# Patient Record
Sex: FEMALE | Race: WHITE
[De-identification: names, ages, dates, MRNs, and addresses within clinical notes are randomized per-mention and may not be internally consistent; named-entity substitution may affect disease eponyms.]

---

## 2017-03-28 NOTE — OPERATIVE REPORT
PROCEDURE PHYSICIAN:   IVÁN OVALLE

 

DATE OF PROCEDURE:  

03/28/2017

 

PREOPERATIVE DIAGNOSIS:  

History of colon polyps. 

 

POSTOPERATIVE DIAGNOSIS:

Normal colon. 

 

PROCEDURE:

Colonoscopy. 

 

SURGEON:

Dr. Ovalle. 

 

ANESTHESIA:

Per CRNA. 

 

ESTIMATED BLOOD LOSS:

None. 

 

COMPLICATIONS:

None. 

 

INDICATIONS:

The patient is a 41-year-old female with recent history of

multiple colon polyps. She is recommended to have repeat

colonoscopy at this time. She understands the risks and benefits

and wished to proceed with procedure. Consent was signed on

chart. 

 

PROCEDURE:

The patient was taken to the endoscopy suite, placed in the left

lateral recumbent position. Timeout was performed. Digital rectal

exam was performed. There were no palpable polyps, masses or

ulcerations. The scope was inserted in the rectum and advanced

all way cecum with minimal difficulty. Prep was adequate. There

were no polyps, masses, ulcerations visualized of the cecum,

ascending, transverse, descending and sigmoid colon. Once in the

rectum, the scope was also retroflexed noting no other pathology.

The scope was returned to its normal position slowly withdrawn

until completely removed. The patient tolerated the procedure

well without any complications. She was taken to the recovery

room in stable condition. 

 

RECOMMENDATIONS:

The patient would repeat colonoscopy in 5 years. If she has any

problems prior to that, she should be reevaluated at that time. 

 

 

Job ID: [id number]

Dictated Date: 03/28/2017 10:22:47 

Transcription Date: 03/28/2017 13:26:11 / maicol

## 2017-03-28 NOTE — PROGRESS NOTE-POST OPERATIVE
Post-Operative Progess Note


Surgeon (s)/Assistant (s)


Surgeon


IVÁN HOLLAND DO


Assistant:  none





Pre-Operative Diagnosis


history of polyps





Post-Operative Diagnosis





normal colon





Post-Op Procedure Note


Date of Procedure:  Mar 28, 2017


Name of Procedure Performed:  


colonoscopy


Description of the Procedure:  


see note


Findings of the Procedure


see note


Anesthesia Type


per crna


Estimated blood loss (mL):  none


Specimen(s) collected/removed


none











IVÁN HOLLAND DO Mar 28, 2017 11:02 am

## 2017-03-28 NOTE — DISCHARGE INST-SIMPLE/STANDARD
Discharge Inst-Standard


Patient Instructions/Follow Up


Plan of Care/Instructions/FU:  


Follow up with Dr. Ovalle as needed.


Will need colonoscopy in 5 years or sooner if changes to current


conditions.


Activity as Tolerated:  Yes


Discharge Diet:  No Restrictions











FREDERICK SIMMS Mar 28, 2017 10:35

## 2017-03-28 NOTE — PROGRESS NOTE-PRE OPERATIVE
Pre-Operative Progress Note


H&P Reviewed


The H&P was reviewed, patient examined and no changes noted.


Date H&P Reviewed:  Mar 28, 2017


Time H&P Reviewed:  09:54


Pre-Operative Diagnosis:  history of polyps











IVÁN HOLLAND DO Mar 28, 2017 11:01 am

## 2017-04-11 NOTE — XMS REPORT
[facility]

 Created on: 2015



Kate,  April

External Reference #: [id number]

: 1975

Sex: Female



Demographics







 Address  [address] 

[place]  [address]

 

 Home Phone  [phone]

 

 Preferred Language  Unknown

 

 Marital Status  Unknown

 

 Denominational Affiliation  Unknown

 

 Race  White

 

 Ethnic Group  Not  or 





Author







 Author  VENECIA SHARPE

 

 [facility]  [employer]

 

 Address  Unknown

 

 Phone  Unavailable







Care Team Providers







 Care Team Member Name  Role  Phone

 

 VENECIA SHARPE    Unavailable



                                                                



Allergies, Adverse Reactions, Alerts

          





 Substance  Reaction  Event Type

 

 Penicillin V Potassium  Info Not Available  Drug Allergy



                                                                               
         



Problems

          





 Problem Type  Condition  ICD-9 Code  Onset Dates  Condition Status

 

 Problem  Plantar wart  078.12     Active

 

 Assessment  Palpitations  785.1     Active

 

 Problem  Other and unspecified ovarian cyst  620.2     Active

 

 Problem  Postnasal drip  784.91     Active

 

 Problem  Rash and other nonspecific skin eruption  782.1     Active

 

 Problem  Acute sinusitis, unspecified  461.9     Active

 

 Problem  Unspecified episodic mood disorder  296.90     Active

 

 Problem  Impaired fasting glucose  790.21     Active

 

 Problem  Cervicalgia  723.1     Active

 

 Problem  Enlargement of lymph nodes  785.6     Active

 

 Problem  Personal history of tobacco use, presenting hazards to health  V15.82
     Active

 

 Problem  Need for prophylactic vaccination and inoculation, Influenza  V04.81 
    Active

 

 Problem  Chest pain, unspecified  786.50     Active

 

 Problem  Cough  786.2     Active

 

 Problem  Acute mucoid otitis media  381.02     Active

 

 Problem  Pain in joint, lower leg  719.46     Active

 

 Problem  Acute upper respiratory infections of unspecified site  465.9     
Active

 

 Problem  Leukocytosis, unspecified  288.60     Active

 

 Problem  Screening for malignant neoplasm of the cervix  V76.2     Active

 

 Problem  Ingrowing nail  703.0     Active

 

 Problem  Dermatophytosis of nail  110.1     Active

 

 Problem  Allergic rhinitis due to pollen  477.0     Active

 

 Problem  Allergic rhinitis, cause unspecified  477.9     Active

 

 Problem  Unspecified contraceptive management  V25.9     Active

 

 Problem  Unspecified breast screening  V76.10     Active



                                                                               
                                                                               
                                                                               
                                                                               
            



Medications

          





 Medication  Code System  Code  Instructions  Start Date  End Date  Status  
Dosage

 

 Flonase  NDC  [address]-15  50 mcg/actuation    2014        take 1 
sprays by Nasal route 2 times per dayin each nostril

 

 Triamcinolone Acetonide  NDC  [address]-15  0.1 %    2014        
apply a thin layer to the affected area(s)  by Topical route 3 times per day 
dispense 60 gram tube

 

 Allegra Allergy  NDC  35268-62617  180 mg    2014        take 1 
tablet (180 mg) by oral route once daily

 

 Antacid  NDC  [address]-92  500 MG Orally Once a day           1 tablet

 

 Zoloft  NDC  [address]-68  25 MG Orally Once a day  May 28, 2015        1 
tablet



                                                                               
                                                 



Procedures

          





 Procedure  Coding System  Code  Date

 

 ASSAY OF MAGNESIUM  CPT-4  84203  2015

 

 COMPLETE CBC W/AUTO DIFF WBC  CPT-4  35597  2015

 

 COMPREHEN METABOLIC PANEL  CPT-4  67508  2015

 

 ELECTROCARDIOGRAM, TRACING  CPT-4  41812  2015

 

 ASSAY THYROID STIM HORMONE  CPT-4  32536  2015

 

 VENIPUNCT, ROUTINE*  CPT-4  82469  2015

 

 Office Visit, Est Pt., Level 4  CPT-4  09304  2015



                                                                               
                                                                               



Vital Signs

          





 Date/Time:  2015

 

 Cardiac Monitoring Heart Rate  80 bpm

 

 Temperature  97.4 F

 

 Height  69 in

 

 Blood Pressure Diastolic  74 mmHg

 

 Blood Pressure Systolic  116 mmHg



                                                                    



Results

          





 Name  Result  Date  Reference Range  Unit  Abnormality Flag

 

 MAGNESIUM, SERUM               

 

 ROUTINE VENIPUNCTURE               

 

 EKG, TRACING (IN-HOUSE)               



                                                                               
         



Summary Purpose

          [employer] Submission

## 2017-04-11 NOTE — XMS REPORT
[facility]

 Created on: 2016



Kate,  April

External Reference #: [id number]

: 1975

Sex: Female



Demographics







 Address  [address] [place]  [address]

 

 Home Phone  [phone]

 

 Preferred Language  Unknown

 

 Marital Status  Unknown

 

 Bahai Affiliation  Unknown

 

 Race  White

 

 Ethnic Group  Not  or 





Author







 Author  VENECIA SHARPE

 

 Organization  [employer]

 

 Address  Unknown

 

 Phone  Unavailable







Care Team Providers







 Care Team Member Name  Role  Phone

 

 VENECIA SHARPE  CP  Unavailable



                                                                



Allergies

          No Known Allergies                                                   
                                     



Problems

          





 Problem Type  Condition  Code  Onset Dates  Condition Status

 

 Problem  Upper abdominal pain  R10.10     Active

 

 Problem  Bowel habit changes  R19.4     Active

 

 Problem  Dyspepsia  R10.13     Active

 

 Problem  Type 2 diabetes mellitus without complication, without long-term 
current use of insulin  E11.9     Active

 

 Assessment  Type 2 diabetes mellitus without complication, without long-term 
current use of insulin  E11.9     Active

 

 Problem  History of leukocytosis  Z86.2     Active

 

 Problem  Helicobacter pylori (H. pylori) infection  A04.8     Active



                                                                               
                                                                     



Medications

          





 Medication  Code System  Code  Instructions  Start Date  End Date  Status  
Dosage

 

 MetFORMIN HCl ER  NDC  [address]-73  500 MG Orally twice a day  Aug 03, 2016 
       1 tab with evening meal x 7 days, 1 tab twice day, 2 tabs in am and 1 
tab in evening, 2 tabs twice day with meals



                                                                              



Results

          No Known Results                                                     
               



Summary Purpose

          [employer] Submission

## 2017-04-11 NOTE — XMS REPORT
[facility]

 Created on: 2015



Kate,  April

External Reference #: [id number]

: 1975

Sex: Female



Demographics







 Address  [address] 

[place]  [address]

 

 Home Phone  [phone]

 

 Preferred Language  Unknown

 

 Marital Status  Unknown

 

 Mosque Affiliation  Unknown

 

 Race  White

 

 Ethnic Group  Not  or 





Author







 Author  VENECIA SHARPE

 

 Organization  [employer]

 

 Address  Unknown

 

 Phone  Unavailable







Care Team Providers







 Care Team Member Name  Role  Phone

 

 VENECIA SHARPE  CP  Unavailable



                                                                



Allergies

          No Known Allergies                                                   
                                     



Problems

          





 Problem Type  Condition  ICD-9 Code  Onset Dates  Condition Status

 

 Problem  Plantar wart  078.12     Active

 

 Assessment  Palpitations  785.1     Active

 

 Problem  Other and unspecified ovarian cyst  620.2     Active

 

 Problem  Postnasal drip  784.91     Active

 

 Problem  Rash and other nonspecific skin eruption  782.1     Active

 

 Problem  Acute sinusitis, unspecified  461.9     Active

 

 Problem  Unspecified episodic mood disorder  296.90     Active

 

 Problem  Impaired fasting glucose  790.21     Active

 

 Problem  Cervicalgia  723.1     Active

 

 Problem  Enlargement of lymph nodes  785.6     Active

 

 Problem  Personal history of tobacco use, presenting hazards to health  V15.82
     Active

 

 Problem  Need for prophylactic vaccination and inoculation, Influenza  V04.81 
    Active

 

 Problem  Chest pain, unspecified  786.50     Active

 

 Problem  Cough  786.2     Active

 

 Problem  Acute mucoid otitis media  381.02     Active

 

 Problem  Pain in joint, lower leg  719.46     Active

 

 Problem  Acute upper respiratory infections of unspecified site  465.9     
Active

 

 Problem  Leukocytosis, unspecified  288.60     Active

 

 Problem  Screening for malignant neoplasm of the cervix  V76.2     Active

 

 Problem  Ingrowing nail  703.0     Active

 

 Problem  Dermatophytosis of nail  110.1     Active

 

 Problem  Allergic rhinitis due to pollen  477.0     Active

 

 Problem  Allergic rhinitis, cause unspecified  477.9     Active

 

 Problem  Unspecified contraceptive management  V25.9     Active

 

 Problem  Unspecified breast screening  V76.10     Active



                                                                               
                                                                               
                                                                               
                                                                               
            



Medications

          No Known Medications                                                 
                                       



Procedures

          





 Procedure  Coding System  Code  Date

 

 VENIPUNCT, ROUTINE*  CPT-4  05857  2015

 

 LIPID PANEL  CPT-4  57298  2015



                                                                              



Results

          





 Name  Result  Date  Reference Range  Unit  Abnormality Flag

 

 ROUTINE VENIPUNCTURE               



                                                                    



Summary Purpose

          [employer]ClinicalWorks Submission

## 2017-04-11 NOTE — XMS REPORT
[facility]

 Created on: 2017



Kate,  April

External Reference #: [id number]

: 1975

Sex: Female



Demographics







 Address  [address] [place]  [address]

 

 Preferred Language  Unknown

 

 Marital Status  Unknown

 

 Zoroastrian Affiliation  Unknown

 

 Race  Unknown

 

 Ethnic Group  Unknown





Author







 Author  VENECIA SHARPE

 

 [facility]

 

 Address  [address] [place]  [address]



 

 Phone  [phone]







Care Team Providers







 Care Team Member Name  Role  Phone

 

 VENECIA SHARPE  Unavailable  [phone]







PROBLEMS







 Type  Condition  ICD9-CM Code  AMK90-JC Code  Onset Dates  Condition Status  
SNOMED Code

 

 Problem  Type 2 diabetes mellitus without complication, without long-term 
current use of insulin     E11.9     Active  145020163

 

 Problem  Mixed hyperlipidemia     E78.2     Active  472586435

 

 Problem  Dyspepsia     R10.13     Active  973023198

 

 Problem  History of leukocytosis     Z86.2     Active  650694066

 

 Problem  Helicobacter pylori (H. pylori) infection     A04.8     Active  
5304854

 

 Problem  Upper abdominal pain     R10.10     Active  64029352

 

 Problem  Bowel habit changes     R19.4     Active  46107329







ALLERGIES

Unknown Allergies



SOCIAL HISTORY

No smoking Hx information available



PLAN OF CARE





VITAL SIGNS





MEDICATIONS







 Medication  Instructions  Dosage  Frequency  Start Date  End Date  Duration  
Status

 

 MetFORMIN HCl  MG  Orally twice a day  2 tablets  12h  03 Aug, 2016     
30 days  Active







RESULTS

No Results



PROCEDURES

No Known procedures



IMMUNIZATIONS

No Known Immunizations

## 2017-04-11 NOTE — XMS REPORT
[facility]

 Created on: 2015whit,  April

External Reference #: [id number]

: 1975

Sex: Female



Demographics







 Address  [address]

[place]  [address]

 

 Home Phone  [phone]

 

 Preferred Language  Unknown

 

 Marital Status  Unknown

 

 Lutheran Affiliation  Unknown

 

 Race  White

 

 Ethnic Group  Not  or 





Author







 Author  VENECIA SHARPE

 

 [facility]  [employer]

 

 Address  Unknown

 

 Phone  Unavailable







Care Team Providers







 Care Team Member Name  Role  Phone

 

 VENECIA SHARPE  CP  Unavailable



                                                                



Allergies, Adverse Reactions, Alerts

          





 Substance  Reaction  Event Type

 

 Penicillin V Potassium  Info Not Available  Drug Allergy



                                                                               
         



Problems

          





 Problem Type  Condition  Code  Onset Dates  Condition Status

 

 Assessment  Hyperlipidemia  272.4     Active

 

 Assessment  Encounter for PPD test  V74.1     Active

 

 Assessment  Physical exam, pre-employment  V70.5     Active

 

 Problem  Other and unspecified ovarian cyst  620.2     Active

 

 Problem  Postnasal drip  784.91     Active

 

 Problem  Rash and other nonspecific skin eruption  782.1     Active

 

 Problem  Chest pain, unspecified  786.50     Active

 

 Problem  Unspecified episodic mood disorder  296.90     Active

 

 Problem  Acute mucoid otitis media  381.02     Active

 

 Problem  Acute sinusitis, unspecified  461.9     Active

 

 Problem  Need for prophylactic vaccination and inoculation, Influenza  V04.81 
    Active

 

 Problem  Impaired fasting glucose  790.21     Active

 

 Problem  Ingrowing nail  703.0     Active

 

 Problem  Enlargement of lymph nodes  785.6     Active

 

 Problem  Hyperlipidemia  272.4     Active

 

 Problem  Personal history of tobacco use, presenting hazards to health  V15.82
     Active

 

 Problem  Acute upper respiratory infections of unspecified site  465.9     
Active

 

 Problem  Cough  786.2     Active

 

 Problem  Cervicalgia  723.1     Active

 

 Problem  Pain in joint, lower leg  719.46     Active

 

 Problem  Screening for malignant neoplasm of the cervix  V76.2     Active

 

 Problem  Unspecified contraceptive management  V25.9     Active

 

 Problem  Dermatophytosis of nail  110.1     Active

 

 Problem  Leukocytosis, unspecified  288.60     Active

 

 Problem  Allergic rhinitis, cause unspecified  477.9     Active

 

 Problem  Plantar wart  078.12     Active

 

 Problem  Unspecified breast screening  V76.10     Active

 

 Problem  Allergic rhinitis due to pollen  477.0     Active



                                                                               
                                                                               
                                                                               
                                                                               
                                          



Medications

          





 Medication  Code System  Code  Instructions  Start Date  End Date  Status  
Dosage

 

 Atorvastatin Calcium  NDC  [address]-69  20 MG Orally Once a day  2015        1 tablet

 

 Zoloft  NDC  [address]-56  25 MG Orally Once a day  May 28, 2015        1 
tablet

 

 Depo-Provera  NDC  [address]-55  150 MG/ML Intramuscular            1 ml



                                                                               
                             



Procedures

          





 Procedure  Coding System  Code  Date

 

 TB INTRADERMAL TEST  CPT-4  23532  2015

 

 Office Visit, Est Pt., Level 3  CPT-4  29908  2015



                                                                               
                             



Vital Signs

          





 Date/Time:  2015

 

 Temperature  98.3 F

 

 Weight  208.5 lbs

 

 Height  69 in

 

 BMI  30.79 Index

 

 Blood Pressure Diastolic  70 mmHg

 

 Blood Pressure Systolic  118 mmHg

 

 Cardiac Monitoring Heart Rate  60 bpm



                                                                    



Results

          





 Name  Result  Date  Reference Range  Unit  Abnormality Flag

 

 TB INTRADERMAL               



                                                                    



Summary Purpose

          [employer]ClinicalWorks Submission

## 2017-04-11 NOTE — XMS REPORT
[facility]

 Created on: 2016



Kate,  April

External Reference #: [id number]

: 1975

Sex: Female



Demographics







 Address  [address] [place]  [address]

 

 Home Phone  [phone]

 

 Preferred Language  Unknown

 

 Marital Status  Unknown

 

 Taoist Affiliation  Unknown

 

 Race  White

 

 Ethnic Group  Not  or 





Author







 Author  VENECIA SHARPE

 

 Organization  [employer]

 

 Address  Unknown

 

 Phone  Unavailable







Care Team Providers







 Care Team Member Name  Role  Phone

 

 VENECIA SHARPE  CP  Unavailable



                                                                



Allergies

          No Known Allergies                                                   
                                     



Problems

          





 Problem Type  Condition  Code  Onset Dates  Condition Status

 

 Problem  Upper abdominal pain  R10.10     Active

 

 Problem  Bowel habit changes  R19.4     Active

 

 Problem  Dyspepsia  R10.13     Active

 

 Problem  Type 2 diabetes mellitus without complication, without long-term 
current use of insulin  E11.9     Active

 

 Assessment  Type 2 diabetes mellitus without complication, without long-term 
current use of insulin  E11.9     Active

 

 Problem  History of leukocytosis  Z86.2     Active

 

 Problem  Helicobacter pylori (H. pylori) infection  A04.8     Active



                                                                               
                                                                     



Medications

          No Known Medications                                                 
                                       



Procedures

          





 Procedure  Coding System  Code  Date

 

 MICROALBUMIN, SEMIQUANT  CPT-4  82096  Aug 08, 2016

 

 VENIPUNCT, ROUTINE*  CPT-4  14443  Aug 08, 2016

 

 GLYCATED HEMOGLOBIN TEST  CPT-4  72696  Aug 08, 2016

 

 MICROALBUMIN, QUANTITATIVE  CPT-4  22477  Aug 08, 2016

 

 ASSAY OF URINE CREATININE  CPT-4  60339  Aug 08, 2016



                                                                               
                                       



Results

          No Known Results                                                     
               



Summary Purpose

          [employer]ClinicalWorks Submission

## 2017-04-11 NOTE — XMS REPORT
[facility]

 Created on: 12/15/2015



Kate,  April

External Reference #: [id number]

: 1975

Sex: Female



Demographics







 Address  [address]

[place]  [address]

 

 Home Phone  [phone]

 

 Preferred Language  Unknown

 

 Marital Status  Unknown

 

 Bahai Affiliation  Unknown

 

 Race  White

 

 Ethnic Group  Not  or 





Author







 VENECIA Combs

 

 [facility]  eClinicalWorks

 

 Address  Unknown

 

 Phone  Unavailable







Care Team Providers







 Care Team Member Name  Role  Phone

 

 VENECIA SHARPE    Unavailable



                                                                



Allergies, Adverse Reactions, Alerts

          





 Substance  Reaction  Event Type

 

 Penicillin V Potassium  Info Not Available  Drug Allergy



                                                                               
         



Problems

          





 Problem Type  Condition  Code  Onset Dates  Condition Status

 

 Assessment  Localized swelling of lower leg  R22.40     Active

 

 Assessment  Left leg pain  M79.605     Active

 

 Problem  Postnasal drip  784.91     Active

 

 Problem  Chest pain, unspecified  786.50     Active

 

 Problem  Unspecified episodic mood disorder  296.90     Active

 

 Problem  Personal history of tobacco use, presenting hazards to health  V15.82
     Active

 

 Problem  Acute sinusitis, unspecified  461.9     Active

 

 Problem  Enlargement of lymph nodes  785.6     Active

 

 Problem  Acute mucoid otitis media  381.02     Active

 

 Problem  Acute upper respiratory infections of unspecified site  465.9     
Active

 

 Problem  Cough  786.2     Active

 

 Problem  Left leg pain  M79.605     Active

 

 Problem  Hyperlipidemia  272.4     Active

 

 Problem  Leukocytosis, unspecified  288.60     Active

 

 Problem  Dermatophytosis of nail  110.1     Active

 

 Problem  Localized swelling of lower leg  R22.40     Active

 

 Problem  Ingrowing nail  703.0     Active

 

 Problem  Cervicalgia  723.1     Active

 

 Problem  Pain in joint, lower leg  719.46     Active

 

 Problem  Need for prophylactic vaccination and inoculation, Influenza  V04.81 
    Active

 

 Problem  Impaired fasting glucose  790.21     Active

 

 Problem  Unspecified breast screening  V76.10     Active

 

 Problem  Allergic rhinitis due to pollen  477.0     Active

 

 Problem  Screening for malignant neoplasm of the cervix  V76.2     Active

 

 Problem  Unspecified contraceptive management  V25.9     Active

 

 Problem  Other and unspecified ovarian cyst  620.2     Active

 

 Problem  Rash and other nonspecific skin eruption  782.1     Active

 

 Problem  Allergic rhinitis, cause unspecified  477.9     Active

 

 Problem  Plantar wart  078.12     Active



                                                                               
                                                                               
                                                                               
                                                                               
                                                    



Medications

          





 Medication  Code System  Code  Instructions  Start Date  End Date  Status  
Dosage

 

 Depo-Provera  NDC  [address]-68  150 MG/ML Intramuscular            1 ml

 

 Flonase  NDC  [address]-62  50 MCG/ACT Nasally Once a day           1 spray 
in each nostril

 

 Zoloft  NDC  [address]-63  25 MG Orally Once a day  May 28, 2015        1 
tablet

 

 Fish Oil  NDC  [address]-62  300 MG Orally Twice a day           1 capsule



                                                                               
                                       



Procedures

          





 Procedure  Coding System  Code  Date

 

 Office Visit, Est Pt., Level 3  CPT-4  93447  Dec 10, 2015



                                                                               
                   



Vital Signs

          





 Date/Time:  Dec 10, 2015

 

 Temperature  99.5 F

 

 Weight  215.9 lbs

 

 Height  69 in

 

 BMI  31.88 Index

 

 Blood Pressure Diastolic  80 mmHg

 

 Blood Pressure Systolic  122 mmHg

 

 Cardiac Monitoring Heart Rate  84 bpm



                                                                              



Results

          No Known Results                                                     
               



Summary Purpose

          eClinicalWorks Submission

## 2017-04-11 NOTE — XMS REPORT
[facility]

 Created on: 2015



Kate,  April

External Reference #: [id number]

: 1975

Sex: Female



Demographics







 Address  [address] 

[place]  [address]

 

 Home Phone  [phone]

 

 Preferred Language  Unknown

 

 Marital Status  Unknown

 

 Mosque Affiliation  Unknown

 

 Race  White

 

 Ethnic Group  Not  or 





Author







 Author  VENECIA SHARPE

 

 Organization  eClinicalWorks

 

 Address  Unknown

 

 Phone  Unavailable







Care Team Providers







 Care Team Member Name  Role  Phone

 

 VENECIA SHARPE  CP  Unavailable



                                                                



Allergies

          No Known Allergies                                                   
                                     



Problems

          





 Problem Type  Condition  Code  Onset Dates  Condition Status

 

 Assessment  Encounter for immunization  Z23     Active

 

 Problem  Other and unspecified ovarian cyst  620.2     Active

 

 Problem  Postnasal drip  784.91     Active

 

 Problem  Rash and other nonspecific skin eruption  782.1     Active

 

 Problem  Chest pain, unspecified  786.50     Active

 

 Problem  Unspecified episodic mood disorder  296.90     Active

 

 Problem  Acute mucoid otitis media  381.02     Active

 

 Problem  Acute sinusitis, unspecified  461.9     Active

 

 Problem  Need for prophylactic vaccination and inoculation, Influenza  V04.81 
    Active

 

 Problem  Impaired fasting glucose  790.21     Active

 

 Problem  Ingrowing nail  703.0     Active

 

 Problem  Enlargement of lymph nodes  785.6     Active

 

 Problem  Hyperlipidemia  272.4     Active

 

 Problem  Personal history of tobacco use, presenting hazards to health  V15.82
     Active

 

 Problem  Acute upper respiratory infections of unspecified site  465.9     
Active

 

 Problem  Cough  786.2     Active

 

 Problem  Cervicalgia  723.1     Active

 

 Problem  Pain in joint, lower leg  719.46     Active

 

 Problem  Screening for malignant neoplasm of the cervix  V76.2     Active

 

 Problem  Unspecified contraceptive management  V25.9     Active

 

 Problem  Dermatophytosis of nail  110.1     Active

 

 Problem  Leukocytosis, unspecified  288.60     Active

 

 Problem  Allergic rhinitis, cause unspecified  477.9     Active

 

 Problem  Plantar wart  078.12     Active

 

 Problem  Unspecified breast screening  V76.10     Active

 

 Problem  Allergic rhinitis due to pollen  477.0     Active



                                                                               
                                                                               
                                                                               
                                                                               
                      



Medications

          No Known Medications                                                 
                                       



Procedures

          





 Procedure  Coding System  Code  Date

 

 SINGLE IMMUNIZATION ADMIN  CPT-4  91584  2015

 

 FLUARIX QUAD (3 & UP)-GSK-  CPT-4  01843  2015



                                                                               
         



Results

          No Known Results                                                     
                                   



Immunizations

          





 Vaccine  Administration Date

 

 FLUARIX QUAD (3 & UP)-GSK-2015



                                                                    



Summary Purpose

          eClinicalWorks Submission

## 2017-04-11 NOTE — XMS REPORT
[facility]

 Created on: 2015



Kate,  April

External Reference #: [id number]

: 1975

Sex: Female



Demographics







 Address  [address] 

[place]  [address]

 

 Home Phone  [phone]

 

 Preferred Language  Unknown

 

 Marital Status  Unknown

 

 Amish Affiliation  Unknown

 

 Race  White

 

 Ethnic Group  Not  or 





Author







 Author  VENECIA SHARPE

 

 Organization  eClinicalWorks

 

 Address  Unknown

 

 Phone  Unavailable







Care Team Providers







 Care Team Member Name  Role  Phone

 

 VENECIA SHARPE  CP  Unavailable



                                                                



Allergies

          No Known Allergies                                                   
                                     



Problems

          





 Problem Type  Condition  ICD-9 Code  Onset Dates  Condition Status

 

 Problem  Plantar wart  078.12     Active

 

 Assessment  Elevated blood sugar  790.29     Active

 

 Problem  Other and unspecified ovarian cyst  620.2     Active

 

 Problem  Postnasal drip  784.91     Active

 

 Problem  Rash and other nonspecific skin eruption  782.1     Active

 

 Problem  Acute sinusitis, unspecified  461.9     Active

 

 Problem  Unspecified episodic mood disorder  296.90     Active

 

 Problem  Impaired fasting glucose  790.21     Active

 

 Problem  Cervicalgia  723.1     Active

 

 Problem  Enlargement of lymph nodes  785.6     Active

 

 Problem  Personal history of tobacco use, presenting hazards to health  V15.82
     Active

 

 Problem  Need for prophylactic vaccination and inoculation, Influenza  V04.81 
    Active

 

 Problem  Chest pain, unspecified  786.50     Active

 

 Problem  Cough  786.2     Active

 

 Problem  Acute mucoid otitis media  381.02     Active

 

 Problem  Pain in joint, lower leg  719.46     Active

 

 Problem  Acute upper respiratory infections of unspecified site  465.9     
Active

 

 Problem  Leukocytosis, unspecified  288.60     Active

 

 Problem  Screening for malignant neoplasm of the cervix  V76.2     Active

 

 Problem  Ingrowing nail  703.0     Active

 

 Problem  Dermatophytosis of nail  110.1     Active

 

 Problem  Allergic rhinitis due to pollen  477.0     Active

 

 Problem  Allergic rhinitis, cause unspecified  477.9     Active

 

 Problem  Unspecified contraceptive management  V25.9     Active

 

 Problem  Unspecified breast screening  V76.10     Active



                                                                               
                                                                               
                                                                               
                                                                               
            



Medications

          No Known Medications                                                 
                                       



Procedures

          





 Procedure  Coding System  Code  Date

 

 GLYCATED HEMOGLOBIN TEST  CPT-4  58032  2015



                                                                              



Results

          No Known Results                                                     
               



Summary Purpose

          eClinicalWorks Submission

## 2017-04-11 NOTE — XMS REPORT
[facility]

 Created on: 2015



Kate,  April

External Reference #: [id number]

: 1975

Sex: Female



Demographics







 Address  [address] 

[place]  [address]

 

 Home Phone  [phone]

 

 Preferred Language  Unknown

 

 Marital Status  Unknown

 

 Restorationist Affiliation  Unknown

 

 Race  White

 

 Ethnic Group  Not  or 





Author







 Author  MARY GARCIA

 

 Organization  eClinicalWorks

 

 Address  Unknown

 

 Phone  Unavailable







Care Team Providers







 Care Team Member Name  Role  Phone

 

 MARY GARCIA  CP  Unavailable



                                                                



Allergies

          No Known Allergies                                                   
                                     



Problems

          





 Problem Type  Condition  ICD-9 Code  Onset Dates  Condition Status

 

 Problem  Rash and other nonspecific skin eruption  782.1     Active

 

 Problem  Acute sinusitis, unspecified  461.9     Active

 

 Problem  Unspecified episodic mood disorder  296.90     Active

 

 Problem  Impaired fasting glucose  790.21     Active

 

 Problem  Enlargement of lymph nodes  785.6     Active

 

 Problem  Cervicalgia  723.1     Active

 

 Problem  Personal history of tobacco use, presenting hazards to health  V15.82
     Active

 

 Problem  Chest pain, unspecified  786.50     Active

 

 Problem  Need for prophylactic vaccination and inoculation, Influenza  V04.81 
    Active

 

 Problem  Cough  786.2     Active

 

 Problem  Acute mucoid otitis media  381.02     Active

 

 Problem  Pain in joint, lower leg  719.46     Active

 

 Problem  Acute upper respiratory infections of unspecified site  465.9     
Active

 

 Problem  Leukocytosis, unspecified  288.60     Active

 

 Problem  Screening for malignant neoplasm of the cervix  V76.2     Active

 

 Problem  Ingrowing nail  703.0     Active

 

 Problem  Dermatophytosis of nail  110.1     Active

 

 Problem  Allergic rhinitis due to pollen  477.0     Active

 

 Problem  Allergic rhinitis, cause unspecified  477.9     Active

 

 Problem  Unspecified contraceptive management  V25.9     Active

 

 Problem  Plantar wart  078.12     Active

 

 Problem  Postnasal drip  784.91     Active

 

 Problem  Unspecified breast screening  V76.10     Active

 

 Problem  Other and unspecified ovarian cyst  620.2     Active



                                                                               
                                                                               
                                                                               
                                                                               
  



Medications

          





 Medication  Code System  Code  Instructions  Start Date  End Date  Status  
Dosage

 

 Silvadene  NDC  [address]-31  1 % Externally Once a day  2015        
1 application to affected area



                                                                              



Results

          No Known Results                                                     
               



Summary Purpose

          eClinicalWorks Submission

## 2017-04-11 NOTE — XMS REPORT
[facility]

 Created on: 2015



Kate,  April

External Reference #: [id number]

: 1975

Sex: Female



Demographics







 Address  [address] 

[place]  [address]

 

 Home Phone  [phone]

 

 Preferred Language  Unknown

 

 Marital Status  Unknown

 

 Orthodoxy Affiliation  Unknown

 

 Race  White

 

 Ethnic Group  Not  or 





Author







 Author  VENECIA SHARPE

 

 Organization  eClinicalWorks

 

 Address  Unknown

 

 Phone  Unavailable







Care Team Providers







 Care Team Member Name  Role  Phone

 

 VENECIA SHARPE  CP  Unavailable



                                                                



Allergies

          No Known Allergies                                                   
                                     



Problems

          





 Problem Type  Condition  Code  Onset Dates  Condition Status

 

 Problem  Postnasal drip  784.91     Active

 

 Problem  Chest pain, unspecified  786.50     Active

 

 Problem  Unspecified episodic mood disorder  296.90     Active

 

 Problem  Personal history of tobacco use, presenting hazards to health  V15.82
     Active

 

 Problem  Acute sinusitis, unspecified  461.9     Active

 

 Problem  Enlargement of lymph nodes  785.6     Active

 

 Problem  Acute mucoid otitis media  381.02     Active

 

 Problem  Acute upper respiratory infections of unspecified site  465.9     
Active

 

 Problem  Cough  786.2     Active

 

 Problem  Left leg pain  M79.605     Active

 

 Problem  Hyperlipidemia  272.4     Active

 

 Problem  Leukocytosis, unspecified  288.60     Active

 

 Problem  Dermatophytosis of nail  110.1     Active

 

 Problem  Localized swelling of lower leg  R22.40     Active

 

 Problem  Ingrowing nail  703.0     Active

 

 Problem  Cervicalgia  723.1     Active

 

 Problem  Pain in joint, lower leg  719.46     Active

 

 Problem  Need for prophylactic vaccination and inoculation, Influenza  V04.81 
    Active

 

 Problem  Impaired fasting glucose  790.21     Active

 

 Problem  Unspecified breast screening  V76.10     Active

 

 Problem  Allergic rhinitis due to pollen  477.0     Active

 

 Problem  Screening for malignant neoplasm of the cervix  V76.2     Active

 

 Problem  Unspecified contraceptive management  V25.9     Active

 

 Problem  Other and unspecified ovarian cyst  620.2     Active

 

 Problem  Rash and other nonspecific skin eruption  782.1     Active

 

 Problem  Allergic rhinitis, cause unspecified  477.9     Active

 

 Problem  Plantar wart  078.12     Active



                                                                               
                                                                               
                                                                               
                                                                               
                                



Medications

          No Known Medications                                                 
                             



Results

          No Known Results                                                     
               



Summary Purpose

          eClinicalWorks Submission

## 2017-04-11 NOTE — XMS REPORT
[facility]

 Created on: 12/10/2015



Bunney,  April

External Reference #: [id number]

: 1975

Sex: Female



Demographics







 Address  [address] 

[place]  [address]

 

 Home Phone  [phone]

 

 Preferred Language  Unknown

 

 Marital Status  Unknown

 

 Orthodoxy Affiliation  Unknown

 

 Race  White

 

 Ethnic Group  Not  or 





Author







 Author  VENECIA SHARPE

 

 [facility]  eClinicalWorks

 

 Address  Unknown

 

 Phone  Unavailable







Care Team Providers







 Care Team Member Name  Role  Phone

 

 VENECIA SHARPE  CP  Unavailable



                                                                



Allergies, Adverse Reactions, Alerts

          





 Substance  Reaction  Event Type

 

 Penicillin V Potassium  Info Not Available  Drug Allergy



                                                                               
         



Problems

          





 Problem Type  Condition  Code  Onset Dates  Condition Status

 

 Assessment  Left leg pain  M79.605     Active

 

 Problem  Postnasal drip  784.91     Active

 

 Problem  Rash and other nonspecific skin eruption  782.1     Active

 

 Problem  Chest pain, unspecified  786.50     Active

 

 Problem  Unspecified episodic mood disorder  296.90     Active

 

 Problem  Personal history of tobacco use, presenting hazards to health  V15.82
     Active

 

 Problem  Acute sinusitis, unspecified  461.9     Active

 

 Problem  Cough  786.2     Active

 

 Problem  Acute mucoid otitis media  381.02     Active

 

 Problem  Hyperlipidemia  272.4     Active

 

 Problem  Need for prophylactic vaccination and inoculation, Influenza  V04.81 
    Active

 

 Problem  Dermatophytosis of nail  110.1     Active

 

 Problem  Ingrowing nail  703.0     Active

 

 Problem  Left leg pain  M79.605     Active

 

 Problem  Enlargement of lymph nodes  785.6     Active

 

 Problem  Pain in joint, lower leg  719.46     Active

 

 Problem  Acute upper respiratory infections of unspecified site  465.9     
Active

 

 Problem  Impaired fasting glucose  790.21     Active

 

 Problem  Cervicalgia  723.1     Active

 

 Problem  Unspecified contraceptive management  V25.9     Active

 

 Problem  Unspecified breast screening  V76.10     Active

 

 Problem  Leukocytosis, unspecified  288.60     Active

 

 Problem  Screening for malignant neoplasm of the cervix  V76.2     Active

 

 Problem  Plantar wart  078.12     Active

 

 Problem  Other and unspecified ovarian cyst  620.2     Active

 

 Problem  Allergic rhinitis due to pollen  477.0     Active

 

 Problem  Allergic rhinitis, cause unspecified  477.9     Active



                                                                               
                                                                               
                                                                               
                                                                               
                                



Medications

          





 Medication  Code System  Code  Instructions  Start Date  End Date  Status  
Dosage

 

 Zoloft  NDC  [address]-12  25 MG Orally Once a day  May 28, 2015        1 
tablet

 

 Fish Oil  NDC  [address]-98  300 MG Orally Twice a day           1 capsule

 

 Depo-Provera  NDC  [address]-69  150 MG/ML Intramuscular            1 ml



                                                                               
                             



Procedures

          





 Procedure  Coding System  Code  Date

 

 Office Visit, Est Pt., Level 3  CPT-4  11990  Dec 01, 2015



                                                                               
                   



Vital Signs

          





 Date/Time:  Dec 01, 2015

 

 Temperature  98.3 F

 

 Weight  216.9 lbs

 

 Height  69 in

 

 BMI  32.03 Index

 

 Blood Pressure Diastolic  76 mmHg

 

 Blood Pressure Systolic  118 mmHg

 

 Cardiac Monitoring Heart Rate  80 bpm



                                                                    



Results

          





 Name  Result  Date  Reference Range  Unit  Abnormality Flag

 

 Ultrasound : Venous Doppler (DVT EVAL)               



                                                                    



Summary Purpose

          eClinicalWorks Submission

## 2017-04-11 NOTE — XMS REPORT
[facility]

 Created on: 2016



Kate,  April

External Reference #: [id number]

: 1975

Sex: Female



Demographics







 Address  [address] [place]  [address]

 

 Home Phone  [phone]

 

 Preferred Language  Unknown

 

 Marital Status  Unknown

 

 Alevism Affiliation  Unknown

 

 Race  White

 

 Ethnic Group  Not  or 





Author







 Author  VENECIA SHARPE

 

 Organization  [employer]

 

 Address  Unknown

 

 Phone  Unavailable







Care Team Providers







 Care Team Member Name  Role  Phone

 

 VENECIA SHARPE  CP  Unavailable



                                                                



Allergies

          No Known Allergies                                                   
                                     



Problems

          





 Problem Type  Condition  Code  Onset Dates  Condition Status

 

 Assessment  Mixed hyperlipidemia  E78.2     Active

 

 Problem  Dyspepsia  R10.13     Active

 

 Problem  Upper abdominal pain  R10.10     Active

 

 Problem  Mixed hyperlipidemia  E78.2     Active

 

 Problem  Helicobacter pylori (H. pylori) infection  A04.8     Active

 

 Problem  Type 2 diabetes mellitus without complication, without long-term 
current use of insulin  E11.9     Active

 

 Problem  Bowel habit changes  R19.4     Active

 

 Problem  History of leukocytosis  Z86.2     Active



                                                                               
                                                                               



Medications

          No Known Medications                                                 
                                       



Procedures

          





 Procedure  Coding System  Code  Date

 

 VENIPUNCT, ROUTINE*  CPT-4  39506  2016

 

 LIPID PANEL  CPT-4  67520  2016



                                                                              



Results

          





 Name  Result  Date  Reference Range  Unit  Abnormality Flag

 

 ROUTINE VENIPUNCTURE               

 

 LIPID PANEL               

 

 ----VLDL Cholesterol Jani  44  51107852  5-40   mg/dL  H

 

 ----LDL Cholesterol Calc  136  22357794  0-99   mg/dL  H

 

 ----Triglycerides  220  23339165  0-149   mg/dL  H

 

 ----HDL Cholesterol  33  61617291  >39   mg/dL  L

 

 ----Cholesterol, Total  213  19650301  100-199   mg/dL  H



                                                                              



Summary Purpose

          [employer] Submission

## 2017-04-11 NOTE — XMS REPORT
[facility]

 Created on: 2016whit,  April

External Reference #: [id number]

: 1975

Sex: Female



Demographics







 Address  [address] [place]  [address]

 

 Home Phone  [phone]

 

 Preferred Language  Unknown

 

 Marital Status  Unknown

 

 Anabaptism Affiliation  Unknown

 

 Race  White

 

 Ethnic Group  Not  or 





Author







 VENECIA Combs

 

 [facility]  [employer]

 

 Address  Unknown

 

 Phone  Unavailable







Care Team Providers







 Care Team Member Name  Role  Phone

 

 VENECIA SHARPE    Unavailable



                                                                



Allergies, Adverse Reactions, Alerts

          





 Substance  Reaction  Event Type

 

 Penicillin V Potassium  Info Not Available  Drug Allergy



                                                                               
         



Problems

          





 Problem Type  Condition  Code  Onset Dates  Condition Status

 

 Assessment  Mixed hyperlipidemia  E78.2     Active

 

 Assessment  Type 2 diabetes mellitus without complication, without long-term 
current use of insulin  E11.9     Active

 

 Assessment  Helicobacter pylori (H. pylori) infection  A04.8     Active

 

 Problem  Dyspepsia  R10.13     Active

 

 Problem  Upper abdominal pain  R10.10     Active

 

 Problem  Mixed hyperlipidemia  E78.2     Active

 

 Problem  Helicobacter pylori (H. pylori) infection  A04.8     Active

 

 Problem  Type 2 diabetes mellitus without complication, without long-term 
current use of insulin  E11.9     Active

 

 Problem  Bowel habit changes  R19.4     Active

 

 Problem  History of leukocytosis  Z86.2     Active



                                                                               
                                                                               
                    



Medications

          





 Medication  Code System  Code  Instructions  Start Date  End Date  Status  
Dosage

 

 Zoloft  NDC  [address]-28  25 MG Orally Once a day  May 28, 2015        1 
tablet

 

 Depo-Provera  NDC  [address]-41  150 MG/ML Intramuscular            1 ml

 

 Biaxin  NDC  [address]-10  500 MG Orally every 12 hrs  Aug 02, 2016  Aug 16, 
2016     1 tablet

 

 Silvadene  NDC  [address]-90  1 % Externally Once a day  2015        
1 application to affected area

 

 Los Contour Next Monitor  NDC  [address]-79  w/Device    Aug 10, 2016      
  as directed

 

 Los Contour Next Test  NDC  0  Test Strips In Vitro Once a day  Aug 10, 2016
        as directed

 

 Flonase  NDC  [address]-90  50 MCG/ACT Nasally Once a day           1 spray 
in each nostril

 

 Metronidazole  NDC  [address]-79  500 MG Orally Twice a day  Aug 02, 2016  
Aug 16, 2016     1 tablet

 

 Nexium  NDC  [address]-38  40 mg Orally Once a day  Aug 02, 2016        1 
capsule

 

 Singulair  NDC  [address]-80  10 MG Orally Once a day           1 tablet in 
the evening

 

 MetFORMIN HCl ER  NDC  [address]-82  500 MG Orally twice a day  Aug 03, 2016 
       1 tab with evening meal x 7 days, 1 tab twice day, 2 tabs in am and 1 
tab in evening, 2 tabs twice day with meals

 

 Fish Oil  NDC  [address]-39  300 MG Orally Twice a day           1 capsule



                                                                               
                                                                               
                                        



Procedures

          





 Procedure  Coding System  Code  Date

 

 Office Visit, Est Pt., Level 4  CPT-4  95755  Aug 10, 2016



                                                                               
                   



Vital Signs

          





 Date/Time:  Aug 10, 2016

 

 Cardiac Monitoring Heart Rate  77 bpm

 

 Weight  216.2 lbs

 

 Height  69 in

 

 BMI  31.92 Index

 

 Blood Pressure Diastolic  83 mmHg

 

 Blood Pressure Systolic  128 mmHg



                                                                              



Results

          No Known Results                                                     
               



Summary Purpose

          [employer] Submission

## 2017-04-11 NOTE — XMS REPORT
[facility]

 Created on: 2015



Kate,  April

External Reference #: [id number]

: 1975

Sex: Female



Demographics







 Address  [address] 

[place]  [address]

 

 Home Phone  [phone]

 

 Preferred Language  Unknown

 

 Marital Status  Unknown

 

 Jain Affiliation  Unknown

 

 Race  White

 

 Ethnic Group  Not  or 





Author







 Author  VENECIA SHARPE

 

 Organization  [employer]

 

 Address  Unknown

 

 Phone  Unavailable







Care Team Providers







 Care Team Member Name  Role  Phone

 

 VENECIA SHARPE  CP  Unavailable



                                                                



Allergies

          No Known Allergies                                                   
                                     



Problems

          





 Problem Type  Condition  Code  Onset Dates  Condition Status

 

 Assessment  Left leg pain  M79.605     Active

 

 Problem  Postnasal drip  784.91     Active

 

 Problem  Chest pain, unspecified  786.50     Active

 

 Problem  Unspecified episodic mood disorder  296.90     Active

 

 Problem  Personal history of tobacco use, presenting hazards to health  V15.82
     Active

 

 Problem  Acute sinusitis, unspecified  461.9     Active

 

 Problem  Enlargement of lymph nodes  785.6     Active

 

 Problem  Acute mucoid otitis media  381.02     Active

 

 Problem  Acute upper respiratory infections of unspecified site  465.9     
Active

 

 Problem  Cough  786.2     Active

 

 Problem  Left leg pain  M79.605     Active

 

 Problem  Hyperlipidemia  272.4     Active

 

 Problem  Leukocytosis, unspecified  288.60     Active

 

 Problem  Dermatophytosis of nail  110.1     Active

 

 Problem  Localized swelling of lower leg  R22.40     Active

 

 Problem  Ingrowing nail  703.0     Active

 

 Problem  Cervicalgia  723.1     Active

 

 Problem  Pain in joint, lower leg  719.46     Active

 

 Problem  Need for prophylactic vaccination and inoculation, Influenza  V04.81 
    Active

 

 Problem  Impaired fasting glucose  790.21     Active

 

 Problem  Unspecified breast screening  V76.10     Active

 

 Problem  Allergic rhinitis due to pollen  477.0     Active

 

 Problem  Screening for malignant neoplasm of the cervix  V76.2     Active

 

 Problem  Unspecified contraceptive management  V25.9     Active

 

 Problem  Other and unspecified ovarian cyst  620.2     Active

 

 Problem  Rash and other nonspecific skin eruption  782.1     Active

 

 Problem  Allergic rhinitis, cause unspecified  477.9     Active

 

 Problem  Plantar wart  078.12     Active



                                                                               
                                                                               
                                                                               
                                                                               
                                          



Medications

          No Known Medications                                                 
                             



Results

          No Known Results                                                     
               



Summary Purpose

          [employer] Submission

## 2017-04-11 NOTE — XMS REPORT
Continuity of Care Document

 Created on: 2017



MAKAYLA PRAKASH APRIL

External Reference #: [id number]

: 1975

Sex: Female



Demographics







 [place] [address]

 

 Home Phone  [phone]

 

 Preferred Language  Unknown

 

 Marital Status  Unknown

 

 Jehovah's witness Affiliation  Unknown

 

 Race  Unknown

 

 Ethnic Group  Unknown





Author







 Author  [facility]

 

 Address  Unknown

 

 Phone  Unavailable



                                                      



Allergies

                      





 Active                    Description                    Code                  
  Type                    Severity                    Reaction                  
  Onset                    Reported/Identified                    Relationship 
to Patient                    Clinical Status                

 

 Yes                    Penicillins                                         
Drug Allergy                                                                   
                10/02/2012                                                     
     

 

 Yes                    Penicillins                                         
Drug Allergy                    N/A                    N/A                     
                    10/02/2012                                                 
         

 

 Yes                    Penicillins                    [id number]              
      Drug Allergy                    Unknown                    N/A           
                              2017                                       
                   



                                                                               
                             



Medications

                                                                               
         



Problems

                      





 Date Dx Coded                    Attending                    Type            
        Code                    Diagnosis                    Diagnosed By      
          

 

 10/06/2008                                                              V65.11
                    NEW MOMMY VISIT                                     

 

 10/06/2008                                                              V65.11
                    NEW MOMMY VISIT                                     

 

 10/06/2008                                                              V65.11
                    NEW MOMMY VISIT                                     

 

 10/06/2008                                                              V65.11
                    NEW MOMMY VISIT                                     

 

 10/06/2008                                                              V65.11
                    NEW MOMMY VISIT                                     

 

 10/06/2008                                                              V65.11
                    NEW MOMMY VISIT                                     

 

 10/06/2008                    MARY GARCIA DO K                                
         V65.11                    NEW MOMMY VISIT                             
        

 

 10/06/2008                    MARY GARCIA DO K                                
         V65.11                    NEW MOMMY VISIT                             
        

 

 10/06/2008                    MARY GARCIA DO K                                
         V65.11                    NEW MOMMY VISIT                             
        

 

 10/06/2008                    MAYR GARCIA DO K                                
         V65.11                    NEW MOMMY VISIT                             
        

 

 10/06/2008                    FRANCESCA BUSBY R                       
                  V65.11                    NEW MOMMY VISIT                    
                 

 

 10/06/2008                    MADL APRN, VENECIA L                             
            V65.11                    NEW MOMMY VISIT                          
           

 

 10/06/2008                    MADL APRN, VENECIA L                             
            V65.11                    NEW MOMMY VISIT                          
           

 

 10/06/2008                    MADL APRN, VENECIA L                             
            V65.11                    NEW MOMMY VISIT                          
           

 

 10/06/2008                    MADL APRN, VENECIA L                             
            V65.11                    NEW MOMMY VISIT                          
           

 

 10/06/2008                    MADL APRN, VENECIA L                             
            V65.11                    NEW MOMMY VISIT                          
           

 

 10/06/2008                    MADL APRN, VENECIA L                             
            V65.11                    NEW MOMMY VISIT                          
           

 

 10/06/2008                    YONNY TRENT R                           
              V65.11                    NEW MOMMY VISIT                        
             

 

 10/06/2008                    MADL APRN, VENECIA L                             
            V65.11                    NEW MOMMY VISIT                          
           

 

 10/06/2008                    GARCIA DO MARY K                                
         V65.11                    NEW MOMMY VISIT                             
        

 

 10/06/2008                    MADL APRN, VENECIA L                             
            V65.11                    NEW MOMMY VISIT                          
           

 

 10/06/2008                    GARCIA DO MARY K                                
         V65.11                    NEW MOMMY VISIT                             
        

 

 10/06/2008                    MADL APRN, VENECIA L                             
            V65.11                    NEW MOMMY VISIT                          
           

 

 2011                                                              034.0 
                   STREPTOCOCCAL SORE THROAT                                   
  

 

 2011                                                              034.0 
                   STREPTOCOCCAL SORE THROAT                                   
  

 

 2011                                                              034.0 
                   STREPTOCOCCAL SORE THROAT                                   
  

 

 2011                                                              034.0 
                   STREPTOCOCCAL SORE THROAT                                   
  

 

 2011                                                              034.0 
                   STREPTOCOCCAL SORE THROAT                                   
  

 

 2011                                                              034.0 
                   STREPTOCOCCAL SORE THROAT                                   
  

 

 2011                    MARY GARCIA DO K                                
         034.0                    STREPTOCOCCAL SORE THROAT                    
                 

 

 2011                    MARY GARCIA DO K                                
         034.0                    STREPTOCOCCAL SORE THROAT                    
                 

 

 2011                    MARY GARCIA DO K                                
         034.0                    STREPTOCOCCAL SORE THROAT                    
                 

 

 2011                    MARY GARCIA DO K                                
         034.0                    STREPTOCOCCAL SORE THROAT                    
                 

 

 2011                    JULIUS APRNALEXANDERIA R                       
                  034.0                    STREPTOCOCCAL SORE THROAT           
                          

 

 2011                    MADL APRN, VENECIA L                             
            034.0                    STREPTOCOCCAL SORE THROAT                 
                    

 

 2011                    MADL APRN, VENECIA L                             
            034.0                    STREPTOCOCCAL SORE THROAT                 
                    

 

 2011                    MADL APRN, VENECIA L                             
            034.0                    STREPTOCOCCAL SORE THROAT                 
                    

 

 2011                    MADL APRN, VENECIA L                             
            034.0                    STREPTOCOCCAL SORE THROAT                 
                    

 

 2011                    MADL APRN, VENECIA L                             
            034.0                    STREPTOCOCCAL SORE THROAT                 
                    

 

 2011                    MADL APRN, VENECIA L                             
            034.0                    STREPTOCOCCAL SORE THROAT                 
                    

 

 2011                    DAHLIA PARKINSON YONNY R                           
              034.0                    STREPTOCOCCAL SORE THROAT               
                      

 

 2011                    MADL APRN, VEENCIA L                             
            034.0                    STREPTOCOCCAL SORE THROAT                 
                    

 

 2011                    GARCIA DONGAA K                                
         034.0                    STREPTOCOCCAL SORE THROAT                    
                 

 

 2011                    MADL APRN, VENECIA L                             
            034.0                    STREPTOCOCCAL SORE THROAT                 
                    

 

 2011                    MARY GARCIA DO K                                
         034.0                    STREPTOCOCCAL SORE THROAT                    
                 

 

 2011                    MADL APRN, VENECIA L                             
            034.0                    STREPTOCOCCAL SORE THROAT                 
                    

 

 2011                                                              V25.02
                    GENERAL COUNSELING ON INITIATION OF OTHER CONTRACEPTIVE 
MEASURES                                     

 

 2011                                                              V72.31
                    ROUTINE GYNECOLOGICAL EXAMINATION                          
           

 

 2011                                                              V25.02
                    GENERAL COUNSELING ON INITIATION OF OTHER CONTRACEPTIVE 
MEASURES                                     

 

 2011                                                              V72.31
                    ROUTINE GYNECOLOGICAL EXAMINATION                          
           

 

 2011                                                              V25.02
                    GENERAL COUNSELING ON INITIATION OF OTHER CONTRACEPTIVE 
MEASURES                                     

 

 2011                                                              V72.31
                    ROUTINE GYNECOLOGICAL EXAMINATION                          
           

 

 2011                                                              V25.02
                    GENERAL COUNSELING ON INITIATION OF OTHER CONTRACEPTIVE 
MEASURES                                     

 

 2011                                                              V72.31
                    ROUTINE GYNECOLOGICAL EXAMINATION                          
           

 

 2011                                                              V25.02
                    GENERAL COUNSELING ON INITIATION OF OTHER CONTRACEPTIVE 
MEASURES                                     

 

 2011                                                              V72.31
                    ROUTINE GYNECOLOGICAL EXAMINATION                          
           

 

 2011                                                              V25.02
                    GENERAL COUNSELING ON INITIATION OF OTHER CONTRACEPTIVE 
MEASURES                                     

 

 2011                                                              V72.31
                    ROUTINE GYNECOLOGICAL EXAMINATION                          
           

 

 2011                    MARY GARCIA DO K                                
         V25.02                    GENERAL COUNSELING ON INITIATION OF OTHER 
CONTRACEPTIVE MEASURES                                     

 

 2011                    NGA GARCIA DOA K                                
         V72.31                    ROUTINE GYNECOLOGICAL EXAMINATION           
                          

 

 2011                    NGA GARCIA DOA K                                
         V25.02                    GENERAL COUNSELING ON INITIATION OF OTHER 
CONTRACEPTIVE MEASURES                                     

 

 2011                    NGA GARCIA DOA K                                
         V72.31                    ROUTINE GYNECOLOGICAL EXAMINATION           
                          

 

 2011                    NGA GARCIA DOA K                                
         V25.02                    GENERAL COUNSELING ON INITIATION OF OTHER 
CONTRACEPTIVE MEASURES                                     

 

 2011                    NGA GARCIA DOA K                                
         V72.31                    ROUTINE GYNECOLOGICAL EXAMINATION           
                          

 

 2011                    NGA GARCIA DOA K                                
         V25.02                    GENERAL COUNSELING ON INITIATION OF OTHER 
CONTRACEPTIVE MEASURES                                     

 

 2011                    GARCIA DO MARY K                                
         V72.31                    ROUTINE GYNECOLOGICAL EXAMINATION           
                          

 

 2011                    MADRID APRN FRANCESCA R                       
                  V25.02                    GENERAL COUNSELING ON INITIATION OF 
OTHER CONTRACEPTIVE MEASURES                                     

 

 2011                    MADRID APRN, FRANCESCA R                       
                  V72.31                    ROUTINE GYNECOLOGICAL EXAMINATION  
                                   

 

 2011                    MADL APRN, VENECIA L                             
            V25.02                    GENERAL COUNSELING ON INITIATION OF OTHER 
CONTRACEPTIVE MEASURES                                     

 

 2011                    MADL APRN, VENECIA L                             
            V72.31                    ROUTINE GYNECOLOGICAL EXAMINATION        
                             

 

 2011                    MADL APRN, VENECIA L                             
            V25.02                    GENERAL COUNSELING ON INITIATION OF OTHER 
CONTRACEPTIVE MEASURES                                     

 

 2011                    MADL APRN, VENECIA L                             
            V72.31                    ROUTINE GYNECOLOGICAL EXAMINATION        
                             

 

 2011                    MADL APRN, VENECIA L                             
            V25.02                    GENERAL COUNSELING ON INITIATION OF OTHER 
CONTRACEPTIVE MEASURES                                     

 

 2011                    MADL APRN, VENECIA L                             
            V72.31                    ROUTINE GYNECOLOGICAL EXAMINATION        
                             

 

 2011                    MADL APRN, VENECIA L                             
            V25.02                    GENERAL COUNSELING ON INITIATION OF OTHER 
CONTRACEPTIVE MEASURES                                     

 

 2011                    MADL APRN, VENECIA L                             
            V72.31                    ROUTINE GYNECOLOGICAL EXAMINATION        
                             

 

 2011                    MADL APRN, VENECIA L                             
            V25.02                    GENERAL COUNSELING ON INITIATION OF OTHER 
CONTRACEPTIVE MEASURES                                     

 

 2011                    MADL APRN, VENECIA L                             
            V72.31                    ROUTINE GYNECOLOGICAL EXAMINATION        
                             

 

 2011                    MADL APRN, VENECIA L                             
            V25.02                    GENERAL COUNSELING ON INITIATION OF OTHER 
CONTRACEPTIVE MEASURES                                     

 

 2011                    MADL APRN, VENECIA L                             
            V72.31                    ROUTINE GYNECOLOGICAL EXAMINATION        
                             

 

 2011                    DAHLIA PARKINSON, YONNY R                           
              V25.02                    GENERAL COUNSELING ON INITIATION OF 
OTHER CONTRACEPTIVE MEASURES                                     

 

 2011                    DAHLIA CELESTINN, YONNY R                           
              V72.31                    ROUTINE GYNECOLOGICAL EXAMINATION      
                               

 

 2011                    MADL APRN, VENECIA L                             
            V25.02                    GENERAL COUNSELING ON INITIATION OF OTHER 
CONTRACEPTIVE MEASURES                                     

 

 2011                    MADL APRN, VENECIA L                             
            V72.31                    ROUTINE GYNECOLOGICAL EXAMINATION        
                             

 

 2011                    GARCIA DONGAA K                                
         V25.02                    GENERAL COUNSELING ON INITIATION OF OTHER 
CONTRACEPTIVE MEASURES                                     

 

 2011                    JOSE MEJIA MARY K                                
         V72.31                    ROUTINE GYNECOLOGICAL EXAMINATION           
                          

 

 2011                    MADL APRN, VENECIA L                             
            V25.02                    GENERAL COUNSELING ON INITIATION OF OTHER 
CONTRACEPTIVE MEASURES                                     

 

 2011                    MADL APRN, VENECIA L                             
            V72.31                    ROUTINE GYNECOLOGICAL EXAMINATION        
                             

 

 2011                    GARCIA DO MARY K                                
         V25.02                    GENERAL COUNSELING ON INITIATION OF OTHER 
CONTRACEPTIVE MEASURES                                     

 

 2011                    GARCIA DO MARY K                                
         V72.31                    ROUTINE GYNECOLOGICAL EXAMINATION           
                          

 

 2011                    MADL APRN, VENECIA L                             
            V25.02                    GENERAL COUNSELING ON INITIATION OF OTHER 
CONTRACEPTIVE MEASURES                                     

 

 2011                    MADL APRN, VENECIA L                             
            V72.31                    ROUTINE GYNECOLOGICAL EXAMINATION        
                             

 

 2011                                                              V25.49
                    SURVEILLANCE OF OTHER CONTRACEPTIVE METHOD                 
                    

 

 2011                                                              V25.49
                    SURVEILLANCE OF OTHER CONTRACEPTIVE METHOD                 
                    

 

 2011                                                              V25.49
                    SURVEILLANCE OF OTHER CONTRACEPTIVE METHOD                 
                    

 

 2011                                                              V25.49
                    SURVEILLANCE OF OTHER CONTRACEPTIVE METHOD                 
                    

 

 2011                                                              V25.49
                    SURVEILLANCE OF OTHER CONTRACEPTIVE METHOD                 
                    

 

 2011                                                              V25.49
                    SURVEILLANCE OF OTHER CONTRACEPTIVE METHOD                 
                    

 

 2011                    GARCIA DO MARY K                                
         V25.49                    SURVEILLANCE OF OTHER CONTRACEPTIVE METHOD  
                                   

 

 2011                    GARCIA DO MARY K                                
         V25.49                    SURVEILLANCE OF OTHER CONTRACEPTIVE METHOD  
                                   

 

 2011                    GARCIA DO, MARY K                                
         V25.49                    SURVEILLANCE OF OTHER CONTRACEPTIVE METHOD  
                                   

 

 2011                    GARCIA DO, MARY K                                
         V25.49                    SURVEILLANCE OF OTHER CONTRACEPTIVE METHOD  
                                   

 

 2011                    JULIUS CELESTINNALEXANDERIA R                       
                  V25.49                    SURVEILLANCE OF OTHER CONTRACEPTIVE 
METHOD                                     

 

 2011                    MADL APRN, VENECIA L                             
            V25.49                    SURVEILLANCE OF OTHER CONTRACEPTIVE 
METHOD                                     

 

 2011                    MADL APRN, VENECIA L                             
            V25.49                    SURVEILLANCE OF OTHER CONTRACEPTIVE 
METHOD                                     

 

 2011                    MADL APRN, VENECIA L                             
            V25.49                    SURVEILLANCE OF OTHER CONTRACEPTIVE 
METHOD                                     

 

 2011                    MADL APRN, VENECIA L                             
            V25.49                    SURVEILLANCE OF OTHER CONTRACEPTIVE 
METHOD                                     

 

 2011                    MADL APRN, VENECIA L                             
            V25.49                    SURVEILLANCE OF OTHER CONTRACEPTIVE 
METHOD                                     

 

 2011                    MADL APRN, VENECIA L                             
            V25.49                    SURVEILLANCE OF OTHER CONTRACEPTIVE 
METHOD                                     

 

 2011                    YONNY TRENT R                           
              V25.49                    SURVEILLANCE OF OTHER CONTRACEPTIVE 
METHOD                                     

 

 2011                    MADL APRN, VENECIA L                             
            V25.49                    SURVEILLANCE OF OTHER CONTRACEPTIVE 
METHOD                                     

 

 2011                    GARCIA DO, MARY K                                
         V25.49                    SURVEILLANCE OF OTHER CONTRACEPTIVE METHOD  
                                   

 

 2011                    MADL APRN, VENECIA L                             
            V25.49                    SURVEILLANCE OF OTHER CONTRACEPTIVE 
METHOD                                     

 

 2011                    GARCIA DO MARY K                                
         V25.49                    SURVEILLANCE OF OTHER CONTRACEPTIVE METHOD  
                                   

 

 2011                    MADL APRN, VENECIA L                             
            V25.49                    SURVEILLANCE OF OTHER CONTRACEPTIVE 
METHOD                                     

 

 2012                                                              V25.9 
                   CONTRACEPTION MANAGEMENT                                     

 

 2012                                                              V76.2 
                   CERVICAL CANCER SCREENING (PAP SMEAR)                       
              

 

 2012                                                              V25.9 
                   CONTRACEPTION MANAGEMENT                                     

 

 2012                                                              V76.2 
                   CERVICAL CANCER SCREENING (PAP SMEAR)                       
              

 

 2012                                                              V25.9 
                   CONTRACEPTION MANAGEMENT                                     

 

 2012                                                              V76.2 
                   CERVICAL CANCER SCREENING (PAP SMEAR)                       
              

 

 2012                                                              V25.9 
                   CONTRACEPTION MANAGEMENT                                     

 

 2012                                                              V76.2 
                   CERVICAL CANCER SCREENING (PAP SMEAR)                       
              

 

 2012                                                              V25.9 
                   CONTRACEPTION MANAGEMENT                                     

 

 2012                                                              V76.2 
                   CERVICAL CANCER SCREENING (PAP SMEAR)                       
              

 

 2012                                                              V25.9 
                   CONTRACEPTION MANAGEMENT                                     

 

 2012                                                              V76.2 
                   CERVICAL CANCER SCREENING (PAP SMEAR)                       
              

 

 2012                    GARCIA NGA MEJIAA K                                
         V25.9                    CONTRACEPTION MANAGEMENT                     
                

 

 2012                    GARCIA DO MARY K                                
         V76.2                    CERVICAL CANCER SCREENING (PAP SMEAR)        
                             

 

 2012                    GARCIA NGA MEJIAA K                                
         V25.9                    CONTRACEPTION MANAGEMENT                     
                

 

 2012                    GARCIA NGA MEJIAA K                                
         V76.2                    CERVICAL CANCER SCREENING (PAP SMEAR)        
                             

 

 2012                    GARCIA NGA MEJIAA K                                
         V25.9                    CONTRACEPTION MANAGEMENT                     
                

 

 2012                    GARCIA NGA MEJIAA K                                
         V76.2                    CERVICAL CANCER SCREENING (PAP SMEAR)        
                             

 

 2012                    NGA GARCIA DOA K                                
         V25.9                    CONTRACEPTION MANAGEMENT                     
                

 

 2012                    GARCIA NGA MEJIAA K                                
         V76.2                    CERVICAL CANCER SCREENING (PAP SMEAR)        
                             

 

 2012                    MADRID APRHUMBERTO, FRANCESCA R                       
                  V25.9                    CONTRACEPTION MANAGEMENT            
                         

 

 2012                    MADRID APRHUMBERTO, FRANCESCA R                       
                  V76.2                    CERVICAL CANCER SCREENING (PAP SMEAR
)                                     

 

 2012                    MADL APRN, VENECIA L                             
            V25.9                    CONTRACEPTION MANAGEMENT                  
                   

 

 2012                    MADL APRN, VENECIA L                             
            V76.2                    CERVICAL CANCER SCREENING (PAP SMEAR)     
                                

 

 2012                    MADL APRN, VENECIA L                             
            V25.9                    CONTRACEPTION MANAGEMENT                  
                   

 

 2012                    MADL APRN, VENECIA L                             
            V76.2                    CERVICAL CANCER SCREENING (PAP SMEAR)     
                                

 

 2012                    MADL APRN, VENECIA L                             
            V25.9                    CONTRACEPTION MANAGEMENT                  
                   

 

 2012                    MADL APRN, VENECIA L                             
            V76.2                    CERVICAL CANCER SCREENING (PAP SMEAR)     
                                

 

 2012                    MADL APRN, VENECIA L                             
            V25.9                    CONTRACEPTION MANAGEMENT                  
                   

 

 2012                    MADL APRN, VENECIA L                             
            V76.2                    CERVICAL CANCER SCREENING (PAP SMEAR)     
                                

 

 2012                    MADL APRN, VENECIA L                             
            V25.9                    CONTRACEPTION MANAGEMENT                  
                   

 

 2012                    MADL APRN, VENECIA L                             
            V76.2                    CERVICAL CANCER SCREENING (PAP SMEAR)     
                                

 

 2012                    MADL APRN, VENECIA L                             
            V25.9                    CONTRACEPTION MANAGEMENT                  
                   

 

 2012                    MADL APRN, VENECIA L                             
            V76.2                    CERVICAL CANCER SCREENING (PAP SMEAR)     
                                

 

 2012                    DAHLIA APRN, YONNY R                           
              V25.9                    CONTRACEPTION MANAGEMENT                
                     

 

 2012                    DAHLIA APRN, YONNY R                           
              V76.2                    CERVICAL CANCER SCREENING (PAP SMEAR)   
                                  

 

 2012                    MADL APRN, VENECIA L                             
            V25.9                    CONTRACEPTION MANAGEMENT                  
                   

 

 2012                    MADL APRN, VENECIA L                             
            V76.2                    CERVICAL CANCER SCREENING (PAP SMEAR)     
                                

 

 2012                    GARCIA DOMARY K                                
         V25.9                    CONTRACEPTION MANAGEMENT                     
                

 

 2012                    GARCIA DONGAA K                                
         V76.2                    CERVICAL CANCER SCREENING (PAP SMEAR)        
                             

 

 2012                    MADL APRN, VENECIA L                             
            V25.9                    CONTRACEPTION MANAGEMENT                  
                   

 

 2012                    MADL APRN, VENECIA L                             
            V76.2                    CERVICAL CANCER SCREENING (PAP SMEAR)     
                                

 

 2012                    GARCIA DONGAA K                                
         V25.9                    CONTRACEPTION MANAGEMENT                     
                

 

 2012                    GARCIA DONGAA K                                
         V76.2                    CERVICAL CANCER SCREENING (PAP SMEAR)        
                             

 

 2012                    MADL APRN, VENECIA L                             
            V25.9                    CONTRACEPTION MANAGEMENT                  
                   

 

 2012                    ADRIENNEL APRN, VENECIA L                             
            V76.2                    CERVICAL CANCER SCREENING (PAP SMEAR)     
                                

 

 04/10/2012                                                              078.12
                    PLANTAR WART                                     

 

 04/10/2012                                                              078.12
                    PLANTAR WART                                     

 

 04/10/2012                                                              078.12
                    PLANTAR WART                                     

 

 04/10/2012                                                              078.12
                    PLANTAR WART                                     

 

 04/10/2012                                                              078.12
                    PLANTAR WART                                     

 

 04/10/2012                                                              078.12
                    PLANTAR WART                                     

 

 04/10/2012                    GARCIA DONGAA K                                
         078.12                    PLANTAR WART                                
     

 

 04/10/2012                    GARCIA DONGAA K                                
         078.12                    PLANTAR WART                                
     

 

 04/10/2012                    GARCIA DO MARY K                                
         078.12                    PLANTAR WART                                
     

 

 04/10/2012                    GARCIA DO MARY K                                
         078.12                    PLANTAR WART                                
     

 

 04/10/2012                    JULIUS APRN, FRANCESCA R                       
                  078.12                    PLANTAR WART                       
              

 

 04/10/2012                    MADL APRN, VENECIA L                             
            078.12                    PLANTAR WART                             
        

 

 04/10/2012                    MADL APRN, VENECIA L                             
            078.12                    PLANTAR WART                             
        

 

 04/10/2012                    MADL APRN, VENECIA L                             
            078.12                    PLANTAR WART                             
        

 

 04/10/2012                    MADL APRN, VENECIA L                             
            078.12                    PLANTAR WART                             
        

 

 04/10/2012                    MADL APRN, VENECIA L                             
            078.12                    PLANTAR WART                             
        

 

 04/10/2012                    MADL APRN, VENECIA L                             
            078.12                    PLANTAR WART                             
        

 

 04/10/2012                    DAHLIA APRN, YONNY R                           
              078.12                    PLANTAR WART                           
          

 

 04/10/2012                    MADL APRN, VENECIA L                             
            078.12                    PLANTAR WART                             
        

 

 04/10/2012                    GARCIA DO, MARY K                                
         078.12                    PLANTAR WART                                
     

 

 04/10/2012                    MADL APRN, VENECIA L                             
            078.12                    PLANTAR WART                             
        

 

 04/10/2012                    GARCIA DO, MARY K                                
         078.12                    PLANTAR WART                                
     

 

 04/10/2012                    MADL APRN, VENECIA L                             
            078.12                    PLANTAR WART                             
        

 

 2012                                                              786.50
                    CHEST PAIN                                     

 

 2012                                                              786.50
                    CHEST PAIN                                     

 

 2012                                                              786.50
                    CHEST PAIN                                     

 

 2012                                                              786.50
                    CHEST PAIN                                     

 

 2012                                                              786.50
                    CHEST PAIN                                     

 

 2012                                                              786.50
                    CHEST PAIN                                     

 

 2012                    GARCIA DO, MARY K                                
         786.50                    CHEST PAIN                                  
   

 

 2012                    GARCIA DO, MARY K                                
         786.50                    CHEST PAIN                                  
   

 

 2012                    GARCIA DO, MARY K                                
         786.50                    CHEST PAIN                                  
   

 

 2012                    GARCIA DO, MARY K                                
         786.50                    CHEST PAIN                                  
   

 

 2012                    MADRID APRN, FRANCESCA R                       
                  786.50                    CHEST PAIN                         
            

 

 2012                    MADL APRN, VENECIA L                             
            786.50                    CHEST PAIN                               
      

 

 2012                    MADL APRN, VENECIA L                             
            786.50                    CHEST PAIN                               
      

 

 2012                    MADL APRN, VENECIA L                             
            786.50                    CHEST PAIN                               
      

 

 2012                    MADL APRN, VENECIA L                             
            786.50                    CHEST PAIN                               
      

 

 2012                    MADL APRN, VENECIA L                             
            786.50                    CHEST PAIN                               
      

 

 2012                    MADL APRN, VENECIA L                             
            786.50                    CHEST PAIN                               
      

 

 2012                    DAHLIA APRNRUSSELLYONNY R                           
              786.50                    CHEST PAIN                             
        

 

 2012                    MADL APRN, VENECIA L                             
            786.50                    CHEST PAIN                               
      

 

 2012                    GARCIA DO, MARY K                                
         786.50                    CHEST PAIN                                  
   

 

 2012                    MADL APRN, VENECIA L                             
            786.50                    CHEST PAIN                               
      

 

 2012                    GARCIA DO, MARY K                                
         786.50                    CHEST PAIN                                  
   

 

 2012                    MADL APRN, VENECIA L                             
            786.50                    CHEST PAIN                               
      

 

 10/02/2012                                                              465.9 
                   UPPER RESPIRATORY INFECTION                                 
    

 

 10/02/2012                                                              786.2 
                   COUGH                                     

 

 10/02/2012                                                              465.9 
                   UPPER RESPIRATORY INFECTION                                 
    

 

 10/02/2012                                                              786.2 
                   COUGH                                     

 

 10/02/2012                                                              465.9 
                   UPPER RESPIRATORY INFECTION                                 
    

 

 10/02/2012                                                              786.2 
                   COUGH                                     

 

 10/02/2012                                                              465.9 
                   UPPER RESPIRATORY INFECTION                                 
    

 

 10/02/2012                                                              786.2 
                   COUGH                                     

 

 10/02/2012                                                              465.9 
                   UPPER RESPIRATORY INFECTION                                 
    

 

 10/02/2012                                                              786.2 
                   COUGH                                     

 

 10/02/2012                                                              465.9 
                   UPPER RESPIRATORY INFECTION                                 
    

 

 10/02/2012                                                              786.2 
                   COUGH                                     

 

 10/02/2012                    GARCIA DO, MARY K                                
         465.9                    UPPER RESPIRATORY INFECTION                  
                   

 

 10/02/2012                    GARCIA DO, MARY K                                
         786.2                    COUGH                                     

 

 10/02/2012                    GARCIA DO, MARY K                                
         465.9                    UPPER RESPIRATORY INFECTION                  
                   

 

 10/02/2012                    GARCIA DO, MARY K                                
         786.2                    COUGH                                     

 

 10/02/2012                    GARCIA DO, MARY K                                
         465.9                    UPPER RESPIRATORY INFECTION                  
                   

 

 10/02/2012                    GARCIA DO, MARY K                                
         786.2                    COUGH                                     

 

 10/02/2012                    GARCIA DO, MARY K                                
         465.9                    UPPER RESPIRATORY INFECTION                  
                   

 

 10/02/2012                    GARCIA DO, MARY K                                
         786.2                    COUGH                                     

 

 10/02/2012                    MADRID APRN, FRANCESCA R                       
                  465.9                    UPPER RESPIRATORY INFECTION         
                            

 

 10/02/2012                    MADRID APRN, FRANCESCA R                       
                  786.2                    COUGH                               
      

 

 10/02/2012                    MADL APRN, VENECIA L                             
            465.9                    UPPER RESPIRATORY INFECTION               
                      

 

 10/02/2012                    MADL APRN, VENECIA L                             
            786.2                    COUGH                                     

 

 10/02/2012                    MADL APRN, VENECIA L                             
            465.9                    UPPER RESPIRATORY INFECTION               
                      

 

 10/02/2012                    MADL APRN, VENECIA L                             
            786.2                    COUGH                                     

 

 10/02/2012                    MADL APRN, VENECIA L                             
            465.9                    UPPER RESPIRATORY INFECTION               
                      

 

 10/02/2012                    MADL APRN, VENECIA L                             
            786.2                    COUGH                                     

 

 10/02/2012                    MADL APRN, VENECIA L                             
            465.9                    UPPER RESPIRATORY INFECTION               
                      

 

 10/02/2012                    MADL APRN, VENECIA L                             
            786.2                    COUGH                                     

 

 10/02/2012                    MADL APRN, VENECIA L                             
            465.9                    UPPER RESPIRATORY INFECTION               
                      

 

 10/02/2012                    MADL APRN, VENECIA L                             
            786.2                    COUGH                                     

 

 10/02/2012                    MADL APRN, VENECIA L                             
            465.9                    UPPER RESPIRATORY INFECTION               
                      

 

 10/02/2012                    MADL APRN, VENECIA L                             
            786.2                    COUGH                                     

 

 10/02/2012                    DAHLIA APRN, YONNY R                           
              465.9                    UPPER RESPIRATORY INFECTION             
                        

 

 10/02/2012                    DAHLIA APRN, YONNY R                           
              786.2                    COUGH                                   
  

 

 10/02/2012                    MADL APRN, VENECIA L                             
            465.9                    UPPER RESPIRATORY INFECTION               
                      

 

 10/02/2012                    MADL APRN, VENECIA L                             
            786.2                    COUGH                                     

 

 10/02/2012                    GARCIA DO, MARY K                                
         465.9                    UPPER RESPIRATORY INFECTION                  
                   

 

 10/02/2012                    GARCIA DO, MARY K                                
         786.2                    COUGH                                     

 

 10/02/2012                    MADL APRN, VENECIA L                             
            465.9                    UPPER RESPIRATORY INFECTION               
                      

 

 10/02/2012                    MADL APRN, VENECIA L                             
            786.2                    COUGH                                     

 

 10/02/2012                    GARCIA DO, MARY K                                
         465.9                    UPPER RESPIRATORY INFECTION                  
                   

 

 10/02/2012                    GARCIA DO, MARY K                                
         786.2                    COUGH                                     

 

 10/02/2012                    MADL APRN, VENECIA L                             
            465.9                    UPPER RESPIRATORY INFECTION               
                      

 

 10/02/2012                    MADL APRN, VENECIA L                             
            786.2                    COUGH                                     

 

 2013                                                              461.9 
                   SINUSITIS ACUTE                                     

 

 2013                                                              784.91
                    POSTNASAL DRIP                                     

 

 2013                                                              461.9 
                   SINUSITIS ACUTE                                     

 

 2013                                                              784.91
                    POSTNASAL DRIP                                     

 

 2013                                                              461.9 
                   SINUSITIS ACUTE                                     

 

 2013                                                              784.91
                    POSTNASAL DRIP                                     

 

 2013                                                              461.9 
                   SINUSITIS ACUTE                                     

 

 2013                                                              784.91
                    POSTNASAL DRIP                                     

 

 2013                                                              461.9 
                   SINUSITIS ACUTE                                     

 

 2013                                                              784.91
                    POSTNASAL DRIP                                     

 

 2013                    GARCIA DO, MARY K                                
         461.9                    SINUSITIS ACUTE                              
       

 

 2013                    GARCIA DO, MARY K                                
         784.91                    POSTNASAL DRIP                              
       

 

 2013                    GARCIA DO, MARY K                                
         461.9                    SINUSITIS ACUTE                              
       

 

 2013                    GARCIA DO, MARY K                                
         784.91                    POSTNASAL DRIP                              
       

 

 2013                    GARCIA DO, MARY K                                
         461.9                    SINUSITIS ACUTE                              
       

 

 2013                    GARCIA DO, MARY K                                
         784.91                    POSTNASAL DRIP                              
       

 

 2013                    GARCIA DO, MARY K                                
         461.9                    SINUSITIS ACUTE                              
       

 

 2013                    GARCIA DO, MARY K                                
         784.91                    POSTNASAL DRIP                              
       

 

 2013                    MADRID APRN, FRANCESCA R                       
                  461.9                    SINUSITIS ACUTE                     
                

 

 2013                    MADRID APRN, FRANCESCA R                       
                  784.91                    POSTNASAL DRIP                     
                

 

 2013                    MADL APRN, VENECIA L                             
            461.9                    SINUSITIS ACUTE                           
          

 

 2013                    MADL APRN, VENECIA L                             
            784.91                    POSTNASAL DRIP                           
          

 

 2013                    MADL APRN, VENECIA L                             
            461.9                    SINUSITIS ACUTE                           
          

 

 2013                    MADL APRN, VENECIA L                             
            784.91                    POSTNASAL DRIP                           
          

 

 2013                    MADL APRN, VENECIA L                             
            461.9                    SINUSITIS ACUTE                           
          

 

 2013                    MADL APRN, VENECIA L                             
            784.91                    POSTNASAL DRIP                           
          

 

 2013                    MADL APRN, VENECIA L                             
            461.9                    SINUSITIS ACUTE                           
          

 

 2013                    MADL APRN, VENECIA L                             
            784.91                    POSTNASAL DRIP                           
          

 

 2013                    MADL APRN, VENECIA L                             
            461.9                    SINUSITIS ACUTE                           
          

 

 2013                    MADL APRN, VENECIA L                             
            784.91                    POSTNASAL DRIP                           
          

 

 2013                    MADL APRN, VENECIA L                             
            461.9                    SINUSITIS ACUTE                           
          

 

 2013                    MADL APRN, VENECIA L                             
            784.91                    POSTNASAL DRIP                           
          

 

 2013                    DAHLIA APRN, YONNY R                           
              461.9                    SINUSITIS ACUTE                         
            

 

 2013                    DAHLIA APRN, YONNY R                           
              784.91                    POSTNASAL DRIP                         
            

 

 2013                    MADL APRN, VENECIA L                             
            461.9                    SINUSITIS ACUTE                           
          

 

 2013                    MADL APRN, VENECIA L                             
            784.91                    POSTNASAL DRIP                           
          

 

 2013                    GARCIA DO, MARY K                                
         461.9                    SINUSITIS ACUTE                              
       

 

 2013                    GARCIA DO, MARY K                                
         784.91                    POSTNASAL DRIP                              
       

 

 2013                    MADL APRN, VENECIA L                             
            461.9                    SINUSITIS ACUTE                           
          

 

 2013                    MADL APRN, VENECIA L                             
            784.91                    POSTNASAL DRIP                           
          

 

 2013                    GARCIA DO, MARY K                                
         461.9                    SINUSITIS ACUTE                              
       

 

 2013                    GARCIA DO, MARY K                                
         784.91                    POSTNASAL DRIP                              
       

 

 2013                    MADL APRN, VENECIA L                             
            461.9                    SINUSITIS ACUTE                           
          

 

 2013                    MADL APRN, VENECIA L                             
            784.91                    POSTNASAL DRIP                           
          

 

 2013                                                              V76.10
                    BREAST CANCER SCREENING                                     

 

 2013                                                              V76.10
                    BREAST CANCER SCREENING                                     

 

 2013                                                              V76.10
                    BREAST CANCER SCREENING                                     

 

 2013                    GARCIA DO, MARY K                                
         V76.10                    BREAST CANCER SCREENING                     
                

 

 2013                    GARCIA DO, MARY K                                
         V76.10                    BREAST CANCER SCREENING                     
                

 

 2013                    GARCIA DO, MARY K                                
         V76.10                    BREAST CANCER SCREENING                     
                

 

 2013                    GARCIA DO, MARY K                                
         V76.10                    BREAST CANCER SCREENING                     
                

 

 2013                    JULIUS PARKINSON, FRANCESCA R                       
                  V76.10                    BREAST CANCER SCREENING            
                         

 

 2013                    MADL APRN, VENECIA L                             
            V76.10                    BREAST CANCER SCREENING                  
                   

 

 2013                    MADL APRN, VENECIA L                             
            V76.10                    BREAST CANCER SCREENING                  
                   

 

 2013                    MADL APRN, VENECIA L                             
            V76.10                    BREAST CANCER SCREENING                  
                   

 

 2013                    MADL APRN, VENECIA L                             
            V76.10                    BREAST CANCER SCREENING                  
                   

 

 2013                    MADL APRN, VENECIA L                             
            V76.10                    BREAST CANCER SCREENING                  
                   

 

 2013                    MADL APRN, VENECIA L                             
            V76.10                    BREAST CANCER SCREENING                  
                   

 

 2013                    DAHLIA PARKINSON YONNY R                           
              V76.10                    BREAST CANCER SCREENING                
                     

 

 2013                    MADL APRN, VENECIA L                             
            V76.10                    BREAST CANCER SCREENING                  
                   

 

 2013                    GARCIA DO, MARY K                                
         V76.10                    BREAST CANCER SCREENING                     
                

 

 2013                    MADL APRN, VENECIA L                             
            V76.10                    BREAST CANCER SCREENING                  
                   

 

 2013                    GARCIA DO, MARY K                                
         V76.10                    BREAST CANCER SCREENING                     
                

 

 2013                    MADL APRN, VENECIA L                             
            V76.10                    BREAST CANCER SCREENING                  
                   

 

 2013                                                              V15.82
                    Nicotine abuse                                     

 

 2013                                                              V15.82
                    Nicotine abuse                                     

 

 2013                    GARCIA DO, MARY K                                
         V15.82                    Nicotine abuse                              
       

 

 2013                    GARCIA DO, MARY K                                
         V15.82                    Nicotine abuse                              
       

 

 2013                    GARCIA DO, MARY K                                
         V15.82                    Nicotine abuse                              
       

 

 2013                    GARCIA DO, MARY K                                
         V15.82                    Nicotine abuse                              
       

 

 2013                    MADRID APRN, FRANCESCA R                       
                  V15.82                    Nicotine abuse                     
                

 

 2013                    MADL APRN, VENECIA L                             
            V15.82                    Nicotine abuse                           
          

 

 2013                    MADL APRN, VENECIA L                             
            V15.82                    Nicotine abuse                           
          

 

 2013                    MADL APRN, VENECIA L                             
            V15.82                    Nicotine abuse                           
          

 

 2013                    MADL APRN, VENECIA L                             
            V15.82                    Nicotine abuse                           
          

 

 2013                    MADL APRN, VENECIA L                             
            V15.82                    Nicotine abuse                           
          

 

 2013                    MADL APRN, VENECIA L                             
            V15.82                    Nicotine abuse                           
          

 

 2013                    DAHLIA APRN YONNY R                           
              V15.82                    Nicotine abuse                         
            

 

 2013                    MADL APRN, VENECIA L                             
            V15.82                    Nicotine abuse                           
          

 

 2013                    GARCIA DO, MARY K                                
         V15.82                    Nicotine abuse                              
       

 

 2013                    MADL APRN, VENECIA L                             
            V15.82                    Nicotine abuse                           
          

 

 2013                    GARCIA DO, MARY K                                
         V15.82                    Nicotine abuse                              
       

 

 2013                    MADL APRN, VENECIA L                             
            V15.82                    Nicotine abuse                           
          

 

 2013                    GARCIA DO, MARY K                                
         785.6                    ENLARGEMENT OF LYMPH NODES                   
                  

 

 2013                    GARCIA DO MARY K                                
         785.6                    ENLARGEMENT OF LYMPH NODES                   
                  

 

 2013                    GARCIA DO, MRAY K                                
         785.6                    ENLARGEMENT OF LYMPH NODES                   
                  

 

 2013                    GARCIA DO, MARY K                                
         785.6                    ENLARGEMENT OF LYMPH NODES                   
                  

 

 2013                    MADRID APRN, FRANCESCA R                       
                  785.6                    ENLARGEMENT OF LYMPH NODES          
                           

 

 2013                    MADL APRN, VENECIA L                             
            785.6                    ENLARGEMENT OF LYMPH NODES                
                     

 

 2013                    MADL APRN, VENECIA L                             
            785.6                    ENLARGEMENT OF LYMPH NODES                
                     

 

 2013                    MADL APRN, VENECIA L                             
            785.6                    ENLARGEMENT OF LYMPH NODES                
                     

 

 2013                    MADL APRN, VENECIA L                             
            785.6                    ENLARGEMENT OF LYMPH NODES                
                     

 

 2013                    MADL APRN, VENECIA L                             
            785.6                    ENLARGEMENT OF LYMPH NODES                
                     

 

 2013                    MADL APRN, VENECIA L                             
            785.6                    ENLARGEMENT OF LYMPH NODES                
                     

 

 2013                    DAHLIA APRN, YONNY R                           
              785.6                    ENLARGEMENT OF LYMPH NODES              
                       

 

 2013                    MADL APRN, VENECIA L                             
            785.6                    ENLARGEMENT OF LYMPH NODES                
                     

 

 2013                    GARCIA DO, MARY K                                
         785.6                    ENLARGEMENT OF LYMPH NODES                   
                  

 

 2013                    MADL APRN, VENECIA L                             
            785.6                    ENLARGEMENT OF LYMPH NODES                
                     

 

 2013                    GARCIA DO, MARY K                                
         785.6                    ENLARGEMENT OF LYMPH NODES                   
                  

 

 2013                    MADL APRN, VENECIA L                             
            785.6                    ENLARGEMENT OF LYMPH NODES                
                     

 

 2014                    GARCIA DO, MARY K                                
         296.90                    MOOD DISORDER                               
      

 

 2014                    GARCIA DO, MARY K                                
         782.1                    RASH                                     

 

 2014                    JULIUS APRHUMBERTO, FRANCESCA R                       
                  296.90                    MOOD DISORDER                      
               

 

 2014                    MADRID APRN, FRANCESCA R                       
                  782.1                    RASH                                
     

 

 2014                    MADL APRN, VENECIA L                             
            296.90                    MOOD DISORDER                            
         

 

 2014                    MADL APRN, VENECIA L                             
            782.1                    RASH                                     

 

 2014                    MADL APRN, VENECIA L                             
            296.90                    MOOD DISORDER                            
         

 

 2014                    MADL APRN, VENECIA L                             
            782.1                    RASH                                     

 

 2014                    MADL APRN, VENECIA L                             
            296.90                    MOOD DISORDER                            
         

 

 2014                    MADL APRN, VENECIA L                             
            782.1                    RASH                                     

 

 2014                    MADL APRN, VENECIA L                             
            296.90                    MOOD DISORDER                            
         

 

 2014                    MADL APRN, VENECIA L                             
            782.1                    RASH                                     

 

 2014                    MADL APRN, VENECIA L                             
            296.90                    MOOD DISORDER                            
         

 

 2014                    MADL APRN, VENECIA L                             
            782.1                    RASH                                     

 

 2014                    MADL APRN, VENECIA L                             
            296.90                    MOOD DISORDER                            
         

 

 2014                    MADL APRN, VENECIA L                             
            782.1                    RASH                                     

 

 2014                    DAHLIA APRN, YONNY R                           
              296.90                    MOOD DISORDER                          
           

 

 2014                    DAHLIA APRN, YONNY R                           
              782.1                    RASH                                     

 

 2014                    MADL APRN, VENECIA L                             
            296.90                    MOOD DISORDER                            
         

 

 2014                    MADL APRN, VENECIA L                             
            782.1                    RASH                                     

 

 2014                    GARCIA DO, MARY K                                
         296.90                    MOOD DISORDER                               
      

 

 2014                    GARCIA DO, MARY K                                
         782.1                    RASH                                     

 

 2014                    MADL APRN, VENECIA L                             
            296.90                    MOOD DISORDER                            
         

 

 2014                    MADL APRN, VENECIA L                             
            782.1                    RASH                                     

 

 2014                    GARCIA DO, MARY K                                
         296.90                    MOOD DISORDER                               
      

 

 2014                    GARCIA DO, MARY K                                
         782.1                    RASH                                     

 

 2014                    MADL APRN, VENECIA L                             
            296.90                    MOOD DISORDER                            
         

 

 2014                    MADL APRN, VENECIA L                             
            782.1                    RASH                                     

 

 2014                    FRANCESCA BUSBY R                       
                  477.9                    ALLERGIC RHINITIS CAUSE UNSPECIFIED 
                                    

 

 2014                    MADL APRN, VENECIA L                             
            477.9                    ALLERGIC RHINITIS CAUSE UNSPECIFIED       
                              

 

 2014                    MADL APRN, VENECIA L                             
            477.9                    ALLERGIC RHINITIS CAUSE UNSPECIFIED       
                              

 

 2014                    MADL APRN, VENECIA L                             
            477.9                    ALLERGIC RHINITIS CAUSE UNSPECIFIED       
                              

 

 2014                    MADL APRN, VENECIA L                             
            477.9                    ALLERGIC RHINITIS CAUSE UNSPECIFIED       
                              

 

 2014                    MADL APRN, VENECIA L                             
            477.9                    ALLERGIC RHINITIS CAUSE UNSPECIFIED       
                              

 

 2014                    MADL APRN, VENECIA L                             
            477.9                    ALLERGIC RHINITIS CAUSE UNSPECIFIED       
                              

 

 2014                    RUSSELL TRENTINA R                           
              477.9                    ALLERGIC RHINITIS CAUSE UNSPECIFIED     
                                

 

 2014                    ADRIENNEL APRN, VENECIA L                             
            477.9                    ALLERGIC RHINITIS CAUSE UNSPECIFIED       
                              

 

 2014                    GARCIA DO, MARY K                                
         477.9                    ALLERGIC RHINITIS CAUSE UNSPECIFIED          
                           

 

 2014                    MADL APRN, VENECIA L                             
            477.9                    ALLERGIC RHINITIS CAUSE UNSPECIFIED       
                              

 

 2014                    GARCIA DO, MARY K                                
         477.9                    ALLERGIC RHINITIS CAUSE UNSPECIFIED          
                           

 

 2014                    MADL APRN, VENECIA L                             
            477.9                    ALLERGIC RHINITIS CAUSE UNSPECIFIED       
                              

 

 2014                    MADL APRN, VENECIA L                             
            719.46                    PAIN IN JOINT INVOLVING LOWER LEG        
                             

 

 2014                    MADL APRN, VENECIA L                             
            723.1                    CERVICALGIA                               
      

 

 2014                    MADL APRN, VENECIA L                             
            790.21                    IMPAIRED FASTING GLUCOSE                 
                    

 

 2014                    MADL APRN, VENECIA L                             
            719.46                    PAIN IN JOINT INVOLVING LOWER LEG        
                             

 

 2014                    MADL APRN, VENECIA L                             
            723.1                    CERVICALGIA                               
      

 

 2014                    MADL APRN, VENECIA L                             
            790.21                    IMPAIRED FASTING GLUCOSE                 
                    

 

 2014                    MADL APRN, VENECIA L                             
            719.46                    PAIN IN JOINT INVOLVING LOWER LEG        
                             

 

 2014                    MADL APRN, VENECIA L                             
            723.1                    CERVICALGIA                               
      

 

 2014                    MADL APRN, VENECIA L                             
            790.21                    IMPAIRED FASTING GLUCOSE                 
                    

 

 2014                    MADL APRN, VENECIA L                             
            719.46                    PAIN IN JOINT INVOLVING LOWER LEG        
                             

 

 2014                    MADL APRN, VENECIA L                             
            723.1                    CERVICALGIA                               
      

 

 2014                    MADL APRN, VENECIA L                             
            790.21                    IMPAIRED FASTING GLUCOSE                 
                    

 

 2014                    MADL APRN, VENECIA L                             
            719.46                    PAIN IN JOINT INVOLVING LOWER LEG        
                             

 

 2014                    MADL APRN, VENECIA L                             
            723.1                    CERVICALGIA                               
      

 

 2014                    MADL APRN, VENECIA L                             
            790.21                    IMPAIRED FASTING GLUCOSE                 
                    

 

 2014                    MADL APRN, VENECIA L                             
            719.46                    PAIN IN JOINT INVOLVING LOWER LEG        
                             

 

 2014                    MADL APRN, VENECIA L                             
            723.1                    CERVICALGIA                               
      

 

 2014                    ADRIENNEL APRN, VENECIA L                             
            790.21                    IMPAIRED FASTING GLUCOSE                 
                    

 

 2014                    RUSSELL TRENTINA R                           
              719.46                    PAIN IN JOINT INVOLVING LOWER LEG      
                               

 

 2014                    DAHLIA APRN, YONNY R                           
              723.1                    CERVICALGIA                             
        

 

 2014                    DAHLIA CELESTINN YONNY R                           
              790.21                    IMPAIRED FASTING GLUCOSE               
                      

 

 2014                    ADRIENNEL APRN, VENECIA L                             
            719.46                    PAIN IN JOINT INVOLVING LOWER LEG        
                             

 

 2014                    MADL APRN, VENECIA L                             
            723.1                    CERVICALGIA                               
      

 

 2014                    ADRIENNEL APRN, VENECIA L                             
            790.21                    IMPAIRED FASTING GLUCOSE                 
                    

 

 2014                    GARCIA DO MARY K                                
         719.46                    PAIN IN JOINT INVOLVING LOWER LEG           
                          

 

 2014                    GARCIA DO, MARY K                                
         723.1                    CERVICALGIA                                  
   

 

 2014                    GARCIA DO MARY K                                
         790.21                    IMPAIRED FASTING GLUCOSE                    
                 

 

 2014                    ANNEMARIE CELESTINN, VENECIA L                             
            719.46                    PAIN IN JOINT INVOLVING LOWER LEG        
                             

 

 2014                    MADL APRN, VENECIA L                             
            723.1                    CERVICALGIA                               
      

 

 2014                    ADRIENNEL APRN, VENECIA L                             
            790.21                    IMPAIRED FASTING GLUCOSE                 
                    

 

 2014                    GARCIA DO, MARY K                                
         719.46                    PAIN IN JOINT INVOLVING LOWER LEG           
                          

 

 2014                    GARCIA DO, MARY K                                
         723.1                    CERVICALGIA                                  
   

 

 2014                    GARCIA DO, MARY K                                
         790.21                    IMPAIRED FASTING GLUCOSE                    
                 

 

 2014                    ANNEMARIE APRN, VENECIA L                             
            719.46                    PAIN IN JOINT INVOLVING LOWER LEG        
                             

 

 2014                    MADL APRN, VENECIA L                             
            723.1                    CERVICALGIA                               
      

 

 2014                    MADL APRN, VENECIA L                             
            790.21                    IMPAIRED FASTING GLUCOSE                 
                    

 

 2014                    MADL APRN, VENECIA L                             
            288.60                    LEUKOCYTOSIS UNSPECIFIED                 
                    

 

 2014                    MADL APRN, VENECIA L                             
            288.60                    LEUKOCYTOSIS UNSPECIFIED                 
                    

 

 2014                    MADL APRN, VENECIA L                             
            288.60                    LEUKOCYTOSIS UNSPECIFIED                 
                    

 

 2014                    ADRIENNEL APRN, VENECIA L                             
            288.60                    LEUKOCYTOSIS UNSPECIFIED                 
                    

 

 2014                    DAHLIA APRN, YONNY R                           
              288.60                    LEUKOCYTOSIS UNSPECIFIED               
                      

 

 2014                    MADL APRN, VENECIA L                             
            288.60                    LEUKOCYTOSIS UNSPECIFIED                 
                    

 

 2014                    GARCIA DO, MARY K                                
         288.60                    LEUKOCYTOSIS UNSPECIFIED                    
                 

 

 2014                    MADL APRN, VENECIA L                             
            288.60                    LEUKOCYTOSIS UNSPECIFIED                 
                    

 

 2014                    GARCIA DO, MARY K                                
         288.60                    LEUKOCYTOSIS UNSPECIFIED                    
                 

 

 2014                    MADL APRN, VENECIA L                             
            288.60                    LEUKOCYTOSIS UNSPECIFIED                 
                    

 

 2014                    MADL APRN, VENECIA L                             
            381.02                    ACUTE MUCOID OTITIS MEDIA                
                     

 

 2014                    MADL APRN, VENECIA L                             
            461.9                    SINUSITIS ACUTE                           
          

 

 2014                    MADL APRN, VENECIA L                             
            465.9                    UPPER RESPIRATORY INFECTION               
                      

 

 2014                    DAHLIA APRN, YONNY R                           
              381.02                    ACUTE MUCOID OTITIS MEDIA              
                       

 

 2014                    DAHLIA APRN, YONNY R                           
              461.9                    SINUSITIS ACUTE                         
            

 

 2014                    DAHLIA APRN, YONNY R                           
              465.9                    UPPER RESPIRATORY INFECTION             
                        

 

 2014                    MADL APRN, VENECIA L                             
            381.02                    ACUTE MUCOID OTITIS MEDIA                
                     

 

 2014                    MADL APRN, VENECIA L                             
            461.9                    SINUSITIS ACUTE                           
          

 

 2014                    MADL APRN, VENECIA L                             
            465.9                    UPPER RESPIRATORY INFECTION               
                      

 

 2014                    GARCIA DO, MARY K                                
         381.02                    ACUTE MUCOID OTITIS MEDIA                   
                  

 

 2014                    GARCIA DO, MARY K                                
         461.9                    SINUSITIS ACUTE                              
       

 

 2014                    GARCIA DO, MARY K                                
         465.9                    UPPER RESPIRATORY INFECTION                  
                   

 

 2014                    MADL APRN, VENECIA L                             
            381.02                    ACUTE MUCOID OTITIS MEDIA                
                     

 

 2014                    MADL APRN, VENECIA L                             
            461.9                    SINUSITIS ACUTE                           
          

 

 2014                    MADL APRN, VENECIA L                             
            465.9                    UPPER RESPIRATORY INFECTION               
                      

 

 2014                    GARCIA DO, MARY K                                
         381.02                    ACUTE MUCOID OTITIS MEDIA                   
                  

 

 2014                    GARCIA DO, MARY K                                
         461.9                    SINUSITIS ACUTE                              
       

 

 2014                    GARCIA DO, MARY K                                
         465.9                    UPPER RESPIRATORY INFECTION                  
                   

 

 2014                    MADL APRN, VENECIA L                             
            381.02                    ACUTE MUCOID OTITIS MEDIA                
                     

 

 2014                    MADL APRN, VENECIA L                             
            461.9                    SINUSITIS ACUTE                           
          

 

 2014                    MADL APRN, VENECIA L                             
            465.9                    UPPER RESPIRATORY INFECTION               
                      

 

 2014                    RUSSELL TRENTINA R                           
              477.0                    ALLERGIC RHINITIS DUE TO POLLEN         
                            

 

 2014                    MADL APRN, VENECIA L                             
            477.0                    ALLERGIC RHINITIS DUE TO POLLEN           
                          

 

 2014                    GARCIA DO, MARY K                                
         477.0                    ALLERGIC RHINITIS DUE TO POLLEN              
                       

 

 2014                    MADL APRN, VENECIA L                             
            477.0                    ALLERGIC RHINITIS DUE TO POLLEN           
                          

 

 2014                    GARCIA DO, MARY K                                
         477.0                    ALLERGIC RHINITIS DUE TO POLLEN              
                       

 

 2014                    MADL APRN, VENECIA L                             
            477.0                    ALLERGIC RHINITIS DUE TO POLLEN           
                          

 

 2014                    KARRI GONZALEZ, ANDER D                    Ot    
                620.2                    OVARIAN CYST NEC/NOS                  
                   

 

 2014                    KARRI GONZALEZ, ANDER D                    Ot    
                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              
                       

 

 2014                    KARRI GONZALEZ, ANDER D                    Ot    
                625.3                    DYSMENORRHEA                          
           

 

 10/07/2014                    MADL APRN, VENECIA L                             
            620.2                    OTHER AND UNSPECIFIED OVARIAN CYST        
                             

 

 10/07/2014                    MARY GARCIA DO K                                
         620.2                    OTHER AND UNSPECIFIED OVARIAN CYST           
                          

 

 10/07/2014                    MADL APRN, VENECIA L                             
            620.2                    OTHER AND UNSPECIFIED OVARIAN CYST        
                             

 

 10/07/2014                    MARY GARCIA DO K                                
         620.2                    OTHER AND UNSPECIFIED OVARIAN CYST           
                          

 

 10/07/2014                    MADL APRN, VENECIA L                             
            620.2                    OTHER AND UNSPECIFIED OVARIAN CYST        
                             

 

 2015                    MARY GARCIA DO K                                
         V04.81                    FLU SHOT                                     

 

 2015                    MADL APRN, VENECIA L                             
            V04.81                    FLU SHOT                                 
    

 

 2015                    GARCIA DO MARY K                                
         V04.81                    FLU SHOT                                     

 

 2015                    MADL APRN, VENECIA L                             
            V04.81                    FLU SHOT                                 
    

 

 2015                    MADL, VENECIA L ARNP                    Ot       
             620.2                                                          

 

 2015                    MADL, VENECIA L ARNP                    Ot       
             620.2                                                          

 

 2015                    MADL, VENECIA L ARNP                    Ot       
             620.2                                                          

 

 2015                    HARVINDER GONZALEZ, MEMO SANDERS                    Ot           
         278.00                                                          

 

 2015                    HARVINDER GONZALEZ, MEMO SANDERS                    Ot           
         288.60                                                          

 

 2015                    HARVINDER GONZALEZ, MEMO SNADERS                    Ot           
         305.1                                                          

 

 2015                    HARVINDER GONZALEZ, MEMO SANDERS                    Ot           
         V58.69                                                          

 

 2015                    HARVINDER GONZALEZ, MEMO SANDERS                    Ot           
         V85.34                                                          

 

 2015                    HARVINDER GONZALEZ, MEMO SANDERS                    Ot           
         278.00                                                          

 

 2015                    HARVINDER GONZALEZ, MEMO SANDERS                    Ot           
         288.60                                                          

 

 2015                    HARVINDER GONZALEZ, MEMO SANDERS                    Ot           
         305.1                                                          

 

 2015                    HARVINDER GONZALEZ, MEMO TOMMY                    Ot           
         V58.69                                                          

 

 2015                    HARVINDER GONZALEZ, MEMO SANDERS                    Ot           
         V85.34                                                          

 

 2015                    TANNER GONZALEZ, STACIA PARSONS                    Ot       
             473.9                                                          

 

 2015                    HARVINDER GONZALEZ, MEMO SANDERS                    Ot           
         278.00                    OBESITY, NOS                                
     

 

 2015                    HARVINDER GONZALEZ, MEMO SANDERS                    Ot           
         288.60                    LEUKOCYTOSIS, UNSPECIFIED                   
                  

 

 2015                    HARVINDER GONZALEZ, MEMO SANDERS                    Ot           
         305.1                    TOBACCO USE DISORDER                         
            

 

 2015                    HARVINDER GONZALEZ, MEMO SANDERS                    Ot           
         V58.69                    OT MED,LT,CURRENT USE                      
               

 

 2015                    HARVINDER GONZALEZ, MEMO SANDERS                    Ot           
         V85.34                    BODY MASS INDEX 34.0-34.9, ADULT            
                         

 

 2015                    HARVINDER GONZALEZ, MEMO SANDERS                    Ot           
         278.00                                                          

 

 2015                    HARVINDER GONZALEZ, MEMO SANDERS                    Ot           
         288.60                                                          

 

 2015                    HARVINDER GONZALEZ, MEMO SANDERS                    Ot           
         305.1                                                          

 

 2015                    HARVINDER GONZALEZ, MEMO SANDERS                    Ot           
         V58.69                                                          

 

 2015                    HARVINDER GONZALEZ, MEMO SANDERS                    Ot           
         V85.34                                                          

 

 2015                    MADVENECIA FERREIRA L ARNP                    Ot       
             620.2                                                          

 

 2015                    VENECIA SHARPE L ARNP                    Ot       
             620.2                                                          

 

 2015                    TANNER GONZALEZ, STACIA PARSONS                    Ot       
             473.9                                                          

 

 2015                    HARVINDER GONZALEZ, MEMO SANDERS                    Ot           
         278.00                                                          

 

 2015                    HARVINDER GONZALEZ, MEMO SANDERS                    Ot           
         288.60                                                          

 

 2015                    HARVINDER GONZALEZ, MEMO SANDERS                    Ot           
         305.1                                                          

 

 2015                    HARVINDER GONZALEZ, MEMO SANDERS                    Ot           
         V58.69                                                          

 

 2015                    HARVINDER GONZALEZ, MEMO SANDERS                    Ot           
         V85.34                                                          

 

 2015                    MADVENECIA FERREIRA L ARNP                    Ot       
             620.2                                                          

 

 2015                    VENECIA SHARPE L ARNP                    Ot       
             620.2                                                          

 

 2015                    TANNER GONZALEZ, STACIA P                    Ot       
             473.9                                                          

 

 2015                    HARVINDER GONZALEZ, MEMO K                    Ot           
         278.00                                                          

 

 2015                    HARVINDER GONZALEZ, MEMO TOMMY                    Ot           
         288.60                                                          

 

 2015                    HARVINDER GONZALEZ, MEMO TOMMY                    Ot           
         305.1                                                          

 

 2015                    HARVINDER GONZALEZ, MEMO SANDERS                    Ot           
         V58.69                                                          

 

 2015                    HARVINDER GONZALEZ, MEMO SANDERS                    Ot           
         V85.34                                                          

 

 2015                    SHARON GONZALEZ, NADINE KEYES                    Ot  
                  276.8                    HYPOPOTASSEMIA                      
               

 

 2015                    SHARON GONZALEZ, NADINE T                    Ot  
                  785.1                    PALPITATIONS                        
             

 

 2015                    SHARON GONZALEZ, NADINE T                    Ot  
                  786.50                    CHEST PAIN NOS                     
                

 

 2015                    VENECIA SHARPE ARNP                    Ot       
             620.2                                                          

 

 2015                    VENECIA SHARPE ARNP                    Ot       
             620.2                                                          

 

 2015                    TANNER GONZALEZ, STACIA P                    Ot       
             473.9                                                          

 

 2015                    HARVINDER GONZALEZ, MEMO TOMMY                    Ot           
         278.00                                                          

 

 2015                    HARVINDER GONZALEZ, MEMO TOMMY                    Ot           
         288.60                                                          

 

 2015                    HARVINDER GONZALEZ, MEMO TOMMY                    Ot           
         305.1                                                          

 

 2015                    HARVINDER GONZALEZ, MEMO SANDERS                    Ot           
         V58.69                                                          

 

 2015                    HARVINDER GONZALEZ, MEMO SANDERS                    Ot           
         V85.34                                                          

 

 2015                    VENECIA SHARPE ARNP                    Ot       
             785.1                                                          

 

 2015                    FRANCINE GONZALEZ FAC, ALI FACP CCDS                    
Ot                    288.8                                                    
      

 

 2015                    FRANCINE GONZALEZ FACC, ALI FACP CCDS                    
Ot                    305.1                                                    
      

 

 2015                    FRANCINE GONZALEZ FACC, ALI FACP CCDS                    
Ot                    785.1                                                    
      

 

 2015                    FRANCINE GONZALEZ FACC, ALI FACP CCDS                    
Ot                    786.59                                                   
       

 

 2015                    FRANCINE GONZALEZ FACC, ALI FACP CCDS                    
Ot                    790.6                                                    
      

 

 2015                    FRANCINE GONZALEZ FACC, ALI FACP CCDS                    
Ot                    288.8                                                    
      

 

 2015                    FRANCINE GONZALEZ FACC, ALI FACP CCDS                    
Ot                    305.1                                                    
      

 

 2015                    FRANCINE GONZALEZ FACC, ALI FACP CCDS                    
Ot                    785.1                                                    
      

 

 2015                    FRANCINE GONZALEZ FACC, ALI FACP CCDS                    
Ot                    786.59                                                   
       

 

 2015                    FRANCINE GONZALEZ FACC, ALI FACP CCDS                    
Ot                    790.6                                                    
      

 

 2015                    FRANCINE GONZALEZ FACC, ALI FACP CCDS                    
Ot                    288.8                                                    
      

 

 2015                    FRANCINE GONZALEZ FACC, ALI FACP CCDS                    
Ot                    305.1                                                    
      

 

 2015                    FRANCINE GONZALEZ FACC, ALI FACP CCDS                    
Ot                    785.1                                                    
      

 

 2015                    FRANCINE GONZALEZ FACC, ALI FACP CCDS                    
Ot                    786.59                                                   
       

 

 2015                    FRANCINE GONZALEZ FACC, ALI FACP CCDS                    
Ot                    790.6                                                    
      

 

 2015                    FRANCINE GONZALEZ FACC, ALI FACP CCDS                    
Ot                    288.8                                                    
      

 

 2015                    FRANCINE GONZALEZ FACC, ALI FACP CCDS                    
Ot                    305.1                                                    
      

 

 2015                    FRANCINE GONZALEZ FACC, ALI FACP CCDS                    
Ot                    785.1                                                    
      

 

 2015                    FRANCINE GONZALEZ FACC, ALI FACP CCDS                    
Ot                    786.59                                                   
       

 

 2015                    FRANCINE MD FACC, ALI FACP CCDS                    
Ot                    790.6                                                    
      

 

 09/15/2015                    FRANCINE GONZALEZ FACC, ALI FACP CCDS                    
Ot                    305.1                                                    
      

 

 09/15/2015                    FRANCINE GONZALEZ FAC, ALI FACP CCDS                    
Ot                    786.59                                                   
       

 

 09/15/2015                    FRANCINE GONZALEZ FACC, ALI FACP CCDS                    
Ot                    794.31                                                   
       

 

 09/15/2015                    FRANCINE GONZALEZ FACC, ALI FACP CCDS                    
Ot                    V58.69                                                   
       

 

 2015                    VENECIA SHARPE ARNP                    Ot       
             785.1                                                          

 

 10/05/2015                    FRANCINE GONZALEZ FACC, ALI FACP CCDS                    
Ot                    288.8                                                    
      

 

 10/05/2015                    FRANCINE GONZALEZ FACC, ALI FACP CCDS                    
Ot                    305.1                                                    
      

 

 10/05/2015                    FRANCINE GONZALEZ FACC, ALI FACP CCDS                    
Ot                    785.1                                                    
      

 

 10/05/2015                    FRANCINE GONZALEZ FACC, ALI FACP CCDS                    
Ot                    786.59                                                   
       

 

 10/05/2015                    FRANCINE GONZALEZ FACC, ALI FACP CCDS                    
Ot                    790.6                                                    
      

 

 10/19/2015                    FRANCINE GONZALEZ FACC, ALI FACP CCDS                    
Ot                    288.8                                                    
      

 

 10/19/2015                    FRANCINE GONZALEZ FACC, ALI FACP CCDS                    
Ot                    305.1                                                    
      

 

 10/19/2015                    FRANCINE GONZALEZ FACC, ALI FACP CCDS                    
Ot                    785.1                                                    
      

 

 10/19/2015                    FRANCINE GONZALEZ FACC, ALI FACP CCDS                    
Ot                    786.59                                                   
       

 

 10/19/2015                    FRANCINE GONZALEZ FACC, ALI FACP CCDS                    
Ot                    790.6                                                    
      

 

 10/19/2015                    FRANCINE GONZALEZ FACC, ALI FACP CCDS                    
Ot                    E78.5                                                    
      

 

 10/19/2015                    FRANCINE GONZALEZ FACC, ALI FACP CCDS                    
Ot                    288.8                                                    
      

 

 10/19/2015                    FRANCINE MD FACC, ALI FACP CCDS                    
Ot                    305.1                                                    
      

 

 10/19/2015                    FRANCINE MD FACC, ALI FACP CCDS                    
Ot                    785.1                                                    
      

 

 10/19/2015                    FRANCINE GONZALEZ FACC, ALI FACP CCDS                    
Ot                    786.59                                                   
       

 

 10/19/2015                    FRANCINE MD FACC, ALI FACP CCDS                    
Ot                    790.6                                                    
      

 

 10/21/2015                    FRANCINE GONZALEZ FACC, ALI FACP CCDS                    
Ot                    288.8                                                    
      

 

 10/21/2015                    FRANCINE GONZALEZ FACC, ALI FACP CCDS                    
Ot                    305.1                                                    
      

 

 10/21/2015                    FRANCINE GONZALEZ FACC, ALI FACP CCDS                    
Ot                    785.1                                                    
      

 

 10/21/2015                    FRANCINE GONZALEZ FACC, ALI FACP CCDS                    
Ot                    786.59                                                   
       

 

 10/21/2015                    FRANCINE GONZALEZ FACC, ALI FACP CCDS                    
Ot                    790.6                                                    
      

 

 10/22/2015                    FRANCINE GONZALEZ FACC, ALI FACP CCDS                    
Ot                    288.8                                                    
      

 

 10/22/2015                    FRANCINE GONZALEZ FACC, ALI FACP CCDS                    
Ot                    305.1                                                    
      

 

 10/22/2015                    FRANCINE GONZALEZ FACC, ALI FACP CCDS                    
Ot                    785.1                                                    
      

 

 10/22/2015                    FRANCINE GONZALEZ FACC, ALI FACP CCDS                    
Ot                    786.59                                                   
       

 

 10/22/2015                    FRANCINE GONZALEZ FACC, ALI FACP CCDS                    
Ot                    790.6                                                    
      

 

 2015                    FRANCINE GONZALEZ FACC, ALI FACP CCDS                    
Ot                    E78.5                                                    
      

 

 2015                    FRANCINE GONZALEZ FACC, ALI FACP CCDS                    
Ot                    E78.5                                                    
      

 

 2015                    VENECIA SHARPE ARNP                    Ot       
             620.2                                                          

 

 2015                    VENECIA SHARPE ARNP                    Ot       
             620.2                                                          

 

 2015                    TANNER GONZALEZ, STACIA PARSONS                    Ot       
             473.9                                                          

 

 2015                    HARVINDER GONZALEZ, MEMO SANDERS                    Ot           
         278.00                                                          

 

 2015                    HARVINDER GONZALEZ, MEMO SANDERS                    Ot           
         288.60                                                          

 

 2015                    HARVINDER GONZALEZ, MEMO SANDERS                    Ot           
         305.1                                                          

 

 2015                    HARVINDER GONZALEZ, MEMO SANDERS                    Ot           
         V58.69                                                          

 

 2015                    HARVINDER GONZALEZ, MEMO SANDERS                    Ot           
         V85.34                                                          

 

 2015                    MADL, VENECIA L ARNP                    Ot       
             785.1                                                          

 

 2015                    FRANCINE GONZALEZ FAC, ALI FACP CCDS                    
Ot                    288.8                                                    
      

 

 2015                    FRANCINE GONZALEZ FAC, ALI FACP CCDS                    
Ot                    305.1                                                    
      

 

 2015                    FRANCINE GONZALEZ [facility], ALI FACP CCDS                    
Ot                    785.1                                                    
      

 

 2015                    FRANCINE GONZALEZ [facility], ALI FACP CCDS                    
Ot                    786.59                                                   
       

 

 2015                    FRANCINE GONZALEZ [facility], ALI FACP CCDS                    
Ot                    790.6                                                    
      

 

 2015                    AZUCENA LEDBETTER DO                    Ot        
            G47.10                    HYPERSOMNIA, UNSPECIFIED                 
                    

 

 2015                    AZUCENA LEDBETTER DO                    Ot        
            I49.9                    CARDIAC ARRHYTHMIA, UNSPECIFIED           
                          

 

 2015                    AZUCENA LEDBETTER DO                    Ot        
            R51                    HEADACHE                                     

 

 2016                    MADL, VENECIA L ARNP                    Ot       
             620.2                                                          

 

 2016                    MADL, VENECIA L ARNP                    Ot       
             620.2                                                          

 

 2016                    TANNER GONZALEZ, STACIA PARSONS                    Ot       
             473.9                                                          

 

 2016                    HARVINDER GONZALEZ, MEMO TOMMY                    Ot           
         278.00                                                          

 

 2016                    HARVINDER GONZALEZ, MEMO SANDERS                    Ot           
         288.60                                                          

 

 2016                    HARVINDER GONZALEZ, MEMO SANDERS                    Ot           
         305.1                                                          

 

 2016                    HARVINDER GONZALEZ, MEMO SANDERS                    Ot           
         V58.69                                                          

 

 2016                    HARVINDER GONZALEZ, MEMO SANDERS                    Ot           
         V85.34                                                          

 

 2016                    MADL, VENECIA L ARNP                    Ot       
             785.1                                                          

 

 2016                    FRANCINE GONZALEZ [facility], ALI FACP CCDS                    
Ot                    288.8                                                    
      

 

 2016                    FRANCINE GONZALEZ [facility], ALI FACP CCDS                    
Ot                    305.1                                                    
      

 

 2016                    FRANCINE GONZALEZ [facility], ALI FACP CCDS                    
Ot                    785.1                                                    
      

 

 2016                    FRANCINE GONZALEZ [facility], ALI FACP CCDS                    
Ot                    786.59                                                   
       

 

 2016                    FRANCINE GONZALEZ [facility], ALI FACP CCDS                    
Ot                    790.6                                                    
      

 

 2016                    MADL, VENECIA L ARNP                    Ot       
             R22.40                                                          

 

 2016                    MADL, VENECIA L ARNP                    Ot       
             620.2                                                          

 

 2016                    MADL, VENECIA L ARNP                    Ot       
             620.2                                                          

 

 2016                    TANNER GONZALEZ, STACIA P                    Ot       
             473.9                                                          

 

 2016                    MEMO BLANTON MD                    Ot           
         278.00                                                          

 

 2016                    MEMO BLANTON MD                    Ot           
         288.60                                                          

 

 2016                    MEMO BLANTON MD                    Ot           
         305.1                                                          

 

 2016                    MEMO BLANTON MD                    Ot           
         V58.69                                                          

 

 2016                    MEMO BLANTON MD                    Ot           
         V85.34                                                          

 

 2016                    MADL, VENECIA L ARNP                    Ot       
             785.1                                                          

 

 2016                    FRANCINE GONZALEZ [facility], ALI FACP CCDS                    
Ot                    288.8                                                    
      

 

 2016                    FRANCINE GONZALEZ [facility], ALI FACP CCDS                    
Ot                    305.1                                                    
      

 

 2016                    FRANCINE GONZALEZ [facility], ALI FACP CCDS                    
Ot                    785.1                                                    
      

 

 2016                    FRANCINE GONZALEZ [facility], ALI FACP CCDS                    
Ot                    786.59                                                   
       

 

 2016                    FRANCINE GONZALEZ [facility], ALI FACP CCDS                    
Ot                    790.6                                                    
      

 

 2016                    MADL, VENECIA L ARNP                    Ot       
             R22.40                                                          

 

 2016                    MADL, VENECIA L ARNP                    Ot       
             620.2                    OVARIAN CYST NEC/NOS                     
                

 

 2016                    MADL, VENECIA L ARNP                    Ot       
             620.2                    OVARIAN CYST NEC/NOS                     
                

 

 2016                    TANNER GONZALEZ, STACIA P                    Ot       
             473.9                    CHRONIC SINUSITIS NOS                    
                 

 

 2016                    MEMO BLANTON MD                    Ot           
         278.00                    OBESITY, NOS                                
     

 

 2016                    MEMO BLANTON MD                    Ot           
         288.60                    LEUKOCYTOSIS, UNSPECIFIED                   
                  

 

 2016                    MEMO BLANTON MD                    Ot           
         305.1                    TOBACCO USE DISORDER                         
            

 

 2016                    MEMO BLANTON MD                    Ot           
         V58.69                    OT MED,LT,CURRENT USE                      
               

 

 2016                    MEMO BLANTON MD                    Ot           
         V85.34                    BODY MASS INDEX 34.0-34.9, ADULT            
                         

 

 2016                    MADL, VENECIA L ARNP                    Ot       
             785.1                    PALPITATIONS                             
        

 

 2016                    FRANCINE GONZALEZ [facility], ALI FACP CCDS                    
Ot                    288.8                    WBC DISEASE NEC                 
                    

 

 2016                    FRANCINE NEGRO, ALI FACP CCDS                    
Ot                    305.1                    TOBACCO USE DISORDER            
                         

 

 2016                    FRANCINE GONZALEZ FAC, ALI FACP CCDS                    
Ot                    785.1                    PALPITATIONS                    
                 

 

 2016                    FRANCINE GONZALEZ [facility], ALI FACP CCDS                    
Ot                    786.59                    CHEST PAIN NEC                 
                    

 

 2016                    FRANCINE GONZALEZ [facility], ALI FACP CCDS                    
Ot                    790.6                    ABN BLOOD CHEMISTRY NEC         
                            

 

 2016                    MADL, VENECIA L ARNP                    Ot       
             R22.40                    LOCALIZED SWELLING, MASS AND LUMP, UNSPE
                                     

 

 2016                    MADL, VENECIA L ARNP                    Ot       
             M79.605                    PAIN IN LEFT LEG                       
              

 

 2016                    MADL, VENECIA L ARNP                    Ot       
             620.2                    OVARIAN CYST NEC/NOS                     
                

 

 2016                    MADL, VENECIA L ARNP                    Ot       
             620.2                    OVARIAN CYST NEC/NOS                     
                

 

 2016                    STACIA HART MD                    Ot       
             473.9                    CHRONIC SINUSITIS NOS                    
                 

 

 2016                    MEMO BLANTON MD                    Ot           
         278.00                    OBESITY, NOS                                
     

 

 2016                    MEMO BLANTON MD                    Ot           
         288.60                    LEUKOCYTOSIS, UNSPECIFIED                   
                  

 

 2016                    MEMO BLANTON MD                    Ot           
         305.1                    TOBACCO USE DISORDER                         
            

 

 2016                    MEMO BLANTON MD                    Ot           
         V58.69                    OTH MED,LT,CURRENT USE                      
               

 

 2016                    MEMO BLANTON MD                    Ot           
         V85.34                    BODY MASS INDEX 34.0-34.9, ADULT            
                         

 

 2016                    MADL, VENECIA L ARNP                    Ot       
             785.1                    PALPITATIONS                             
        

 

 2016                    FRANCINE GONZALEZ [facility]                    
Ot                    288.8                    WBC DISEASE NEC                 
                    

 

 2016                    FRANCINE GONZALEZ [facility]                    
Ot                    305.1                    TOBACCO USE DISORDER            
                         

 

 2016                    FRANCINE GONZALEZ [facility]                    
Ot                    785.1                    PALPITATIONS                    
                 

 

 2016                    FRANCINE GONZALEZ [facility]                    
Ot                    786.59                    CHEST PAIN NEC                 
                    

 

 2016                    FRANCINE GONZALEZ [facility], ALI FACP CCDS                    
Ot                    790.6                    ABN BLOOD CHEMISTRY NEC         
                            

 

 2016                    MADL, VENECIA L ARNP                    Ot       
             R22.40                    LOCALIZED SWELLING, MASS AND LUMP, UNSPE
                                     

 

 2016                    MADL, VENECIA L ARNP                    Ot       
             M79.605                    PAIN IN LEFT LEG                       
              

 

 2016                    MADL, VENECIA L ARNP                    Ot       
             620.2                    OVARIAN CYST NEC/NOS                     
                

 

 2016                    STACIA HART MD                    Ot       
             473.9                    CHRONIC SINUSITIS NOS                    
                 

 

 2016                    MADL, VENECIA L ARNP                    Ot       
             785.1                    PALPITATIONS                             
        

 

 2016                    VENECIA SHARPE ARNP                    Ot       
             M79.605                    PAIN IN LEFT LEG                       
              

 

 2016                    IVÁN HOLLAND DO                    Ot        
            B96.81                    HELICOBACTER PYLORI AS THE CAUSE OF DISE 
                                    

 

 2016                    IVÁN HOLLAND DO                    Ot        
            R19.4                    CHANGE IN BOWEL HABIT                     
                

 

 2016                    IVÁN HOLLAND DO                    Ot        
            Z01.818                    ENCOUNTER FOR OTHER PREPROCEDURAL EXAMIN
                                     

 

 2016                    IVÁN HOLLAND DO                    Ot        
            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        
                             

 

 2016                    IVÁN HOLLAND DO                    Ot        
            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       
                              

 

 2016                    IVÁN HOLLAND DO                    Ot        
            K21.9                    GASTRO-ESOPHAGEAL REFLUX DISEASE WITHOUT  
                                   

 

 2016                    IVÁN HOLLAND DO                    Ot        
            K44.9                    DIAPHRAGMATIC HERNIA WITHOUT OBSTRUCTION  
                                   

 

 2016                    IVÁN HOLLAND DO                    Ot        
            K62.1                    RECTAL POLYP                              
       

 

 2016                    IVÁN HOLLAND DO                    Ot        
            K63.5                    POLYP OF COLON                            
         

 

 2016                    IVÁN HOLLAND DO                    Ot        
            R19.4                    CHANGE IN BOWEL HABIT                     
                

 

 2016                    IVÁN HOLLAND DO                    Ot        
            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        
                             

 

 2016                    IVÁN HOLLAND DO                    Ot        
            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       
                              

 

 2016                    IVÁN HOLLAND DO                    Ot        
            K21.9                    GASTRO-ESOPHAGEAL REFLUX DISEASE WITHOUT  
                                   

 

 2016                    IVÁN HOLLAND DO                    Ot        
            K44.9                    DIAPHRAGMATIC HERNIA WITHOUT OBSTRUCTION  
                                   

 

 2016                    IVÁN HOLLAND DO                    Ot        
            K62.1                    RECTAL POLYP                              
       

 

 2016                    IVÁN HOLLAND DO                    Ot        
            K63.5                    POLYP OF COLON                            
         

 

 2016                    IVÁN HOLLAND DO                    Ot        
            R19.4                    CHANGE IN BOWEL HABIT                     
                

 

 2017                    IVÁN HOLLAND DO                    Ot        
            Z01.818                    ENCOUNTER FOR OTHER PREPROCEDURAL EXAMIN
                                     

 

 2017                    IVÁN HOLLAND DO                    Ot        
            Z86.010                    PERSONAL HISTORY OF COLONIC POLYPS      
                               

 

 2017                    IVÁN HOLLAND DO                    Ot        
            Z01.818                    ENCOUNTER FOR OTHER PREPROCEDURAL EXAMIN
                                     

 

 2017                    IVÁN HOLLAND DO                    Ot        
            Z86.010                    PERSONAL HISTORY OF COLONIC POLYPS      
                               

 

 2017                    IVÁN HOLLAND DO                    Ot        
            Z01.818                    ENCOUNTER FOR OTHER PREPROCEDURAL EXAMIN
                                     

 

 2017                    IVÁN HOLLAND DO                    Ot        
            Z86.010                    PERSONAL HISTORY OF COLONIC POLYPS      
                               

 

 2017                    IVÁN HOLLAND DO                    Ot        
            Z09                    ENCNTR FOR F/U EXAM AFT TRTMT FOR COND O    
                                 

 

 2017                    IVÁN HOLLAND DO                    Ot        
            Z86.010                    PERSONAL HISTORY OF COLONIC POLYPS      
                               

 

 2017                    VENECIA SHARPE ARNP                    Ot       
             620.2                    OVARIAN CYST NEC/NOS                     
                

 

 2017                    MADL, VENECIA L ARNP                    Ot       
             620.2                    OVARIAN CYST NEC/NOS                     
                

 

 2017                    TANNER GONZALEZ, STACIA PARSONS                    Ot       
             473.9                    CHRONIC SINUSITIS NOS                    
                 

 

 2017                    HARVINDER GONZALEZ, MEMO SANDERS                    Ot           
         278.00                    OBESITY, NOS                                
     

 

 2017                    HARVINDER GONZALEZ, MEMO SANDERS                    Ot           
         288.60                    LEUKOCYTOSIS, UNSPECIFIED                   
                  

 

 2017                    MEMO BLANTON MD                    Ot           
         305.1                    TOBACCO USE DISORDER                         
            

 

 2017                    MEMO BLANTON MD                    Ot           
         V58.69                    OT MED,LT,CURRENT USE                      
               

 

 2017                    HARVINDER GONZALEZ, MEMO SANDERS                    Ot           
         V85.34                    BODY MASS INDEX 34.0-34.9, ADULT            
                         

 

 2017                    ANNEMARIEVENECIA ARNP                    Ot       
             785.1                    PALPITATIONS                             
        

 

 2017                    FRANCINE GONZALEZ FAC, ALI FACP CCDS                    
Ot                    288.8                    WBC DISEASE NEC                 
                    

 

 2017                    FRANCINE GONZALEZ FACC, ALI FACP CCDS                    
Ot                    305.1                    TOBACCO USE DISORDER            
                         

 

 2017                    FRANCINE GONZALEZ FACC, ALI FACP CCDS                    
Ot                    785.1                    PALPITATIONS                    
                 

 

 2017                    FRANCINE GONZALEZ FACC, ALI FACP CCDS                    
Ot                    786.59                    CHEST PAIN NEC                 
                    

 

 2017                    FRANCINE GONZALEZ FACC, ALI FACP CCDS                    
Ot                    790.6                    ABN BLOOD CHEMISTRY NEC         
                            

 

 2017                    ANNEMARIEANNTETEVENECIA HANNA ARNP                    Ot       
             R22.40                    LOCALIZED SWELLING, MASS AND LUMP, UNSPE
                                     

 

 2017                    ADRIENNEHANNA VENECIA FERREIRA ARNP                    Ot       
             M79.605                    PAIN IN LEFT LEG                       
              

 

 2017                    IVÁN HOLLAND DO                    Ot        
            Z01.818                    ENCOUNTER FOR OTHER PREPROCEDURAL EXAMIN
                                     

 

 2017                    IVÁN HOLLAND DO                    Ot        
            Z86.010                    PERSONAL HISTORY OF COLONIC POLYPS      
                               



                                                                               
                                                                               
                                                                               
                                                                               
                                                                               
                                                                               
                                                                               
                                                                               
                                                                               
                                                                               
                                                                               
                                                                               
                                                                               
                                                                               
                                                                               
                                                                               
                                                                               
                                                                               
                                                                               
                                                                               
                                                                               
                                                                               
                                                                               
                                                                               
                                                                               
                                                                               
                                                                               
                                                                               
                                                                               
                                                                               
                                                                               
                                                                               
                                                                               
                                                                               
                                                                               
                                                                               
                                                                               
                                                                               
                                                                               
                                                                               
                                                                               
                                                                               
                                                                               
                                                                               
                                                                               
                                                                               
                                                                               
                                                                               
                                                                               
                                                                               
                                                                               
                                                                               
                                                                               
                                                                               
                                                                               
                                                                               
                                                                               
                                                                               
                                                                               
                                                                               
                                                                               
                                                                               
                                                                               
                                                                               
                                                                               
                                                                               
                                                                               
                                                                               
                                                                               
                                                                               
                                                                               
                                                                               
                                                                               
                                                                               
                                                                               
                                                                               
                                                                               
                                                                               
                                                                               
                                                                               
                                                                               
                                                                               
                                                                               
                                                                               
                                                                               
                                                                               
                                                                               
                                                                               
                                                                               
                                                                               
                                                                    



Procedures

                      





 Code                    Description                    Performed By           
         Performed On                

 

                     09350                                                     
     THERAPUTIC INJ SQ/IM                                                      
     2012                

 

                                                                          
     DEPO-[facility]OVER[facility]  MG                                                   
        2012                

 

                     17362                                                     
     URINE PREGNANCY TEST (IN-HOUSE)                                           
                2012                

 

                     08030                                                     
     URINE PREGNANCY TEST (IN-HOUSE)                                           
                2013                

 

                                                                          
     DEPO-[facility]  MG                                                   
        2013                

 

                     29100                                                     
     THERAPUTIC INJ SQ/IM                                                      
     2013                

 

                     55252                                                     
     THERAPUTIC INJ SQ/IM                                                      
     2013                

 

                                                                          
     DEPO [facility]                                                                           

 

                     05646                                                     
     URINE PREGNANCY TEST (IN-HOUSE)                                           
                2013                

 

                     25349                                                     
     PULMONARY FUNCTION TEST (IN-HOUSE)                                        
                   2013                

 

                     67864                                                     
     RESPIRATORY FLOW VOLUME LOOP                                              
             2013                

 

                     69455                                                     
     THERAPUTIC INJ SQ/IM                                                      
     2013                

 

                     49852                                                     
     URINE PREGNANCY TEST (IN-HOUSE)                                           
                2013                

 

                                                                          
     DEPO [facility]                                                           2013                

 

                     30532                                                     
     ROUTINE VENIPUNCTURE                                                      
     10/04/2013                

 

                     72853                                                     
     XRAY CHEST 2 VIEW                                                         
  10/04/2013                

 

                     78775                                                     
     CBC                                                           10/04/2013  
              

 

                     05640                                                     
     MONO TEST (IN-HOUSE)                                                      
     10/07/2013                

 

                     27913                                                     
     URINE PREGNANCY TEST (IN-HOUSE)                                           
                2013                

 

                                                                          
     DEPO [facility]                                                                           

 

                                                                          
     DEPO [facility]OVER[facility]                                                                           

 

                     14565                                                     
     PREGNANCY TEST, URINE (IN-HOUSE)                                          
                 2014                

 

                     46085                                                     
     THERAPUTIC INJ SQ/IM                                                      
     2014                

 

                     15178                                                     
     TB TEST INTRADERMAL                                                       
    2014                

 

                     43924                                                     
     XRAY CERVICAL SPINE, 2 OR 3 VIEWS                                         
                  2014                

 

                     10562                                                     
     XRAY KNEE RIGHT 1 OR 2 VIEWS                                              
             2014                

 

                     75182                                                     
     ROUTINE VENIPUNCTURE                                                      
     2014                

 

                     53936                                                     
     CBC                                                           2014  
              

 

                     77574                                                     
     LIPID PANEL                                                                           

 

                     28387                                                     
     CMP                                                           2014  
              

 

                     [id number]                                                   
       GFR CALC (RESULT ONLY)                                                  
         2014                

 

                     45329                                                     
     TSH                                                           2014  
              

 

                     22862                                                     
     GLUCOSE JANET 2 HOUR                                                        
   2014                

 

                     52032                                                     
     A1C (IN-HOUSE)                                                                           

 

                     29974                                                     
     ROUTINE VENIPUNCTURE                                                      
     2014                

 

                     82174                                                     
     CBC                                                           2014  
              

 

                     [id number]                                                   
       GFR CALC (RESULT ONLY)                                                  
         2014                

 

                     83208                                                     
     BMP                                                           2014  
              

 

                                                                          
     ROCEPHIN INJ 1 G                                                           
2014                

 

                     28762                                                     
     THERAPUTIC INJ SQ/IM                                                      
     2014                

 

                     56176                                                     
     US PELVIC COMPL (REFLEX CPT- 61983)                                       
                    10/07/2014                

 

                     48989                                                     
     ROUTINE VENIPUNCTURE                                                      
     01/15/2015                

 

                     43290                                                     
     US PELVIC COMPL (REFLEX CPT- 21486)                                       
                    01/15/2015                

 

                     78261                                                     
     A1C (IN-HOUSE)                                                           01
/15/2015                

 

                     [id number]                                                     
     CBC                                                           01/15/2015  
              

 

                     [id number]                                                   
       GFR CALC (RESULT ONLY)                                                  
         01/15/2015                

 

                     [id number]                                                     
     CMP                                                           01/15/2015  
              

 

                     [id number]                                                     
     ROUTINE VENIPUNCTURE                                                      
     2015                

 

                     [id number]                                                     
     PERIPHERAL BLOOD SMEAR W/ PATH REPORT                                     
                      2015                



                                                                               
                                                                               
                                                                               
                                                                               
                                                                               
                                                                               
                                                                               
               



Results

                      





 Test                    Result                    Range                









 Urine beta human chorionic gonadotropin (hCG) measurement - 16 07:40    
            









 Urine beta human chorionic gonadotropin (hCG) measurement                    
NEGATIVE                     NEGATIVE                









 Urine beta human chorionic gonadotropin (hCG) measurement - 17 09:15    
            









 Urine beta human chorionic gonadotropin (hCG) measurement                    
NEGATIVE                     NEGATIVE                









 Capillary blood glucose measurement by glucometer (mass/volume) - 17 09:
16                









 Capillary blood glucose measurement by glucometer (mass/volume)               
     159 mg/dL                                    



                                                                               
                   



Encounters

                      





 ACCT No.                    Visit Date/Time                    Discharge      
              Status                    Pt. Type                    Provider   
                 Facility                    Loc./Unit                    
Complaint                

 

 [id number]                    02/10/2015 10:24:00                    02/10/2015 23:
59:59                    CLS                    Outpatient                    
VENECIA HOBSON                                                            
                   

 

 [id number]                    2015 08:48:00                    2015 23:
59:59                    CLS                    Outpatient                    
MARY GARCIA DO                                                               
                

 

 [id number]                    01/15/2015 14:25:00                    01/15/2015 23:
59:59                    CLS                    Outpatient                    
VENECIA HOBSON                                                            
                   

 

 [id number]                    2015 16:35:00                    2015 23:
59:59                    CLS                    Outpatient                    
MARY GARCIA DO                                                               
                

 

 [id number]                    10/07/2014 08:36:00                    10/07/2014 23:
59:59                    CLS                    Outpatient                    
VENECIA HOBSON                                                            
                   

 

 [id number]                    2014 08:30:00                    2014 23:
59:59                    CLS                    Outpatient                    
YONNY TRENT                                                          
                     

 

 [id number]                    2014 15:42:00                    2014 23:
59:59                    CLS                    Outpatient                    
MADL APRNNEGROA L                                                            
                   

 

 [id number]                    2014 09:02:00                    2014 23:
59:59                    CLS                    Outpatient                    
MADL APRN, VENECIA L                                                            
                   

 

 [id number]                    2014 15:00:00                    2014 23:
59:59                    CLS                    Outpatient                    
MADL APRN, VENECIA L                                                            
                   

 

 [id number]                    2014 11:03:00                    2014 23:
59:59                    CLS                    Outpatient                    
MADL APRN, VENECIA L                                                            
                   

 

 [id number]                    2014 07:59:00                    2014 23:
59:59                    CLS                    Outpatient                    
MADL APRN, VENECIA L                                                            
                   

 

 [id number]                    2014 09:59:00                    2014 23:
59:59                    CLS                    Outpatient                    
MADL APRN, VENECIA L                                                            
                   

 

 [id number]                    2014 15:28:00                    2014 23:
59:59                    CLS                    Outpatient                    
FRANCESCA BUSBY STEFANY                                                      
                         

 

 [id number]                    2014 08:33:00                    2014 23:
59:59                    CLS                    Outpatient                    
GARCIA MARY                                                               
                

 

 [id number]                    2013 09:07:00                    2013 23:
59:59                    CLS                    Outpatient                    
JOSE MEJIAMARY                                                               
                

 

 [id number]                    10/04/2013 08:57:00                    10/04/2013 23:
59:59                    CLS                    Outpatient                    
JOSE MEJIAMARY                                                               
                

 

 [id number]                    2013 10:37:00                    2013 23:
59:59                    CLS                    Outpatient                    
MARY GARCIA DO                                                               
                

 

 [id number]                    2013 09:42:00                    2013 23:
59:59                    CLS                    Outpatient                     
                                                                               

 

 [id number]                    2013 10:41:00                    2013 23:
59:59                    CLS                    Outpatient                     
                                                                               

 

 [id number]                    2012 08:24:00                    2012 23:
59:59                    CLS                    Outpatient                     
                                                                               

 

 [id number]                    2013 11:39:00                                 
                             Document Registration                             
                                                                       

 

 [id number]                    2013 15:03:00                                 
                             Document Registration                             
                                                                       

 

 [id number]                    2013 15:04:00                                 
                             Document Registration

## 2017-04-11 NOTE — XMS REPORT
[facility]

 Created on: 10/14/2015



Bunney,  April

External Reference #: [id number]

: 1975

Sex: Female



Demographics







 [place]  [address] 

[facility] [place]  [address]

 

 Home Phone  [phone]

 

 Preferred Language  Unknown

 

 Marital Status  Unknown

 

 Druze Affiliation  Unknown

 

 Race  White

 

 Ethnic Group  Not  or 





Author







 Author  CEASAR QUINTANILLA

 

 [facility]  [employer]

 

 Address  Unknown

 

 Phone  Unavailable







Care Team Providers







 Care Team Member Name  Role  Phone

 

 CEASAR QUINTANILLA  CP  Unavailable



                                                                



Allergies, Adverse Reactions, Alerts

          





 Substance  Reaction  Event Type

 

 Penicillin V Potassium  Info Not Available  Drug Allergy



                                                                               
         



Problems

          





 Problem Type  Condition  Code  Onset Dates  Condition Status

 

 Assessment  Bronchitis  J40     Active

 

 Problem  Other and unspecified ovarian cyst  620.2     Active

 

 Problem  Postnasal drip  784.91     Active

 

 Problem  Rash and other nonspecific skin eruption  782.1     Active

 

 Problem  Chest pain, unspecified  786.50     Active

 

 Problem  Unspecified episodic mood disorder  296.90     Active

 

 Problem  Acute mucoid otitis media  381.02     Active

 

 Problem  Acute sinusitis, unspecified  461.9     Active

 

 Problem  Need for prophylactic vaccination and inoculation, Influenza  V04.81 
    Active

 

 Problem  Impaired fasting glucose  790.21     Active

 

 Problem  Ingrowing nail  703.0     Active

 

 Problem  Enlargement of lymph nodes  785.6     Active

 

 Problem  Hyperlipidemia  272.4     Active

 

 Problem  Personal history of tobacco use, presenting hazards to health  V15.82
     Active

 

 Problem  Acute upper respiratory infections of unspecified site  465.9     
Active

 

 Problem  Cough  786.2     Active

 

 Problem  Cervicalgia  723.1     Active

 

 Problem  Pain in joint, lower leg  719.46     Active

 

 Problem  Screening for malignant neoplasm of the cervix  V76.2     Active

 

 Problem  Unspecified contraceptive management  V25.9     Active

 

 Problem  Dermatophytosis of nail  110.1     Active

 

 Problem  Leukocytosis, unspecified  288.60     Active

 

 Problem  Allergic rhinitis, cause unspecified  477.9     Active

 

 Problem  Plantar wart  078.12     Active

 

 Problem  Unspecified breast screening  V76.10     Active

 

 Problem  Allergic rhinitis due to pollen  477.0     Active



                                                                               
                                                                               
                                                                               
                                                                               
                      



Medications

          





 Medication  Code System  Code  Instructions  Start Date  End Date  Status  
Dosage

 

 Zithromax Z-Miguelangel  NDC  [address]-71  250 MG Orally Once a day  Oct 12, 2015  
Oct 17, 2015     2 tablets  on the first day, then 1 tablet daily for 4 days

 

 Flonase  NDC  [address]-46  50 MCG/ACT Nasally Once a day           1 spray 
in each nostril

 

 Depo-Provera  NDC  [address]-32  150 MG/ML Intramuscular            1 ml

 

 Zoloft  NDC  [address]-64  25 MG Orally Once a day  May 28, 2015        1 
tablet

 

 Atorvastatin Calcium  NDC  [address]-87  20 MG Orally Once a day  2015        1 tablet



                                                                               
                                                 



Procedures

          





 Procedure  Coding System  Code  Date

 

 Office Visit, Est Pt., Level 3  CPT-4  56003  Oct 12, 2015



                                                                               
                   



Vital Signs

          





 Date/Time:  Oct 12, 2015

 

 Temperature  99.1 F

 

 Weight  208.2 lbs

 

 Height  69 in

 

 BMI  30.74 Index

 

 Blood Pressure Diastolic  74 mmHg

 

 Blood Pressure Systolic  118 mmHg

 

 Cardiac Monitoring Heart Rate  72 bpm



                                                                              



Results

          No Known Results                                                     
               



Summary Purpose

          eClinicalWorks Submission

## 2017-04-11 NOTE — XMS REPORT
[facility]

 Created on: 2015



Kate,  April

External Reference #: [id number]

: 1975

Sex: Female



Demographics







 Address  [address] 

[place]  [address]

 

 Home Phone  [phone]

 

 Preferred Language  Unknown

 

 Marital Status  Unknown

 

 Religion Affiliation  Unknown

 

 Race  White

 

 Ethnic Group  Not  or 





Author







 Author  VENECIA SHARPE

 

 Organization  [employer]

 

 Address  Unknown

 

 Phone  Unavailable







Care Team Providers







 Care Team Member Name  Role  Phone

 

 VENECIA SHARPE  CP  Unavailable



                                                                



Allergies

          No Known Allergies                                                   
                                     



Problems

          





 Problem Type  Condition  ICD-9 Code  Onset Dates  Condition Status

 

 Problem  Rash and other nonspecific skin eruption  782.1     Active

 

 Problem  Acute sinusitis, unspecified  461.9     Active

 

 Problem  Unspecified episodic mood disorder  296.90     Active

 

 Problem  Impaired fasting glucose  790.21     Active

 

 Problem  Enlargement of lymph nodes  785.6     Active

 

 Problem  Cervicalgia  723.1     Active

 

 Problem  Personal history of tobacco use, presenting hazards to health  V15.82
     Active

 

 Problem  Chest pain, unspecified  786.50     Active

 

 Problem  Need for prophylactic vaccination and inoculation, Influenza  V04.81 
    Active

 

 Problem  Cough  786.2     Active

 

 Problem  Acute mucoid otitis media  381.02     Active

 

 Problem  Pain in joint, lower leg  719.46     Active

 

 Problem  Acute upper respiratory infections of unspecified site  465.9     
Active

 

 Problem  Leukocytosis, unspecified  288.60     Active

 

 Problem  Screening for malignant neoplasm of the cervix  V76.2     Active

 

 Problem  Ingrowing nail  703.0     Active

 

 Problem  Dermatophytosis of nail  110.1     Active

 

 Problem  Allergic rhinitis due to pollen  477.0     Active

 

 Problem  Allergic rhinitis, cause unspecified  477.9     Active

 

 Problem  Unspecified contraceptive management  V25.9     Active

 

 Problem  Plantar wart  078.12     Active

 

 Problem  Postnasal drip  784.91     Active

 

 Problem  Unspecified breast screening  V76.10     Active

 

 Problem  Other and unspecified ovarian cyst  620.2     Active



                                                                               
                                                                               
                                                                               
                                                                               
  



Medications

          





 Medication  Code System  Code  Instructions  Start Date  End Date  Status  
Dosage

 

 Lovastatin  NDC  [address]-88  10 MG Orally Once a day  2015        
1 tablet with a meal

 

 Fish Oil  NDC  [address]-45  1000 MG Orally Once a day  Sept 09, 2015  Oct 09
, 2015     1 capsule



                                                                               
         



Results

          No Known Results                                                     
               



Summary Purpose

          eClinicalWorks Submission

## 2017-04-11 NOTE — XMS REPORT
[facility]

 Created on: 2016



Kate,  April

External Reference #: [id number]

: 1975

Sex: Female



Demographics







 Address  [address] [place]  [address]

 

 Home Phone  [phone]

 

 Preferred Language  Unknown

 

 Marital Status  Unknown

 

 Lutheran Affiliation  Unknown

 

 Race  White

 

 Ethnic Group  Not  or 





Author







 Author  VENECIA SHARPE

 

 Organization  [employer]

 

 Address  Unknown

 

 Phone  Unavailable







Care Team Providers







 Care Team Member Name  Role  Phone

 

 VENECIA SHARPE  CP  Unavailable



                                                                



Allergies

          No Known Allergies                                                   
                                     



Problems

          





 Problem Type  Condition  Code  Onset Dates  Condition Status

 

 Assessment  Mixed hyperlipidemia  E78.2     Active

 

 Problem  Dyspepsia  R10.13     Active

 

 Problem  Upper abdominal pain  R10.10     Active

 

 Problem  Mixed hyperlipidemia  E78.2     Active

 

 Problem  Helicobacter pylori (H. pylori) infection  A04.8     Active

 

 Problem  Type 2 diabetes mellitus without complication, without long-term 
current use of insulin  E11.9     Active

 

 Problem  Bowel habit changes  R19.4     Active

 

 Problem  History of leukocytosis  Z86.2     Active



                                                                               
                                                                               



Medications

          





 Medication  Code System  Code  Instructions  Start Date  End Date  Status  
Dosage

 

 [facility]  [address]-09  10 mg Orally Once a day  2016        1 
tablet



                                                                              



Results

          No Known Results                                                     
               



Summary Purpose

          eClinicalWorks Submission

## 2017-04-11 NOTE — XMS REPORT
[facility]

 Created on: 2016



Kate  April

External Reference #: [id number]

: 1975

Sex: Female



Demographics







 Address  [address] [place]  [address]

 

 Home Phone  [phone]

 

 Preferred Language  Unknown

 

 Marital Status  Unknown

 

 Confucianist Affiliation  Unknown

 

 Race  White

 

 Ethnic Group  Not  or 





Author







 Author  VENECIA SHARPE

 

 Organization  [employer]

 

 Address  Unknown

 

 Phone  Unavailable







Care Team Providers







 Care Team Member Name  Role  Phone

 

 VENECIA SHARPE  CP  Unavailable



                                                                



Allergies

          No Known Allergies                                                   
                                     



Problems

          





 Problem Type  Condition  Code  Onset Dates  Condition Status

 

 Problem  Dyspepsia  R10.13     Active

 

 Problem  Upper abdominal pain  R10.10     Active

 

 Problem  Mixed hyperlipidemia  E78.2     Active

 

 Problem  Helicobacter pylori (H. pylori) infection  A04.8     Active

 

 Problem  Type 2 diabetes mellitus without complication, without long-term 
current use of insulin  E11.9     Active

 

 Problem  Bowel habit changes  R19.4     Active

 

 Problem  History of leukocytosis  Z86.2     Active



                                                                               
                                                                     



Medications

          No Known Medications                                                 
                             



Results

          No Known Results                                                     
               



Summary Purpose

          eClinicalWorks Submission

## 2017-04-11 NOTE — XMS REPORT
[facility]

 Created on: 2016

External Reference #: [id number]

: 1975

Sex: Female



Demographics







 Address  [address] [place]  [address]

 

 Home Phone  [phone]

 

 Preferred Language  Unknown

 

 Marital Status  Unknown

 

 Pentecostalism Affiliation  Unknown

 

 Race  White

 

 Ethnic Group  Not  or 





Author







 VENECIA Combs

 

 [facility]  e[employer]

 

 Address  Unknown

 

 Phone  Unavailable







Care Team Providers







 Care Team Member Name  Role  Phone

 

 VENECIA SHARPE    Unavailable



                                                                



Allergies, Adverse Reactions, Alerts

          





 Substance  Reaction  Event Type

 

 Penicillin V Potassium  Info Not Available  Drug Allergy



                                                                               
         



Problems

          





 Problem Type  Condition  Code  Onset Dates  Condition Status

 

 Assessment  History of leukocytosis  Z86.2     Active

 

 Assessment  Upper abdominal pain  R10.10     Active

 

 Assessment  Bowel habit changes  R19.4     Active

 

 Assessment  Helicobacter pylori (H. pylori) infection  A04.8     Active

 

 Problem  Upper abdominal pain  R10.10     Active

 

 Problem  Bowel habit changes  R19.4     Active

 

 Problem  Dyspepsia  R10.13     Active

 

 Problem  Type 2 diabetes mellitus without complication, without long-term 
current use of insulin  E11.9     Active

 

 Assessment  Dyspepsia  R10.13     Active

 

 Problem  History of leukocytosis  Z86.2     Active

 

 Problem  Helicobacter pylori (H. pylori) infection  A04.8     Active



                                                                               
                                                                               
                              



Medications

          





 Medication  Code System  Code  Instructions  Start Date  End Date  Status  
Dosage

 

 Singulair  NDC  [address]-36  10 MG Orally Once a day           1 tablet in 
the evening

 

 Metronidazole  NDC  [address]-92  500 MG Orally Twice a day  Aug 02, 2016  
Aug 16, 2016     1 tablet

 

 Nexium  NDC  [address]-66  40 mg Orally Once a day  Aug 02, 2016        1 
capsule

 

 Zoloft  NDC  [address]-66  25 MG Orally Once a day  May 28, 2015        1 
tablet

 

 Fish Oil  NDC  [address]-29  300 MG Orally Twice a day           1 capsule

 

 Biaxin  NDC  [address]-03  500 MG Orally every 12 hrs  Aug 02, 2016  Aug 16, 
2016     1 tablet

 

 Silvadene  NDC  [address]-95  1 % Externally Once a day  2015        
1 application to affected area

 

 Depo-Provera  NDC  [address]-62  150 MG/ML Intramuscular            1 ml

 

 Flonase  NDC  [address]-03  50 MCG/ACT Nasally Once a day           1 spray 
in each nostril



                                                                               
                                                                               
          



Procedures

          





 Procedure  Coding System  Code  Date

 

 COMPLETE CBC W/AUTO DIFF WBC  CPT-4  86667  Aug 02, 2016

 

 COMPREHEN METABOLIC PANEL  CPT-4  94193  Aug 02, 2016

 

 IMMUNOASSAY,INFECTIOUS AGENT  CPT-4  44133  Aug 02, 2016

 

 VENIPUNCT, ROUTINE*  CPT-4  37803  Aug 02, 2016

 

 Office Visit, Est Pt., Level 4  CPT-4  33239  Aug 02, 2016



                                                                               
                                                           



Vital Signs

          





 Date/Time:  Aug 02, 2016

 

 Cardiac Monitoring Heart Rate  78 bpm

 

 Weight  217.0 lbs

 

 Height  69 in

 

 BMI  32.04 Index

 

 Blood Pressure Diastolic  78 mmHg

 

 Blood Pressure Systolic  126 mmHg



                                                                              



Results

          No Known Results                                                     
               



Summary Purpose

          eClinicalWorks Submission

## 2017-04-11 NOTE — XMS REPORT
[facility]

 Created on: 2015



Kate,  April

External Reference #: [id number]

: 1975

Sex: Female



Demographics







 Address  [address] 

[place]  [address]

 

 Home Phone  [phone]

 

 Preferred Language  Unknown

 

 Marital Status  Unknown

 

 Synagogue Affiliation  Unknown

 

 Race  White

 

 Ethnic Group  Not  or 





Author







 Author  VENECIA SHARPE

 

 Organization  [employer]

 

 Address  Unknown

 

 Phone  Unavailable







Care Team Providers







 Care Team Member Name  Role  Phone

 

 VENECIA SHARPE  CP  Unavailable



                                                                



Allergies

          No Known Allergies                                                   
                                     



Problems

          





 Problem Type  Condition  ICD-9 Code  Onset Dates  Condition Status

 

 Problem  Plantar wart  078.12     Active

 

 Assessment  Elevated blood sugar  790.29     Active

 

 Problem  Other and unspecified ovarian cyst  620.2     Active

 

 Problem  Postnasal drip  784.91     Active

 

 Problem  Rash and other nonspecific skin eruption  782.1     Active

 

 Problem  Acute sinusitis, unspecified  461.9     Active

 

 Problem  Unspecified episodic mood disorder  296.90     Active

 

 Problem  Impaired fasting glucose  790.21     Active

 

 Problem  Cervicalgia  723.1     Active

 

 Problem  Enlargement of lymph nodes  785.6     Active

 

 Problem  Personal history of tobacco use, presenting hazards to health  V15.82
     Active

 

 Problem  Need for prophylactic vaccination and inoculation, Influenza  V04.81 
    Active

 

 Problem  Chest pain, unspecified  786.50     Active

 

 Problem  Cough  786.2     Active

 

 Problem  Acute mucoid otitis media  381.02     Active

 

 Problem  Pain in joint, lower leg  719.46     Active

 

 Problem  Acute upper respiratory infections of unspecified site  465.9     
Active

 

 Problem  Leukocytosis, unspecified  288.60     Active

 

 Problem  Screening for malignant neoplasm of the cervix  V76.2     Active

 

 Problem  Ingrowing nail  703.0     Active

 

 Problem  Dermatophytosis of nail  110.1     Active

 

 Problem  Allergic rhinitis due to pollen  477.0     Active

 

 Problem  Allergic rhinitis, cause unspecified  477.9     Active

 

 Problem  Unspecified contraceptive management  V25.9     Active

 

 Problem  Unspecified breast screening  V76.10     Active



                                                                               
                                                                               
                                                                               
                                                                               
            



Medications

          No Known Medications                                                 
                             



Results

          No Known Results                                                     
               



Summary Purpose

          [employer] Submission

## 2017-04-11 NOTE — XMS REPORT
[facility]

 Created on: 2015



Kate,  April

External Reference #: [id number]

: 1975

Sex: Female



Demographics







 Address  [address] 

[place]  [address]

 

 Home Phone  [phone]

 

 Preferred Language  Unknown

 

 Marital Status  Unknown

 

 Mandaeism Affiliation  Unknown

 

 Race  White

 

 Ethnic Group  Not  or 





Author







 Author  SONNY CARVALHO

 

 Organization  [employer]

 

 Address  Unknown

 

 Phone  Unavailable







Care Team Providers







 Care Team Member Name  Role  Phone

 

 SONNY CARVALHO  CP  Unavailable



                                                                



Allergies

          No Known Allergies                                                   
                                     



Problems

          





 Problem Type  Condition  ICD-9 Code  Onset Dates  Condition Status

 

 Problem  Rash and other nonspecific skin eruption  782.1     Active

 

 Problem  Acute sinusitis, unspecified  461.9     Active

 

 Problem  Unspecified episodic mood disorder  296.90     Active

 

 Problem  Impaired fasting glucose  790.21     Active

 

 Problem  Enlargement of lymph nodes  785.6     Active

 

 Problem  Cervicalgia  723.1     Active

 

 Problem  Personal history of tobacco use, presenting hazards to health  V15.82
     Active

 

 Problem  Chest pain, unspecified  786.50     Active

 

 Problem  Need for prophylactic vaccination and inoculation, Influenza  V04.81 
    Active

 

 Problem  Cough  786.2     Active

 

 Problem  Acute mucoid otitis media  381.02     Active

 

 Problem  Pain in joint, lower leg  719.46     Active

 

 Problem  Acute upper respiratory infections of unspecified site  465.9     
Active

 

 Problem  Leukocytosis, unspecified  288.60     Active

 

 Problem  Screening for malignant neoplasm of the cervix  V76.2     Active

 

 Problem  Ingrowing nail  703.0     Active

 

 Problem  Dermatophytosis of nail  110.1     Active

 

 Problem  Allergic rhinitis due to pollen  477.0     Active

 

 Problem  Allergic rhinitis, cause unspecified  477.9     Active

 

 Problem  Unspecified contraceptive management  V25.9     Active

 

 Problem  Plantar wart  078.12     Active

 

 Problem  Postnasal drip  784.91     Active

 

 Problem  Unspecified breast screening  V76.10     Active

 

 Problem  Other and unspecified ovarian cyst  620.2     Active



                                                                               
                                                                               
                                                                               
                                                                               
  



Medications

          





 Medication  Code System  Code  Instructions  Start Date  End Date  Status  
Dosage

 

 Silvadene  NDC  [address]-68  1 % Externally Once a day  2015        
1 application to affected area



                                                                              



Results

          No Known Results                                                     
               



Summary Purpose

          [employer] Submission

## 2018-02-09 NOTE — DIAGNOSTIC IMAGING REPORT
INDICATION: Screening mammogram  



COMPARISON: None.



FINDINGS:

Baseline digital screen mammography was obtained with cad.



The current study was also evaluated with a Computer Aided

Detection (CAD) system.



Scattered fibroglandular densities are present. There is no mass

or suspicious calcification.



 IMPRESSION: Negative baseline mammogram. No malignancy. 



BI-RADS category 1.



ACR BI-RADS Category 1: Negative.

Result letter will be mailed to the patient.

Note:  At least 10% of breast cancer is not imaged by

mammography.



Dictated by: 



  Dictated on workstation # [id number]

## 2020-02-07 NOTE — DIAGNOSTIC IMAGING REPORT
EXAMINATION: 

Magnetic resonance imaging of the left knee without intravenous

contrast.



DATE: 

February 6, 2020.



COMPARISON: 

None.



INDICATION: 

44-year-old female, left knee pain and swelling after fall on

Christmas 2019.



TECHNIQUE: 

Multiplanar, multisequence noncontrast enhanced MR imaging was

accomplished.



FINDINGS:



MENISCI:

The medial meniscus is intact.



The lateral meniscus is intact.



LIGAMENTS AND TENDONS:

The anterior and posterior cruciate ligaments are intact.



The medial collateral ligament is intact.



The iliotibial band, mid third lateral capsular ligament, fibular

collateral ligament, biceps femoris tendon, and conjoined tendon

are intact.



The quadriceps tendon and patella ligament are intact.



JOINT:

There is an 8 mm wide full-thickness cartilage flap involving the

medial aspect of the lateral patellar facet. There is surface

irregularity of the cartilage of the medial patellar facet and

median patellar ridge. There is a full-thickness cartilage

fissure involving the mid weightbearing portion of the medial

femoral condyle. The lateral compartment cartilage is grossly

intact. There is a trace knee joint effusion without

intra-articular body or prominent synovitis.



BONE:

There is degenerative related marrow edema in the patella. There

is no acute fracture, bone contusion, or evidence of

osteonecrosis.



BURSAE AND SOFT TISSUES:

There is no Baker's cyst. There is nonspecific prepatellar

subcutaneous edema.



IMPRESSION: 

1. Intact menisci and cruciate ligaments. Additional ligaments

and tendons are intact.

2. Moderate patellofemoral compartment osteoarthritis. Trace knee

joint effusion.

3. No acute fracture, bone contusion, or evidence of

osteonecrosis.



 



Dictated by: 



  Dictated on workstation # [id number]

## 2020-06-09 NOTE — STRESS TEST
DATE OF SERVICE:  06/09/2020



RESTING AND POST REGADENOSON TECHNETIUM-99M TETROFOSMIN SPECT CT IMAGING



ORDERING PHYSICIAN:

Dr. Chiu.



PRIMARY PHYSICIAN:

Dr. Ambrose.



CLINICAL DIAGNOSES:

Palpitations, shortness of breath.



Baseline images were carried out after injection of 10.92 mCi of technetium-99m

Tetrofosmin.  This was followed by 0.4 mg regadenoson and 30.6 mCi of

technetium-99m Tetrofosmin for stress imaging.  The electrocardiogram showed

sinus rhythm at baseline.  Frequent isolated premature ventricular contractions

were seen.  There was less than 1 mm ST segment depression in the anterolateral

leads.  The patient noted mild shortness of breath with regadenoson infusion,

which resolved in a few minutes.  Review of images at rest and following stress

indicates a small to moderate anteroapical perfusion defect that appears

transient.  Gated images show normal global left ventricular systolic function,

normal regional wall motion.  Left ventricular ejection fraction is calculated

to be 65%.  Left ventricular end diastolic volume is 93 mL.  TID is absent

(0.99).



CONCLUSIONS:

1.  This study is suggestive of a small to moderate amount of anteroapical

ischemia.

2.  Normal regional wall motion.

3.  Normal global left ventricular systolic function with a calculated ejection

fraction of 65%.





Job ID: [id number]

DocumentID: [id number]

Dictated Date:  06/09/2020 15:53:37

Transcription Date: 06/09/2020 19:47:26

Dictated By: LAN CHIU MD, MA, FACP, FACC,

## 2020-06-23 NOTE — XMS REPORT
[facility]

                             Created on: 2018



Kate April

External Reference #: [id number]

: 1975

Sex: Female



Demographics





                          Address                   [address] [place]  [address]

 

                          Preferred Language        Unknown

 

                          Marital Status            Unknown

 

                          Orthodox Affiliation     Unknown

 

                          Race                      Unknown

 

                          Ethnic Group              Unknown





Author





                          Author                    Darby Humphrey

 

                          Organization              [facility]

 

                          Address                   [address] [place]  [address]



 

                          Phone                     [phone]







Care Team Providers





                    Care Team Member Name Role                Phone

 

                    GRISELDA Humphrey Unavailable         [phone]







PROBLEMS





          Type      Condition ICD9-CM Code DQP20-AW Code Onset Dates Condition S

tatus SNOMED 

Code

 

          Problem   History of leukocytosis           Z86.2               Active

    [id number]

 

          Problem   Duarte's neuroma of right foot           G57.61             

 Active    [id number]

 

                          Problem                   Type 2 diabetes mellitus wit

hout complication, without long-term current

use of insulin              E11.9                     Active       [id number]

 

          Problem   Tubular adenoma of colon           D12.6               Activ

e    [id number]

 

          Problem   Mixed hyperlipidemia           E78.2               Active   

 [id number]







ALLERGIES

No Information



ENCOUNTERS





                Encounter       Location        Date            Diagnosis

 

                          [place] [id number] [address]

                                        Pharyngitis due to Streptoco

ccus species J02.0

 

                          [place] [address] [place] [id number]

[address] [place] [address]

                                         

 

                          [facility] [place] [facility]  [id number]

[address] [place] [address] 

[address]                10 Jul, 2018              Fever, unspecified fever cau

se R50.9 and Lymphadenopathy

R59.1

 

                          [facility] [place] [facility]  [address] [facility] [id number]

[address] [place] [address] 

[address]                              Pharyngitis due to other org

anism J02.8

 

                          [facility]     [id number]

[address] [place] [address]

                                         

 

                          [facility]     [id number]

[address] [place] [address]

                                        Type 2 diabetes mellitus wit

hout complication, without long-term 

current use of insulin E11.9 and Other eczema L30.8

 

                          [facility]  [address] [place] [id number]

[address] [place] [address] 

[address]                28 2018              Acute pain of left shoulder 

M25.512

 

                          [facility]     [address] [place] [id number] [place] [address]

                          23 2018               

 

                          [facility]     [address] [address]

                          15 2018              Type 2 diabetes mellitus wit

hout complication, without long-term 

current use of insulin E11.9

 

                          [place] [address] [address]

                                        Type 2 diabetes mellitus wit

hout complication, without long-term 

current use of insulin E11.9 ; Tubular adenoma of colon D12.6 ; Mixed 
hyperlipidemia E78.2 ; History of leukocytosis Z86.2 and Screening breast 
examination Z12.31

 

                          [place] [address] [address]

                                        Metatarsalgia of right foot 

M77.41 and Type 2 diabetes mellitus 

without complication, without long-term current use of insulin E11.9

 

                          [place] [address] [address]

                                        Capsulitis M77.9 and Metatar

salgia of right foot M77.41

 

                          [place] [address] [address]

                          08 Sep, 2017              Capsulitis M77.9

 

                          [place] [address] [address]

                          06 Sep, 2017               

 

                          [place] [address] [address]

                          03 Aug, 2017              Type 2 diabetes mellitus wit

hout complication, without long-term 

current use of insulin E11.9 ; Tubular adenoma of colon D12.6 and Mixed 
hyperlipidemia E78.2

 

                          [place] [address] [address]

                          14 2017              Metatarsalgia of right foot 

M77.41 and Capsulitis M77.9

 

                          [facility]     [address]

[address] [place] [address]

                                         

 

                          [facility]  [address] [place] [id number]

[address] [place] [address] 

[address]                              Duarte's neuroma of right fo

ot G57.61

 

                          [facility]     [address] [place] [id number]

[address] [place] [address]

                                        Mixed hyperlipidemia E78.2 a

nd Type 2 diabetes mellitus without 

complication, without long-term current use of insulin E11.9

 

                          [place] [address] [address]

                          10 Brandon, 2017              Type 2 diabetes mellitus wit

hout complication, without long-term 

current use of insulin E11.9 ; Tubular adenoma of colon D12.6 and Mixed 
hyperlipidemia E78.2

 

                          [place] [address]

[address] [place] [address]

                          22 Dec, 2016              Mixed hyperlipidemia E78.2

 

                          [place] [address] [place] [id number]

[address] [place] [address]

                                         

 

                          [place] [address] [place] [id number] [place] [address]

                                        Mixed hyperlipidemia E78.2

 

                          [place] [address] [place] [id number]

[address] [place] [address]

                                        Mixed hyperlipidemia E78.2

 

                          [place] [address] [place] [id number]

[address] [place] [address]

                          08 Sep, 2016               

 

                          [facility]     [address] [place] [id number]

[address] [place] [address]

                          10 Aug, 2016              Type 2 diabetes mellitus wit

hout complication, without long-term 

current use of insulin E11.9 ; Helicobacter pylori (H. pylori) infection A04.8 
and Mixed hyperlipidemia E78.2

 

                          [place] [address] [address]

                          08 Aug, 2016              Type 2 diabetes mellitus wit

hout complication, without long-term 

current use of insulin E11.9

 

                          [place] [address] [address]

                          03 Aug, 2016              Type 2 diabetes mellitus wit

hout complication, without long-term 

current use of insulin E11.9

 

                          [facility]     [address] [address]

                          02 Aug, 2016              Dyspepsia R10.13 ; Upper abd

ominal pain R10.10 ; Bowel habit 

changes R19.4 ; History of leukocytosis Z86.2 and Helicobacter pylori (H. 
pylori) infection A04.8

 

                          [facility] [place] [facility]  [address] 

[address]                27 May, 2016              Environmental allergies Z91.

09

 

                          [place] [address] [address]

                                        Left leg pain M79.605 ; Loca

lized swelling of lower leg R22.40 and 

Upper respiratory infection J06.9

 

                          [place] [address] [address]

                          28 Dec, 2015               

 

                          [place] [address] [address]

                          21 Dec, 2015              Left leg pain M79.605

 

                          [place] [address] [address]

                          10 Dec, 2015              Left leg pain M79.605 and Lo

calized swelling of lower leg R22.40

 

                          [place] [address] [address]

                          01 Dec, 2015              Left leg pain M79.605

 

                          [place] [address] [address]

                                        Encounter for immunization Z

23

 

                          [place] [address] [address]

                          12 Oct, 2015              Bronchitis J40

 

                          [facility]     [address] [facility] [id number]

[address] [place] [address]

                          30 Sep, 2015              Physical exam, pre-employmen

t V70.5 ; Encounter for PPD test V74.1 

and Hyperlipidemia 272.4

 

                          [facility]     [address] [facility] [id number]

[address] [place] [address]

                          21 Sep, 2015               

 

                          [facility]     [address] [place] [id number] [place] [address]

                          21 Sep, 2015               

 

                          [facility]     [address] [place] [id number]

[address] [place] [address]

                          09 Sep, 2015               

 

                          [facility]     [address] [address]

                          04 Sep, 2015              Elevated blood sugar 790.29

 

                          [facility]     [address] [place] [id number] [place] [address]

                          03 Sep, 2015              Elevated blood sugar 790.29

 

                          [facility]     [address] [place] [id number] [place] [address]

                          03 Sep, 2015              Palpitations 785.1

 

                          [facility]     [address] [address]

                          01 Sep, 2015              Palpitations 785.1

 

                          [facility]     [address] [place] [id number] [place] [address]

                                         

 

                          [facility]     [address] [place] [id number] [place] [address]

                          28 May, 2015              Hand pain, right 729.5 and D

epression with anxiety 300.4

 

                          [facility]     [address] [place] [id number]

[address] [place] [address]

                                         

 

                          [facility]     [address] [place] [id number] [place] [address]

                                         

 

                          [facility]     [address] [place] [id number]

[address] [place] [address]

                          05 Mar, 2015               

 

                          [facility]     [address] [id number]

[address] [place] [address], [place] [address]

                          05 Mar, 2015               

 

                          [facility]     [address] [id number]

[address] [place] [address], [place] [address]

                                         

 

                          [facility]     [address] [id number]

[address] [place] [address], [place] [address]

                                         

 

                          [facility]     [address] [id number]

[address] [place] [address], [place] [address]

                          10 Feb, 2015               

 

                          [facility]     [address] [id number]

[address] [place] [address], [place] [address]

                          10 Feb, 2015               

 

                          [facility]     [address] [id number]

[address] [place] [address], [place] [address]

                                         

 

                          [facility]     [address] [id number]

[address] [place] [address], [place] [address]

                                         

 

                          [facility]     [address] [id number]

[address] [place] [address], [place] [address]

                                         

 

                          [facility]     [address] [id number]

[address] [place] [address], [place] [address]

                                         

 

                          [facility]     [address] [id number]

[address] [place] [address], [place] [address]

                                         

 

                          [facility]     [address] [id number]

[address] [place] [address], [place] [address]

                                         

 

                          [facility]     [address] [id number]

[address] [place] [address], [place] [address]

                                         

 

                          [facility]     [address] [id number]

[address] [place] [address], [place] [address]

                                         

 

                          [facility]     [address] [id number]

[address] [place] [address], [place] [address]

                                         

 

                          [facility]     [address] [id number]

[address] [place] [address], [place] [address]

                                         

 

                          [facility]     [address] [id number]

[address] [place] [address], [place] [address]

                                         

 

                          [facility]     [address] [id number]

[address] [place] [address], [place] [address]

                          16 2015               

 

                          [facility]     [address] [id number]

[address] [place] [address], [place] [address]

                          15 2015               

 

                          [facility]     [address] [id number]

[address] [place] [address], [place] [address]

                          15 2015               

 

                          [facility]     [address] [id number]

[address] [place] [address], [place] [address]

                                         

 

                          [facility]     [address] [id number]

[address] [place] [address], [place] [address]

                                         

 

                          [facility]     [address] [id number]

[address] [place] [address], [place] [address]

                          15 Oct, 2014               

 

                          [facility]     [address] [id number]

[address] [place] [address], [place] [address]

                          15 Oct, 2014               

 

                          [facility]     [address] [id number]

[address] [place] [address], [place] [address]

                          14 Oct, 2014               

 

                          [facility]     [address] [id number]

[address] [place] [address], [place] [address]

                          14 Oct, 2014               

 

                          [facility]     [address] [id number]

[address] [place] [address], [place] [address]

                          07 Oct, 2014               

 

                          [facility]     [address] [id number]

[address] [place] [address]

                          07 Oct, 2014               

 

                          [facility]     [address] [id number]

[address] [place] [address]

                          12 Sep, 2014               

 

                          [facility]     [address] [id number]

[address] [place] [address], [place] [address]

                          12 Sep, 2014               

 

                          [facility]     [address] [id number]

[address] [place] [address], [place] [address]

                          04 Sep, 2014               

 

                          [facility]     [address] [id number]

[address] [place] [address], [place] [address]

                          03 Sep,                

 

                          [facility]     [address] [id number]

[facility] [place] [address]

                          03 Sep, 2014               

 

                          [facility]     [address] [id number]

[address] [place] [address], [place] [address]

                          02 Sep, 2014               

 

                          [facility]     [address] [id number]

[address] [place] [address], [place] [address]

                          02 Sep, 2014               

 

                          [facility]     [address] [id number]

[address] [place] [address], [place] [address]

                          30 Aug, 2014               

 

                          [facility]     [address] [id number]

[address] [place] [address], [place] [address]

                          30 Aug, 2014               

 

                          [facility]     [address] [id number]

[address] [place] [address], [place] [address]

                          19 Aug, 2014               

 

                          [facility]     [address] [id number]

[address] [place] [address], [place] [address]

                          19 Aug, 2014               

 

                          [facility]     [address] [id number]

[address] [place] [address], [place] [address]

                          14 Aug, 2014               

 

                          [facility]     [address] [id number]

[address] [place] [address], [place] [address]

                          14 Aug, 2014               

 

                          [facility]     [address] [id number]

[address] [place] [address], [place] [address]

                          13 Aug, 2014               

 

                          [facility]     [address] [id number]

[address] [place] [address], [place] [address]

                          13 Aug, 2014               

 

                          [facility]     [address] [id number]

[address] [place] [address], [place] [address]

                                         

 

                          [facility]     [address] [id number]

[address] [place] [address], [place] [address]

                                         

 

                          [facility]     [address] [id number]

[address] [place] [address], [place] [address]

                                         

 

                          [facility]     [address] [id number]

[address] [place] [address], [place] [address]

                                         

 

                          [facility]     [address] [id number]

[address] [place] [address], [place] [address]

                                         

 

                          [facility]     [address] [id number]

[facility] [place] [address]

                          ,                

 

                          [facility]     [address] [id number]

[address] [place] [address], [place] [address]

                                         

 

                          [facility]     [address] [id number]

[address] [place] [address], [place] [address]

                          ,                

 

                          [facility]     [address] [id number]

[address] [place] [address], [place] [address]

                                         

 

                          [facility]     [address] [id number]

[address] [place] [address], [place] [address]

                                         

 

                          [facility]     [address] [id number]

[address] [place] [address], [place] [address]

                                         

 

                          [facility]     [address] [id number]

[address] [place] [address], [place] [address]

                          ,                

 

                          [facility]     [address] [id number]

[address] [place] [address], [place] [address]

                          ,                

 

                          [facility]     [address] [id number]

[address] [place] [address], [place] [address]

                          ,                

 

                          [facility]     [address] [id number]

[address] [place] [address], [place] [address]

                                         

 

                          [facility]     [address] [id number]

[address] [place] [address], [place] [address]

                          ,                

 

                          [facility]     [address] [id number]

[address] [place] [address], [place] [address]

                          ,                

 

                          [facility]     [address] [id number]

[address] [place] [address], [place] [address]

                          ,                

 

                          [facility]     [address] [id number]

[address] [place] [address], [place] [address]

                                         

 

                          [facility]     [address] [id number]

[address] [place] [address], [place] [address]

                          ,                

 

                          [facility]     [address] [id number]

[address] [place] [address], [place] [address]

                                         

 

                          [facility]     [address] [id number]

[address] [place] [address], [place] [address]

                                         

 

                          [facility]     [address] [id number]

[address] [place] [address], [place] [address]

                                         

 

                          [facility]     [address] [id number]

[address] [place] [address], [place] [address]

                                         

 

                          [facility]     [address] [id number]

[address] [place] [address], [place] [address]

                                         

 

                          [facility]     [address] [id number]

[address] [place] [address], [place] [address]

                                         

 

                          [facility]     [address] [id number]

[address] [place] [address], [place] [address]

                                         

 

                          [facility]     [address] [id number]

[address] [place] [address], [place] [address]

                          27 Mar, 2014               

 

                          [facility]     [address] [id number]

[address] [place] [address], [place] [address]

                          27 Mar, 2014               

 

                          [facility]     [address] [id number]

[address] [place] [address], [place] [address]

                                         

 

                          [facility]     [address] [id number]

[address] [place] [address], [place] [address]

                                         

 

                          [facility]     [address] [id number]

[address] [place] [address], [place] [address]

                                         

 

                          [facility]     [address] [id number]

[address] [place] [address], [place] [address]

                                         

 

                          [facility]     [address] [id number]

[address] [place] [address], [place] [address]

                          08 Oct, 2013               

 

                          [facility]     [address] [id number]

[address] [place] [address], [place] [address]

                          04 Oct, 2013               

 

                          [facility]     [address] [id number]

[address] [place] [address], [place] [address]

                          03 Oct, 2013               

 

                          [facility]     [address] [id number]

[address] [place] [address], [place] [address]

                          02 Oct, 2013               

 

                          [facility]     [address] [id number]

[address] [place] [address], [place] [address]

                          01 Oct, 2013               

 

                          [facility]     [address] [id number]

[address] [place] [address], [place] [address]

                          20 Sep, 2013               

 

                          [facility]     [address] [id number]

[address] [place] [address], [place] [address]

                          08 Aug, 2013               

 

                          [facility]     [address] [id number]

[address] [place] [address], [place] [address]

                          24 May, 2013               

 

                          [facility]     [address] [id number]

[address] [place] [address], [place] [address]

                          13 May, 2013               

 

                          [facility]     [address] [id number]

[address] [place] [address], [place] [address]

                                         

 

                          [facility]     [address] [id number]

[address] [place] [address], [place] [address]

                                         

 

                          [facility]     [address] [id number]

[address] [place] [address], [place] [address]

                                         

 

                          [facility]     [address] [id number]

[address] [place] [address], [place] [address]

                                         

 

                          [facility]     [address] [id number]

[address] [place] [address], [place] [address]

                          02 Oct, 2012               

 

                          [facility]     [address] [id number]

[address] [place] [address], [place] [address]

                          02 Oct, 2012               

 

                          [facility]     [address] [id number]

[address] [place] [address], [place] [address]

                          21 Sep, 2012               

 

                          [facility]     [address] [id number]

[address] [place] [address], [place] [address]

                          06 Aug, 2012               

 

                          [facility]     [address] [id number]

[address] [place] [address], [place] [address]

                          02 Aug, 2012               

 

                          [facility]     [address] [id number]

[address] [place] [address], [place] [address]

                                         

 

                          [facility]     [address] [id number]

[address] [place] [address]

                          27 2012               

 

                          [facility]     [address] [id number]

[address] [place] [address]

                          15 2012               

 

                          [facility]     [address] [id number]

[address] [place] [address]

                          20 2012               

 

                          [facility]     [address] [id number]

[address] [place] [address]

                          10 Apr, 2012               

 

                          [facility]     [address] [facility] [id number]

[address] [place] [address]

                          22 Mar, 2012               

 

                          [facility]     [address] [facility] [id number]

[address] [place] [address]

                          20 Mar, 2012               

 

                          [facility]     [address] [facility] [id number]

[address] [place] [address]

                          14 Mar, 2012               

 

                          [facility]     [address] [facility] [id number]

[address] [place] [address]

                                         







IMMUNIZATIONS

No Known Immunizations



SOCIAL HISTORY

Never Assessed



REASON FOR VISIT

Requests return call



PLAN OF CARE





VITAL SIGNS





MEDICATIONS

No Known Medications



RESULTS

No Results



PROCEDURES

No Known procedures



INSTRUCTIONS





MEDICATIONS ADMINISTERED

No Known Medications



MEDICAL (GENERAL) HISTORY





                    Type                Description         Date

 

                          Medical History           allergic rhinitis- rec's dewige donahue allergy injections from Dr Rojas office                            

 

                    Medical History     mood disorder        

 

                    Medical History     Leukocytosis- has been to hematology  

 

                    Medical History     Hyperlipidemia       

 

                          Medical History           Left leg pain- multiple imag

ing performed and ortho referral 

placed                                   

 

                          Medical History           TUBULAR ADENOMA AND GASTRITI

S ON COLONOSOCPY AUG 2016 -MULTIPLE 

POLYPS                                   

 

                    Medical History     Helicobacter pylori (H. pylori) infectio

n Hx  

 

                    Surgical History    cholecystectomy     2006

 

                    Surgical History     section    , 

 

                    Surgical History    tonsillectomy       2015

 

                    Surgical History    colonoscopy         2016

 

                    Surgical History    colonscopy          2017

 

                    Surgical History    Right Knee scope    2017

## 2020-06-23 NOTE — XMS REPORT
[facility]

                             Created on: 2019



Jasonwhit April

External Reference #: [id number]

: 1975

Sex: Female



Demographics





                          Address                   [address] [place]  [address]

 

                          Preferred Language        Unknown

 

                          Marital Status            Unknown

 

                          Pentecostalism Affiliation     Unknown

 

                          Race                      Unknown

 

                          Ethnic Group              Unknown





Author





                          Author                    ANNEMARIE April VENECIA

 

                          [facility]

 

                          Address                   [address] [place]  [address]



 

                          Phone                     [phone]







Care Team Providers





                    Care Team Member Name Role                Phone

 

                    ANNETTE SHARPEWNYA       Unavailable         [phone]







PROBLEMS





          Type      Condition ICD9-CM Code WLE70-BZ Code Onset Dates Condition S

tatus SNOMED 

Code

 

          Problem   Tubular adenoma of colon           D12.6               Activ

e    [id number]

 

          Problem   Duarte's neuroma of right foot           G57.61             

 Active    90954810

 

          Problem   History of leukocytosis           Z86.2               Active

    [id number]

 

          Problem   Non morbid obesity           E66.9               Active    4

[id number]

 

          Problem   Mixed hyperlipidemia           E78.2               Active   

 [id number]

 

          Problem   Seasonal allergic rhinitis due to pollen           J30.1    

           Active    34039060

 

                          Problem                   Type 2 diabetes mellitus wit

hout complication, without long-term current

use of insulin              E11.9                     Active       [id number]

 

          Problem   Anxiety             F41.9               Active    95349915

 

           Problem    Seasonal allergic rhinitis due to other allergic trigger  

          J30.89                

Active                                  [id number]

 

          Problem   Dysthymic disorder           F34.1               Active    7

[id number]

 

          Problem   Arthritis           M19.90              Active    3999006







ALLERGIES

No Information



ENCOUNTERS





                Encounter       Location        Date            Diagnosis

 

                          [place] [address] [facility] [id number]

[address] [place] [address]

                                        Type 2 diabetes mellitus wit

hout complication, without long-term 

current use of insulin E11.9

 

                          [facility]     [address] [facility] [id number]

[address] [place] [address]

                                        Weight loss R63.4

 

                          [place] [address] [facility] [id number]

[address] [place] [address]

                          31 May, 2019              Type 2 diabetes mellitus wit

hout complication, without long-term 

current use of insulin E11.9

 

                          [place] [address] [facility] [id number]

[address] [place] [address]

                          31 May, 2019              Type 2 diabetes mellitus wit

hout complication, without long-term 

current use of insulin E11.9 and Non morbid obesity E66.9

 

                          [facility] IN [place] [address] 

[address]                14 May, 2019              Seasonal allergic rhinitis d

ue to pollen J30.1 and Sore 

throat J02.9

 

                          [facility] [place] [address] 

[address]                              Arthritis M19.90

 

                          [place] [address] [address]

                                        Seasonal allergic rhinitis d

ue to other allergic trigger J30.89 and

Dysthymic disorder F34.1

 

                          [place] [address] [address]

                          05 Dec, 2018              Bilateral otitis media with 

effusion H65.93 and Anxiety F41.9

 

                          [place] [address] [address]

                                        Type 2 diabetes mellitus wit

hout complication, without long-term 

current use of insulin E11.9

 

                          [place] [address] [address]

                                        Pharyngitis due to Streptoco

ccus species J02.0

 

                          [place] [address] [address]

                                         

 

                          [facility] [place] [address] 

[address]                10 Jul, 2018              Fever, unspecified fever cau

se R50.9 and Lymphadenopathy

R59.1

 

                          [facility] [place] [address] 

[address]                              Pharyngitis due to other org

anism J02.8

 

                          [place] [address] [address]

                                         

 

                          [place] [address] [address]

                                        Type 2 diabetes mellitus wit

hout complication, without long-term 

current use of insulin E11.9 and Other eczema L30.8

 

                          [facility] [place] [facility]  [address] 

[address]                              Acute pain of left shoulder 

M25.512

 

                          [place] [address] [address]

                                         

 

                          [place] [address] [address]

                          15 Feb, 2018              Type 2 diabetes mellitus wit

hout complication, without long-term 

current use of insulin E11.9

 

                          [place] [address] [address]

                                        Type 2 diabetes mellitus wit

hout complication, without long-term 

current use of insulin E11.9 ; Tubular adenoma of colon D12.6 ; Mixed 
hyperlipidemia E78.2 ; History of leukocytosis Z86.2 and Screening breast 
examination Z12.31

 

                          [place] [address] [address]

                                        Metatarsalgia of right foot 

M77.41 and Type 2 diabetes mellitus 

without complication, without long-term current use of insulin E11.9

 

                          [place] [address] [address]

                                        Capsulitis M77.9 and Metatar

salgia of right foot M77.41

 

                          [place] [address] [address]

                          08 Sep, 2017              Capsulitis M77.9

 

                          [place] [address] [address]

                          06 Sep, 2017               

 

                          [place] [address] [address]

                          03 Aug, 2017              Type 2 diabetes mellitus wit

hout complication, without long-term 

current use of insulin E11.9 ; Tubular adenoma of colon D12.6 and Mixed 
hyperlipidemia E78.2

 

                          [facility]     [address] [facility] [id number]

[address] [place] [address]

                          14 2017              Metatarsalgia of right foot 

M77.41 and Capsulitis M77.9

 

                          [facility]     [address] [facility] [id number]

[address] [place] [address]

                                         

 

                          [facility]  [address] [facility] [id number]

[address] [place] [address] 

[address]                              Duarte's neuroma of right fo

ot G57.61

 

                          [facility]     [address] [facility] [id number]

[address] [place] [address]

                                        Mixed hyperlipidemia E78.2 a

nd Type 2 diabetes mellitus without 

complication, without long-term current use of insulin E11.9

 

                          [place] [address] [place] [id number]

[address] [place] [address]

                          10 Brandon, 2017              Type 2 diabetes mellitus wit

hout complication, without long-term 

current use of insulin E11.9 ; Tubular adenoma of colon D12.6 and Mixed 
hyperlipidemia E78.2

 

                          [place] [address] [facility] [id number]

[address] [place] [address]

                          22 Dec, 2016              Mixed hyperlipidemia E78.2

 

                          [place] [address] [facility] [id number]

[address] [place] [address]

                                         

 

                          [place] [address] [facility] [id number]

[address] [place] [address]

                                        Mixed hyperlipidemia E78.2

 

                          [place] [address] [facility] [id number]

[address] [place] [address]

                                        Mixed hyperlipidemia E78.2

 

                          [place] [address] [facility] [id number]

[address] [place] [address]

                          08 Sep, 2016               

 

                          [facility]     [address] [facility] [id number]

[address] [place] [address]

                          10 Aug, 2016              Type 2 diabetes mellitus wit

hout complication, without long-term 

current use of insulin E11.9 ; Helicobacter pylori (H. pylori) infection A04.8 
and Mixed hyperlipidemia E78.2

 

                          [facility]     [address] [place] [id number]

[address] [place] [address]

                          08 Aug, 2016              Type 2 diabetes mellitus wit

hout complication, without long-term 

current use of insulin E11.9

 

                          [place] [address] [place] [id number]

[address] [place] [address]

                          03 Aug, 2016              Type 2 diabetes mellitus wit

hout complication, without long-term 

current use of insulin E11.9

 

                          [place] [address] [address]

                          02 Aug, 2016              Dyspepsia R10.13 ; Upper abd

ominal pain R10.10 ; Bowel habit 

changes R19.4 ; History of leukocytosis Z86.2 and Helicobacter pylori (H. 
pylori) infection A04.8

 

                          [facility] [place] [facility]  [address] 

[address]                27 May, 2016              Environmental allergies Z91.

09

 

                          [place] [address] [address]

                                        Left leg pain M79.605 ; Loca

lized swelling of lower leg R22.40 and 

Upper respiratory infection J06.9

 

                          [place] [address] [address]

                          28 Dec, 2015               

 

                          [place] [address] [address]

                          21 Dec, 2015              Left leg pain M79.605

 

                          [place] [address] [address]

                          10 Dec, 2015              Left leg pain M79.605 and Lo

calized swelling of lower leg R22.40

 

                          [place] [address] [address]

                          01 Dec, 2015              Left leg pain M79.605

 

                          [place] [address] [address]

                                        Encounter for immunization Z

23

 

                          [place] [address]

[address] [place] [address]

                          12 Oct, 2015              Bronchitis J40

 

                          [facility]     [address] [address]

                          30 Sep, 2015              Physical exam, pre-employmen

t V70.5 ; Encounter for PPD test V74.1 

and Hyperlipidemia 272.4

 

                          [facility]     [address] [address]

                          21 Sep, 2015               

 

                          [facility]     [address] [place] [id number] [place] [address]

                          21 Sep, 2015               

 

                          [facility]     [address] [address]

                          09 Sep, 2015               

 

                          [facility]     [address] [address]

                          04 Sep, 2015              Elevated blood sugar 790.29

 

                          [facility]     [address] [address]

                          03 Sep, 2015              Elevated blood sugar 790.29

 

                          [facility]     [address] [address]

                          03 Sep, 2015              Palpitations 785.1

 

                          [facility]     [address] [address]

                          01 Sep, 2015              Palpitations 785.1

 

                          [facility]     [address] [address]

                                         

 

                          [facility]     [address] [address]

                          28 May, 2015              Hand pain, right 729.5 and D

epression with anxiety 300.4

 

                          [facility]     [address] [place] [id number] [place] [address]

                                         

 

                          [facility]     [address] [place] [id number] [place] [address]

                                         

 

                          [facility]     [address] [address]

                          05 Mar, 2015               

 

                          [facility]     [address] [id number]

[address] [place] [address], [place] [address]

                          05 Mar, 2015               

 

                          [facility]     [address] [id number]

[address] [place] [address], [place] [address]

                                         

 

                          [facility]     [address] [id number]

[address] [place] [address], [place] [address]

                                         

 

                          [facility]     [address] [id number]

[address] [place] [address], [place] [address]

                          10 Feb, 2015               

 

                          [facility]     [address] [id number]

[address] [place] [address], [place] [address]

                          10 Feb, 2015               

 

                          [facility]     [address] [id number]

[address] [place] [address], [place] [address]

                                         

 

                          [facility]     [address] [id number]

[address] [place] [address], [place] [address]

                                         

 

                          [facility]     [address] [id number]

[address] [place] [address], [place] [address]

                                         

 

                          [facility]     [address] [id number]

[address] [place] [address], [place] [address]

                                         

 

                          [facility]     [address] [id number]

[address] [place] [address], [place] [address]

                                         

 

                          [facility]     [address] [id number]

[address] [place] [address], [place] [address]

                                         

 

                          [facility]     [address] [id number]

[address] [place] [address]

                                         

 

                          [facility]     [address] [id number]

[address] [place] [address], [place] [address]

                                         

 

                          [facility]     [address] [id number]

[address] [place] [address], [place] [address]

                                         

 

                          [facility]     [address] [id number]

[address] [place] [address]

                                         

 

                          [facility]     [address] [id number]

[address] [place] [address], [place] [address]

                          16 2015               

 

                          [facility]     [address] [id number]

[address] [place] [address], [place] [address]

                                         

 

                          [facility]     [address] [id number]

[address] [place] [address], [place] [address]

                          15 2015               

 

                          [facility]     [address] [id number]

[address] [place] [address], [place] [address]

                          15 [place], 2015               

 

                          [facility]     [address] [id number]

[address] [place] [address], [place] [address]

                                         

 

                          [facility]     [address] [id number]

[address] [place] [address], [place] [address]

                                         

 

                          [facility]     [address] [id number]

[address] [place] [address], [place] [address]

                          15 Oct, 2014               

 

                          [facility]     [address] [id number]

[address] [place] [address], [place] [address]

                          15 Oct, 2014               

 

                          [facility]     [address] [id number]

[address] [place] [address], [place] [address]

                          14 Oct, 2014               

 

                          [facility]     [address] [id number]

[address] [place] [address], [place] [address]

                          14 Oct, 2014               

 

                          [facility]     [address] [id number]

[address] [place] [address], [place] [address]

                          07 Oct, 2014               

 

                          [facility]     [address] [id number]

[address] [place] [address], [place] [address]

                          07 Oct, 2014               

 

                          [facility]     [address] [id number]

[address] [place] [address], [place] [address]

                          12 Sep, 2014               

 

                          [facility]     [address] [id number]

[address] [place] [address], [place] [address]

                          12 Sep, 2014               

 

                          [facility]     [address] [id number]

[address] [place] [address], [place] [address]

                          04 Sep, 2014               

 

                          [facility]     [address] [id number]

[address] [place] [address], [place] [address]

                          03 Sep, 2014               

 

                          [facility]     [address] [id number]

[address] [address]

                          03 Sep, 2014               

 

                          [facility]     [address] [id number]

[address] [place] [address], [place] [address]

                          02 Sep, 2014               

 

                          [facility]     [address] [id number]

[address] [place] [address], [place] [address]

                          02 Sep, 2014               

 

                          [facility]     [address] [id number]

[address] [place] [address], [place] [address]

                          30 Aug, 2014               

 

                          [facility]     [address] [id number]

[address] [place] [address], [place] [address]

                          30 Aug, 2014               

 

                          [facility]     [address] [id number]

[address] [place] [address], [place] [address]

                          19 Aug, 2014               

 

                          [facility]     [address] [id number]

[address] [place] [address], [place] [address]

                          19 Aug, 2014               

 

                          [facility]     [address] [id number]

[address] [place] [address], [place] [address]

                          14 Aug, 2014               

 

                          [facility]     [address] [id number]

[address] [place] [address], [place] [address]

                          14 Aug, 2014               

 

                          [facility]     [address] [id number]

[address] [place] [address], [place] [address]

                          13 Aug, 2014               

 

                          [facility]     [address] [id number]

[address] [place] [address], [place] [address]

                          13 Aug, 2014               

 

                          [facility]     [address] [id number]

[address] [place] [address], [place] [address]

                                         

 

                          [facility]     [address] [id number]

[address] [place] [address], [place] [address]

                                         

 

                          [facility]     [address] [id number]

[address] [place] [address], [place] [address]

                                         

 

                          [facility]     [address] [id number]

[address] [place] [address], [place] [address]

                                         

 

                          [facility]     [address] [id number]

[address] [address]

                          ,                

 

                          [facility]     [address] [id number]

[address] [address]

                          ,                

 

                          [facility]     [address] [id number]

[address] [address]

                          ,                

 

                          [facility]     [address] [id number]

[address] [place] [address], [place] [address]

                          ,                

 

                          [facility]     [address] [id number]

[address] [place] [address], [place] [address]

                          ,                

 

                          [facility]     [address] [id number]

[address] [place] [address], [place] [address]

                          ,                

 

                          [facility]     [address] [id number]

[address] [place] [address], [place] [address]

                          ,                

 

                          [facility]     [address] [id number]

[address] [place] [address], [place] [address]

                          ,                

 

                          [facility]     [address] [id number]

[address] [place] [address], [place] [address]

                          ,                

 

                          [facility]     [address] [id number]

[address] [place] [address], [place] [address]

                          ,                

 

                          [facility]     [address] [id number]

[address] [place] [address], [place] [address]

                          ,                

 

                          [facility]     [address] [id number]

[address] [place] [address], [place] [address]

                          ,                

 

                          [facility]     [address] [id number]

[address] [place] [address], [place] [address]

                          ,                

 

                          [facility]     [address] [id number]

[address] [place] [address], [place] [address]

                          ,                

 

                          [facility]     [address] [id number]

[address] [place] [address], [place] [address]

                          ,                

 

                          [facility]     [address] [id number]

[address] [place] [address], [place] [address]

                                         

 

                          [facility]     [address] [id number]

[address] [place] [address], [place] [address]

                                         

 

                          [facility]     [address] [id number]

[address] [place] [address], [place] [address]

                                         

 

                          [facility]     [address] [id number]

[address] [place] [address], [place] [address]

                                         

 

                          [facility]     [address] [id number]

[address] [place] [address], [place] [address]

                                         

 

                          [facility]     [address] [id number]

[address] [place] [address], [place] [address]

                                         

 

                          [facility]     [address] [id number]

[address] [place] [address], [place] [address]

                                         

 

                          [facility]     [address] [id number]

[address] [place] [address], [place] [address]

                                         

 

                          [facility]     [address] [id number]

[address] [place] [address], [place] [address]

                          27 Mar, 2014               

 

                          [facility]     [address] [id number]

[address] [place] [address], [place] [address]

                          27 Mar, 2014               

 

                          [facility]     [address] [id number]

[address] [place] [address], [place] [address]

                                         

 

                          [facility]     [address] [id number]

[address] [place] [address], [place] [address]

                                         

 

                          [facility]     [address] [id number]

[address] [place] [address]

                                         

 

                          [facility]     [address] [id number]

[address] [place] [address], [place] [address]

                                         

 

                          [facility]     [address] [id number]

[address] [place] [address], [place] [address]

                          08 Oct, 2013               

 

                          [facility]     [address] [id number]

[address] [place] [address], [place] [address]

                          04 Oct, 2013               

 

                          [facility]     [address] [id number]

[address] [place] [address]

                          03 Oct, 2013               

 

                          [facility]     [address] [id number]

[address] [place] [address], [place] [address]

                          02 Oct, 2013               

 

                          [facility]     [address] [id number]

[address] [place] [address], [place] [address]

                          01 Oct, 2013               

 

                          [facility]     [address] [id number]

[address] [place] [address], [place] [address]

                          20 Sep, 2013               

 

                          [facility]     [address] [id number]

[address] [place] [address], [place] [address]

                          08 Aug, 2013               

 

                          [facility]     [address] [id number]

[address] [place] [address], [place] [address]

                          24 May, 2013               

 

                          [facility]     [address] [id number]

[address] [place] [address], [place] [address]

                          13 May, 2013               

 

                          [facility]     [address] [id number]

[address] [place] [address], [place] [address]

                                         

 

                          [facility]     [address] [id number]

[address] [place] [address], [place] [address]

                                         

 

                          [facility]     [address] [id number]

[address] [place] [address], [place] [address]

                                         

 

                          [facility]     [address] [id number]

[address] [place] [address], [place] [address]

                                         

 

                          [facility]     [address] [id number]

[address] [place] [address], [place] [address]

                          02 Oct, 2012               

 

                          [facility]     [address] [id number]

[address] [place] [address], [place] [address]

                          02 Oct, 2012               

 

                          [facility]     [address] [id number]

[address] [place] [address], [place] [address]

                          21 Sep, 2012               

 

                          [facility]     [address] [id number]

[address] [place] [address], [place] [address]

                          06 Aug, 2012               

 

                          [facility]     [address] [id number]

[address] [place] [address], [place] [address]

                          02 Aug, 2012               

 

                          [facility]     [address] [id number]

[address] [place] [address]

                                         

 

                          [facility]     [address] [id number]

[address] [place] [address]

                                         

 

                          [facility]     [address] [id number]

[address] [place] [address]

                          15 Eusebio, 2012               

 

                          [facility]     [address] [id number]

[address] [place] [address]

                                         

 

                          [facility]     [address] [id number]

[address] [place] [address]

                          10 Apr, 2012               

 

                          [facility]     [address] [id number]

[address] [place] [address]

                          22 Mar, 2012               

 

                          [facility]     [address] [id number]

[address] [place] [address]

                          20 Mar, 2012               

 

                          [facility]     [address] [id number]

[address] [place] [address]

                          14 Mar, 2012               

 

                          [facility]     [address] [id number]

[address] [place] [address]

                                         







IMMUNIZATIONS

No Known Immunizations



SOCIAL HISTORY

Never Assessed



REASON FOR VISIT





PLAN OF CARE





VITAL SIGNS





                    Height              69 in               2014

 

                    Weight              209 lbs             2014

 

                    Temperature         98.1 degrees Fahrenheit 2014

 

                    Heart Rate          84 bpm              2014

 

                    Respiratory Rate    20                  2014

 

                    Blood pressure systolic 118 mmHg            2014

 

                    Blood pressure diastolic 76 mmHg             2014







MEDICATIONS

Unknown Medications



RESULTS

No Results



PROCEDURES





                Procedure       Date Ordered    Result          Body Site

 

                COMPLETE CBC W/AUTO DIFF WBC Aug 13, 2014                     

 

                BASIC METABOLIC PANEL Aug 13, 2014                     

 

                VENIPUNCT, ROUTINE* Aug 13, 2014                     







INSTRUCTIONS





MEDICATIONS ADMINISTERED

No Known Medications



MEDICAL (GENERAL) HISTORY





                    Type                Description         Date

 

                          Medical History           allergic rhinitis- rec's edwige donahue allergy injections from Dr Rojas office                            

 

                    Medical History     mood disorder        

 

                    Medical History     Leukocytosis- has been to hematology  

 

                    Medical History     Hyperlipidemia       

 

                          Medical History           Left leg pain- multiple imag

ing performed and ortho referral 

placed                                   

 

                          Medical History           TUBULAR ADENOMA AND GASTRITI

S ON COLONOSOCPY AUG 2016 -MULTIPLE 

POLYPS                                   

 

                    Medical History     Helicobacter pylori (H. pylori) infectio

n Hx  

 

                    Surgical History    cholecystectomy     2006

 

                    Surgical History     section    , 

 

                    Surgical History    tonsillectomy       2015

 

                    Surgical History    colonoscopy         2016

 

                    Surgical History    colonscopy          2017

 

                    Surgical History    Right Knee scope    2017

## 2020-06-23 NOTE — XMS REPORT
[facility]

                             Created on: 2020



Darby Love

External Reference #: [id number]

: 1975

Sex: Female



Demographics





                          Address                   [address]

[place]  [address]

 

                          Preferred Language        Unknown

 

                          Marital Status            Unknown

 

                          Pentecostalism Affiliation     Unknown

 

                          Race                      Unknown

 

                          Ethnic Group              Unknown





Author





                          Author                    Darby MADRID FRANCESCA

 

                          Organization              [facility]

 

                          Address                   [address] [place]  [address]



 

                          Phone                     [phone]







Care Team Providers





                    Care Team Member Name Role                Phone

 

                    JULIUS  FRANCESCA Unavailable         [phone]







PROBLEMS





          Type      Condition ICD9-CM Code FPL00-JK Code Onset Dates Condition S

tatus SNOMED 

Code

 

          Problem   Duarte's neuroma of right foot           G57.61             

 Active    13618497

 

          Problem   History of leukocytosis           Z86.2               Active

    [id number]

 

          Problem   Anxiety             F41.9               Active    38471628

 

                          Problem                   Type 2 diabetes mellitus wit

hout complication, without long-term current

use of insulin              E11.9                     Active       [id number]

 

          Problem   Seasonal allergic rhinitis due to pollen           J30.1    

           Active    77465833

 

          Problem   Tubular adenoma of colon           D12.6               Activ

e    354683482

 

          Problem   GERD with esophagitis           K21.0               Active  

  [id number]

 

          Problem   Mixed hyperlipidemia           E78.2               Active   

 597090919

 

           Problem    Seasonal allergic rhinitis due to other allergic trigger  

          J30.89                

Active                                  [id number]

 

          Problem   Dysthymic disorder           F34.1               Active    7

2725324

 

          Problem   Arthritis           M19.90              Active    0166792

 

          Problem   Non morbid obesity           E66.9               Active    4

[id number]







ALLERGIES

No Information



ENCOUNTERS





                Encounter       Location        Date            Diagnosis

 

                    [facility] [address] [place] [address] 10 

2020                                

 

                    [facility] [id number] [place] [address] 15 

2020                               Leg pain, left M79.605

 

                          [facility]  [address] [id number]

[address], [place] 

[address]                13 2020              Leg pain, left M79.605

 

                    [facility] [address] [place] [address]                                Type 2 diabetes mellitus without complic

ation, without long-term 

current use of insulin E11.9 and GERD with esophagitis K21.0

 

                    [place] [address] [address] 01 

Oct, 2019                               Encounter for immunization Z23

 

                    [place] [address] [address]                                Type 2 diabetes mellitus without complic

ation, without long-term 

current use of insulin E11.9

 

                    [place] [address] [address]                                 

 

                    [place] [address] [address]                                Type 2 diabetes mellitus without complic

ation, without long-term 

current use of insulin E11.9

 

                    [place] [address] [address]                                Weight loss R63.4

 

                    [place] [address] [address] 31 

May, 2019                               Type 2 diabetes mellitus without complic

ation, without long-term 

current use of insulin E11.9

 

                    [place] [address] [address] 31 

May, 2019                               Type 2 diabetes mellitus without complic

ation, without long-term 

current use of insulin E11.9 and Non morbid obesity E66.9

 

                          [facility] [place] [address]

[address] [place] [address] 

[address]                14 May, 2019              Seasonal allergic rhinitis d

ue to pollen J30.1 and Sore 

throat J02.9

 

                          [facility] WALK IN [place] [address] [address] [id number]

[address] [place] [address] 

[address]                              Arthritis M19.90

 

                    [address] [address]                                Seasonal allergic rhinitis due to other 

allergic trigger J30.89 and 

Dysthymic disorder F34.1

 

                    [place] [address] [address] 05 

Dec, 2018                               Bilateral otitis media with effusion H65

.93 and Anxiety F41.9

 

                    [place] [address] [address]                                Type 2 diabetes mellitus without complic

ation, without long-term 

current use of insulin E11.9

 

                    [place] [address] [address]                                Pharyngitis due to Streptococcus species

 J02.0

 

                    [place] [address] [address]                                 

 

                          [facility] WALK IN [place] [address] 

[address]                10 Jul, 2018              Fever, unspecified fever cau

se R50.9 and Lymphadenopathy

R59.1

 

                          [facility] WALK IN [place] [address] 

[address]                              Pharyngitis due to other org

anism J02.8

 

                    [place] [address] [address]                                 

 

                    [place] [address] [address]                                Type 2 diabetes mellitus without complic

ation, without long-term 

current use of insulin E11.9 and Other eczema L30.8

 

                          [facility] WALK IN [place] [address] 

[address]                              Acute pain of left shoulder 

M25.512

 

                    [place] [address] [address]                                 

 

                    [address] [address] 15 

Feb, 2018                               Type 2 diabetes mellitus without complic

ation, without long-term 

current use of insulin E11.9

 

                    [place] [address] [address] 06 

2018                               Type 2 diabetes mellitus without complic

ation, without long-term 

current use of insulin E11.9 ; Tubular adenoma of colon D12.6 ; Mixed 
hyperlipidemia E78.2 ; History of leukocytosis Z86.2 and Screening breast 
examination Z12.31

 

                    [place] [address] [address]                                Metatarsalgia of right foot M77.41 and T

ype 2 diabetes mellitus 

without complication, without long-term current use of insulin E11.9

 

                    [place] [address] [address]                                Capsulitis M77.9 and Metatarsalgia of ri

ght foot M77.41

 

                    [place] [address] [address] 08 

Sep, 2017                               Capsulitis M77.9

 

                    [place] [address] [address] 06 

Sep, 2017                                

 

                    [place] [address] [address] 03 

Aug, 2017                               Type 2 diabetes mellitus without complic

ation, without long-term 

current use of insulin E11.9 ; Tubular adenoma of colon D12.6 and Mixed 
hyperlipidemia E78.2

 

                    [place] [address] [address]                                Metatarsalgia of right foot M77.41 and C

apsulitis M77.9

 

                    [place] [address] [address]                                 

 

                          [facility] WALK IN [facility]  [address] [id number]

[address] 

[address]                              Duarte's neuroma of right fo

ot G57.61

 

                    [place] [address] [address]                                Mixed hyperlipidemia E78.2 and Type 2 di

abetes mellitus without 

complication, without long-term current use of insulin E11.9

 

                    [place] [address] [address] 10 

Brandon, 2017                               Type 2 diabetes mellitus without complic

ation, without long-term 

current use of insulin E11.9 ; Tubular adenoma of colon D12.6 and Mixed 
hyperlipidemia E78.2

 

                    [place] [address] [address] 22 

Dec, 2016                               Mixed hyperlipidemia E78.2

 

                    [place] [address] [address]                                 

 

                    [place] [address] [address]                                Mixed hyperlipidemia E78.2

 

                    [place] [address] [address]                                Mixed hyperlipidemia E78.2

 

                    [place] [address] [address] 08 

Sep, 2016                                

 

                    [place] [address] [address] 10 

Aug, 2016                               Type 2 diabetes mellitus without complic

ation, without long-term 

current use of insulin E11.9 ; Helicobacter pylori (H. pylori) infection A04.8 
and Mixed hyperlipidemia E78.2

 

                    [place] [address] [address] 08 

Aug, 2016                               Type 2 diabetes mellitus without complic

ation, without long-term 

current use of insulin E11.9

 

                    [place] [address] [address] 03 

Aug, 2016                               Type 2 diabetes mellitus without complic

ation, without long-term 

current use of insulin E11.9

 

                    [place] [address] [address] 02 

Aug, 2016                               Dyspepsia R10.13 ; Upper abdominal pain 

R10.10 ; Bowel habit changes 

R19.4 ; History of leukocytosis Z86.2 and Helicobacter pylori (H. pylori) 
infection A04.8

 

                          [facility] [place] [facility]  [address] [id number]

[address] 

[address]                27 May, 2016              Environmental allergies Z91.

09

 

                    [place] [address] [address]                                Left leg pain M79.605 ; Localized swelli

ng of lower leg R22.40 and 

Upper respiratory infection J06.9

 

                    [place] [address] [address] 28 

Dec, 2015                                

 

                    [place] [address] [address] 21 

Dec, 2015                               Left leg pain M79.605

 

                    [place] [address] [address] 10 

Dec, 2015                               Left leg pain M79.605 and Localized swel

ling of lower leg R22.40

 

                    [place] [address] [address] 01 

Dec, 2015                               Left leg pain M79.605

 

                    [place] [address] [address]                                Encounter for immunization Z23

 

                    [address] [address] 12 

Oct, 2015                               Bronchitis J40

 

                    [place] [address] [address] 30 

Sep, 2015                               Physical exam, pre-employment V70.5 ; En

counter for PPD test V74.1 and

Hyperlipidemia 272.4

 

                    [place] [address] [address] 21 

Sep, 2015                                

 

                    [place] [address] [address] 21 

Sep, 2015                                

 

                    [place] [address] [address] 09 

Sep, 2015                                

 

                    [place] [address] [address] 04 

Sep, 2015                               Elevated blood sugar 790.29

 

                    [place] [address] [address] 03 

Sep, 2015                               Elevated blood sugar 790.29

 

                    [place] [address] [address] 03 

Sep, 2015                               Palpitations 785.1

 

                    [facility] [id number] [place] [address] 01 

Sep, 2015                               Palpitations 785.1

 

                    [facility] [id number] [place] [address]                                 

 

                    [facility] [id number] [place] [address] 28 

May, 2015                               Hand pain, right 729.5 and Depression wi

th anxiety 300.4

 

                    [facility] [id number] [place] [address]                                 

 

                    [facility] [id number] [place] [address]                                 

 

                    [facility] [id number] [place] [address] 05 

Mar, 2015                                

 

                    [facility] [id number] [place] [address] 05 

Mar, 2015                                

 

                    [facility] [id number] [place] [address]                                 

 

                    [facility] [id number] [place] [address]                                 

 

                    [facility] [id number] [place] [address] 10 

Feb, 2015                                

 

                    [facility] [id number] [place] [address] 10 

Feb, 2015                                

 

                    [facility] [id number] [place] [address]                                 

 

                    [facility] [id number] [place] [address]                                 

 

                    [facility] [id number] [place] [address]                                 

 

                    [facility] [id number] [place] [address]                                 

 

                    [facility] [address] [address] [place] [address]                                 

 

                    [facility] [place] [facility] [address] [id number] [place] [address]                                 

 

                    [facility] [place] [facility] [id number] [place] [address]                                 

 

                    [facility] [place] [facility] [id number] [place] [address]                                 

 

                    [facility] [place] [facility] [id number] [place] [address]                                 

 

                    [facility] [place] [facility] [id number] [place] [address]                                 

 

                    [facility] [place] [facility] [id number] [place] [address]                                 

 

                    [facility] [place] [facility] [id number] [place] [address]                                 

 

                    [facility] [place] [facility] [id number] [place] [address] 15 

Br[place], 2015                                

 

                    [facility] [place] [facility] [id number] [place] [address] 15 

Br[place], 2015                                

 

                    [facility] [place] [facility] [id number] [place] [address]                                 

 

                    [facility] [place] [facility] [id number] [place] [address]                                 

 

                    [facility] [place] [facility] [address] [place] [address] 15 

Oct, 2014                                

 

                    [facility] [place] [facility] [id number] [place] [address] 15 

Oct, 2014                                

 

                    [facility] [place] [facility] [id number] [place] [address] 14 

Oct, 2014                                

 

                    [facility] [place] [facility] [id number] [place] [address] 14 

Oct, 2014                                

 

                    [facility] [place] [facility] [id number] [place] [address] 07 

Oct, 2014                                

 

                    [facility] [place] [facility] [address] [id number] [place] [address] 07 

Oct, 2014                                

 

                    [facility] [place] [facility] [address] [id number] [place] [address] 12 

Sep, 2014                                

 

                    [facility] [place] [facility] [address] [id number] [place] [address] 12 

Sep,                                 

 

                    [facility] [place] [facility] [id number] [place] [address] 04 

Sep, 2014                                

 

                    [facility] [place] [facility] [address] [id number] [place] [address] 03 

Sep,                                 

 

                    [facility] [place] [facility] [id number] [place] [address] 03 

Sep, 2014                                

 

                    [facility] [place] [facility] [id number] [place] [address] 02 

Sep, 2014                                

 

                    [facility] [place] [facility] [id number] [place] [address] 02 

Sep, 2014                                

 

                    [facility] [place] [facility] [address] [place] [address] 30 

Aug, 2014                                

 

                    [facility] [place] [facility] [id number] [place] [address] 30 

Aug, 2014                                

 

                    [facility] [place] [facility] [address] [place] [address] 19 

Aug, 2014                                

 

                    [facility] [place] [facility] [address] [place] [address] 19 

Aug, 2014                                

 

                    [facility] [place] [facility] [id number] [place] [address] 14 

Aug, 2014                                

 

                    [facility] [place] [facility] [address] [id number] [place] [address] 14 

Aug, 2014                                

 

                    [facility] [place] [facility] [id number] [place] [address] 13 

Aug, 2014                                

 

                    [facility] [place] [facility] [id number] [place] [address] 13 

Aug, 2014                                

 

                    [facility] [place] [facility] [id number] [place] [address] ,                                 

 

                    [facility] [place] [facility] [address] [id number] [facility] [place] [address] ,                                 

 

                    [facility] [place] [facility] [address] [id number] [place] [address] ,                                 

 

                    [facility] [place] [facility] [address] [facility] [id number] [facility] [place] [address] ,                                 

 

                    [facility] [place] [facility] [address] [id number] [place] [address] ,                                 

 

                    [facility] [place] [facility] [address] [id number] [place] [address] ,                                 

 

                    [facility] [place] [facility] [address] [id number] [place] [address] ,                                 

 

                    [facility] [place] [facility] [address] [id number] [facility] [place] [address] ,                                 

 

                    [facility] [place] [facility] [address] [id number] [place] [address] ,                                 

 

                    [facility] [place] [facility] [address] [id number] [facility] [place] [address] ,                                 

 

                    [facility] [place] [facility] [address] [id number] [place] [address] ,                                 

 

                    [facility] [place] [facility] [address] [id number] [place] [address] ,                                 

 

                    [facility] [place] [facility] [address] [id number] [place] [address] ,                                 

 

                    [facility] [place] [facility] [address] [id number] [facility] [place] [address] ,                                 

 

                    [facility] [place] [facility] [address] [id number] [place] [address] ,                                 

 

                    [facility] [place] [facility] [address] [id number] [place] [address] ,                                 

 

                    [facility] [place] [facility] [address] [id number] [facility] [place] [address] ,                                 

 

                    [facility] [place] [facility] [address] [facility] [id number] [place] [address]                                 

 

                    [facility] [place] [facility] [address] [facility] [id number] [place] [address]                                 

 

                    [facility] [place] [facility] [id number] [place] [address]                                 

 

                    [facility] [id number] [place] [address]                                 

 

                    [facility] [id number] [place] [address]                                 

 

                    [facility] [id number] [place] [address]                                 

 

                    [facility] [id number] [place] [address]                                 

 

                    [facility] [id number] [place] [address]                                 

 

                    [facility] [id number] [place] [address]                                 

 

                    [facility] [id number] [place] [address]                                 

 

                    [facility] [id number] [place] [address] 27 

Mar, 2014                                

 

                    [facility] [id number] [place] [address] 27 

Mar, 2014                                

 

                    [facility] [id number] [place] [address]                                 

 

                    [facility] [id number] [place] [address]                                 

 

                    [facility] [id number] [place] [address]                                 

 

                    [facility] [id number] [place] [address]                                 

 

                    [facility] [id number] [place] [address] 08 

Oct, 2013                                

 

                    [facility] [id number] [place] [address] 04 

Oct, 2013                                

 

                    [facility] [id number] [place] [address] 03 

Oct, 2013                                

 

                    [facility] [id number] [place] [address] 02 

Oct, 2013                                

 

                    [facility] [id number] [place] [address] 01 

Oct, 2013                                

 

                    [facility] [id number] [place] [address] 20 

Sep, 2013                                

 

                    [facility] [id number] [place] [address] 08 

Aug, 2013                                

 

                    [facility] [id number] [place] [address] 24 

May, 2013                                

 

                    [facility] [id number] [place] [address] 13 

May, 2013                                

 

                    [facility] [id number] [place] [address]                                 

 

                    [facility] [id number] [place] [address]                                 

 

                    [facility] [id number] [place] [address]                                 

 

                    [facility] [id number] [place] [address]                                 

 

                    [facility] [id number] [place] [address] 02 

Oct, 2012                                

 

                    [facility] [id number] [place] [address] 02 

Oct, 2012                                

 

                    [facility] [id number] [place] [address] 21 

Sep, 2012                                

 

                    [facility] [id number] [place] [address] 06 

Aug, 2012                                

 

                    [facility] [id number] [place] [address] 02 

Aug, 2012                                

 

                    [facility] [id number] [place] [address]                                 

 

                    [facility] [id number] [place] [address]                                 

 

                    [facility] [id number] [place] [address] 15 

Eusebio, 2012                                

 

                    [facility] [address] [address]                                 

 

                    [facility] [address] [address] 10 

Apr, 2012                                

 

                    [facility] [address] [address] 22 

Mar, 2012                                

 

                    [facility] [id number] [place] [address] 20 

Mar, 2012                                

 

                    [facility] [id number] [place] [address] 14 

Mar, 2012                                

 

                    [facility] [id number] [place] [address]                                 







IMMUNIZATIONS

No Known Immunizations



SOCIAL HISTORY

Never Assessed



REASON FOR VISIT





PLAN OF CARE





VITAL SIGNS





                    Height              69 in               2014

 

                    Weight              211.3 lbs           2014

 

                    Temperature         97.8 degrees Fahrenheit 2014

 

                    Heart Rate          86 bpm              2014

 

                    Respiratory Rate    16                  2014

 

                    Blood pressure systolic 110 mmHg            2014

 

                    Blood pressure diastolic 78 mmHg             2014







MEDICATIONS

Unknown Medications



RESULTS

No Results



PROCEDURES

No Known procedures



INSTRUCTIONS





MEDICATIONS ADMINISTERED

No Known Medications



MEDICAL (GENERAL) HISTORY





                    Type                Description         Date

 

                          Medical History           allergic rhinitis- rec's edwige donahue allergy injections from Dr Rojas office                            

 

                    Medical History     mood disorder        

 

                    Medical History     Leukocytosis- has been to hematology  

 

                    Medical History     Hyperlipidemia       

 

                          Medical History           Left leg pain- multiple imag

ing performed and ortho referral 

placed                                   

 

                          Medical History           TUBULAR ADENOMA AND GASTRITI

S ON COLONOSOCPY AUG 2016 -MULTIPLE 

POLYPS                                   

 

                    Medical History     Helicobacter pylori (H. pylori) infectio

n Hx  

 

                    Surgical History    cholecystectomy     2006

 

                    Surgical History     section    , 

 

                    Surgical History    tonsillectomy       2015

 

                    Surgical History    colonoscopy         2016

 

                    Surgical History    colonscopy          2017

 

                    Surgical History    Right Knee scope    2017

## 2020-06-23 NOTE — XMS REPORT
[facility]

                             Created on: 07/10/2018



Kate April

External Reference #: [id number]

: 1975

Sex: Female



Demographics





                          Address                   [address] [place]  [address]

 

                          Preferred Language        Unknown

 

                          Marital Status            Unknown

 

                          Voodoo Affiliation     Unknown

 

                          Race                      Unknown

 

                          Ethnic Group              Unknown





Author





                          Author                    Darby SHARPE

 

                          [facility]

 

                          Address                   [address] [place]  [address]



 

                          Phone                     [phone]







Care Team Providers





                    Care Team Member Name Role                Phone

 

                    ANNEMARIE  VENECIA       Unavailable         [phone]







PROBLEMS





          Type      Condition ICD9-CM Code AOU62-EA Code Onset Dates Condition S

tatus SNOMED 

Code

 

          Problem   History of leukocytosis           Z86.2               Active

    [id number]

 

          Problem   Duarte's neuroma of right foot           G57.61             

 Active    [id number]

 

                          Problem                   Type 2 diabetes mellitus wit

hout complication, without long-term current

use of insulin              E11.9                     Active       [id number]

 

          Problem   Tubular adenoma of colon           D12.6               Activ

e    [id number]

 

          Problem   Mixed hyperlipidemia           E78.2               Active   

 [id number]







ALLERGIES

No Information



ENCOUNTERS





                Encounter       Location        Date            Diagnosis

 

                          [facility]     [address] [id number]

[address] [place] [address]

                                         

 

                          [facility]  [address] [id number]

[address] [place] [address] 

[address]                              Pharyngitis due to other org

anism J02.8

 

                          [facility]     [address] [id number]

[address] [place] [address]

                                         

 

                          [facility]     [address] [id number]

[address] [place] [address]

                                        Type 2 diabetes mellitus wit

hout complication, without long-term 

current use of insulin E11.9 and Other eczema L30.8

 

                          [facility]  [address] [id number]

[address] [place] [address] 

[address]                              Acute pain of left shoulder 

M25.512

 

                          [facility]     [address] [id number]

[address] [place] [address]

                                         

 

                          [facility]     [address] [id number]

[address] [place] [address]

                          15 2018              Type 2 diabetes mellitus wit

hout complication, without long-term 

current use of insulin E11.9

 

                          [place] [address] [address]

                          06 2018              Type 2 diabetes mellitus wit

hout complication, without long-term 

current use of insulin E11.9 ; Tubular adenoma of colon D12.6 ; Mixed 
hyperlipidemia E78.2 ; History of leukocytosis Z86.2 and Screening breast 
examination Z12.31

 

                          [place] [address] [address]

                                        Metatarsalgia of right foot 

M77.41 and Type 2 diabetes mellitus 

without complication, without long-term current use of insulin E11.9

 

                          [place] [address] [address]

                                        Capsulitis M77.9 and Metatar

salgia of right foot M77.41

 

                          [place] [address] [address]

                          08 Sep, 2017              Capsulitis M77.9

 

                          [place] [address] [address]

                          06 Sep, 2017               

 

                          [place] [address] [address]

                          03 Aug, 2017              Type 2 diabetes mellitus wit

hout complication, without long-term 

current use of insulin E11.9 ; Tubular adenoma of colon D12.6 and Mixed 
hyperlipidemia E78.2

 

                          [place] [address] [place] [id number] [place] [address]

                                        Metatarsalgia of right foot 

M77.41 and Capsulitis M77.9

 

                          [facility]     [address] [id number]

[address] [place] [address]

                                         

 

                          [facility]  [address] [id number]

[address] [place] [address] 

[address]                              Duarte's neuroma of right fo

ot G57.61

 

                          [facility]     [address] [id number]

[address] [place] [address]

                                        Mixed hyperlipidemia E78.2 a

nd Type 2 diabetes mellitus without 

complication, without long-term current use of insulin E11.9

 

                          [facility]     [address] [id number]

[address] [place] [address]

                          10 Brandon, 2017              Type 2 diabetes mellitus wit

hout complication, without long-term 

current use of insulin E11.9 ; Tubular adenoma of colon D12.6 and Mixed 
hyperlipidemia E78.2

 

                          [facility]     [address] [id number]

[address] [place] [address]

                          22 Dec, 2016              Mixed hyperlipidemia E78.2

 

                          [place] [address] [id number]

[address] [place] [address]

                                         

 

                          [place] [address] [id number]

[address] [place] [address]

                                        Mixed hyperlipidemia E78.2

 

                          [facility]     [address] [id number]

[address] [place] [address]

                                        Mixed hyperlipidemia E78.2

 

                          [facility]     [address] [id number]

[address] [place] [address]

                          08 Sep, 2016               

 

                          [facility]     [address] [id number]

[address] [place] [address]

                          10 Aug, 2016              Type 2 diabetes mellitus wit

hout complication, without long-term 

current use of insulin E11.9 ; Helicobacter pylori (H. pylori) infection A04.8 
and Mixed hyperlipidemia E78.2

 

                          [facility]     [address] [id number]

[address] [place] [address]

                          08 Aug, 2016              Type 2 diabetes mellitus wit

hout complication, without long-term 

current use of insulin E11.9

 

                          [facility]     [address] [id number]

[address] [place] [address]

                          03 Aug, 2016              Type 2 diabetes mellitus wit

hout complication, without long-term 

current use of insulin E11.9

 

                          [place] [address] [id number]

[address] [place] [address]

                          02 Aug, 2016              Dyspepsia R10.13 ; Upper abd

ominal pain R10.10 ; Bowel habit 

changes R19.4 ; History of leukocytosis Z86.2 and Helicobacter pylori (H. 
pylori) infection A04.8

 

                          [facility]  [address] [id number]

[address] [place] [address] 

[address]                27 May, 2016              Environmental allergies Z91.

09

 

                          [place] [address] [address]

                                        Left leg pain M79.605 ; Loca

lized swelling of lower leg R22.40 and 

Upper respiratory infection J06.9

 

                          [place] [address] [address]

                          28 Dec, 2015               

 

                          [place] [address] [address]

                          21 Dec, 2015              Left leg pain M79.605

 

                          [place] [address] [address]

                          10 Dec, 2015              Left leg pain M79.605 and Lo

calized swelling of lower leg R22.40

 

                          [place] [address] [address]

                          01 Dec, 2015              Left leg pain M79.605

 

                          [place] [address] [address]

                                        Encounter for immunization Z

23

 

                          [place] [address] [address]

                          12 Oct, 2015              Bronchitis J40

 

                          [place] [address] [address]

                          30 Sep, 2015              Physical exam, pre-employmen

t V70.5 ; Encounter for PPD test V74.1 

and Hyperlipidemia 272.4

 

                          [place] [address] [id number]

[address] [place] [address]

                          21 Sep, 2015               

 

                          [place] [id number]

[address] [place] [address]

                          21 Sep, 2015               

 

                          [facility]     [address] [id number]

[address] [place] [address]

                          09 Sep, 2015               

 

                          [facility]     [address] [id number]

[address] [place] [address]

                          04 Sep, 2015              Elevated blood sugar 790.29

 

                          [facility]     [address] [id number]

[address] [place] [address]

                          03 Sep, 2015              Elevated blood sugar 790.29

 

                          [facility]     [address] [id number]

[address] [place] [address]

                          03 Sep, 2015              Palpitations 785.1

 

                          [facility]     [address] [id number]

[address] [place] [address]

                          01 Sep, 2015              Palpitations 785.1

 

                          [facility]     [address] [id number]

[address] [place] [address]

                                         

 

                          [facility]     [address] [id number]

[address] [place] [address]

                          28 May, 2015              Hand pain, right 729.5 and D

epression with anxiety 300.4

 

                          [facility]     [address] [id number]

[address] [place] [address]

                                         

 

                          [facility]     [address] [id number]

[address] [place] [address]

                                         

 

                          [facility]     [address] [id number]

[address] [place] [address]

                          05 Mar, 2015               

 

                          [facility]     [address] [id number]

[address] [place] [address]

                          05 Mar, 2015               

 

                          [facility]     [address] [id number]

[address] [place] [address]

                                         

 

                          [facility]     [address] [id number]

[address] [place] [address]

                                         

 

                          [facility]     [address] [id number]

[address] [place] [address] [place] [address]

                          10 2015               

 

                          [facility]     [address] [id number]

[address] [place] [address], [place] [address]

                          10 Feb, 2015               

 

                          [facility]     [address] [id number]

[address] [place] [address], [place] [address]

                                         

 

                          [facility]     [address] [id number]

[address] [place] [address], [place] [address]

                                         

 

                          [facility]     [address] [id number]

[address] [place] [address], [place] [address]

                                         

 

                          [facility]     [address] [id number]

[address] [place] [address], [place] [address]

                                         

 

                          [facility]     [address] [id number]

[address] [place] [address], [place] [address]

                                         

 

                          [facility]     [address] [id number]

[address] [place] [address], [place] [address]

                                         

 

                          [facility]     [address] [id number]

[address] [place] [address], [place] [address]

                                         

 

                          [facility]     [address] [id number]

[address] [place] [address], [place] [address]

                                         

 

                          [facility]     [address] [id number]

[address] [place] [address], [place] [address]

                                         

 

                          [facility]     [address] [id number]

[address] [place] [address], [place] [address]

                                         

 

                          [facility]     [address] [id number]

[address] [place] [address], [place] [address]

                                         

 

                          [facility]     [address] [id number]

[address] [place] [address], [place] [address]

                                         

 

                          [facility]     [address] [id number]

[address] [place] [address], [place] [address]

                          15 [place], 2015               

 

                          [facility]     [address] [id number]

[address] [place] [address], [place] [address]

                          15 [place], 2015               

 

                          [facility]     [address] [id number]

[address] [place] [address], [place] [address]

                                         

 

                          [facility]     [address] [id number]

[address] [place] [address], [place] [address]

                                         

 

                          [facility]     [address] [id number]

[address] [place] [address], [place] [address]

                          15 Oct, 2014               

 

                          [facility]     [address] [id number]

[address] [place] [address], [place] [address]

                          15 Oct, 2014               

 

                          [facility]     [address] [id number]

[address] [place] [address], [place] [address]

                          14 Oct, 2014               

 

                          [facility]     [address] [id number]

[address] [place] [address], [place] [address]

                          14 Oct, 2014               

 

                          [facility]     [address] [id number]

[address] [place] [address], [place] [address]

                          07 Oct, 2014               

 

                          [facility]     [address] [id number]

[address] [place] [address], [place] [address]

                          07 Oct, 2014               

 

                          [facility]     [address] [id number]

[address] [place] [address], [place] [address]

                          12 Sep, 2014               

 

                          [facility]     [address] [id number]

[address] [place] [address], [place] [address]

                          12 Sep,                

 

                          [facility]     [address] [id number]

[address] [place] [address], [place] [address]

                          04 Sep,                

 

                          [facility]     [address] [id number]

[address] [place] [address], [place] [address]

                          03 Sep,                

 

                          [facility]     [address] [id number]

[address] [place] [address], [place] [address]

                          03 Sep,                

 

                          [facility]     [address] [id number]

[address] [place] [address], [place] [address]

                          02 Sep,                

 

                          [facility]     [address] [id number]

[address] [place] [address], [place] [address]

                          02 Sep,                

 

                          [facility]     [address] [id number]

[address] [place] [address], [place] [address]

                          30 Aug, 2014               

 

                          [facility]     [address] [id number]

[address] [place] [address]

                          30 Aug, 2014               

 

                          [facility]     [address] [id number]

[address] [place] [address], [place] [address]

                          19 Aug, 2014               

 

                          [facility]     [address] [id number]

[address] [place] [address], [place] [address]

                          19 Aug, 2014               

 

                          [facility]     [address] [id number]

[address] [place] [address], [place] [address]

                          14 Aug, 2014               

 

                          [facility]     [address] [id number]

[address] [place] [address], [place] [address]

                          14 Aug, 2014               

 

                          [facility]     [address] [id number]

[address] [place] [address], [place] [address]

                          13 Aug, 2014               

 

                          [facility]     [address] [id number]

[address] [place] [address], [place] [address]

                          13 Aug, 2014               

 

                          [facility]     [address] [id number]

[address] [place] [address], [place] [address]

                                         

 

                          [facility]     [address] [id number]

[address] [place] [address], [place] [address]

                                         

 

                          [facility]     [address] [id number]

[address] [place] [address], [place] [address]

                                         

 

                          [facility]     [address] [id number]

[address] [place] [address], [place] [address]

                                         

 

                          [facility]     [address] [id number]

[address] [place] [address], [place] [address]

                                         

 

                          [facility]     [address] [id number]

[address] [place] [address], [place] [address]

                                         

 

                          [facility]     [address] [id number]

[address] [place] [address], [place] [address]

                                         

 

                          [facility]     [address] [id number]

[address] [place] [address], [place] [address]

                                         

 

                          [facility]     [address] [id number]

[facility] [place] [address]

                          ,                

 

                          [facility]     [address] [id number]

[facility] [place] [address]

                          ,                

 

                          [facility]     [address] [id number]

[address] [place] [address], [place] [address]

                          ,                

 

                          [facility]     [address] [id number]

[address] [place] [address], [place] [address]

                          ,                

 

                          [facility]     [address] [id number]

[address] [place] [address], [place] [address]

                          ,                

 

                          [facility]     [address] [id number]

[address] [place] [address], [place] [address]

                          ,                

 

                          [facility]     [address] [id number]

[address] [place] [address], [place] [address]

                          ,                

 

                          [facility]     [address] [id number]

[address] [place] [address], [place] [address]

                          ,                

 

                          [facility]     [address] [id number]

[address] [place] [address], [place] [address]

                                         

 

                          [facility]     [address] [id number]

[address] [place] [address], [place] [address]

                                         

 

                          [facility]     [address] [id number]

[address] [place] [address], [place] [address]

                                         

 

                          [facility]     [address] [id number]

[address] [place] [address], [place] [address]

                                         

 

                          [facility]     [address] [id number]

[address] [place] [address], [place] [address]

                                         

 

                          [facility]     [address] [id number]

[address] [place] [address], [place] [address]

                                         

 

                          [facility]     [address] [id number]

[address] [place] [address], [place] [address]

                                         

 

                          [facility]     [address] [id number]

[address] [place] [address], [place] [address]

                                         

 

                          [facility]     [address] [id number]

[address] [place] [address], [place] [address]

                                         

 

                          [facility]     [address] [id number]

[address] [place] [address], [place] [address]

                                         

 

                          [facility]     [address] [id number]

[address] [place] [address], [place] [address]

                                         

 

                          [facility]     [address] [id number]

[address] [place] [address], [place] [address]

                          27 Mar, 2014               

 

                          [facility]     [address] [id number]

[address] [place] [address], [place] [address]

                          27 Mar, 2014               

 

                          [facility]     [address] [id number]

[address] [place] [address], [place] [address]

                                         

 

                          [facility]     [address] [id number]

[address] [place] [address], [place] [address]

                                         

 

                          [facility]     [address] [id number]

[address] [place] [address], [place] [address]

                                         

 

                          [facility]     [address] [id number]

[address] [place] [address], [place] [address]

                                         

 

                          [facility]     [address] [id number]

[address] [place] [address], [place] [address]

                          08 Oct, 2013               

 

                          [facility]     [address] [id number]

[address] [place] [address], [place] [address]

                          04 Oct, 2013               

 

                          [facility]     [address] [id number]

[address] [place] [address]

                          03 Oct, 2013               

 

                          [facility]     [address] [id number]

[address] [place] [address], [place] [address]

                          02 Oct, 2013               

 

                          [facility]     [address] [id number]

[address] [place] [address], [place] [address]

                          01 Oct, 2013               

 

                          [facility]     [address] [id number]

[address] [place] [address], [place] [address]

                          20 Sep, 2013               

 

                          [facility]     [address] [id number]

[address] [place] [address], [place] [address]

                          08 Aug, 2013               

 

                          [facility]     [address] [id number]

[address] [place] [address], [place] [address]

                          24 May, 2013               

 

                          [facility]     [address] [id number]

[address] [place] [address], [place] [address]

                          13 May, 2013               

 

                          [facility]     [address] [id number]

[address] [place] [address], [place] [address]

                                         

 

                          [facility]     [address] [id number]

[address] [place] [address], [place] [address]

                                         

 

                          [facility]     [address] [id number]

[address] [place] [address], [place] [address]

                                         

 

                          [facility]     [address] [id number]

[address] [place] [address], [place] [address]

                                         

 

                          [facility]     [address] [id number]

[address] [place] [address], [place] [address]

                          02 Oct, 2012               

 

                          [facility]     [address] [id number]

[address] [place] [address], [place] [address]

                          02 Oct, 2012               

 

                          [facility]     [address] [id number]

[address] [place] [address], [place] [address]

                          21 Sep, 2012               

 

                          [facility]     [address] [id number]

[address] [place] [address], [place] [address]

                          06 Aug, 2012               

 

                          [facility]     [address] [id number]

[address] [place] [address], [place] [address]

                          02 Aug, 2012               

 

                          [facility]     [address] [id number]

[address] [place] [address], [place] [address]

                                         

 

                          [facility]     [address] [id number]

[address] [place] [address], [place] [address]

                                         

 

                          [facility]     [address] [id number]

[address] [place] [address], [place] [address]

                          15 Eusebio, 2012               

 

                          [facility]     [address] [id number]

[address] [place] [address], [place] [address]

                                         

 

                          [facility]     [address] [facility] [address] [id number]

[address] [place] [address]

                          10 Apr, 2012               

 

                          [facility]     [address] [id number]

[address] [place] [address]

                          22 Mar, 2012               

 

                          [facility]     [address] [facility] [address] [id number]

[address] [place] [address]

                          20 Mar, 2012               

 

                          [facility]     [address] [facility] [address] [id number]

[address] [place] [address]

                          14 Mar, 2012               

 

                          [facility]     [address] [facility] [address] [id number]

[address] [place] [address]

                                         







IMMUNIZATIONS

No Known Immunizations



SOCIAL HISTORY

Never Assessed



REASON FOR VISIT

requesting a returned call 



PLAN OF CARE





VITAL SIGNS





MEDICATIONS





        Medication Instructions Dosage  Frequency Start Date End Date Duration S

tat

 

                Chantix 1 MG                    1mg 1/2 tab daily x 3 days then 

1/2 tab bid x 4 days then 1 bid  

                                                    Active







RESULTS

No Results



PROCEDURES

No Known procedures



INSTRUCTIONS





MEDICATIONS ADMINISTERED

No Known Medications



MEDICAL (GENERAL) HISTORY





                    Type                Description         Date

 

                          Medical History           allergic rhinitis- rec's edwige donahue allergy injections from Dr Rojas office                            

 

                    Medical History     mood disorder        

 

                    Medical History     Leukocytosis- has been to hematology  

 

                    Medical History     Hyperlipidemia       

 

                          Medical History           Left leg pain- multiple imag

ing performed and ortho referral 

placed                                   

 

                          Medical History           TUBULAR ADENOMA AND GASTRITI

S ON COLONOSOCPY AUG 2016 -MULTIPLE 

POLYPS                                   

 

                    Medical History     Helicobacter pylori (H. pylori) infectio

n Hx  

 

                    Surgical History    cholecystectomy     2006

 

                    Surgical History     section    , 

 

                    Surgical History    tonsillectomy       2015

 

                    Surgical History    colonoscopy         2016

 

                    Surgical History    colonscopy          2017

 

                    Surgical History    Right Knee scope    2017

## 2020-06-23 NOTE — XMS REPORT
[facility]

                             Created on: 2019



Jasonwhit, April

External Reference #: [id number]

: 1975

Sex: Female



Demographics





                          Address                   [address] [place]  [address]

 

                          Preferred Language        Unknown

 

                          Marital Status            Unknown

 

                          Alevism Affiliation     Unknown

 

                          Race                      Unknown

 

                          Ethnic Group              Unknown





Author





                          Author                    ANNEMARIE April VENECIA

 

                          [facility]

 

                          Address                   [address] [place]  [address]



 

                          Phone                     [phone]







Care Team Providers





                    Care Team Member Name Role                Phone

 

                    ANNETTE SHARPEWNYA       Unavailable         [phone]







PROBLEMS





          Type      Condition ICD9-CM Code FJP57-VC Code Onset Dates Condition S

tatus SNOMED 

Code

 

          Problem   Tubular adenoma of colon           D12.6               Activ

e    [id number]

 

          Problem   Duarte's neuroma of right foot           G57.61             

 Active    16443498

 

          Problem   History of leukocytosis           Z86.2               Active

    [id number]

 

          Problem   Non morbid obesity           E66.9               Active    4

[id number]

 

          Problem   Mixed hyperlipidemia           E78.2               Active   

 [id number]

 

          Problem   Seasonal allergic rhinitis due to pollen           J30.1    

           Active    09253737

 

                          Problem                   Type 2 diabetes mellitus wit

hout complication, without long-term current

use of insulin              E11.9                     Active       [id number]

 

          Problem   Anxiety             F41.9               Active    39279467

 

           Problem    Seasonal allergic rhinitis due to other allergic trigger  

          J30.89                

Active                                  [id number]

 

          Problem   Dysthymic disorder           F34.1               Active    7

9447217

 

          Problem   Arthritis           M19.90              Active    6820603







ALLERGIES

No Information



ENCOUNTERS





                Encounter       Location        Date            Diagnosis

 

                          [place] [address] [address] [facility] [id number]

[address] [place] [address]

                                        Type 2 diabetes mellitus wit

hout complication, without long-term 

current use of insulin E11.9

 

                          [facility]     [address] [facility] [id number]

[address] [place] [address]

                                         

 

                          [facility]     [address] [facility] [id number]

[address] [place] [address]

                                        Type 2 diabetes mellitus wit

hout complication, without long-term 

current use of insulin E11.9

 

                          [facility]     [address] [facility] [id number]

[address] [place] [address]

                                        Weight loss R63.4

 

                          [place] [address] [address] [facility] [id number]

[address] [place] [address]

                          31 May, 2019              Type 2 diabetes mellitus wit

hout complication, without long-term 

current use of insulin E11.9

 

                          [place] [address] [place] [id number]

[address] [place] [address]

                          31 May, 2019              Type 2 diabetes mellitus wit

hout complication, without long-term 

current use of insulin E11.9 and Non morbid obesity E66.9

 

                          [facility] IN [place] [address] 

[address]                14 May, 2019              Seasonal allergic rhinitis d

ue to pollen J30.1 and Sore 

throat J02.9

 

                          [place] [address] 

[address]                              Arthritis M19.90

 

                          [place] [address] [address]

                                        Seasonal allergic rhinitis d

ue to other allergic trigger J30.89 and

Dysthymic disorder F34.1

 

                          [place] [address] [address]

                          05 Dec, 2018              Bilateral otitis media with 

effusion H65.93 and Anxiety F41.9

 

                          [place] [address] [address]

                                        Type 2 diabetes mellitus wit

hout complication, without long-term 

current use of insulin E11.9

 

                          [place] [address] [place] [id number]

[address] [place] [address]

                                        Pharyngitis due to Streptoco

ccus species J02.0

 

                          [place] [address] [address]

                                         

 

                          [facility] [place] [address] 

[address]                10 Jul, 2018              Fever, unspecified fever cau

se R50.9 and Lymphadenopathy

R59.1

 

                          [facility] [place] [address] [address] [place] 

[address]                              Pharyngitis due to other org

anism J02.8

 

                          [facility]     [address] [place] [id number]

[address] [place] [address]

                                         

 

                          [facility]     [address] [facility] [id number]

[address] [place] [address]

                                        Type 2 diabetes mellitus wit

hout complication, without long-term 

current use of insulin E11.9 and Other eczema L30.8

 

                          [facility] IN [facility]  [address] [facility] [id number]

[address] [place] [address] 

[address]                              Acute pain of left shoulder 

M25.512

 

                          [place] [address] [place] [id number] [place] [address]

                                         

 

                          [place] [address] [address]

                          15 2018              Type 2 diabetes mellitus wit

hout complication, without long-term 

current use of insulin E11.9

 

                          [place] [address] [place] [id number] [place] [address]

                                        Type 2 diabetes mellitus wit

hout complication, without long-term 

current use of insulin E11.9 ; Tubular adenoma of colon D12.6 ; Mixed 
hyperlipidemia E78.2 ; History of leukocytosis Z86.2 and Screening breast 
examination Z12.31

 

                          [place] [address] [address]

                                        Metatarsalgia of right foot 

M77.41 and Type 2 diabetes mellitus 

without complication, without long-term current use of insulin E11.9

 

                          [place] [address] [place] [id number]

[address] [place] [address]

                                        Capsulitis M77.9 and Metatar

salgia of right foot M77.41

 

                          [place] [address] [place] [id number] [place] [address]

                          08 Sep, 2017              Capsulitis M77.9

 

                          [place] [address] [address] [place] [address]

                          06 Sep, 2017               

 

                          [facility]     [address] [facility] [id number]

[address] [place] [address]

                          03 Aug, 2017              Type 2 diabetes mellitus wit

hout complication, without long-term 

current use of insulin E11.9 ; Tubular adenoma of colon D12.6 and Mixed 
hyperlipidemia E78.2

 

                          [facility]     [address] [place] [id number]

[address] [place] [address]

                                        Metatarsalgia of right foot 

M77.41 and Capsulitis M77.9

 

                          [facility]     [address] [place] [id number]

[address] [place] [address]

                                         

 

                          [facility] [place] [facility]  [address] [place] [id number] [place] [address] 

[address]                              Duarte's neuroma of right fo

ot G57.61

 

                          [place] [address] [place] [id number]

[address] [place] [address]

                                        Mixed hyperlipidemia E78.2 a

nd Type 2 diabetes mellitus without 

complication, without long-term current use of insulin E11.9

 

                          [place] [address] [address]

                          10 Brandon, 2017              Type 2 diabetes mellitus wit

hout complication, without long-term 

current use of insulin E11.9 ; Tubular adenoma of colon D12.6 and Mixed 
hyperlipidemia E78.2

 

                          [place] [address] [place] [id number]

[address] [place] [address]

                          22 Dec, 2016              Mixed hyperlipidemia E78.2

 

                          [place] [address] [place] [id number]

[address] [place] [address]

                                         

 

                          [place] [address] [place] [id number]

[address] [place] [address]

                                        Mixed hyperlipidemia E78.2

 

                          [place] [address] [place] [id number]

[address] [place] [address]

                                        Mixed hyperlipidemia E78.2

 

                          [place] [address] [place] [id number]

[address] [place] [address]

                          08 Sep, 2016               

 

                          [facility]     [address] [facility] [id number]

[address] [place] [address]

                          10 Aug, 2016              Type 2 diabetes mellitus wit

hout complication, without long-term 

current use of insulin E11.9 ; Helicobacter pylori (H. pylori) infection A04.8 
and Mixed hyperlipidemia E78.2

 

                          [facility]     [address] [place] [id number] [place] [address]

                          08 Aug, 2016              Type 2 diabetes mellitus wit

hout complication, without long-term 

current use of insulin E11.9

 

                          [place] [address] [place] [id number] [place] [address]

                          03 Aug, 2016              Type 2 diabetes mellitus wit

hout complication, without long-term 

current use of insulin E11.9

 

                          [place] [address] [place] [id number] [place] [address]

                          02 Aug, 2016              Dyspepsia R10.13 ; Upper abd

ominal pain R10.10 ; Bowel habit 

changes R19.4 ; History of leukocytosis Z86.2 and Helicobacter pylori (H. 
pylori) infection A04.8

 

                          [facility] [place] [facility]  [address] 

[address]                27 May, 2016              Environmental allergies Z91.

09

 

                          [facility]     [address] [place] [id number]

[address] [place] [address]

                                        Left leg pain M79.605 ; Loca

lized swelling of lower leg R22.40 and 

Upper respiratory infection J06.9

 

                          [facility]     [address] [facility] [id number]

[address] [place] [address]

                          28 Dec, 2015               

 

                          [place] [address] [address]

                          21 Dec, 2015              Left leg pain M79.605

 

                          [facility]     [address] [place] [id number]

[address] [place] [address]

                          10 Dec, 2015              Left leg pain M79.605 and Lo

calized swelling of lower leg R22.40

 

                          [facility]     [address] [facility] [id number]

[address] [place] [address]

                          01 Dec, 2015              Left leg pain M79.605

 

                          [facility]     [address] [place] [id number]

[address] [place] [address]

                                        Encounter for immunization Z

23

 

                          [facility]     [address] [place] [id number] [place] [address]

                          12 Oct, 2015              Bronchitis J40

 

                          [facility]     [address] [place] [id number]

[address] [place] [address]

                          30 Sep, 2015              Physical exam, pre-employmen

t V70.5 ; Encounter for PPD test V74.1 

and Hyperlipidemia 272.4

 

                          [facility]     [address] [place] [id number]

[address] [place] [address]

                          21 Sep, 2015               

 

                          [facility]     [address] [place] [id number] [place] [address]

                          21 Sep, 2015               

 

                          [facility]     [address] [place] [id number]

[address] [place] [address]

                          09 Sep, 2015               

 

                          [facility]     [address] [place] [id number]

[address] [place] [address]

                          04 Sep, 2015              Elevated blood sugar 790.29

 

                          [facility]     [address] [place] [id number]

[address] [place] [address]

                          03 Sep, 2015              Elevated blood sugar 790.29

 

                          [facility]     [address] [place] [id number]

[address] [place] [address]

                          03 Sep, 2015              Palpitations 785.1

 

                          [facility]     [address] [place] [id number]

[address] [place] [address]

                          01 Sep, 2015              Palpitations 785.1

 

                          [facility]     [address] [place] [id number]

[address] [place] [address]

                                         

 

                          [facility]     [address] [place] [id number]

[address] [place] [phone]

                          28 May, 2015              Hand pain, right 729.5 and D

epression with anxiety 300.4

 

                          [facility]     [address] [id number]

[address] [place] [address], [place] [address]

                          14 2015               

 

                          [facility]     [address] [id number]

[address] [place] [address], [place] [address]

                                         

 

                          [facility]     [address] [id number]

[address] [place] [address], [place] [address]

                          05 Mar, 2015               

 

                          [facility]     [address] [id number]

[address] [place] [address], [place] [address]

                          05 Mar, 2015               

 

                          [facility]     [address] [id number]

[address] [place] [address], [place] [address]

                                         

 

                          [facility]     [address] [id number]

[address] [place] [address], [place] [address]

                                         

 

                          [facility]     [address] [id number]

[address] [place] [address], [place] [address]

                          10 Feb, 2015               

 

                          [facility]     [address] [id number]

[address] [place] [address]

                          10 Feb, 2015               

 

                          [facility]     [address] [id number]

[address] [place] [address], [place] [address]

                                         

 

                          [facility]     [address] [id number]

[address] [place] [address]

                                         

 

                          [facility]     [address] [id number]

[address] [place] [address]

                                         

 

                          [facility]     [address] [id number]

[address] [place] [address]

                                         

 

                          [facility]     [address] [id number]

[address] [place] [address], [place] [address]

                                         

 

                          [facility]     [address] [id number]

[address] [place] [address]

                                         

 

                          [facility]     [address] [id number]

[address] [place] [address], [place] [address]

                                         

 

                          [facility]     [address] [id number]

[address] [place] [address], [place] [address]

                          17 2015               

 

                          [facility]     [address] [id number]

[address] [place] [address], [place] [address]

                                         

 

                          [facility]     [address] [id number]

[address] [place] [address], [place] [address]

                                         

 

                          [facility]     [address] [id number]

[address] [place] [address], [place] [address]

                                         

 

                          [facility]     [address] [id number]

[address] [place] [address], [place] [address]

                                         

 

                          [facility]     [address] [id number]

[address] [place] [address], [place] [address]

                          15 [place], 2015               

 

                          [facility]     [address] [id number]

[address] [place] [address], [place] [address]

                          15 [place], 2015               

 

                          [facility]     [address] [id number]

[address] [place] [address], [place] [address]

                                         

 

                          [facility]     [address] [id number]

[address] [place] [address], [place] [address]

                                         

 

                          [facility]     [address] [id number]

[address] [place] [address], [place] [address]

                          15 Oct, 2014               

 

                          [facility]     [address] [id number]

[address] [place] [address], [place] [address]

                          15 Oct, 2014               

 

                          [facility]     [address] [id number]

[address] [place] [address], [place] [address]

                          14 Oct, 2014               

 

                          [facility]     [address] [id number]

[address] [place] [address], [place] [address]

                          14 Oct, 2014               

 

                          [facility]     [address] [id number]

[address] [place] [address], [place] [address]

                          07 Oct, 2014               

 

                          [facility]     [address] [id number]

[address] [place] [address], [place] [address]

                          07 Oct, 2014               

 

                          [facility]     [address] [id number]

[address] [place] [address], [place] [address]

                          12 Sep, 2014               

 

                          [facility]     [address] [id number]

[address] [address]

                          12 Sep,                

 

                          [facility]     [address] [id number]

[facility] [address]

                          04 Sep,                

 

                          [facility]     [address] [id number]

[facility] [place] [address]

                          03 Sep,                

 

                          [facility]     [address] [id number]

[address] [place] [address], [place] [address]

                          03 Sep,                

 

                          [facility]     [address] [id number]

[address] [place] [address], [place] [address]

                          02 Sep,                

 

                          [facility]     [address] [id number]

[address] [place] [address], [place] [address]

                          02 Sep, 2014               

 

                          [facility]     [address] [id number]

[address] [place] [address], [place] [address]

                          30 Aug, 2014               

 

                          [facility]     [address] [id number]

[address] [place] [address], [place] [address]

                          30 Aug, 2014               

 

                          [facility]     [address] [id number]

[address] [place] [address], [place] [address]

                          19 Aug, 2014               

 

                          [facility]     [address] [id number]

[address] [place] [address], [place] [address]

                          19 Aug, 2014               

 

                          [facility] [place] [facility]     [address] [id number]

[address] [place] [address], [place] [address]

                          14 Aug, 2014               

 

                          [facility]     [address] [id number]

[address] [place] [address], [place] [address]

                          14 Aug, 2014               

 

                          [facility]     [address] [id number]

[address] [place] [address], [place] [address]

                          13 Aug, 2014               

 

                          [facility]     [address] [id number]

[address] [place] [address], [place] [address]

                          13 Aug, 2014               

 

                          [facility]     [address] [id number]

[address] [place] [address], [place] [address]

                                         

 

                          [facility]     [address] [id number]

[address] [place] [address], [place] [address]

                                         

 

                          [facility]     [address] [id number]

[facility] [address]

                                         

 

                          [facility]     [address] [id number]

[address] [place] [address], [place] [address]

                                         

 

                          [facility]     [address] [id number]

[address] [address]

                                         

 

                          [facility]     [address] [id number]

[address] [place] [address], [place] [address]

                                         

 

                          [facility]     [address] [id number]

[address] [place] [address], [place] [address]

                                         

 

                          [facility]     [address] [id number]

[address] [place] [address], [place] [address]

                                         

 

                          [facility]     [address] [id number]

[address] [place] [address], [place] [address]

                                         

 

                          [facility]     [address] [id number]

[address] [place] [address], [place] [address]

                                         

 

                          [facility]     [address] [id number]

[address] [place] [address], [place] [address]

                                         

 

                          [facility]     [address] [id number]

[address] [place] [address], [place] [address]

                                         

 

                          [facility]     [address] [id number]

[address] [place] [address], [place] [address]

                                         

 

                          [facility]     [address] [id number]

[address] [place] [address], [place] [address]

                          ,                

 

                          [facility]     [address] [id number]

[address] [place] [address], [place] [address]

                                         

 

                          [facility]     [address] [id number]

[address] [place] [address], [place] [address]

                                         

 

                          [facility]     [address] [id number]

[address] [place] [address], [place] [address]

                          ,                

 

                          [facility]     [address] [id number]

[address] [place] [address], [place] [address]

                                         

 

                          [facility]     [address] [id number]

[address] [place] [address], [place] [address]

                                         

 

                          [facility]     [address] [id number]

[address] [place] [address], [place] [address]

                                         

 

                          [facility]     [address] [id number]

[address] [place] [address], [place] [address]

                                         

 

                          [facility]     [address] [id number]

[address] [place] [address], [place] [address]

                                         

 

                          [facility]     [address] [id number]

[address] [place] [address], [place] [address]

                                         

 

                          [facility]     [address] [id number]

[address] [place] [address], [place] [address]

                                         

 

                          [facility]     [address] [id number]

[address] [place] [address], [place] [address]

                                         

 

                          [facility]     [address] [id number]

[address] [place] [address], [place] [address]

                                         

 

                          [facility]     [address] [id number]

[address] [place] [address], [place] [address]

                                         

 

                          [facility]     [address] [id number]

[address] [place] [address], [place] [address]

                          27 Mar, 2014               

 

                          [facility]     [address] [id number]

[address] [place] [address], [place] [address]

                          27 Mar, 2014               

 

                          [facility]     [address] [id number]

[address] [place] [address], [place] [address]

                                         

 

                          [facility]     [address] [id number]

[address] [place] [address], [place] [address]

                                         

 

                          [facility]     [address] [id number]

[address] [place] [address], [place] [address]

                                         

 

                          [facility]     [address] [id number]

[address] [place] [address], [place] [address]

                                         

 

                          [facility]     [address] [id number]

[address] [place] [address], [place] [address]

                          08 Oct, 2013               

 

                          [facility]     [address] [id number]

[address] [place] [address], [place] [address]

                          04 Oct, 2013               

 

                          [facility]     [address] [id number]

[address] [place] [address], [place] [address]

                          03 Oct, 2013               

 

                          [facility]     [address] [id number]

[address] [place] [address], [place] [address]

                          02 Oct, 2013               

 

                          [facility]     [address] [id number]

[address] [place] [address], [place] [address]

                          01 Oct, 2013               

 

                          [facility]     [address] [id number]

[address] [place] [address], [place] [address]

                          20 Sep, 2013               

 

                          [facility]     [address] [id number]

[address] [place] [address], [place] [address]

                          08 Aug, 2013               

 

                          [facility]     [address] [id number]

[address] [place] [address], [place] [address]

                          24 May, 2013               

 

                          [facility]     [address] [id number]

[address] [place] [address], [place] [address]

                          13 May, 2013               

 

                          [facility]     [address] [id number]

[address] [place] [address], [place] [address]

                                         

 

                          [facility]     [address] [id number]

[address] [place] [address], [place] [address]

                                         

 

                          [facility]     [address] [id number]

[address] [place] [address], [place] [address]

                                         

 

                          [facility]     [address] [id number]

[address] [place] [address], [place] [address]

                                         

 

                          [facility]     [address] [id number]

[address] [place] [address], [place] [address]

                          02 Oct, 2012               

 

                          [facility]     [address] [id number]

[address] [place] [address], [place] [address]

                          02 Oct, 2012               

 

                          [facility]     [address] [id number]

[address] [place] [address]

                          21 Sep, 2012               

 

                          [facility]     [address] [id number]

[address] [place] [address]

                          06 Aug, 2012               

 

                          [facility]     [address] [id number]

[address] [place] [address]

                          02 Aug, 2012               

 

                          [facility]     [address] [id number]

[address] [place] [address]

                                         

 

                          [facility]     [address] [id number]

[address] [place] [address]

                                         

 

                          [facility]     [address] [id number]

[address] [place] [address]

                          15 Eusebio, 2012               

 

                          [facility]     [address] [id number]

[address] [place] [address]

                                         

 

                          [facility]     [address] [id number]

[address] [place] [address]

                          10 Apr, 2012               

 

                          [facility]     [address] [id number]

[address] [place] [address]

                          22 Mar, 2012               

 

                          [facility]     [address] [id number]

[address] [place] [address]

                          20 Mar, 2012               

 

                          [facility]     [address] [id number]

[address] [place] [address]

                          14 Mar, 2012               

 

                          [facility]     [address] [facility] [id number]

[address] [place] [address]

                                         







IMMUNIZATIONS

No Known Immunizations



SOCIAL HISTORY

Never Assessed



REASON FOR VISIT





PLAN OF CARE





VITAL SIGNS





MEDICATIONS

Unknown Medications



RESULTS

No Results



PROCEDURES

No Known procedures



INSTRUCTIONS





MEDICATIONS ADMINISTERED

No Known Medications



MEDICAL (GENERAL) HISTORY





                    Type                Description         Date

 

                          Medical History           allergic rhinitis- rec's edwige donahue allergy injections from Dr Rojas office                            

 

                    Medical History     mood disorder        

 

                    Medical History     Leukocytosis- has been to hematology  

 

                    Medical History     Hyperlipidemia       

 

                          Medical History           Left leg pain- multiple imag

ing performed and ortho referral 

placed                                   

 

                          Medical History           TUBULAR ADENOMA AND GASTRITI

S ON COLONOSOCPY AUG 2016 -MULTIPLE 

POLYPS                                   

 

                    Medical History     Helicobacter pylori (H. pylori) infectio

n Hx  

 

                    Surgical History    cholecystectomy     2006

 

                    Surgical History     section    , 

 

                    Surgical History    tonsillectomy       2015

 

                    Surgical History    colonoscopy         2016

 

                    Surgical History    colonscopy          2017

 

                    Surgical History    Right Knee scope    2017

## 2020-06-23 NOTE — XMS REPORT
[facility]

                             Created on: 2018



Jasonwhit April

External Reference #: [id number]

: 1975

Sex: Female



Demographics





                          Address                   [address] [place]  [address]

 

                          Preferred Language        Unknown

 

                          Marital Status            Unknown

 

                          Temple Affiliation     Unknown

 

                          Race                      Unknown

 

                          Ethnic Group              Unknown





Author





                          Author                    ZIA April ASHOK

 

                          Organization              [facility]

 

                          Address                   [address] [place]  [address]



 

                          Phone                     [phone]







Care Team Providers





                    Care Team Member Name Role                Phone

 

                    ASHOK LIZARRAGA       Unavailable         [phone]







PROBLEMS





          Type      Condition ICD9-CM Code AQA00-JK Code Onset Dates Condition S

tatus SNOMED 

Code

 

          Problem   History of leukocytosis           Z86.2               Active

    [id number]

 

          Problem   Duarte's neuroma of right foot           G57.61             

 Active    [id number]

 

                          Problem                   Type 2 diabetes mellitus wit

hout complication, without long-term current

use of insulin              E11.9                     Active       [id number]

 

          Problem   Tubular adenoma of colon           D12.6               Activ

e    [id number]

 

          Problem   Mixed hyperlipidemia           E78.2               Active   

 [id number]







ALLERGIES





             Substance    Reaction     Event Type   Date         Status

 

             Penicillin V Potassium Unknown      Drug Allergy  Activ

e







ENCOUNTERS





                Encounter       Location        Date            Diagnosis

 

                          [facility]     [address] [facility] [id number]

[address] [place] [address]

                                        Pharyngitis due to Streptoco

ccus species J02.0

 

                          [facility]     [address] [facility] [id number]

[address] [place] [address]

                                         

 

                          [facility]  [address] [facility] [id number]

[address] [place] [address] 

[address]                10 Jul, 2018              Fever, unspecified fever cau

se R50.9 and Lymphadenopathy

R59.1

 

                          [facility]  [address] [facility] [id number]

[address] [place] [address] 

[address]                              Pharyngitis due to other org

anism J02.8

 

                          [facility]     [address] [facility] [id number]

[address] [place] [address]

                                         

 

                          [facility]     [address] [facility] [id number]

[address] [place] [address]

                                        Type 2 diabetes mellitus wit

hout complication, without long-term 

current use of insulin E11.9 and Other eczema L30.8

 

                          [facility] [place] [facility]  [address] 

[address]                              Acute pain of left shoulder 

M25.512

 

                          [facility]     [address] [address]

                                         

 

                          [facility]     [address] [address]

                          15 Feb, 2018              Type 2 diabetes mellitus wit

hout complication, without long-term 

current use of insulin E11.9

 

                          [place] [address] [address]

                                        Type 2 diabetes mellitus wit

hout complication, without long-term 

current use of insulin E11.9 ; Tubular adenoma of colon D12.6 ; Mixed 
hyperlipidemia E78.2 ; History of leukocytosis Z86.2 and Screening breast 
examination Z12.31

 

                          [place] [address] [address]

                                        Metatarsalgia of right foot 

M77.41 and Type 2 diabetes mellitus 

without complication, without long-term current use of insulin E11.9

 

                          [place] [address] [address]

                                        Capsulitis M77.9 and Metatar

salgia of right foot M77.41

 

                          [place] [address] [address]

                          08 Sep, 2017              Capsulitis M77.9

 

                          [place] [address] [place] [id number]

[address] [place] [address]

                          06 Sep, 2017               

 

                          [place] [address] [address]

                          03 Aug, 2017              Type 2 diabetes mellitus wit

hout complication, without long-term 

current use of insulin E11.9 ; Tubular adenoma of colon D12.6 and Mixed 
hyperlipidemia E78.2

 

                          [place] [address] [place] [address]

                          14 2017              Metatarsalgia of right foot 

M77.41 and Capsulitis M77.9

 

                          [facility]     [address]

[address] [place] [address]

                                         

 

                          [facility]  [address] [place] [id number]

[address] [place] [address] 

[address]                              Duarte's neuroma of right fo

ot G57.61

 

                          [facility]     [address]

[address] [place] [address]

                                        Mixed hyperlipidemia E78.2 a

nd Type 2 diabetes mellitus without 

complication, without long-term current use of insulin E11.9

 

                          [place] [address] [address]

                          10 Brandon, 2017              Type 2 diabetes mellitus wit

hout complication, without long-term 

current use of insulin E11.9 ; Tubular adenoma of colon D12.6 and Mixed 
hyperlipidemia E78.2

 

                          [place] [address] [place] [id number]

[address] [place] [address]

                          22 Dec, 2016              Mixed hyperlipidemia E78.2

 

                          [place] [address] [address]

                                         

 

                          [place] [address] [address]

                                        Mixed hyperlipidemia E78.2

 

                          [place] [address]

[address] [place] [address]

                                        Mixed hyperlipidemia E78.2

 

                          [place] [address] [place] [id number]

[address] [place] [address]

                          08 Sep, 2016               

 

                          [place] [address] [place] [id number]

[address] [place] [address]

                          10 Aug, 2016              Type 2 diabetes mellitus wit

hout complication, without long-term 

current use of insulin E11.9 ; Helicobacter pylori (H. pylori) infection A04.8 
and Mixed hyperlipidemia E78.2

 

                          [place] [address] [place] [id number]

[address] [place] [address]

                          08 Aug, 2016              Type 2 diabetes mellitus wit

hout complication, without long-term 

current use of insulin E11.9

 

                          [place] [address] [address]

                          03 Aug, 2016              Type 2 diabetes mellitus wit

hout complication, without long-term 

current use of insulin E11.9

 

                          [facility]     [address] [address]

                          02 Aug, 2016              Dyspepsia R10.13 ; Upper abd

ominal pain R10.10 ; Bowel habit 

changes R19.4 ; History of leukocytosis Z86.2 and Helicobacter pylori (H. 
pylori) infection A04.8

 

                          [facility] [place] [facility]  [address] 

[address]                27 May, 2016              Environmental allergies Z91.

09

 

                          [place] [address] [address]

                                        Left leg pain M79.605 ; Loca

lized swelling of lower leg R22.40 and 

Upper respiratory infection J06.9

 

                          [place] [address] [address]

                          28 Dec, 2015               

 

                          [place] [address] [address]

                          21 Dec, 2015              Left leg pain M79.605

 

                          [place] [address] [address]

                          10 Dec, 2015              Left leg pain M79.605 and Lo

calized swelling of lower leg R22.40

 

                          [place] [address] [address]

                          01 Dec, 2015              Left leg pain M79.605

 

                          [place] [address] [address]

                                        Encounter for immunization Z

23

 

                          [place] [address] [address]

                          12 Oct, 2015              Bronchitis J40

 

                          [facility]     [address] [facility] [id number]

[address] [place] [address]

                          30 Sep, 2015              Physical exam, pre-employmen

t V70.5 ; Encounter for PPD test V74.1 

and Hyperlipidemia 272.4

 

                          [facility]     [address] [facility] [id number]

[address] [place] [address]

                          21 Sep, 2015               

 

                          [facility]     [address] [facility] [id number]

[address] [place] [address]

                          21 Sep, 2015               

 

                          [facility]     [address] [facility] [id number]

[address] [place] [address]

                          09 Sep, 2015               

 

                          [facility]     [address] [place] [id number]

[address] [place] [address]

                          04 Sep, 2015              Elevated blood sugar 790.29

 

                          [facility]     [address] [place] [id number]

[address] [place] [address]

                          03 Sep, 2015              Elevated blood sugar 790.29

 

                          [facility]     [address] [facility] [id number]

[address] [place] [address]

                          03 Sep, 2015              Palpitations 785.1

 

                          [facility]     [address] [place] [id number] [place] [address]

                          01 Sep, 2015              Palpitations 785.1

 

                          [facility]     [address] [place] [id number]

[address] [place] [address]

                                         

 

                          [facility]     [address] [place] [id number]

[address] [place] [address]

                          28 May, 2015              Hand pain, right 729.5 and D

epression with anxiety 300.4

 

                          [facility]     [address] [facility] [id number]

[address] [place] [address]

                                         

 

                          [facility]     [address] [place] [id number]

[address] [place] [address]

                                         

 

                          [facility]     [address] [place] [id number]

[address] [place] [address]

                          05 Mar, 2015               

 

                          [facility]     [address] [id number]

[address] [place] [address], [place] [address]

                          05 Mar, 2015               

 

                          [facility]     [address] [id number]

[address] [place] [address], [place] [address]

                                         

 

                          [facility]     [address] [id number]

[address] [place] [address], [place] [address]

                          ,                

 

                          [facility]     [address] [id number]

[address] [place] [address], [place] [address]

                          10 Feb, 2015               

 

                          [facility]     [address] [id number]

[address] [place] [address], [place] [address]

                          10 Feb,                

 

                          [facility]     [address] [id number]

[address] [place] [address], [place] [address]

                                         

 

                          [facility]     [address] [id number]

[address] [place] [address], [place] [address]

                                         

 

                          [facility]     [address] [id number]

[address] [place] [address], [place] [address]

                                         

 

                          [facility]     [address] [id number]

[address] [place] [address], [place] [address]

                                         

 

                          [facility]     [address] [id number]

[address] [place] [address], [place] [address]

                                         

 

                          [facility]     [address] [id number]

[address] [place] [address], [place] [address]

                                         

 

                          [facility]     [address] [id number]

[address] [place] [address], [place] [address]

                                         

 

                          [facility]     [address] [id number]

[address] [place] [address], [place] [address]

                                         

 

                          [facility]     [address] [id number]

[address] [place] [address], [place] [address]

                                         

 

                          [facility]     [address] [id number]

[address] [place] [address], [place] [address]

                                         

 

                          [facility]     [address] [id number]

[address] [place] [address]

                                         

 

                          [facility]     [address] [id number]

[address] [place] [address], [place] [address]

                                         

 

                          [facility]     [address] [id number]

[address] [place] [address]

                          15 [place], 2015               

 

                          [facility]     [address] [id number]

[address] [place] [address], [place] [address]

                          15 [place], 2015               

 

                          [facility]     [address] [id number]

[address] [place] [address], [place] [address]

                                         

 

                          [facility]     [address] [id number]

[address] [place] [address], [place] [address]

                                         

 

                          [facility]     [address] [id number]

[address] [place] [address], [place] [address]

                          15 Oct, 2014               

 

                          [facility]     [address] [id number]

[address] [place] [address]

                          15 Oct, 2014               

 

                          [facility]     [address] [id number]

[address] [place] [address], [place] [address]

                          14 Oct, 2014               

 

                          [facility]     [address] [id number]

[address] [place] [address]

                          14 Oct, 2014               

 

                          [facility]     [address] [id number]

[address] [place] [address], [place] [address]

                          07 Oct, 2014               

 

                          [facility]     [address] [id number]

[address] [place] [address]

                          07 Oct, 2014               

 

                          [facility]     [address] [id number]

[address] [place] [address]

                          12 Sep, 2014               

 

                          [facility]     [address] [id number]

[address] [place] [address], [place] [address]

                          12 Sep, 2014               

 

                          [facility]     [address] [id number]

[address] [place] [address], [place] [address]

                          04 Sep,                

 

                          [facility]     [address] [id number]

[address] [place] [address], [place] [address]

                          03 Sep,                

 

                          [facility]     [address] [id number]

[address] [address]

                          03 Sep, 2014               

 

                          [facility]     [address] [id number]

[address] [address]

                          02 Sep, 2014               

 

                          [facility]     [address] [id number]

[address] [address]

                          02 Sep, 2014               

 

                          [facility]     [address] [id number]

[address] [place] [address], [place] [address]

                          30 Aug, 2014               

 

                          [facility]     [address] [id number]

[address] [place] [address], [place] [address]

                          30 Aug, 2014               

 

                          [facility]     [address] [id number]

[address] [place] [address], [place] [address]

                          19 Aug, 2014               

 

                          [facility]     [address] [id number]

[address] [place] [address], [place] [address]

                          19 Aug, 2014               

 

                          [facility]     [address] [id number]

[address] [place] [address], [place] [address]

                          14 Aug, 2014               

 

                          [facility]     [address] [id number]

[address] [place] [address], [place] [address]

                          14 Aug, 2014               

 

                          [facility]     [address] [id number]

[address] [place] [address], [place] [address]

                          13 Aug, 2014               

 

                          [facility]     [address] [id number]

[address] [place] [address], [place] [address]

                          13 Aug, 2014               

 

                          [facility]     [address] [id number]

[address] [place] [address], [place] [address]

                                         

 

                          [facility]     [address] [id number]

[address] [place] [address], [place] [address]

                                         

 

                          [facility]     [address] [id number]

[address] [place] [address], [place] [address]

                                         

 

                          [facility]     [address] [id number]

[address] [place] [address], [place] [address]

                                         

 

                          [facility]     [address] [id number]

[address] [place] [address], [place] [address]

                                         

 

                          [facility]     [address] [id number]

[address] [address]

                          ,                

 

                          [facility]     [address] [id number]

[address] [address]

                                         

 

                          [facility]     [address] [id number]

[address] [address]

                          ,                

 

                          [facility]     [address] [id number]

[address] [place] [address], [place] [address]

                          ,                

 

                          [facility]     [address] [id number]

[address] [place] [address], [place] [address]

                                         

 

                          [facility]     [address] [id number]

[address] [place] [address], [place] [address]

                                         

 

                          [facility]     [address] [id number]

[address] [place] [address], [place] [address]

                                         

 

                          [facility]     [address] [id number]

[address] [place] [address], [place] [address]

                                         

 

                          [facility]     [address] [id number]

[address] [place] [address], [place] [address]

                          ,                

 

                          [facility]     [address] [id number]

[address] [place] [address], [place] [address]

                                         

 

                          [facility]     [address] [id number]

[address] [place] [address], [place] [address]

                          ,                

 

                          [facility]     [address] [address] [id number]

[address] [place] [address], [place] [address]

                                         

 

                          [facility]     [address] [id number]

[address] [place] [address], [place] [address]

                                         

 

                          [facility]     [address] [id number]

[address] [place] [address], [place] [address]

                                         

 

                          [facility]     [address] [id number]

[address] [place] [address], [place] [address]

                          ,                

 

                          [facility]     [address] [id number]

[address] [place] [address], [place] [address]

                                         

 

                          [facility]     [address] [id number]

[address] [place] [address], [place] [address]

                                         

 

                          [facility]     [address] [id number]

[address] [place] [address], [place] [address]

                                         

 

                          [facility]     [address] [id number]

[address] [place] [address], [place] [address]

                                         

 

                          [facility]     [address] [id number]

[address] [place] [address], [place] [address]

                                         

 

                          [facility]     [address] [id number]

[address] [place] [address], [place] [address]

                                         

 

                          [facility]     [address] [id number]

[address] [place] [address], [place] [address]

                                         

 

                          [facility]     [address] [id number]

[address] [place] [address], [place] [address]

                          27 Mar, 2014               

 

                          [facility]     [address] [id number]

[address] [place] [address], [place] [address]

                          27 Mar, 2014               

 

                          [facility]     [address] [id number]

[address] [place] [address], [place] [address]

                                         

 

                          [facility]     [address] [id number]

[address] [place] [address], [place] [address]

                                         

 

                          [facility]     [address] [id number]

[address] [place] [address], [place] [address]

                                         

 

                          [facility]     [address] [id number]

[address] [place] [address], [place] [address]

                                         

 

                          [facility]     [address] [id number]

[address] [place] [address], [place] [address]

                          08 Oct, 2013               

 

                          [facility]     [address] [id number]

[address] [place] [address], [place] [address]

                          04 Oct, 2013               

 

                          [facility]     [address] [id number]

[address] [place] [address], [place] [address]

                          03 Oct, 2013               

 

                          [facility]     [address] [id number]

[address] [place] [address], [place] [address]

                          02 Oct, 2013               

 

                          [facility]     [address] [id number]

[address] [place] [address], [place] [address]

                          01 Oct, 2013               

 

                          [facility]     [address] [id number]

[address] [place] [address], [place] [address]

                          20 Sep, 2013               

 

                          [facility]     [address] [id number]

[address] [place] [address], [place] [address]

                          08 Aug, 2013               

 

                          [facility]     [address] [id number]

[address] [place] [address], [place] [address]

                          24 May, 2013               

 

                          [facility]     [address] [id number]

[address] [place] [address], [place] [address]

                          13 May, 2013               

 

                          [facility]     [address] [id number]

[address] [place] [address], [place] [address]

                                         

 

                          [facility]     [address] [id number]

[address] [place] [address], [place] [address]

                                         

 

                          [facility]     [address] [id number]

[address] [place] [address], [place] [address]

                                         

 

                          [facility]     [address] [id number]

[address] [place] [address], [place] [address]

                                         

 

                          [facility]     [address] [id number]

[address] [place] [address], [place] [address]

                          02 Oct, 2012               

 

                          [facility]     [address] [id number]

[address] [place] [address], [place] [address]

                          02 Oct, 2012               

 

                          [facility]     [address] [id number]

[address] [place] [address], [place] [address]

                          21 Sep, 2012               

 

                          [facility]     [address] [id number]

[address] [place] [address], [place] [address]

                          06 Aug, 2012               

 

                          [facility]     [address] [id number]

[address] [place] [address], [place] [address]

                          02 Aug, 2012               

 

                          [facility]     [address] [id number]

[address] [place] [address]

                                         

 

                          [facility]     [address] [id number]

[address] [place] [address]

                                         

 

                          [facility]     [address] [id number]

[address] [place] [address]

                          15 Eusebio, 2012               

 

                          [facility]     [address] [id number]

[address] [place] [address]

                                         

 

                          [facility]     [address] [id number]

[address] [place] [address]

                          10 Apr, 2012               

 

                          [facility]     [address] [id number]

[address] [place] [address]

                          22 Mar, 2012               

 

                          [facility]     [address] [id number]

[address] [place] [address]

                          20 Mar, 2012               

 

                          [facility]     [address] [id number]

[address] [place] [address]

                          14 Mar, 2012               

 

                          [facility]     [address] [id number]

[address] [place] [address]

                                         







IMMUNIZATIONS

No Known Immunizations



SOCIAL HISTORY

Never Assessed



REASON FOR VISIT

2 month f/u. Consult Dr. Lizarraga;ed RT(R)



PLAN OF CARE





                          Activity                  Details

 

                                         

 

                          Follow Up                 prn Reason:







VITAL SIGNS





MEDICATIONS





        Medication Instructions Dosage  Frequency Start Date End Date Duration S

tatus

 

        Tramadol HCl 50 MG Orally every 6 hrs prn 1 tablet as needed                          

Unknown

 

                    Triamcinolone Acetonide 0.1 % Externally Twice a day 1 appli

cation to affected 

area         12h          10 Brandon, 2017                           Unknown

 

        Singulair 10 MG Orally Once a day 1 tablet in the evening 24h           

                  Unknown

 

          Atorvastatin Calcium 20 MG Orally Once a day 1 tablet  24h       30 

p,            30 

day(s)                                  Unknown

 

             Take the Interview Contour Next Test Test Strips In Vitro Once a day as directed

  24h          10 Aug, 

2016                                                        Unknown

 

        Crestor 10 mg Orally Once a day 1 tablet 24h                     30     

 Unknown

 

        MetFORMIN HCl  MG Orally twice a day 2 tablets 12h                

     30      Unknown

 

        Take the Interview Contour Next Monitor w/Device         as directed         10 Aug, 

2016                 Unknown

 

        Depo-Provera 150 MG/ML         1 ml                                    U

nknown

 

        Flonase 50 MCG/ACT Nasally Once a day 1 spray in each nostril 24h       

                      Unknown

 

        Glimepiride 1 MG Orally Once a day 1 tablet 24h                     30  

    Unknown

 

        Xopenex HFA 45 MCG/ACT Inhalation every 4 hrs 1 puff as needed 4h       

                       Unknown

 

        Zoloft 25 MG Orally Once a day 1 tablet 24h     28 May, 2015         30 

days Unknown

 

        Fish Oil 300 mg Orally Twice a day 1 capsule 12h                        

     Unknown

 

        Nexium 40 mg Orally Once a day 1 capsule 24h                     30     

 Unknown







RESULTS

No Results



PROCEDURES

No Known procedures



INSTRUCTIONS





MEDICATIONS ADMINISTERED

No Known Medications



MEDICAL (GENERAL) HISTORY





                    Type                Description         Date

 

                          Medical History           allergic rhinitis- rec's edwige donahue allergy injections from Dr Rojas office                            

 

                    Medical History     mood disorder        

 

                    Medical History     Leukocytosis- has been to hematology  

 

                    Medical History     Hyperlipidemia       

 

                          Medical History           Left leg pain- multiple imag

ing performed and ortho referral 

placed                                   

 

                          Medical History           TUBULAR ADENOMA AND GASTRITI

S ON COLONOSOCPY AUG 2016 -MULTIPLE 

POLYPS                                   

 

                    Medical History     Helicobacter pylori (H. pylori) infectio

n Hx  

 

                    Surgical History    cholecystectomy     

 

                    Surgical History     section    , 

 

                    Surgical History    tonsillectomy       2015

 

                    Surgical History    colonoscopy         2016

 

                    Surgical History    colonscopy          2017

 

                    Surgical History    Right Knee scope    2017

## 2020-06-23 NOTE — XMS REPORT
[facility]

                             Created on: 2019



Kate April

External Reference #: [id number]

: 1975

Sex: Female



Demographics





                          Address                   [address] [place]  [address]

 

                          Preferred Language        Unknown

 

                          Marital Status            Unknown

 

                          Congregational Affiliation     Unknown

 

                          Race                      Unknown

 

                          Ethnic Group              Unknown





Author





                          Author                    Migration, April Doctor

 

                          Organization              [facility]

 

                          Address                   Unknown

 

                          Phone                     Unavailable







Care Team Providers





                    Care Team Member Name Role                Phone

 

                    Migration,  Doctor  Unavailable         Unavailable







PROBLEMS





          Type      Condition ICD9-CM Code EID87-JE Code Onset Dates Condition S

tatus SNOMED 

Code

 

          Problem   Tubular adenoma of colon           D12.6               Activ

e    [id number]

 

          Problem   Duarte's neuroma of right foot           G57.61             

 Active    35974256

 

          Problem   History of leukocytosis           Z86.2               Active

    [id number]

 

          Problem   Non morbid obesity           E66.9               Active    4

[id number]

 

          Problem   Mixed hyperlipidemia           E78.2               Active   

 [id number]

 

          Problem   Seasonal allergic rhinitis due to pollen           J30.1    

           Active    43963743

 

                          Problem                   Type 2 diabetes mellitus wit

hout complication, without long-term current

use of insulin              E11.9                     Active       [id number]

 

          Problem   Anxiety             F41.9               Active    11808081

 

           Problem    Seasonal allergic rhinitis due to other allergic trigger  

          J30.89                

Active                                  [id number]

 

          Problem   Dysthymic disorder           F34.1               Active    7

5195365

 

          Problem   Arthritis           M19.90              Active    8732127







ALLERGIES

No Information



ENCOUNTERS





                Encounter       Location        Date            Diagnosis

 

                          [place] [address] [facility] [id number]

[address] [place] [address]

                                        Type 2 diabetes mellitus wit

hout complication, without long-term 

current use of insulin E11.9

 

                          [place] [address] [facility] [id number]

[address] [place] [address]

                                         

 

                          [place] [address] [facility] [id number]

[address] [place] [address]

                                        Type 2 diabetes mellitus wit

hout complication, without long-term 

current use of insulin E11.9

 

                          [place] [address] [facility] [id number]

[address] [place] [address]

                                        Weight loss R63.4

 

                          [facility]     [address] [facility] [id number]

[address] [place] [address]

                          31 May, 2019              Type 2 diabetes mellitus wit

hout complication, without long-term 

current use of insulin E11.9

 

                          [place] [address] [address]

                          31 May, 2019              Type 2 diabetes mellitus wit

hout complication, without long-term 

current use of insulin E11.9 and Non morbid obesity E66.9

 

                          [facility] WALK IN [place] [address] 

[address]                14 May, 2019              Seasonal allergic rhinitis d

ue to pollen J30.1 and Sore 

throat J02.9

 

                          [facility] WALK IN [place] [address] 

[address]                              Arthritis M19.90

 

                          [place] [address] [address]

                                        Seasonal allergic rhinitis d

ue to other allergic trigger J30.89 and

Dysthymic disorder F34.1

 

                          [place] [address] [address]

                          05 Dec, 2018              Bilateral otitis media with 

effusion H65.93 and Anxiety F41.9

 

                          [place] [address] [address]

                                        Type 2 diabetes mellitus wit

hout complication, without long-term 

current use of insulin E11.9

 

                          [place] [address] [address]

                                        Pharyngitis due to Streptoco

ccus species J02.0

 

                          [place] [address] [address]

                                         

 

                          [facility] WALK IN [place] [address] 

[address]                10 Jul, 2018              Fever, unspecified fever cau

se R50.9 and Lymphadenopathy

R59.1

 

                          [facility] WALK IN [place] [address] 

[address]                              Pharyngitis due to other org

anism J02.8

 

                          [facility]     [address] [place] [id number]

[address] [place] [address]

                                         

 

                          [place] [address] [address]

                                        Type 2 diabetes mellitus wit

hout complication, without long-term 

current use of insulin E11.9 and Other eczema L30.8

 

                          [facility] WALK IN [facility]  [address] [place] [id number]

[address] [place] [address] 

[address]                              Acute pain of left shoulder 

M25.512

 

                          [place] [address] [address]

                                         

 

                          [place] [address] [address]

                          15 2018              Type 2 diabetes mellitus wit

hout complication, without long-term 

current use of insulin E11.9

 

                          [place] [address] [address]

                                        Type 2 diabetes mellitus wit

hout complication, without long-term 

current use of insulin E11.9 ; Tubular adenoma of colon D12.6 ; Mixed 
hyperlipidemia E78.2 ; History of leukocytosis Z86.2 and Screening breast 
examination Z12.31

 

                          [place] [address] [place] [id number]

[address] [place] [address]

                                        Metatarsalgia of right foot 

M77.41 and Type 2 diabetes mellitus 

without complication, without long-term current use of insulin E11.9

 

                          [place] [id number]

[address] [place] [address]

                                        Capsulitis M77.9 and Metatar

salgia of right foot M77.41

 

                          [place] [address] [place] [id number]

[address] [place] [address]

                          08 Sep, 2017              Capsulitis M77.9

 

                          [place] [address] [place] [id number]

[address] [place] [address]

                          06 Sep, 2017               

 

                          [place] [address] [place] [address] [place] [address]

                          03 Aug, 2017              Type 2 diabetes mellitus wit

hout complication, without long-term 

current use of insulin E11.9 ; Tubular adenoma of colon D12.6 and Mixed 
hyperlipidemia E78.2

 

                          [facility]     [address] [place] [id number]

[address] [place] [address]

                                        Metatarsalgia of right foot 

M77.41 and Capsulitis M77.9

 

                          [facility]     [address] [place] [id number]

[address] [place] [address]

                                         

 

                          [facility]  [address] [facility] [id number]

[address] [place] [address] 

[address]                              Duarte's neuroma of right fo

ot G57.61

 

                          [facility]     [address] [place] [id number]

[address] [place] [address]

                                        Mixed hyperlipidemia E78.2 a

nd Type 2 diabetes mellitus without 

complication, without long-term current use of insulin E11.9

 

                          [facility]     [address] [place] [id number]

[address] [place] [address]

                          10 Brandon, 2017              Type 2 diabetes mellitus wit

hout complication, without long-term 

current use of insulin E11.9 ; Tubular adenoma of colon D12.6 and Mixed 
hyperlipidemia E78.2

 

                          [facility]     [address] [place] [id number]

[address] [place] [address]

                          22 Dec, 2016              Mixed hyperlipidemia E78.2

 

                          [facility]     [address] [place] [id number]

[address] [place] [address]

                                         

 

                          [facility]     [address] [place] [id number]

[address] [place] [address]

                                        Mixed hyperlipidemia E78.2

 

                          [place] [address] [place] [id number]

[address] [place] [address]

                                        Mixed hyperlipidemia E78.2

 

                          [facility]     [address] [place] [id number]

[address] [place] [address]

                          08 Sep, 2016               

 

                          [facility]     [address] [address]

                          10 Aug, 2016              Type 2 diabetes mellitus wit

hout complication, without long-term 

current use of insulin E11.9 ; Helicobacter pylori (H. pylori) infection A04.8 
and Mixed hyperlipidemia E78.2

 

                          [place] [address] [address]

                          08 Aug, 2016              Type 2 diabetes mellitus wit

hout complication, without long-term 

current use of insulin E11.9

 

                          [place] [address] [address]

                          03 Aug, 2016              Type 2 diabetes mellitus wit

hout complication, without long-term 

current use of insulin E11.9

 

                          [place] [address] [address]

                          02 Aug, 2016              Dyspepsia R10.13 ; Upper abd

ominal pain R10.10 ; Bowel habit 

changes R19.4 ; History of leukocytosis Z86.2 and Helicobacter pylori (H. 
pylori) infection A04.8

 

                          [facility] [place] [facility]  [address] 

[address]                27 May, 2016              Environmental allergies Z91.

09

 

                          [place] [address] [address]

                                        Left leg pain M79.605 ; Loca

lized swelling of lower leg R22.40 and 

Upper respiratory infection J06.9

 

                          [place] [address] [address]

                          28 Dec, 2015               

 

                          [place] [address] [address]

                          21 Dec, 2015              Left leg pain M79.605

 

                          [place] [address] [address]

                          10 Dec, 2015              Left leg pain M79.605 and Lo

calized swelling of lower leg R22.40

 

                          [place] [address] [address]

                          01 Dec, 2015              Left leg pain M79.605

 

                          [facility]     [address] [facility] [id number]

[address] [place] [address]

                                        Encounter for immunization Z

23

 

                          [facility]     [address] [facility] [id number]

[address] [place] [address]

                          12 Oct, 2015              Bronchitis J40

 

                          [facility]     [address] [place] [id number]

[address] [place] [address]

                          30 Sep, 2015              Physical exam, pre-employmen

t V70.5 ; Encounter for PPD test V74.1 

and Hyperlipidemia 272.4

 

                          [facility]     [address] [facility] [id number]

[address] [place] [address]

                          21 Sep, 2015               

 

                          [facility]     [address] [place] [id number]

[address] [place] [address]

                          21 Sep, 2015               

 

                          [facility]     [address] [place] [id number]

[address] [place] [address]

                          09 Sep, 2015               

 

                          [facility]     [address] [place] [id number]

[address] [place] [address]

                          04 Sep, 2015              Elevated blood sugar 790.29

 

                          [facility]     [address] [place] [id number]

[address] [place] [address]

                          03 Sep, 2015              Elevated blood sugar 790.29

 

                          [facility]     [address] [place] [id number]

[address] [place] [address]

                          03 Sep, 2015              Palpitations 785.1

 

                          [facility]     [address] [place] [id number]

[address] [place] [address]

                          01 Sep, 2015              Palpitations 785.1

 

                          [facility]     [address] [facility] [id number]

[address] [place] [address]

                                         

 

                          [facility]     [address] [place] [id number]

[address] [place] [address]

                          28 May, 2015              Hand pain, right 729.5 and D

epression with anxiety 300.4

 

                          [facility]     [address] [place] [id number]

[address] [place] [address]

                          14 2015               

 

                          [facility]     [address] [id number]

[address] [place] [address], [place] [address]

                          13 2015               

 

                          [facility] [place] [facility]     [address] [id number]

[address] [place] [address], [place] [address]

                          05 Mar, 2015               

 

                          [facility]     [address] [id number]

[address] [place] [address], [place] [address]

                          05 Mar, 2015               

 

                          [facility] [place] [facility]     [address] [id number]

[address] [place] [address], [place] [address]

                                         

 

                          [facility]     [address] [id number]

[address] [place] [address], [place] [address]

                                         

 

                          [facility]     [address] [id number]

[address] [place] [address], [place] [address]

                          10 Feb, 2015               

 

                          [facility]     [address] [id number]

[address] [place] [address], [place] [address]

                          10 Feb, 2015               

 

                          [facility]     [address] [id number]

[address] [place] [address], [place] [address]

                                         

 

                          [facility]     [address] [id number]

[address] [place] [address], [place] [address]

                                         

 

                          [facility]     [address] [id number]

[address] [place] [address], [place] [address]

                                         

 

                          [facility]     [address] [id number]

[address] [place] [address], [place] [address]

                                         

 

                          [facility] [place] [facility]     [address] [id number]

[address] [place] [address]

                                         

 

                          [facility] [place] [facility]     [address] [id number]

[address] [place] [address], [place] [address]

                                         

 

                          [facility] [place] [facility]     [address] [id number]

[address] [place] [address], [place] [address]

                                         

 

                          [facility] [place] [facility]     [address] [id number]

[address] [place] [address], [place] [address]

                                         

 

                          [facility] [place] [facility]     [address] [id number]

[address] [place] [address], [place] [address]

                          16 2015               

 

                          [facility]     [address] [id number]

[address] [place] [address], [place] [address]

                                         

 

                          [facility]     [address] [id number]

[address] [place] [address], [place] [address]

                                         

 

                          [facility]     [address] [id number]

[address] [place] [address], [place] [address]

                                         

 

                          [facility] [place] [facility]     [address] [id number]

[address] [place] [address], [place] [address]

                          15 2015               

 

                          [facility]     [address] [id number]

[address] [place] [address], [place] [address]

                          15 Brandon, 2015               

 

                          [facility]     [address] [id number]

[address] [place] [address], [place] [address]

                                         

 

                          [facility]     [address] [id number]

[address] [place] [address], [place] [address]

                                         

 

                          [facility]     [address] [id number]

[address] [place] [address], [place] [address]

                          15 Oct, 2014               

 

                          [facility]     [address] [id number]

[address] [place] [address], [place] [address]

                          15 Oct, 2014               

 

                          [facility]     [address] [id number]

[address] [place] [address], [place] [address]

                          14 Oct, 2014               

 

                          [facility] [place] [facility]     [address] [id number]

[address] [place] [address], [place] [address]

                          14 Oct, 2014               

 

                          [facility] [place] [facility]     [address] [id number]

[address] [place] [address], [place] [address]

                          07 Oct, 2014               

 

                          [facility] [place] [facility]     [address] [id number]

[address] [place] [address], [place] [address]

                          07 Oct, 2014               

 

                          [facility] [place] [facility]     [address] [id number]

[address] [place] [address], [place] [address]

                          12 Sep, 2014               

 

                          [facility]     [address] [id number]

[address] [place] [address], [place] [address]

                          12 Sep, 2014               

 

                          [facility] [place] [facility]     [address] [id number]

[facility] [address]

                          04 Sep,                

 

                          [facility]     [address] [id number]

[address] [place] [address], [place] [address]

                          03 Sep, 2014               

 

                          [facility]     [address] [id number]

[address] [place] [address], [place] [address]

                          03 Sep, 2014               

 

                          [facility]     [address] [id number]

[address] [place] [address], [place] [address]

                          02 Sep, 2014               

 

                          [facility]     [address] [id number]

[address] [place] [address], [place] [address]

                          02 Sep, 2014               

 

                          [facility]     [address] [id number]

[address] [place] [address], [place] [address]

                          30 Aug, 2014               

 

                          [facility]     [address] [id number]

[address] [place] [address], [place] [address]

                          30 Aug, 2014               

 

                          [facility]     [address] [id number]

[address] [place] [address], [place] [address]

                          19 Aug, 2014               

 

                          [facility]     [address] [id number]

[address] [place] [address], [place] [address]

                          19 Aug, 2014               

 

                          [facility]     [address] [id number]

[address] [place] [address], [place] [address]

                          14 Aug, 2014               

 

                          [facility]\""[facility]     [address] [id number]

[address] [place] [address], [place] [address]

                          14 Aug, 2014               

 

                          [facility]     [address] [id number]

[address] [place] [address], [place] [address]

                          13 Aug, 2014               

 

                          [facility] [facility]     [address] [id number]

[address] [place] [address], [place] [address]

                          13 Aug, 2014               

 

                          [facility]     [address] [id number]

[address] [place] [address], [place] [address]

                                         

 

                          [facility]     [address] [id number]

[address] [place] [address], [place] [address]

                                         

 

                          [facility]     [address] [id number]

[address] [place] [address], [place] [address]

                          ,                

 

                          [facility]     [address] [id number]

[facility] [place] [address]

                                         

 

                          [facility]     [address] [id number]

[address] [place] [address], [place] [address]

                                         

 

                          [facility]     [address] [id number]

[address] [place] [address], [place] [address]

                                         

 

                          [facility]     [address] [id number]

[address] [place] [address], [place] [address]

                          ,                

 

                          [facility]     [address] [id number]

[address] [place] [address], [place] [address]

                                         

 

                          [facility]     [address] [id number]

[address] [place] [address], [place] [address]

                                         

 

                          [facility]     [address] [id number]

[address] [place] [address], [place] [address]

                                         

 

                          [facility]     [address] [id number]

[address] [place] [address], [place] [address]

                                         

 

                          [facility]     [address] [id number]

[address] [place] [address], [place] [address]

                                         

 

                          [facility]     [address] [id number]

[address] [place] [address], [place] [address]

                                         

 

                          [facility]     [address] [id number]

[address] [place] [address], [place] [address]

                          ,                

 

                          [facility]     [address] [id number]

[address] [place] [address], [place] [address]

                                         

 

                          [facility]     [address] [id number]

[address] [place] [address], [place] [address]

                          ,                

 

                          [facility]     [address] [id number]

[address] [place] [address], [place] [address]

                                         

 

                          [facility]     [address] [id number]

[address] [place] [address], [place] [address]

                          ,                

 

                          [facility]     [address] [id number]

[address] [place] [address], [place] [address]

                                         

 

                          [facility]     [address] [id number]

[address] [place] [address], [place] [address]

                                         

 

                          [facility]     [address] [id number]

[address] [place] [address], [place] [address]

                                         

 

                          [facility]     [address] [id number]

[address] [place] [address], [place] [address]

                                         

 

                          [facility]     [address] [id number]

[address] [place] [address], [place] [address]

                                         

 

                          [facility]     [address] [id number]

[address] [place] [address], [place] [address]

                                         

 

                          [facility]     [address] [id number]

[address] [place] [address], [place] [address]

                                         

 

                          [facility]     [address] [id number]

[address] [place] [address], [place] [address]

                                         

 

                          [facility]     [address] [id number]

[address] [place] [address], [place] [address]

                                         

 

                          [facility]     [address] [id number]

[address] [place] [address], [place] [address]

                          27 Mar, 2014               

 

                          [facility]     [address] [id number]

[address] [place] [address], [place] [address]

                          27 Mar, 2014               

 

                          [facility]     [address] [id number]

[address] [place] [address], [place] [address]

                                         

 

                          [facility]     [address] [id number]

[address] [place] [address], [place] [address]

                                         

 

                          [facility]     [address] [id number]

[address] [place] [address], [place] [address]

                                         

 

                          [facility]     [address] [id number]

[address] [place] [address], [place] [address]

                                         

 

                          [facility]     [address] [id number]

[address] [place] [address], [place] [address]

                          08 Oct, 2013               

 

                          [facility]     [address] [id number]

[address] [place] [address], [place] [address]

                          04 Oct, 2013               

 

                          [facility]     [address] [id number]

[address] [place] [address], [place] [address]

                          03 Oct, 2013               

 

                          [facility]     [address] [id number]

[address] [place] [address], [place] [address]

                          02 Oct, 2013               

 

                          [facility]     [address] [id number]

[address] [place] [address], [place] [address]

                          01 Oct, 2013               

 

                          [facility]     [address] [id number]

[address] [place] [address], [place] [address]

                          20 Sep, 2013               

 

                          [facility]     [address] [id number]

[address] [place] [address], [place] [address]

                          08 Aug, 2013               

 

                          [facility]     [address] [id number]

[address] [place] [address], [place] [address]

                          24 May, 2013               

 

                          [facility]     [address] [id number]

[address] [place] [address], [place] [address]

                          13 May, 2013               

 

                          [facility]     [address] [id number]

[address] [place] [address], [place] [address]

                                         

 

                          [facility]     [address] [id number]

[address] [place] [address], [place] [address]

                                         

 

                          [facility]     [address] [id number]

[address] [place] [address], [place] [address]

                                         

 

                          [facility]     [address] [id number]

[address] [place] [address]

                                         

 

                          [facility]     [address] [id number]

[address] [place] [address], [place] [address]

                          02 Oct, 2012               

 

                          [facility]     [address] [id number]

[address] [place] [address], [place] [address]

                          02 Oct, 2012               

 

                          [facility]     [address] [id number]

[address] [place] [address], [place] [address]

                          21 Sep, 2012               

 

                          [facility]     [address] [id number]

[address] [place] [address]

                          06 Aug, 2012               

 

                          [facility]     [address] [id number]

[address] [place] [address]

                          02 Aug, 2012               

 

                          [facility]     [address] [id number]

[address] [place] [address]

                                         

 

                          [facility]     [address] [id number]

[address] [place] [address]

                                         

 

                          [facility]     [address] [id number]

[address] [place] [address]

                          15 Eusebio, 2012               

 

                          [facility]     [address] [id number]

[address] [place] [address]

                                         

 

                          [facility]     [address] [id number]

[address] [place] [address]

                          10 Apr, 2012               

 

                          [facility]     [address] [id number]

[address] [place] [address]

                          22 Mar, 2012               

 

                          [facility]     [address] [id number]

[address] [place] [address]

                          20 Mar, 2012               

 

                          [facility]     [address] [id number]

[address] [place] [address]

                          14 Mar, 2012               

 

                          [facility]     [address] [id number]

[address] [place] [address]

                                         







IMMUNIZATIONS

No Known Immunizations



SOCIAL HISTORY

Never Assessed



REASON FOR VISIT





PLAN OF CARE





VITAL SIGNS





MEDICATIONS

Unknown Medications



RESULTS

No Results



PROCEDURES

No Known procedures



INSTRUCTIONS





MEDICATIONS ADMINISTERED

No Known Medications



MEDICAL (GENERAL) HISTORY





                    Type                Description         Date

 

                          Medical History           allergic rhinitis- rec's edwige perriny allergy injections from Dr Rojas office                            

 

                    Medical History     mood disorder        

 

                    Medical History     Leukocytosis- has been to hematology  

 

                    Medical History     Hyperlipidemia       

 

                          Medical History           Left leg pain- multiple imag

ing performed and ortho referral 

placed                                   

 

                          Medical History           TUBULAR ADENOMA AND GASTRITI

S ON COLONOSOCPY AUG 2016 -MULTIPLE 

POLYPS                                   

 

                    Medical History     Helicobacter pylori (H. pylori) infectio

n Hx  

 

                    Surgical History    cholecystectomy     2006

 

                    Surgical History     section    , 

 

                    Surgical History    tonsillectomy       2015

 

                    Surgical History    colonoscopy         2016

 

                    Surgical History    colonscopy          2017

 

                    Surgical History    Right Knee scope    2017

## 2020-06-23 NOTE — XMS REPORT
[facility]

                             Created on: 2019



Kate April

External Reference #: [id number]

: 1975

Sex: Female



Demographics





                          Address                   [address] [place]  [address]

 

                          Preferred Language        Unknown

 

                          Marital Status            Unknown

 

                          Mosque Affiliation     Unknown

 

                          Race                      Unknown

 

                          Ethnic Group              Unknown





Author





                          Author                    Migration, April Doctor

 

                          Organization              [facility]

 

                          Address                   Unknown

 

                          Phone                     Unavailable







Care Team Providers





                    Care Team Member Name Role                Phone

 

                    Migration,  Doctor  Unavailable         Unavailable







PROBLEMS





          Type      Condition ICD9-CM Code EIJ07-ZW Code Onset Dates Condition S

tatus SNOMED 

Code

 

          Problem   Tubular adenoma of colon           D12.6               Activ

e    [id number]

 

          Problem   Duarte's neuroma of right foot           G57.61             

 Active    53575847

 

          Problem   History of leukocytosis           Z86.2               Active

    [id number]

 

          Problem   Non morbid obesity           E66.9               Active    4

[id number]

 

          Problem   Mixed hyperlipidemia           E78.2               Active   

 [id number]

 

          Problem   Seasonal allergic rhinitis due to pollen           J30.1    

           Active    18074866

 

                          Problem                   Type 2 diabetes mellitus wit

hout complication, without long-term current

use of insulin              E11.9                     Active       [id number]

 

          Problem   Anxiety             F41.9               Active    92286388

 

           Problem    Seasonal allergic rhinitis due to other allergic trigger  

          J30.89                

Active                                  [id number]

 

          Problem   Dysthymic disorder           F34.1               Active    7

[id number]

 

          Problem   Arthritis           M19.90              Active    8433370







ALLERGIES

No Information



ENCOUNTERS





                Encounter       Location        Date            Diagnosis

 

                          [place] [address] [facility] [id number]

[address] [place] [address]

                                        Type 2 diabetes mellitus wit

hout complication, without long-term 

current use of insulin E11.9

 

                          [place] [address] [facility] [id number]

[address] [place] [address]

                                         

 

                          [place] [address] [facility] [id number]

[address] [place] [address]

                                        Type 2 diabetes mellitus wit

hout complication, without long-term 

current use of insulin E11.9

 

                          [place] [address] [facility] [id number]

[address] [place] [address]

                                        Weight loss R63.4

 

                          [facility]     [address] [facility] [id number]

[address] [place] [address]

                          31 May, 2019              Type 2 diabetes mellitus wit

hout complication, without long-term 

current use of insulin E11.9

 

                          [place] [address] [address]

                          31 May, 2019              Type 2 diabetes mellitus wit

hout complication, without long-term 

current use of insulin E11.9 and Non morbid obesity E66.9

 

                          [facility] WALK IN [place] [address] 

[address]                14 May, 2019              Seasonal allergic rhinitis d

ue to pollen J30.1 and Sore 

throat J02.9

 

                          [facility] WALK IN [place] [address] 

[address]                              Arthritis M19.90

 

                          [place] [address] [address]

                                        Seasonal allergic rhinitis d

ue to other allergic trigger J30.89 and

Dysthymic disorder F34.1

 

                          [place] [address] [address]

                          05 Dec, 2018              Bilateral otitis media with 

effusion H65.93 and Anxiety F41.9

 

                          [place] [address] [address]

                                        Type 2 diabetes mellitus wit

hout complication, without long-term 

current use of insulin E11.9

 

                          [place] [address] [address]

                                        Pharyngitis due to Streptoco

ccus species J02.0

 

                          [place] [address] [address]

                                         

 

                          [facility] WALK IN [place] [address] 

[address]                10 Jul, 2018              Fever, unspecified fever cau

se R50.9 and Lymphadenopathy

R59.1

 

                          [facility] WALK IN [place] [address] 

[address]                              Pharyngitis due to other org

anism J02.8

 

                          [facility]     [address] [place] [id number]

[address] [place] [address]

                                         

 

                          [place] [address] [address]

                                        Type 2 diabetes mellitus wit

hout complication, without long-term 

current use of insulin E11.9 and Other eczema L30.8

 

                          [facility] WALK IN [facility]  [address] [place] [id number]

[address] [place] [address] 

[address]                              Acute pain of left shoulder 

M25.512

 

                          [place] [address] [address]

                                         

 

                          [place] [address] [address]

                          15 2018              Type 2 diabetes mellitus wit

hout complication, without long-term 

current use of insulin E11.9

 

                          [place] [address] [address]

                                        Type 2 diabetes mellitus wit

hout complication, without long-term 

current use of insulin E11.9 ; Tubular adenoma of colon D12.6 ; Mixed 
hyperlipidemia E78.2 ; History of leukocytosis Z86.2 and Screening breast 
examination Z12.31

 

                          [place] [address] [place] [id number]

[address] [place] [address]

                                        Metatarsalgia of right foot 

M77.41 and Type 2 diabetes mellitus 

without complication, without long-term current use of insulin E11.9

 

                          [place] [address]

[address] [place] [address]

                                        Capsulitis M77.9 and Metatar

salgia of right foot M77.41

 

                          [place] [address] [place] [id number]

[address] [place] [address]

                          08 Sep, 2017              Capsulitis M77.9

 

                          [place] [address] [place] [id number]

[address] [place] [address]

                          06 Sep, 2017               

 

                          [place] [address] [place] [address] [place] [address]

                          03 Aug, 2017              Type 2 diabetes mellitus wit

hout complication, without long-term 

current use of insulin E11.9 ; Tubular adenoma of colon D12.6 and Mixed 
hyperlipidemia E78.2

 

                          [facility]     [address] [place] [id number]

[address] [place] [address]

                                        Metatarsalgia of right foot 

M77.41 and Capsulitis M77.9

 

                          [facility]     [address] [place] [id number]

[address] [place] [address]

                                         

 

                          [facility]  [address] [facility] [id number]

[address] [place] [address] 

[address]                              Duarte's neuroma of right fo

ot G57.61

 

                          [facility]     [address] [place] [id number]

[address] [place] [address]

                                        Mixed hyperlipidemia E78.2 a

nd Type 2 diabetes mellitus without 

complication, without long-term current use of insulin E11.9

 

                          [facility]     [address] [place] [id number]

[address] [place] [address]

                          10 Brandon, 2017              Type 2 diabetes mellitus wit

hout complication, without long-term 

current use of insulin E11.9 ; Tubular adenoma of colon D12.6 and Mixed 
hyperlipidemia E78.2

 

                          [facility]     [address] [place] [id number]

[address] [place] [address]

                          22 Dec, 2016              Mixed hyperlipidemia E78.2

 

                          [facility]     [address] [place] [id number]

[address] [place] [address]

                                         

 

                          [facility]     [address] [place] [id number]

[address] [place] [address]

                                        Mixed hyperlipidemia E78.2

 

                          [place] [address] [place] [id number]

[address] [place] [address]

                                        Mixed hyperlipidemia E78.2

 

                          [facility]     [address] [place] [id number]

[address] [place] [address]

                          08 Sep, 2016               

 

                          [facility]     [address] [address]

                          10 Aug, 2016              Type 2 diabetes mellitus wit

hout complication, without long-term 

current use of insulin E11.9 ; Helicobacter pylori (H. pylori) infection A04.8 
and Mixed hyperlipidemia E78.2

 

                          [place] [address] [address]

                          08 Aug, 2016              Type 2 diabetes mellitus wit

hout complication, without long-term 

current use of insulin E11.9

 

                          [place] [address] [address]

                          03 Aug, 2016              Type 2 diabetes mellitus wit

hout complication, without long-term 

current use of insulin E11.9

 

                          [place] [address] [address]

                          02 Aug, 2016              Dyspepsia R10.13 ; Upper abd

ominal pain R10.10 ; Bowel habit 

changes R19.4 ; History of leukocytosis Z86.2 and Helicobacter pylori (H. 
pylori) infection A04.8

 

                          [facility] [place] [facility]  [address] 

[address]                27 May, 2016              Environmental allergies Z91.

09

 

                          [place] [address] [address]

                                        Left leg pain M79.605 ; Loca

lized swelling of lower leg R22.40 and 

Upper respiratory infection J06.9

 

                          [place] [address] [address]

                          28 Dec, 2015               

 

                          [place] [address] [address]

                          21 Dec, 2015              Left leg pain M79.605

 

                          [place] [address] [address]

                          10 Dec, 2015              Left leg pain M79.605 and Lo

calized swelling of lower leg R22.40

 

                          [place] [address] [address]

                          01 Dec, 2015              Left leg pain M79.605

 

                          [facility]     [address] [facility] [id number]

[address] [place] [address]

                                        Encounter for immunization Z

23

 

                          [facility]     [address] [facility] [id number]

[address] [place] [address]

                          12 Oct, 2015              Bronchitis J40

 

                          [facility]     [address] [place] [id number]

[address] [place] [address]

                          30 Sep, 2015              Physical exam, pre-employmen

t V70.5 ; Encounter for PPD test V74.1 

and Hyperlipidemia 272.4

 

                          [facility]     [address] [facility] [id number]

[address] [place] [address]

                          21 Sep, 2015               

 

                          [facility]     [address] [place] [id number]

[address] [place] [address]

                          21 Sep, 2015               

 

                          [facility]     [address] [place] [id number]

[address] [place] [address]

                          09 Sep, 2015               

 

                          [facility]     [address] [place] [id number]

[address] [place] [address]

                          04 Sep, 2015              Elevated blood sugar 790.29

 

                          [facility]     [address] [place] [id number]

[address] [place] [address]

                          03 Sep, 2015              Elevated blood sugar 790.29

 

                          [facility]     [address] [place] [id number]

[address] [place] [address]

                          03 Sep, 2015              Palpitations 785.1

 

                          [facility]     [address] [place] [id number]

[address] [place] [address]

                          01 Sep, 2015              Palpitations 785.1

 

                          [facility]     [address] [facility] [id number]

[address] [place] [address]

                                         

 

                          [facility]     [address] [place] [id number]

[address] [place] [address]

                          28 May, 2015              Hand pain, right 729.5 and D

epression with anxiety 300.4

 

                          [facility]     [address] [place] [id number]

[address] [place] [address]

                          14 2015               

 

                          [facility]     [address] [id number]

[address] [place] [address], [place] [address]

                          13 2015               

 

                          [facility] [place] [facility]     [address] [id number]

[address] [place] [address], [place] [address]

                          05 Mar, 2015               

 

                          [facility]     [address] [id number]

[address] [place] [address], [place] [address]

                          05 Mar, 2015               

 

                          [facility] [place] [facility]     [address] [id number]

[address] [place] [address], [place] [address]

                                         

 

                          [facility]     [address] [id number]

[address] [place] [address], [place] [address]

                                         

 

                          [facility]     [address] [id number]

[address] [place] [address], [place] [address]

                          10 Feb, 2015               

 

                          [facility]     [address] [id number]

[address] [place] [address], [place] [address]

                          10 Feb, 2015               

 

                          [facility]     [address] [id number]

[address] [place] [address], [place] [address]

                                         

 

                          [facility]     [address] [id number]

[address] [place] [address], [place] [address]

                                         

 

                          [facility]     [address] [id number]

[address] [place] [address], [place] [address]

                                         

 

                          [facility]     [address] [id number]

[address] [place] [address], [place] [address]

                                         

 

                          [facility] [place] [facility]     [address] [id number]

[address] [place] [address]

                                         

 

                          [facility] [place] [facility]     [address] [id number]

[address] [place] [address], [place] [address]

                                         

 

                          [facility] [place] [facility]     [address] [id number]

[address] [place] [address], [place] [address]

                                         

 

                          [facility] [place] [facility]     [address] [id number]

[address] [place] [address], [place] [address]

                                         

 

                          [facility] [place] [facility]     [address] [id number]

[address] [place] [address], [place] [address]

                          16 2015               

 

                          [facility]     [address] [id number]

[address] [place] [address], [place] [address]

                                         

 

                          [facility]     [address] [id number]

[address] [place] [address], [place] [address]

                                         

 

                          [facility]     [address] [id number]

[address] [place] [address], [place] [address]

                                         

 

                          [facility] [place] [facility]     [address] [id number]

[address] [place] [address], [place] [address]

                          15 2015               

 

                          [facility]     [address] [id number]

[address] [place] [address], [place] [address]

                          15 [place], 2015               

 

                          [facility]     [address] [id number]

[address] [place] [address], [place] [address]

                                         

 

                          [facility]     [address] [id number]

[address] [place] [address], [place] [address]

                                         

 

                          [facility]     [address] [id number]

[address] [place] [address], [place] [address]

                          15 Oct, 2014               

 

                          [facility]     [address] [id number]

[address] [place] [address], [place] [address]

                          15 Oct, 2014               

 

                          [facility]     [address] [id number]

[address] [place] [address], [place] [address]

                          14 Oct, 2014               

 

                          [facility] [place] [facility]     [address] [id number]

[address] [place] [address], [place] [address]

                          14 Oct, 2014               

 

                          [facility] [place] [facility]     [address] [id number]

[address] [place] [address], [place] [address]

                          07 Oct, 2014               

 

                          [facility] [place] [facility]     [address] [id number]

[address] [place] [address], [place] [address]

                          07 Oct, 2014               

 

                          [facility] [place] [facility]     [address] [id number]

[address] [place] [address], [place] [address]

                          12 Sep, 2014               

 

                          [facility]     [address] [id number]

[address] [place] [address], [place] [address]

                          12 Sep, 2014               

 

                          [facility] [place] [facility]     [address] [id number]

[facility] [place] [address]

                          04 Sep,                

 

                          [facility]     [address] [id number]

[address] [place] [address], [place] [address]

                          03 Sep, 2014               

 

                          [facility]     [address] [id number]

[address] [place] [address], [place] [address]

                          03 Sep, 2014               

 

                          [facility]     [address] [id number]

[address] [place] [address], [place] [address]

                          02 Sep, 2014               

 

                          [facility]     [address] [id number]

[address] [place] [address], [place] [address]

                          02 Sep, 2014               

 

                          [facility]     [address] [id number]

[address] [place] [address], [place] [address]

                          30 Aug, 2014               

 

                          [facility]     [address] [id number]

[address] [place] [address], [place] [address]

                          30 Aug, 2014               

 

                          [facility]     [address] [id number]

[address] [place] [address], [place] [address]

                          19 Aug, 2014               

 

                          [facility]     [address] [id number]

[address] [place] [address], [place] [address]

                          19 Aug, 2014               

 

                          [facility]     [address] [id number]

[address] [place] [address], [place] [address]

                          14 Aug, 2014               

 

                          [facility]     [address] [address] [id number]

[address] [place] [address], [place] [address]

                          14 Aug, 2014               

 

                          [facility]     [address] [id number]

[address] [place] [address], [place] [address]

                          13 Aug, 2014               

 

                          [facility]     [address] [id number]

[address] [place] [address], [place] [address]

                          13 Aug, 2014               

 

                          [facility]     [address] [id number]

[address] [place] [address], [place] [address]

                                         

 

                          [facility]     [address] [id number]

[address] [place] [address], [place] [address]

                                         

 

                          [facility]     [address] [id number]

[address] [place] [address], [place] [address]

                          ,                

 

                          [facility]     [address] [id number]

[facility] [place] [address]

                                         

 

                          [facility]     [address] [id number]

[address] [place] [address], [place] [address]

                                         

 

                          [facility]     [address] [id number]

[address] [place] [address], [place] [address]

                                         

 

                          [facility]     [address] [id number]

[address] [place] [address], [place] [address]

                          ,                

 

                          [facility]     [address] [id number]

[address] [place] [address], [place] [address]

                                         

 

                          [facility]     [address] [id number]

[address] [place] [address], [place] [address]

                                         

 

                          [facility]     [address] [id number]

[address] [place] [address], [place] [address]

                                         

 

                          [facility]     [address] [id number]

[address] [place] [address], [place] [address]

                                         

 

                          [facility]     [address] [id number]

[address] [place] [address], [place] [address]

                                         

 

                          [facility]     [address] [id number]

[address] [place] [address], [place] [address]

                                         

 

                          [facility]     [address] [id number]

[address] [place] [address], [place] [address]

                          ,                

 

                          [facility]     [address] [id number]

[address] [place] [address], [place] [address]

                                         

 

                          [facility]     [address] [id number]

[address] [place] [address], [place] [address]

                          ,                

 

                          [facility]     [address] [id number]

[address] [place] [address], [place] [address]

                                         

 

                          [facility]     [address] [id number]

[address] [place] [address], [place] [address]

                          ,                

 

                          [facility]     [address] [id number]

[address] [place] [address], [place] [address]

                                         

 

                          [facility]     [address] [id number]

[address] [place] [address], [place] [address]

                                         

 

                          [facility]     [address] [id number]

[address] [place] [address], [place] [address]

                                         

 

                          [facility]     [address] [id number]

[address] [place] [address], [place] [address]

                                         

 

                          [facility]     [address] [id number]

[address] [place] [address], [place] [address]

                                         

 

                          [facility]     [address] [id number]

[address] [place] [address], [place] [address]

                                         

 

                          [facility]     [address] [id number]

[address] [place] [address], [place] [address]

                                         

 

                          [facility]     [address] [id number]

[address] [place] [address], [place] [address]

                                         

 

                          [facility]     [address] [id number]

[address] [place] [address], [place] [address]

                                         

 

                          [facility]     [address] [id number]

[address] [place] [address], [place] [address]

                          27 Mar, 2014               

 

                          [facility]     [address] [id number]

[address] [place] [address], [place] [address]

                          27 Mar, 2014               

 

                          [facility]     [address] [id number]

[address] [place] [address], [place] [address]

                                         

 

                          [facility]     [address] [id number]

[address] [place] [address], [place] [address]

                                         

 

                          [facility]     [address] [id number]

[address] [place] [address], [place] [address]

                                         

 

                          [facility]     [address] [id number]

[address] [place] [address], [place] [address]

                                         

 

                          [facility]     [address] [id number]

[address] [place] [address], [place] [address]

                          08 Oct, 2013               

 

                          [facility]     [address] [id number]

[address] [place] [address], [place] [address]

                          04 Oct, 2013               

 

                          [facility]     [address] [id number]

[address] [place] [address], [place] [address]

                          03 Oct, 2013               

 

                          [facility]     [address] [id number]

[address] [place] [address], [place] [address]

                          02 Oct, 2013               

 

                          [facility]     [address] [id number]

[address] [place] [address], [place] [address]

                          01 Oct, 2013               

 

                          [facility]     [address] [id number]

[address] [place] [address], [place] [address]

                          20 Sep, 2013               

 

                          [facility]     [address] [id number]

[address] [place] [address], [place] [address]

                          08 Aug, 2013               

 

                          [facility]     [address] [id number]

[address] [place] [address], [place] [address]

                          24 May, 2013               

 

                          [facility]     [address] [id number]

[address] [place] [address], [place] [address]

                          13 May, 2013               

 

                          [facility]     [address] [id number]

[address] [place] [address], [place] [address]

                                         

 

                          [facility]     [address] [id number]

[address] [place] [address], [place] [address]

                                         

 

                          [facility]     [address] [id number]

[address] [place] [address], [place] [address]

                                         

 

                          [facility]     [address] [id number]

[address] [place] [address]

                                         

 

                          [facility]     [address] [id number]

[address] [place] [address], [place] [address]

                          02 Oct, 2012               

 

                          [facility]     [address] [id number]

[address] [place] [address], [place] [address]

                          02 Oct, 2012               

 

                          [facility]     [address] [id number]

[address] [place] [address], [place] [address]

                          21 Sep, 2012               

 

                          [facility]     [address] [id number]

[address] [place] [address]

                          06 Aug, 2012               

 

                          [facility]     [address] [id number]

[address] [place] [address]

                          02 Aug, 2012               

 

                          [facility]     [address] [id number]

[address] [place] [address]

                                         

 

                          [facility]     [address] [id number]

[address] [place] [address]

                                         

 

                          [facility]     [address] [id number]

[address] [place] [address]

                          15 Eusebio, 2012               

 

                          [facility]     [address] [id number]

[address] [place] [address]

                                         

 

                          [facility]     [address] [id number]

[address] [place] [address]

                          10 Apr, 2012               

 

                          [facility]     [address] [id number]

[address] [place] [address]

                          22 Mar, 2012               

 

                          [facility]     [address] [id number]

[address] [place] [address]

                          20 Mar, 2012               

 

                          [facility]     [address] [id number]

[address] [place] [address]

                          14 Mar, 2012               

 

                          [facility]     [address] [id number]

[address] [place] [address]

                                         







IMMUNIZATIONS

No Known Immunizations



SOCIAL HISTORY

Never Assessed



REASON FOR VISIT





PLAN OF CARE





VITAL SIGNS





MEDICATIONS

Unknown Medications



RESULTS

No Results



PROCEDURES

No Known procedures



INSTRUCTIONS





MEDICATIONS ADMINISTERED

No Known Medications



MEDICAL (GENERAL) HISTORY





                    Type                Description         Date

 

                          Medical History           allergic rhinitis- rec's edwige perriny allergy injections from Dr Rojas office                            

 

                    Medical History     mood disorder        

 

                    Medical History     Leukocytosis- has been to hematology  

 

                    Medical History     Hyperlipidemia       

 

                          Medical History           Left leg pain- multiple imag

ing performed and ortho referral 

placed                                   

 

                          Medical History           TUBULAR ADENOMA AND GASTRITI

S ON COLONOSOCPY AUG 2016 -MULTIPLE 

POLYPS                                   

 

                    Medical History     Helicobacter pylori (H. pylori) infectio

n Hx  

 

                    Surgical History    cholecystectomy     2006

 

                    Surgical History     section    , 

 

                    Surgical History    tonsillectomy       2015

 

                    Surgical History    colonoscopy         2016

 

                    Surgical History    colonscopy          2017

 

                    Surgical History    Right Knee scope    2017

## 2020-06-23 NOTE — XMS REPORT
[facility]

                             Created on: 2018



Jasonwhit April

External Reference #: [id number]

: 1975

Sex: Female



Demographics





                          Address                   [address] [place]  [address]

 

                          Preferred Language        Unknown

 

                          Marital Status            Unknown

 

                          Rastafarian Affiliation     Unknown

 

                          Race                      Unknown

 

                          Ethnic Group              Unknown





Author





                          Author                    ZIA April ASHOK

 

                          Organization              [facility]

 

                          Address                   [address] [place]  [address]



 

                          Phone                     [phone]







Care Team Providers





                    Care Team Member Name Role                Phone

 

                    ASHOK LIZARRAGA       Unavailable         [phone]







PROBLEMS





          Type      Condition ICD9-CM Code DAC14-WA Code Onset Dates Condition S

tatus SNOMED 

Code

 

          Problem   History of leukocytosis           Z86.2               Active

    [id number]

 

          Problem   Duarte's neuroma of right foot           G57.61             

 Active    [id number]

 

                          Problem                   Type 2 diabetes mellitus wit

hout complication, without long-term current

use of insulin              E11.9                     Active       [id number]

 

          Problem   Tubular adenoma of colon           D12.6               Activ

e    [id number]

 

          Problem   Mixed hyperlipidemia           E78.2               Active   

 [id number]







ALLERGIES

No Information



ENCOUNTERS





                Encounter       Location        Date            Diagnosis

 

                          [facility]     [address] [id number]

[address] [place] [address]

                                         

 

                          [facility]  [address] 

[address]                28 2018              Acute pain of left shoulder 

M25.512

 

                          [facility]     [address] [id number]

[address] [place] [address]

                                         

 

                          [facility]     [address] [id number]

[address] [place] [address]

                          15 2018              Type 2 diabetes mellitus wit

hout complication, without long-term 

current use of insulin E11.9

 

                          [facility]     [address] [id number]

[address] [place] [address]

                          06 2018              Type 2 diabetes mellitus wit

hout complication, without long-term 

current use of insulin E11.9 ; Tubular adenoma of colon D12.6 ; Mixed 
hyperlipidemia E78.2 ; History of leukocytosis Z86.2 and Screening breast 
examination Z12.31

 

                          [facility]     [address] [address]

                                        Metatarsalgia of right foot 

M77.41 and Type 2 diabetes mellitus 

without complication, without long-term current use of insulin E11.9

 

                          [place] [address] [address]

                                        Capsulitis M77.9 and Metatar

salgia of right foot M77.41

 

                          [place] [address] [address]

                          08 Sep, 2017              Capsulitis M77.9

 

                          [place] [address] [address]

                          06 Sep, 2017               

 

                          [place] [address] [address]

                          03 Aug, 2017              Type 2 diabetes mellitus wit

hout complication, without long-term 

current use of insulin E11.9 ; Tubular adenoma of colon D12.6 and Mixed 
hyperlipidemia E78.2

 

                          [place] [address] [address]

                                        Metatarsalgia of right foot 

M77.41 and Capsulitis M77.9

 

                          [place] [address] [address]

                                         

 

                          [facility]  [address] 

[address]                              Duarte's neuroma of right fo

ot G57.61

 

                          [place] [address] [address]

                                        Mixed hyperlipidemia E78.2 a

nd Type 2 diabetes mellitus without 

complication, without long-term current use of insulin E11.9

 

                          [place] [address] [address]

                          10 Brandon, 2017              Type 2 diabetes mellitus wit

hout complication, without long-term 

current use of insulin E11.9 ; Tubular adenoma of colon D12.6 and Mixed 
hyperlipidemia E78.2

 

                          [place] [address] [id number]

[address] [place] [address]

                          22 Dec, 2016              Mixed hyperlipidemia E78.2

 

                          [facility]     [address] [address]

                                         

 

                          [facility]     [address] [address]

                                        Mixed hyperlipidemia E78.2

 

                          [place] [address] [address]

                                        Mixed hyperlipidemia E78.2

 

                          [place] [address] [address]

                          08 Sep, 2016               

 

                          [place] [address] [address]

                          10 Aug, 2016              Type 2 diabetes mellitus wit

hout complication, without long-term 

current use of insulin E11.9 ; Helicobacter pylori (H. pylori) infection A04.8 
and Mixed hyperlipidemia E78.2

 

                          [place] [address] [address]

                          08 Aug, 2016              Type 2 diabetes mellitus wit

hout complication, without long-term 

current use of insulin E11.9

 

                          [place] [address] [address]

                          03 Aug, 2016              Type 2 diabetes mellitus wit

hout complication, without long-term 

current use of insulin E11.9

 

                          [place] [address] [address]

                          02 Aug, 2016              Dyspepsia R10.13 ; Upper abd

ominal pain R10.10 ; Bowel habit 

changes R19.4 ; History of leukocytosis Z86.2 and Helicobacter pylori (H. 
pylori) infection A04.8

 

                          [facility]  [address] 

[address]                27 May, 2016              Environmental allergies Z91.

09

 

                          [facility]     [address] [id number]

[address] [place] [address]

                                        Left leg pain M79.605 ; Loca

lized swelling of lower leg R22.40 and 

Upper respiratory infection J06.9

 

                          [facility]     [address] [id number]

[address] [place] [address]

                          28 Dec, 2015               

 

                          [facility]     [address] [facility] [id number]

[address] [place] [address]

                          21 Dec, 2015              Left leg pain M79.605

 

                          [facility]     [address] [facility] [id number]

[address] [place] [address]

                          10 Dec, 2015              Left leg pain M79.605 and Lo

calized swelling of lower leg R22.40

 

                          [place] [address] [facility] [id number]

[address] [place] [address]

                          01 Dec, 2015              Left leg pain M79.605

 

                          [place] [address] [facility] [id number]

[address] [place] [address]

                                        Encounter for immunization Z

23

 

                          [place] [address] [facility] [id number]

[address] [place] [address]

                          12 Oct, 2015              Bronchitis J40

 

                          [place] [address] [facility] [id number]

[address] [place] [address]

                          30 Sep, 2015              Physical exam, pre-employmen

t V70.5 ; Encounter for PPD test V74.1 

and Hyperlipidemia 272.4

 

                          [place] [address] [id number]

[address] [place] [address]

                          21 Sep, 2015               

 

                          [place] [address] [facility] [id number]

[address] [place] [address]

                          21 Sep, 2015               

 

                          [place] [address] [facility] [id number]

[address] [place] [address]

                          09 Sep, 2015               

 

                          [place] [address] [id number]

[address] [place] [address]

                          04 Sep, 2015              Elevated blood sugar 790.29

 

                          [place] [address] [facility] [id number]

[address] [place] [address]

                          03 Sep, 2015              Elevated blood sugar 790.29

 

                          [place] [address] [id number]

[address] [place] [address]

                          03 Sep, 2015              Palpitations 785.1

 

                          [facility]     [address] [id number]

[address] [place] [address]

                          01 Sep, 2015              Palpitations 785.1

 

                          [facility]     [address] [id number]

[address] [place] [address]

                                         

 

                          [facility]     [address] [id number]

[address] [place] [address]

                          28 May, 2015              Hand pain, right 729.5 and D

epression with anxiety 300.4

 

                          [facility]     [address] [id number]

[address] [place] [address]

                                         

 

                          [facility]     [address] [id number]

[address] [place] [address]

                                         

 

                          [facility]     [address] [id number]

[address] [place] [address]

                          05 Mar, 2015               

 

                          [facility]     [address] [id number]

[address] [place] [address]

                          05 Mar, 2015               

 

                          [facility]     [address] [id number]

[address] [place] [address]

                                         

 

                          [facility]     [address] [id number]

[address] [place] [address]

                                         

 

                          [facility]     [address] [id number]

[address] [place] [address]

                          10 Feb, 2015               

 

                          [facility]     [address] [id number]

[address] [place] [address]

                          10 Feb, 2015               

 

                          [facility]     [address] [id number]

[address] [place] [address]

                                         

 

                          [facility]     [address] [id number]

[address] [place] [address]

                                         

 

                          [facility]     [address] [id number]

[address] [place] [address]

                                         

 

                          [facility]     [address] [id number]

[address] [place] [address], [place] [address]

                                         

 

                          [facility]     [address] [id number]

[address] [place] [address], [place] [address]

                                         

 

                          [facility]     [address] [id number]

[address] [place] [address], [place] [address]

                                         

 

                          [facility]     [address] [id number]

[address] [place] [address], [place] [address]

                                         

 

                          [facility]     [address] [id number]

[address] [place] [address], [place] [address]

                                         

 

                          [facility]     [address] [id number]

[address] [place] [address], [place] [address]

                                         

 

                          [facility]     [address] [id number]

[address] [place] [address], [place] [address]

                                         

 

                          [facility]     [address] [id number]

[address] [place] [address], [place] [address]

                                         

 

                          [facility]     [address] [id number]

[address] [place] [address], [place] [address]

                                         

 

                          [facility]     [address] [id number]

[address] [place] [address]

                          15 Brandon, 2015               

 

                          [facility]     [address] [id number]

[address] [place] [address], [place] [address]

                          15 Brandon, 2015               

 

                          [facility]     [address] [id number]

[address] [place] [address]

                                         

 

                          [facility]     [address] [id number]

[address] [place] [address], [place] [address]

                                         

 

                          [facility]     [address] [id number]

[address] [place] [address]

                          15 Oct, 2014               

 

                          [facility]     [address] [id number]

[address] [place] [address], [place] [address]

                          15 Oct, 2014               

 

                          [facility]     [address] [id number]

[address] [place] [address], [place] [address]

                          14 Oct, 2014               

 

                          [facility]     [address] [id number]

[address] [place] [address], [place] [address]

                          14 Oct, 2014               

 

                          [facility]     [address] [id number]

[address] [place] [address], [place] [address]

                          07 Oct, 2014               

 

                          [facility]     [address] [id number]

[address] [place] [address], [place] [address]

                          07 Oct, 2014               

 

                          [facility]     [address] [id number]

[address] [place] [address], [place] [address]

                          12 Sep,                

 

                          [facility]     [address] [id number]

[address] [place] [address], [place] [address]

                          12 Sep,                

 

                          [facility]     [address] [id number]

[address] [place] [address], [place] [address]

                          04 Sep,                

 

                          [facility]     [address] [id number]

[address] [place] [address], [place] [address]

                          03 Sep,                

 

                          [facility]     [address] [id number]

[address] [place] [address], [place] [address]

                          03 Sep,                

 

                          [facility]     [address] [id number]

[address] [place] [address], [place] [address]

                          02 Sep,                

 

                          [facility]     [address] [id number]

[address] [place] [address], [place] [address]

                          02 Sep, 2014               

 

                          [facility]     [address] [id number]

[address] [place] [address], [place] [address]

                          30 Aug, 2014               

 

                          [facility]     [address] [id number]

[address] [place] [address], [place] [address]

                          30 Aug, 2014               

 

                          [facility]     [address] [id number]

[address] [place] [address], [place] [address]

                          19 Aug, 2014               

 

                          [facility]     [address] [id number]

[address] [place] [address], [place] [address]

                          19 Aug, 2014               

 

                          [facility]     [address] [id number]

[address] [place] [address], [place] [address]

                          14 Aug, 2014               

 

                          [facility]     [address] [id number]

[facility] [place] [address]

                          14 Aug, 2014               

 

                          [facility]     [address] [id number]

[facility] [place] [address]

                          13 Aug, 2014               

 

                          [facility]     [address] [id number]

[facility] [place] [address]

                          13 Aug, 2014               

 

                          [facility]     [address] [id number]

[address] [place] [address], [place] [address]

                                         

 

                          [facility]     [address] [id number]

[facility] [place] [address]

                                         

 

                          [facility]     [address] [id number]

[address] [place] [address], [place] [address]

                                         

 

                          [facility]     [address] [id number]

[address] [place] [address], [place] [address]

                                         

 

                          [facility]     [address] [id number]

[address] [place] [address], [place] [address]

                                         

 

                          [facility]     [address] [id number]

[address] [place] [address], [place] [address]

                                         

 

                          [facility]     [address] [id number]

[address] [place] [address], [place] [address]

                                         

 

                          [facility]     [address] [id number]

[address] [place] [address], [place] [address]

                                         

 

                          [facility]     [address] [id number]

[address] [place] [address], [place] [address]

                                         

 

                          [facility]     [address] [id number]

[address] [place] [address], [place] [address]

                                         

 

                          [facility]     [address] [id number]

[address] [place] [address], [place] [address]

                                         

 

                          [facility]     [address] [id number]

[address] [place] [address], [place] [address]

                                         

 

                          [facility]     [address] [id number]

[address] [place] [address], [place] [address]

                          ,                

 

                          [facility]     [address] [id number]

[address] [place] [address], [place] [address]

                          ,                

 

                          [facility]     [address] [id number]

[address] [place] [address], [place] [address]

                                         

 

                          [facility]     [address] [id number]

[address] [place] [address], [place] [address]

                          ,                

 

                          [facility]     [address] [id number]

[address] [place] [address], [place] [address]

                          ,                

 

                          [facility]     [address] [id number]

[address] [place] [address], [place] [address]

                                         

 

                          [facility]     [address] [id number]

[address] [place] [address], [place] [address]

                                         

 

                          [facility]     [address] [id number]

[address] [place] [address], [place] [address]

                                         

 

                          [facility]     [address] [id number]

[address] [place] [address], [place] [address]

                                         

 

                          [facility]     [address] [id number]

[address] [place] [address], [place] [address]

                                         

 

                          [facility]     [address] [id number]

[address] [place] [address], [place] [address]

                                         

 

                          [facility]     [address] [id number]

[address] [place] [address], [place] [address]

                                         

 

                          [facility]     [address] [id number]

[address] [place] [address], [place] [address]

                                         

 

                          [facility]     [address] [id number]

[address] [place] [address], [place] [address]

                                         

 

                          [facility]     [address] [id number]

[address] [place] [address], [place] [address]

                                         

 

                          [facility]     [address] [id number]

[address] [place] [address], [place] [address]

                          27 Mar, 2014               

 

                          [facility]     [address] [id number]

[address] [place] [address], [place] [address]

                          27 Mar, 2014               

 

                          [facility]     [address] [id number]

[address] [place] [address], [place] [address]

                                         

 

                          [facility]     [address] [id number]

[address] [place] [address], [place] [address]

                                         

 

                          [facility]     [address] [id number]

[address] [place] [address], [place] [address]

                                         

 

                          [facility]     [address] [id number]

[address] [place] [address], [place] [address]

                                         

 

                          [facility]     [address] [id number]

[address] [place] [address], [place] [address]

                          08 Oct, 2013               

 

                          [facility]     [address] [id number]

[address] [place] [address], [place] [address]

                          04 Oct, 2013               

 

                          [facility]     [address] [id number]

[address] [place] [address], [place] [address]

                          03 Oct, 2013               

 

                          [facility]     [address] [id number]

[address] [place] [address], [place] [address]

                          02 Oct, 2013               

 

                          [facility]     [address] [id number]

[address] [place] [address], [place] [address]

                          01 Oct, 2013               

 

                          [facility]     [address] [id number]

[address] [place] [address], [place] [address]

                          20 Sep, 2013               

 

                          [facility]     [address] [id number]

[address] [place] [address], [place] [address]

                          08 Aug, 2013               

 

                          [facility]     [address] [id number]

[address] [place] [address], [place] [address]

                          24 May, 2013               

 

                          [facility]     [address] [id number]

[address] [place] [address], [place] [address]

                          13 May, 2013               

 

                          [facility]     [address] [id number]

[address] [place] [address], [place] [address]

                                         

 

                          [facility]     [address] [id number]

[address] [place] [address], [place] [address]

                                         

 

                          [facility]     [address] [id number]

[address] [place] [address], [place] [address]

                                         

 

                          [facility]     [address] [id number]

[address] [place] [address], [place] [address]

                                         

 

                          [facility]     [address] [id number]

[address] [place] [address], [place] [address]

                          02 Oct, 2012               

 

                          [facility]     [address] [id number]

[address] [place] [address], [place] [address]

                          02 Oct, 2012               

 

                          [facility]     [address] [id number]

[address] [place] [address], [place] [address]

                          21 Sep, 2012               

 

                          [facility]     [address] [id number]

[address] [place] [address], [place] [address]

                          06 Aug, 2012               

 

                          [facility]     [address] [id number]

[address] [place] [address], [place] [address]

                          02 Aug, 2012               

 

                          [facility]     [address] [id number]

[address] [place] [address], [place] [address]

                                         

 

                          [facility]     [address] [id number]

[address] [place] [address], [place] [address]

                                         

 

                          [facility]     [address] [id number]

[address] [place] [address], [place] [address]

                          15 Eusebio, 2012               

 

                          [facility]     [address] [id number]

[address] [place] [address], [place] [address]

                                         

 

                          [facility]     [address] [id number]

[address] [place] [address], [place] [address]

                          10 2012               

 

                          [facility]     [address] [id number]

[address] [place] [address], [place] [address]

                          22 Mar, 2012               

 

                          [facility]     [address] [id number]

[address] [place] [address], [place] [address]

                          20 Mar, 2012               

 

                          [facility]     [address] [id number]

[address] [place] [address], [place] [address]

                          14 Mar, 2012               

 

                          [facility]     [address] [id number]

[address] [place] [address], [place] [address]

                                         







IMMUNIZATIONS

No Known Immunizations



SOCIAL HISTORY

Never Assessed



REASON FOR VISIT

2 month f/u. Consult Dr. Lizarraga; Eyal RT(R)



PLAN OF CARE





                          Activity                  Details

 

                                         

 

                          Follow Up                 2 Months Reason:







VITAL SIGNS





                    Height              69 in               2017

 

                    Blood pressure systolic 114 mmHg            2017

 

                    Blood pressure diastolic 68 mmHg             2017







MEDICATIONS

Unknown Medications



RESULTS

No Results



PROCEDURES

No Known procedures



INSTRUCTIONS





MEDICATIONS ADMINISTERED

No Known Medications



MEDICAL (GENERAL) HISTORY





                    Type                Description         Date

 

                          Medical History           allergic rhinitis- rec's edwige

kly allergy injections from Dr Rojas office                            

 

                    Medical History     mood disorder        

 

                    Medical History     Leukocytosis- has been to hematology  

 

                    Medical History     Hyperlipidemia       

 

                          Medical History           Left leg pain- multiple imag

ing performed and ortho referral 

placed                                   

 

                          Medical History           TUBULAR ADENOMA AND GASTRITI

S ON COLONOSOCPY AUG 2016 -MULTIPLE 

POLYPS                                   

 

                    Medical History     Helicobacter pylori (H. pylori) infectio

n Hx  

 

                    Surgical History    cholecystectomy     

 

                    Surgical History     section    , 

 

                    Surgical History    tonsillectomy       2015

 

                    Surgical History    colonoscopy         2016

 

                    Surgical History    colonscopy          2017

 

                    Surgical History    Right Knee scope    2017

## 2020-06-23 NOTE — XMS REPORT
[facility]

                             Created on: 2019



Kate April

External Reference #: [id number]

: 1975

Sex: Female



Demographics





                          Address                   [address] [place]  [address]

 

                          Preferred Language        Unknown

 

                          Marital Status            Unknown

 

                          Pentecostalism Affiliation     Unknown

 

                          Race                      Unknown

 

                          Ethnic Group              Unknown





Author





                          Author                    Migration, April Doctor

 

                          Organization              [facility]

 

                          Address                   Unknown

 

                          Phone                     Unavailable







Care Team Providers





                    Care Team Member Name Role                Phone

 

                    Migration,  Doctor  Unavailable         Unavailable







PROBLEMS





          Type      Condition ICD9-CM Code APN45-EQ Code Onset Dates Condition S

tatus SNOMED 

Code

 

                          Problem                   Type 2 diabetes mellitus wit

hout complication, without long-term current

use of insulin              E11.9                     Active       [id number]

 

          Problem   Mixed hyperlipidemia           E78.2               Active   

 [id number]

 

          Problem   Tubular adenoma of colon           D12.6               Activ

e    [id number]

 

          Problem   Dysthymic disorder           F34.1               Active    7

[id number]

 

          Problem   Arthritis           M19.90              Active    1634361

 

          Problem   Duarte's neuroma of right foot           G57.61             

 Active    13083278

 

          Problem   History of leukocytosis           Z86.2               Active

    [id number]

 

          Problem   Anxiety             F41.9               Active    70748301

 

           Problem    Seasonal allergic rhinitis due to other allergic trigger  

          J30.89                

Active                                  [id number]







ALLERGIES

No Information



ENCOUNTERS





                Encounter       Location        Date            Diagnosis

 

                          [facility] WALK IN [facility]  [address] [id number]

[address] [place] [address] 

[address]                              Arthritis M19.90

 

                          [facility]     [address] [id number]

[address] [place] [address]

                                        Seasonal allergic rhinitis d

ue to other allergic trigger J30.89 and

Dysthymic disorder F34.1

 

                          [facility]     [address] [id number]

[address] [place] [address]

                          05 Dec, 2018              Bilateral otitis media with 

effusion H65.93 and Anxiety F41.9

 

                          [facility]     [address] [id number]

[address] [place] [address]

                                        Type 2 diabetes mellitus wit

hout complication, without long-term 

current use of insulin E11.9

 

                          [facility]     [address] [facility] [id number]

[address] [place] [address]

                                        Pharyngitis due to Streptoco

ccus species J02.0

 

                          [place] [address] [address]

                                         

 

                          [facility] WALK IN [place] [address] 

[address]                10 Jul, 2018              Fever, unspecified fever cau

se R50.9 and Lymphadenopathy

R59.1

 

                          [facility] WALK IN [place] [address] 

[address]                              Pharyngitis due to other org

anism J02.8

 

                          [place] [address] [address]

                                         

 

                          [place] [address] [address]

                                        Type 2 diabetes mellitus wit

hout complication, without long-term 

current use of insulin E11.9 and Other eczema L30.8

 

                          [facility] [place] [address] 

[address]                              Acute pain of left shoulder 

M25.512

 

                          [place] [address] [address]

                                         

 

                          [place] [address] [address]

                          15 Feb, 2018              Type 2 diabetes mellitus wit

hout complication, without long-term 

current use of insulin E11.9

 

                          [place] [address] [address]

                          06 2018              Type 2 diabetes mellitus wit

hout complication, without long-term 

current use of insulin E11.9 ; Tubular adenoma of colon D12.6 ; Mixed 
hyperlipidemia E78.2 ; History of leukocytosis Z86.2 and Screening breast 
examination Z12.31

 

                          [place] [address] [address]

                                        Metatarsalgia of right foot 

M77.41 and Type 2 diabetes mellitus 

without complication, without long-term current use of insulin E11.9

 

                          [place] [address] [facility] [id number]

[address] [place] [address]

                          17 2017              Capsulitis M77.9 and Metatar

salgia of right foot M77.41

 

                          [place] [address] [facility] [id number]

[address] [place] [address]

                          08 Sep, 2017              Capsulitis M77.9

 

                          [place] [address] [facility] [id number]

[address] [place] [address]

                          06 Sep, 2017               

 

                          [place] [address] [facility] [id number]

[address] [place] [address]

                          03 Aug, 2017              Type 2 diabetes mellitus wit

hout complication, without long-term 

current use of insulin E11.9 ; Tubular adenoma of colon D12.6 and Mixed 
hyperlipidemia E78.2

 

                          [place] [id number]

[address] [place] [address]

                                        Metatarsalgia of right foot 

M77.41 and Capsulitis M77.9

 

                          [place] [address] [facility] [id number]

[address] [place] [address]

                                         

 

                          [facility]  [address] [facility] [id number]

[address] [place] [address] 

[address]                              Duarte's neuroma of right fo

ot G57.61

 

                          [place] [address] [facility] [id number]

[address] [place] [address]

                                        Mixed hyperlipidemia E78.2 a

nd Type 2 diabetes mellitus without 

complication, without long-term current use of insulin E11.9

 

                          [place] [address] [facility] [id number]

[address] [place] [address]

                          10 Brandon, 2017              Type 2 diabetes mellitus wit

hout complication, without long-term 

current use of insulin E11.9 ; Tubular adenoma of colon D12.6 and Mixed 
hyperlipidemia E78.2

 

                          [place] [address] [facility] [id number]

[address] [place] [address]

                          22 Dec, 2016              Mixed hyperlipidemia E78.2

 

                          [place] [id number]

[address] [place] [address]

                                         

 

                          [facility]     [id number]

[address] [place] [address]

                                        Mixed hyperlipidemia E78.2

 

                          [facility]     [address] [address]

                                        Mixed hyperlipidemia E78.2

 

                          [place] [address] [address]

                          08 Sep, 2016               

 

                          [place] [address] [address]

                          10 Aug, 2016              Type 2 diabetes mellitus wit

hout complication, without long-term 

current use of insulin E11.9 ; Helicobacter pylori (H. pylori) infection A04.8 
and Mixed hyperlipidemia E78.2

 

                          [place] [id number]

[address] [place] [address]

                          08 Aug, 2016              Type 2 diabetes mellitus wit

hout complication, without long-term 

current use of insulin E11.9

 

                          [place] [address] [id number]

[address] [place] [address]

                          03 Aug, 2016              Type 2 diabetes mellitus wit

hout complication, without long-term 

current use of insulin E11.9

 

                          [place] [id number]

[address] [place] [address]

                          02 Aug, 2016              Dyspepsia R10.13 ; Upper abd

ominal pain R10.10 ; Bowel habit 

changes R19.4 ; History of leukocytosis Z86.2 and Helicobacter pylori (H. 
pylori) infection A04.8

 

                          [facility]  [address] [id number]

[address] [place] [address] 

[address]                27 May, 2016              Environmental allergies Z91.

09

 

                          [place] [id number]

[address] [place] [address]

                                        Left leg pain M79.605 ; Loca

lized swelling of lower leg R22.40 and 

Upper respiratory infection J06.9

 

                          [place] [address] [place] [id number]

[address] [place] [address]

                          28 Dec, 2015               

 

                          [facility]     [address] [facility] [id number]

[address] [place] [address]

                          21 Dec, 2015              Left leg pain M79.605

 

                          [facility]     [address] [facility] [id number]

[address] [place] [address]

                          10 Dec, 2015              Left leg pain M79.605 and Lo

calized swelling of lower leg R22.40

 

                          [facility]     [address] [facility] [id number]

[address] [place] [address]

                          01 Dec, 2015              Left leg pain M79.605

 

                          [facility]     [address] [facility] [id number]

[address] [place] [address]

                                        Encounter for immunization Z

23

 

                          [facility]     [id number] [place] [address]

                          12 Oct, 2015              Bronchitis J40

 

                          [facility]     [address] [address]

                          30 Sep, 2015              Physical exam, pre-employmen

t V70.5 ; Encounter for PPD test V74.1 

and Hyperlipidemia 272.4

 

                          [facility]     [id number]

[address] [place] [address]

                          21 Sep, 2015               

 

                          [facility]     [id number]

[address] [place] [address]

                          21 Sep, 2015               

 

                          [facility]     [id number]

[address] [place] [address]

                          09 Sep, 2015               

 

                          [facility]     [address] [facility] [id number]

[address] [place] [address]

                          04 Sep, 2015              Elevated blood sugar 790.29

 

                          [facility]     [address] [id number]

[address] [place] [address]

                          03 Sep, 2015              Elevated blood sugar 790.29

 

                          [facility]     [address] [facility] [id number]

[address] [place] [address]

                          03 Sep, 2015              Palpitations 785.1

 

                          [facility]     [id number]

[address] [place] [address]

                          01 Sep, 2015              Palpitations 785.1

 

                          [facility]     [address] [id number]

[address] [place] [address]

                                         

 

                          [facility]     [address] [id number]

[address] [place] [address]

                          28 May, 2015              Hand pain, right 729.5 and D

epression with anxiety 300.4

 

                          [facility]     [address] [id number]

[address] [place] [address]

                                         

 

                          [facility]     [address] [id number]

[address] [place] [address]

                                         

 

                          [facility]     [address] [id number]

[address] [place] [address]

                          05 Mar, 2015               

 

                          [facility]     [address] [id number]

[address] [place] [address]

                          05 Mar, 2015               

 

                          [facility]     [address] [id number]

[address] [place] [address]

                                         

 

                          [facility]     [address] [id number]

[address] [place] [address]

                                         

 

                          [facility]     [address] [facility] [id number]

[address] [place] [address]

                          10 Feb, 2015               

 

                          [facility]     [address] [id number]

[address] [place] [address]

                          10 Feb, 2015               

 

                          [facility]     [address] [id number]

[address] [place] [address]

                                         

 

                          [facility]     [address] [id number]

[address] [place] [address]

                                         

 

                          [facility]     [address] [facility] [id number]

[address] [place] [address]

                                         

 

                          [facility]     [address] [id number]

[address] [place] [address]

                                         

 

                          [facility]     [address] [id number]

[address] [place] [address], [place] [address]

                                         

 

                          [facility]     [address] [id number]

[address] [place] [address], [place] [address]

                                         

 

                          [facility]     [address] [id number]

[address] [place] [address], [place] [address]

                                         

 

                          [facility]     [address] [id number]

[address] [place] [address], [place] [address]

                                         

 

                          [facility]     [address] [id number]

[address] [place] [address], [place] [address]

                                         

 

                          [facility]     [address] [id number]

[address] [place] [address], [place] [address]

                                         

 

                          [facility]     [address] [id number]

[address] [place] [address], [place] [address]

                                         

 

                          [facility]     [address] [id number]

[address] [place] [address], [place] [address]

                                         

 

                          [facility]     [address] [id number]

[address] [place] [address], [place] [address]

                          15 [place], 2015               

 

                          [facility]     [address] [id number]

[address] [place] [address], [place] [address]

                          15 [place], 2015               

 

                          [facility]     [address] [id number]

[address] [place] [address], [place] [address]

                                         

 

                          [facility]     [address] [id number]

[address] [place] [address], [place] [address]

                                         

 

                          [facility]     [address] [id number]

[address] [place] [address], [place] [address]

                          15 Oct, 2014               

 

                          [facility]     [address] [id number]

[address] [place] [address], [place] [address]

                          15 Oct, 2014               

 

                          [facility]     [address] [id number]

[address] [place] [address], [place] [address]

                          14 Oct, 2014               

 

                          [facility]     [address] [id number]

[address] [place] [address], [place] [address]

                          14 Oct, 2014               

 

                          [facility]     [address] [id number]

[address] [place] [address], [place] [address]

                          07 Oct, 2014               

 

                          [facility]     [address] [id number]

[address] [place] [address], [place] [address]

                          07 Oct, 2014               

 

                          [facility]     [address] [id number]

[address] [place] [address], [place] [address]

                          12 Sep, 2014               

 

                          [facility]     [address] [id number]

[address] [place] [address], [place] [address]

                          12 Sep,                

 

                          [facility]     [address] [id number]

[address] [place] [address], [place] [address]

                          04 Sep, 2014               

 

                          [facility]     [address] [id number]

[address] [place] [address], [place] [address]

                          03 Sep, 2014               

 

                          [facility]     [address] [id number]

[address] [place] [address], [place] [address]

                          03 Sep, 2014               

 

                          [facility]     [address] [id number]

[address] [place] [address], [place] [address]

                          02 Sep, 2014               

 

                          [facility]     [address] [id number]

[address] [place] [address], [place] [address]

                          02 Sep, 2014               

 

                          [facility]     [address] [id number]

[address] [place] [address], [place] [address]

                          30 Aug, 2014               

 

                          [facility]     [address] [id number]

[address] [place] [address], [place] [address]

                          30 Aug, 2014               

 

                          [facility]     [address] [id number]

[address] [place] [address], [place] [address]

                          19 Aug, 2014               

 

                          [facility]     [address] [id number]

[address] [place] [address], [place] [address]

                          19 Aug, 2014               

 

                          [facility]     [address] [id number]

[address] [place] [address], [place] [address]

                          14 Aug, 2014               

 

                          [facility]     [address] [id number]

[address] [place] [address], [place] [address]

                          14 Aug, 2014               

 

                          [facility]     [address] [id number]

[address] [place] [address], [place] [address]

                          13 Aug, 2014               

 

                          [facility]     [address] [id number]

[facility] [address]

                          13 Aug, 2014               

 

                          [facility]     [address] [id number]

[address] [place] [address], [place] [address]

                                         

 

                          [facility]     [address] [id number]

[address] [place] [address], [place] [address]

                                         

 

                          [facility]     [address] [id number]

[address] [place] [address], [place] [address]

                                         

 

                          [facility]     [address] [id number]

[address] [place] [address], [place] [address]

                                         

 

                          [facility]     [address] [id number]

[address] [place] [address], [place] [address]

                                         

 

                          [facility]     [address] [id number]

[address] [place] [address], [place] [address]

                                         

 

                          [facility]     [address] [id number]

[address] [place] [address], [place] [address]

                                         

 

                          [facility]     [address] [id number]

[address] [place] [address], [place] [address]

                                         

 

                          [facility]     [address] [id number]

[address] [place] [address], [place] [address]

                                         

 

                          [facility]     [address] [id number]

[address] [place] [address], [place] [address]

                                         

 

                          [facility]     [address] [id number]

[address] [place] [address], [place] [address]

                                         

 

                          [facility]     [address] [id number]

[address] [place] [address], [place] [address]

                                         

 

                          [facility]     [address] [id number]

[address] [place] [address], [place] [address]

                                         

 

                          [facility]     [address] [id number]

[address] [place] [address], [place] [address]

                                         

 

                          [facility]     [address] [id number]

[facility] [place] [address]

                          ,                

 

                          [facility]     [address] [id number]

[facility] [place] [address]

                          ,                

 

                          [facility]     [address] [id number]

[address] [place] [address], [place] [address]

                          ,                

 

                          [facility]     [address] [id number]

[address] [place] [address], [place] [address]

                          ,                

 

                          [facility]     [address] [id number]

[address] [place] [address], [place] [address]

                          ,                

 

                          [facility]     [address] [id number]

[address] [place] [address], [place] [address]

                                         

 

                          [facility]     [address] [id number]

[address] [place] [address], [place] [address]

                                         

 

                          [facility]     [address] [id number]

[address] [place] [address], [place] [address]

                                         

 

                          [facility]     [address] [id number]

[address] [place] [address], [place] [address]

                                         

 

                          [facility]     [address] [id number]

[address] [place] [address], [place] [address]

                                         

 

                          [facility]     [address] [id number]

[address] [place] [address], [place] [address]

                                         

 

                          [facility]     [address] [id number]

[address] [place] [address], [place] [address]

                                         

 

                          [facility]     [address] [id number]

[address] [place] [address], [place] [address]

                                         

 

                          [facility]     [address] [id number]

[address] [place] [address], [place] [address]

                          27 Mar, 2014               

 

                          [facility]     [address] [id number]

[address] [place] [address], [place] [address]

                          27 Mar, 2014               

 

                          [facility]     [address] [id number]

[address] [place] [address], [place] [address]

                                         

 

                          [facility]     [address] [id number]

[address] [place] [address], [place] [address]

                                         

 

                          [facility]     [address] [id number]

[address] [place] [address], [place] [address]

                                         

 

                          [facility]     [address] [id number]

[address] [place] [address], [place] [address]

                                         

 

                          [facility]     [address] [id number]

[address] [place] [address]

                          08 Oct, 2013               

 

                          [facility]     [address] [id number]

[address] [place] [address], [place] [address]

                          04 Oct, 2013               

 

                          [facility]     [address] [id number]

[address] [place] [address], [place] [address]

                          03 Oct, 2013               

 

                          [facility]     [address] [id number]

[address] [place] [address], [place] [address]

                          02 Oct, 2013               

 

                          [facility]     [address] [id number]

[address] [place] [address], [place] [address]

                          01 Oct, 2013               

 

                          [facility]     [address] [id number]

[address] [place] [address]

                          20 Sep, 2013               

 

                          [facility]     [address] [id number]

[address] [place] [address]

                          08 Aug, 2013               

 

                          [facility]     [address] [id number]

[address] [place] [address]

                          24 May, 2013               

 

                          [facility]     [address] [id number]

[address] [place] [address]

                          13 May, 2013               

 

                          [facility]     [address] [id number]

[address] [place] [address], [place] [address]

                                         

 

                          [facility]     [address] [id number]

[address] [place] [address]

                                         

 

                          [facility]     [address] [id number]

[address] [place] [address]

                                         

 

                          [facility]     [address] [id number]

[address] [place] [address]

                                         

 

                          [facility]     [address] [id number]

[address] [place] [address]

                          02 Oct, 2012               

 

                          [facility]     [address] [id number]

[address] [place] [address]

                          02 Oct, 2012               

 

                          [facility]     [address] [id number]

[address] [place] [address]

                          21 Sep, 2012               

 

                          [facility]     [address] [id number]

[address] [place] [address]

                          06 Aug, 2012               

 

                          [facility]     [address] [id number]

[address] [place] [address]

                          02 Aug, 2012               

 

                          [facility]     [address] [id number]

[address] [place] [address]

                                         

 

                          [facility]     [address] [id number]

[address] [place] [address]

                                         

 

                          [facility]     [address] [id number]

[address] [place] [address]

                          15 Eusebio, 2012               

 

                          [facility]     [address] [id number]

[address] [place] [address]

                                         

 

                          [facility]     [address] [id number]

[address] [place] [address]

                          10 Apr, 2012               

 

                          [facility]     [address] [id number]

[address] [place] [address]

                          22 Mar, 2012               

 

                          [facility]     [address] [id number]

[address] [place] [address]

                          20 Mar, 2012               

 

                          [facility]     [address] [id number]

[address] [place] [address]

                          14 Mar, 2012               

 

                          [facility]     [address] [id number]

[address] [place] [address]

                                         







IMMUNIZATIONS

No Known Immunizations



SOCIAL HISTORY

Never Assessed



REASON FOR VISIT

[facility]



PLAN OF CARE





VITAL SIGNS





MEDICATIONS

Unknown Medications



RESULTS

No Results



PROCEDURES

No Known procedures



INSTRUCTIONS





MEDICATIONS ADMINISTERED

No Known Medications



MEDICAL (GENERAL) HISTORY





                    Type                Description         Date

 

                          Medical History           allergic rhinitis- rec's edwige donahue allergy injections from Dr Rojas office                            

 

                    Medical History     mood disorder        

 

                    Medical History     Leukocytosis- has been to hematology  

 

                    Medical History     Hyperlipidemia       

 

                          Medical History           Left leg pain- multiple imag

ing performed and ortho referral 

placed                                   

 

                          Medical History           TUBULAR ADENOMA AND GASTRITI

S ON COLONOSOCPY AUG 2016 -MULTIPLE 

POLYPS                                   

 

                    Medical History     Helicobacter pylori (H. pylori) infectio

n Hx  

 

                    Surgical History    cholecystectomy     

 

                    Surgical History     section    , 

 

                    Surgical History    tonsillectomy       2015

 

                    Surgical History    colonoscopy         2016

 

                    Surgical History    colonscopy          2017

 

                    Surgical History    Right Knee scope    2017

## 2020-06-23 NOTE — XMS REPORT
[facility]

                             Created on: 2020



Kate April

External Reference #: [id number]

: 1975

Sex: Female



Demographics





                          Address                   [address] [place]  [address]

 

                          Preferred Language        Unknown

 

                          Marital Status            Unknown

 

                          Shinto Affiliation     Unknown

 

                          Race                      Unknown

 

                          Ethnic Group              Unknown





Author





                          Author                    Uvaldo April YONNY

 

                          Organization              [facility]

 

                          Address                   [address] [place]  [address]



 

                          Phone                     [phone]







Care Team Providers





                    Care Team Member Name Role                Phone

 

                    YONNY Bailon   Unavailable         [phone]







PROBLEMS





          Type      Condition ICD9-CM Code ENF63-LA Code Onset Dates Condition S

tatus SNOMED 

Code

 

          Problem   Mixed hyperlipidemia           E78.2               Active   

 [id number]

 

                          Problem                   Type 2 diabetes mellitus wit

hout complication, without long-term current

use of insulin              E11.9                     Active       [id number]

 

          Problem   History of leukocytosis           Z86.2               Active

    [id number]

 

          Problem   Anxiety             F41.9               Active    17786746

 

           Problem    Seasonal allergic rhinitis due to other allergic trigger  

          J30.89                

Active                                  [id number]

 

          Problem   GERD with esophagitis           K21.0               Active  

  [id number]

 

          Problem   Duarte's neuroma of right foot           G57.61             

 Active    69459245

 

          Problem   Chronic fatigue           R53.82              Active    8422

9001

 

          Problem   Tubular adenoma of colon           D12.6               Activ

e    479442112

 

          Problem   Dysthymic disorder           F34.1               Active    7

2918781

 

          Problem   Arthritis           M19.90              Active    3883981

 

          Problem   Non morbid obesity           E66.9               Active    4

01710678

 

          Problem   Seasonal allergic rhinitis due to pollen           J30.1    

           Active    92963697







ALLERGIES

No Information



ENCOUNTERS





                Encounter       Location        Date            Diagnosis

 

                          [facility]     [address] [facility] [id number]

[address] [place] [address]

                                         

 

                          [facility]  [address] [facility] [id number]

[address] [place] [address] 

[address]                14 May, 2020              Heart palpitations R00.2

 

                          [facility]     [address] [facility] [id number]

[address] [place] [address]

                          01 May, 2020              Chronic fatigue R53.82 ; Mal

aise R53.81 ; Type 2 diabetes mellitus 

without complication, without long-term current use of insulin E11.9 ; Arthritis
M19.90 ; Insect bite (nonvenomous), right lower leg, initial encounter S80.861A 
and Bitten or stung by nonvenomous insect and other nonvenomous arthropods, 
initial encounter W57.XXXA

 

                          [facility]     [id number]

[address] [place] [address]

                                        Chronic fatigue R53.82 ; Mal

aise R53.81 ; Type 2 diabetes mellitus 

without complication, without long-term current use of insulin E11.9 ; Arthritis
M19.90 ; Insect bite (nonvenomous), right lower leg, initial encounter S80.861A 
and Bitten or stung by nonvenomous insect and other nonvenomous arthropods, 
initial encounter W57.XXXA

 

                          [place] [address]

[address] [place] [address]

                          04 Mar, 2020              Type 2 diabetes mellitus wit

hout complication, without long-term 

current use of insulin E11.9

 

                          [place] [address] [place] [id number]

[address] [place] [address]

                                        Acute pain of left knee M25.

562

 

                          [place] [address]

[address] [place] [address]

                          15 Brandon, 2020              Leg pain, left M79.605

 

                          [facility]  [id number]

[address] [place] [address] 

[address]                              Leg pain, left M79.605

 

                          [facility]     [id number]

[address] [place] [address]

                                        Type 2 diabetes mellitus wit

hout complication, without long-term 

current use of insulin E11.9 and GERD with esophagitis K21.0

 

                          [place] [address]

[address] [place] [address]

                          01 Oct, 2019              Encounter for immunization Z

23

 

                          [place] [address] [place] [id number]

[address] [place] [address]

                                        Type 2 diabetes mellitus wit

hout complication, without long-term 

current use of insulin E11.9

 

                          [place] [address] [address]

                                         

 

                          [place] [address] [address]

                                        Type 2 diabetes mellitus wit

hout complication, without long-term 

current use of insulin E11.9

 

                          [place] [address] [address]

                                        Weight loss R63.4

 

                          [place] [address] [address]

                          31 May, 2019              Type 2 diabetes mellitus wit

hout complication, without long-term 

current use of insulin E11.9

 

                          [place] [address] [address]

                          31 May, 2019              Type 2 diabetes mellitus wit

hout complication, without long-term 

current use of insulin E11.9 and Non morbid obesity E66.9

 

                          [facility] [place] [address] 

[address]                14 May, 2019              Seasonal allergic rhinitis d

ue to pollen J30.1 and Sore 

throat J02.9

 

                          [place] [address] 

[address]                              Arthritis M19.90

 

                          [place] [address] [address]

                                        Seasonal allergic rhinitis d

ue to other allergic trigger J30.89 and

Dysthymic disorder F34.1

 

                          [place] [address] [address]

                          05 Dec, 2018              Bilateral otitis media with 

effusion H65.93 and Anxiety F41.9

 

                          [place] [address] [address]

                                        Type 2 diabetes mellitus wit

hout complication, without long-term 

current use of insulin E11.9

 

                          [place] [address] [address]

                                        Pharyngitis due to Streptoco

ccus species J02.0

 

                          [place] [address] [address]

                                         

 

                          [facility] [place] [address] 

[address]                10 Jul, 2018              Fever, unspecified fever cau

se R50.9 and Lymphadenopathy

R59.1

 

                          [facility] [place] [address] 

[address]                              Pharyngitis due to other org

anism J02.8

 

                          [place] [address] [address]

                                         

 

                          [place] [address] [address]

                                        Type 2 diabetes mellitus wit

hout complication, without long-term 

current use of insulin E11.9 and Other eczema L30.8

 

                          [facility] [place] [address] 

[address]                              Acute pain of left shoulder 

M25.512

 

                          [place] [address] [address]

                          23 2018               

 

                          [place] [address] [address]

                          15 2018              Type 2 diabetes mellitus wit

hout complication, without long-term 

current use of insulin E11.9

 

                          [place] [address] [address]

                          06 2018              Type 2 diabetes mellitus wit

hout complication, without long-term 

current use of insulin E11.9 ; Tubular adenoma of colon D12.6 ; Mixed 
hyperlipidemia E78.2 ; History of leukocytosis Z86.2 and Screening breast 
examination Z12.31

 

                          [place] [address] [address]

                                        Metatarsalgia of right foot 

M77.41 and Type 2 diabetes mellitus 

without complication, without long-term current use of insulin E11.9

 

                          [facility]     [address] [facility] [id number]

[address] [place] [address]

                                        Capsulitis M77.9 and Metatar

salgia of right foot M77.41

 

                          [place] [address] [facility] [id number]

[address] [place] [address]

                          08 Sep, 2017              Capsulitis M77.9

 

                          [place] [address] [facility] [id number]

[address] [place] [address]

                          06 Sep, 2017               

 

                          [place] [address] [facility] [id number]

[address] [place] [address]

                          03 Aug, 2017              Type 2 diabetes mellitus wit

hout complication, without long-term 

current use of insulin E11.9 ; Tubular adenoma of colon D12.6 and Mixed 
hyperlipidemia E78.2

 

                          [place] [address] [place] [id number]

[address] [place] [address]

                                        Metatarsalgia of right foot 

M77.41 and Capsulitis M77.9

 

                          [place] [address] [facility] [id number]

[address] [place] [address]

                                         

 

                          [facility]  [id number]

[address] [place] [address] 

[address]                              Duarte's neuroma of right fo

ot G57.61

 

                          [place] [address] [facility] [id number]

[address] [place] [address]

                                        Mixed hyperlipidemia E78.2 a

nd Type 2 diabetes mellitus without 

complication, without long-term current use of insulin E11.9

 

                          [place] [address] [facility] [id number]

[address] [place] [address]

                          10 Brandon, 2017              Type 2 diabetes mellitus wit

hout complication, without long-term 

current use of insulin E11.9 ; Tubular adenoma of colon D12.6 and Mixed 
hyperlipidemia E78.2

 

                          [place] [address] [facility] [id number]

[address] [place] [address]

                          22 Dec, 2016              Mixed hyperlipidemia E78.2

 

                          [place] [address]

[address] [place] [address]

                                         

 

                          [facility]     [address] [address]

                                        Mixed hyperlipidemia E78.2

 

                          [place] [address] [address]

                                        Mixed hyperlipidemia E78.2

 

                          [place] [address] [address]

                          08 Sep, 2016               

 

                          [place] [address] [address]

                          10 Aug, 2016              Type 2 diabetes mellitus wit

hout complication, without long-term 

current use of insulin E11.9 ; Helicobacter pylori (H. pylori) infection A04.8 
and Mixed hyperlipidemia E78.2

 

                          [place] [address] [address]

                          08 Aug, 2016              Type 2 diabetes mellitus wit

hout complication, without long-term 

current use of insulin E11.9

 

                          [place] [address] [place] [id number]

[address] [place] [address]

                          03 Aug, 2016              Type 2 diabetes mellitus wit

hout complication, without long-term 

current use of insulin E11.9

 

                          [place] [address] [place] [id number]

[address] [place] [address]

                          02 Aug, 2016              Dyspepsia R10.13 ; Upper abd

ominal pain R10.10 ; Bowel habit 

changes R19.4 ; History of leukocytosis Z86.2 and Helicobacter pylori (H. 
pylori) infection A04.8

 

                          [facility]  [id number]

[address] [place] [address] 

[address]                27 May, 2016              Environmental allergies Z91.

09

 

                          [facility]     [address] [id number]

[address] [place] [address]

                                        Left leg pain M79.605 ; Loca

lized swelling of lower leg R22.40 and 

Upper respiratory infection J06.9

 

                          [place] [address]

[address] [place] [address]

                          28 Dec, 2015               

 

                          [facility]     [address] [facility] [id number]

[address] [place] [address]

                          21 Dec, 2015              Left leg pain M79.605

 

                          [facility]     [address] [facility] [id number]

[address] [place] [address]

                          10 Dec, 2015              Left leg pain M79.605 and Lo

calized swelling of lower leg R22.40

 

                          [facility]     [address] [facility] [id number]

[address] [place] [address]

                          01 Dec, 2015              Left leg pain M79.605

 

                          [place] [address] [place] [id number] [place] [address]

                                        Encounter for immunization Z

23

 

                          [place] [address] [place] [id number] [place] [address]

                          12 Oct, 2015              Bronchitis J40

 

                          [facility]     [id number]

[address] [place] [address]

                          30 Sep, 2015              Physical exam, pre-employmen

t V70.5 ; Encounter for PPD test V74.1 

and Hyperlipidemia 272.4

 

                          [place] [address] [place] [id number]

[address] [place] [address]

                          21 Sep, 2015               

 

                          [facility]     [address] [id number]

[address] [place] [address]

                          21 Sep, 2015               

 

                          [facility]     [id number]

[address] [place] [address]

                          09 Sep, 2015               

 

                          [facility]     [id number]

[address] [place] [address]

                          04 Sep, 2015              Elevated blood sugar 790.29

 

                          [facility]     [id number]

[address] [place] [address]

                          03 Sep, 2015              Elevated blood sugar 790.29

 

                          [facility]     [address] [facility] [id number]

[address] [place] [address]

                          03 Sep, 2015              Palpitations 785.1

 

                          [place] [address] [id number]

[address] [place] [address]

                          01 Sep, 2015              Palpitations 785.1

 

                          [facility]     [address] [id number]

[address] [place] [address]

                                         

 

                          [facility]     [address] [facility] [id number]

[address] [place] [address]

                          28 May, 2015              Hand pain, right 729.5 and D

epression with anxiety 300.4

 

                          [facility]     [address] [id number]

[address] [place] [address]

                                         

 

                          [facility]     [address] [id number]

[address] [place] [address]

                                         

 

                          [facility]     [address] [id number]

[address] [place] [address]

                          05 Mar, 2015               

 

                          [facility]     [address] [id number]

[address] [place] [address]

                          05 Mar, 2015               

 

                          [facility]     [address] [id number]

[address] [place] [address]

                                         

 

                          [facility]     [address] [id number]

[address] [place] [address]

                                         

 

                          [facility]     [address] [facility] [id number]

[address] [place] [address]

                          10 Feb, 2015               

 

                          [facility]     [address] [id number]

[address] [place] [address]

                          10 Feb, 2015               

 

                          [facility]     [address] [id number]

[address] [place] [address]

                                         

 

                          [facility]     [address] [id number]

[address] [place] [address]

                                         

 

                          [facility]     [address] [facility] [id number]

[address] [place] [address]

                                         

 

                          [facility]     [address] [id number]

[address] [place] [address]

                                         

 

                          [facility]     [address] [id number]

[address] [place] [address], [place] [address]

                                         

 

                          [facility]     [address] [id number]

[address] [place] [address], [place] [address]

                                         

 

                          [facility]     [address] [id number]

[address] [place] [address], [place] [address]

                                         

 

                          [facility]     [address] [id number]

[address] [place] [address], [place] [address]

                                         

 

                          [facility]     [address] [id number]

[address] [place] [address], [place] [address]

                                         

 

                          [facility]     [address] [id number]

[address] [place] [address], [place] [address]

                                         

 

                          [facility]     [address] [id number]

[address] [place] [address], [place] [address]

                                         

 

                          [facility]     [address] [id number]

[address] [place] [address], [place] [address]

                                         

 

                          [facility]     [address] [id number]

[address] [place] [address], [place] [address]

                          15 [place], 2015               

 

                          [facility]     [address] [id number]

[address] [place] [address]

                          15 Brandon, 2015               

 

                          [facility]     [address] [id number]

[address] [place] [address], [place] [address]

                                         

 

                          [facility]     [address] [id number]

[address] [place] [address]

                                         

 

                          [facility]     [address] [id number]

[address] [place] [address], [place] [address]

                          15 Oct, 2014               

 

                          [facility]     [address] [id number]

[address] [place] [address], [place] [address]

                          15 Oct, 2014               

 

                          [facility]     [address] [id number]

[address] [place] [address], [place] [address]

                          14 Oct, 2014               

 

                          [facility]     [address] [id number]

[address] [place] [address] [address]

                          14 Oct, 2014               

 

                          [facility]     [address] [id number]

[address] [place] [address], [place] [address]

                          07 Oct, 2014               

 

                          [facility]     [address] [id number]

[address] [place] [address], [place] [address]

                          07 Oct, 2014               

 

                          [facility]     [address] [id number]

[address] [place] [address], [place] [address]

                          12 Sep,                

 

                          [facility]     [address] [id number]

[address] [place] [address], [place] [address]

                          12 Sep,                

 

                          [facility]     [address] [id number]

[address] [place] [address], [place] [address]

                          04 Sep,                

 

                          [facility]     [address] [id number]

[address] [place] [address], [place] [address]

                          03 Sep,                

 

                          [facility]     [address] [id number]

[address] [place] [address], [place] [address]

                          03 Sep,                

 

                          [facility]     [address] [id number]

[address] [place] [address], [place] [address]

                          02 Sep,                

 

                          [facility]     [address] [id number]

[address] [place] [address], [place] [address]

                          02 Sep, 2014               

 

                          [facility]     [address] [id number]

[address] [place] [address], [place] [address]

                          30 Aug, 2014               

 

                          [facility]     [address] [id number]

[address] [place] [address], [place] [address]

                          30 Aug, 2014               

 

                          [facility]     [address] [id number]

[address] [place] [address], [place] [address]

                          19 Aug, 2014               

 

                          [facility]     [address] [id number]

[address] [place] [address], [place] [address]

                          19 Aug, 2014               

 

                          [facility]     [address] [id number]

[address] [place] [address], [place] [address]

                          14 Aug, 2014               

 

                          [facility]     [address] [id number]

[address] [place] [address], [place] [address]

                          14 Aug, 2014               

 

                          [facility]     [address] [id number]

[facility] [place] [address]

                          13 Aug, 2014               

 

                          [facility]     [address] [id number]

[facility] [place] [address]

                          13 Aug, 2014               

 

                          [facility]     [address] [id number]

[facility] [place] [address]

                                         

 

                          [facility]     [address] [id number]

[address] [place] [address], [place] [address]

                                         

 

                          [facility]     [address] [id number]

[facility] [place] [address]

                                         

 

                          [facility]     [address] [id number]

[address] [place] [address], [place] [address]

                                         

 

                          [facility]     [address] [id number]

[address] [place] [address], [place] [address]

                                         

 

                          [facility]     [address] [id number]

[address] [place] [address], [place] [address]

                                         

 

                          [facility]     [address] [id number]

[address] [place] [address], [place] [address]

                                         

 

                          [facility]     [address] [id number]

[address] [place] [address], [place] [address]

                                         

 

                          [facility]     [address] [id number]

[address] [place] [address], [place] [address]

                                         

 

                          [facility]     [address] [id number]

[address] [place] [address], [place] [address]

                                         

 

                          [facility]     [address] [id number]

[address] [place] [address], [place] [address]

                                         

 

                          [facility]     [address] [id number]

[address] [place] [address], [place] [address]

                                         

 

                          [facility]     [address] [id number]

[address] [place] [address], [place] [address]

                                         

 

                          [facility]     [address] [id number]

[address] [place] [address], [place] [address]

                                         

 

                          [facility]     [address] [id number]

[address] [place] [address], [place] [address]

                          ,                

 

                          [facility]     [address] [id number]

[address] [place] [address], [place] [address]

                                         

 

                          [facility]     [address] [id number]

[address] [place] [address], [place] [address]

                          ,                

 

                          [facility]     [address] [id number]

[address] [place] [address], [place] [address]

                          ,                

 

                          [facility]     [address] [id number]

[address] [place] [address], [place] [address]

                                         

 

                          [facility]     [address] [id number]

[address] [place] [address], [place] [address]

                                         

 

                          [facility]     [address] [id number]

[address] [place] [address], [place] [address]

                                         

 

                          [facility]     [address] [id number]

[address] [place] [address], [place] [address]

                                         

 

                          [facility]     [address] [id number]

[address] [place] [address], [place] [address]

                                         

 

                          [facility]     [address] [id number]

[address] [place] [address], [place] [address]

                                         

 

                          [facility]     [address] [id number]

[address] [place] [address], [place] [address]

                                         

 

                          [facility]     [address] [id number]

[address] [place] [address], [place] [address]

                                         

 

                          [facility]     [address] [id number]

[address] [place] [address], [place] [address]

                                         

 

                          [facility]     [address] [id number]

[address] [place] [address], [place] [address]

                          27 Mar, 2014               

 

                          [facility]     [address] [id number]

[address] [place] [address], [place] [address]

                          27 Mar, 2014               

 

                          [facility]     [address] [id number]

[address] [place] [address], [place] [address]

                                         

 

                          [facility]     [address] [id number]

[address] [place] [address], [place] [address]

                                         

 

                          [facility]     [address] [id number]

[address] [place] [address], [place] [address]

                                         

 

                          [facility]     [address] [id number]

[address] [place] [address], [place] [address]

                                         

 

                          [facility]     [address] [id number]

[address] [place] [address], [place] [address]

                          08 Oct, 2013               

 

                          [facility]     [address] [id number]

[address] [place] [address], [place] [address]

                          04 Oct, 2013               

 

                          [facility]     [address] [id number]

[address] [place] [address], [place] [address]

                          03 Oct, 2013               

 

                          [facility]     [address] [id number]

[address] [place] [address], [place] [address]

                          02 Oct, 2013               

 

                          [facility]     [address] [id number]

[address] [place] [address], [place] [address]

                          01 Oct, 2013               

 

                          [facility]     [address] [id number]

[address] [place] [address], [place] [address]

                          20 Sep, 2013               

 

                          [facility]     [address] [id number]

[address] [place] [address], [place] [address]

                          08 Aug, 2013               

 

                          [facility]     [address] [id number]

[address] [place] [address], [place] [address]

                          24 May, 2013               

 

                          [facility]     [address] [id number]

[address] [place] [address], [place] [address]

                          13 May, 2013               

 

                          [facility]     [address] [id number]

[address] [place] [address], [place] [address]

                                         

 

                          [facility]     [address] [id number]

[address] [place] [address], [place] [address]

                                         

 

                          [facility]     [address] [id number]

[address] [place] [address], [place] [address]

                                         

 

                          [facility]     [address] [id number]

[address] [place] [address]

                                         

 

                          [facility]     [address] [id number]

[address] [place] [address]

                          02 Oct, 2012               

 

                          [facility]     [address] [id number]

[address] [place] [address]

                          02 Oct, 2012               

 

                          [facility]     [address] [id number]

[address] [place] [address]

                          21 Sep, 2012               

 

                          [facility]     [address] [id number]

[address] [place] [address]

                          06 Aug, 2012               

 

                          [facility]     [address] [id number]

[address] [place] [address]

                          02 Aug, 2012               

 

                          [facility]     [address] [id number]

[address] [place] [address]

                                         

 

                          [facility]     [address] [id number]

[address] [place] [address]

                                         

 

                          [facility]     [address] [id number]

[address] [place] [address]

                          15 Eusebio, 2012               

 

                          [facility]     [address] [id number]

[address] [place] [address]

                                         

 

                          [facility]     [address] [id number]

[address] [place] [address]

                          10 Apr, 2012               

 

                          [facility]     [address] [id number]

[address] [place] [address]

                          22 Mar, 2012               

 

                          [facility]     [address] [id number]

[address] [place] [address]

                          20 Mar, 2012               

 

                          [facility]     [address] [id number]

[address] [place] [address]

                          14 Mar, 2012               

 

                          [facility]     [address] [id number]

[address] [place] [address]

                          17 2011               







IMMUNIZATIONS

No Known Immunizations



SOCIAL HISTORY

Never Assessed



REASON FOR VISIT





PLAN OF CARE





VITAL SIGNS





                    Height              69 in               2013

 

                    Weight              224.92 lbs          2013

 

                    Temperature         98.6 degrees Fahrenheit 2013

 

                    Heart Rate          86 bpm              2013

 

                    Respiratory Rate    18                  2013

 

                    Blood pressure systolic 120 mmHg            2013

 

                    Blood pressure diastolic 78 mmHg             2013







MEDICATIONS

Unknown Medications



RESULTS

No Results



PROCEDURES

No Known procedures



INSTRUCTIONS





MEDICATIONS ADMINISTERED

No Known Medications



MEDICAL (GENERAL) HISTORY





                    Type                Description         Date

 

                          Medical History           allergic rhinitis- rec's edwige donahue allergy injections from Dr Rojas office                            

 

                    Medical History     mood disorder        

 

                    Medical History     Leukocytosis- has been to hematology  

 

                    Medical History     Hyperlipidemia       

 

                          Medical History           Left leg pain- multiple imag

ing performed and ortho referral 

placed                                   

 

                          Medical History           TUBULAR ADENOMA AND GASTRITI

S ON COLONOSOCPY AUG 2016 -MULTIPLE 

POLYPS                                   

 

                    Medical History     Helicobacter pylori (H. pylori) infectio

n Hx  

 

                    Surgical History    cholecystectomy     

 

                    Surgical History     section    , 

 

                    Surgical History    tonsillectomy       2015

 

                    Surgical History    colonoscopy         2016

 

                    Surgical History    colonscopy          2017

 

                    Surgical History    Right Knee scope    2017

## 2020-06-23 NOTE — XMS REPORT
[facility]

                             Created on: 2019



Jasonwhit, April

External Reference #: [id number]

: 1975

Sex: Female



Demographics





                          Address                   [address] [place]  [address]

 

                          Preferred Language        Unknown

 

                          Marital Status            Unknown

 

                          Confucianist Affiliation     Unknown

 

                          Race                      Unknown

 

                          Ethnic Group              Unknown





Author





                          Author                    Darby SHARPENYA

 

                          [facility]

 

                          Address                   [address] [place]  [address]



 

                          Phone                     [phone]







Care Team Providers





                    Care Team Member Name Role                Phone

 

                    ANNETTE SHARPEWNYA       Unavailable         [phone]







PROBLEMS





          Type      Condition ICD9-CM Code FUM16-JS Code Onset Dates Condition S

tatus SNOMED 

Code

 

          Problem   Tubular adenoma of colon           D12.6               Activ

e    [id number]

 

          Problem   Duarte's neuroma of right foot           G57.61             

 Active    70504912

 

          Problem   History of leukocytosis           Z86.2               Active

    [id number]

 

          Problem   Non morbid obesity           E66.9               Active    4

[id number]

 

          Problem   Mixed hyperlipidemia           E78.2               Active   

 [id number]

 

          Problem   Seasonal allergic rhinitis due to pollen           J30.1    

           Active    89946345

 

                          Problem                   Type 2 diabetes mellitus wit

hout complication, without long-term current

use of insulin              E11.9                     Active       [id number]

 

          Problem   Anxiety             F41.9               Active    51915411

 

           Problem    Seasonal allergic rhinitis due to other allergic trigger  

          J30.89                

Active                                  [id number]

 

          Problem   Dysthymic disorder           F34.1               Active    7

4281282

 

          Problem   Arthritis           M19.90              Active    8884926







ALLERGIES

No Information



ENCOUNTERS





                Encounter       Location        Date            Diagnosis

 

                          [place] [address] [address] [facility] [id number]

[address] [place] [address]

                                        Type 2 diabetes mellitus wit

hout complication, without long-term 

current use of insulin E11.9

 

                          [facility]     [address] [facility] [id number]

[address] [place] [address]

                                         

 

                          [facility]     [address] [facility] [id number]

[address] [place] [address]

                                        Type 2 diabetes mellitus wit

hout complication, without long-term 

current use of insulin E11.9

 

                          [facility]     [address] [facility] [id number]

[address] [place] [address]

                                        Weight loss R63.4

 

                          [place] [address] [address] [facility] [id number]

[address] [place] [address]

                          31 May, 2019              Type 2 diabetes mellitus wit

hout complication, without long-term 

current use of insulin E11.9

 

                          [place] [address] [place] [id number]

[address] [place] [address]

                          31 May, 2019              Type 2 diabetes mellitus wit

hout complication, without long-term 

current use of insulin E11.9 and Non morbid obesity E66.9

 

                          [facility] [place] [address] 

[address]                14 May, 2019              Seasonal allergic rhinitis d

ue to pollen J30.1 and Sore 

throat J02.9

 

                          [place] [address] 

[address]                              Arthritis M19.90

 

                          [place] [address] [address]

                                        Seasonal allergic rhinitis d

ue to other allergic trigger J30.89 and

Dysthymic disorder F34.1

 

                          [place] [address] [address]

                          05 Dec, 2018              Bilateral otitis media with 

effusion H65.93 and Anxiety F41.9

 

                          [place] [address] [address]

                                        Type 2 diabetes mellitus wit

hout complication, without long-term 

current use of insulin E11.9

 

                          [place] [address] [address] [place] [id number]

[address] [place] [address]

                                        Pharyngitis due to Streptoco

ccus species J02.0

 

                          [place] [address] [address]

                                         

 

                          [facility] [place] [address] 

[address]                10 Jul, 2018              Fever, unspecified fever cau

se R50.9 and Lymphadenopathy

R59.1

 

                          [facility] [place] [address] [place] 

[address]                              Pharyngitis due to other org

anism J02.8

 

                          [facility]     [address] [place] [id number]

[address] [place] [address]

                                         

 

                          [facility]     [address] [facility] [id number]

[address] [place] [address]

                                        Type 2 diabetes mellitus wit

hout complication, without long-term 

current use of insulin E11.9 and Other eczema L30.8

 

                          [facility]  [address] [facility] [id number]

[address] [place] [address] 

[address]                              Acute pain of left shoulder 

M25.512

 

                          [place] [address] [place] [id number] [place] [address]

                                         

 

                          [place] [address] [address]

                          15 2018              Type 2 diabetes mellitus wit

hout complication, without long-term 

current use of insulin E11.9

 

                          [place] [address] [place] [id number] [place] [address]

                                        Type 2 diabetes mellitus wit

hout complication, without long-term 

current use of insulin E11.9 ; Tubular adenoma of colon D12.6 ; Mixed 
hyperlipidemia E78.2 ; History of leukocytosis Z86.2 and Screening breast 
examination Z12.31

 

                          [place] [address] [address]

                                        Metatarsalgia of right foot 

M77.41 and Type 2 diabetes mellitus 

without complication, without long-term current use of insulin E11.9

 

                          [place] [address] [place] [id number]

[address] [place] [address]

                                        Capsulitis M77.9 and Metatar

salgia of right foot M77.41

 

                          [place] [address] [place] [id number] [place] [address]

                          08 Sep, 2017              Capsulitis M77.9

 

                          [place] [address] [address] [place] [address]

                          06 Sep, 2017               

 

                          [facility]     [address] [facility] [id number]

[address] [place] [address]

                          03 Aug, 2017              Type 2 diabetes mellitus wit

hout complication, without long-term 

current use of insulin E11.9 ; Tubular adenoma of colon D12.6 and Mixed 
hyperlipidemia E78.2

 

                          [facility]     [address] [place] [id number]

[address] [place] [address]

                                        Metatarsalgia of right foot 

M77.41 and Capsulitis M77.9

 

                          [facility]     [address] [place] [id number]

[address] [place] [address]

                                         

 

                          [facility] [place] [facility]  [address] [place] [id number] [place] [address] 

[address]                              Duarte's neuroma of right fo

ot G57.61

 

                          [place] [address] [place] [id number]

[address] [place] [address]

                                        Mixed hyperlipidemia E78.2 a

nd Type 2 diabetes mellitus without 

complication, without long-term current use of insulin E11.9

 

                          [place] [address] [address]

                          10 Brandon, 2017              Type 2 diabetes mellitus wit

hout complication, without long-term 

current use of insulin E11.9 ; Tubular adenoma of colon D12.6 and Mixed 
hyperlipidemia E78.2

 

                          [place] [address] [place] [id number]

[address] [place] [address]

                          22 Dec, 2016              Mixed hyperlipidemia E78.2

 

                          [place] [address] [place] [id number]

[address] [place] [address]

                                         

 

                          [place] [address] [place] [id number]

[address] [place] [address]

                                        Mixed hyperlipidemia E78.2

 

                          [place] [address] [place] [id number]

[address] [place] [address]

                                        Mixed hyperlipidemia E78.2

 

                          [place] [address] [place] [id number]

[address] [place] [address]

                          08 Sep, 2016               

 

                          [facility]     [address] [facility] [id number]

[address] [place] [address]

                          10 Aug, 2016              Type 2 diabetes mellitus wit

hout complication, without long-term 

current use of insulin E11.9 ; Helicobacter pylori (H. pylori) infection A04.8 
and Mixed hyperlipidemia E78.2

 

                          [facility]     [address] [place] [id number] [place] [address]

                          08 Aug, 2016              Type 2 diabetes mellitus wit

hout complication, without long-term 

current use of insulin E11.9

 

                          [place] [address] [place] [id number] [place] [address]

                          03 Aug, 2016              Type 2 diabetes mellitus wit

hout complication, without long-term 

current use of insulin E11.9

 

                          [place] [address] [place] [id number] [place] [address]

                          02 Aug, 2016              Dyspepsia R10.13 ; Upper abd

ominal pain R10.10 ; Bowel habit 

changes R19.4 ; History of leukocytosis Z86.2 and Helicobacter pylori (H. 
pylori) infection A04.8

 

                          [facility] [place] [facility]  [address] 

[address]                27 May, 2016              Environmental allergies Z91.

09

 

                          [facility]     [address] [place] [id number]

[address] [place] [address]

                                        Left leg pain M79.605 ; Loca

lized swelling of lower leg R22.40 and 

Upper respiratory infection J06.9

 

                          [facility]     [address] [facility] [id number]

[address] [place] [address]

                          28 Dec, 2015               

 

                          [place] [address] [address]

                          21 Dec, 2015              Left leg pain M79.605

 

                          [facility]     [address] [place] [id number]

[address] [place] [address]

                          10 Dec, 2015              Left leg pain M79.605 and Lo

calized swelling of lower leg R22.40

 

                          [facility]     [address] [facility] [id number]

[address] [place] [address]

                          01 Dec, 2015              Left leg pain M79.605

 

                          [facility]     [address] [place] [id number]

[address] [place] [address]

                                        Encounter for immunization Z

23

 

                          [facility]     [address] [place] [id number] [place] [address]

                          12 Oct, 2015              Bronchitis J40

 

                          [facility]     [address] [place] [id number]

[address] [place] [address]

                          30 Sep, 2015              Physical exam, pre-employmen

t V70.5 ; Encounter for PPD test V74.1 

and Hyperlipidemia 272.4

 

                          [facility]     [address] [place] [id number]

[address] [place] [address]

                          21 Sep, 2015               

 

                          [facility]     [address] [place] [id number] [place] [address]

                          21 Sep, 2015               

 

                          [facility]     [address] [place] [id number]

[address] [place] [address]

                          09 Sep, 2015               

 

                          [facility]     [address] [place] [id number]

[address] [place] [address]

                          04 Sep, 2015              Elevated blood sugar 790.29

 

                          [facility]     [address] [place] [id number]

[address] [place] [address]

                          03 Sep, 2015              Elevated blood sugar 790.29

 

                          [facility]     [address] [place] [id number]

[address] [place] [address]

                          03 Sep, 2015              Palpitations 785.1

 

                          [facility]     [address] [place] [id number]

[address] [place] [address]

                          01 Sep, 2015              Palpitations 785.1

 

                          [facility]     [address] [place] [id number]

[address] [place] [address]

                                         

 

                          [facility]     [address] [place] [id number]

[address] [place] [address]

                          28 May, 2015              Hand pain, right 729.5 and D

epression with anxiety 300.4

 

                          [facility]     [address] [id number]

[address] [place] [address], [place] [address]

                          14 2015               

 

                          [facility]     [address] [id number]

[address] [place] [address], [place] [address]

                                         

 

                          [facility]     [address] [id number]

[address] [place] [address], [place] [address]

                          05 Mar, 2015               

 

                          [facility]     [address] [id number]

[address] [place] [address], [place] [address]

                          05 Mar, 2015               

 

                          [facility]     [address] [id number]

[address] [place] [address], [place] [address]

                                         

 

                          [facility]     [address] [id number]

[address] [place] [address], [place] [address]

                                         

 

                          [facility]     [address] [id number]

[address] [place] [address], [place] [address]

                          10 Feb, 2015               

 

                          [facility]     [address] [id number]

[address] [place] [address]

                          10 Feb, 2015               

 

                          [facility]     [address] [id number]

[address] [place] [address], [place] [address]

                                         

 

                          [facility]     [address] [id number]

[address] [place] [address]

                                         

 

                          [facility]     [address] [id number]

[address] [place] [address]

                                         

 

                          [facility]     [address] [id number]

[address] [place] [address]

                                         

 

                          [facility]     [address] [id number]

[address] [place] [address], [place] [address]

                                         

 

                          [facility]     [address] [id number]

[address] [place] [address]

                                         

 

                          [facility]     [address] [id number]

[address] [place] [address], [place] [address]

                                         

 

                          [facility]     [address] [id number]

[address] [place] [address], [place] [address]

                          17 2015               

 

                          [facility]     [address] [id number]

[address] [place] [address], [place] [address]

                                         

 

                          [facility]     [address] [id number]

[address] [place] [address], [place] [address]

                                         

 

                          [facility]     [address] [id number]

[address] [place] [address], [place] [address]

                                         

 

                          [facility]     [address] [id number]

[address] [place] [address], [place] [address]

                                         

 

                          [facility]     [address] [id number]

[address] [place] [address], [place] [address]

                          15 [place], 2015               

 

                          [facility]     [address] [id number]

[address] [place] [address], [place] [address]

                          15 [place], 2015               

 

                          [facility]     [address] [id number]

[address] [place] [address], [place] [address]

                                         

 

                          [facility]     [address] [id number]

[address] [place] [address], [place] [address]

                                         

 

                          [facility]     [address] [id number]

[address] [place] [address], [place] [address]

                          15 Oct, 2014               

 

                          [facility]     [address] [id number]

[address] [place] [address], [place] [address]

                          15 Oct, 2014               

 

                          [facility]     [address] [id number]

[address] [place] [address], [place] [address]

                          14 Oct, 2014               

 

                          [facility]     [address] [id number]

[address] [place] [address], [place] [address]

                          14 Oct, 2014               

 

                          [facility]     [address] [id number]

[address] [place] [address], [place] [address]

                          07 Oct, 2014               

 

                          [facility]     [address] [id number]

[address] [place] [address], [place] [address]

                          07 Oct, 2014               

 

                          [facility]     [address] [id number]

[address] [place] [address], [place] [address]

                          12 Sep, 2014               

 

                          [facility]     [address] [id number]

[facility] [place] [address]

                          12 Sep,                

 

                          [facility]     [address] [id number]

[facility] [place] [address]

                          04 Sep,                

 

                          [facility]     [address] [id number]

[facility] [place] [address]

                          03 Sep,                

 

                          [facility]     [address] [id number]

[address] [place] [address], [place] [address]

                          03 Sep,                

 

                          [facility]     [address] [id number]

[address] [place] [address], [place] [address]

                          02 Sep,                

 

                          [facility]     [address] [id number]

[address] [place] [address], [place] [address]

                          02 Sep, 2014               

 

                          [facility]     [address] [id number]

[address] [place] [address], [place] [address]

                          30 Aug, 2014               

 

                          [facility]     [address] [id number]

[address] [place] [address], [place] [address]

                          30 Aug, 2014               

 

                          [facility]     [address] [id number]

[address] [place] [address], [place] [address]

                          19 Aug, 2014               

 

                          [facility]     [address] [id number]

[address] [place] [address], [place] [address]

                          19 Aug, 2014               

 

                          [facility]     [address] [id number]

[address] [place] [address], [place] [address]

                          14 Aug, 2014               

 

                          [facility]     [address] [id number]

[address] [place] [address], [place] [address]

                          14 Aug, 2014               

 

                          [facility]     [address] [id number]

[address] [place] [address], [place] [address]

                          13 Aug, 2014               

 

                          [facility]     [address] [id number]

[address] [place] [address], [place] [address]

                          13 Aug, 2014               

 

                          [facility]     [address] [id number]

[address] [place] [address], [place] [address]

                                         

 

                          [facility]     [address] [id number]

[address] [place] [address], [place] [address]

                                         

 

                          [facility]     [address] [id number]

[facility] [place] [address]

                                         

 

                          [facility]     [address] [id number]

[address] [place] [address], [place] [address]

                                         

 

                          [facility]     [address] [id number]

[facility] [place] [address]

                                         

 

                          [facility]     [address] [id number]

[address] [place] [address], [place] [address]

                                         

 

                          [facility]     [address] [id number]

[address] [place] [address], [place] [address]

                                         

 

                          [facility]     [address] [id number]

[address] [place] [address], [place] [address]

                                         

 

                          [facility]     [address] [id number]

[address] [place] [address], [place] [address]

                                         

 

                          [facility]     [address] [id number]

[address] [place] [address], [place] [address]

                                         

 

                          [facility]     [address] [id number]

[address] [place] [address], [place] [address]

                                         

 

                          [facility]     [address] [id number]

[address] [place] [address], [place] [address]

                                         

 

                          [facility]     [address] [id number]

[address] [place] [address], [place] [address]

                                         

 

                          [facility]     [address] [id number]

[address] [place] [address], [place] [address]

                          ,                

 

                          [facility]     [address] [id number]

[address] [place] [address], [place] [address]

                                         

 

                          [facility]     [address] [id number]

[address] [place] [address], [place] [address]

                                         

 

                          [facility]     [address] [id number]

[address] [place] [address], [place] [address]

                          ,                

 

                          [facility]     [address] [id number]

[address] [place] [address], [place] [address]

                                         

 

                          [facility]     [address] [id number]

[address] [place] [address], [place] [address]

                                         

 

                          [facility]     [address] [id number]

[address] [place] [address], [place] [address]

                                         

 

                          [facility]     [address] [id number]

[address] [place] [address], [place] [address]

                                         

 

                          [facility]     [address] [id number]

[address] [place] [address], [place] [address]

                                         

 

                          [facility]     [address] [id number]

[address] [place] [address], [place] [address]

                                         

 

                          [facility]     [address] [id number]

[address] [place] [address], [place] [address]

                                         

 

                          [facility]     [address] [id number]

[address] [place] [address], [place] [address]

                                         

 

                          [facility]     [address] [id number]

[address] [place] [address], [place] [address]

                                         

 

                          [facility]     [address] [id number]

[address] [place] [address], [place] [address]

                                         

 

                          [facility]     [address] [id number]

[address] [place] [address], [place] [address]

                          27 Mar, 2014               

 

                          [facility]     [address] [id number]

[address] [place] [address], [place] [address]

                          27 Mar, 2014               

 

                          [facility]     [address] [id number]

[address] [place] [address], [place] [address]

                                         

 

                          [facility]     [address] [id number]

[address] [place] [address], [place] [address]

                                         

 

                          [facility]     [address] [id number]

[address] [place] [address], [place] [address]

                                         

 

                          [facility]     [address] [id number]

[address] [place] [address], [place] [address]

                                         

 

                          [facility]     [address] [id number]

[address] [place] [address], [place] [address]

                          08 Oct, 2013               

 

                          [facility]     [address] [id number]

[address] [place] [address], [place] [address]

                          04 Oct, 2013               

 

                          [facility]     [address] [id number]

[address] [place] [address], [place] [address]

                          03 Oct, 2013               

 

                          [facility]     [address] [id number]

[address] [place] [address], [place] [address]

                          02 Oct, 2013               

 

                          [facility]     [address] [id number]

[address] [place] [address], [place] [address]

                          01 Oct, 2013               

 

                          [facility]     [address] [id number]

[address] [place] [address], [place] [address]

                          20 Sep, 2013               

 

                          [facility]     [address] [id number]

[address] [place] [address], [place] [address]

                          08 Aug, 2013               

 

                          [facility]     [address] [id number]

[address] [place] [address], [place] [address]

                          24 May, 2013               

 

                          [facility]     [address] [id number]

[address] [place] [address], [place] [address]

                          13 May, 2013               

 

                          [facility]     [address] [id number]

[address] [place] [address], [place] [address]

                                         

 

                          [facility]     [address] [id number]

[address] [place] [address], [place] [address]

                                         

 

                          [facility]     [address] [id number]

[address] [place] [address], [place] [address]

                                         

 

                          [facility]     [address] [id number]

[address] [place] [address], [place] [address]

                                         

 

                          [facility]     [address] [id number]

[address] [place] [address], [place] [address]

                          02 Oct, 2012               

 

                          [facility]     [address] [id number]

[address] [place] [address], [place] [address]

                          02 Oct, 2012               

 

                          [facility]     [address] [id number]

[address] [place] [address]

                          21 Sep, 2012               

 

                          [facility]     [address] [id number]

[address] [place] [address]

                          06 Aug, 2012               

 

                          [facility]     [address] [id number]

[address] [place] [address]

                          02 Aug, 2012               

 

                          [facility]     [address] [id number]

[address] [place] [address]

                                         

 

                          [facility]     [address] [id number]

[address] [place] [address]

                                         

 

                          [facility]     [address] [id number]

[address] [place] [address]

                          15 Eusebio, 2012               

 

                          [facility]     [address] [id number]

[address] [place] [address]

                                         

 

                          [facility]     [address] [id number]

[address] [place] [address]

                          10 Apr, 2012               

 

                          [facility]     [address] [id number]

[address] [place] [address]

                          22 Mar, 2012               

 

                          [facility]     [address] [id number]

[address] [place] [address]

                          20 Mar, 2012               

 

                          [facility]     [address] [id number]

[address] [place] [address]

                          14 Mar, 2012               

 

                          [facility]     [address] [id number]

[address] [place] [address]

                                         







IMMUNIZATIONS

No Known Immunizations



SOCIAL HISTORY

Never Assessed



REASON FOR VISIT





PLAN OF CARE





VITAL SIGNS





                    Height              69 in               2015-01-15

 

                    Weight              223.19 lbs          2015-01-15

 

                    Temperature         98.6 degrees Fahrenheit 2015-01-15

 

                    Heart Rate          70 bpm              2015-01-15

 

                    Respiratory Rate    20                  2015-01-15

 

                    Blood pressure systolic 128 mmHg            2015-01-15

 

                    Blood pressure diastolic 76 mmHg             2015-01-15







MEDICATIONS

Unknown Medications



RESULTS

No Results



PROCEDURES





                Procedure       Date Ordered    Result          Body Site

 

                COMPLETE CBC W/AUTO DIFF WBC Brandon 15, 2015                     

 

                GLYCATED HEMOGLOBIN TEST Brandon 15, 2015                     

 

                COMPREHEN METABOLIC PANEL Brandon 15, 2015                     

 

                US EXAM, PELVIC, COMPLETE Brandon 15, 2015                     

 

                VENIPUNCT, ROUTINE* Brandon 15, 2015                     







INSTRUCTIONS





MEDICATIONS ADMINISTERED

No Known Medications



MEDICAL (GENERAL) HISTORY





                    Type                Description         Date

 

                          Medical History           allergic rhinitis- rec's edwige donahue allergy injections from Dr Rojas office                            

 

                    Medical History     mood disorder        

 

                    Medical History     Leukocytosis- has been to hematology  

 

                    Medical History     Hyperlipidemia       

 

                          Medical History           Left leg pain- multiple imag

ing performed and ortho referral 

placed                                   

 

                          Medical History           TUBULAR ADENOMA AND GASTRITI

S ON COLONOSOCPY AUG 2016 -MULTIPLE 

POLYPS                                   

 

                    Medical History     Helicobacter pylori (H. pylori) infectio

n Hx  

 

                    Surgical History    cholecystectomy     2006

 

                    Surgical History     section    , 

 

                    Surgical History    tonsillectomy       2015

 

                    Surgical History    colonoscopy         2016

 

                    Surgical History    colonscopy          2017

 

                    Surgical History    Right Knee scope    2017

## 2020-06-23 NOTE — XMS REPORT
[facility]

                             Created on: 2019



Kate April

External Reference #: [id number]

: 1975

Sex: Female



Demographics





                          Address                   [address] [place]  [address]

 

                          Preferred Language        Unknown

 

                          Marital Status            Unknown

 

                          Zoroastrianism Affiliation     Unknown

 

                          Race                      Unknown

 

                          Ethnic Group              Unknown





Author





                          Author                    Migration, April Doctor

 

                          Organization              [facility]

 

                          Address                   Unknown

 

                          Phone                     Unavailable







Care Team Providers





                    Care Team Member Name Role                Phone

 

                    Migration,  Doctor  Unavailable         Unavailable







PROBLEMS





          Type      Condition ICD9-CM Code UNO37-QS Code Onset Dates Condition S

tatus SNOMED 

Code

 

          Problem   Tubular adenoma of colon           D12.6               Activ

e    [id number]

 

          Problem   Duarte's neuroma of right foot           G57.61             

 Active    23819456

 

          Problem   History of leukocytosis           Z86.2               Active

    [id number]

 

          Problem   Non morbid obesity           E66.9               Active    4

[id number]

 

          Problem   Mixed hyperlipidemia           E78.2               Active   

 [id number]

 

          Problem   Seasonal allergic rhinitis due to pollen           J30.1    

           Active    27349798

 

                          Problem                   Type 2 diabetes mellitus wit

hout complication, without long-term current

use of insulin              E11.9                     Active       [id number]

 

          Problem   Anxiety             F41.9               Active    18378804

 

           Problem    Seasonal allergic rhinitis due to other allergic trigger  

          J30.89                

Active                                  [id number]

 

          Problem   Dysthymic disorder           F34.1               Active    7

2021290

 

          Problem   Arthritis           M19.90              Active    3635747







ALLERGIES





             Substance    Reaction     Event Type   Date         Status

 

             Penicillins  Unknown      Non Drug Allergy  Active







ENCOUNTERS





                Encounter       Location        Date            Diagnosis

 

                          [facility]     [address] [id number]

[address] [place] [address]

                          31 May, 2019              Type 2 diabetes mellitus wit

hout complication, without long-term 

current use of insulin E11.9

 

                          [facility]     [address] [id number]

[address] [place] [address]

                          31 May, 2019              Type 2 diabetes mellitus wit

hout complication, without long-term 

current use of insulin E11.9 and Non morbid obesity E66.9

 

                          [facility]  [id number]

[address] [place] [address] 

[address]                14 May, 2019              Seasonal allergic rhinitis d

ue to pollen J30.1 and Sore 

throat J02.9

 

                          [facility]  [address] [id number]

[address] [place] [address] 

[address]                              Arthritis M19.90

 

                          [place] [address] [address]

                                        Seasonal allergic rhinitis d

ue to other allergic trigger J30.89 and

Dysthymic disorder F34.1

 

                          [place] [address] [address]

                          05 Dec, 2018              Bilateral otitis media with 

effusion H65.93 and Anxiety F41.9

 

                          [place] [address] [address]

                                        Type 2 diabetes mellitus wit

hout complication, without long-term 

current use of insulin E11.9

 

                          [place] [address] [address]

                                        Pharyngitis due to Streptoco

ccus species J02.0

 

                          [address] [address]

                                         

 

                          [facility]  [facility] [address] 

[address]                10 Jul, 2018              Fever, unspecified fever cau

se R50.9 and Lymphadenopathy

R59.1

 

                          [facility] [place] [address] 

[address]                              Pharyngitis due to other org

anism J02.8

 

                          [address] [address]

                                         

 

                          [place] [address] [address]

                                        Type 2 diabetes mellitus wit

hout complication, without long-term 

current use of insulin E11.9 and Other eczema L30.8

 

                          [facility] [place] [address] 

[address]                28 2018              Acute pain of left shoulder 

M25.512

 

                          [address] [address]

                                         

 

                          [facility]     [address] [facility] [id number]

[address] [place] [address]

                          15 Feb, 2018              Type 2 diabetes mellitus wit

hout complication, without long-term 

current use of insulin E11.9

 

                          [facility]     [address] [id number]

[address] [place] [address]

                                        Type 2 diabetes mellitus wit

hout complication, without long-term 

current use of insulin E11.9 ; Tubular adenoma of colon D12.6 ; Mixed 
hyperlipidemia E78.2 ; History of leukocytosis Z86.2 and Screening breast 
examination Z12.31

 

                          [place] [address] [facility] [id number]

[address] [place] [address]

                                        Metatarsalgia of right foot 

M77.41 and Type 2 diabetes mellitus 

without complication, without long-term current use of insulin E11.9

 

                          [place] [address] [facility] [id number]

[address] [place] [address]

                                        Capsulitis M77.9 and Metatar

salgia of right foot M77.41

 

                          [place] [address] [facility] [id number]

[address] [place] [address]

                          08 Sep, 2017              Capsulitis M77.9

 

                          [facility]     [address] [id number]

[address] [place] [address]

                          06 Sep, 2017               

 

                          [place] [address] [facility] [id number]

[address] [place] [address]

                          03 Aug, 2017              Type 2 diabetes mellitus wit

hout complication, without long-term 

current use of insulin E11.9 ; Tubular adenoma of colon D12.6 and Mixed 
hyperlipidemia E78.2

 

                          [place] [address] [facility] [id number]

[address] [place] [address]

                                        Metatarsalgia of right foot 

M77.41 and Capsulitis M77.9

 

                          [facility]     [address] [id number]

[address] [place] [address]

                                         

 

                          [facility]  [address] [id number]

[address] [place] [address] 

[address]                              Duarte's neuroma of right fo

ot G57.61

 

                          [facility]     [address] [id number]

[address] [place] [address]

                                        Mixed hyperlipidemia E78.2 a

nd Type 2 diabetes mellitus without 

complication, without long-term current use of insulin E11.9

 

                          [facility]     [address] [id number]

[address] [place] [address]

                          10 Brandon, 2017              Type 2 diabetes mellitus wit

hout complication, without long-term 

current use of insulin E11.9 ; Tubular adenoma of colon D12.6 and Mixed 
hyperlipidemia E78.2

 

                          [place] [address] [id number]

[address] [place] [address]

                          22 Dec, 2016              Mixed hyperlipidemia E78.2

 

                          [place] [address] [facility] [id number]

[address] [place] [address]

                                         

 

                          [place] [address] [facility] [id number]

[address] [place] [address]

                                        Mixed hyperlipidemia E78.2

 

                          [place] [address] [id number]

[address] [place] [address]

                                        Mixed hyperlipidemia E78.2

 

                          [place] [address] [facility] [id number]

[address] [place] [address]

                          08 Sep, 2016               

 

                          [facility]     [address] [id number]

[address] [place] [address]

                          10 Aug, 2016              Type 2 diabetes mellitus wit

hout complication, without long-term 

current use of insulin E11.9 ; Helicobacter pylori (H. pylori) infection A04.8 
and Mixed hyperlipidemia E78.2

 

                          [facility]     [address] [id number]

[address] [place] [address]

                          08 Aug, 2016              Type 2 diabetes mellitus wit

hout complication, without long-term 

current use of insulin E11.9

 

                          [address] [address] [facility] [id number]

[address] [place] [address]

                          03 Aug, 2016              Type 2 diabetes mellitus wit

hout complication, without long-term 

current use of insulin E11.9

 

                          [facility]     [address] [id number]

[address] [place] [address]

                          02 Aug, 2016              Dyspepsia R10.13 ; Upper abd

ominal pain R10.10 ; Bowel habit 

changes R19.4 ; History of leukocytosis Z86.2 and Helicobacter pylori (H. 
pylori) infection A04.8

 

                          [facility]  [address] [id number]

[address] [place] [address] 

[address]                27 May, 2016              Environmental allergies Z91.

09

 

                          [place] [address] [facility] [id number]

[address] [place] [address]

                                        Left leg pain M79.605 ; Loca

lized swelling of lower leg R22.40 and 

Upper respiratory infection J06.9

 

                          [place] [address] [facility] [id number]

[address] [place] [address]

                          28 Dec, 2015               

 

                          [place] [id number]

[address] [place] [address]

                          21 Dec, 2015              Left leg pain M79.605

 

                          [place] [id number] [place] [address]

                          10 Dec, 2015              Left leg pain M79.605 and Lo

calized swelling of lower leg R22.40

 

                          [place] [id number]

[address] [place] [address]

                          01 Dec, 2015              Left leg pain M79.605

 

                          [place] [id number]

[address] [place] [address]

                                        Encounter for immunization Z

23

 

                          [place] [address] [facility] [id number]

[address] [place] [address]

                          12 Oct, 2015              Bronchitis J40

 

                          [place] [id number]

[address] [place] [address]

                          30 Sep, 2015              Physical exam, pre-employmen

t V70.5 ; Encounter for PPD test V74.1 

and Hyperlipidemia 272.4

 

                          [place] [id number]

[address] [place] [address]

                          21 Sep, 2015               

 

                          [facility]     [address] [id number]

[address] [place] [address]

                          21 Sep, 2015               

 

                          [facility]     [address] [id number]

[address] [place] [address]

                          09 Sep, 2015               

 

                          [facility]     [address] [facility] [id number]

[address] [place] [address]

                          04 Sep, 2015              Elevated blood sugar 790.29

 

                          [facility]     [address] [id number]

[address] [place] [address]

                          03 Sep, 2015              Elevated blood sugar 790.29

 

                          [facility]     [address] [id number]

[address] [place] [address]

                          03 Sep, 2015              Palpitations 785.1

 

                          [facility]     [address] [facility] [id number]

[address] [place] [address]

                          01 Sep, 2015              Palpitations 785.1

 

                          [facility]     [address] [facility] [id number]

[address] [place] [address]

                                         

 

                          [facility]     [address] [id number]

[address] [place] [address]

                          28 May, 2015              Hand pain, right 729.5 and D

epression with anxiety 300.4

 

                          [facility]     [address] [id number]

[address] [place] [address]

                                         

 

                          [facility]     [address] [id number]

[address] [place] [address]

                                         

 

                          [facility]     [address] [id number]

[address] [place] [address]

                          05 Mar, 2015               

 

                          [facility]     [address] [facility] [id number]

[address] [place] [address]

                          05 Mar, 2015               

 

                          [facility]     [address] [facility] [id number]

[address] [place] [address]

                                         

 

                          [facility]     [address] [facility] [id number]

[address] [place] [address]

                                         

 

                          [facility]     [address] [id number]

[address] [place] [address]

                          10 Feb, 2015               

 

                          [facility]     [address] [id number]

[address] [place] [address], [place] [address]

                          10 Feb, 2015               

 

                          [facility]     [address] [id number]

[address] [place] [address], [place] [address]

                                         

 

                          [facility]     [address] [id number]

[address] [place] [address], [place] [address]

                                         

 

                          [facility]     [address] [id number]

[address] [place] [address], [place] [address]

                                         

 

                          [facility]     [address] [id number]

[address] [place] [address], [place] [address]

                                         

 

                          [facility]     [address] [id number]

[address] [place] [address], [place] [address]

                                         

 

                          [facility]     [address] [id number]

[address] [place] [address], [place] [address]

                                         

 

                          [facility]     [address] [id number]

[address] [place] [address], [place] [address]

                                         

 

                          [facility]     [address] [id number]

[address] [place] [address], [place] [address]

                                         

 

                          [facility]     [address] [id number]

[address] [place] [address], [place] [address]

                                         

 

                          [facility]     [address] [id number]

[address] [place] [address], [place] [address]

                                         

 

                          [facility]     [address] [id number]

[address] [place] [address], [place] [address]

                                         

 

                          [facility]     [address] [id number]

[address] [place] [address], [place] [address]

                                         

 

                          [facility]     [address] [id number]

[address] [place] [address], [place] [address]

                          15 [place], 2015               

 

                          [facility]     [address] [id number]

[address] [place] [address], [place] [address]

                          15 2015               

 

                          [facility]     [address] [id number]

[address] [place] [address], [place] [address]

                          02 2015               

 

                          [facility]     [address] [id number]

[address] [place] [address], [place] [address]

                          02 2015               

 

                          [facility]     [address] [id number]

[address] [place] [address], [place] [address]

                          15 Oct, 2014               

 

                          [facility]     [address] [id number]

[address] [place] [address], [place] [address]

                          15 Oct, 2014               

 

                          [facility]     [address] [id number]

[address] [place] [address], [place] [address]

                          14 Oct, 2014               

 

                          [facility]     [address] [id number]

[address] [place] [address], [place] [address]

                          14 Oct, 2014               

 

                          [facility]     [address] [id number]

[address] [place] [address], [place] [address]

                          07 Oct, 2014               

 

                          [facility]     [address] [id number]

[address] [place] [address], [place] [address]

                          07 Oct, 2014               

 

                          [facility]     [address] [id number]

[address] [place] [address], [place] [address]

                          12 Sep,                

 

                          [facility]     [address] [id number]

[address] [place] [address], [place] [address]

                          12 Sep,                

 

                          [facility]     [address] [id number]

[address] [place] [address], [place] [address]

                          04 Sep,                

 

                          [facility]     [address] [id number]

[address] [place] [address], [place] [address]

                          03 Sep,                

 

                          [facility]     [address] [id number]

[address] [place] [address], [place] [address]

                          03 Sep,                

 

                          [facility]     [address] [id number]

[address] [place] [address], [place] [address]

                          02 Sep,                

 

                          [facility]     [address] [id number]

[address] [place] [address], [place] [address]

                          02 Sep, 2014               

 

                          [facility]     [address] [id number]

[address] [place] [address], [place] [address]

                          30 Aug, 2014               

 

                          [facility]     [address] [id number]

[address] [place] [address], [place] [address]

                          30 Aug, 2014               

 

                          [facility]     [address] [id number]

[address] [place] [address], [place] [address]

                          19 Aug, 2014               

 

                          [facility]     [address] [id number]

[address] [place] [address], [place] [address]

                          19 Aug, 2014               

 

                          [facility]     [address] [id number]

[address] [place] [address], [place] [address]

                          14 Aug, 2014               

 

                          [facility]     [address] [id number]

[address] [place] [address], [place] [address]

                          14 Aug, 2014               

 

                          [facility]     [address] [id number]

[address] [place] [address], [place] [address]

                          13 Aug, 2014               

 

                          [facility]     [address] [id number]

[address] [place] [address], [place] [address]

                          13 Aug, 2014               

 

                          [facility]     [address] [id number]

[address] [place] [address], [place] [address]

                                         

 

                          [facility]     [address] [id number]

[address] [place] [address], [place] [address]

                                         

 

                          [facility]     [address] [id number]

[address] [place] [address], [place] [address]

                                         

 

                          [facility]     [address] [id number]

[address] [place] [address], [place] [address]

                                         

 

                          [facility]     [address] [id number]

[address] [place] [address], [place] [address]

                                         

 

                          [facility]     [address] [id number]

[address] [place] [address], [place] [address]

                                         

 

                          [facility]     [address] [id number]

[address] [place] [address], [place] [address]

                                         

 

                          [facility]     [address] [id number]

[address] [place] [address], [place] [address]

                          ,                

 

                          [facility]     [address] [id number]

[address] [place] [address]

                          ,                

 

                          [facility]     [address] [id number]

[address] [place] [address], [place] [address]

                          ,                

 

                          [facility]     [address] [id number]

[address] [place] [address], [place] [address]

                          ,                

 

                          [facility]     [address] [id number]

[address] [place] [address], [place] [address]

                          ,                

 

                          [facility]     [address] [id number]

[address] [place] [address], [place] [address]

                                         

 

                          [facility]     [address] [id number]

[address] [place] [address], [place] [address]

                          ,                

 

                          [facility]     [address] [id number]

[address] [place] [address], [place] [address]

                                         

 

                          [facility]     [address] [id number]

[address] [place] [address], [place] [address]

                                         

 

                          [facility]     [address] [id number]

[address] [place] [address], [place] [address]

                                         

 

                          [facility]     [address] [id number]

[address] [place] [address], [place] [address]

                                         

 

                          [facility]     [address] [id number]

[address] [place] [address], [place] [address]

                                         

 

                          [facility]     [address] [id number]

[address] [place] [address], [place] [address]

                                         

 

                          [facility]     [address] [id number]

[address] [place] [address], [place] [address]

                                         

 

                          [facility]     [address] [id number]

[address] [place] [address], [place] [address]

                                         

 

                          [facility]     [address] [id number]

[address] [place] [address], [place] [address]

                                         

 

                          [facility]     [address] [id number]

[address] [place] [address], [place] [address]

                                         

 

                          [facility]     [address] [id number]

[address] [place] [address], [place] [address]

                                         

 

                          [facility]     [address] [id number]

[address] [place] [address], [place] [address]

                                         

 

                          [facility]     [address] [id number]

[address] [place] [address], [place] [address]

                                         

 

                          [facility]     [address] [id number]

[address] [place] [address], [place] [address]

                          27 Mar, 2014               

 

                          [facility]     [address] [id number]

[address] [place] [address], [place] [address]

                          27 Mar, 2014               

 

                          [facility]     [address] [id number]

[address] [place] [address], [place] [address]

                                         

 

                          [facility]     [address] [id number]

[address] [place] [address], [place] [address]

                                         

 

                          [facility]     [address] [id number]

[address] [place] [address], [place] [address]

                                         

 

                          [facility]     [address] [id number]

[address] [place] [address], [place] [address]

                                         

 

                          [facility]     [address] [id number]

[address] [place] [address], [place] [address]

                          08 Oct, 2013               

 

                          [facility]     [address] [id number]

[address] [place] [address], [place] [address]

                          04 Oct, 2013               

 

                          [facility]     [address] [id number]

[address] [place] [address], [place] [address]

                          03 Oct, 2013               

 

                          [facility]     [address] [id number]

[address] [place] [address], [place] [address]

                          02 Oct, 2013               

 

                          [facility]     [address] [id number]

[address] [place] [address], [place] [address]

                          01 Oct, 2013               

 

                          [facility]     [address] [id number]

[address] [place] [address], [place] [address]

                          20 Sep, 2013               

 

                          [facility]     [address] [id number]

[address] [place] [address], [place] [address]

                          08 Aug, 2013               

 

                          [facility]     [address] [id number]

[address] [place] [address], [place] [address]

                          24 May, 2013               

 

                          [facility]     [address] [id number]

[address] [place] [address], [place] [address]

                          13 May, 2013               

 

                          [facility]     [address] [id number]

[address] [place] [address], [place] [address]

                                         

 

                          [facility]     [address] [id number]

[address] [place] [address], [place] [address]

                                         

 

                          [facility]     [address] [id number]

[address] [place] [address], [place] [address]

                                         

 

                          [facility]     [address] [id number]

[address] [place] [address], [place] [address]

                                         

 

                          [facility]     [address] [id number]

[address] [place] [address], [place] [address]

                          02 Oct, 2012               

 

                          [facility]     [address] [id number]

[address] [place] [address], [place] [address]

                          02 Oct, 2012               

 

                          [facility]     [address] [id number]

[address] [place] [address], [place] [address]

                          21 Sep, 2012               

 

                          [facility]     [address] [id number]

[address] [place] [address], [place] [address]

                          06 Aug, 2012               

 

                          [facility]     [address] [id number]

[address] [place] [address], [place] [address]

                          02 Aug, 2012               

 

                          [facility]     [address] [id number]

[address] [place] [address], [place] [address]

                                         

 

                          [facility]     [address] [id number]

[address] [place] [address], [place] [address]

                                         

 

                          [facility]     [address] [id number]

[address] [place] [address], [place] [address]

                          15 Eusebio, 2012               

 

                          [facility]     [address] [id number]

[address] [place] [address]

                                         

 

                          [facility]     [address] [id number]

[address] [place] [address]

                          10 Apr, 2012               

 

                          [facility]     [address] [id number]

[address] [place] [address]

                          22 Mar, 2012               

 

                          [facility]     [address] [id number]

[address] [place] [address]

                          20 Mar, 2012               

 

                          [facility]     [address] [id number]

[address] [place] [address]

                          14 Mar, 2012               

 

                          [facility]     [address] [id number]

[address] [place] [address]

                                         







IMMUNIZATIONS

No Known Immunizations



SOCIAL HISTORY

Never Assessed



REASON FOR VISIT

[facility]



PLAN OF CARE





VITAL SIGNS





MEDICATIONS





        Medication Instructions Dosage  Frequency Start Date End Date Duration S

tatus

 

        Bactrim -160 mg         1 Tablet by Po route 2 times per day      

                    

Active

 

                    Chantix 1 mg                            0.5 mg by Oral route

 1 time per day for 3 d, then take 0.5 mg BID

                          15 Brandon, 2015                           Active

 

                    Triamcinolone Acetonide 0.1 %                     apply a th

in layer to the affected area(s)  by 

Topical route 3 times per day dispense 60 gram tube                 

                        Active

 

                    Provera 10 mg                           1 tablet by Oral rou

te 1 time per day for 10 days DAY 15-25 OF 

CYCLE                     14 Oct, 2014                           Active

 

                    PredniSONE 20 mg                        2 tablet by Oral rou

te 1 time per day for 5 day(s) Take both 

tabs at the same time                                         Active

 

             Allegra Allergy 180 mg              take 1 tablet (180 mg) by oral 

route once daily              03 

Sep, 2014                                                   Active

 

                    Azithromycin 250 mg                     2 Tablet by Oral rou

te on day 1 then take  1 daily for 4 

days                      20 Sep, 2013                           Active

 

             PredniSONE 10 mg              1 Tablet 2 times per day for 5 days T

hemal at 8 am and noon.              

12 Sep, 2014                                                Active

 

                    Promethazine-Codeine 6.25-10 mg/5 mL                     5 m

L by Oral route every 4 hours for 5 

day(s)                    02 Oct, 2012                           Active

 

                    Flonase 50 mcg/actuation                     take 1 sprays b

y Nasal route 2 times per dayin each 

nostril                                              Active







RESULTS

No Results



PROCEDURES

No Known procedures



INSTRUCTIONS





MEDICATIONS ADMINISTERED

No Known Medications



MEDICAL (GENERAL) HISTORY





                    Type                Description         Date

 

                          Medical History           allergic rhinitis- rec's edwige donahue allergy injections from Dr Rojas office                            

 

                    Medical History     mood disorder        

 

                    Medical History     Leukocytosis- has been to hematology  

 

                    Medical History     Hyperlipidemia       

 

                          Medical History           Left leg pain- multiple imag

ing performed and ortho referral 

placed                                   

 

                          Medical History           TUBULAR ADENOMA AND GASTRITI

S ON COLONOSOCPY AUG 2016 -MULTIPLE 

POLYPS                                   

 

                    Medical History     Helicobacter pylori (H. pylori) infectio

n Hx  

 

                    Surgical History    cholecystectomy     

 

                    Surgical History     section    , 

 

                    Surgical History    tonsillectomy       2015

 

                    Surgical History    colonoscopy         2016

 

                    Surgical History    colonscopy          2017

 

                    Surgical History    Right Knee scope    2017

## 2020-06-23 NOTE — XMS REPORT
[facility]

                             Created on: 2020



Kate April

External Reference #: [id number]

: 1975

Sex: Female



Demographics





                          Address                   [address] [place]  [address]

 

                          Preferred Language        Unknown

 

                          Marital Status            Unknown

 

                          Episcopal Affiliation     Unknown

 

                          Race                      Unknown

 

                          Ethnic Group              Unknown





Author





                          Author                    ANNEMARIE April VENECIA

 

                          [facility]

 

                          Address                   [address] [place]  [address]



 

                          Phone                     [phone]







Care Team Providers





                    Care Team Member Name Role                Phone

 

                    ANNETTE SHARPEWNYA       Unavailable         [phone]







PROBLEMS





          Type      Condition ICD9-CM Code HMC29-AJ Code Onset Dates Condition S

tatus SNOMED 

Code

 

          Problem   Duarte's neuroma of right foot           G57.61             

 Active    24992479

 

          Problem   History of leukocytosis           Z86.2               Active

    [id number]

 

          Problem   Anxiety             F41.9               Active    26852991

 

                          Problem                   Type 2 diabetes mellitus wit

hout complication, without long-term current

use of insulin              E11.9                     Active       [id number]

 

          Problem   Seasonal allergic rhinitis due to pollen           J30.1    

           Active    03427868

 

          Problem   Tubular adenoma of colon           D12.6               Activ

e    169967780

 

          Problem   GERD with esophagitis           K21.0               Active  

  [id number]

 

          Problem   Mixed hyperlipidemia           E78.2               Active   

 524697436

 

           Problem    Seasonal allergic rhinitis due to other allergic trigger  

          J30.89                

Active                                  [id number]

 

          Problem   Dysthymic disorder           F34.1               Active    7

7605541

 

          Problem   Arthritis           M19.90              Active    5007731

 

          Problem   Non morbid obesity           E66.9               Active    4

[id number]







ALLERGIES

No Information



ENCOUNTERS





                Encounter       Location        Date            Diagnosis

 

                    [place] [id number] [place] [address] 

Feb, 2020                                

 

                    [place] [address] [address]                                Type 2 diabetes mellitus without complic

ation, without long-term 

current use of insulin E11.9 and GERD with esophagitis K21.0

 

                    [place] [address] [address] 01 

Oct, 2019                               Encounter for immunization Z23

 

                    [place] [address] [address]                                Type 2 diabetes mellitus without complic

ation, without long-term 

current use of insulin E11.9

 

                    [place] [address] [address]                                 

 

                    [place] [address] [address]                                Type 2 diabetes mellitus without complic

ation, without long-term 

current use of insulin E11.9

 

                    [place] [address] [address]                                Weight loss R63.4

 

                    [place] [address] [address] 31 

May, 2019                               Type 2 diabetes mellitus without complic

ation, without long-term 

current use of insulin E11.9

 

                    [place] [address] [address] 31 

May, 2019                               Type 2 diabetes mellitus without complic

ation, without long-term 

current use of insulin E11.9 and Non morbid obesity E66.9

 

                          [facility] [place] [address] 

[address]                14 May, 2019              Seasonal allergic rhinitis d

ue to pollen J30.1 and Sore 

throat J02.9

 

                          [place] [id number]

[address] [place] [address] 

[address]                              Arthritis M19.90

 

                    [address] [address]                                Seasonal allergic rhinitis due to other 

allergic trigger J30.89 and 

Dysthymic disorder F34.1

 

                    [place] [address] [address] 05 

Dec, 2018                               Bilateral otitis media with effusion H65

.93 and Anxiety F41.9

 

                    [address] [address]                                Type 2 diabetes mellitus without complic

ation, without long-term 

current use of insulin E11.9

 

                    [place] [address] [address]                                Pharyngitis due to Streptococcus species

 J02.0

 

                    [place] [address] [address]                                 

 

                          [facility] [place] [address] 

[address]                10 Jul, 2018              Fever, unspecified fever cau

se R50.9 and Lymphadenopathy

R59.1

 

                          [facility] [place] [address] 

[address]                              Pharyngitis due to other org

anism J02.8

 

                    [place] [address] [address]                                 

 

                    [place] [address] [address]                                Type 2 diabetes mellitus without complic

ation, without long-term 

current use of insulin E11.9 and Other eczema L30.8

 

                          [facility] [place] [address] 

[address]                              Acute pain of left shoulder 

M25.512

 

                    [place] [address] [address] 23 

2018                                

 

                    [place] [address] [address] 15 

2018                               Type 2 diabetes mellitus without complic

ation, without long-term 

current use of insulin E11.9

 

                    [place] [address] [address] 06 

2018                               Type 2 diabetes mellitus without complic

ation, without long-term 

current use of insulin E11.9 ; Tubular adenoma of colon D12.6 ; Mixed 
hyperlipidemia E78.2 ; History of leukocytosis Z86.2 and Screening breast 
examination Z12.31

 

                    [place] [address] [address]                                Metatarsalgia of right foot M77.41 and T

ype 2 diabetes mellitus 

without complication, without long-term current use of insulin E11.9

 

                    [place] [address] [address]                                Capsulitis M77.9 and Metatarsalgia of ri

ght foot M77.41

 

                    [place] [address] [address] 08 

Sep, 2017                               Capsulitis M77.9

 

                    [place] [address] [address] 06 

Sep, 2017                                

 

                    [place] [address] [address] 03 

Aug, 2017                               Type 2 diabetes mellitus without complic

ation, without long-term 

current use of insulin E11.9 ; Tubular adenoma of colon D12.6 and Mixed 
hyperlipidemia E78.2

 

                    [place] [address] [address]                                Metatarsalgia of right foot M77.41 and C

apsulitis M77.9

 

                    [place] [address] [address]                                 

 

                          [facility]  [address] [facility] [id number]

[address] [place] 

[address]                              Duarte's neuroma of right fo

ot G57.61

 

                    [place] [address] [address]                                Mixed hyperlipidemia E78.2 and Type 2 di

abetes mellitus without 

complication, without long-term current use of insulin E11.9

 

                    [place] [address] [address] 10 

Brandon, 2017                               Type 2 diabetes mellitus without complic

ation, without long-term 

current use of insulin E11.9 ; Tubular adenoma of colon D12.6 and Mixed 
hyperlipidemia E78.2

 

                    [place] [address] [address] 22 

Dec, 2016                               Mixed hyperlipidemia E78.2

 

                    [place] [address] [address]                                 

 

                    [place] [address] [address]                                Mixed hyperlipidemia E78.2

 

                    [place] [address] [address]                                Mixed hyperlipidemia E78.2

 

                    [place] [address] [address] 08 

Sep, 2016                                

 

                    [place] [address] [address] 10 

Aug, 2016                               Type 2 diabetes mellitus without complic

ation, without long-term 

current use of insulin E11.9 ; Helicobacter pylori (H. pylori) infection A04.8 
and Mixed hyperlipidemia E78.2

 

                    [place] [address] [address] 08 

Aug, 2016                               Type 2 diabetes mellitus without complic

ation, without long-term 

current use of insulin E11.9

 

                    [place] [address] [address] 03 

Aug, 2016                               Type 2 diabetes mellitus without complic

ation, without long-term 

current use of insulin E11.9

 

                    [place] [address] [address] 02 

Aug, 2016                               Dyspepsia R10.13 ; Upper abdominal pain 

R10.10 ; Bowel habit changes 

R19.4 ; History of leukocytosis Z86.2 and Helicobacter pylori (H. pylori) 
infection A04.8

 

                          [facility]  [address] [facility] [id number]

[address], [place] 

[address]                27 May, 2016              Environmental allergies Z91.

09

 

                    [place] [address] [address]                                Left leg pain M79.605 ; Localized swelli

ng of lower leg R22.40 and 

Upper respiratory infection J06.9

 

                    [place] [address] [address] 28 

Dec, 2015                                

 

                    [place] [address] [address] 21 

Dec, 2015                               Left leg pain M79.605

 

                    [place] [address] [address] 10 

Dec, 2015                               Left leg pain M79.605 and Localized swel

ling of lower leg R22.40

 

                    [place] [address] [address] 01 

Dec, 2015                               Left leg pain M79.605

 

                    [place] [address] [address]                                Encounter for immunization Z23

 

                    [place] [address] [address] 12 

Oct, 2015                               Bronchitis J40

 

                    [place] [address] [address] 30 

Sep, 2015                               Physical exam, pre-employment V70.5 ; En

counter for PPD test V74.1 and

Hyperlipidemia 272.4

 

                    [place] [address] [address] 21 

Sep, 2015                                

 

                    [place] [address] [address] 21 

Sep, 2015                                

 

                    [place] [address] [address] 09 

Sep, 2015                                

 

                    [place] [address] [address] 04 

Sep, 2015                               Elevated blood sugar 790.29

 

                    [place] [address] [address] 03 

Sep, 2015                               Elevated blood sugar 790.29

 

                    [place] [address] [address] 03 

Sep, 2015                               Palpitations 785.1

 

                    [place] [address] [address] 01 

Sep, 2015                               Palpitations 785.1

 

                    [place] [address] [address]                                 

 

                    [facility] [address] [facility] [place] [address] 28 

May, 2015                               Hand pain, right 729.5 and Depression wi

th anxiety 300.4

 

                    [facility] [place] [facility] [address] [facility] [place] [address]                                 

 

                    [facility] [place] [facility] [address] [facility] [place] [address]                                 

 

                    [facility] [place] [facility] [id number] [place] [address] 05 

Mar, 2015                                

 

                    [facility] [place] [facility] [id number] [place] [address] 05 

Mar, 2015                                

 

                    [facility] [place] [facility] [id number] [place] [address]                                 

 

                    [facility] [place] [facility] [id number] [place] [address]                                 

 

                    [facility] [place] [facility] [id number] [place] [address] 10 

Feb, 2015                                

 

                    [facility] [place] [facility] [address] [facility] [place] [address] 10 

Feb, 2015                                

 

                    [facility] [place] [facility] [id number] [place] [address]                                 

 

                    [facility] [place] [facility] [id number] [place] [address]                                 

 

                    [facility] [place] [facility] [id number] [place] [address]                                 

 

                    [facility] [place] [facility] [address] [facility] [place] [address]                                 

 

                    [facility] [place] [facility] [id number] [place] [address]                                 

 

                    [facility] [place] [facility] [address] [facility] [place] [address]                                 

 

                    [facility] [place] [facility] [address] [place] [id number] [place] [address]                                 

 

                    [facility] [place] [facility] [address] [facility] [place] [address] 17 

2015                                

 

                    [facility] [place] [facility] [address] [facility] [place] [address]                                 

 

                    [facility] [place] [facility] [address] [facility] [place] [address]                                 

 

                    [facility] [place] [facility] [address] [facility] [place] [address]                                 

 

                    [facility] [place] [facility] [address] [facility] [place] [address]                                 

 

                    [facility] [place] [facility] [address] [facility] [place] [address] 15 

Brandon, 2015                                

 

                    [facility] [place] [facility] [address] [facility] [place] [address] 15 

Brandon, 2015                                

 

                    [facility] [place] [facility] [address] [facility] [place] [address]                                 

 

                    [facility] [place] [facility] [address] [facility] [place] [address]                                 

 

                    [facility] [place] [facility] [address] [facility] [place] [address] 15 

Oct, 2014                                

 

                    [facility] [place] [facility] [address] [facility] [place] [address] 15 

Oct, 2014                                

 

                    [facility] [place] [facility] [address] [facility] [place] [address] 14 

Oct, 2014                                

 

                    [facility] [place] [facility] [address] [facility] [place] [address] 14 

Oct, 2014                                

 

                    [facility] [place] [facility] [address] [facility] [place] [address] 07 

Oct, 2014                                

 

                    [facility] [place] [facility] [address] [facility] [place] [address] 07 

Oct, 2014                                

 

                    [facility] [place] [facility] [address] [facility] [place] [address] 12 

Sep, 2014                                

 

                    [facility] [place] [facility] [address] [facility] [place] [address] 12 

Sep,                                 

 

                    [facility] [place] [facility] [address] [id number] [facility] [place] [address] 04 

Sep,                                 

 

                    [facility] [place] [facility] [address] [facility] [id number] [place] [address] 03 

Sep,                                 

 

                    [facility] [place] [facility] [address] [id number] [facility] [place] [address] 03 

Sep,                                 

 

                    [facility] [place] [facility] [address] [facility] [id number] [place] [address] 02 

Sep,                                 

 

                    [facility] [place] [facility] [address] [facility] [id number] [place] [address] 02 

Sep, 2014                                

 

                    [facility] [place] [facility] [address] [id number] [place] [address] 30 

Aug, 2014                                

 

                    [facility] [place] [facility] [address] [id number] [facility] [place] [address] 30 

Aug, 2014                                

 

                    [facility] [place] [facility] [address] [facility] [facility] [place] [address] 19 

Aug, 2014                                

 

                    [facility] [place] [facility] [address] [facility] [facility] [place] [address] 19 

Aug, 2014                                

 

                    [facility] [place] [facility] [address] [facility] [place] [address] 14 

Aug, 2014                                

 

                    [facility] [place] [facility] [address] [facility] [place] [address] 14 

Aug, 2014                                

 

                    [facility] [place] [facility] [address] [facility] [place] [address] 13 

Aug, 2014                                

 

                    [facility] [place] [facility] [address] [facility] [id number] [place] [address] 13 

Aug, 2014                                

 

                    [facility] [place] [facility] [address] [id number] [place] [address]                                 

 

                    [facility] [place] [facility] [address] [id number] [place] [address]                                 

 

                    [facility] [place] [facility] [address] [id number] [place] [address]                                 

 

                    [facility] [place] [facility] [address] [id number] [facility] [place] [address] ,                                 

 

                    [facility] [place] [facility] [address] [id number] [facility] [place] [address] ,                                 

 

                    [facility] [place] [facility] [address] [facility] [id number] [facility] [place] [address] ,                                 

 

                    [facility] [place] [facility] [address] [facility] [id number] [place] [address] ,                                 

 

                    [facility] [place] [facility] [address] [facility] [id number] [facility] [place] [address] ,                                 

 

                    [facility] [place] [facility] [address] [id number] [facility] [place] [address] ,                                 

 

                    [facility] [place] [facility] [address] [facility] [id number] [place] [address] ,                                 

 

                    [facility] [place] [facility] [address] [id number] [place] [address] ,                                 

 

                    [facility] [place] [facility] [address] [facility] [id number] [place] [address] ,                                 

 

                    [facility] [place] [facility] [address] [facility] [id number] [place] [address] ,                                 

 

                    [facility] [place] [facility] [address] [facility] [id number] [place] [address] ,                                 

 

                    [facility] [place] [facility] [address] [id number] [place] [address] ,                                 

 

                    [facility] [place] [facility] [address] [facility] [id number] [place] [address] ,                                 

 

                    [facility] [place] [facility] [address] [facility] [id number] [place] [address] ,                                 

 

                    [facility] [place] [facility] [address] [id number] [place] [address] ,                                 

 

                    [facility] [place] [facility] [address] [facility] [id number] [place] [address] ,                                 

 

                    [facility] [place] [facility] [address] [id number] [place] [address]                                 

 

                    [facility] [place] [facility] [address] [id number] [place] [address]                                 

 

                    [facility] [place] [facility] [address] [id number] [place] [address]                                 

 

                    [facility] [address] [id number] [place] [address]                                 

 

                    [facility] [address] [id number] [place] [address]                                 

 

                    [facility] [address] [id number] [place] [address]                                 

 

                    [facility] [address] [id number] [place] [address]                                 

 

                    [facility] [address] [id number] [place] [address]                                 

 

                    [facility] [id number] [place] [address] 27 

Mar, 2014                                

 

                    [facility] [address] [id number] [place] [address] 27 

Mar, 2014                                

 

                    [facility] [address] [id number] [place] [address]                                 

 

                    [facility] [address] [id number] [place] [address]                                 

 

                    [facility] [address] [id number] [place] [address]                                 

 

                    [facility] [address] [id number] [place] [address]                                 

 

                    [facility] [address] [id number] [place] [address] 08 

Oct, 2013                                

 

                    [facility] [address] [id number] [place] [address] 04 

Oct, 2013                                

 

                    [facility] [address] [id number] [place] [address] 03 

Oct, 2013                                

 

                    [facility] [address] [id number] [place] [address] 02 

Oct, 2013                                

 

                    [facility] [address] [id number] [place] [address] 01 

Oct, 2013                                

 

                    [facility] [address] [id number] [place] [address] 20 

Sep, 2013                                

 

                    [facility] [address] [id number] [place] [address] 08 

Aug, 2013                                

 

                    [facility] [address] [id number] [place] [address] 24 

May, 2013                                

 

                    [facility] [address] [id number] [place] [address] 13 

May, 2013                                

 

                    [facility] [address] [id number] [place] [address]                                 

 

                    [facility] [address] [id number] [place] [address]                                 

 

                    [facility] [id number] [place] [address]                                 

 

                    [facility] [address] [id number] [place] [address]                                 

 

                    [facility] [id number] [place] [address] 02 

Oct, 2012                                

 

                    [facility] [address] [id number] [place] [address] 02 

Oct, 2012                                

 

                    [facility] [id number] [place] [address] 21 

Sep, 2012                                

 

                    [facility] [address] [id number] [place] [address] 06 

Aug, 2012                                

 

                    [facility] [id number] [place] [address] 02 

Aug, 2012                                

 

                    [facility] [address] [id number] [place] [address]                                 

 

                    [facility] [address] [id number] [place] [address]                                 

 

                    [facility] [address] [id number] [place] [address] 15 

2012                                

 

                    [facility] [address] [id number] [place] [address] 20 

2012                                

 

                    [facility] [address] [id number] [place] [address] 10 

2012                                

 

                    [facility] [address] [id number] [place] [address] 22 

Mar, 2012                                

 

                    [facility] [id number] [place] [address] 20 

Mar, 2012                                

 

                    [facility] [id number] [place] [address] 14 

Mar, 2012                                

 

                    [facility] [address] [id number] [place] [address]                                 







IMMUNIZATIONS

No Known Immunizations



SOCIAL HISTORY

Never Assessed



REASON FOR VISIT





PLAN OF CARE





VITAL SIGNS





                    Height              69 in               2014

 

                    Weight              208.7 lbs           2014

 

                    Temperature         97.3 degrees Fahrenheit 2014

 

                    Heart Rate          72 bpm              2014

 

                    Respiratory Rate    18                  2014

 

                    Blood pressure systolic 116 mmHg            2014

 

                    Blood pressure diastolic 66 mmHg             2014







MEDICATIONS

Unknown Medications



RESULTS

No Results



PROCEDURES





                Procedure       Date Ordered    Result          Body Site

 

                GLUCOSE TOLERANCE TEST (GTT) 2014                    







INSTRUCTIONS





MEDICATIONS ADMINISTERED

No Known Medications



MEDICAL (GENERAL) HISTORY





                    Type                Description         Date

 

                          Medical History           allergic rhinitis- rec's edwige donahue allergy injections from Dr Rojas office                            

 

                    Medical History     mood disorder        

 

                    Medical History     Leukocytosis- has been to hematology  

 

                    Medical History     Hyperlipidemia       

 

                          Medical History           Left leg pain- multiple imag

ing performed and ortho referral 

placed                                   

 

                          Medical History           TUBULAR ADENOMA AND GASTRITI

S ON COLONOSOCPY AUG 2016 -MULTIPLE 

POLYPS                                   

 

                    Medical History     Helicobacter pylori (H. pylori) infectio

n Hx  

 

                    Surgical History    cholecystectomy     2006

 

                    Surgical History     section    , 

 

                    Surgical History    tonsillectomy       2015

 

                    Surgical History    colonoscopy         2016

 

                    Surgical History    colonscopy          2017

 

                    Surgical History    Right Knee scope    2017

## 2020-06-23 NOTE — XMS REPORT
[facility]

                             Created on: 2018



Darby Love

External Reference #: [id number]

: 1975

Sex: Female



Demographics





                          Address                   [address] [place]  [address]

 

                          Preferred Language        Unknown

 

                          Marital Status            Unknown

 

                          Faith Affiliation     Unknown

 

                          Race                      Unknown

 

                          Ethnic Group              Unknown





Author





                          Author                    Darby DELGADO

 

                          Organization              [facility]

 

                          Address                   [address] [place]  [address]



 

                          Phone                     [phone]







Care Team Providers





                    Care Team Member Name Role                Phone

 

                    VILMA DELGADO      Unavailable         [phone]







PROBLEMS





          Type      Condition ICD9-CM Code HTL10-DA Code Onset Dates Condition S

tatus SNOMED 

Code

 

          Problem   History of leukocytosis           Z86.2               Active

    [id number]

 

          Problem   Duarte's neuroma of right foot           G57.61             

 Active    [id number]

 

                          Problem                   Type 2 diabetes mellitus wit

hout complication, without long-term current

use of insulin              E11.9                     Active       [id number]

 

          Problem   Tubular adenoma of colon           D12.6               Activ

e    [id number]

 

          Problem   Mixed hyperlipidemia           E78.2               Active   

 [id number]







ALLERGIES





             Substance    Reaction     Event Type   Date         Status

 

             Penicillin V Potassium Unknown      Drug Allergy  Activ

e







ENCOUNTERS





                Encounter       Location        Date            Diagnosis

 

                          [place] [address] [facility] [id number]

[address] [place] [address]

                                        Pharyngitis due to Streptoco

ccus species J02.0

 

                          [facility]     [address] [facility] [id number]

[address] [place] [address]

                                         

 

                          [facility]  [address] [place] [id number]

[address] [place] [address] 

[address]                10 Jul, 2018              Fever, unspecified fever cau

se R50.9 and Lymphadenopathy

R59.1

 

                          [facility]  [address] [facility] [id number]

[address] [place] [address] 

[address]                              Pharyngitis due to other org

anism J02.8

 

                          [facility]     [address] [facility] [id number]

[address] [place] [address]

                                         

 

                          [facility]     [address] [facility] [id number]

[address] [place] [address]

                                        Type 2 diabetes mellitus wit

hout complication, without long-term 

current use of insulin E11.9 and Other eczema L30.8

 

                          [facility]  [address] 

[address]                28 2018              Acute pain of left shoulder 

M25.512

 

                          [facility]     [address] [address]

                          23 2018               

 

                          [facility]     [address] [address]

                          15 2018              Type 2 diabetes mellitus wit

hout complication, without long-term 

current use of insulin E11.9

 

                          [place] [address] [address]

                                        Type 2 diabetes mellitus wit

hout complication, without long-term 

current use of insulin E11.9 ; Tubular adenoma of colon D12.6 ; Mixed 
hyperlipidemia E78.2 ; History of leukocytosis Z86.2 and Screening breast 
examination Z12.31

 

                          [place] [address] [address]

                                        Metatarsalgia of right foot 

M77.41 and Type 2 diabetes mellitus 

without complication, without long-term current use of insulin E11.9

 

                          [place] [address] [address]

                                        Capsulitis M77.9 and Metatar

salgia of right foot M77.41

 

                          [place] [address] [address]

                          08 Sep, 2017              Capsulitis M77.9

 

                          [place] [address] [address]

                          06 Sep, 2017               

 

                          [place] [address] [address]

                          03 Aug, 2017              Type 2 diabetes mellitus wit

hout complication, without long-term 

current use of insulin E11.9 ; Tubular adenoma of colon D12.6 and Mixed 
hyperlipidemia E78.2

 

                          [place] [address] [address]

                                        Metatarsalgia of right foot 

M77.41 and Capsulitis M77.9

 

                          [facility]     [address] [place] [id number]

[address] [place] [address]

                                         

 

                          [facility]  [address] [place] [id number]

[address] [place] [address] 

[address]                              Duarte's neuroma of right fo

ot G57.61

 

                          [facility]     [address] [place] [id number]

[address] [place] [address]

                                        Mixed hyperlipidemia E78.2 a

nd Type 2 diabetes mellitus without 

complication, without long-term current use of insulin E11.9

 

                          [place] [address] [address]

                          10 Brandon, 2017              Type 2 diabetes mellitus wit

hout complication, without long-term 

current use of insulin E11.9 ; Tubular adenoma of colon D12.6 and Mixed 
hyperlipidemia E78.2

 

                          [place] [address]

[address] [place] [address]

                          22 Dec, 2016              Mixed hyperlipidemia E78.2

 

                          [place] [address] [place] [id number]

[address] [place] [address]

                                         

 

                          [place] [address] [place] [id number] [place] [address]

                                        Mixed hyperlipidemia E78.2

 

                          [place] [address] [place] [id number]

[address] [place] [address]

                                        Mixed hyperlipidemia E78.2

 

                          [place] [address] [place] [id number]

[address] [place] [address]

                          08 Sep, 2016               

 

                          [facility]     [address] [place] [id number]

[address] [place] [address]

                          10 Aug, 2016              Type 2 diabetes mellitus wit

hout complication, without long-term 

current use of insulin E11.9 ; Helicobacter pylori (H. pylori) infection A04.8 
and Mixed hyperlipidemia E78.2

 

                          [place] [address] [address]

                          08 Aug, 2016              Type 2 diabetes mellitus wit

hout complication, without long-term 

current use of insulin E11.9

 

                          [facility]     [address] [address]

                          03 Aug, 2016              Type 2 diabetes mellitus wit

hout complication, without long-term 

current use of insulin E11.9

 

                          [facility]     [address] [address]

                          02 Aug, 2016              Dyspepsia R10.13 ; Upper abd

ominal pain R10.10 ; Bowel habit 

changes R19.4 ; History of leukocytosis Z86.2 and Helicobacter pylori (H. 
pylori) infection A04.8

 

                          [facility] [place] [facility]  [address] 

[address]                27 May, 2016              Environmental allergies Z91.

09

 

                          [place] [address] [address]

                                        Left leg pain M79.605 ; Loca

lized swelling of lower leg R22.40 and 

Upper respiratory infection J06.9

 

                          [place] [address] [address]

                          28 Dec, 2015               

 

                          [place] [address] [address]

                          21 Dec, 2015              Left leg pain M79.605

 

                          [facility]     [address] [address]

                          10 Dec, 2015              Left leg pain M79.605 and Lo

calized swelling of lower leg R22.40

 

                          [facility]     [address] [address]

                          01 Dec, 2015              Left leg pain M79.605

 

                          [place] [address] [address]

                                        Encounter for immunization Z

23

 

                          [facility]     [address] [address]

                          12 Oct, 2015              Bronchitis J40

 

                          [facility]     [address] [facility] [id number]

[address] [place] [address]

                          30 Sep, 2015              Physical exam, pre-employmen

t V70.5 ; Encounter for PPD test V74.1 

and Hyperlipidemia 272.4

 

                          [facility]     [address] [facility] [id number]

[address] [place] [address]

                          21 Sep, 2015               

 

                          [facility]     [address] [facility] [id number] [place] [address]

                          21 Sep, 2015               

 

                          [facility]     [address] [facility] [id number]

[address] [place] [address]

                          09 Sep, 2015               

 

                          [facility]     [address] [place] [id number] [place] [address]

                          04 Sep, 2015              Elevated blood sugar 790.29

 

                          [facility]     [address] [place] [id number]

[address] [place] [address]

                          03 Sep, 2015              Elevated blood sugar 790.29

 

                          [facility]     [address] [facility] [id number]

[address] [place] [address]

                          03 Sep, 2015              Palpitations 785.1

 

                          [facility]     [address] [place] [id number] [place] [address]

                          01 Sep, 2015              Palpitations 785.1

 

                          [facility]     [address] [place] [id number]

[address] [place] [address]

                                         

 

                          [facility]     [address] [facility] [id number]

[address] [place] [address]

                          28 May, 2015              Hand pain, right 729.5 and D

epression with anxiety 300.4

 

                          [facility]     [address] [facility] [id number]

[address] [place] [address]

                                         

 

                          [facility]     [address] [place] [id number]

[address] [place] [address]

                                         

 

                          [facility]     [address] [place] [id number]

[address] [place] [address]

                          05 Mar, 2015               

 

                          [facility]     [address] [id number]

[address] [place] [address], [place] [address]

                          05 Mar, 2015               

 

                          [facility]     [address] [id number]

[address] [place] [address], [place] [address]

                                         

 

                          [facility]     [address] [id number]

[address] [place] [address], [place] [address]

                                         

 

                          [facility]     [address] [id number]

[address] [place] [address], [place] [address]

                          10 Feb, 2015               

 

                          [facility]     [address] [id number]

[address] [place] [address], [place] [address]

                          10 Feb, 2015               

 

                          [facility]     [address] [id number]

[address] [place] [address], [place] [address]

                                         

 

                          [facility]     [address] [id number]

[address] [place] [address], [place] [address]

                                         

 

                          [facility]     [address] [id number]

[address] [place] [address], [place] [address]

                                         

 

                          [facility]     [address] [id number]

[address] [place] [address], [place] [address]

                                         

 

                          [facility]     [address] [id number]

[address] [place] [address], [place] [address]

                                         

 

                          [facility]     [address] [id number]

[address] [place] [address], [place] [address]

                                         

 

                          [facility]     [address] [id number]

[address] [place] [address], [place] [address]

                                         

 

                          [facility]     [address] [id number]

[address] [place] [address], [place] [address]

                                         

 

                          [facility]     [address] [id number]

[address] [place] [address], [place] [address]

                                         

 

                          [facility]     [address] [id number]

[address] [place] [address], [place] [address]

                                         

 

                          [facility]     [address] [id number]

[address] [place] [address], [place] [address]

                          16 2015               

 

                          [facility]     [address] [id number]

[address] [place] [address], [place] [address]

                          16 2015               

 

                          [facility]     [address] [id number]

[address] [place] [address], [place] [address]

                          15 2015               

 

                          [facility]     [address] [id number]

[address] [place] [address], [place] [address]

                          15 2015               

 

                          [facility]     [address] [id number]

[address] [place] [address], [place] [address]

                                         

 

                          [facility]     [address] [id number]

[address] [place] [address], [place] [address]

                                         

 

                          [facility]     [address] [id number]

[address] [place] [address], [place] [address]

                          15 Oct, 2014               

 

                          [facility]     [address] [id number]

[address] [place] [address], [place] [address]

                          15 Oct, 2014               

 

                          [facility]     [address] [id number]

[address] [place] [address], [place] [address]

                          14 Oct, 2014               

 

                          [facility]     [address] [id number]

[address] [place] [address], [place] [address]

                          14 Oct, 2014               

 

                          [facility]     [address] [id number]

[address] [place] [address], [place] [address]

                          07 Oct, 2014               

 

                          [facility]     [address] [id number]

[address] [place] [address], [place] [address]

                          07 Oct, 2014               

 

                          [facility]     [address] [id number]

[address] [place] [address]

                          12 Sep,                

 

                          [facility]     [address] [id number]

[address] [place] [address], [place] [address]

                          12 Sep, 2014               

 

                          [facility]     [address] [id number]

[address] [place] [address], [place] [address]

                          04 Sep, 2014               

 

                          [facility]     [address] [id number]

[address] [place] [address], [place] [address]

                          03 Sep,                

 

                          [facility]     [address] [id number]

[address] [place] [address]

                          03 Sep, 2014               

 

                          [facility]     [address] [id number]

[address] [place] [address], [place] [address]

                          02 Sep, 2014               

 

                          [facility]     [address] [id number]

[address] [place] [address], [place] [address]

                          02 Sep, 2014               

 

                          [facility]     [address] [id number]

[address] [place] [address], [place] [address]

                          30 Aug, 2014               

 

                          [facility]     [address] [id number]

[address] [place] [address], [place] [address]

                          30 Aug, 2014               

 

                          [facility]     [address] [id number]

[address] [place] [address], [place] [address]

                          19 Aug, 2014               

 

                          [facility]     [address] [id number]

[address] [place] [address], [place] [address]

                          19 Aug, 2014               

 

                          [facility]     [address] [id number]

[address] [place] [address], [place] [address]

                          14 Aug, 2014               

 

                          [facility]     [address] [id number]

[address] [place] [address], [place] [address]

                          14 Aug, 2014               

 

                          [facility]     [address] [id number]

[address] [place] [address], [place] [address]

                          13 Aug, 2014               

 

                          [facility]     [address] [id number]

[address] [place] [address], [place] [address]

                          13 Aug, 2014               

 

                          [facility]     [address] [id number]

[address] [place] [address], [place] [address]

                                         

 

                          [facility]     [address] [id number]

[address] [place] [address], [place] [address]

                                         

 

                          [facility]     [address] [id number]

[address] [place] [address], [place] [address]

                                         

 

                          [facility]     [address] [id number]

[address] [place] [address], [place] [address]

                                         

 

                          [facility]     [address] [id number]

[address] [place] [address], [place] [address]

                                         

 

                          [facility]     [address] [id number]

[address] [place] [address], [place] [address]

                          ,                

 

                          [facility]     [address] [id number]

[address] [place] [address], [place] [address]

                          ,                

 

                          [facility]     [address] [id number]

[address] [place] [address], [place] [address]

                          ,                

 

                          [facility]     [address] [id number]

[address] [place] [address], [place] [address]

                          ,                

 

                          [facility]     [address] [id number]

[address] [place] [address], [place] [address]

                          ,                

 

                          [facility]     [address] [id number]

[address] [place] [address], [place] [address]

                          ,                

 

                          [facility]     [address] [id number]

[address] [place] [address], [place] [address]

                          ,                

 

                          [facility]     [address] [id number]

[address] [place] [address], [place] [address]

                          ,                

 

                          [facility]     [address] [id number]

[address] [place] [address], [place] [address]

                          ,                

 

                          [facility]     [address] [id number]

[address] [place] [address], [place] [address]

                                         

 

                          [facility]     [address] [id number]

[address] [place] [address], [place] [address]

                          ,                

 

                          [facility]     [address] [id number]

[address] [place] [address], [place] [address]

                          ,                

 

                          [facility]     [address] [id number]

[address] [place] [address], [place] [address]

                          ,                

 

                          [facility]     [address] [id number]

[address] [place] [address], [place] [address]

                                         

 

                          [facility]     [address] [id number]

[address] [place] [address], [place] [address]

                          ,                

 

                          [facility]     [address] [id number]

[address] [place] [phone]

                                         

 

                          [facility]     [address] [id number]

[address] [place] [address], [place] [address]

                                         

 

                          [facility]     [address] [id number]

[address] [place] [address], [place] [address]

                                         

 

                          [facility]     [address] [id number]

[address] [place] [address], [place] [address]

                                         

 

                          [facility]     [address] [id number]

[address] [place] [address], [place] [address]

                                         

 

                          [facility]     [address] [id number]

[address] [place] [address], [place] [address]

                                         

 

                          [facility]     [address] [id number]

[address] [place] [address], [place] [address]

                                         

 

                          [facility]     [address] [id number]

[address] [place] [address], [place] [address]

                          27 Mar, 2014               

 

                          [facility]     [address] [id number]

[address] [place] [address], [place] [address]

                          27 Mar, 2014               

 

                          [facility]     [address] [id number]

[address] [place] [address], [place] [address]

                                         

 

                          [facility]     [address] [id number]

[address] [place] [address], [place] [address]

                                         

 

                          [facility]     [address] [id number]

[address] [place] [address], [place] [address]

                                         

 

                          [facility]     [address] [id number]

[address] [place] [phone]

                                         

 

                          [facility]     [address] [id number]

[address] [place] [address]

                          08 Oct, 2013               

 

                          [facility]     [address] [id number]

[address] [place] [address]

                          04 Oct, 2013               

 

                          [facility]     [address] [id number]

[address] [place] [address]

                          03 Oct, 2013               

 

                          [facility]     [address] [id number]

[address] [place] [address], [place] [address]

                          02 Oct, 2013               

 

                          [facility]     [address] [id number]

[address] [place] [address], [place] [address]

                          01 Oct, 2013               

 

                          [facility]     [address] [id number]

[address] [place] [address], [place] [address]

                          20 Sep, 2013               

 

                          [facility]     [address] [id number]

[address] [place] [address], [place] [address]

                          08 Aug, 2013               

 

                          [facility]     [address] [id number]

[address] [place] [address], [place] [address]

                          24 May, 2013               

 

                          [facility]     [address] [id number]

[address] [place] [address], [place] [address]

                          13 May, 2013               

 

                          [facility]     [address] [id number]

[address] [place] [address], [place] [address]

                                         

 

                          [facility]     [address] [id number]

[address] [place] [address], [place] [address]

                                         

 

                          [facility]     [address] [id number]

[address] [place] [address], [place] [address]

                                         

 

                          [facility]     [address] [id number]

[address] [place] [address], [place] [address]

                                         

 

                          [facility]     [address] [id number]

[address] [place] [address], [place] [address]

                          02 Oct, 2012               

 

                          [facility]     [address] [id number]

[address] [place] [address], [place] [address]

                          02 Oct, 2012               

 

                          [facility]     [address] [id number]

[address] [place] [address], [place] [address]

                          21 Sep, 2012               

 

                          [facility]     [address] [id number]

[address] [place] [address], [place] [address]

                          06 Aug, 2012               

 

                          [facility]     [address] [id number]

[address] [place] [address], [place] [address]

                          02 Aug, 2012               

 

                          [facility]     [address] [id number]

[address] [place] [address], [place] [address]

                                         

 

                          [facility]     [address] [id number]

[address] [place] [address]

                                         

 

                          [facility]     [address] [id number]

[address] [place] [address]

                          15 Eusebio, 2012               

 

                          [facility]     [address] [id number]

[address] [place] [address]

                                         

 

                          [facility]     [address] [id number]

[address] [place] [address]

                          10 Apr, 2012               

 

                          [facility]     [address] [facility] [id number]

[address] [place] [address]

                          22 Mar, 2012               

 

                          [facility]     [address] [facility] [id number]

[address] [place] [address]

                          20 Mar, 2012               

 

                          [facility]     [address] [facility] [id number]

[address] [place] [address]

                          14 Mar, 2012               

 

                          [facility]     [address] [facility] [id number]

[address] [place] [address]

                                         







IMMUNIZATIONS

No Known Immunizations



SOCIAL HISTORY

Never Assessed



REASON FOR VISIT

DM2, F/U on medication increase from last visit. , Was seen in [facility] about 2 weeks
ago for fever, was placed on doxycycline, has a lymph node that popped up on ri
ght side of neck on Saturday. Stopped taking the abx because she was on the medi
cation when the lymph node swelled up. [email or web address]



PLAN OF CARE





VITAL SIGNS





                    Height              69 in               2018

 

                    Weight              241.1 lbs           2018

 

                    Temperature         98.4 degrees Fahrenheit 2018

 

                    Heart Rate          88 bpm              2018

 

                    Respiratory Rate    20                  2018

 

                    BMI                 35.60 kg/m2         2018

 

                    Blood pressure systolic 136 mmHg            2018

 

                    Blood pressure diastolic 84 mmHg             2018







MEDICATIONS





        Medication Instructions Dosage  Frequency Start Date End Date Duration S

tatus

 

        Allegra Allergy 180 MG Orally Once a day 1 tablet as needed 24h         

                    Active

 

        Nexium 40 mg Orally Once a day 1 capsule 24h                     30     

 Active

 

        Prenatal Advantage - Orally Once a day 1 tablet 24h                     

        Active

 

        Fish Oil 1200 MG Orally Twice a day 1 capsule 12h                       

      Active

 

        Crestor 10 mg Orally Once a day 1 tablet 24h                     30     

 Active

 

          Acidophilus 100 mg Orally Once a day 1 capsule 24h       19 Jul, 2018 

17 Sep, 2018 30 

days                                    Active

 

           Doxycycline Hyclate 100 MG Orally every 12 hrs 1 capsule  12h        

10 Jul, 2018 24 

Jul, 2018                 14 days                   Not-Taking

 

                    Triamcinolone Acetonide 0.1 % Externally Twice a day 1 appli

cation to affected 

area         12h                                     Active

 

        MetFORMIN HCl  mg Orally twice a day 2 tablets 12h                

     30      Active

 

        Glimepiride 4 MG Orally 2 times a day 1 tablet 12h                      

       Active

 

        Depo-Provera 150 MG/ML         1 ml                                    A

ctive

 

          Diflucan 150 MG Orally every 24 hrs 1 tablet             2

 03 days

                                        Active

 

             Los Contour Next Test Test Strips In Vitro Once a day as directed

  24h          10 Aug, 

2016                                                        Active

 

        ApeniMED Contour Next Monitor w/Device         as directed         10 Aug, 

2016                 Active

 

           Doxycycline Hyclate 100 MG Orally every 12 hrs 1 capsule  12h        

10 Jul, 2018 20 

Jul, 2018                 10 day(s)                 Not-Taking

 

        Xopenex HFA 45 MCG/ACT Inhalation every 4 hrs 1 puff as needed 4h       

                       Active

 

           Clindamycin HCl 300 MG Orally every 6 hrs 1 capsule  6h         19 Ju

l, 2018 29 Jul, 2018

                          10 days                   Active







RESULTS

No Results



PROCEDURES

No Known procedures



INSTRUCTIONS





MEDICATIONS ADMINISTERED

No Known Medications



MEDICAL (GENERAL) HISTORY





                    Type                Description         Date

 

                          Medical History           allergic rhinitis- rec's edwige perriny allergy injections from Dr Rojas office                            

 

                    Medical History     mood disorder        

 

                    Medical History     Leukocytosis- has been to hematology  

 

                    Medical History     Hyperlipidemia       

 

                          Medical History           Left leg pain- multiple imag

ing performed and ortho referral 

placed                                   

 

                          Medical History           TUBULAR ADENOMA AND GASTRITI

S ON COLONOSOCPY AUG 2016 -MULTIPLE 

POLYPS                                   

 

                    Medical History     Helicobacter pylori (H. pylori) infectio

n Hx  

 

                    Surgical History    cholecystectomy     2006

 

                    Surgical History     section    , 

 

                    Surgical History    tonsillectomy       2015

 

                    Surgical History    colonoscopy         2016

 

                    Surgical History    colonscopy          2017

 

                    Surgical History    Right Knee scope    2017

## 2020-06-23 NOTE — XMS REPORT
[facility]

                             Created on: 2019



Jasonwhit, April

External Reference #: [id number]

: 1975

Sex: Female



Demographics





                          Address                   [address] [place]  [address]

 

                          Preferred Language        Unknown

 

                          Marital Status            Unknown

 

                          Oriental orthodox Affiliation     Unknown

 

                          Race                      Unknown

 

                          Ethnic Group              Unknown





Author





                          Author                    ANNEMARIE April VENECIA

 

                          [facility]

 

                          Address                   [address] [place]  [address]



 

                          Phone                     [phone]







Care Team Providers





                    Care Team Member Name Role                Phone

 

                    ANNETTE SHARPEWNYA       Unavailable         [phone]







PROBLEMS





          Type      Condition ICD9-CM Code FTR60-GY Code Onset Dates Condition S

tatus SNOMED 

Code

 

          Problem   Tubular adenoma of colon           D12.6               Activ

e    [id number]

 

          Problem   Duarte's neuroma of right foot           G57.61             

 Active    [id number]

 

          Problem   History of leukocytosis           Z86.2               Active

    [id number]

 

          Problem   Non morbid obesity           E66.9               Active    4

[id number]

 

          Problem   Mixed hyperlipidemia           E78.2               Active   

 [id number]

 

          Problem   Seasonal allergic rhinitis due to pollen           J30.1    

           Active    [id number]

 

                          Problem                   Type 2 diabetes mellitus wit

hout complication, without long-term current

use of insulin              E11.9                     Active       [id number]

 

          Problem   Anxiety             F41.9               Active    51289817

 

           Problem    Seasonal allergic rhinitis due to other allergic trigger  

          J30.89                

Active                                  [id number]

 

          Problem   Dysthymic disorder           F34.1               Active    7

[id number]

 

          Problem   Arthritis           M19.90              Active    4809827







ALLERGIES

No Information



ENCOUNTERS





                Encounter       Location        Date            Diagnosis

 

                          [place] [address] [address] [facility] [id number]

[address] [place] [address]

                                        Type 2 diabetes mellitus wit

hout complication, without long-term 

current use of insulin E11.9

 

                          [facility]     [address] [facility] [id number]

[address] [place] [address]

                                         

 

                          [facility]     [address] [facility] [id number]

[address] [place] [address]

                                        Type 2 diabetes mellitus wit

hout complication, without long-term 

current use of insulin E11.9

 

                          [facility]     [address] [facility] [id number]

[address] [place] [address]

                                        Weight loss R63.4

 

                          [place] [address] [address] [facility] [id number]

[address] [place] [address]

                          31 May, 2019              Type 2 diabetes mellitus wit

hout complication, without long-term 

current use of insulin E11.9

 

                          [place] [address] [address] [place] [id number]

[address] [place] [address]

                          31 May, 2019              Type 2 diabetes mellitus wit

hout complication, without long-term 

current use of insulin E11.9 and Non morbid obesity E66.9

 

                          [facility] [place] [address] 

[address]                14 May, 2019              Seasonal allergic rhinitis d

ue to pollen J30.1 and Sore 

throat J02.9

 

                          [place] [address] 

[address]                              Arthritis M19.90

 

                          [place] [address] [address]

                                        Seasonal allergic rhinitis d

ue to other allergic trigger J30.89 and

Dysthymic disorder F34.1

 

                          [place] [address] [address]

                          05 Dec, 2018              Bilateral otitis media with 

effusion H65.93 and Anxiety F41.9

 

                          [place] [address] [address]

                                        Type 2 diabetes mellitus wit

hout complication, without long-term 

current use of insulin E11.9

 

                          [place] [address] [address] [place] [id number]

[address] [place] [address]

                                        Pharyngitis due to Streptoco

ccus species J02.0

 

                          [place] [address] [address]

                                         

 

                          [facility] [place] [address] 

[address]                10 Jul, 2018              Fever, unspecified fever cau

se R50.9 and Lymphadenopathy

R59.1

 

                          [facility] [place] [address] [place] 

[address]                              Pharyngitis due to other org

anism J02.8

 

                          [facility]     [address] [place] [id number]

[address] [place] [address]

                                         

 

                          [facility]     [address] [facility] [id number]

[address] [place] [address]

                                        Type 2 diabetes mellitus wit

hout complication, without long-term 

current use of insulin E11.9 and Other eczema L30.8

 

                          [facility]  [address] [facility] [id number]

[address] [place] [address] 

[address]                              Acute pain of left shoulder 

M25.512

 

                          [place] [address] [address] [place] [id number] [place] [address]

                                         

 

                          [place] [address] [address]

                          15 2018              Type 2 diabetes mellitus wit

hout complication, without long-term 

current use of insulin E11.9

 

                          [place] [address] [address] [place] [id number] [place] [address]

                                        Type 2 diabetes mellitus wit

hout complication, without long-term 

current use of insulin E11.9 ; Tubular adenoma of colon D12.6 ; Mixed 
hyperlipidemia E78.2 ; History of leukocytosis Z86.2 and Screening breast 
examination Z12.31

 

                          [place] [address] [address]

                                        Metatarsalgia of right foot 

M77.41 and Type 2 diabetes mellitus 

without complication, without long-term current use of insulin E11.9

 

                          [place] [address] [address] [place] [id number]

[address] [place] [address]

                                        Capsulitis M77.9 and Metatar

salgia of right foot M77.41

 

                          [place] [address] [address] [place] [id number] [place] [address]

                          08 Sep, 2017              Capsulitis M77.9

 

                          [place] [address] [address] [place] [address]

                          06 Sep, 2017               

 

                          [facility]     [address] [facility] [id number]

[address] [place] [address]

                          03 Aug, 2017              Type 2 diabetes mellitus wit

hout complication, without long-term 

current use of insulin E11.9 ; Tubular adenoma of colon D12.6 and Mixed 
hyperlipidemia E78.2

 

                          [facility]     [address] [place] [id number]

[address] [place] [address]

                                        Metatarsalgia of right foot 

M77.41 and Capsulitis M77.9

 

                          [facility]     [address] [place] [id number]

[address] [place] [address]

                                         

 

                          [facility]  [address] [place] [id number] [place] [address] 

[address]                              Duarte's neuroma of right fo

ot G57.61

 

                          [place] [address] [address] [place] [id number]

[address] [place] [address]

                                        Mixed hyperlipidemia E78.2 a

nd Type 2 diabetes mellitus without 

complication, without long-term current use of insulin E11.9

 

                          [place] [address] [address]

                          10 Brandon, 2017              Type 2 diabetes mellitus wit

hout complication, without long-term 

current use of insulin E11.9 ; Tubular adenoma of colon D12.6 and Mixed 
hyperlipidemia E78.2

 

                          [place] [address] [address] [place] [id number]

[address] [place] [address]

                          22 Dec, 2016              Mixed hyperlipidemia E78.2

 

                          [place] [address] [address] [place] [id number]

[address] [place] [address]

                                         

 

                          [place] [address] [address] [place] [id number]

[address] [place] [address]

                                        Mixed hyperlipidemia E78.2

 

                          [place] [address] [address] [place] [id number]

[address] [place] [address]

                                        Mixed hyperlipidemia E78.2

 

                          [place] [address] [address] [place] [id number]

[address] [place] [address]

                          08 Sep, 2016               

 

                          [facility]     [address] [facility] [id number]

[address] [place] [address]

                          10 Aug, 2016              Type 2 diabetes mellitus wit

hout complication, without long-term 

current use of insulin E11.9 ; Helicobacter pylori (H. pylori) infection A04.8 
and Mixed hyperlipidemia E78.2

 

                          [facility]     [address] [place] [id number] [place] [address]

                          08 Aug, 2016              Type 2 diabetes mellitus wit

hout complication, without long-term 

current use of insulin E11.9

 

                          [place] [address] [address] [place] [id number] [place] [address]

                          03 Aug, 2016              Type 2 diabetes mellitus wit

hout complication, without long-term 

current use of insulin E11.9

 

                          [place] [address] [address] [place] [id number] [place] [address]

                          02 Aug, 2016              Dyspepsia R10.13 ; Upper abd

ominal pain R10.10 ; Bowel habit 

changes R19.4 ; History of leukocytosis Z86.2 and Helicobacter pylori (H. 
pylori) infection A04.8

 

                          [facility]  [address] 

[address]                27 May, 2016              Environmental allergies Z91.

09

 

                          [facility]     [address] [place] [id number]

[address] [place] [address]

                                        Left leg pain M79.605 ; Loca

lized swelling of lower leg R22.40 and 

Upper respiratory infection J06.9

 

                          [facility]     [address] [facility] [id number]

[address] [place] [address]

                          28 Dec, 2015               

 

                          [place] [address] [address]

                          21 Dec, 2015              Left leg pain M79.605

 

                          [facility]     [address] [place] [id number]

[address] [place] [address]

                          10 Dec, 2015              Left leg pain M79.605 and Lo

calized swelling of lower leg R22.40

 

                          [facility]     [address] [facility] [id number]

[address] [place] [address]

                          01 Dec, 2015              Left leg pain M79.605

 

                          [facility]     [address] [place] [id number]

[address] [place] [address]

                                        Encounter for immunization Z

23

 

                          [facility]     [address] [place] [id number] [place] [address]

                          12 Oct, 2015              Bronchitis J40

 

                          [facility]     [address] [place] [id number]

[address] [place] [address]

                          30 Sep, 2015              Physical exam, pre-employmen

t V70.5 ; Encounter for PPD test V74.1 

and Hyperlipidemia 272.4

 

                          [facility]     [address] [place] [id number]

[address] [place] [address]

                          21 Sep, 2015               

 

                          [facility]     [address] [place] [id number] [place] [address]

                          21 Sep, 2015               

 

                          [facility]     [address] [place] [id number]

[address] [place] [address]

                          09 Sep, 2015               

 

                          [facility]     [address] [place] [id number]

[address] [place] [address]

                          04 Sep, 2015              Elevated blood sugar 790.29

 

                          [facility]     [address] [place] [id number]

[address] [place] [address]

                          03 Sep, 2015              Elevated blood sugar 790.29

 

                          [facility]     [address] [place] [id number]

[address] [place] [address]

                          03 Sep, 2015              Palpitations 785.1

 

                          [facility]     [address] [place] [id number]

[address] [place] [address]

                          01 Sep, 2015              Palpitations 785.1

 

                          [facility]     [address] [place] [id number]

[address] [place] [address]

                                         

 

                          [facility]     [address] [place] [id number]

[address] [place] [address]

                          28 May, 2015              Hand pain, right 729.5 and D

epression with anxiety 300.4

 

                          [facility]     [address] [id number]

[address] [place] [address], [place] [address]

                          14 2015               

 

                          [facility]     [address] [id number]

[address] [place] [address], [place] [address]

                                         

 

                          [facility]     [address] [id number]

[address] [place] [address], [place] [address]

                          05 Mar, 2015               

 

                          [facility]     [address] [id number]

[address] [place] [address], [place] [address]

                          05 Mar, 2015               

 

                          [facility]     [address] [id number]

[address] [place] [address], [place] [address]

                                         

 

                          [facility]     [address] [id number]

[address] [place] [address], [place] [address]

                                         

 

                          [facility]     [address] [id number]

[address] [place] [address], [place] [address]

                          10 Feb, 2015               

 

                          [facility]     [address] [id number]

[address] [place] [address]

                          10 Feb, 2015               

 

                          [facility]     [address] [id number]

[address] [place] [address], [place] [address]

                                         

 

                          [facility]     [address] [id number]

[address] [place] [address]

                                         

 

                          [facility]     [address] [id number]

[address] [place] [address]

                                         

 

                          [facility]     [address] [id number]

[address] [place] [address]

                                         

 

                          [facility]     [address] [id number]

[address] [place] [address], [place] [address]

                                         

 

                          [facility]     [address] [id number]

[address] [place] [address]

                                         

 

                          [facility]     [address] [id number]

[address] [place] [address], [place] [address]

                                         

 

                          [facility]     [address] [id number]

[address] [place] [address], [place] [address]

                          17 2015               

 

                          [facility]     [address] [id number]

[address] [place] [address], [place] [address]

                                         

 

                          [facility]     [address] [id number]

[address] [place] [address], [place] [address]

                                         

 

                          [facility]     [address] [id number]

[address] [place] [address], [place] [address]

                                         

 

                          [facility]     [address] [id number]

[address] [place] [address], [place] [address]

                                         

 

                          [facility]     [address] [id number]

[address] [place] [address], [place] [address]

                          15 [place], 2015               

 

                          [facility]     [address] [id number]

[address] [place] [address], [place] [address]

                          15 [place], 2015               

 

                          [facility]     [address] [id number]

[address] [place] [address], [place] [address]

                                         

 

                          [facility]     [address] [id number]

[address] [place] [address], [place] [address]

                                         

 

                          [facility]     [address] [id number]

[address] [place] [address], [place] [address]

                          15 Oct, 2014               

 

                          [facility]     [address] [id number]

[address] [place] [address], [place] [address]

                          15 Oct, 2014               

 

                          [facility]     [address] [id number]

[address] [place] [address], [place] [address]

                          14 Oct, 2014               

 

                          [facility]     [address] [id number]

[address] [place] [address], [place] [address]

                          14 Oct, 2014               

 

                          [facility]     [address] [id number]

[address] [place] [address], [place] [address]

                          07 Oct, 2014               

 

                          [facility]     [address] [id number]

[address] [place] [address], [place] [address]

                          07 Oct, 2014               

 

                          [facility]     [address] [id number]

[address] [place] [address], [place] [address]

                          12 Sep, 2014               

 

                          [facility]     [address] [id number]

[facility] [place] [address]

                          12 Sep,                

 

                          [facility]     [address] [id number]

[facility] [place] [address]

                          04 Sep,                

 

                          [facility]     [address] [id number]

[facility] [place] [address]

                          03 Sep,                

 

                          [facility]     [address] [id number]

[address] [place] [address], [place] [address]

                          03 Sep,                

 

                          [facility]     [address] [id number]

[address] [place] [address], [place] [address]

                          02 Sep,                

 

                          [facility]     [address] [id number]

[address] [place] [address], [place] [address]

                          02 Sep, 2014               

 

                          [facility]     [address] [id number]

[address] [place] [address], [place] [address]

                          30 Aug, 2014               

 

                          [facility]     [address] [id number]

[address] [place] [address], [place] [address]

                          30 Aug, 2014               

 

                          [facility] [facility]     [address] [id number]

[address] [place] [address], [place] [address]

                          19 Aug, 2014               

 

                          [facility]     [address] [id number]

[address] [place] [address], [place] [address]

                          19 Aug, 2014               

 

                          [facility]     [address] [id number]

[address] [place] [address], [place] [address]

                          14 Aug, 2014               

 

                          [facility]     [address] [id number]

[address] [place] [address], [place] [address]

                          14 Aug, 2014               

 

                          [facility]     [address] [id number]

[address] [place] [address], [place] [address]

                          13 Aug, 2014               

 

                          [facility]     [address] [id number]

[address] [place] [address], [place] [address]

                          13 Aug, 2014               

 

                          [facility]     [address] [id number]

[address] [place] [address], [place] [address]

                                         

 

                          [facility]     [address] [id number]

[address] [place] [address], [place] [address]

                                         

 

                          [facility]     [address] [id number]

[facility] [place] [address]

                                         

 

                          [facility]     [address] [id number]

[address] [place] [address], [place] [address]

                                         

 

                          [facility]     [address] [id number]

[facility] [place] [address]

                                         

 

                          [facility]     [address] [id number]

[address] [place] [address], [place] [address]

                                         

 

                          [facility]     [address] [id number]

[address] [place] [address], [place] [address]

                                         

 

                          [facility]     [address] [id number]

[address] [place] [address], [place] [address]

                                         

 

                          [facility]     [address] [id number]

[address] [place] [address], [place] [address]

                                         

 

                          [facility]     [address] [id number]

[address] [place] [address], [place] [address]

                                         

 

                          [facility]     [address] [id number]

[address] [place] [address], [place] [address]

                                         

 

                          [facility]     [address] [id number]

[address] [place] [address], [place] [address]

                                         

 

                          [facility]     [address] [id number]

[address] [place] [address], [place] [address]

                                         

 

                          [facility]     [address] [id number]

[address] [place] [address], [place] [address]

                          ,                

 

                          [facility]     [address] [id number]

[address] [place] [address], [place] [address]

                                         

 

                          [facility]     [address] [id number]

[address] [place] [address], [place] [address]

                                         

 

                          [facility]     [address] [id number]

[address] [place] [address], [place] [address]

                          ,                

 

                          [facility]     [address] [id number]

[address] [place] [address], [place] [address]

                                         

 

                          [facility]     [address] [id number]

[address] [place] [address], [place] [address]

                                         

 

                          [facility]     [address] [id number]

[address] [place] [address], [place] [address]

                                         

 

                          [facility]     [address] [id number]

[address] [place] [address], [place] [address]

                                         

 

                          [facility]     [address] [id number]

[address] [place] [address], [place] [address]

                                         

 

                          [facility]     [address] [id number]

[address] [place] [address], [place] [address]

                                         

 

                          [facility]     [address] [id number]

[address] [place] [address], [place] [address]

                                         

 

                          [facility]     [address] [id number]

[address] [place] [address], [place] [address]

                                         

 

                          [facility]     [address] [id number]

[address] [place] [address], [place] [address]

                                         

 

                          [facility]     [address] [id number]

[address] [place] [address], [place] [address]

                                         

 

                          [facility]     [address] [id number]

[address] [place] [address], [place] [address]

                          27 Mar, 2014               

 

                          [facility]     [address] [id number]

[address] [place] [address], [place] [address]

                          27 Mar, 2014               

 

                          [facility]     [address] [id number]

[address] [place] [address], [place] [address]

                                         

 

                          [facility]     [address] [id number]

[address] [place] [address], [place] [address]

                                         

 

                          [facility]     [address] [id number]

[address] [place] [address], [place] [address]

                                         

 

                          [facility]     [address] [id number]

[address] [place] [address], [place] [address]

                                         

 

                          [facility]     [address] [id number]

[address] [place] [address], [place] [address]

                          08 Oct, 2013               

 

                          [facility]     [address] [id number]

[address] [place] [address], [place] [address]

                          04 Oct, 2013               

 

                          [facility]     [address] [id number]

[address] [place] [address], [place] [address]

                          03 Oct, 2013               

 

                          [facility]     [address] [id number]

[address] [place] [address], [place] [address]

                          02 Oct, 2013               

 

                          [facility]     [address] [id number]

[address] [place] [address], [place] [address]

                          01 Oct, 2013               

 

                          [facility]     [address] [id number]

[address] [place] [address], [place] [address]

                          20 Sep, 2013               

 

                          [facility]     [address] [id number]

[address] [place] [address], [place] [address]

                          08 Aug, 2013               

 

                          [facility]     [address] [id number]

[address] [place] [address], [place] [address]

                          24 May, 2013               

 

                          [facility]     [address] [id number]

[address] [place] [address], [place] [address]

                          13 May, 2013               

 

                          [facility]     [address] [id number]

[address] [place] [address], [place] [address]

                                         

 

                          [facility]     [address] [id number]

[address] [place] [address], [place] [address]

                                         

 

                          [facility]     [address] [id number]

[address] [place] [address], [place] [address]

                                         

 

                          [facility]     [address] [id number]

[address] [place] [address], [place] [address]

                                         

 

                          [facility]     [address] [id number]

[address] [place] [address], [place] [address]

                          02 Oct, 2012               

 

                          [facility]     [address] [id number]

[address] [place] [address], [place] [address]

                          02 Oct, 2012               

 

                          [facility]     [address] [id number]

[address] [place] [address]

                          21 Sep, 2012               

 

                          [facility]     [address] [id number]

[address] [place] [address]

                          06 Aug, 2012               

 

                          [facility]     [address] [id number]

[address] [place] [address]

                          02 Aug, 2012               

 

                          [facility]     [address] [id number]

[address] [place] [address]

                                         

 

                          [facility]     [address] [id number]

[address] [place] [address]

                                         

 

                          [facility]     [address] [id number]

[address] [place] [address]

                          15 Eusebio, 2012               

 

                          [facility]     [address] [id number]

[address] [place] [address]

                                         

 

                          [facility]     [address] [id number]

[address] [place] [address]

                          10 Apr, 2012               

 

                          [facility]     [address] [id number]

[address] [place] [address]

                          22 Mar, 2012               

 

                          [facility]     [address] [id number]

[address] [place] [address]

                          20 Mar, 2012               

 

                          [facility]     [address] [id number]

[address] [place] [address]

                          14 Mar, 2012               

 

                          [facility]     [address] [facility] [id number]

[address] [place] [address]

                                         







IMMUNIZATIONS

No Known Immunizations



SOCIAL HISTORY

Never Assessed



REASON FOR VISIT





PLAN OF CARE





VITAL SIGNS





MEDICATIONS

Unknown Medications



RESULTS

No Results



PROCEDURES

No Known procedures



INSTRUCTIONS





MEDICATIONS ADMINISTERED

No Known Medications



MEDICAL (GENERAL) HISTORY





                    Type                Description         Date

 

                          Medical History           allergic rhinitis- rec's edwige donahue allergy injections from Dr Rojas office                            

 

                    Medical History     mood disorder        

 

                    Medical History     Leukocytosis- has been to hematology  

 

                    Medical History     Hyperlipidemia       

 

                          Medical History           Left leg pain- multiple imag

ing performed and ortho referral 

placed                                   

 

                          Medical History           TUBULAR ADENOMA AND GASTRITI

S ON COLONOSOCPY AUG 2016 -MULTIPLE 

POLYPS                                   

 

                    Medical History     Helicobacter pylori (H. pylori) infectio

n Hx  

 

                    Surgical History    cholecystectomy     2006

 

                    Surgical History     section    , 

 

                    Surgical History    tonsillectomy       2015

 

                    Surgical History    colonoscopy         2016

 

                    Surgical History    colonscopy          2017

 

                    Surgical History    Right Knee scope    2017

## 2020-06-23 NOTE — XMS REPORT
[facility]

                             Created on: 2020



Kate April

External Reference #: [id number]

: 1975

Sex: Female



Demographics





                          Address                   [address] [place]  [address]

 

                          Preferred Language        Unknown

 

                          Marital Status            Unknown

 

                          [employer] Affiliation     Unknown

 

                          Race                      Unknown

 

                          Ethnic Group              Unknown





Author





                          Author                    Migration, April Doctor

 

                          Organization              [facility]

 

                          Address                   Unknown

 

                          Phone                     Unavailable







Care Team Providers





                    Care Team Member Name Role                Phone

 

                    Migration,  Doctor  Unavailable         Unavailable







PROBLEMS





          Type      Condition ICD9-CM Code RXW18-IV Code Onset Dates Condition S

tatus SNOMED 

Code

 

          Problem   Mixed hyperlipidemia           E78.2               Active   

 [id number]

 

                          Problem                   Type 2 diabetes mellitus wit

hout complication, without long-term current

use of insulin              E11.9                     Active       [id number]

 

          Problem   History of leukocytosis           Z86.2               Active

    [id number]

 

          Problem   Anxiety             F41.9               Active    77820509

 

           Problem    Seasonal allergic rhinitis due to other allergic trigger  

          J30.89                

Active                                  [id number]

 

          Problem   GERD with esophagitis           K21.0               Active  

  [id number]

 

          Problem   Duarte's neuroma of right foot           G57.61             

 Active    24659685

 

          Problem   Chronic fatigue           R53.82              Active    8422

9001

 

          Problem   Tubular adenoma of colon           D12.6               Activ

e    [id number]

 

          Problem   Dysthymic disorder           F34.1               Active    7

3316687

 

          Problem   Arthritis           M19.90              Active    0974998

 

          Problem   Non morbid obesity           E66.9               Active    4

77832935

 

          Problem   Seasonal allergic rhinitis due to pollen           J30.1    

           Active    85047453







ALLERGIES

No Information



ENCOUNTERS





                Encounter       Location        Date            Diagnosis

 

                          [facility]     [address] [facility] [id number]

[address] [place] [address]

                          03 2020               

 

                          [facility]  [address] [facility] [id number]

[address] [place] [address] 

[address]                14 May, 2020              Heart palpitations R00.2

 

                          [facility]     [address] [facility] [id number]

[address] [place] [address]

                          01 May, 2020              Chronic fatigue R53.82 ; Mal

aise R53.81 ; Type 2 diabetes mellitus 

without complication, without long-term current use of insulin E11.9 ; Arthritis
M19.90 ; Insect bite (nonvenomous), right lower leg, initial encounter S80.861A 
and Bitten or stung by nonvenomous insect and other nonvenomous arthropods, 
initial encounter W57.XXXA

 

                          [facility]     [address] [facility] [id number]

[address] [place] [address]

                          30 2020              Chronic fatigue R53.82 ; Mal

aise R53.81 ; Type 2 diabetes mellitus 

without complication, without long-term current use of insulin E11.9 ; Arthritis
M19.90 ; Insect bite (nonvenomous), right lower leg, initial encounter S80.861A 
and Bitten or stung by nonvenomous insect and other nonvenomous arthropods, 
initial encounter W57.XXXA

 

                          [place] [address] [facility] [id number]

[address] [place] [address]

                          04 Mar, 2020              Type 2 diabetes mellitus wit

hout complication, without long-term 

current use of insulin E11.9

 

                          [place] [address] [place] [id number]

[address] [place] [address]

                                        Acute pain of left knee M25.

562

 

                          [place] [address] [place] [id number]

[address] [place] [address]

                          15 Brandon, 2020              Leg pain, left M79.605

 

                          [facility]  [address] [facility] [id number]

[address] [place] [address] 

[address]                              Leg pain, left M79.605

 

                          [facility]     [address] [facility] [id number]

[address] [place] [address]

                                        Type 2 diabetes mellitus wit

hout complication, without long-term 

current use of insulin E11.9 and GERD with esophagitis K21.0

 

                          [place] [address] [facility] [id number]

[address] [place] [address]

                          01 Oct, 2019              Encounter for immunization Z

23

 

                          [place] [address] [facility] [id number]

[address] [place] [address]

                                        Type 2 diabetes mellitus wit

hout complication, without long-term 

current use of insulin E11.9

 

                          [place] [address] [facility] [id number]

[address] [place] [address]

                                         

 

                          [place] [id number]

[address] [place] [address]

                                        Type 2 diabetes mellitus wit

hout complication, without long-term 

current use of insulin E11.9

 

                          [place] [address] [place] [id number]

[address] [place] [address]

                                        Weight loss R63.4

 

                          [place] [address] [place] [id number]

[address] [place] [address]

                          31 May, 2019              Type 2 diabetes mellitus wit

hout complication, without long-term 

current use of insulin E11.9

 

                          [place] [address] [place] [id number]

[address] [place] [address]

                          31 May, 2019              Type 2 diabetes mellitus wit

hout complication, without long-term 

current use of insulin E11.9 and Non morbid obesity E66.9

 

                          [place] [address] 

[address]                14 May, 2019              Seasonal allergic rhinitis d

ue to pollen J30.1 and Sore 

throat J02.9

 

                          [facility]  [address] [place] [id number]

[address] [place] [address] 

[address]                              Arthritis M19.90

 

                          [place] [address] [address]

                                        Seasonal allergic rhinitis d

ue to other allergic trigger J30.89 and

Dysthymic disorder F34.1

 

                          [place] [address] [place] [id number]

[address] [place] [address]

                          05 Dec, 2018              Bilateral otitis media with 

effusion H65.93 and Anxiety F41.9

 

                          [place] [address] [place] [id number]

[address] [place] [address]

                                        Type 2 diabetes mellitus wit

hout complication, without long-term 

current use of insulin E11.9

 

                          [place] [address] [facility] [id number]

[address] [place] [address]

                                        Pharyngitis due to Streptoco

ccus species J02.0

 

                          [place] [address] [place] [address] [address]

                                         

 

                          [facility]  [address] 

[address]                10 Jul, 2018              Fever, unspecified fever cau

se R50.9 and Lymphadenopathy

R59.1

 

                          [facility]  [address] 

[address]                              Pharyngitis due to other org

anism J02.8

 

                          [place] [address] [address]

                                         

 

                          [place] [address] [address]

                                        Type 2 diabetes mellitus wit

hout complication, without long-term 

current use of insulin E11.9 and Other eczema L30.8

 

                          [facility] [place] [address] 

[address]                              Acute pain of left shoulder 

M25.512

 

                          [place] [address] [address]

                                         

 

                          [place] [address] [address]

                          15 2018              Type 2 diabetes mellitus wit

hout complication, without long-term 

current use of insulin E11.9

 

                          [place] [address] [address]

                                        Type 2 diabetes mellitus wit

hout complication, without long-term 

current use of insulin E11.9 ; Tubular adenoma of colon D12.6 ; Mixed 
hyperlipidemia E78.2 ; History of leukocytosis Z86.2 and Screening breast 
examination Z12.31

 

                          [place] [address] [address]

                                        Metatarsalgia of right foot 

M77.41 and Type 2 diabetes mellitus 

without complication, without long-term current use of insulin E11.9

 

                          [place] [address] [address]

                                        Capsulitis M77.9 and Metatar

salgia of right foot M77.41

 

                          [place] [address] [place] [id number] [place] [address]

                          08 Sep, 2017              Capsulitis M77.9

 

                          [place] [address] [place] [id number] [place] [address]

                          06 Sep, 2017               

 

                          [place] [address] [address]

                          03 Aug, 2017              Type 2 diabetes mellitus wit

hout complication, without long-term 

current use of insulin E11.9 ; Tubular adenoma of colon D12.6 and Mixed 
hyperlipidemia E78.2

 

                          [place] [address] [address]

                                        Metatarsalgia of right foot 

M77.41 and Capsulitis M77.9

 

                          [place] [address] [address]

                                         

 

                          [facility]  [address] 

[address]                              Duarte's neuroma of right fo

ot G57.61

 

                          [place] [address] [address]

                                        Mixed hyperlipidemia E78.2 a

nd Type 2 diabetes mellitus without 

complication, without long-term current use of insulin E11.9

 

                          [place] [address] [address]

                          10 Brandon, 2017              Type 2 diabetes mellitus wit

hout complication, without long-term 

current use of insulin E11.9 ; Tubular adenoma of colon D12.6 and Mixed 
hyperlipidemia E78.2

 

                          [place] [address] [address]

                          22 Dec, 2016              Mixed hyperlipidemia E78.2

 

                          [place] [address] [place] [id number] [place] [address]

                                         

 

                          [place] [address] [address]

                                        Mixed hyperlipidemia E78.2

 

                          [place] [address] [address]

                                        Mixed hyperlipidemia E78.2

 

                          [facility]     [address] [address]

                          08 Sep, 2016               

 

                          [place] [address] [address]

                          10 Aug, 2016              Type 2 diabetes mellitus wit

hout complication, without long-term 

current use of insulin E11.9 ; Helicobacter pylori (H. pylori) infection A04.8 
and Mixed hyperlipidemia E78.2

 

                          [place] [address] [address]

                          08 Aug, 2016              Type 2 diabetes mellitus wit

hout complication, without long-term 

current use of insulin E11.9

 

                          [place] [address] [address]

                          03 Aug, 2016              Type 2 diabetes mellitus wit

hout complication, without long-term 

current use of insulin E11.9

 

                          [place] [address] [address]

                          02 Aug, 2016              Dyspepsia R10.13 ; Upper abd

ominal pain R10.10 ; Bowel habit 

changes R19.4 ; History of leukocytosis Z86.2 and Helicobacter pylori (H. 
pylori) infection A04.8

 

                          [facility]  [address] [place] [id number]

[address] [place] [address] 

[address]                27 May, 2016              Environmental allergies Z91.

09

 

                          [place] [address] [address]

                                        Left leg pain M79.605 ; Loca

lized swelling of lower leg R22.40 and 

Upper respiratory infection J06.9

 

                          [facility]     [address] [place] [id number]

[address] [place] [address]

                          28 Dec, 2015               

 

                          [place] [address] [place] [address]

[address] [place] [address]

                          21 Dec, 2015              Left leg pain M79.605

 

                          [facility]     [address] [facility] [id number]

[address] [place] [address]

                          10 Dec, 2015              Left leg pain M79.605 and Lo

calized swelling of lower leg R22.40

 

                          [facility]     [address] [facility] [id number]

[address] [place] [address]

                          01 Dec, 2015              Left leg pain M79.605

 

                          [facility]     [address] [facility] [id number]

[address] [place] [address]

                                        Encounter for immunization Z

23

 

                          [place] [address] [place] [id number] [place] [address]

                          12 Oct, 2015              Bronchitis J40

 

                          [place] [address] [place] [id number]

[address] [place] [address]

                          30 Sep, 2015              Physical exam, pre-employmen

t V70.5 ; Encounter for PPD test V74.1 

and Hyperlipidemia 272.4

 

                          [facility]     [address] [facility] [id number]

[address] [place] [address]

                          21 Sep, 2015               

 

                          [facility]     [address] [facility] [id number]

[address] [place] [address]

                          21 Sep, 2015               

 

                          [facility]     [address] [place] [id number]

[address] [place] [address]

                          09 Sep, 2015               

 

                          [facility]     [address] [facility] [id number]

[address] [place] [address]

                          04 Sep, 2015              Elevated blood sugar 790.29

 

                          [facility]     [address] [facility] [id number]

[address] [place] [address]

                          03 Sep, 2015              Elevated blood sugar 790.29

 

                          [facility]     [address] [facility] [id number]

[address] [place] [address]

                          03 Sep, 2015              Palpitations 785.1

 

                          [facility]     [address] [place] [id number]

[address] [place] [address]

                          01 Sep, 2015              Palpitations 785.1

 

                          [facility]     [address] [id number]

[address] [place] [address]

                                         

 

                          [facility]     [address] [id number]

[address] [place] [address]

                          28 May, 2015              Hand pain, right 729.5 and D

epression with anxiety 300.4

 

                          [facility]     [address] [id number]

[address] [place] [address]

                                         

 

                          [facility]     [address] [id number]

[address] [place] [address]

                                         

 

                          [facility]     [address] [id number]

[address] [place] [address]

                          05 Mar, 2015               

 

                          [facility]     [address] [id number]

[address] [place] [address]

                          05 Mar, 2015               

 

                          [facility]     [address] [id number]

[address] [place] [address]

                                         

 

                          [facility]     [address] [id number]

[address] [place] [address]

                                         

 

                          [facility]     [address] [id number]

[address] [place] [address]

                          10 Feb, 2015               

 

                          [facility]     [address] [id number]

[address] [place] [address]

                          10 Feb, 2015               

 

                          [facility]     [address] [id number]

[address] [place] [address]

                                         

 

                          [facility]     [address] [id number]

[address] [place] [address]

                                         

 

                          [facility]     [address] [id number]

[address] [place] [address]

                                         

 

                          [facility]     [address] [id number]

[address] [place] [address]

                                         

 

                          [facility]     [address] [id number]

[address] [place] [address]

                                         

 

                          [facility]     [address] [id number]

[address] [place] [address], [place] [address]

                                         

 

                          [facility]     [address] [id number]

[address] [place] [address], [place] [address]

                                         

 

                          [facility]     [address] [id number]

[address] [place] [address], [place] [address]

                                         

 

                          [facility]     [address] [id number]

[address] [place] [address], [place] [address]

                                         

 

                          [facility]     [address] [id number]

[address] [place] [address], [place] [address]

                                         

 

                          [facility]     [address] [id number]

[address] [place] [address], [place] [address]

                                         

 

                          [facility]     [address] [id number]

[address] [place] [address], [place] [address]

                                         

 

                          [facility]     [address] [id number]

[address] [place] [address], [place] [address]

                          15 [place], 2015               

 

                          [facility]     [address] [id number]

[address] [place] [address], [place] [address]

                          15 [place], 2015               

 

                          [facility]     [address] [id number]

[address] [place] [address], [place] [address]

                                         

 

                          [facility]     [address] [id number]

[address] [place] [address], [place] [address]

                                         

 

                          [facility]     [address] [id number]

[address] [place] [address], [place] [address]

                          15 Oct, 2014               

 

                          [facility]     [address] [id number]

[address] [place] [address], [place] [address]

                          15 Oct, 2014               

 

                          [facility]     [address] [id number]

[address] [place] [address], [place] [address]

                          14 Oct, 2014               

 

                          [facility]     [address] [id number]

[address] [place] [address], [place] [address]

                          14 Oct, 2014               

 

                          [facility]     [address] [id number]

[address] [place] [address] [place] [address]

                          07 Oct, 2014               

 

                          [facility]     [address] [id number]

[address] [place] [address], [place] [address]

                          07 Oct, 2014               

 

                          [facility]     [address] [id number]

[address] [place] [address], [place] [address]

                          12 Sep,                

 

                          [facility]     [address] [id number]

[address] [place] [address], [place] [address]

                          12 Sep,                

 

                          [facility]     [address] [id number]

[address] [place] [address], [place] [address]

                          04 Sep,                

 

                          [facility]     [address] [id number]

[address] [place] [address], [place] [address]

                          03 Sep,                

 

                          [facility]     [address] [id number]

[address] [place] [address], [place] [address]

                          03 Sep,                

 

                          [facility]     [address] [id number]

[address] [place] [address], [place] [address]

                          02 Sep,                

 

                          [facility]     [address] [id number]

[address] [place] [address], [place] [address]

                          02 Sep,                

 

                          [facility]     [address] [id number]

[address] [place] [address], [place] [address]

                          30 Aug, 2014               

 

                          [facility]     [address] [id number]

[address] [place] [address], [place] [address]

                          30 Aug, 2014               

 

                          [facility]     [address] [id number]

[address] [place] [address], [place] [address]

                          19 Aug, 2014               

 

                          [facility]     [address] [id number]

[address] [place] [address], [place] [address]

                          19 Aug, 2014               

 

                          [facility]     [address] [id number]

[address] [place] [address], [place] [address]

                          14 Aug, 2014               

 

                          [facility]     [address] [id number]

[address] [place] [address], [place] [address]

                          14 Aug, 2014               

 

                          [facility]     [address] [id number]

[address] [place] [address], [place] [address]

                          13 Aug, 2014               

 

                          [facility]     [address] [id number]

[facility] [address]

                          13 Aug, 2014               

 

                          [facility]     [address] [id number]

[facility] [address]

                                         

 

                          [facility]     [address] [id number]

[facility] [address]

                                         

 

                          [facility]     [address] [id number]

[address] [place] [address], [place] [address]

                                         

 

                          [facility]     [address] [id number]

[address] [place] [address], [place] [address]

                                         

 

                          [facility]     [address] [id number]

[address] [place] [address], [place] [address]

                                         

 

                          [facility]     [address] [id number]

[address] [place] [address], [place] [address]

                                         

 

                          [facility]     [address] [id number]

[address] [place] [address], [place] [address]

                                         

 

                          [facility]     [address] [id number]

[address] [place] [address], [place] [address]

                                         

 

                          [facility]     [address] [id number]

[address] [place] [address], [place] [address]

                                         

 

                          [facility]     [address] [id number]

[address] [place] [address], [place] [address]

                                         

 

                          [facility]     [address] [id number]

[address] [place] [address], [place] [address]

                                         

 

                          [facility]     [address] [id number]

[address] [place] [address], [place] [address]

                                         

 

                          [facility]     [address] [id number]

[address] [place] [address], [place] [address]

                                         

 

                          [facility]     [address] [id number]

[address] [place] [address], [place] [address]

                                         

 

                          [facility]     [address] [id number]

[address] [place] [address], [place] [address]

                                         

 

                          [facility]     [address] [id number]

[facility] [address]

                                         

 

                          [facility]     [address] [id number]

[address] [place] [address], [place] [address]

                                         

 

                          [facility]     [address] [id number]

[address] [place] [address], [place] [address]

                                         

 

                          [facility]     [address] [id number]

[address] [place] [address], [place] [address]

                                         

 

                          [facility]     [address] [id number]

[address] [place] [address], [place] [address]

                                         

 

                          [facility]     [address] [id number]

[address] [place] [address], [place] [address]

                                         

 

                          [facility]     [address] [id number]

[address] [place] [address], [place] [address]

                                         

 

                          [facility]     [address] [id number]

[address] [place] [address], [place] [address]

                                         

 

                          [facility]     [address] [id number]

[address] [place] [address], [place] [address]

                                         

 

                          [facility]     [address] [id number]

[address] [place] [address], [place] [address]

                                         

 

                          [facility]     [address] [id number]

[address] [place] [address], [place] [address]

                                         

 

                          [facility]     [address] [id number]

[address] [place] [address], [place] [address]

                                         

 

                          [facility]     [address] [id number]

[address] [place] [address], [place] [address]

                          27 Mar, 2014               

 

                          [facility]     [address] [id number]

[address] [place] [address], [place] [address]

                          27 Mar, 2014               

 

                          [facility]     [address] [id number]

[address] [place] [address], [place] [address]

                                         

 

                          [facility]     [address] [id number]

[address] [place] [address], [place] [address]

                                         

 

                          [facility]     [address] [id number]

[address] [place] [address], [place] [address]

                                         

 

                          [facility]     [address] [id number]

[address] [place] [address], [place] [address]

                                         

 

                          [facility]     [address] [id number]

[address] [place] [address], [place] [address]

                          08 Oct, 2013               

 

                          [facility]     [address] [id number]

[address] [place] [address], [place] [address]

                          04 Oct, 2013               

 

                          [facility]     [address] [id number]

[address] [place] [address], [place] [address]

                          03 Oct, 2013               

 

                          [facility]     [address] [id number]

[address] [place] [address], [place] [address]

                          02 Oct, 2013               

 

                          [facility]     [address] [id number]

[address] [place] [address], [place] [address]

                          01 Oct, 2013               

 

                          [facility]     [address] [id number]

[address] [place] [address], [place] [address]

                          20 Sep, 2013               

 

                          [facility]     [address] [id number]

[address] [place] [address], [place] [address]

                          08 Aug, 2013               

 

                          [facility]     [address] [id number]

[address] [place] [address], [place] [address]

                          24 May, 2013               

 

                          [facility]     [address] [id number]

[address] [place] [address], [place] [address]

                          13 May, 2013               

 

                          [facility]     [address] [id number]

[address] [place] [address], [place] [address]

                                         

 

                          [facility]     [address] [id number]

[address] [place] [address], [place] [address]

                                         

 

                          [facility]     [address] [id number]

[address] [place] [address], [place] [address]

                                         

 

                          [facility]     [address] [id number]

[address] [place] [address], [place] [address]

                                         

 

                          [facility]     [address] [id number]

[address] [place] [address]

                          02 Oct, 2012               

 

                          [facility]     [address] [id number]

[address] [place] [address]

                          02 Oct, 2012               

 

                          [facility]     [address] [id number]

[address] [place] [address]

                          21 Sep, 2012               

 

                          [facility]     [address] [id number]

[address] [place] [address]

                          06 Aug, 2012               

 

                          [facility]     [address] [id number]

[address] [place] [address]

                          02 Aug, 2012               

 

                          [facility]     [address] [id number]

[address] [place] [address]

                                         

 

                          [facility]     [address] [id number]

[address] [place] [address]

                                         

 

                          [facility]     [address] [id number]

[address] [place] [address]

                          15 Eusebio, 2012               

 

                          [facility]     [address] [id number]

[address] [place] [address]

                                         

 

                          [facility]     [address] [id number]

[address] [place] [address]

                          10 Apr, 2012               

 

                          [facility]     [address] [id number]

[address] [place] [address]

                          22 Mar, 2012               

 

                          [facility]     [address] [id number]

[address] [place] [address]

                          20 Mar, 2012               

 

                          [facility]     [address] [id number]

[address] [place] [address]

                          14 Mar, 2012               

 

                          [facility]     [address] [id number]

[address] [place] [address]

                                         







IMMUNIZATIONS

No Known Immunizations



SOCIAL HISTORY

Never Assessed



REASON FOR VISIT





PLAN OF CARE





VITAL SIGNS





MEDICATIONS

Unknown Medications



RESULTS

No Results



PROCEDURES





                Procedure       Date Ordered    Result          Body Site

 

                THER/PROPH/DIAG INJ, SC/IM Aug 08, 2013                     

 

                Medroxyprogesterone inj Aug 08, 2013                     

 

                URINE PREGNANCY TEST Aug 08, 2013                     







INSTRUCTIONS





MEDICATIONS ADMINISTERED

No Known Medications



MEDICAL (GENERAL) HISTORY





                    Type                Description         Date

 

                          Medical History           allergic rhinitis- rec's edwige donahue allergy injections from Dr Rojas office                            

 

                    Medical History     mood disorder        

 

                    Medical History     Leukocytosis- has been to hematology  

 

                    Medical History     Hyperlipidemia       

 

                          Medical History           Left leg pain- multiple imag

ing performed and ortho referral 

placed                                   

 

                          Medical History           TUBULAR ADENOMA AND GASTRITI

S ON COLONOSOCPY AUG 2016 -MULTIPLE 

POLYPS                                   

 

                    Medical History     Helicobacter pylori (H. pylori) infectio

n Hx  

 

                    Surgical History    cholecystectomy     2006

 

                    Surgical History     section    , 

 

                    Surgical History    tonsillectomy       2015

 

                    Surgical History    colonoscopy         2016

 

                    Surgical History    colonscopy          2017

 

                    Surgical History    Right Knee scope    2017

## 2020-06-23 NOTE — XMS REPORT
[facility]

                             Created on: 10/05/2019



Kate April

External Reference #: [id number]

: 1975

Sex: Female



Demographics





                          Address                   [address] [place]  [address]

 

                          Preferred Language        Unknown

 

                          Marital Status            Unknown

 

                          Yarsanism Affiliation     Unknown

 

                          Race                      Unknown

 

                          Ethnic Group              Unknown





Author





                          Author                    ANNEMARIE April VENECIA

 

                          [facility]

 

                          Address                   [address] [place]  [address]



 

                          Phone                     [phone]







Care Team Providers





                    Care Team Member Name Role                Phone

 

                    ANNETTE SHARPEWNYA       Unavailable         [phone]







PROBLEMS





          Type      Condition ICD9-CM Code CTE76-GT Code Onset Dates Condition S

tatus SNOMED 

Code

 

          Problem   Tubular adenoma of colon           D12.6               Activ

e    [id number]

 

          Problem   Duarte's neuroma of right foot           G57.61             

 Active    61922874

 

          Problem   History of leukocytosis           Z86.2               Active

    [id number]

 

          Problem   Non morbid obesity           E66.9               Active    4

[id number]

 

          Problem   Mixed hyperlipidemia           E78.2               Active   

 [id number]

 

          Problem   Seasonal allergic rhinitis due to pollen           J30.1    

           Active    06127608

 

                          Problem                   Type 2 diabetes mellitus wit

hout complication, without long-term current

use of insulin              E11.9                     Active       [id number]

 

          Problem   Anxiety             F41.9               Active    38649008

 

           Problem    Seasonal allergic rhinitis due to other allergic trigger  

          J30.89                

Active                                  [id number]

 

          Problem   Dysthymic disorder           F34.1               Active    7

[id number]

 

          Problem   Arthritis           M19.90              Active    0148528







ALLERGIES

No Information



ENCOUNTERS





                Encounter       Location        Date            Diagnosis

 

                          [place] [address] [address] [facility] [id number]

[address] [place] [address]

                          01 Oct, 2019              Encounter for immunization Z

23

 

                          [place] [address] [address] [facility] [id number]

[address] [place] [address]

                                        Type 2 diabetes mellitus wit

hout complication, without long-term 

current use of insulin E11.9

 

                          [place] [address] [address] [facility] [id number]

[address] [place] [address]

                                         

 

                          [place] [address] [address] [facility] [id number]

[address] [place] [address]

                                        Type 2 diabetes mellitus wit

hout complication, without long-term 

current use of insulin E11.9

 

                          [place] [address] [place] [id number]

[address] [place] [address]

                                        Weight loss R63.4

 

                          [place] [address] [address]

                          31 May, 2019              Type 2 diabetes mellitus wit

hout complication, without long-term 

current use of insulin E11.9

 

                          [place] [address] [place] [id number]

[address] [place] [address]

                          31 May, 2019              Type 2 diabetes mellitus wit

hout complication, without long-term 

current use of insulin E11.9 and Non morbid obesity E66.9

 

                          [place] [address] 

[address]                14 May, 2019              Seasonal allergic rhinitis d

ue to pollen J30.1 and Sore 

throat J02.9

 

                          [place] [address] 

[address]                              Arthritis M19.90

 

                          [place] [address] [address]

                                        Seasonal allergic rhinitis d

ue to other allergic trigger J30.89 and

Dysthymic disorder F34.1

 

                          [place] [address] [address]

                          05 Dec, 2018              Bilateral otitis media with 

effusion H65.93 and Anxiety F41.9

 

                          [place] [address] [address]

                                        Type 2 diabetes mellitus wit

hout complication, without long-term 

current use of insulin E11.9

 

                          [place] [address] [place] [id number]

[address] [place] [address]

                                        Pharyngitis due to Streptoco

ccus species J02.0

 

                          [place] [address] [place] [id number]

[address] [place] [address]

                                         

 

                          [place] [address] 

[address]                10 Jul, 2018              Fever, unspecified fever cau

se R50.9 and Lymphadenopathy

R59.1

 

                          [facility]  [address] 

[address]                              Pharyngitis due to other org

anism J02.8

 

                          [place] [address] [address]

                                         

 

                          [place] [address] [address]

                                        Type 2 diabetes mellitus wit

hout complication, without long-term 

current use of insulin E11.9 and Other eczema L30.8

 

                          [facility]ALK IN [place] [address] 

[address]                              Acute pain of left shoulder 

M25.512

 

                          [place] [address] [address]

                                         

 

                          [place] [address] [address]

                          15 2018              Type 2 diabetes mellitus wit

hout complication, without long-term 

current use of insulin E11.9

 

                          [place] [address] [address]

                          06 2018              Type 2 diabetes mellitus wit

hout complication, without long-term 

current use of insulin E11.9 ; Tubular adenoma of colon D12.6 ; Mixed 
hyperlipidemia E78.2 ; History of leukocytosis Z86.2 and Screening breast 
examination Z12.31

 

                          [place] [address] [address]

                                        Metatarsalgia of right foot 

M77.41 and Type 2 diabetes mellitus 

without complication, without long-term current use of insulin E11.9

 

                          [place] [address] [address]

                                        Capsulitis M77.9 and Metatar

salgia of right foot M77.41

 

                          [place] [address] [place] [address]

                          08 Sep, 2017              Capsulitis M77.9

 

                          [facility]     [address] [facility] [id number]

[address] [place] [address]

                          06 Sep, 2017               

 

                          [facility]     [address] [facility] [id number]

[address] [place] [address]

                          03 Aug, 2017              Type 2 diabetes mellitus wit

hout complication, without long-term 

current use of insulin E11.9 ; Tubular adenoma of colon D12.6 and Mixed 
hyperlipidemia E78.2

 

                          [facility]     [address] [facility] [id number]

[address] [place] [address]

                                        Metatarsalgia of right foot 

M77.41 and Capsulitis M77.9

 

                          [facility]     [address] [facility] [id number]

[address] [place] [address]

                                         

 

                          [facility] WALK IN [facility]  [address] [place] [id number]

[address] [place] [address] 

[address]                              Duarte's neuroma of right fo

ot G57.61

 

                          [facility]     [address] [facility] [id number]

[address] [place] [address]

                                        Mixed hyperlipidemia E78.2 a

nd Type 2 diabetes mellitus without 

complication, without long-term current use of insulin E11.9

 

                          [place] [address] [place] [id number]

[address] [place] [address]

                          10 Brandon, 2017              Type 2 diabetes mellitus wit

hout complication, without long-term 

current use of insulin E11.9 ; Tubular adenoma of colon D12.6 and Mixed 
hyperlipidemia E78.2

 

                          [place] [address] [address] [facility] [id number]

[address] [place] [address]

                          22 Dec, 2016              Mixed hyperlipidemia E78.2

 

                          [place] [address] [address] [facility] [id number]

[address] [place] [address]

                                         

 

                          [place] [address] [place] [id number]

[address] [place] [address]

                                        Mixed hyperlipidemia E78.2

 

                          [place] [address] [address]

                                        Mixed hyperlipidemia E78.2

 

                          [facility]     [address] [address]

                          08 Sep, 2016               

 

                          [place] [address] [address]

                          10 Aug, 2016              Type 2 diabetes mellitus wit

hout complication, without long-term 

current use of insulin E11.9 ; Helicobacter pylori (H. pylori) infection A04.8 
and Mixed hyperlipidemia E78.2

 

                          [place] [address] [address]

                          08 Aug, 2016              Type 2 diabetes mellitus wit

hout complication, without long-term 

current use of insulin E11.9

 

                          [place] [address] [address]

                          03 Aug, 2016              Type 2 diabetes mellitus wit

hout complication, without long-term 

current use of insulin E11.9

 

                          [place] [address] [address]

                          02 Aug, 2016              Dyspepsia R10.13 ; Upper abd

ominal pain R10.10 ; Bowel habit 

changes R19.4 ; History of leukocytosis Z86.2 and Helicobacter pylori (H. 
pylori) infection A04.8

 

                          [facility] [place] [facility]  [address] 

[address]                27 May, 2016              Environmental allergies Z91.

09

 

                          [place] [address] [address]

                                        Left leg pain M79.605 ; Loca

lized swelling of lower leg R22.40 and 

Upper respiratory infection J06.9

 

                          [facility]     [address] [address]

                          28 Dec, 2015               

 

                          [place] [address] [address]

                          21 Dec, 2015              Left leg pain M79.605

 

                          [facility]     [address] [address]

                          10 Dec, 2015              Left leg pain M79.605 and Lo

calized swelling of lower leg R22.40

 

                          [facility]     [address] [facility] [id number]

[address] [place] [address]

                          01 Dec, 2015              Left leg pain M79.605

 

                          [facility]     [address] [facility] [id number]

[address] [place] [address]

                                        Encounter for immunization Z

23

 

                          [facility]     [address] [facility] [id number]

[address] [place] [address]

                          12 Oct, 2015              Bronchitis J40

 

                          [facility]     [address] [facility] [id number]

[address] [place] [address]

                          30 Sep, 2015              Physical exam, pre-employmen

t V70.5 ; Encounter for PPD test V74.1 

and Hyperlipidemia 272.4

 

                          [facility]     [address] [facility] [id number]

[address] [place] [address]

                          21 Sep, 2015               

 

                          [facility]     [address] [facility] [id number]

[address] [place] [address]

                          21 Sep, 2015               

 

                          [facility]     [address] [facility] [id number]

[address] [place] [address]

                          09 Sep, 2015               

 

                          [facility]     [address] [facility] [id number]

[address] [place] [address]

                          04 Sep, 2015              Elevated blood sugar 790.29

 

                          [facility]     [address] [facility] [id number]

[address] [place] [address]

                          03 Sep, 2015              Elevated blood sugar 790.29

 

                          [facility]     [address] [facility] [id number]

[address] [place] [address]

                          03 Sep, 2015              Palpitations 785.1

 

                          [facility]     [address] [facility] [id number]

[address] [place] [address]

                          01 Sep, 2015              Palpitations 785.1

 

                          [facility]     [address] [facility] [id number]

[address] [place] [address]

                                         

 

                          [facility]     [address] [id number]

[address] [place] [address]

                          28 May, 2015              Hand pain, right 729.5 and D

epression with anxiety 300.4

 

                          [facility]     [address] [id number]

[address] [place] [address]

                                         

 

                          [facility]     [address] [id number]

[address] [place] [address]

                                         

 

                          [facility]     [address] [id number]

[address] [place] [address]

                          05 Mar, 2015               

 

                          [facility]     [address] [id number]

[address] [place] [address]

                          05 Mar, 2015               

 

                          [facility]     [address] [id number]

[address] [place] [address]

                                         

 

                          [facility]     [address] [id number]

[address] [place] [address]

                                         

 

                          [facility]     [address] [id number]

[address] [place] [address]

                          10 Feb, 2015               

 

                          [facility]     [address] [id number]

[address] [place] [address]

                          10 Feb, 2015               

 

                          [facility]     [address] [id number]

[address] [place] [address]

                                         

 

                          [facility]     [address] [id number]

[address] [place] [address]

                                         

 

                          [facility]     [address] [id number]

[address] [place] [address]

                                         

 

                          [facility]     [address] [id number]

[address] [place] [address]

                                         

 

                          [facility]     [address] [id number]

[address] [place] [address]

                                         

 

                          [facility]     [address] [id number]

[address] [place] [address]

                                         

 

                          [facility]     [address] [id number]

[address] [place] [address], [place] [address]

                          19 2015               

 

                          [facility]     [address] [id number]

[address] [place] [address], [place] [address]

                                         

 

                          [facility]     [address] [id number]

[address] [place] [address], [place] [address]

                          16 2015               

 

                          [facility]     [address] [id number]

[address] [place] [address], [place] [address]

                                         

 

                          [facility]     [address] [id number]

[address] [place] [address], [place] [address]

                                         

 

                          [facility]     [address] [id number]

[address] [place] [address], [place] [address]

                                         

 

                          [facility]     [address] [id number]

[address] [place] [address], [place] [address]

                          15 [place], 2015               

 

                          [facility]     [address] [id number]

[address] [place] [address], [place] [address]

                          15 [place], 2015               

 

                          [facility]     [address] [id number]

[address] [place] [address], [place] [address]

                                         

 

                          [facility]     [address] [id number]

[address] [place] [address], [place] [address]

                                         

 

                          [facility]     [address] [id number]

[address] [place] [address], [place] [address]

                          15 Oct, 2014               

 

                          [facility]     [address] [id number]

[address] [place] [address], [place] [address]

                          15 Oct, 2014               

 

                          [facility]     [address] [id number]

[address] [place] [address], [place] [address]

                          14 Oct, 2014               

 

                          [facility]     [address] [id number]

[address] [place] [address], [place] [address]

                          14 Oct, 2014               

 

                          [facility]     [address] [id number]

[address] [place] [address], [place] [address]

                          07 Oct, 2014               

 

                          [facility]     [address] [id number]

[address] [place] [address], [place] [address]

                          07 Oct, 2014               

 

                          [facility]     [address] [id number]

[address] [place] [address], [place] [address]

                          12 Sep,                

 

                          [facility]     [address] [id number]

[address] [place] [address], [place] [address]

                          12 Sep, 2014               

 

                          [facility]     [address] [id number]

[address] [place] [address], [place] [address]

                          04 Sep,                

 

                          [facility]     [address] [id number]

[address] [place] [address], [place] [address]

                          03 Sep,                

 

                          [facility]     [address] [id number]

[address] [place] [address], [place] [address]

                          03 Sep,                

 

                          [facility]     [address] [id number]

[address] [place] [address], [place] [address]

                          02 Sep, 2014               

 

                          [facility]     [address] [id number]

[address] [place] [address], [place] [address]

                          02 Sep, 2014               

 

                          [facility]     [address] [id number]

[address] [place] [address], [place] [address]

                          30 Aug, 2014               

 

                          [facility]     [address] [id number]

[address] [place] [address], [place] [address]

                          30 Aug, 2014               

 

                          [facility]     [address] [id number]

[address] [place] [address], [place] [address]

                          19 Aug, 2014               

 

                          [facility]     [address] [id number]

[address] [place] [address], [place] [address]

                          19 Aug, 2014               

 

                          [facility]     [address] [id number]

[address] [place] [address], [place] [address]

                          14 Aug, 2014               

 

                          [facility]     [address] [id number]

[address] [place] [address], [place] [address]

                          14 Aug, 2014               

 

                          [facility]     [address] [id number]

[address] [place] [address], [place] [address]

                          13 Aug, 2014               

 

                          [facility]     [address] [id number]

[address] [place] [address], [place] [address]

                          13 Aug, 2014               

 

                          [facility]     [address] [id number]

[address] [place] [address], [place] [address]

                          ,                

 

                          [facility]     [address] [id number]

[facility] [place] [address]

                          ,                

 

                          [facility]     [address] [id number]

[address] [place] [address], [place] [address]

                                         

 

                          [facility]     [address] [id number]

[address] [place] [address], [place] [address]

                                         

 

                          [facility]     [address] [id number]

[address] [place] [address], [place] [address]

                                         

 

                          [facility]     [address] [id number]

[address] [place] [address], [place] [address]

                                         

 

                          [facility]     [address] [id number]

[address] [place] [address], [place] [address]

                                         

 

                          [facility]     [address] [id number]

[address] [place] [address], [place] [address]

                                         

 

                          [facility]     [address] [id number]

[address] [place] [address], [place] [address]

                                         

 

                          [facility]     [address] [id number]

[address] [place] [address], [place] [address]

                                         

 

                          [facility]     [address] [id number]

[address] [place] [address], [place] [address]

                                         

 

                          [facility]     [address] [id number]

[address] [place] [address], [place] [address]

                          ,                

 

                          [facility]     [address] [id number]

[address] [place] [address], [place] [address]

                                         

 

                          [facility]     [address] [id number]

[address] [place] [address], [place] [address]

                                         

 

                          [facility]     [address] [id number]

[address] [place] [address], [place] [address]

                                         

 

                          [facility]     [address] [id number]

[address] [place] [address], [place] [address]

                          ,                

 

                          [facility]     [address] [id number]

[facility] [place] [address]

                                         

 

                          [facility]     [address] [id number]

[facility] [place] [address]

                                         

 

                          [facility]     [address] [id number]

[facility] [place] [address]

                                         

 

                          [facility]     [address] [id number]

[address] [place] [address], [place] [address]

                                         

 

                          [facility]     [address] [id number]

[address] [place] [address], [place] [address]

                                         

 

                          [facility]     [address] [id number]

[address] [place] [address], [place] [address]

                                         

 

                          [facility]     [address] [id number]

[address] [place] [address], [place] [address]

                                         

 

                          [facility]     [address] [id number]

[address] [place] [address], [place] [address]

                                         

 

                          [facility]     [address] [id number]

[address] [place] [address], [place] [address]

                                         

 

                          [facility]     [address] [id number]

[address] [place] [address], [place] [address]

                                         

 

                          [facility]     [address] [id number]

[address] [place] [address], [place] [address]

                                         

 

                          [facility]     [address] [id number]

[address] [place] [address], [place] [address]

                          27 Mar, 2014               

 

                          [facility]     [address] [id number]

[address] [place] [address], [place] [address]

                          27 Mar, 2014               

 

                          [facility]     [address] [id number]

[address] [place] [address], [place] [address]

                                         

 

                          [facility]     [address] [id number]

[address] [place] [address], [place] [address]

                                         

 

                          [facility]     [address] [id number]

[address] [place] [address], [place] [address]

                                         

 

                          [facility]     [address] [id number]

[address] [place] [address], [place] [address]

                                         

 

                          [facility]     [address] [id number]

[address] [place] [address], [place] [address]

                          08 Oct, 2013               

 

                          [facility]     [address] [id number]

[address] [place] [address], [place] [address]

                          04 Oct, 2013               

 

                          [facility]     [address] [id number]

[address] [place] [address], [place] [address]

                          03 Oct, 2013               

 

                          [facility]     [address] [id number]

[address] [place] [address], [place] [address]

                          02 Oct, 2013               

 

                          [facility]     [address] [id number]

[address] [place] [address], [place] [address]

                          01 Oct, 2013               

 

                          [facility]     [address] [id number]

[address] [place] [address], [place] [address]

                          20 Sep, 2013               

 

                          [facility]     [address] [id number]

[address] [place] [address]

                          08 Aug, 2013               

 

                          [facility]     [address] [id number]

[address] [place] [address], [place] [address]

                          24 May, 2013               

 

                          [facility]     [address] [id number]

[address] [place] [address]

                          13 May, 2013               

 

                          [facility]     [address] [id number]

[address] [place] [address]

                                         

 

                          [facility]     [address] [id number]

[address] [place] [address]

                                         

 

                          [facility]     [address] [id number]

[address] [place] [address], [place] [address]

                                         

 

                          [facility]     [address] [id number]

[address] [place] [address]

                                         

 

                          [facility]     [address] [id number]

[address] [place] [address]

                          02 Oct, 2012               

 

                          [facility]     [address] [id number]

[address] [place] [address]

                          02 Oct, 2012               

 

                          [facility]     [address] [id number]

[address] [place] [address]

                          21 Sep, 2012               

 

                          [facility]     [address] [id number]

[address] [place] [address]

                          06 Aug, 2012               

 

                          [facility]     [address] [id number]

[address] [place] [address]

                          02 Aug, 2012               

 

                          [facility]     [address] [id number]

[address] [place] [address]

                                         

 

                          [facility]     [address] [id number]

[address] [place] [address]

                                         

 

                          [facility]     [address] [id number]

[address] [place] [address]

                          15 Eusebio, 2012               

 

                          [facility]     [address] [id number]

[address] [place] [address]

                                         

 

                          [facility]     [address] [id number]

[address] [place] [address]

                          10 Apr, 2012               

 

                          [facility]     [address] [id number]

[address] [place] [address]

                          22 Mar, 2012               

 

                          [facility]     [address] [id number]

[address] [place] [address]

                          20 Mar, 2012               

 

                          [facility]     [address] [id number]

[address] [place] [address]

                          14 Mar, 2012               

 

                          [facility]     [address] [id number]

[address] [place] [address]

                                         







IMMUNIZATIONS

No Known Immunizations



SOCIAL HISTORY

Never Assessed



REASON FOR VISIT





PLAN OF CARE





VITAL SIGNS





MEDICATIONS

Unknown Medications



RESULTS

No Results



PROCEDURES





                Procedure       Date Ordered    Result          Body Site

 

                GLYCATED HEMOGLOBIN TEST 2014                    







INSTRUCTIONS





MEDICATIONS ADMINISTERED

No Known Medications



MEDICAL (GENERAL) HISTORY





                    Type                Description         Date

 

                          Medical History           allergic rhinitis- rec's edwige donahue allergy injections from Dr Rojas office                            

 

                    Medical History     mood disorder        

 

                    Medical History     Leukocytosis- has been to hematology  

 

                    Medical History     Hyperlipidemia       

 

                          Medical History           Left leg pain- multiple imag

ing performed and ortho referral 

placed                                   

 

                          Medical History           TUBULAR ADENOMA AND GASTRITI

S ON COLONOSOCPY AUG 2016 -MULTIPLE 

POLYPS                                   

 

                    Medical History     Helicobacter pylori (H. pylori) infectio

n Hx  

 

                    Surgical History    cholecystectomy     

 

                    Surgical History     section    , 

 

                    Surgical History    tonsillectomy       2015

 

                    Surgical History    colonoscopy         2016

 

                    Surgical History    colonscopy          2017

 

                    Surgical History    Right Knee scope    2017

## 2020-06-23 NOTE — XMS REPORT
[facility]

                             Created on: 2020



Kate April

External Reference #: [id number]

: 1975

Sex: Female



Demographics





                          Address                   [address] [place]  [address]

 

                          Preferred Language        Unknown

 

                          Marital Status            Unknown

 

                          Moravian Affiliation     Unknown

 

                          Race                      Unknown

 

                          Ethnic Group              Unknown





Author





                          Author                    ANNEMARIE April VENECIA

 

                          [facility]

 

                          Address                   [address] [place]  [address]



 

                          Phone                     [phone]







Care Team Providers





                    Care Team Member Name Role                Phone

 

                    ANNEMARIE  VENECIA       Unavailable         [phone]







PROBLEMS





          Type      Condition ICD9-CM Code [id number] Code Onset Dates Condition S

tatus SNOMED 

Code

 

          Problem   Duarte's neuroma of right foot           G57.61             

 Active    [id number]

 

          Problem   History of leukocytosis           Z86.2               Active

    [id number]

 

          Problem   Anxiety             F41.9               Active    59923512

 

                          Problem                   Type 2 diabetes mellitus wit

hout complication, without long-term current

use of insulin              E11.9                     Active       [id number]

 

          Problem   Seasonal allergic rhinitis due to pollen           J30.1    

           Active    16126208

 

          Problem   Tubular adenoma of colon           D12.6               Activ

e    [id number]

 

          Problem   GERD with esophagitis           K21.0               Active  

  [id number]

 

          Problem   Mixed hyperlipidemia           E78.2               Active   

 420041748

 

           Problem    Seasonal allergic rhinitis due to other allergic trigger  

          J30.89                

Active                                  [id number]

 

          Problem   Dysthymic disorder           F34.1               Active    7

[id number]

 

          Problem   Arthritis           M19.90              Active    6511274

 

          Problem   Non morbid obesity           E66.9               Active    4

[id number]







ALLERGIES

No Information



ENCOUNTERS





                Encounter       Location        Date            Diagnosis

 

                          [place] [address] [facility] [id number]

[address] [place] [address]

                                         

 

                          [place] [address] [facility] [id number]

[address] [place] [address]

                          04 Mar, 2020              Type 2 diabetes mellitus wit

hout complication, without long-term 

current use of insulin E11.9

 

                          [facility]     [address] [facility] [id number]

[address] [place] [address]

                                        Acute pain of left knee M25.

562

 

                          [place] [address] [facility] [id number]

[address] [place] [address]

                          15 2020              Leg pain, left M79.605

 

                          [facility]  [address] [facility] [id number]

[address] [place] [address] 

[address]                              Leg pain, left M79.605

 

                          [place] [address] [facility] [id number]

[address] [place] [address]

                                        Type 2 diabetes mellitus wit

hout complication, without long-term 

current use of insulin E11.9 and GERD with esophagitis K21.0

 

                          [place] [address] [place] [address]

                          01 Oct, 2019              Encounter for immunization Z

23

 

                          [place] [address] [address]

                                        Type 2 diabetes mellitus wit

hout complication, without long-term 

current use of insulin E11.9

 

                          [place] [address] [address]

                                         

 

                          [place] [address] [address]

                                        Type 2 diabetes mellitus wit

hout complication, without long-term 

current use of insulin E11.9

 

                          [place] [address] [address]

                                        Weight loss R63.4

 

                          [place] [address] [place] [id number] [place] [address]

                          31 May, 2019              Type 2 diabetes mellitus wit

hout complication, without long-term 

current use of insulin E11.9

 

                          [place] [address] [facility] [id number]

[address] [place] [address]

                          31 May, 2019              Type 2 diabetes mellitus wit

hout complication, without long-term 

current use of insulin E11.9 and Non morbid obesity E66.9

 

                          [facility] [place] [address] [place] [id number]

[address] [place] [address] 

[address]                14 May, 2019              Seasonal allergic rhinitis d

ue to pollen J30.1 and Sore 

throat J02.9

 

                          [facility]  [address] [place] [id number]

[address] [place] [address] 

[address]                12 2019              Arthritis M19.90

 

                          [place] [address] [address]

                                        Seasonal allergic rhinitis d

ue to other allergic trigger J30.89 and

Dysthymic disorder F34.1

 

                          [place] [address] [address]

                          05 Dec, 2018              Bilateral otitis media with 

effusion H65.93 and Anxiety F41.9

 

                          [place] [address] [address]

                                        Type 2 diabetes mellitus wit

hout complication, without long-term 

current use of insulin E11.9

 

                          [place] [address] [address]

                                        Pharyngitis due to Streptoco

ccus species J02.0

 

                          [place] [address] [address]

                                         

 

                          [facility]  [address] 

[address]                10 Jul, 2018              Fever, unspecified fever cau

se R50.9 and Lymphadenopathy

R59.1

 

                          [facility] [place] [address] 

[address]                              Pharyngitis due to other org

anism J02.8

 

                          [place] [address] [place] [address]

                                         

 

                          [place] [address] [address]

                                        Type 2 diabetes mellitus wit

hout complication, without long-term 

current use of insulin E11.9 and Other eczema L30.8

 

                          [facility]  [address] [place] [id number]

[address] [place] [address] 

[address]                28 2018              Acute pain of left shoulder 

M25.512

 

                          [place] [address] [place] [address]

                                         

 

                          [facility]     [address] [place] [id number]

[address] [place] [address]

                          15 Feb, 2018              Type 2 diabetes mellitus wit

hout complication, without long-term 

current use of insulin E11.9

 

                          [place] [address] [address]

                                        Type 2 diabetes mellitus wit

hout complication, without long-term 

current use of insulin E11.9 ; Tubular adenoma of colon D12.6 ; Mixed 
hyperlipidemia E78.2 ; History of leukocytosis Z86.2 and Screening breast 
examination Z12.31

 

                          [place] [address] [address]

                                        Metatarsalgia of right foot 

M77.41 and Type 2 diabetes mellitus 

without complication, without long-term current use of insulin E11.9

 

                          [place] [address] [address]

                                        Capsulitis M77.9 and Metatar

salgia of right foot M77.41

 

                          [place] [address] [address]

                          08 Sep, 2017              Capsulitis M77.9

 

                          [facility]     [address] [address]

                          06 Sep, 2017               

 

                          [place] [address] [address]

                          03 Aug, 2017              Type 2 diabetes mellitus wit

hout complication, without long-term 

current use of insulin E11.9 ; Tubular adenoma of colon D12.6 and Mixed 
hyperlipidemia E78.2

 

                          [place] [address] [address]

                                        Metatarsalgia of right foot 

M77.41 and Capsulitis M77.9

 

                          [facility]     [address] [place] [id number] [place] [address]

                                         

 

                          [facility]  [address] [place] [address]

[address] [place] [address] 

[address]                              Duarte's neuroma of right fo

ot G57.61

 

                          [facility]     [address] [place] [id number]

[address] [place] [address]

                                        Mixed hyperlipidemia E78.2 a

nd Type 2 diabetes mellitus without 

complication, without long-term current use of insulin E11.9

 

                          [place] [address] [address]

                          10 Brandon, 2017              Type 2 diabetes mellitus wit

hout complication, without long-term 

current use of insulin E11.9 ; Tubular adenoma of colon D12.6 and Mixed 
hyperlipidemia E78.2

 

                          [place] [address] [place] [id number]

[address] [place] [address]

                          22 Dec, 2016              Mixed hyperlipidemia E78.2

 

                          [place] [address] [place] [id number]

[address] [place] [address]

                                         

 

                          [place] [address] [address]

                                        Mixed hyperlipidemia E78.2

 

                          [place] [address] [place] [id number]

[address] [place] [address]

                                        Mixed hyperlipidemia E78.2

 

                          [facility]     [address] [place] [id number]

[address] [place] [address]

                          08 Sep, 2016               

 

                          [place] [address] [place] [id number]

[address] [place] [address]

                          10 Aug, 2016              Type 2 diabetes mellitus wit

hout complication, without long-term 

current use of insulin E11.9 ; Helicobacter pylori (H. pylori) infection A04.8 
and Mixed hyperlipidemia E78.2

 

                          [facility]     [address] [place] [id number]

[address] [place] [address]

                          08 Aug, 2016              Type 2 diabetes mellitus wit

hout complication, without long-term 

current use of insulin E11.9

 

                          [facility]     [address] [place] [id number]

[address] [place] [address]

                          03 Aug, 2016              Type 2 diabetes mellitus wit

hout complication, without long-term 

current use of insulin E11.9

 

                          [facility]     [address] [place] [id number]

[address] [place] [address]

                          02 Aug, 2016              Dyspepsia R10.13 ; Upper abd

ominal pain R10.10 ; Bowel habit 

changes R19.4 ; History of leukocytosis Z86.2 and Helicobacter pylori (H. 
pylori) infection A04.8

 

                          [facility]  [address] 

[address]                27 May, 2016              Environmental allergies Z91.

09

 

                          [facility]     [address] [address]

                                        Left leg pain M79.605 ; Loca

lized swelling of lower leg R22.40 and 

Upper respiratory infection J06.9

 

                          [place] [address] [address]

                          28 Dec, 2015               

 

                          [place] [address] [address]

                          21 Dec, 2015              Left leg pain M79.605

 

                          [place] [address] [address]

                          10 Dec, 2015              Left leg pain M79.605 and Lo

calized swelling of lower leg R22.40

 

                          [place] [address] [address]

                          01 Dec, 2015              Left leg pain M79.605

 

                          [place] [address] [address]

                                        Encounter for immunization Z

23

 

                          [place] [address] [address]

                          12 Oct, 2015              Bronchitis J40

 

                          [place] [address] [address]

                          30 Sep, 2015              Physical exam, pre-employmen

t V70.5 ; Encounter for PPD test V74.1 

and Hyperlipidemia 272.4

 

                          [place] [address] [place] [address]

[address] [place] [address]

                          21 Sep, 2015               

 

                          [facility]     [address] [id number]

[address] [place] [address]

                          21 Sep, 2015               

 

                          [facility]     [address] [id number]

[address] [place] [address]

                          09 Sep, 2015               

 

                          [facility]     [address] [id number]

[address] [place] [address]

                          04 Sep, 2015              Elevated blood sugar 790.29

 

                          [facility]     [address] [id number]

[address] [place] [address]

                          03 Sep, 2015              Elevated blood sugar 790.29

 

                          [facility]     [address] [id number]

[address] [place] [address]

                          03 Sep, 2015              Palpitations 785.1

 

                          [facility]     [address] [facility] [id number]

[address] [place] [address]

                          01 Sep, 2015              Palpitations 785.1

 

                          [facility]     [address] [id number]

[address] [place] [address]

                                         

 

                          [facility]     [address] [facility] [id number]

[address] [place] [address]

                          28 May, 2015              Hand pain, right 729.5 and D

epression with anxiety 300.4

 

                          [facility]     [address] [facility] [id number]

[address] [place] [address]

                                         

 

                          [facility]     [address] [id number]

[address] [place] [address]

                                         

 

                          [facility]     [address] [facility] [id number]

[address] [place] [address]

                          05 Mar, 2015               

 

                          [facility]     [address] [facility] [id number]

[address] [place] [address]

                          05 Mar, 2015               

 

                          [facility]     [address] [facility] [id number]

[address] [place] [address]

                                         

 

                          [facility]     [address] [facility] [id number]

[address] [place] [address]

                                         

 

                          [facility]     [address] [id number]

[address] [place] [address], [place] [address]

                          10 Feb, 2015               

 

                          [facility]     [address] [id number]

[address] [place] [address], [place] [address]

                          10 Feb, 2015               

 

                          [facility]     [address] [id number]

[address] [place] [address], [place] [address]

                                         

 

                          [facility]     [address] [id number]

[address] [place] [address], [place] [address]

                                         

 

                          [facility]     [address] [id number]

[address] [place] [address], [place] [address]

                                         

 

                          [facility]     [address] [id number]

[address] [place] [address], [place] [address]

                                         

 

                          [facility]     [address] [id number]

[address] [place] [address], [place] [address]

                                         

 

                          [facility]     [address] [id number]

[address] [place] [address], [place] [address]

                                         

 

                          [facility]     [address] [id number]

[address] [place] [address], [place] [address]

                                         

 

                          [facility]     [address] [id number]

[address] [place] [address], [place] [address]

                                         

 

                          [facility]     [address] [id number]

[address] [place] [address], [place] [address]

                                         

 

                          [facility]     [address] [id number]

[address] [place] [address]

                                         

 

                          [facility]     [address] [id number]

[address] [place] [address], [place] [address]

                                         

 

                          [facility]     [address] [id number]

[address] [place] [address], [place] [address]

                                         

 

                          [facility]     [address] [id number]

[address] [place] [address]

                          15 [place], 2015               

 

                          [facility]     [address] [id number]

[address] [place] [address], [place] [address]

                          15 2015               

 

                          [facility]     [address] [id number]

[address] [place] [address], [place] [address]

                                         

 

                          [facility]     [address] [id number]

[address] [place] [address], [place] [address]

                                         

 

                          [facility]     [address] [id number]

[address] [place] [address], [place] [address]

                          15 Oct, 2014               

 

                          [facility]     [address] [id number]

[address] [place] [address], [place] [address]

                          15 Oct, 2014               

 

                          [facility]     [address] [id number]

[address] [place] [address], [place] [address]

                          14 Oct, 2014               

 

                          [facility]     [address] [id number]

[address] [place] [address], [place] [address]

                          14 Oct, 2014               

 

                          [facility]     [address] [id number]

[address] [place] [address], [place] [address]

                          07 Oct, 2014               

 

                          [facility]     [address] [id number]

[address] [place] [address], [place] [address]

                          07 Oct, 2014               

 

                          [facility]     [address] [id number]

[address] [place] [address], [place] [address]

                          12 Sep,                

 

                          [facility]     [address] [id number]

[address] [place] [address], [place] [address]

                          12 Sep,                

 

                          [facility]     [address] [id number]

[address] [place] [address], [place] [address]

                          04 Sep,                

 

                          [facility]     [address] [id number]

[address] [place] [address], [place] [address]

                          03 Sep,                

 

                          [facility]     [address] [id number]

[address] [place] [address], [place] [address]

                          03 Sep,                

 

                          [facility]     [address] [id number]

[address] [place] [address], [place] [address]

                          02 Sep,                

 

                          [facility]     [address] [id number]

[address] [place] [address], [place] [address]

                          02 Sep, 2014               

 

                          [facility]     [address] [id number]

[address] [place] [address], [place] [address]

                          30 Aug, 2014               

 

                          [facility]     [address] [id number]

[address] [place] [address], [place] [address]

                          30 Aug, 2014               

 

                          [facility]     [address] [id number]

[address] [place] [address], [place] [address]

                          19 Aug, 2014               

 

                          [facility]     [address] [id number]

[address] [place] [address], [place] [address]

                          19 Aug, 2014               

 

                          [facility]     [address] [id number]

[address] [place] [address], [place] [address]

                          14 Aug, 2014               

 

                          [facility]     [address] [id number]

[address] [place] [address], [place] [address]

                          14 Aug, 2014               

 

                          [facility]     [address] [id number]

[address] [place] [address], [place] [address]

                          13 Aug, 2014               

 

                          [facility]     [address] [id number]

[address] [place] [address], [place] [address]

                          13 Aug, 2014               

 

                          [facility]     [address] [id number]

[address] [place] [address], [place] [address]

                                         

 

                          [facility]     [address] [id number]

[address] [place] [address], [place] [address]

                                         

 

                          [facility]     [address] [id number]

[address] [place] [address], [place] [address]

                                         

 

                          [facility]     [address] [id number]

[address] [place] [address], [place] [address]

                                         

 

                          [facility]     [address] [id number]

[address] [place] [address], [place] [address]

                                         

 

                          [facility]     [address] [id number]

[address] [place] [address], [place] [address]

                                         

 

                          [facility]     [address] [id number]

[address] [place] [address], [place] [address]

                                         

 

                          [facility]     [address] [id number]

[address] [place] [address]

                          ,                

 

                          [facility]     [address] [id number]

[address] [place] [address], [place] [address]

                          ,                

 

                          [facility]     [address] [id number]

[address] [place] [address], [place] [address]

                          ,                

 

                          [facility]     [address] [id number]

[address] [place] [address], [place] [address]

                          ,                

 

                          [facility]     [address] [id number]

[address] [place] [address], [place] [address]

                          ,                

 

                          [facility]     [address] [id number]

[address] [place] [address], [place] [address]

                                         

 

                          [facility]     [address] [id number]

[address] [place] [address], [place] [address]

                          ,                

 

                          [facility]     [address] [id number]

[address] [place] [address], [place] [address]

                                         

 

                          [facility]     [address] [id number]

[address] [place] [address], [place] [address]

                                         

 

                          [facility]     [address] [id number]

[address] [place] [address], [place] [address]

                                         

 

                          [facility]     [address] [id number]

[address] [place] [address], [place] [address]

                                         

 

                          [facility]     [address] [id number]

[address] [place] [address], [place] [address]

                                         

 

                          [facility]     [address] [id number]

[address] [place] [address], [place] [address]

                                         

 

                          [facility]     [address] [id number]

[address] [place] [address], [place] [address]

                                         

 

                          [facility]     [address] [id number]

[address] [place] [address], [place] [address]

                                         

 

                          [facility]     [address] [id number]

[address] [place] [address], [place] [address]

                                         

 

                          [facility]     [address] [id number]

[address] [place] [address], [place] [address]

                                         

 

                          [facility]     [address] [id number]

[address] [place] [address], [place] [address]

                                         

 

                          [facility]     [address] [id number]

[address] [place] [address], [place] [address]

                                         

 

                          [facility]     [address] [id number]

[address] [place] [address], [place] [address]

                                         

 

                          [facility]     [address] [id number]

[address] [place] [address], [place] [address]

                          27 Mar, 2014               

 

                          [facility]     [address] [id number]

[address] [place] [address], [place] [address]

                          27 Mar, 2014               

 

                          [facility]     [address] [id number]

[address] [place] [address], [place] [address]

                                         

 

                          [facility]     [address] [id number]

[address] [place] [address], [place] [address]

                                         

 

                          [facility]     [address] [id number]

[address] [place] [address], [place] [address]

                                         

 

                          [facility]     [address] [id number]

[address] [place] [address], [place] [address]

                                         

 

                          [facility]     [address] [id number]

[address] [place] [address], [place] [address]

                          08 Oct, 2013               

 

                          [facility]     [address] [id number]

[address] [place] [address]

                          04 Oct, 2013               

 

                          [facility]     [address] [id number]

[address] [place] [address], [place] [address]

                          03 Oct, 2013               

 

                          [facility]     [address] [id number]

[address] [place] [address], [place] [address]

                          02 Oct, 2013               

 

                          [facility]     [address] [id number]

[address] [place] [address], [place] [address]

                          01 Oct, 2013               

 

                          [facility]     [address] [id number]

[address] [place] [address] [place] [address]

                          20 Sep, 2013               

 

                          [facility]     [address] [id number]

[address] [place] [address], [place] [address]

                          08 Aug, 2013               

 

                          [facility]     [address] [id number]

[address] [place] [address], [place] [address]

                          24 May, 2013               

 

                          [facility]     [address] [id number]

[address] [place] [address], [place] [address]

                          13 May, 2013               

 

                          [facility]     [address] [id number]

[address] [place] [address], [place] [address]

                                         

 

                          [facility]     [address] [id number]

[address] [place] [address], [place] [address]

                                         

 

                          [facility]     [address] [id number]

[address] [place] [address], [place] [address]

                                         

 

                          [facility]     [address] [id number]

[address] [place] [address], [place] [address]

                                         

 

                          [facility]     [address] [id number]

[address] [place] [address], [place] [address]

                          02 Oct, 2012               

 

                          [facility]     [address] [id number]

[address] [place] [address], [place] [address]

                          02 Oct, 2012               

 

                          [facility]     [address] [id number]

[address] [place] [address], [place] [address]

                          21 Sep, 2012               

 

                          [facility]     [address] [id number]

[address] [place] [address], [place] [address]

                          06 Aug, 2012               

 

                          [facility]     [address] [id number]

[address] [place] [address], [place] [address]

                          02 Aug, 2012               

 

                          [facility]     [address] [id number]

[address] [place] [address], [place] [address]

                                         

 

                          [facility]     [address] [id number]

[address] [place] [address], [place] [address]

                                         

 

                          [facility]     [address] [id number]

[address] [place] [address], [place] [address]

                          15 Eusebio, 2012               

 

                          [facility]     [address] [facility] [id number]

[address] [place] [address]

                                         

 

                          [facility]     [address] [facility] [id number]

[address] [place] [address]

                          10 Apr, 2012               

 

                          [facility]     [address] [facility] [id number]

[address] [place] [address]

                          22 Mar, 2012               

 

                          [facility]     [address] [facility] [id number]

[address] [place] [address]

                          20 Mar, 2012               

 

                          [facility]     [address] [facility] [id number]

[address] [place] [address]

                          14 Mar, 2012               

 

                          [facility]     [address] [facility] [id number]

[address] [place] [address]

                                         







IMMUNIZATIONS

No Known Immunizations



SOCIAL HISTORY

Never Assessed



REASON FOR VISIT





PLAN OF CARE





VITAL SIGNS





MEDICATIONS

Unknown Medications



RESULTS

No Results



PROCEDURES

No Known procedures



INSTRUCTIONS





MEDICATIONS ADMINISTERED

No Known Medications



MEDICAL (GENERAL) HISTORY





                    Type                Description         Date

 

                          Medical History           allergic rhinitis- rec's edwige donahue allergy injections from Dr Rojas office                            

 

                    Medical History     mood disorder        

 

                    Medical History     Leukocytosis- has been to hematology  

 

                    Medical History     Hyperlipidemia       

 

                          Medical History           Left leg pain- multiple imag

ing performed and ortho referral 

placed                                   

 

                          Medical History           TUBULAR ADENOMA AND GASTRITI

S ON COLONOSOCPY AUG 2016 -MULTIPLE 

POLYPS                                   

 

                    Medical History     Helicobacter pylori (H. pylori) infectio

n Hx  

 

                    Surgical History    cholecystectomy     

 

                    Surgical History     section    , 

 

                    Surgical History    tonsillectomy       2015

 

                    Surgical History    colonoscopy         2016

 

                    Surgical History    colonscopy          2017

 

                    Surgical History    Right Knee scope    2017

## 2020-06-23 NOTE — DISCHARGE INST-CARDIOLOGY
Discharge Inst-Cardiac


Discharge Medications


Continued Medications:  


Fexofenadine HCl (Allegra Allergy) 180 Mg Tablet


180 MG PO DAILY, TAB





Fluticasone Propionate (Flonase Allergy Relief) 9.9 Ml [place].susp


2 SPRAY NS DAILY, #1 EACH


2 SPRAYS PER NOSTRIL DAILY X 2 DAYS THEN 1 SPRAY DAILY


Glimepiride (Glimepiride) 4 Mg Tablet


4 MG PO DAILY, TAB





Multivitamin (Multivitamin) 1 Each Tablet


1 EACH PO DAILY, TAB





Rosuvastatin Calcium (Rosuvastatin Calcium) 10 Mg Tablet


10 MG PO DAILY, TAB





Sertraline HCl (Sertraline HCl) 50 Mg Tablet


50 MG PO DAILY, TAB





 


Discontinued Medications:  


Metformin HCl (Metformin HCl) 500 Mg Tablet


1000 MG PO BID, TAB


take 2 (500mg) tabs








Patient Instructions


Patient Instructions:  


Hold METFORMIN until the morning of 6/26/20 and then resume previous home


dose











LAN ESCAMILLA MD FACP FAC CCDS   Jun 23, 2020 12:40

## 2020-06-23 NOTE — XMS REPORT
Patient Summarization Differential

                             Created on: 2020



BERTA PRAKASH

External Reference #: [id number]

: 1975

Sex: Female



Demographics





                          Address                   [address]



 

                          Home Phone                [phone]

 

                          Preferred Language        English

 

                          Marital Status            Unknown

 

                          Cheondoism Affiliation     Unknown

 

                          Race                      White

 

                          Ethnic Group              Not  or 





Author





                          Author                    [employer]  [facility]              [facility] [employer] [facility]

 

                          Address                   [address]  [address]



 

                          Phone                     [phone]







Care Team Providers





                    Care Team Member Name Role                Phone

 

                    MADL, VENECIA        Unavailable         Unavailable

 

                    GARCIA, MARY K       Unavailable         [phone]

 

                    MADL, VENECIA        Unavailable         [phone]

 

                    CEASAR QUINTANILLA     Unavailable         Unavailable

 

                    GARCIA, MARY         Unavailable         Unavailable

 

                    [facility] Unavailable         [phone]

 

                    SONNY CARVALHO    Unavailable         Unavailable

 

                    MADL, VENECIA        Unavailable         [phone]

 

                    MADL, VENECIA        Unavailable         [phone]

 

                    MADL, VENECIA        Unavailable         [phone]

 

                    MADL, VENECIA        Unavailable         [phone]

 

                    ZIA, ASHOK        Unavailable         [phone]

 

                    ZIA, ASHOK        Unavailable         [phone]

 

                    MADL, VENECIA        Unavailable         [phone]

 

                    MADL, VENECIA        Unavailable         [phone]

 

                    MADL, VENECIA        Unavailable         [phone]

 

                    ZIA, ASHOK        Unavailable         [phone]

 

                    VILMA DELGADO       Unavailable         [phone]

 

                    VILMA DELGADO       Unavailable         [phone]

 

                    VILMA DELGADO       Unavailable         [phone]

 

                    GRISELDA Humphrey  Unavailable         [phone]

 

                    VILMA DELGADO       Unavailable         [phone]

 

                    GRISELDA Humphrey  Unavailable         [phone]

 

                    MILADIS LOPEZ        Unavailable         [phone]

 

                    VILMA DELGADO       Unavailable         [phone]

 

                    IVÁN HOLLAND DO  Unavailable         Unavailable

 

                    Migration, Doctor   Unavailable         Unavailable

 

                    Migration, Doctor   Unavailable         Unavailable

 

                    VILMA DELGADO     Unavailable         Unavailable

 

                    MADL, VENECIA        Unavailable         [phone]

 

                    MADL, VENECIA        Unavailable         [phone]

 

                    MADL, VENECIA        Unavailable         [phone]

 

                    MADL, VENECIA        Unavailable         [phone]

 

                    ABRAHAN CASTORENA      Unavailable         [phone]

 

                    zDINA Torres   Unavailable         [phone]

 

                    Migration, Doctor   Unavailable         Unavailable

 

                    Migration, Doctor   Unavailable         Unavailable

 

                    zzSANCHEZ, YONNY    Unavailable         [phone]

 

                    MADL, VENECIA        Unavailable         [phone]

 

                    zzSANCHEZ, YONNY    Unavailable         [phone]

 

                    MADL, VENECIA        Unavailable         [phone]

 

                    Migration, Doctor   Unavailable         Unavailable

 

                    MADL, VENECIA        Unavailable         [phone]

 

                    MADL, VENECIA        Unavailable         [phone]

 

                    MADL, VENECIA        Unavailable         [phone]

 

                    MADL, VENECIA        Unavailable         [phone]

 

                    MADL, VENECIA        Unavailable         [phone]

 

                    MADL, VENECIA        Unavailable         [phone]

 

                    MADL, VENECIA        Unavailable         [phone]

 

                    MADL, VENECIA        Unavailable         [phone]

 

                    MADL, VENECIA        Unavailable         [phone]

 

                    FRANCESCA MADRID  Unavailable         [phone]

 

                    VILMA DELGADO       Unavailable         [phone]

 

                    MADL, VENECIA        Unavailable         [phone]

 

                    VILMA RILEY Unavailable         Unavailable

 

                    IVÁN HOLLAND DO  Unavailable         Unavailable

 

                    MADL NEGRO BEACHA L Unavailable         Unavailable

 

                          Unavailable               Unavailable

 

                    NAT BALTAZAR        Unavailable         [phone]

 

                    MADL, VENECIA        Unavailable         [phone]

 

                    MADL, VENECIA        Unavailable         [phone]

 

                    Migration, Doctor   Unavailable         Unavailable

 

                    VILMA DELGADO       Unavailable         [phone]

 

                    Migration, Doctor   Unavailable         Unavailable

 

                    Migration, Doctor   Unavailable         Unavailable

 

                    Migration, Doctor   Unavailable         Unavailable

 

                    MADL, VENECIA        Unavailable         [phone]

 

                    MADL, VENECIA        Unavailable         [phone]

 

                    VILMA DELGADO       Unavailable         [phone]

 

                    Migration, Doctor   Unavailable         Unavailable

 

                    Migration, Doctor   Unavailable         Unavailable

 

                    zzSANCHEZ, YONNY    Unavailable         [phone]

 

                    FRANCINE MA FSCAI, ALI Unavailable         Unavailable

 

                          Unavailable               Unavailable

 

                          Unavailable               Unavailable

 

                          Unavailable               Unavailable

 

                          Unavailable               Unavailable

 

                          Unavailable               Unavailable



                                            



Allergies

                      The data below is from unstructured sourcesNo Known 
Allergies            No Known Allergies            No Known Allergies           
No Known Allergies            No Known Allergies                        Unknown 
Allergies            Unknown AllergiesNo Known Allergies            No Known 
Allergies            No Known Allergies            No Known Allergies           
No Known Allergies            No Known Allergies            No Known Allergies  
         No Known Allergies            No Known Allergies            No Known 
Allergies            No Known Allergies            No Known Allergies           
No Known Allergies            No Known Allergies            No Known Allergies  
         No Known Allergies            No Known Allergies            No Known 
Allergies            No Known Allergies            No Known Allergies           
            Unknown Allergies            Unknown Allergies            No 
Information            No Information            No Information            No 
Information            No Information            No Information                 
                                                  



Medications

                      Current Medications            

            



        



         Medication  Ingredient  Drug    Dose    Dates   Status  Sig     Sig    

 Care



                 Class(es)       (Normalized)    (Original)      Provid



                                         er

 

        



         no      Allegra  no      180 mg   Active  no      Allegra  no



            information  Allergy 180  information     information  Allergy 180  

name



                     (1 source.)         MG                  MG Orally 



                           Translation               Once a day 1 



                           s: [                      tablet as 



                           Allegra                   needed 24h 



                           Allergy 180               Active 



                                         MG]       

 

        



         no      Los   no      08-10-20  Active  no      Los   no



           information  Contour   information   16        information  Contour N

ext  name



                     (5                  Next                Monitor 



                     sources.)           Monitor             w/Device as 



                           w/Device                  directed 10 



                           Translation               Aug, 2016 



                           s: [ Los                Active 



                                         Contour       



                                         Next       



                                         Monitor       



                                         w/Device,       



                                         Los       



                                         Contour       



                                         Next       



                                         Monitor       



                                         w/Device]       

 

     



              08-   Active       no           no           no name



                           inform                    informat 



                           ation                     ion 

 

        



         clindamycin  clindamycin  Lincosamide  300 mg  20  Active  no    

  

Clindamycin                              no



           300 mg oral  Translation  Antibacteri   18 -      information  HCl 30

0 MG  name



              capsule (1   s: [         al           20     Orally every 



                 source.)        Clindamycin     18              6 hrs 1 



                           HCl 300 MG]               capsule 6h 



                                          



                                         10 days 



                                         Active 

 

        



         no      fexofenadin  Histamine-1  180 mg   Active  no      Allegra  no



            information  e          Receptor     information  Allergy 180  name



                 (4              Translation     Antagonist      MG Orally 



                     sources.)           s: [                Once a day 1 



                           Allegra                   tablet as 



                           Allergy 180               needed 24h 



                           MG]                       Active 

 

        



         fluconazole  fluconazole  Azole   150 mg  20  Active  no      Dif

lucan 150  no



           150 mg oral  Translation  Antifungal   18 -      information  MG Oral

ly  name



                 tablet (1       s: [            20        every 24 hrs 



                 source.)        Diflucan        18              1 tablet 19 



                           150 MG]                    03 



                                         days Active 

 

        



         no      lactobacill  no      100 mg  20  Active  no      Acidophi

angy  no



           information  us        information   18 -      information  100 mg   

 name



                 (1 source.)     acidophilus     20        Orally Once 



                     Translation         18                  a day 1 



                           s: [                      capsule 24h 



                           Acidophilus                



                           100 mg]                   17 Sep, 2018 



                                         30 days 



                                         Active 

 

        



           no        Prenatal  no        Active    no        Prenatal  no



            information  Advantage -  information     information  Advantage -  

name



                           (5                        Orally Once 



                           sources.)                 a day 1 



                                         tablet 24h 



                                         Active 

 

        



         pseudoePHED  Pseudoephed  alpha-Adren  60 mg   20  Active  no    

  SudoGest 60

                                         no



           rine      rine      ergic     18        information  MG Orally  name



                 hydrochlori     Translation     Agonist         every 6 hrs 



                     de 60 mg            s: [                1 tablet as 



                     oral tablet         SudoGest 60         needed 6h 05 



                     (1 source.)         MG]                 Dec, 2018 



                                         Active 



            

            Completed/Discontinued Medications            

            



        



         Medication  Ingredient  Drug    Dose    Dates   Status  Sig     Sig    

 Care



                 Class(es)       (Normalized)    (Original)      Provid



                                         er

 

        



         doxycycline  doxycycline  Tetracyclin  100 mg  07-10-20  Suspende  no  

    

Doxycycline                              no



         hyclate 100  Translation  e-class   18 -    d       information  Hyclat

e 100  name



              mg oral      s: [         Drug         -20     MG Orally 



                 capsule (4      Doxycycline     18              every 12 hrs 



                     sources.)           Hyclate 100         1 capsule 



                           MG]                       12h 10 Jul, 



                                         2018 20 Jul, 



                                         2018 10 



                                         day(s) 



                                         Not-Taking 



                                                                                
                                                                             



Problems

                      Active Problems            

            



      



           Problem   Normalized  Date Last  Normalized  Normalized  Provider  Fa

cility



              Classification  Problem(s)   Recorded     Problem      Problem Sta

tus  



                                         Duration   

 

      



            Other      Abnormal   Episodic   Active     VENECIA SHARPE  [facility]



                 [facility]al;    [facility]     [address]           [facility]



                     endocrine; and      Translations:       [facility] [place]



                     metabolic           [ - Weight          [facility] ([address])



                           disorders (20             loss R63.4]     



                                         sources.)      

 

      



           Other and  Benign    2020 -  Episodic  Active    IVÁN HOLLAND

  [facility]



                 unspecified     neoplasm of     DO              [facility]



                     benign              ascending           Hospital -



                     neoplasm (8         colon               [place]



                           sources.)                 ([address])

 

      



           Other and  Benign    2020 -  Episodic  Active    IVÁN HOLLAND ,

  [facility]



                 unspecified     neoplasm of     DO              [facility]



                     benign              transverse          Hospital -



                     neoplasm (8         colon               [place]



                           sources.)                 ([address])

 

      



            Other      Body Mass   Chronic    Active     MEMO BLANTON MD  Not Courtney

ilable



                     nutritional;        Index               (62711)



                           endocrine; and            34.0-34.9,     



                           metabolic                 adult     



                                         disorders (1      



                                         source.)      

 

      



            Cardiac    Cardiac    Chronic    Active     AZUCENA KHLOE ,  Not Avai

lable



                 dysrhythmias    arrhythmia,     DO              (48740)



                           (2 sources.)              unspecified     

 

      



           Abdominal  Diaphragmatic  2020 -  Episodic  Active    IVÁN DUN

BAR ,  [facility] 

[facility]ia



                 hernia (8       hernia without     DO              [facility]



                     sources.)           obstruction or      Hospital -



                           gangrene                  [place]



                                         ([address])

 

      



           Other     Encounter for  2020 -  Episodic  Active    IVÁN DUNB

AR ,  [facility]



                 aftercare (9    follow-up       DO              [place]



                     sources.)           examination         Hospital -



                           after                     [place]



                           completed                 ([address])



                                         treatment for     



                                         conditions     



                                         other than     



                                         malignant     



                                         neoplasm     

 

      



            Other      Enthesopathy,   Episodic   Active     VILMA mckeon



                 connective      unspecified     [address] [place] [address]



                     tissue disease      Translations:       [facility]



                     (20 sources.)       [ - Capsulitis      [place] ([address]55)



                                         M77.9, -     



                                         Capsulitis     



                                         M77.9]     

 

      



            Other      Metatarsalgia,   Episodic   Active     VILMA dinero



                 connective      right foot      [address] [place] [address]



                     tissue disease      Translations:       [facility]



                     (20 sources.)       [ -                 [place] (07061)



                                         Metatarsalgia     



                                         of right foot     



                                         M77.41, -     



                                         Metatarsalgia     



                                         of right foot     



                                         M77.41]     

 

      



            Other      Obesity    Chronic    Active     [employer]



                 nutritional;    Translations:     [address] [place] [address]



                     endocrine; and      [ Non morbid        [facility]



                     metabolic           obesity]            [facility] ([address])



                                         disorders (20      



                                         sources.)      

 

      



            Other      Obesity,   Chronic    Active     MEMO BLANTON MD  Not [facility]

lable



                     nutritional;        unspecified         (43087)



                                         endocrine; and      



                                         metabolic      



                                         disorders (1      



                                         source.)      

 

      



            Other      Obesity,   Chronic    Active     [employer]



                 nutritional;    unspecified     [address] [place] [address]



                     endocrine; and      Translations:       [facility]



                     metabolic           [ - Non morbid      [place] (14218)



                           disorders (20             obesity E66.9]     



                                         sources.)      

 

      



           Other and  Personal  2020 -  Episodic  Active    IVÁN HOLLAND ,

  Not 

Available



                 unspecified     history of      DO              (32947)



                           benign                    colonic polyps     



                                         neoplasm (21      



                                         sources.)      

 

      



           Other and  Polyp of colon  2020 -  Episodic  Active    IVÁN DU

NBAR ,  [facility] 

[facility]



                     unspecified         DO                  [facility]



                           benign                    Hospital -



                           neoplasm (8               [place]



                           sources.)                 ([address])

 

      



           Anal and  Rectal polyp  2020 -  Episodic  Active    IVÁN DUNBA

R ,  [facility]



                     rectal              DO                  [facility]



                           conditions (8             Hospital -



                           sources.)                 [place]



                                         ([address])

 

      



           Other lower  Shortness of  2020 -  Episodic  Active    ALI JACQUE

AD ,  [facility] 

[facility]ia



                 respiratory     breath          MA FSCAI        [facility]



                           disease (10               Hospital -



                           sources.)                 [place]



                                         ([address])

 

      



            Substance-rela  Tobacco use   Chronic    Active     MEMO BLANTON MD  

Not Available



                     quincy disorders       disorder            (397[address])



                                         (2 sources.)      



            

            Past or Other Problems            

            



      



           Problem   Normalized  Date Last  Normalized  Normalized  Provider  Fa

cility



              Classification  Problem(s)   Recorded     Problem      Problem Sta

tus  



                                         Duration   

 

      



            Headache;  Headache   Episodic   Completed  AZUCENA LEDBETTER ,  Not Avai

lable



                     including           DO                  (77767)



                                         migraine (2      



                                         sources.)      

 

      



            Residual   Hypersomnia,   no information  no information  AZUCENA CHA ,  Not 

Available



                 codes;          unspecified     DO              (01930)



                                         unclassified      



                                         (2 sources.)      

 

      



            Other      Long-term   Episodic   Completed  MEMO BLANTON MD  Not [facility]

ilable



                     aftercare (1        (current) use       (54794)



                           source.)                  of other     



                                         medications     



                                                                                
                                                                                
                                                                                
                         



Procedures

                      



    



              Procedure    Normalized Procedure  Procedure Result  Performer    

Facility



                                         Date    

 

    



              07-   Antibody borrelia  no information  no name      [facility]



                           burgdorferi lyme          [facility] ([id number])

 

    



              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

[facility] ([id number])

 

    



              2015   Avulsion nail plate  no information  no name      Com

[facility]



                           partial/complete          [address] ([address])

 

    



              2014   Basic metabolic panel  no information  no name      [facility]



                           calc[facility]             [facility] (75094)

 

    



              2018   Billing Notes on claim  no information  no name      

[facility] ([id number])

 

    



              07-   Blood count complete  no information  no name      Co

[facility] EHR



                           auto&auto difrntl wbc     [facility] (05662)

 

    



              01-   Blood count complete  no information  no name      Co

[facility] EHR



                           auto&auto difrntl wbc     [facility] (87445)

 

    



              2014   Blood count complete  no information  no name      [facility]



                           auto&auto difrntl wbc     [facility] (85841)

 

    



              2015   Blood count  no information  no name      [facility]



                           reticulocyte automated    [facility] (11571)

 

    



              2015   Blood count smear  no information  no name      [facility]



                           mcrscp w/mnl difrntl      [facility]



                           wbc count                 [place] (00906)

 

    



              2014   Ceftriaxone sodium  no information  no name      [facility]



                           injection                 [facility] (77309)

 

    



              2015   Clinical pathology  no information  no name      [facility]



                           consultation              [facility] (14989)

 

    



              07-   Collection venous  no information  no name      [facility]



                           blood venipuncture        [facility] (91606)

 

    



              2015   Collection venous  no information  no name      [facility]



                           blood venipuncture        [facility] (46686)

 

    



              01-   Collection venous  no information  no name      [facility]



                           blood venipuncture        [facility] (30409)

 

    



              2014   Collection venous  no information  no name      [facility]



                           blood venipuncture        [facility] (12736)

 

    



              01-   Comprehensive  no information  no name      [facility]



                           metabolic panel           [facility] (13381)

 

    



              2018   Hemoglobin   no information  no name      [facility]



                           glycosylated a1c          [facility] (68178)

 

    



              2018   Hemoglobin   no information  no name      [facility]



                           glycosylated a1c          [facility] (28469)

 

    



              01-   Hemoglobin   no information  no name      [facility]



                           glycosylated a1c          [facility] (77253)

 

    



              2014   Hemoglobin   no information  no name      [facility]



                           glycosylated a1c          [facility] (36562)

 

    



              07-   Heterophile antibodies  no information  no name      

[facility]



                           screen                    [facility] (22487)

 

    



              2018   Methylprednisolone  no information  no name      [facility]



                           injection                 [facility] (64699)

 

    



              2018   Therapeutic  no information  no name      [facility]

ealth



                           prophylactic/dx           [facility] (32203)

 

    



              2014   The[facility]  no information  no name      [facility]



                           prophylactic/dx           [facility]



                           [facility]         [facility]80362[facility]

 

    



              01-   Us pelvic nonobstetric  no information  no name      

[facility]



                           real-time image           [facility] (67306)



                                                                                
                                                                                
                                                                                
                                                                                
    



Immunizations

                      



    



              Normalized   Immunization Date  Notes        Care Provider  [facility]



                                         Immunization    

 

    



              influenza,   2015   no information  no name      [facility]



                           injectable,               [facility]



                           quadrivalent,             [facility]



                           contains                  (14247)



                                         preservative    

 

    



              influenza, seasonal,  10-   no information  no name      [facility]



                           injectable                [facility]



                                         (02965)

 

    



              SOLUMEDROL (UP TO  2018   no information  [place] [address]

  [facility]



                           125 MG)                   [facility] (57063)

 

    



              tetanus toxoid,  10-   no information  no name      [facility]



                           reduced diphtheria        [facility]



                           toxoid, and               [facility]



                           acellular pertussis       (88394)



                                         vaccine, adsorbed    

 

    



              TORADOL (IM) 60  2018   no information  no name      [facility]



                           MG/2ML (UP TO 15 MG)      [facility]



                                         (74972)



                                                                                
                                     



Results

                      



     



            Test Name  Value      Interpretation  Reference Range  Date Time  Fa

cility



                     (Normalized)        (Normalized)        (Medline  



                                         Reference)  

 

 



                                         other



                                         on



                                         null

 

     



                 Exp date        2020         (no code)       [facility]



                                         (03926)

 

     



                 Lot             8.9~10.9~0856   (no code)       [facility]



                                         (51819)

 

 



                                         not yet



                                         categorized



                                         on null

 

     



                 Exp date        2021         (no code)       [facility]



                                         (31194)

 

     



                 Lot             7.0~8.9~0993    (no code)       [facility]



                                         (35072)

 

 



                                         a1c (in house)



                                         on null

 

     



              Hemoglobin   10.9 %       (no code)    0 - 5.7 %    [facility]



                           A1c/Hemoglobin.t          [facility]



                           fraction (Bld)            (21790)

 

     



              Hemoglobin   8.5 %        (no code)    0 - 5.7 %    [facility]



                           A1c/Hemoglobin.t          [facility]



                           fraction (d)            (82826)

 

     



              Hemoglobin   8.9 %        (no code)    0 - 5.7 %    [facility]

[facility]



                           A1c/Hemoglobin.t          [facility]



                           [facility]



                           fraction (Bld)            (17853)

 

     



                 A1C (IN HOUSE)  0856            (no code)       [facility]



                                         (28185)

 

     



                 A1C (IN HOUSE)  2020         (no code)       [facility]



                                         (69162)

 

 



                                         not yet



                                         categorized



                                         on 2020

 

     



                 Exp date        2021         (no code)       [facility]



                                         (36805)

 

     



                 Lot             7.9~7.9~0610    (no code)       [facility]



                                         (85170)

 

 



                                         laboratory



                                         on 2020

 

     



              HCG (pregnancy  Negative     (no code)    2020   PENDING LOC

ATION



                     test) Ql            12:          [facility] (56660)

 

 



                                         laboratory



                                         on 2020

 

     



              Albumin      3.7 g/dL     (N)          3.4 - 5.4 g/dL   [facility]



                           [Mass/Vol]                [facility]



                                         ()

 

     



              Albumin/Globulin  1.4 {ratio}  (N)          1 - 2.5 {ratio}   Comm

[facility]



                           [Mass ratio]              [facility]



                                         (30558)

 

     



              ALP [Catalytic  47 U/L       (N)          44 - 147 U/L   Critical access hospital

 Health



                           activity/Vol]             [facility]



                                         (63783)

 

     



              ALT [Catalytic  15 U/L       (N)          4 - 40 U/L   [facility]

ealt



                           activity/Vol]             [facility]



                                         (17428)

 

     



              AST [Catalytic  12 U/L       (N)          10 - 34 U/L   [facility] access hospital 

Health



                           activity/Vol]             [facility]



                                         (74012)

 

     



              Basophils (Bld)  0.073 10*3/uL  (N)          0 - 0.3 10*3/uL   [facility]



                           [#/Vol]                   [facility]



                                         (97253)

 

     



              Basophils/100  0.6 %        (N)          0.5 - 1 %    [facility]

[facility]



                           WBC (Bld)                 [facility]



                                         (99143)

 

     



              Bilirubin    0.4 mg/dL    (N)          0.1 - 1.2 mg/dL   [facility] access hospital

 Health



                           [Mass/Vol]                [facility]



                                         (55972)

 

     



              Calcium      8.8 mg/dL    (N)          8.5 - 10.2 mg/dL   [facility]



                           [Mass/Vol]                [facility]



                                         (15075)

 

     



              Chloride     105 mmol/L   (N)          95 - 106 mmol/L   [facility]



                           [Moles/Vol]               [facility]



                                         (54449)

 

     



              CO2 [Moles/Vol]  24 mmol/L    (N)          23 - 29 mmol/L   [facility]



                                         (07594)

 

     



                 Creatinine      0.68 mg/dL      (N)             [facility]

h



                           [Mass/Vol]                [facility]



                                         (35364)

 

     



              Eosinophils  0.354 10*3/uL  (N)          0.05 - 0.5   [facility]

alth



                     (Bld) [#/Vol]       10*3/uL             [facility]



                                         (90960)

 

     



              Eosinophils/100  2.9 %        (N)          1 - 4 %      [facility]



                           WBC (Bld)                 [facility]



                                         (23336)

 

     



              Erythrocyte  13.7 %       (N)          11.6 - 14.6 %   [facility]

ealth



                           distribution              [facility]



                           width (RBC)               [facility]



                           [Ratio]                   (32809)

 

     



              GFR/1.73 sq M  123          (N)          90 - 120     [facility]

alth



                 predicted among  mL/min/{1.73_m2}   mL/min/{1.73_m2}   Center [facility]

[facility]



                           blacks MDRD               [facility]



                           (S/P/Bld) [Vol            (36937)



                                         rate/Area]     

 

     



              GFR/1.73 sq  106          (N)          90 - 120     [facility]

th



                 M.predicted MDRD  mL/min/{1.73_m2}   mL/min/{1.73_m2}   [facility]



                           (S/P/Bld) [Vol            [facility]



                           rate/Area]                (95620)

 

     



              Globulin (S)  2.6 g/dL     (N)          2 - 3.5 g/dL   [facility]

ealt



                           [Mass/Vol]                [facility]



                                         (37832)

 

     



              Glucose      233 mg/dL    (H)          60 - 125 mg/dL   [facility]



                           [Mass/Vol]                [facility]



                                         (01123)

 

     



              Hematocrit (Bld)  38.0 %       (N)          36.1 - 50.3 %   [facility]



                           [Volume                   [facility]



                           fraction]                 [facility]



                                         (71734)

 

     



              Hemoglobin (Bld)  11.9 g/dL    (N)          12.1 - 17.2 g/dL   [facility]



                           [Mass/Vol]                [facility]



                                         (52629)

 

     



              Lymphocytes  2.233 10*3/uL  (N)          0.9 - 2.9    [facility]

[facility]



                     (Bld) [#/Vol]       10*3/uL             [facility]



                                         (73356)

 

     



              Lymphocytes/100  18.3 %       (N)          20 - 40 %    [facility] hospital 

Health



                           WBC (Bld)                 [facility]



                                         (04650)

 

     



              MCH (RBC)    27.9 pg      (N)          27 - 31 pg   [facility]

[facility]



                           [Entitic mass]            [facility]



                                         (16697)

 

     



              MCHC (RBC)   31.3 g/dL    (L)          32 - 36 g/dL   [facility] hospital He

alth



                           [Mass/Vol]                [facility]



                                         (03354)

 

     



              MCV (RBC)    89.2 fL      (N)          80 - 100 fL   [facility] hospital Hea

lth



                           [Entitic vol]             [facility]



                                         (09570)

 

     



              Monocytes (Bld)  0.634 10*3/uL  (N)          0.3 - 0.9    [facility]



                     [#/Vol]             10*3/uL             [facility]



                                         (68053)

 

     



              Monocytes/100  5.2 %        (N)          2 - 8 %      [facility]

[facility]



                           WBC (Bld)                 [facility]



                                         (89291)

 

     



              Natriuretic  21 pg/mL     (N)          0 - 100 pg/mL   [facility]

[facility]



                           peptide B (Bld)           [facility]



                           [Mass/Vol]                [facility]



                                         (07029)

 

     



              Neutrophils  8.906 10*3/uL  (H)          1.7 - 7 10*3/uL   [facility]



                           (Bld) [#/Vol]             [facility]



                                         (69231)

 

     



              Neutrophils/100  73 %         (N)          40 - 60 %    [facility] hospital 

Health



                           WBC (Bld)                 [facility]



                                         (79511)

 

     



              Platelet mean  9.4 fL       (N)          7.2 - 11.7 fL   Critical access hospital

 Health



                           volume (Bld)              [facility]



                           [Entitic vol]             [facility]



                                         (62937)

 

     



              Platelets (Bld)  367 10*3/uL  (N)          150 - 450    [facility] access hospital 

Health



                     [#/Vol]             10*3/uL             [facility]



                                         (08786)

 

     



              Potassium    4.1 mmol/L   (N)          3.7 - 5.2 mmol/L   [facility]



                           [Moles/Vol]               [facility]



                                         ()

 

     



              Protein      6.3 g/dL     (N)          6.4 - 8.3 g/dL   [facility]



                           [Mass/Vol]                [facility]



                                         ()

 

     



              RBC (Bld)    4.26 10*6/uL  (N)          4.2 - 6.1    [facility] access hospital Hea

lth



                     [#/Vol]             10*6/uL             [facility]



                                         ()

 

     



              Sodium       137 mmol/L   (N)          135 - 145 mmol/L   [facility]



                           [Moles/Vol]               [facility]



                                         ()

 

     



              Urea nitrogen  7 mg/dL      (N)          7 - 20 mg/dL   [facility]



                           [Mass/Vol]                [facility]



                                         ()

 

     



                 Urea            NOT APPLICABLE  (no code)       [facility]

[facility]



                           nitrogen/Creatin          [facility] [Mass ratio]          [facility]



                                         ()

 

     



              WBC (Bld)    12.2 10*3/uL  (H)          3.5 - 10.5   [facility] hospital Hea

lth



                     [#/Vol]             10*3/uL             [facility]



                                         ()

 

 



                                         laboratory



                                         on 2020

 

     



              Albumin      3.8 g/dL     (N)          3.4 - 5.4 g/dL   [facility]



                           [Mass/Vol]                [facility]



                                         ()

 

     



              Albumin/Globulin  1.5 {ratio}  (N)          1 - 2.5 {ratio}   Comm

[facility]



                           [Mass ratio]              [facility]



                                         ()

 

     



              ALP [Catalytic  40 U/L       (N)          44 - 147 U/L   [facility] access hospital

 Health



                           activity/Vol]             [facility]



                                         ()

 

     



              ALT [Catalytic  15 U/L       (N)          4 - 40 U/L   [facility]

ealt



                           activity/Vol]             [facility]



                                         ()

 

     



              AST [Catalytic  13 U/L       (N)          10 - 34 U/L   [facility]



                           activity/Vol]             [facility]



                                         ()

 

     



                 B. burgdorferi  <0.90           (N)             [facility]

[facility]



                           Ab IA Qn (S)              [facility]



                                         ()

 

     



              Basophils (Bld)  0.051 10*3/uL  (N)          0 - 0.3 10*3/uL   [facility]



                           [#/Vol]                   [facility]



                                         (57242)

 

     



              Basophils/100  0.5 %        (N)          0.5 - 1 %    [facility] access hospital He

alth



                           WBC (Bld)                 [facility]



                                         (74263)

 

     



              Bilirubin    0.3 mg/dL    (N)          0.1 - 1.2 mg/dL   [facility]



                           [Mass/Vol]                [facility]



                                         (54473)

 

     



              Calcidiol    25 ng/mL     (L)          20 - 50 ng/mL   [facility]

ealt



                           [Mass/Vol]                [facility]



                                         (65072)

 

     



              Calcium      8.5 mg/dL    (L)          8.5 - 10.2 mg/dL   [facility]



                           [Mass/Vol]                [facility]



                                         ([id number])

 

     



              Chloride     108 mmol/L   (N)          95 - 106 mmol/L   [facility]



                           [Moles/Vol]               [facility]



                                         ([id number])

 

     



              Cholesterol  159 mg/dL    (N)          180 - 200 mg/dL   [facility]



                           [Mass/Vol]                [facility]



                                         (70686)

 

     



                 Cholesterol in  42 mg/dL        (L)             [facility]



                           HDL [Mass/Vol]            [facility]



                                         (24604)

 

     



              Cholesterol in  89 mg/dL     (N)          0 - 100 mg/dL   [facility]



                           LDL [Mass/Vol]            [facility]



                                         (05835)

 

     



                 Cholesterol non  117 mg/dL       (N)             [facility]

th



                           HDL [Mass/Vol]            [facility]



                                         (38781)

 

     



                 Cholesterol.tota  3.8 {ratio}     (N)             [facility]

lth



                           l/Cholesterol in          [facility]



                           HDL [Mass ratio]          [facility]



                                         (55717)

 

     



              CO2 [Moles/Vol]  24 mmol/L    (N)          23 - 29 mmol/L   [facility]



                                         (53392)

 

     



                 Creatinine      0.65 mg/dL      (N)             [facility]



                           [Mass/Vol]                [facility]



                                         (73140)

 

     



              CRP [Mass/Vol]  8.7 mg/L     (H)          0 - 8 mg/L   [facility]



                                         (86176[facility]

 

     



              Eosinophils  0.394 10*3/uL  (N)          0.05 - 0.5   [facility]

[facility]



                     (Bld) [#/Vol]       10*3/uL             [facility]



                                         (54289)

 

     



              Eosinophils/100  3.9 %        (N)          1 - 4 %      [facility]



                           WBC (Bld)                 [facility]



                                         (58701)

 

     



              Erythrocyte  13.5 %       (N)          11.6 - 14.6 %   [facility]

ealth



                           distribution              [facility]



                           width (RBC)               [facility]



                           [Ratio]                   (07019)

 

     



                 ESR (Bld)       6 mm/h          (N)             [facility]

h



                           [Velocity]                [facility]



                                         (44183)

 

     



              GFR/1.73 sq M  125          (N)          90 - 120     [facility]

alth



                 predicted among  mL/min/{1.73_m2}   mL/min/{1.73_m2}   [place] [facility]

f [facility]



                           blacks MDRD               [facility]



                           (S/P/Bld) [Vol            (19935)



                                         rate/Area]     

 

     



              GFR/1.73 sq  108          (N)          90 - 120     Count includes the [facility]

th



                 M.predicted MDRD  mL/min/{1.73_m2}   mL/min/{1.73_m2}   [facility]



                           (S/P/Bld) [Vol            [facility]



                           rate/Area]                (23100)

 

     



              Globulin (S)  2.6 g/dL     (N)          2 - 3.5 g/dL   [facility]



                           [Mass/Vol]                [facility]



                                         (60659)

 

     



              Glucose      214 mg/dL    (H)          60 - 125 mg/dL   [facility]



                           [Mass/Vol]                [facility]



                                         (77863)

 

     



              Hematocrit (Bld)  37.3 %       (N)          36.1 - 50.3 %   [facility]



                           [Volume                   [facility]



                           fraction]                 [facility]



                                         (87378)

 

     



              Hemoglobin (Bld)  11.7 g/dL    (N)          12.1 - 17.2 g/dL   [facility]



                           [Mass/Vol]                [facility]



                                         (82327)

 

     



              Lymphocytes  2.98 10*3/uL  (N)          0.9 - 2.9    [facility]

lth



                     (Bld) [#/Vol]       10*3/uL             [facility]



                                         (93978)

 

     



              Lymphocytes/100  29.5 %       (N)          20 - 40 %    [facility]



                           WBC (Bld)                 [facility]



                                         (80144)

 

     



              MCH (RBC)    27.3 pg      (N)          27 - 31 pg   [facility]

th



                           [Entitic mass]            [facility]



                                         (71991)

 

     



              MCHC (RBC)   31.4 g/dL    (L)          32 - 36 g/dL   [facility]

alth



                           [Mass/Vol]                [facility]



                                         (12931)

 

     



              MCV (RBC)    87.1 fL      (N)          80 - 100 fL   [facility]

lth



                           [Entitic vol]             [facility]



                                         (86625)

 

     



              Monocytes (Bld)  0.697 10*3/uL  (N)          0.3 - 0.9    [facility]



                     [#/Vol]             10*3/uL             [facility]



                                         (86100)

 

     



              Monocytes/100  6.9 %        (N)          2 - 8 %      [facility]



                           WBC (Bld)                 [facility]



                                         (37442)

 

     



              Neutrophils  5.979 10*3/uL  (N)          1.7 - 7 10*3/uL   [facility]



                           (Bld) [#/Vol]             [facility]



                                         (24093)

 

     



              Neutrophils/100  59.2 %       (N)          40 - 60 %    [facility]



                           WBC (Bld)                 [facility]



                                         (00028)

 

     



              Platelet mean  9.5 fL       (N)          7.2 - 11.7 fL   [facility]

 [facility]



                           volume (Bld)              [facility]



                           [Entitic vol]             [facility]



                                         (61887)

 

     



              Platelets (Bld)  394 10*3/uL  (N)          150 - 450    [facility]



                     [#/Vol]             10*3/uL             [facility]



                                         (01959)

 

     



              Potassium    4.2 mmol/L   (N)          3.7 - 5.2 mmol/L   [facility]



                           [Moles/Vol]               [facility]



                                         (70133)

 

     



              Protein      6.4 g/dL     (N)          6.4 - 8.3 g/dL   [facility]



                           [Mass/Vol]                [facility]



                                         (57507)

 

     



                 R. spotted fever  no information  (no code)       [facility]

lth



                           group IgG Ql (S)          [facility]



                                         (99133)

 

     



                 R. spotted fever  no information  (no code)       [facility]

lth



                           group IgM Ql (S)          [facility]



                                         (12571)

 

     



              RBC (Bld)    4.28 10*6/uL  (N)          4.2 - 6.1    [facility]

[facility]



                     [#/Vol]             10*6/uL             [facility]



                                         (00412)

 

     



              Sodium       140 mmol/L   (N)          135 - 145 mmol/L   [facility]



                           [Moles/Vol]               [facility]



                                         (08715)

 

     



              Triglyceride  184 mg/dL    (H)          0 - 150 mg/dL   [facility]



                           [Mass/Vol]                [facility]



                                         (80563)

 

     



              TSH Qn       1.59 m[IU]/L  (N)          0.4 - 4 m[IU]/L   [facility]



                                         (05945)

 

     



              Urea nitrogen  9 mg/dL      (N)          7 - 20 mg/dL   [facility]



                           [Mass/Vol]                [facility]



                                         (88444)

 

     



                 Urea            NOT APPLICABLE  (no code)       [facility]





                           nitrogen/Creatin          [facility] [Mass ratio]          [facility]



                                         ()

 

     



              WBC (Bld)    10.1 10*3/uL  (N)          3.5 - 10.5   [facility]

lth



                     [#/Vol]             10*3/uL             [facility]



                                         (15564)

 

 



                                         not yet



                                         categorized



                                         on 2020

 

     



                 Exp date        2021         (no code)       [facility]

h



                                         [facility]



                                         (89543)

 

     



                 Lot             7.9~7.0~0610    (no code)       [facility]

h



                                         [facility]



                                         (59534)

 

 



                                         not yet



                                         categorized



                                         on 2019

 

     



                 Exp date        2021         (no code)       [facility]

h



                                         [facility]



                                         (80511)

 

     



                 Lot             7.0~7.0~0552    (no code)       [facility]

h



                                         [facility]



                                         (61658)

 

 



                                         laboratory



                                         on 2019

 

     



              Albumin      3.8 g/dL     (N)          3.4 - 5.4 g/dL   [facility]



                           [Mass/Vol]                [facility]



                                         (19415)

 

     



              Albumin/Globulin  1.4 {ratio}  (N)          1 - 2.5 {ratio}   Comm

[facility]



                           [Mass ratio]              [facility]



                                         (53326)

 

     



              ALP [Catalytic  41 U/L       (N)          44 - 147 U/L   [facility]



                           activity/Vol]             [facility]



                                         (83003)

 

     



              ALT [Catalytic  13 U/L       (N)          4 - 40 U/L   [facility]

ealt



                           activity/Vol]             [facility]



                                         (28670)

 

     



              AST [Catalytic  10 U/L       (N)          10 - 34 U/L   [facility]



                           activity/Vol]             [facility]



                                         (18591)

 

     



              Bilirubin    0.3 mg/dL    (N)          0.1 - 1.2 mg/dL   [facility]



                           [Mass/Vol]                [facility]



                                         (25439)

 

     



              Calcium      9.0 mg/dL    (N)          8.5 - 10.2 mg/dL   [facility]



                           [Mass/Vol]                [facility]



                                         (86866)

 

     



              Chloride     108 mmol/L   (N)          95 - 106 mmol/L   [facility]



                           [Moles/Vol]               [facility]



                                         (38113)

 

     



              Cholesterol  168 mg/dL    (N)          180 - 200 mg/dL   [facility]



                           [Mass/Vol]                [facility]



                                         (22425)

 

     



                 Cholesterol in  45 mg/dL        (L)             [facility]



                           HDL [Mass/Vol]            [facility]



                                         (21933)

 

     



              Cholesterol in  97 mg/dL     (N)          0 - 100 mg/dL   [facility]



                           LDL [Mass/Vol]            [facility]



                                         (82070)

 

     



                 Cholesterol non  123 mg/dL       (N)             [facility]

th



                           HDL [Mass/Vol]            [facility]



                                         (55708)

 

     



                 Cholesterol.tota  3.7 {ratio}     (N)             [facility]

lt



                           l/Cholesterol in          [facility]



                           HDL [Mass ratio]          [facility]



                                         (81115)

 

     



              CO2 [Moles/Vol]  24 mmol/L    (N)          23 - 29 mmol/L   [facility]



                                         (84445)

 

     



                 Creatinine      0.62 mg/dL      (N)             [facility]

h



                           [Mass/Vol]                [facility]



                                         (34211)

 

     



              GFR/1.73 sq M  128          (N)          90 - 120     [facility]

alth



                 predicted among  mL/min/{1.73_m2}   mL/min/{1.73_m2}   [facility]

[facility]



                           blacks MDRD               [facility]



                           (S/P/Bld) [Vol            (71394)



                                         rate/Area]     

 

     



              GFR/1.73 sq  110          (N)          90 - 120     Count includes the [facility]

th



                 M.predicted MDRD  mL/min/{1.73_m2}   mL/min/{1.73_m2}   [facility]



                           (S/P/Bld) [Vol            [facility]



                           rate/Area]                (50256)

 

     



              Globulin (S)  2.8 g/dL     (N)          2 - 3.5 g/dL   [facility]

ealt



                           [Mass/Vol]                [facility]



                                         (83702)

 

     



              Glucose      138 mg/dL    (H)          60 - 125 mg/dL   [facility]



                           [Mass/Vol]                [facility]



                                         (97516)

 

     



              Potassium    4.1 mmol/L   (N)          3.7 - 5.2 mmol/L   [facility]



                           [Moles/Vol]               [facility]



                                         (64997)

 

     



              Protein      6.6 g/dL     (N)          6.4 - 8.3 g/dL   [facility]



                           [Mass/Vol]                [facility]



                                         (14521)

 

     



              Sodium       139 mmol/L   (N)          135 - 145 mmol/L   [facility]



                           [Moles/Vol]               [facility]



                                         (77100)

 

     



              Triglyceride  164 mg/dL    (H)          0 - 150 mg/dL   [facility]



                           [Mass/Vol]                [facility]



                                         (80573)

 

     



              TSH Qn       1.36 m[IU]/L  (N)          0.4 - 4 m[IU]/L   [facility]



                                         (46072)

 

     



              Urea nitrogen  13 mg/dL     (N)          7 - 20 mg/dL   [facility]



                           [Mass/Vol]                [facility]



                                         (32024)

 

     



                 Urea            NOT APPLICABLE  (no code)       [facility]





                           nitrogen/Creatin          [facility]



                           ine [Mass ratio]          [facility]



                                         (25528)

 

 



                                         other



                                         on



                                         2019

 

     



                 Exp date        Negative        (no code)       [facility]



                                         (20587)

 

 



                                         other



                                         on



                                         2018-07-10

 

     



                 Control         Negative        (no code)       Not Available



                                         (72548)

 

     



                 Exp date        2018         (no code)       Not Available



                                         (99299)

 

     



                 Lot #           [id number]          (no code)       Not Available



                                         (56759)

 

     



                 RMSF IGG        NOT DETECTED    (no code)       Not Available



                                         (48377)

 

     



                 RMSF IGM        NOT DETECTED    (no code)       Not Available



                                         (54339)

 

 



                                         hematology



                                         on 2018-07-10

 

     



              Basophils Auto  0.05 10*3/uL  (N)          0 - 0.3 10*3/uL   no in

formation



                                         #/vol (Bld)     

 

     



              Basophils/100  0.6 %        (N)          0.5 - 1 %    no informati

on



                                         WBC Auto (Bld)     

 

     



              Eosinophils Auto  0.059 10*3/uL  (N)          0.05 - 0.5   no info

rmation



                           #/vol (Bld)               10*3/uL  

 

     



              Eosinophils/100  0.7 %        (N)          1 - 4 %      no informa

tion



                                         WBC Auto (Bld)     

 

     



              Erythrocyte  12.9 %       (N)          11.6 - 14.6 %   no informat

ion



                                         distribution     



                                         width Auto Ratio     



                                         (RBC)     

 

     



              Hematocrit Auto  39.9 %       (N)          36.1 - 50.3 %   no info

rmation



                                         Volume Fraction     



                                         (Bld)     

 

     



              Hemoglobin mass  13.2 g/dL    (N)          12.1 - 17.2 g/dL   no i

nformation



                                         conc (Bld)     

 

     



              Lymphocytes Auto  0.941 10*3/uL  (N)          0.9 - 2.9    no info

rmation



                           #/vol (Bld)               10*3/uL  

 

     



              Lymphocytes/100  11.2 %       (N)          20 - 40 %    no informa

tion



                                         WBC Auto (Bld)     

 

     



              MCH Auto Entitic  26.9 pg      (L)          27 - 31 pg   no inform

ation



                                         mass (RBC)     

 

     



              MCHC Auto mass  33.1 g/dL    (N)          32 - 36 g/dL   no inform

ation



                                         conc (RBC)     

 

     



              MCV Auto Entitic  81.4 fL      (N)          80 - 100 fL   no infor

mation



                                         volume (RBC)     

 

     



              Monocytes Auto  0.378 10*3/uL  (N)          0.3 - 0.9    no inform

ation



                           #/vol (Bld)               10*3/uL  

 

     



              Monocytes/100  4.5 %        (N)          2 - 8 %      no informati

on



                                         WBC Auto (Bld)     

 

     



              Neutrophils Auto  6.972 10*3/uL  (N)          1.7 - 7 10*3/uL   no

 information



                                         #/vol (Bld)     

 

     



              Neutrophils/100  83 %         (N)          40 - 60 %    no informa

tion



                                         WBC Auto (Bld)     

 

     



              Platelet mean  9.7 fL       (N)          7.2 - 11.7 fL   no inform

ation



                                         volume Auto     



                                         Entitic volume     



                                         (Bld)     

 

     



              Platelets Auto  271 10*3/uL  (N)          150 - 450    no informat

ion



                           #/vol (Bld)               10*3/uL  

 

     



              RBC Auto #/vol  4.90 10*6/uL  (N)          4.2 - 6.1    no informa

tion



                           (Bld)                     10*6/uL  

 

     



              WBC Auto #/vol  8.4 10*3/uL  (N)          3.5 - 10.5   no informat

ion



                           (Bld)                     10*3/uL  

 

 



                                         other



                                         on



                                         2018

 

     



                 Exp date        2020         (no code)       [facility]



                                         ([id number])

 

     



                 Lot             10.9~8.5~0856   (no code)       [facility]



                                         (68413)

 

 



                                         urinalysis



                                         on 2018

 

     



                 Color Nom (U)   2018~clear~ye  (no code)       no informatio

n



                                         llow    

 

 



                                         other



                                         on



                                         2018

 

     



                 Albumin/Globulin  1.4             (N)             [facility]

lt



                           mass ratio                [facility]



                                         (61416)

 

     



                 Cholesterol in  127             (H)             [facility]



                           LDL mass conc             [facility]



                                         (23846)

 

     



                 Cholesterol non  163             (H)             [facility]

th



                           HDL mass conc             [facility]



                                         (99179)

 

     



                 Cholesterol.tota  5.8             (H)             [facility]



                           l/Cholesterol in          [facility]



                           HDL mass ratio            [facility]



                                         (33316)

 

     



                 Control         <30~+           (no code)       no information

 

     



                 CRE             30~100          (no code)       no information

 

     



                 Exp date        10/19           (no code)       [facility]



                                         (04963)

 

     



                 Globulin        2.8             (N)             [facility]



                           Calculated mass           [facility] (S)                  [facility]



                                         (56733)

 

     



                 Lot             8.5~7.8~0796    (no code)       [facility]



                                         (09598)

 

     



                 Lot #           [id number]          (no code)       no information

 

     



                 MICROALBUMIN    Normal          (no code)       no information

 

 



                                         metabolic panel



                                         on 2018

 

     



              Albumin mass  3.8 g/dL     (N)          3.4 - 5.4 g/dL   [facility]



                                         (10662)

 

     



              ALP enzyme   50 U/L       (N)          44 - 147 U/L   [facility]

alth



                           act/vol                   [facility]



                                         (70060)

 

     



              ALT enzyme   15 U/L       (N)          4 - 40 U/L   [facility]



                           act/[facility]



                                         (70591)

 

     



              AST enzyme   13 U/L       (N)          10 - 34 U/L   [facility]



                           act/[facility]



                                         (75740)

 

     



              Bilirubin mass  0.3 mg/dL    (N)          0.1 - 1.2 mg/dL   [facility]



                                         (09066)

 

     



              Calcium mass  9.0 mg/dL    (N)          8.5 - 10.2 mg/dL   [facility]



                                         (94331)

 

     



              Chloride molar  105 mmol/L   (N)          95 - 106 mmol/L   [facility]



                                         (76949)

 

     



              CO2 molar conc  23 mmol/L    (N)          23 - 29 mmol/L   [facility]



                                         (39818)

 

     



                 Creatinine mass  0.57 mg/dL      (N)             [facility]



                                         (33395)

 

     



              GFR/1.73 sq M  133          (N)          90 - 120     [facility]



                 predicted among  mL/min/{1.73_m2}   mL/min/{1.73_m2}   [place] [facility]

[place]



                           blacks MDRD [facility]



                           rate/area                 (34509)



                                         (S/P/Bld)     

 

     



              GFR/1.73 sq  115          (N)          90 - 120     [facility].predicted MDRD  mL/min/{1.73_m2}   mL/min/{1.73_m2}   [facility] rate/[facility]



                                         (77865)

 

     



              Glucose mass  189 mg/dL    (H)          60 - 125 mg/dL   [facility]



                                         (69183)

 

     



              Potassium molar  4.1 mmol/L   (N)          3.7 - 5.2 mmol/L   [facility]



                                         (35828)

 

     



              Protein mass  6.6 g/dL     (N)          6.4 - 8.3 g/dL   [facility]



                                         (41923)

 

     



              Sodium molar  136 mmol/L   (N)          135 - 145 mmol/L   [facility]



                                         (69984)

 

     



              Urea nitrogen  4 mg/dL      (L)          7 - 20 mg/dL   [facility] 

[facility]



                           mass conc                 [facility]



                                         (35293)

 

     



              Urea         7 mg/mg      (N)          6 - 22 mg/mg   [facility]



                           nitrogen/Creatin          [facility] mass ratio            [facility]



                                         (13918)

 

 



                                         hematology



                                         on 2018

 

     



              Basophils Auto  0.073 10*3/uL  (N)          0 - 0.3 10*3/uL   Comm

[facility]



                           #/vol (Bld)               [facility]



                                         (61263)

 

     



              Basophils/100  0.6 %        (N)          0.5 - 1 %    [facility]

[facility]



                           WBC Auto (Bld)            [facility]



                                         (90571)

 

     



              Eosinophils Auto  0.303 10*3/uL  (N)          0.05 - 0.5   [facility]



                     #/vol (Bld)         10*3/uL             [facility]



                                         (37006)

 

     



              Eosinophils/100  2.5 %        (N)          1 - 4 %      [facility]



                           WBC Auto (Bld)            [facility]



                                         (80768)

 

     



              Erythrocyte  15.0 %       (N)          11.6 - 14.6 %   [facility]

ealth



                           distribution              [facility] Auto Ratio          [facility]



                           (RBC)                     (15985)

 

     



              Hematocrit Auto  39.0 %       (N)          36.1 - 50.3 %   [facility]



                           Volume Fraction           [facility]



                           ([facility]



                                         (60247)

 

     



              Hemoglobin mass  12.5 g/dL    (N)          12.1 - 17.2 g/dL   [facility]



                           conc (Bld)                [facility]



                                         (45721)

 

     



              Lymphocytes Auto  3.194 10*3/uL  (N)          0.9 - 2.9    [facility]



                     #/vol (Bld)         10*3/uL             [facility]



                                         (15316)

 

     



              Lymphocytes/100  26.4 %       (N)          20 - 40 %    [facility]



                           WBC Auto (Bld)            [facility]



                                         (38241)

 

     



              MCH Auto Entitic  25.6 pg      (L)          27 - 31 pg   [facility] (RBC)                [facility]



                                         (11887)

 

     



              MCHC Auto mass  32.1 g/dL    (N)          32 - 36 g/dL   [facility]



                           conc (RBC)                [facility]



                                         (04601)

 

     



              MCV Auto Entitic  79.8 fL      (L)          80 - 100 fL   [facility]



                           volume (RBC)              [facility]



                                         (63706)

 

     



              Monocytes Auto  0.581 10*3/uL  (N)          0.3 - 0.9    [facility] hospital

 Health



                     #/vol (Bld)         10*3/uL             [facility]



                                         (80116)

 

     



              Monocytes/100  4.8 %        (N)          2 - 8 %      [facility]



                           WBC Auto (Bld)            [facility]



                                         (90196)

 

     



              Neutrophils Auto  7.95 10*3/uL  (H)          1.7 - 7 10*3/uL   [facility]



                           #/vol (Bld)               [facility]



                                         (22741)

 

     



              Neutrophils/100  65.7 %       (N)          40 - 60 %    [facility] 

[facility]



                           WBC Auto (Bld)            [facility]



                                         (34388)

 

     



              Platelet mean  10.1 fL      (N)          7.2 - 11.7 fL   [facility] hospital

 Health



                           volume Auto               [facility]



                           (Bld)                     (94858)

 

     



              Platelets Auto  375 10*3/uL  (N)          150 - 450    [facility]

[facility]



                     #/vol (Bld)         10*3/uL             [facility]



                                         (94147)

 

     



              RBC Auto #/vol  4.89 10*6/uL  (N)          4.2 - 6.1    [facility] hospital 

Health



                     (Bld)               10*6/uL             [facility]



                                         (75769)

 

     



              WBC Auto #/vol  12.1 10*3/uL  (H)          3.5 - 10.5   [facility]



                     (Bld)               10*3/uL             [facility]



                                         (36923)

 

 



                                         cardiac



                                         on



                                         2018

 

     



                 Cholesterol in  34 mg/dL        (L)             [facility]

h



                           HDL mass conc             [facility]



                                         (15782)

 

     



              Cholesterol mass  197 mg/dL    (N)          180 - 200 mg/dL   Comm

[facility]



                           conc                      [facility]



                                         (41598)

 

     



              Triglyceride  217 mg/dL    (H)          0 - 150 mg/dL   [facility]



                           mass conc                 [facility]



                                         (64427)

 

 



                                         other



                                         on



                                         2017

 

     



            Albumin/Globulin  1.4 {ratio}  (no code)  1 - 2.5 {ratio}  201

7  Not 

Available



                     mass ratio          08:          (51566)

 

     



            Globulin   2.7 g/dL   (no code)  2 - 3.5 g/dL  2017  Not Avail

able



                     Calculated mass     08:          (99689)



                                         conc (S)     

 

 



                                         metabolic panel



                                         on 2017

 

     



            Albumin mass  3.9 g/dL   (no code)  3.4 - 5.4 g/dL  2017  Not 

Available



                     conc                08:          (06558)

 

     



            ALP enzyme  62 U/L     (no code)  44 - 147 U/L  2017  Not Avai

lable



                     act/vol             08:          (11730)

 

     



            ALT enzyme  13 U/L     (no code)  4 - 40 U/L  2017  Not Availa

ble



                     act/vol             08:          (09227)

 

     



            AST enzyme  8 U/L      (no code)  10 - 34 U/L  2017  Not Avail

able



                     act/vol             08:          (28840)

 

     



            Bilirubin mass  mg/dL      (no code)  0.1 - 1.2 mg/dL  2017  N

ot Available



                     conc                08:          (93295)

 

     



            Calcium mass  9.6 mg/dL  (no code)  8.5 - 10.2 mg/dL  2017  No

t Available



                     conc                08:          (54916)

 

     



            Chloride molar  98 mmol/L  (no code)  95 - 106 mmol/L  2017  N

ot 

Available



                     conc                08:          (78233)

 

     



            CO2 molar conc  23 mmol/L  (no code)  23 - 29 mmol/L  2017  No

t Available



                           08:                (69122)

 

     



              Creatinine mass  0.62 mg/dL   (no code)    2017   Not Availa

ble



                     conc                08:          (09315)

 

     



            GFR/1.73 sq M  130        (no code)  90 - 120   2017  Not Avai

lable



              predicted among  mL/min/{1.73_m2}   mL/min/{1.73_m2}  08:  

 (13246)



                                         blacks MDRD vol     



                                         rate/area     



                                         (S/P/Bld)     

 

     



            GFR/1.73 sq M  112        (no code)  90 - 120   2017  Not Avai

lable



              predicted among  mL/min/{1.73_m2}   mL/min/{1.73_m2}  08:  

 (39234)



                                         non-blacks MDRD     



                                         vol rate/area     



                                         (S/P/Bld)     

 

     



            Glucose mass  286 mg/dL  (H)        60 - 125 mg/dL  2017  Not 

Available



                     conc                08:          (81179)

 

     



            Potassium molar  3.9 mmol/L  (no code)  3.7 - 5.2 mmol/L  2017

  Not 

Available



                     conc                08:470400          (72582)

 

     



            Protein mass  6.6 g/dL   (no code)  6.4 - 8.3 g/dL  2017  Not 

Available



                     conc                08:530400          (33785)

 

     



            Sodium molar  137 mmol/L  (no code)  135 - 145 mmol/L  2017  N

ot 

Available



                     conc                08:470400          (78768)

 

     



            Urea nitrogen  6 mg/dL    (no code)  7 - 20 mg/dL  2017  Not A

vailable



                     mass conc           08:530400          (09448)

 

     



            Urea       10 mg/mg   (no code)  6 - 22 mg/mg  2017  Not Avail

able



                     nitrogen/Creatin     08:          (09271)



                                         ine mass ratio     

 

 



                                         other



                                         on



                                         2017

 

     



            Albumin/Globulin  1.4 {ratio}  (no code)  1 - 2.5 {ratio}  

7  Not 

Available



                     [Mass ratio]        09:0          (77249)

 

     



              Cholesterol in  28 mg/dL     (no code)    2017   Not Availab

le



                     VLDL [Mass/Vol]     09:0          (64211)

 

     



            Globulin (S)  2.8 g/dL   (no code)  2 - 3.5 g/dL  2017  Not Av

ailable



                     [Mass/Vol]          09:0          (79238)

 

 



                                         metabolic panel



                                         on 2017

 

     



            Albumin    4.0 g/dL   (no code)  3.4 - 5.4 g/dL  2017  Not [place]

ilable



                     [Mass/Vol]          09:0          (18328)

 

     



            ALP [Catalytic  60 U/L     (no code)  44 - 147 U/L  2017  Not 

Available



                     activity/Vol]       09:0          (24542)

 

     



            ALT [Catalytic  12 U/L     (no code)  4 - 40 U/L  2017  Not Av

ailable



                     activity/Vol]       09:          (18329)

 

     



            AST [Catalytic  11 U/L     (no code)  10 - 34 U/L  2017  Not A

vailable



                     activity/Vol]       09:          (95626)

 

     



            Bilirubin  mg/dL      (no code)  0.1 - 1.2 mg/dL  2017  Not Av

ailable



                     [Mass/Vol]          09:          (13716)

 

     



            Calcium    9.2 mg/dL  (no code)  8.5 - 10.2 mg/dL  2017  Not A

vailable



                     [Mass/Vol]          09:          (67484)

 

     



            Chloride   103 mmol/L  (no code)  95 - 106 mmol/L  2017  Not A

vailable



                     [Moles/Vol]         09:          (51891)

 

     



            CO2 [Moles/Vol]  25 mmol/L  (no code)  23 - 29 mmol/L  2017  N

ot 

Available



                           09:                ()

 

     



              Creatinine   0.62 mg/dL   (no code)    2017   Not Available



                     [Mass/Vol]          09:          (46683)

 

     



            GFR/1.73 sq M  130        (no code)  90 - 120   2017  Not Avai

lable



              predicted among  mL/min/{1.73_m2}   mL/min/{1.73_m2}  09:  

 (31114)



                                         blacks MDRD     



                                         (S/P/Bld) [Vol     



                                         rate/Area]     

 

     



            GFR/1.73 sq M  113        (no code)  90 - 120   2017  Not Avai

lable



              predicted among  mL/min/{1.73_m2}   mL/min/{1.73_m2}  09:  

 (68528)



                                         non-blacks MDRD     



                                         (S/P/Bld) [Vol     



                                         rate/Area]     

 

     



            Glucose    145 mg/dL  (H)        60 - 125 mg/dL  2017  Not [facility]

ilable



                     [Mass/Vol]          :          (47177)

 

     



            Potassium  4.1 mmol/L  (no code)  3.7 - 5.2 mmol/L  2017  Not 

Available



                     [Moles/Vol]         :          (33349)

 

     



            Protein    6.8 g/dL   (no code)  6.4 - 8.3 g/dL  2017  Not Courtney

ilable



                     [Mass/Vol]          09:110500          (90344)

 

     



            Sodium     143 mmol/L  (no code)  135 - 145 mmol/L  2017  Not 

Available



                     [Moles/Vol]         09:0500          (70393)

 

     



            Urea nitrogen  6 mg/dL    (no code)  7 - 20 mg/dL  2017  Not A

vailable



                     [Mass/Vol]          09:0500          (09605)

 

     



            Urea       10 mg/mg   (no code)  6 - 22 mg/mg  2017  Not Avail

able



                     nitrogen/Creatin     09:          (74740)



                                         ine [Mass ratio]     

 

 



                                         cardiac



                                         on



                                         2017

 

     



            Cholesterol  135 mg/dL  (no code)  180 - 200 mg/dL  2017  Not 

Available



                     [Mass/Vol]          09:0500          (49755)

 

     



              Cholesterol in  38 mg/dL     (L)          2017   Not Availab

le



                     HDL [Mass/Vol]      09:0500          (23668)

 

     



            Cholesterol in  69 mg/dL   (no code)  0 - 100 mg/dL  2017  Not

 Available



                     LDL [Mass/Vol]      09:0500          (44368)

 

     



            Triglyceride  140 mg/dL  (no code)  0 - 150 mg/dL  2017  Not A

vailable



                     [Mass/Vol]          09:0500          (20711)



                                                                                
                                                                                
                                                                                
                                                                                
                                                                                
                                                                                
                                                                                
                                                                                
                                                                                
                                                                                
                                                                                
                                                                                
                                                                                
                                                                                
                                                                                
                                                                                
                                                                                
                                                                                
                                                                                
                                                                                
                                                                                
                                                                                
                                                                                
                                                                                
                                                                                
                                                                                
                                                                                
                                                                                
                                                                                
                                                                                
                                                                                
                                                                                
                                                                              



Vital Signs

                      



      



           Vital Sign  Value     Interpretation  Reference  Date Time  Care Prov

ider  

Facility



                     (Normalized)        (Normalized)        Range   

 

      



            BMI (Body Mass  36.63 kg/m2  (no code)  15 - 25 kg/m2  2018  [facility]

                                         Community



                 Index)          18:      [address] [place] [address] ([id number])

 

      



           BMI (Body Mass  35.6 kg/m2  (no code)  15 - 25 kg/m2  2018  CHASE [facility] 

 Community



                 Index)          17:      [address] [place] [address] ([id number])

 

      



           BMI (Body Mass  34.82 kg/m2  (no code)  15 - 25 kg/m2  07-  CECELIA BAIG   

Community



                 Index)          12:      [facility]



                           [phone]                [facility] ([id number])

 

      



            BMI (Body Mass  36.44 kg/m2  (no code)  15 - 25 kg/m2  2018  [facility]

                                         [facility]



                 Index)          18:      [id number]           [facility] ([id number])

 

      



            BMI (Body Mass  36.47 kg/m2  (no code)  15 - 25 kg/m2  2018  W

YUE DANNY

                                         Community



                 Index)          18:      [id number]           [facility] ([id number])

 

      



           Body      98.4 [degF]  (no code)  97.8 - 99.0  2018  [facility]  Community



              Temperature    [degF]       18:   [id number] [place] [address] ([id number])

 

      



           Body      98.4 [degF]  (no code)  97.8 - 99.0  2018  [facility]  Community



              Temperature    [degF]       17:   [address] [place] [address] ([id number])

 

      



           Body      99.2 [degF]  (no code)  97.8 - 99.0  07-  [facility]



              Temperature    [degF]       12:   [facility]



                           [id number]                [facility] ([id number])

 

      



           Body      0 [degF]  (no code)  97.8 - 99.0  2018  [facility]



              Temperature    [degF]       18:   [id number] [place] [address] ([id number])

 

      



           Body      98 [degF]  (no code)  97.8 - 99.0  2018  [facility]



              Temperature    [degF]       18:   [id number]        [facility] (03339)

 

      



           Body      98 [degF]  (no code)  97.8 - 99.0  2015  ABRAHAN GONZALEZ

  Community



              Temperature    [degF]       13:   [id number] [place], [place] [address]

[facility] ([id number])

 

      



           Body      98.8 [degF]  (no code)  97.8 - 99.0  2015  YONNY     

Community



              Temperature    [degF]       10:   [facility]



                           [address]                     [facility] ([id number])

 

      



           Body      98.4 [degF]  (no code)  97.8 - 99.0  02-  VENECIA [facility]



              Temperature    [degF]       10:   [address] [place] [address] ([address])

 

      



           Body      98.6 [degF]  (no code)  97.8 - 99.0  01-  VENECIA MAD

[facility]



              Temperature    [degF]       14:   [address] [place] [address] ([id number])

 

      



           Body      97.9 [degF]  (no code)  97.8 - 99.0  2014  [facility]



              Temperature    [degF]       09:   [address] ([address])

 

      



           Body      97 [degF]  (no code)  97.8 - 99.0  2014  [facility]



              Temperature    [degF]       16:   [address] [place] [address] ([id number])

 

      



           Body      98.1 [degF]  (no code)  97.8 - 99.0  2014  [facility]



              Temperature    [degF]       10:   [address] [place] [address] ([id number])

 

      



           Body      99.2 [degF]  (no code)  97.8 - 99.0  04-  [facility]



              Temperature    [degF]       09:   [address] ([address])

 

      



           Body weight  104.78 kg  (no code)  kg        2015  ABRAHAN LISA

H  Community



                     13:          [address] [place] [address] ([address]20)

 

      



           Body weight  103.15 kg  (no code)  kg        2015  YONNY     Co

mmunity



                     10:120500          [address] ([id number])

 

      



           Body weight  102.74 kg  (no code)  kg        02-  [facility]



                     10:          [address] [place] [address] ([address]53)

 

      



           Body weight  101.24 kg  (no code)  kg        01-  [facility]



                     14:          [id number] [place] [address] (60875)

 

      



           Body weight  97.52 kg  (no code)  kg        2014  YONNY     Com

munity



                     09:          [address] ([address])

 

      



           Body weight  96.8 kg   (no code)  kg        2014  [facility]



                     16:          [address]               [facility] ([id number])

 

      



           Body weight  94.8 kg   (no code)  kg        2014  [facility]



                     10:          [address] [place] [address] ([address]042)

 

      



           Body weight  88.32 kg  (no code)  kg        04-  DINA    Com

munity



                     09:          [address] ([address]84)

 

      



           Height    175.26 cm  (no code)  cm        2018  [facility]



                     18:          [address] [place] [address] ([id number])

 

      



           Height    175.26 cm  (no code)  cm        2018  VILMA LAGUNAS

ommunity



                     17:          [address] [place] [address] ([address]62)

 

      



           Height    175.26 cm  (no code)  cm        07-  GRISELDA mckeon



                     12:          [place] [id number] [place] [address] ([id number])

 

      



           Height    175.26 cm  (no code)  cm        2018  VILMA LAGUNAS

ommunity



                     18:          [address] [place] [address] ([address]90)

 

      



           Height    175.26 cm  (no code)  cm        2018  VILMA LAGUNAS

ommunity



                     18:          [address] [place] [address] (32964)

 

      



           Height    175.26 cm  (no code)  cm        2015  [facility]



                     13:          [address] [place] [address] ([address]89)

 

      



           Height    175.26 cm  (no code)  cm        2015  YONNY Alvarenga

nitjosie



                     10:          [address] ([address])

 

      



           Height    175.26 cm  (no code)  cm        02-  VENECIA MADL  Co

mmunity



                     10:          [address] [place] [address] ([address]406)

 

      



           Height    175.26 cm  (no code)  cm        01-  VENECIA MADL  Co

mmunity



                     14:          [address] [place] [address] (16705)

 

      



           Height    175.26 cm  (no code)  cm        2014  YONNY     Commu

nity



                     09:          [address] ([address]17)

 

      



           Height    175.26 cm  (no code)  cm        2014  VENECIA MADL  Co

mmunity



                     16:          [address] [place] [address] (06140)

 

      



           Height    175.26 cm  (no code)  cm        2014  VENECIA MADL  Co

mmunity



                     10:          [address] [place] [address] (71218)

 

      



           Height    175.26 cm  (no code)  cm        04-  DINA    Commu

nity



                     09:          [address] ([address])

 

      



           Pulse Oximetry  0 %       (no code)  95 - 100 %  2018  VILMA Lemons



                     18:          [address] [place] [address] ([address]15)

 

      



           Weight    112.54 kg  (no code)  kg        2018  VILMA nguyen



                     18:          [address] [place] [address] (92152)

 

      



           Weight    109.36 kg  (no code)  kg        2018  VILMA nguyen



                     17:          [address] [place] [address] (83051)

 

      



           Weight    106.96 kg  (no code)  kg        07-  GRISELDA   Commu

nity



                     12:          [place] [phone]                [facility] ([address])

 

      



           Weight    111.95 kg  (no code)  kg        2018  VILMA nguyen



                     18:          [address] [place] [address] ([address]1854)

 

      



           Weight    112.04 kg  (no code)  kg        2018  VILMA LAGUNAS

ommunity



                     18:          [address]               [facility] (54559)



                                                                                
                                                                                
                                                                                
                                                                                
                                                                                
                                                                                
                                      



Interventions

          No Information                                                        
  



Plan of Treatment

                      



    



              Normalized Care  Care Detail  Care Activity Date  Care Provider  F

acility



                                         Activity    

 

    



              ([facility]) [facility]  2018   MILADIS LOPEZ 6

6762  

[facility] ([id number])

 

    



              ([facility]) [facility]  2019   VILMA DELGADO 

[id number]  

[facility] ([id number])



                                                                                
        



Goals

          No Information                                                        
  



Social History

          No Information                                                        
  



Functional Status

                      The data below is from unstructured sources            



                    Query                   Response                   Date Rojas

rded                

 

                          Patient Orientation                   Person          

          

Place                    

Time                    

Situation                    

                                        September 15, 2015 5:19pm               

 



                                                                    



Mental Status

          No Information                                                        
  



Encounters

                      



    



              Encounter    Normalized Encounter  Encounter Diagnosis  Care Provi

gaviota  

Organization



                           Date                      Type   

 

    



              2019   ([facility]) Chronic Health  Type 2 diabetes  VILMA [facility] (

no  [facility]



              -            Maintenance  mellitus without  phone)       (no phone

)



                           2019                complications  



                                         -    



                                         2019   Nursing evaluation of  Abnormal weight loss  VILMA [facility] (no  

[facility]



                 -               patient and report   phone)          (no phone)



                                         2019    



                                         -    



                                         2019    

 

    



              NEGATED      Patient encounter  no information  no name      no or

ganization name



                                         07-    

 

    



              07-   Patient encounter  no information  no name      no or

ganization name

 

    



              2018   Patient encounter  no information  no name      no or

ganization name

 

    



              2018   Patient encounter  no information  no name      no or

ganization name

 

    



              2018   Patient encounter  no information  no name      no or

ganization name

 

    



              2018   Patient encounter  no information  no name      no or

ganization name

 

    



              2018   Patient encounter  no information  no name      no or

ganization name

 

    



              2018   Patient encounter  no information  no name      no or

ganization name

 

    



              2017   Patient encounter  no information  no name      no or

ganization name

 

    



              2020   Patient encounter  no information  ALI FRANCINE MA FSCA

I  [facility]



                     procedure           (no phone)          [facility]



                                         (no phone)

 

    



              2020   Patient encounter  no information  ALI FRANCINE MA FSCA

I  [facility]



                     procedure           (no phone)          [facility]



                                         (no phone)

 

    



              2020   Patient encounter  no information  (no phone) [facility] KEYES  [facility]



                     procedure           DANNY (no phone)     [facility] (no phone)

 

    



              2020   Patient encounter  no information  ALI FRANCINE MA FSCA

I  [facility]



                     procedure           (no phone)          [facility]



                                         (no phone)

 

    



              2020   Patient encounter  no information  (no phone)   [facility] (no phone)

 

    



              2020   Patient encounter  no information  (no phone)   [facility] (no phone)

 

    



              2020   Patient encounter  no information  [facility] (no

  [facility]



                     procedure           phone)              [facility] (no phone)

 

    



              2020   Patient encounter  no information  [facility] (no

  [facility]



                     procedure           phone) (no phone)   [facility] (no phone)

 

    



              2020   Patient encounter  no information  [facility] ARN

P  [facility]



                     procedure           (no phone)          [facility]



                                         (no phone)

 

    



              2020   Patient encounter  no information  (no phone)   [facility] (no phone)

 

    



              01-   Patient encounter  no information  no name      no or

ganization name



                                         procedure   

 

    



              2019   Patient encounter  no information  no name      no or

ganization name



                                         procedure   

 

    



              10-   Patient encounter  no information  no name      no or

ganization name



                                         procedure   

 

    



              2019   Patient encounter  no information  no name      no or

ganization name



                                         procedure   

 

    



              2019   Patient encounter  no information  no name      no or

ganization name



                                         procedure   

 

    



              2019   Patient encounter  no information  no name      no or

ganization name



                                         procedure   

 

    



              2019   Patient encounter  no information  no name      no or

ganization name



                                         procedure   

 

    



              2019   Patient encounter  no information  no name      no or

ganization name



                                         procedure   

 

    



              2019   Patient encounter  no information  no name      no or

ganization name



                                         procedure   

 

    



              2019   Patient encounter  no information  no name      no or

ganization name



                                         procedure   

 

    



              2019   Patient encounter  no information  no name      no or

ganization name



                                         procedure   

 

    



              2018   Patient encounter  no information  no name      no or

ganization name



                                         procedure   

 

    



              2018   Patient encounter  no information  VENECIA PARKP

 (no  [facility]



                     procedure           phone)              [facility]



                                         (no phone)

 

    



              2017   Patient encounter  no information  no name      no or

ganization name



                           -                         procedure   



                                         2017   Patient encounter  no information  IVÁN D HOLLAND DO 

(no  [facility]



                 -               procedure       phone)          [facility]



                           2017                (no phone)

 

    



              2017   Patient encounter  no information  IVÁN D HOLLAND DO 

(no  [facility]



                 -               procedure       phone)          [facility]



                           2017                (no phone)

 

    



              2017   Patient encounter  no information  no name      no or

ganization name



                                         procedure   

 

    



              2017   Patient encounter  no information  no name      no or

ganization name



                                         procedure   

 

    



              2016   Patient encounter  no information  no name      no or

ganization name



                           -                         procedure   



                                         2016   Patient encounter  no information  IVÁN D AMALIA DO 

(no  [facility]



                 -               procedure       phone)          [facility]



                           2016                (no phone)

 

    



              2016   Patient encounter  no information  no name      no or

ganization name



                                         procedure   

 

    



              2015   Patient encounter  no information  no name      no or

ganization name



                           -                         procedure   



                                         2015   Patient encounter  no information  no name      no or

ganization name



                                         procedure   

 

    



              10-   Patient encounter  no information  no name      no or

ganization name



                                         procedure   

 

    



              2019   Telephone encounter  Type 2 diabetes  VILMA [facility]AD[facility] (n

o  [facility]



                 -               mellitus without  phone)          (no phone)



                           2019                complications  



                                         -    



                                         2019   no information  Encounter for other  no name      (no

 phone)



                                         preprocedural  



                                         examination  

 

    



                 no information  Encounter for other  no name         no organiz

ation name



                                         preprocedural  



                                         examination  



                                                                                
                                                                                
                                                                                
                                                                                
                                                                                
                                                                                
                                                                    



Medical Equipment

                      



    



                 Equipment Code (if  Equipment Original  Equipment Identifier  P

rocedure Code 

(if                                      Dates



                 provided)       Text (if provided)  (if provided)   provided) 

 

    



              no information  as directed  no information (no  no information  0

8-



                                         named assigning  



                                         authority)  



                                                                              



Payers

          No Information                                                        
  



Summary Purpose

          eClinicalWorks SubmissioneClinicalWorks SubmissioneClinicalWorks 
SubmissioneClinicalWorks SubmissioneClinicalWorks SubmissioneClinicalWorks 
SubmissioneClinicalWorks SubmissioneClinicalWorks Submission                    
                                      



Advance Directives

                      



                    Directive                   Response                   Recor

ded Date/Time       

         

 

                    Advance Directives                   No                    2:57pm       

         

 

                    Health Care Power of                    No          

         16 

2:57pm                

 

                    Organ Donor                   No                   16 

2:57pm              

  

 

                    Resuscitation Status                   Full Code            

       16 

2:57pm                



            



                    Directive                   Response                   Recor

ded Date/Time       

         

 

                    Advance Directives                   No                    7:53am       

         

 

                    Health Care Power of                    No          

         16 

7:53am                

 

                    Organ Donor                   No                   16 

7:53am              

  

 

                    Resuscitation Status                   Full Code            

       16 

7:53am                



            



                    Directive                   Response                   Recor

ded Date/Time       

         

 

                    Advance Directives                   No                   09

/15/15 9:25am       

         

 

                    Health Care Power of                    No          

         09/15/15 

9:25am                

 

                    Organ Donor                   No                   09/15/15 

9:25am              

  



            



                    Directive                   Response                   Recor

ded Date/Time       

         

 

                    Advance Directives                   No                    9:15pm       

         

 

                    Health Care Power of                    No          

         14 

9:15pm                

 

                    Organ Donor                   No                   14 

9:15pm              

  



            



                    Directive                   Response                   Recor

ded Date/Time       

         

 

                    Advance Directives                   No                    9:15pm       

         

 

                    Health Care Power of                    No          

         14 

9:15pm                

 

                    Organ Donor                   No                   14 

9:15pm              

  

 

                    Resuscitation Status                   Full Code            

       14 

9:15pm                



            



                    Directive                   Response                   Recor

ded Date/Time       

         

 

                    Advance Directives                   No                   09

/09/15 4:08pm       

         

 

                    Health Care Power of                    No          

         09/09/15 

4:08pm                

 

                    Organ Donor                   No                   09/09/15 

4:08pm              

  

 

                    Resuscitation Status                   Full Code            

       09/09/15 

4:08pm                



            



                    Directive                   Response                   Recor

ded Date/Time       

         

 

                    Advance Directives                   No                   09

/15/15 9:25am       

         

 

                    Health Care Power of                    No          

         09/15/15 

9:25am                

 

                    Organ Donor                   No                   09/15/15 

9:25am              

  

 

                    Resuscitation Status                   Full Code            

       09/15/15 

9:25am                



                                                                    



Discharge Instructions

          No hospital discharge instructions.                          

Patient Instructions              

Physician Instructions              

              

Plan of Care/Instructions/FU:               

Follow up with Dr. Holland in 2-3 weeks.              

Will need repeat colonoscopy in 6 months              

Activity as Tolerated: Yes              

Discharge Diet: No Restrictions              

Pneu Vac Indicated: Yes              

              

              

Care Plan              

Patient Instructions:: Follow up with Dr. Holland in 2-3 weeks.Will need repeat  
             

colonoscopy in 6 months              

            No hospital discharge instructions.No hospital discharge 
instructions.No hospital discharge instructions.No hospital discharge 
instructions.                          

Patient Instructions              

Physician Instructions              

              

Follow Up/Plan              

F/u with Dr Chiu in 2-3 weeks              

CARDIAC CATH DISCHARGE INSTRUCTIONS              

              

Cardiac Rehab              

Please be expecting a follow up call from Cardiac Rehab within in one week.     
         

              

*Hold Metformin for 48 hours post heart cath.              

              

ACTIVITY              

              

* Go Home directly and rest.              

* Limit activity of the leg (or wrist if it was used) for 7 days including      
         

aerobics, swimming,              

 jogging, bicycling, etc.              

* Restrict stair-climbing for 7 days if possible, if not, climb up with your    
           

non-cath leg, then              

 bring together on the same step.              

* Avoid lifting, pushing, pulling or excessive movement of the affected         
      

extremity for 7 days.              

* Customary sexual activity may be resumed after 2 days-use caution not to use a
              

position               

 that strains or causes pain to the affected extremity.              

* No driving for 24 hours.              

* NO SMOKING.               

* Avoid straining for bowel movements for 7 days.              

* Gentle walking on level ground is allowed.              

* Returning to work will depend on the type of procedure and the results. Your  
             

doctor will discuss              

 this with you.              

              

CALL YOUR DOCTOR FOR ANY OF THE FOLLOWING:              

              

*If bleeding from the puncture site occurs- Apply gentle pressure to site with  
             

clean cloth and call              

 your doctor or EMS.              

* If a knot or lump forms under the skin, increases in size, or causes pain.    
          

* If bruising appears to be worsening or moving further down your leg instead of
              

disappearing.              

* Temperature above 101 F.              

              

CARE OF YOUR GROIN INCISION;              

              

* Bruising or purple discoloration of the skin near the puncture site is common.
              

* You may shower only, no bathtub bathing for 5 days. Be careful to avoid       
        

slipping as your              

 leg may feel stiff.              

* If a closure device was used on your femoral artery, please see the attached  
             

guide regarding              

 care of the device and your leg.              

* REMOVE the dressing from your groin the next day after your procedure in the  
             

shower.              

              

CARE OF YOUR WRIST INCISION;              

              

* Bruising or purple discoloration of the skin near the puncture site is common.
              

* You may shower.              

* DO NOT submerge wrist.              

* Remove dressing in 24 hours.              

              

              

Patient Instructions:               

Blood work in 2 weeks and then follow up with Dr Chiu              

Discharge Diet: Cardiac Diet              

              

              

Care Plan              

Patient Instructions:: Blood work in 2 weeks and then follow up with Dr Chiu  
            

                                                                      



Additional Source Comments

          This clinical document has been generated using [employer] 
software that has been certified by the Office of the National Coordinator for 
Health Information Technology (ONC 15.99.04.3023.Diam.31.00.0.034314) and the 
National Committee for  (NCQA, as an eMeasure certified 
technology).            

            

FOR RECORDS PERTAINING TO PATIENTS WHO ARE OR HAVE BEEN ENROLLED IN A CHEMICAL D
EPENDENCY/SUBSTANCE ABUSE PROGRAM, SOME INFORMATION MAY BE OMITTED. This clinica
l summary was aggregated from multiple sources.  Caution should be exercised in 
using it in the provision of clinical care. This summary normalizes information 
from multiple sources, and as a consequence, information in this document may ma
terially change the coding, format and clinical context of patient data. In cornelio
tion, data may be omitted in some cases. CLINICAL DECISIONS SHOULD BE BASED ON T
HE PRIMARY CLINICAL RECORDS. [employer] provides no warranty or guara
ntee of the accuracy or completeness of information in this document.The followi
ng information is based on time limited clinical information            



                                        UNRECOGNIZED CONTENT PROVIDED BELOW FOR 

UNRECOGNIZED SECTION MEDICAL (GENERAL) 

HISTORY                

 

                                                         



            



                    Type                   Description                   Date   

             

 

                          Medical History                   allergic rhinitis- r

ec's weekly allergy 

injections from Dr Rojas office                                    

 

                    Medical History                   mood disorder             

                    

   

 

                          Medical History                   Leukocytosis- has be

en to hematology          

                                                         

 

                    Medical History                   Hyperlipidemia            

                    

    

 

                          Medical History                   Left leg pain- multi

ple imaging performed and 

ortho referral placed                                    

 

                          Medical History                   TUBULAR ADENOMA AND 

GASTRITIS ON COLONOSOCPY 

AUG 2016 -MULTIPLE POLYPS                                    

 

                          Medical History                   Helicobacter pylori 

(H. pylori) infection Hx  

                                                         

 

                    Surgical History                   cholecystectomy          

                

         

 

                    Surgical History                    section         

          , 

                

 

                    Surgical History                   tonsillectomy            

       2015      

          

 

                    Surgical History                   colonoscopy              

     2016     

           

 

                    Surgical History                   colonscopy               

    2017         

       

 

                    Surgical History                   Right Knee scope         

          2017   

             



            



                                        UNRECOGNIZED CONTENT PROVIDED BELOW FOR 

UNRECOGNIZED SECTION REASON FOR VISIT   

             

 

                                                         



Transition/DMHolden Quesada rnsore throat/fever JStrasserRNCreamfever/sore throat-
Pt was seen her two weeks ago for the same complaints. The patient said she got 
a little better but have now intensified over the last couple of days. She is co
mplaining of swollen lymph nodes causing her neck and ear pain. She is also havi
ng body aches, low grade fever and headaches.--TSowell, MADM2, F/U on medication
 increase from last visit. , Was seen in [facility] about 2 weeks ago for fever, was pl
aced on doxycycline, has a lymph node that popped up on right side of neck on Sa
turday. Stopped taking the abx because she was on the medication when the lymph 
node swelled up. CBrumbackRNRequests return callFREE [id number], rnAnxiety/Diabe
lamont-BALTAZAR zavaleta, Needs A1C, done 18., pt has some concerns about her anxei
ty meds want to discuss AXG-ExiENI-Eif

## 2020-06-23 NOTE — CARDIAC PROCEDURE NOTE-CS/ASA
Pre-Procedure Note


Pre-Op Procedure Note


H&P Reviewed


The H&P was reviewed, patient examined and no changes noted.


Date H&P Reviewed:  Jun 23, 2020


Time H&P Reviewed:  12:00





Conscious Sedation Pre-Proced


Time


12:00





ASA Score


3


For ASA 3 and 4: Consider anesthesia and medical clearance. Also, for patients

with a history of failed moderate sedation consider anesthesia.

















Airway 


 


Lungs 


 


Heart 


 


 ASA score


 


 ASA 1: a normal healthy patient


 


 ASA 2:  a patient with a mild systemic disease (mid diabetes, controlled 

hypertension, obesity 


 


 ASA 3:  a patient with a severe systemic disease that limits activity  (angina,

COPD, prior Myocardial infarction)


 


 ASA 4:  a patient with an incapacitating disease that is a constant threat to 

life (CHF, renal failure)


 


 ASA 5:  a moribund patient not expected to survive 24 hrs.  (ruptured aneurysm)


 


 ASA 6:  a declared brain-dead patient whose organs are being harvested.


 


 For emergent operations, add the letter E after the classification











Mallampati Classification


Grade 2





Sedation Plan


Analgesia, Amnesia, Plan communicated to team members, Discussed options with 

patient/fam, Discussed risks with patient/fam


The patient is an appropriate candidate to undergo the planned procedure, 

sedation, and anesthesia.





The patient immediately re-assessed prior to indication.











LAN ESCAMILLA MD FACP FAC CCDS   Jun 23, 2020 12:36

## 2020-06-23 NOTE — XMS REPORT
[facility]

                             Created on: 2020



Darby Love

External Reference #: [id number]

: 1975

Sex: Female



Demographics





                          Address                   [address] [place]  [address]

 

                          Preferred Language        Unknown

 

                          Marital Status            Unknown

 

                          Anglican Affiliation     Unknown

 

                          Race                      Unknown

 

                          Ethnic Group              Unknown





Author





                          Author                    Darby DELGADO

 

                          Organization              [facility]

 

                          Address                   [address] [place]  [address]



 

                          Phone                     [phone]







Care Team Providers





                    Care Team Member Name Role                Phone

 

                    VILMA DELGADO      Unavailable         [phone]







PROBLEMS





          Type      Condition ICD9-CM Code RMC28-HA Code Onset Dates Condition S

tatus SNOMED 

Code

 

          Problem   Mixed hyperlipidemia           E78.2               Active   

 [id number]

 

                          Problem                   Type 2 diabetes mellitus wit

hout complication, without long-term current

use of insulin              E11.9                     Active       [id number]

 

          Problem   History of leukocytosis           Z86.2               Active

    [id number]

 

          Problem   Anxiety             F41.9               Active    75712539

 

           Problem    Seasonal allergic rhinitis due to other allergic trigger  

          J30.89                

Active                                  [id number]

 

          Problem   GERD with esophagitis           K21.0               Active  

  [id number]

 

          Problem   Duarte's neuroma of right foot           G57.61             

 Active    57927476

 

          Problem   Chronic fatigue           R53.82              Active    8422

9001

 

          Problem   Tubular adenoma of colon           D12.6               Activ

e    625793107

 

          Problem   Dysthymic disorder           F34.1               Active    7

4504361

 

          Problem   Arthritis           M19.90              Active    9686513

 

          Problem   Non morbid obesity           E66.9               Active    4

55603154

 

          Problem   Seasonal allergic rhinitis due to pollen           J30.1    

           Active    19839607







ALLERGIES

No Information



ENCOUNTERS





                Encounter       Location        Date            Diagnosis

 

                          [place] [address] [address]

[address] [place] [address]

                                         

 

                          [place] [address] [place] [id number]

[address] [place] [address]

                          01 May, 2020              Chronic fatigue R53.82 ; Mal

aise R53.81 ; Type 2 diabetes mellitus 

without complication, without long-term current use of insulin E11.9 ; Arthritis
M19.90 ; Insect bite (nonvenomous), right lower leg, initial encounter S80.861A 
and Bitten or stung by nonvenomous insect and other nonvenomous arthropods, 
initial encounter W57.XXXA

 

                          [place] [address] [place] [id number]

[address] [place] [address]

                                        Chronic fatigue R53.82 ; Mal

aise R53.81 ; Type 2 diabetes mellitus 

without complication, without long-term current use of insulin E11.9 ; Arthritis
M19.90 ; Insect bite (nonvenomous), right lower leg, initial encounter S80.861A 
and Bitten or stung by nonvenomous insect and other nonvenomous arthropods, 
initial encounter W57.XXXA

 

                          [place] [address]

[address] [place] [address]

                          04 Mar, 2020              Type 2 diabetes mellitus wit

hout complication, without long-term 

current use of insulin E11.9

 

                          [place] [address] [place] [id number]

[address] [place] [address]

                                        Acute pain of left knee M25.

562

 

                          [place] [address] [address]

                          15 Brandon, 2020              Leg pain, left M79.605

 

                          [facility] WALK [facility]  [address] [place] [id number]

[address] [place] [address] 

[address]                              Leg pain, left M79.605

 

                          [place] [address]

[address] [place] [address]

                                        Type 2 diabetes mellitus wit

hout complication, without long-term 

current use of insulin E11.9 and GERD with esophagitis K21.0

 

                          [place] [address] [place] [id number]

[address] [place] [address]

                          01 Oct, 2019              Encounter for immunization Z

23

 

                          [place] [address] [address] [facility] [id number]

[address] [place] [address]

                                        Type 2 diabetes mellitus wit

hout complication, without long-term 

current use of insulin E11.9

 

                          [place] [address] [place] [id number]

[address] [place] [address]

                                         

 

                          [place] [address] [place] [id number]

[address] [place] [address]

                                        Type 2 diabetes mellitus wit

hout complication, without long-term 

current use of insulin E11.9

 

                          [place] [address] [address]

                                        Weight loss R63.4

 

                          [place] [address] [place] [id number] [place] [address]

                          31 May, 2019              Type 2 diabetes mellitus wit

hout complication, without long-term 

current use of insulin E11.9

 

                          [place] [address] [address]

                          31 May, 2019              Type 2 diabetes mellitus wit

hout complication, without long-term 

current use of insulin E11.9 and Non morbid obesity E66.9

 

                          [facility] [place] [address] 

[address]                14 May, 2019              Seasonal allergic rhinitis d

ue to pollen J30.1 and Sore 

throat J02.9

 

                          [facility] [place] [address] 

[address]                              Arthritis M19.90

 

                          [place] [address] [address]

                                        Seasonal allergic rhinitis d

ue to other allergic trigger J30.89 and

Dysthymic disorder F34.1

 

                          [place] [address] [address]

                          05 Dec, 2018              Bilateral otitis media with 

effusion H65.93 and Anxiety F41.9

 

                          [place] [address] [address]

                                        Type 2 diabetes mellitus wit

hout complication, without long-term 

current use of insulin E11.9

 

                          [place] [address] [address]

                                        Pharyngitis due to Streptoco

ccus species J02.0

 

                          [place] [address] [address]

                                         

 

                          [facility]  [address] [place] [id number]

[address] [place] [address] 

[address]                10 Jul, 2018              Fever, unspecified fever cau

se R50.9 and Lymphadenopathy

R59.1

 

                          [facility] WALK IN [facility]  [address] [place] [id number]

[address] [place] [address] 

[address]                              Pharyngitis due to other org

anism J02.8

 

                          [place] [address] [address]

                                         

 

                          [place] [address] [place] [id number] [place] [address]

                                        Type 2 diabetes mellitus wit

hout complication, without long-term 

current use of insulin E11.9 and Other eczema L30.8

 

                          [facility] [place] [address] [address] [place] [id number] [place] [address] 

[address]                              Acute pain of left shoulder 

M25.512

 

                          [place] [address] [address]

                                         

 

                          [place] [address] [address]

                          15 2018              Type 2 diabetes mellitus wit

hout complication, without long-term 

current use of insulin E11.9

 

                          [place] [address] [address]

                                        Type 2 diabetes mellitus wit

hout complication, without long-term 

current use of insulin E11.9 ; Tubular adenoma of colon D12.6 ; Mixed 
hyperlipidemia E78.2 ; History of leukocytosis Z86.2 and Screening breast 
examination Z12.31

 

                          [place] [address] [address]

                                        Metatarsalgia of right foot 

M77.41 and Type 2 diabetes mellitus 

without complication, without long-term current use of insulin E11.9

 

                          [place] [address] [place] [id number]

[address] [place] [address]

                                        Capsulitis M77.9 and Metatar

salgia of right foot M77.41

 

                          [facility]     [address] [facility] [id number]

[address] [place] [address]

                          08 Sep, 2017              Capsulitis M77.9

 

                          [facility]     [address] [facility] [id number]

[address] [place] [address]

                          06 Sep, 2017               

 

                          [facility]     [address] [place] [id number]

[address] [place] [address]

                          03 Aug, 2017              Type 2 diabetes mellitus wit

hout complication, without long-term 

current use of insulin E11.9 ; Tubular adenoma of colon D12.6 and Mixed 
hyperlipidemia E78.2

 

                          [place] [address] [place] [id number] [place] [address]

                                        Metatarsalgia of right foot 

M77.41 and Capsulitis M77.9

 

                          [place] [address] [place] [id number] [place] [address]

                                         

 

                          [facility]  [address] [place] [id number]

[address] [place] [address] 

[address]                              Duarte's neuroma of right fo

ot G57.61

 

                          [place] [address] [place] [id number] [place] [address]

                                        Mixed hyperlipidemia E78.2 a

nd Type 2 diabetes mellitus without 

complication, without long-term current use of insulin E11.9

 

                          [place] [address] [place] [id number]

[address] [place] [address]

                          10 [place], 2017              Type 2 diabetes mellitus wit

hout complication, without long-term 

current use of insulin E11.9 ; Tubular adenoma of colon D12.6 and Mixed 
hyperlipidemia E78.2

 

                          [place] [address] [place] [id number]

[address] [place] [address]

                          22 Dec, 2016              Mixed hyperlipidemia E78.2

 

                          [place] [address] [place] [id number]

[address] [place] [address]

                                         

 

                          [place] [address] [place] [id number] [place] [address]

                                        Mixed hyperlipidemia E78.2

 

                          [facility]     [address] [facility] [id number]

[address] [place] [address]

                                        Mixed hyperlipidemia E78.2

 

                          [facility]     [address] [facility] [id number]

[address] [place] [address]

                          08 Sep, 2016               

 

                          [place] [address] [address]

                          10 Aug, 2016              Type 2 diabetes mellitus wit

hout complication, without long-term 

current use of insulin E11.9 ; Helicobacter pylori (H. pylori) infection A04.8 
and Mixed hyperlipidemia E78.2

 

                          [place] [address] [address]

                          08 Aug, 2016              Type 2 diabetes mellitus wit

hout complication, without long-term 

current use of insulin E11.9

 

                          [place] [address] [address]

                          03 Aug, 2016              Type 2 diabetes mellitus wit

hout complication, without long-term 

current use of insulin E11.9

 

                          [place] [address] [address] [place] [id number]

[address] [place] [address]

                          02 Aug, 2016              Dyspepsia R10.13 ; Upper abd

ominal pain R10.10 ; Bowel habit 

changes R19.4 ; History of leukocytosis Z86.2 and Helicobacter pylori (H. 
pylori) infection A04.8

 

                          [facility] WALK IN [facility]  [address] [place] [id number]

[address] [place] [address] 

[address]                27 May, 2016              Environmental allergies Z91.

09

 

                          [facility]     [address] [place] [id number]

[address] [place] [address]

                                        Left leg pain M79.605 ; Loca

lized swelling of lower leg R22.40 and 

Upper respiratory infection J06.9

 

                          [facility]     [address] [place] [id number]

[address] [place] [address]

                          28 Dec, 2015               

 

                          [place] [address] [address]

                          21 Dec, 2015              Left leg pain M79.605

 

                          [facility]     [address] [facility] [id number]

[address] [place] [address]

                          10 Dec, 2015              Left leg pain M79.605 and Lo

calized swelling of lower leg R22.40

 

                          [facility]     [address] [facility] [id number]

[address] [place] [address]

                          01 Dec, 2015              Left leg pain M79.605

 

                          [facility]     [address] [facility] [id number]

[address] [place] [address]

                                        Encounter for immunization Z

23

 

                          [facility]     [address] [facility] [id number]

[address] [place] [address]

                          12 Oct, 2015              Bronchitis J40

 

                          [facility]     [address] [place] [id number]

[address] [place] [address]

                          30 Sep, 2015              Physical exam, pre-employmen

t V70.5 ; Encounter for PPD test V74.1 

and Hyperlipidemia 272.4

 

                          [facility]     [address] [facility] [id number]

[address] [place] [address]

                          21 Sep, 2015               

 

                          [facility]     [address] [facility] [id number]

[address] [place] [address]

                          21 Sep, 2015               

 

                          [facility]     [address] [facility] [id number]

[address] [place] [address]

                          09 Sep, 2015               

 

                          [facility]     [address] [facility] [id number]

[address] [place] [address]

                          04 Sep, 2015              Elevated blood sugar 790.29

 

                          [facility]     [address] [facility] [id number]

[address] [place] [address]

                          03 Sep, 2015              Elevated blood sugar 790.29

 

                          [facility]     [address] [facility] [id number]

[address] [place] [address]

                          03 Sep, 2015              Palpitations 785.1

 

                          [facility]     [address] [facility] [id number]

[address] [place] [address]

                          01 Sep, 2015              Palpitations 785.1

 

                          [facility]     [address] [facility] [id number]

[address] [place] [address]

                                         

 

                          [facility]     [address] [id number]

[address] [place] [address]

                          28 May, 2015              Hand pain, right 729.5 and D

epression with anxiety 300.4

 

                          [facility]     [address] [id number]

[address] [place] [address]

                                         

 

                          [facility]     [address] [id number]

[address] [place] [address]

                                         

 

                          [facility]     [address] [id number]

[address] [place] [address]

                          05 Mar, 2015               

 

                          [facility]     [address] [id number]

[address] [place] [address]

                          05 Mar, 2015               

 

                          [facility]     [address] [id number]

[address] [place] [address]

                                         

 

                          [facility]     [address] [id number]

[address] [place] [address]

                                         

 

                          [facility]     [address] [id number]

[address] [place] [address]

                          10 Feb, 2015               

 

                          [facility]     [address] [id number]

[address] [place] [address]

                          10 Feb, 2015               

 

                          [facility]     [address] [id number]

[address] [place] [address]

                                         

 

                          [facility]     [address] [id number]

[address] [place] [address]

                                         

 

                          [facility]     [address] [id number]

[address] [place] [address]

                                         

 

                          [facility]     [address] [id number]

[address] [place] [address]

                                         

 

                          [facility]     [address] [id number]

[address] [place] [address]

                                         

 

                          [facility]     [address] [id number]

[address] [place] [address], [place] [address]

                                         

 

                          [facility]     [address] [id number]

[address] [place] [address], [place] [address]

                                         

 

                          [facility]     [address] [id number]

[address] [place] [address], [place] [address]

                                         

 

                          [facility]     [address] [id number]

[address] [place] [address], [place] [address]

                                         

 

                          [facility]     [address] [id number]

[address] [place] [address], [place] [address]

                                         

 

                          [facility]     [address] [id number]

[address] [place] [address], [place] [address]

                                         

 

                          [facility]     [address] [id number]

[address] [place] [address], [place] [address]

                                         

 

                          [facility]     [address] [id number]

[address] [place] [address], [place] [address]

                          15 [place], 2015               

 

                          [facility]     [address] [id number]

[address] [place] [address], [place] [address]

                          15 [place], 2015               

 

                          [facility]     [address] [id number]

[address] [place] [address], [place] [address]

                                         

 

                          [facility]     [address] [id number]

[address] [place] [address], [place] [address]

                                         

 

                          [facility]     [address] [id number]

[address] [place] [address], [place] [address]

                          15 Oct, 2014               

 

                          [facility]     [address] [id number]

[address] [place] [address], [place] [address]

                          15 Oct, 2014               

 

                          [facility]     [address] [id number]

[address] [place] [address], [place] [address]

                          14 Oct, 2014               

 

                          [facility]     [address] [id number]

[address] [place] [address], [place] [address]

                          14 Oct, 2014               

 

                          [facility]     [address] [id number]

[address] [place] [address], [place] [address]

                          07 Oct, 2014               

 

                          [facility]     [address] [id number]

[address] [place] [address], [place] [address]

                          07 Oct, 2014               

 

                          [facility]     [address] [id number]

[address] [place] [address], [place] [address]

                          12 Sep,                

 

                          [facility]     [address] [id number]

[address] [place] [address], [place] [address]

                          12 Sep,                

 

                          [facility]     [address] [id number]

[address] [place] [address], [place] [address]

                          04 Sep,                

 

                          [facility]     [address] [id number]

[address] [place] [address], [place] [address]

                          03 Sep,                

 

                          [facility]     [address] [id number]

[address] [place] [address], [place] [address]

                          03 Sep,                

 

                          [facility]     [address] [id number]

[address] [place] [address], [place] [address]

                          02 Sep,                

 

                          [facility]     [address] [id number]

[address] [place] [address], [place] [address]

                          02 Sep, 2014               

 

                          [facility]     [address] [id number]

[address] [place] [address], [place] [address]

                          30 Aug, 2014               

 

                          [facility]     [address] [id number]

[address] [place] [address], [place] [address]

                          30 Aug, 2014               

 

                          [facility]     [address] [id number]

[address] [place] [address], [place] [address]

                          19 Aug, 2014               

 

                          [facility]     [address] [id number]

[address] [place] [address], [place] [address]

                          19 Aug, 2014               

 

                          [facility]     [address] [id number]

[address] [place] [address], [place] [address]

                          14 Aug, 2014               

 

                          [facility]     [address] [id number]

[address] [place] [address], [place] [address]

                          14 Aug, 2014               

 

                          [facility]     [address] [id number]

[address] [place] [address], [place] [address]

                          13 Aug, 2014               

 

                          [facility]     [address] [id number]

[address] [place] [address], [place] [address]

                          13 Aug, 2014               

 

                          [facility]     [address] [id number]

[address] [place] [address]

                                         

 

                          [facility]     [address] [id number]

[address] [place] [address], [place] [address]

                                         

 

                          [facility]     [address] [id number]

[address] [place] [address], [place] [address]

                                         

 

                          [facility]     [address] [id number]

[address] [place] [address], [place] [address]

                                         

 

                          [facility]     [address] [id number]

[address] [place] [address], [place] [address]

                                         

 

                          [facility]     [address] [id number]

[address] [place] [address], [place] [address]

                                         

 

                          [facility]     [address] [id number]

[address] [place] [address], [place] [address]

                                         

 

                          [facility]     [address] [id number]

[address] [place] [address], [place] [address]

                                         

 

                          [facility]     [address] [id number]

[address] [place] [address], [place] [address]

                                         

 

                          [facility]     [address] [id number]

[address] [place] [address], [place] [address]

                                         

 

                          [facility]     [address] [id number]

[address] [place] [address], [place] [address]

                                         

 

                          [facility]     [address] [id number]

[address] [place] [address], [place] [address]

                                         

 

                          [facility]     [address] [id number]

[address] [place] [address], [place] [address]

                                         

 

                          [facility]     [address] [id number]

[address] [place] [address], [place] [address]

                                         

 

                          [facility]     [address] [id number]

[address] [place] [address], [place] [address]

                                         

 

                          [facility]     [address] [id number]

[address] [place] [address]

                                         

 

                          [facility]     [address] [id number]

[address] [place] [address], [place] [address]

                                         

 

                          [facility]     [address] [id number]

[address] [place] [address], [place] [address]

                                         

 

                          [facility]     [address] [id number]

[address] [place] [address], [place] [address]

                                         

 

                          [facility]     [address] [id number]

[address] [place] [address], [place] [address]

                                         

 

                          [facility]     [address] [id number]

[address] [place] [address], [place] [address]

                                         

 

                          [facility]     [address] [id number]

[address] [place] [address], [place] [address]

                                         

 

                          [facility]     [address] [id number]

[address] [place] [address], [place] [address]

                                         

 

                          [facility]     [address] [id number]

[address] [place] [address], [place] [address]

                                         

 

                          [facility]     [address] [id number]

[address] [place] [address], [place] [address]

                                         

 

                          [facility]     [address] [id number]

[address] [place] [address], [place] [address]

                                         

 

                          [facility]     [address] [id number]

[address] [place] [address], [place] [address]

                                         

 

                          [facility]     [address] [id number]

[address] [place] [address], [place] [address]

                          27 Mar, 2014               

 

                          [facility]     [address] [id number]

[address] [place] [address], [place] [address]

                          27 Mar, 2014               

 

                          [facility]     [address] [id number]

[address] [place] [address], [place] [address]

                                         

 

                          [facility]     [address] [id number]

[address] [place] [address], [place] [address]

                                         

 

                          [facility]     [address] [id number]

[address] [place] [address], [place] [address]

                                         

 

                          [facility]     [address] [id number]

[address] [place] [address], [place] [address]

                                         

 

                          [facility]     [address] [id number]

[address] [place] [address], [place] [address]

                          08 Oct, 2013               

 

                          [facility]     [address] [id number]

[address] [place] [address], [place] [address]

                          04 Oct, 2013               

 

                          [facility]     [address] [id number]

[address] [place] [address], [place] [address]

                          03 Oct, 2013               

 

                          [facility]     [address] [id number]

[address] [place] [address], [place] [address]

                          02 Oct, 2013               

 

                          [facility]     [address] [id number]

[address] [place] [address], [place] [address]

                          01 Oct, 2013               

 

                          [facility]     [address] [id number]

[address] [place] [address], [place] [address]

                          20 Sep, 2013               

 

                          [facility]     [address] [id number]

[address] [place] [address], [place] [address]

                          08 Aug, 2013               

 

                          [facility]     [address] [id number]

[address] [place] [address], [place] [address]

                          24 May, 2013               

 

                          [facility]     [address] [id number]

[address] [place] [address], [place] [address]

                          13 May, 2013               

 

                          [facility]     [address] [id number]

[address] [place] [address]

                                         

 

                          [facility]     [address] [id number]

[address] [place] [address], [place] [address]

                                         

 

                          [facility]     [address] [id number]

[address] [place] [address], [place] [address]

                                         

 

                          [facility]     [address] [id number]

[address] [place] [address], [place] [address]

                                         

 

                          [facility]     [address] [id number]

[address] [place] [address]

                          02 Oct, 2012               

 

                          [facility]     [address] [id number]

[address] [place] [address]

                          02 Oct, 2012               

 

                          [facility]     [address] [id number]

[address] [place] [address]

                          21 Sep, 2012               

 

                          [facility]     [address] [id number]

[address] [place] [address]

                          06 Aug, 2012               

 

                          [facility]     [address] [id number]

[address] [place] [address]

                          02 Aug, 2012               

 

                          [facility]     [address] [id number]

[address] [place] [address]

                                         

 

                          [facility]     [address] [id number]

[address] [place] [address]

                                         

 

                          [facility]     [address] [id number]

[address] [place] [address]

                          15 Eusebio, 2012               

 

                          [facility]     [address] [id number]

[address] [place] [address]

                                         

 

                          [facility]     [address] [id number]

[address] [place] [address]

                          10 Apr, 2012               

 

                          [facility]     [address] [id number]

[address] [place] [address]

                          22 Mar, 2012               

 

                          [facility]     [address] [id number]

[address] [place] [address]

                          20 Mar, 2012               

 

                          [facility]     [address] [id number]

[address] [place] [address]

                          14 Mar, 2012               

 

                          [facility]     [address] [id number]

[address] [place] [address]

                                         







IMMUNIZATIONS

No Known Immunizations



SOCIAL HISTORY

Never Assessed



REASON FOR VISIT





PLAN OF CARE





VITAL SIGNS





                    Height              69 in               2012-06-15

 

                    Weight              198.1 lbs           2012-06-15

 

                    Temperature         99.2 degrees Fahrenheit 2012-06-15

 

                    Heart Rate          74 bpm              2012-06-15

 

                    Respiratory Rate    18                  2012-06-15

 

                    Blood pressure systolic 112 mmHg            2012-06-15

 

                    Blood pressure diastolic 70 mmHg             2012-06-15







MEDICATIONS

Unknown Medications



RESULTS

No Results



PROCEDURES





                Procedure       Date Ordered    Result          Body Site

 

                DESTRUCT LESION, 1-14 Cheryl 15, 2012                    







INSTRUCTIONS





MEDICATIONS ADMINISTERED

No Known Medications



MEDICAL (GENERAL) HISTORY





                    Type                Description         Date

 

                          Medical History           allergic rhinitis- rec's edwige donahue allergy injections from Dr Rojas office                            

 

                    Medical History     mood disorder        

 

                    Medical History     Leukocytosis- has been to hematology  

 

                    Medical History     Hyperlipidemia       

 

                          Medical History           Left leg pain- multiple imag

ing performed and ortho referral 

placed                                   

 

                          Medical History           TUBULAR ADENOMA AND GASTRITI

S ON COLONOSOCPY AUG 2016 -MULTIPLE 

POLYPS                                   

 

                    Medical History     Helicobacter pylori (H. pylori) infectio

n Hx  

 

                    Surgical History    cholecystectomy     2006

 

                    Surgical History     section    , 

 

                    Surgical History    tonsillectomy       2015

 

                    Surgical History    colonoscopy         2016

 

                    Surgical History    colonscopy          2017

 

                    Surgical History    Right Knee scope    2017

## 2020-06-23 NOTE — XMS REPORT
[facility]

                             Created on: 2020



Darby Love

External Reference #: [id number]

: 1975

Sex: Female



Demographics





                          Address                   [address]

[place]  [address]

 

                          Preferred Language        Unknown

 

                          Marital Status            Unknown

 

                          Jainism Affiliation     Unknown

 

                          Race                      Unknown

 

                          Ethnic Group              Unknown





Author





                          Author                    DANNY April VILMA

 

                          Organization              [facility]

 

                          Address                   [address] [place]  [address]



 

                          Phone                     [phone]







Care Team Providers





                    Care Team Member Name Role                Phone

 

                    VILMA DELGADO      Unavailable         [phone]







PROBLEMS





          Type      Condition ICD9-CM Code APM17-ON Code Onset Dates Condition S

tatus SNOMED 

Code

 

          Problem   Duarte's neuroma of right foot           G57.61             

 Active    81262969

 

          Problem   History of leukocytosis           Z86.2               Active

    [id number]

 

          Problem   Anxiety             F41.9               Active    00040353

 

                          Problem                   Type 2 diabetes mellitus wit

hout complication, without long-term current

use of insulin              E11.9                     Active       722673603

 

          Problem   Seasonal allergic rhinitis due to pollen           J30.1    

           Active    95814003

 

          Problem   Tubular adenoma of colon           D12.6               Activ

e    805871199

 

          Problem   GERD with esophagitis           K21.0               Active  

  525053660

 

          Problem   Mixed hyperlipidemia           E78.2               Active   

 560865412

 

           Problem    Seasonal allergic rhinitis due to other allergic trigger  

          J30.89                

Active                                  [id number]

 

          Problem   Dysthymic disorder           F34.1               Active    7

1840254

 

          Problem   Arthritis           M19.90              Active    0272788

 

          Problem   Non morbid obesity           E66.9               Active    4

31132287







ALLERGIES

No Information



ENCOUNTERS





                Encounter       Location        Date            Diagnosis

 

                    [facility] [id number] [place] [address] 04 

Mar, 2020                                

 

                    [facility] [id number] [place] [address]                                Acute pain of left knee M25.562

 

                    [facility] [address] [id number] [place] [address] 15 

2020                               Leg pain, left M79.605

 

                          [facility]  [address] [facility] [id number]

[address] [place] 

[address]                              Leg pain, left M79.605

 

                    [facility] [address] [facility] [place] [address]                                Type 2 diabetes mellitus without complic

ation, without long-term 

current use of insulin E11.9 and GERD with esophagitis K21.0

 

                    [place] [address] [address] 01 

Oct, 2019                               Encounter for immunization Z23

 

                    [place] [address] [address]                                Type 2 diabetes mellitus without complic

ation, without long-term 

current use of insulin E11.9

 

                    [place] [address] [address]                                 

 

                    [place] [address] [address]                                Type 2 diabetes mellitus without complic

ation, without long-term 

current use of insulin E11.9

 

                    [place] [address] [address]                                Weight loss R63.4

 

                    [place] [address] [address] 31 

May, 2019                               Type 2 diabetes mellitus without complic

ation, without long-term 

current use of insulin E11.9

 

                    [place] [address] [address] 31 

May, 2019                               Type 2 diabetes mellitus without complic

ation, without long-term 

current use of insulin E11.9 and Non morbid obesity E66.9

 

                          [facility] [place] [address] [address]

[address] [place] [address] 

[address]                14 May, 2019              Seasonal allergic rhinitis d

ue to pollen J30.1 and Sore 

throat J02.9

 

                          [facility] [place] [id number]

[address] [place] [address] 

[address]                              Arthritis M19.90

 

                    [place] [address] [address]                                Seasonal allergic rhinitis due to other 

allergic trigger J30.89 and 

Dysthymic disorder F34.1

 

                    [place] [address] [address] 05 

Dec, 2018                               Bilateral otitis media with effusion H65

.93 and Anxiety F41.9

 

                    [address] [address]                                Type 2 diabetes mellitus without complic

ation, without long-term 

current use of insulin E11.9

 

                    [address] [address]                                Pharyngitis due to Streptococcus species

 J02.0

 

                    [address] [address]                                 

 

                          [facility] [address] 

[address]                10 Jul, 2018              Fever, unspecified fever cau

se R50.9 and Lymphadenopathy

R59.1

 

                          [facility] [address] 

[address]                              Pharyngitis due to other org

anism J02.8

 

                    [address] [address]                                 

 

                    [address] [address]                                Type 2 diabetes mellitus without complic

ation, without long-term 

current use of insulin E11.9 and Other eczema L30.8

 

                          [facility] [address] 

[address]                              Acute pain of left shoulder 

M25.512

 

                    [address] [address]                                 

 

                    [address] [address] 15 

2018                               Type 2 diabetes mellitus without complic

ation, without long-term 

current use of insulin E11.9

 

                    [address] [address]                                Type 2 diabetes mellitus without complic

ation, without long-term 

current use of insulin E11.9 ; Tubular adenoma of colon D12.6 ; Mixed 
hyperlipidemia E78.2 ; History of leukocytosis Z86.2 and Screening breast 
examination Z12.31

 

                    [place] [address] [address]                                Metatarsalgia of right foot M77.41 and T

ype 2 diabetes mellitus 

without complication, without long-term current use of insulin E11.9

 

                    [place] [address] [address]                                Capsulitis M77.9 and Metatarsalgia of ri

ght foot M77.41

 

                    [place] [address] [address] 08 

Sep, 2017                               Capsulitis M77.9

 

                    [place] [address] [address] 06 

Sep, 2017                                

 

                    [place] [address] [address] 03 

Aug, 2017                               Type 2 diabetes mellitus without complic

ation, without long-term 

current use of insulin E11.9 ; Tubular adenoma of colon D12.6 and Mixed 
hyperlipidemia E78.2

 

                    [place] [address] [address]                                Metatarsalgia of right foot M77.41 and C

apsulitis M77.9

 

                    [place] [address] [address]                                 

 

                          [facility]  [address] [facility] [id number]

[address] [place] 

[address]                              Duarte's neuroma of right fo

ot G57.61

 

                    [place] [address] [address]                                Mixed hyperlipidemia E78.2 and Type 2 di

abetes mellitus without 

complication, without long-term current use of insulin E11.9

 

                    [place] [address] [address] 10 

2017                               Type 2 diabetes mellitus without complic

ation, without long-term 

current use of insulin E11.9 ; Tubular adenoma of colon D12.6 and Mixed 
hyperlipidemia E78.2

 

                    [place] [address] [address] 22 

Dec, 2016                               Mixed hyperlipidemia E78.2

 

                    [place] [address] [address]                                 

 

                    [place] [address] [address]                                Mixed hyperlipidemia E78.2

 

                    [place] [address] [address]                                Mixed hyperlipidemia E78.2

 

                    [place] [address] [address] 08 

Sep, 2016                                

 

                    [place] [address] [address] 10 

Aug, 2016                               Type 2 diabetes mellitus without complic

ation, without long-term 

current use of insulin E11.9 ; Helicobacter pylori (H. pylori) infection A04.8 
and Mixed hyperlipidemia E78.2

 

                    [place] [address] [address] 08 

Aug, 2016                               Type 2 diabetes mellitus without complic

ation, without long-term 

current use of insulin E11.9

 

                    [place] [address] [address] 03 

Aug, 2016                               Type 2 diabetes mellitus without complic

ation, without long-term 

current use of insulin E11.9

 

                    [place] [address] [address] 02 

Aug, 2016                               Dyspepsia R10.13 ; Upper abdominal pain 

R10.10 ; Bowel habit changes 

R19.4 ; History of leukocytosis Z86.2 and Helicobacter pylori (H. pylori) 
infection A04.8

 

                          [facility]  [address] [facility] [id number]

[address] [place] 

[address]                27 May, 2016              Environmental allergies Z91.

09

 

                    [place] [address] [address]                                Left leg pain M79.605 ; Localized swelli

ng of lower leg R22.40 and 

Upper respiratory infection J06.9

 

                    [place] [address] [address] 28 

Dec, 2015                                

 

                    [place] [address] [address] 21 

Dec, 2015                               Left leg pain M79.605

 

                    [place] [address] [address] 10 

Dec, 2015                               Left leg pain M79.605 and Localized swel

ling of lower leg R22.40

 

                    [place] [address] [address] 01 

Dec, 2015                               Left leg pain M79.605

 

                    [place] [address] [address]                                Encounter for immunization Z23

 

                    [address] [address] 12 

Oct, 2015                               Bronchitis J40

 

                    [place] [address] [address] 30 

Sep, 2015                               Physical exam, pre-employment V70.5 ; En

counter for PPD test V74.1 and

Hyperlipidemia 272.4

 

                    [place] [address] [address] 21 

Sep, 2015                                

 

                    [place] [address] [address] 21 

Sep, 2015                                

 

                    [place] [address] [address] 09 

Sep, 2015                                

 

                    [place] [address] [address] 04 

Sep, 2015                               Elevated blood sugar 790.29

 

                    [place] [address] [address] 03 

Sep, 2015                               Elevated blood sugar 790.29

 

                    [facility] [id number] [place] [address] 03 

Sep, 2015                               Palpitations 785.1

 

                    [facility] [id number] [place] [address] 01 

Sep, 2015                               Palpitations 785.1

 

                    [facility] [address] [address]                                 

 

                    [facility] [address] [address] 28 

May, 2015                               Hand pain, right 729.5 and Depression wi

th anxiety 300.4

 

                    [facility] [address] [address]                                 

 

                    [facility] [address] [address]                                 

 

                    [facility] [address] [address] 05 

Mar, 2015                                

 

                    [facility] [id number] [place] [address] 05 

Mar, 2015                                

 

                    [facility] [address] [address]                                 

 

                    [facility] [address] [address]                                 

 

                    [facility] [address] [address] 10 

Feb, 2015                                

 

                    [facility] [id number] [place] [address] 10 

Feb, 2015                                

 

                    [facility] [address] [address]                                 

 

                    [facility] [address] [address]                                 

 

                    [facility] [address] [address]                                 

 

                    [facility] [place] [facility] [address] [facility] [place] [address]                                 

 

                    [facility] [place] [facility] [address] [facility] [place] [address]                                 

 

                    [facility] [place] [facility] [address] [facility] [place] [address]                                 

 

                    [facility] [place] [facility] [address] [facility] [place] [address]                                 

 

                    [facility] [place] [facility] [address] [facility] [place] [address]                                 

 

                    [facility] [place] [facility] [address] [facility] [place] [address]                                 

 

                    [facility] [place] [facility] [address] [facility] [place] [address]                                 

 

                    [facility] [place] [facility] [address] [facility] [address] [place] [address]                                 

 

                    [facility] [place] [facility] [address] [facility] [address] [place] [address]                                 

 

                    [facility] [place] [facility] [address] [facility] [place] [address] 15 

Brandon, 2015                                

 

                    [facility] [place] [facility] [address] [facility] [place] [address] 15 

Brandon, 2015                                

 

                    [facility] [place] [facility] [address] [facility] [place] [address]                                 

 

                    [facility] [place] [facility] [address] [facility] [address] [place] [address]                                 

 

                    [facility] [place] [facility] [address] [facility] [address] [place] [address] 15 

Oct, 2014                                

 

                    [facility] [place] [facility] [address] [facility] [place] [address] 15 

Oct, 2014                                

 

                    [facility] [place] [facility] [address] [facility] [place] [address] 14 

Oct, 2014                                

 

                    [facility] [place] [facility] [address] [facility] [place] [address] 14 

Oct, 2014                                

 

                    [facility] [place] [facility] [address] [id number] [place] [address] 07 

Oct, 2014                                

 

                    [facility] [place] [facility] [address] [facility] [id number] [place] [address] 07 

Oct, 2014                                

 

                    [facility] [place] [facility] [address] [facility] [place] [address] 12 

Sep,                                 

 

                    [facility] [place] [facility] [address] [facility] [place] [address] 12 

Sep,                                 

 

                    [facility] [place] [facility] [address] [facility] [id number] [place] [address] 04 

Sep,                                 

 

                    [facility] [place] [facility] [address] [facility] [place] [address] 03 

Sep,                                 

 

                    [facility] [place] [facility] [address] [facility] [place] [address] 03 

Sep,                                 

 

                    [facility] [place] [facility] [address] [facility] [place] [address] 02 

Sep,                                 

 

                    [facility] [place] [facility] [address] [facility] [place] [address] 02 

Sep,                                 

 

                    [facility] [place] [facility] [address] [facility] [place] [address] 30 

Aug, 2014                                

 

                    [facility] [place] [facility] [address] [facility] [place] [address] 30 

Aug, 2014                                

 

                    [facility] [place] [facility] [address] [facility] [place] [address] 19 

Aug, 2014                                

 

                    [facility] [place] [facility] [address] [facility] [place] [address] 19 

Aug, 2014                                

 

                    [facility] [place] [address] [facility] [place] [address] 14 

Aug, 2014                                

 

                    [facility] [place] [facility] [address] [facility] [place] [address] 14 

Aug, 2014                                

 

                    [facility] [place] [facility] [address] [facility] [place] [address] 13 

Aug, 2014                                

 

                    [facility] [address] [id number] [facility] [place] [address] 13 

Aug, 2014                                

 

                    [facility] [address] [id number] [facility] [place] [address]                                 

 

                    [facility] [place] [address] [facility] [id number] [facility] [place] [address]                                 

 

                    [facility] [place] [facility] [address] [facility] [id number] [facility] [place] [address]                                 

 

                    [facility] [place] [facility] [address] [facility] [id number] [place] [address]                                 

 

                    [facility] [place] [facility] [address] [facility] [id number] [place] [address]                                 

 

                    [facility] [place] [facility] [address] [facility] [id number] [place] [address]                                 

 

                    [facility] [place] [facility] [address] [facility] [facility] [place] [address]                                 

 

                    [facility] [place] [facility] [address] [facility] [facility] [place] [address]                                 

 

                    [facility] [facility] [address] [facility] [id number] [facility] [place] [address]                                 

 

                    [facility] [place] [facility] [address] [facility] [id number] [facility] [place] [address]                                 

 

                    [facility] [facility] [address] [facility] [id number] [facility] [place] [address]                                 

 

                    [facility] [place] [facility] [address] [facility] [facility] [place] [address] ,                                 

 

                    [facility] [place] [facility] [address] [facility] [id number] [facility] [place] [address]                                 

 

                    [facility] [place] [facility] [address] [facility] [id number] [facility] [place] [address] ,                                 

 

                    [facility] [place] [facility] [address] [facility] [id number] [facility] [place] [address]                                 

 

                    [facility] [place] [facility] [address] [facility] [facility] [place] [address] ,                                 

 

                    [facility] [place] [facility] [address] [facility] [id number] [place] [address]                                 

 

                    [facility] [place] [facility] [address] [facility] [place] [address]                                 

 

                    [facility] [place] [facility] [address] [facility] [place] [address]                                 

 

                    [facility] [place] [facility] [address] [facility] [place] [address]                                 

 

                    [facility] [place] [facility] [address] [facility] [place] [address]                                 

 

                    [facility] [place] [facility] [address] [facility] [place] [address]                                 

 

                    [facility] [place] [facility] [address] [facility] [place] [address]                                 

 

                    [facility] [id number] [place] [address]                                 

 

                    [facility] [id number] [place] [address]                                 

 

                    [facility] [id number] [place] [address]                                 

 

                    [facility] [id number] [place] [address]                                 

 

                    [facility] [id number] [place] [address] 27 

Mar, 2014                                

 

                    [facility] [id number] [place] [address] 27 

Mar, 2014                                

 

                    [facility] [id number] [place] [address]                                 

 

                    [facility] [id number] [place] [address]                                 

 

                    [facility] [id number] [place] [address]                                 

 

                    [facility] [id number] [place] [address]                                 

 

                    [facility] [id number] [place] [address] 08 

Oct, 2013                                

 

                    [facility] [id number] [place] [address] 04 

Oct, 2013                                

 

                    [facility] [id number] [place] [address] 03 

Oct, 2013                                

 

                    [facility] [id number] [place] [address] 02 

Oct, 2013                                

 

                    [facility] [id number] [place] [address] 01 

Oct, 2013                                

 

                    [facility] [id number] [place] [address] 20 

Sep, 2013                                

 

                    [facility] [id number] [place] [address] 08 

Aug, 2013                                

 

                    [facility] [id number] [place] [address] 24 

May, 2013                                

 

                    [facility] [id number] [place] [address] 13 

May, 2013                                

 

                    [facility] [id number] [place] [address]                                 

 

                    [facility] [id number] [place] [address]                                 

 

                    [facility] [id number] [place] [address]                                 

 

                    [facility] [id number] [place] [address]                                 

 

                    [facility] [id number] [place] [address] 02 

Oct, 2012                                

 

                    [facility] [id number] [place] [address] 02 

Oct, 2012                                

 

                    [facility] [id number] [place] [address] 21 

Sep, 2012                                

 

                    [facility] [id number] [place] [address] 06 

Aug, 2012                                

 

                    [facility] [address] [address] 02 

Aug, 2012                                

 

                    [facility] [address] [address]                                 

 

                    [facility] [address] [address]                                 

 

                    [facility] [address] [address] 15 

Eusebio, 2012                                

 

                    [facility] [address] [address]                                 

 

                    [facility] [address] [address] 10 

Apr, 2012                                

 

                    [facility] [address] [address] 22 

Mar, 2012                                

 

                    [facility] [address] [address] 20 

Mar, 2012                                

 

                    [facility] [address] [address] 14 

Mar, 2012                                

 

                    [facility] [address] [address]                                 







IMMUNIZATIONS

No Known Immunizations



SOCIAL HISTORY

Never Assessed



REASON FOR VISIT





PLAN OF CARE





VITAL SIGNS





                    Height              69 in               2014

 

                    Weight              213.9 lbs           2014

 

                    Temperature         96.8 degrees Fahrenheit 2014

 

                    Heart Rate          80 bpm              2014

 

                    Respiratory Rate    18                  2014

 

                    Blood pressure systolic 118 mmHg            2014

 

                    Blood pressure diastolic 74 mmHg             2014







MEDICATIONS

Unknown Medications



RESULTS

No Results



PROCEDURES

No Known procedures



INSTRUCTIONS





MEDICATIONS ADMINISTERED

No Known Medications



MEDICAL (GENERAL) HISTORY





                    Type                Description         Date

 

                          Medical History           allergic rhinitis- rosita's edwige donahue allergy injections from Dr Rojas office                            

 

                    Medical History     mood disorder        

 

                    Medical History     Leukocytosis- has been to hematology  

 

                    Medical History     Hyperlipidemia       

 

                          Medical History           Left leg pain- multiple imag

ing performed and ortho referral 

placed                                   

 

                          Medical History           TUBULAR ADENOMA AND GASTRITI

S ON COLONOSOCPY AUG 2016 -MULTIPLE 

POLYPS                                   

 

                    Medical History     Helicobacter pylori (H. pylori) infectio

n Hx  

 

                    Surgical History    cholecystectomy     2006

 

                    Surgical History     section    , 

 

                    Surgical History    tonsillectomy       2015

 

                    Surgical History    colonoscopy         2016

 

                    Surgical History    colonscopy          2017

 

                    Surgical History    Right Knee scope    2017

## 2020-06-23 NOTE — XMS REPORT
[facility]

                             Created on: 10/05/2019



Kate April

External Reference #: [id number]

: 1975

Sex: Female



Demographics





                          Address                   [address] [place]  [address]

 

                          Preferred Language        Unknown

 

                          Marital Status            Unknown

 

                          Tenriism Affiliation     Unknown

 

                          Race                      Unknown

 

                          Ethnic Group              Unknown





Author





                          Author                    ANNEMARIE April VENECIA

 

                          [facility]

 

                          Address                   [address] [place]  [address]



 

                          Phone                     [phone]







Care Team Providers





                    Care Team Member Name Role                Phone

 

                    ANNETTE SHARPEWNYA       Unavailable         [phone]







PROBLEMS





          Type      Condition ICD9-CM Code NSO32-XJ Code Onset Dates Condition S

tatus SNOMED 

Code

 

          Problem   Tubular adenoma of colon           D12.6               Activ

e    [id number]

 

          Problem   Duarte's neuroma of right foot           G57.61             

 Active    43857492

 

          Problem   History of leukocytosis           Z86.2               Active

    [id number]

 

          Problem   Non morbid obesity           E66.9               Active    4

[id number]

 

          Problem   Mixed hyperlipidemia           E78.2               Active   

 [id number]

 

          Problem   Seasonal allergic rhinitis due to pollen           J30.1    

           Active    [id number]

 

                          Problem                   Type 2 diabetes mellitus wit

hout complication, without long-term current

use of insulin              E11.9                     Active       [id number]

 

          Problem   Anxiety             F41.9               Active    71943596

 

           Problem    Seasonal allergic rhinitis due to other allergic trigger  

          J30.89                

Active                                  [id number]

 

          Problem   Dysthymic disorder           F34.1               Active    7

6983645

 

          Problem   Arthritis           M19.90              Active    0857191







ALLERGIES

No Information



ENCOUNTERS





                Encounter       Location        Date            Diagnosis

 

                          [place] [address] [address] [facility] [id number]

[address] [place] [address]

                          01 Oct, 2019              Encounter for immunization Z

23

 

                          [place] [address] [facility] [id number]

[address] [place] [address]

                                        Type 2 diabetes mellitus wit

hout complication, without long-term 

current use of insulin E11.9

 

                          [place] [address] [facility] [id number]

[address] [place] [address]

                                         

 

                          [place] [address] [facility] [id number]

[address] [place] [address]

                                        Type 2 diabetes mellitus wit

hout complication, without long-term 

current use of insulin E11.9

 

                          [place] [address] [place] [id number]

[address] [place] [address]

                                        Weight loss R63.4

 

                          [place] [address] [address]

                          31 May, 2019              Type 2 diabetes mellitus wit

hout complication, without long-term 

current use of insulin E11.9

 

                          [place] [address] [place] [id number]

[address] [place] [address]

                          31 May, 2019              Type 2 diabetes mellitus wit

hout complication, without long-term 

current use of insulin E11.9 and Non morbid obesity E66.9

 

                          [place] [address] 

[address]                14 May, 2019              Seasonal allergic rhinitis d

ue to pollen J30.1 and Sore 

throat J02.9

 

                          [place] [address] 

[address]                              Arthritis M19.90

 

                          [place] [address] [address]

                                        Seasonal allergic rhinitis d

ue to other allergic trigger J30.89 and

Dysthymic disorder F34.1

 

                          [place] [address] [address]

                          05 Dec, 2018              Bilateral otitis media with 

effusion H65.93 and Anxiety F41.9

 

                          [place] [address] [address]

                                        Type 2 diabetes mellitus wit

hout complication, without long-term 

current use of insulin E11.9

 

                          [place] [address] [place] [id number]

[address] [place] [address]

                                        Pharyngitis due to Streptoco

ccus species J02.0

 

                          [place] [address] [place] [id number]

[address] [place] [address]

                                         

 

                          [place] [address] 

[address]                10 Jul, 2018              Fever, unspecified fever cau

se R50.9 and Lymphadenopathy

R59.1

 

                          [facility]  [address] 

[address]                              Pharyngitis due to other org

anism J02.8

 

                          [place] [address] [address]

                                         

 

                          [place] [address] [address]

                                        Type 2 diabetes mellitus wit

hout complication, without long-term 

current use of insulin E11.9 and Other eczema L30.8

 

                          [facility] [place] [address] 

[address]                              Acute pain of left shoulder 

M25.512

 

                          [place] [address] [address]

                                         

 

                          [place] [address] [address]

                          15 2018              Type 2 diabetes mellitus wit

hout complication, without long-term 

current use of insulin E11.9

 

                          [place] [address] [address]

                          06 2018              Type 2 diabetes mellitus wit

hout complication, without long-term 

current use of insulin E11.9 ; Tubular adenoma of colon D12.6 ; Mixed 
hyperlipidemia E78.2 ; History of leukocytosis Z86.2 and Screening breast 
examination Z12.31

 

                          [place] [address] [address]

                                        Metatarsalgia of right foot 

M77.41 and Type 2 diabetes mellitus 

without complication, without long-term current use of insulin E11.9

 

                          [place] [address] [address]

                                        Capsulitis M77.9 and Metatar

salgia of right foot M77.41

 

                          [place] [address] [place] [address]

                          08 Sep, 2017              Capsulitis M77.9

 

                          [facility]     [address] [facility] [id number]

[address] [place] [address]

                          06 Sep, 2017               

 

                          [facility]     [address] [facility] [id number]

[address] [place] [address]

                          03 Aug, 2017              Type 2 diabetes mellitus wit

hout complication, without long-term 

current use of insulin E11.9 ; Tubular adenoma of colon D12.6 and Mixed 
hyperlipidemia E78.2

 

                          [facility]     [address] [facility] [id number]

[address] [place] [address]

                                        Metatarsalgia of right foot 

M77.41 and Capsulitis M77.9

 

                          [facility]     [address] [facility] [id number]

[address] [place] [address]

                                         

 

                          [facility]  [address] [place] [id number]

[address] [place] [address] 

[address]                              Duarte's neuroma of right fo

ot G57.61

 

                          [facility]     [address] [facility] [id number]

[address] [place] [address]

                                        Mixed hyperlipidemia E78.2 a

nd Type 2 diabetes mellitus without 

complication, without long-term current use of insulin E11.9

 

                          [place] [address] [place] [id number]

[address] [place] [address]

                          10 Brandon, 2017              Type 2 diabetes mellitus wit

hout complication, without long-term 

current use of insulin E11.9 ; Tubular adenoma of colon D12.6 and Mixed 
hyperlipidemia E78.2

 

                          [place] [address] [facility] [id number]

[address] [place] [address]

                          22 Dec, 2016              Mixed hyperlipidemia E78.2

 

                          [place] [address] [facility] [id number]

[address] [place] [address]

                                         

 

                          [place] [address] [place] [id number]

[address] [place] [address]

                                        Mixed hyperlipidemia E78.2

 

                          [place] [address] [address]

                                        Mixed hyperlipidemia E78.2

 

                          [facility]     [address] [address]

                          08 Sep, 2016               

 

                          [place] [address] [address]

                          10 Aug, 2016              Type 2 diabetes mellitus wit

hout complication, without long-term 

current use of insulin E11.9 ; Helicobacter pylori (H. pylori) infection A04.8 
and Mixed hyperlipidemia E78.2

 

                          [place] [address] [address]

                          08 Aug, 2016              Type 2 diabetes mellitus wit

hout complication, without long-term 

current use of insulin E11.9

 

                          [place] [address] [address]

                          03 Aug, 2016              Type 2 diabetes mellitus wit

hout complication, without long-term 

current use of insulin E11.9

 

                          [place] [address] [address]

                          02 Aug, 2016              Dyspepsia R10.13 ; Upper abd

ominal pain R10.10 ; Bowel habit 

changes R19.4 ; History of leukocytosis Z86.2 and Helicobacter pylori (H. 
pylori) infection A04.8

 

                          [facility]  [address] 

[address]                27 May, 2016              Environmental allergies Z91.

09

 

                          [place] [address] [address]

                                        Left leg pain M79.605 ; Loca

lized swelling of lower leg R22.40 and 

Upper respiratory infection J06.9

 

                          [facility]     [address] [address]

                          28 Dec, 2015               

 

                          [place] [address] [address]

                          21 Dec, 2015              Left leg pain M79.605

 

                          [facility]     [address] [address]

                          10 Dec, 2015              Left leg pain M79.605 and Lo

calized swelling of lower leg R22.40

 

                          [facility]     [address] [facility] [id number]

[address] [place] [address]

                          01 Dec, 2015              Left leg pain M79.605

 

                          [facility]     [address] [facility] [id number]

[address] [place] [address]

                                        Encounter for immunization Z

23

 

                          [facility]     [address] [facility] [id number]

[address] [place] [address]

                          12 Oct, 2015              Bronchitis J40

 

                          [facility]     [address] [facility] [id number]

[address] [place] [address]

                          30 Sep, 2015              Physical exam, pre-employmen

t V70.5 ; Encounter for PPD test V74.1 

and Hyperlipidemia 272.4

 

                          [facility]     [address] [facility] [id number]

[address] [place] [address]

                          21 Sep, 2015               

 

                          [facility]     [address] [facility] [id number]

[address] [place] [address]

                          21 Sep, 2015               

 

                          [facility]     [address] [facility] [id number]

[address] [place] [address]

                          09 Sep, 2015               

 

                          [facility]     [address] [facility] [id number]

[address] [place] [address]

                          04 Sep, 2015              Elevated blood sugar 790.29

 

                          [facility]     [address] [facility] [id number]

[address] [place] [address]

                          03 Sep, 2015              Elevated blood sugar 790.29

 

                          [facility]     [address] [facility] [id number]

[address] [place] [address]

                          03 Sep, 2015              Palpitations 785.1

 

                          [facility]     [address] [facility] [id number]

[address] [place] [address]

                          01 Sep, 2015              Palpitations 785.1

 

                          [facility]     [address] [facility] [id number]

[address] [place] [address]

                                         

 

                          [facility]     [address] [id number]

[address] [place] [address]

                          28 May, 2015              Hand pain, right 729.5 and D

epression with anxiety 300.4

 

                          [facility]     [address] [id number]

[address] [place] [address]

                                         

 

                          [facility]     [address] [id number]

[address] [place] [address]

                                         

 

                          [facility]     [address] [id number]

[address] [place] [address]

                          05 Mar, 2015               

 

                          [facility]     [address] [id number]

[address] [place] [address]

                          05 Mar, 2015               

 

                          [facility]     [address] [id number]

[address] [place] [address]

                                         

 

                          [facility]     [address] [id number]

[address] [place] [address]

                                         

 

                          [facility]     [address] [id number]

[address] [place] [address]

                          10 Feb, 2015               

 

                          [facility]     [address] [id number]

[address] [place] [address]

                          10 Feb, 2015               

 

                          [facility]     [address] [id number]

[address] [place] [address]

                                         

 

                          [facility]     [address] [id number]

[address] [place] [address]

                                         

 

                          [facility]     [address] [id number]

[address] [place] [address]

                                         

 

                          [facility]     [address] [id number]

[address] [place] [address]

                                         

 

                          [facility]     [address] [id number]

[address] [place] [address]

                                         

 

                          [facility]     [address] [id number]

[address] [place] [address]

                                         

 

                          [facility]     [address] [id number]

[address] [place] [address], [place] [address]

                          19 2015               

 

                          [facility]     [address] [id number]

[address] [place] [address], [place] [address]

                                         

 

                          [facility]     [address] [id number]

[address] [place] [address], [place] [address]

                          16 2015               

 

                          [facility]     [address] [id number]

[address] [place] [address], [place] [address]

                                         

 

                          [facility]     [address] [id number]

[address] [place] [address], [place] [address]

                                         

 

                          [facility]     [address] [id number]

[address] [place] [address], [place] [address]

                                         

 

                          [facility]     [address] [id number]

[address] [place] [address], [place] [address]

                          15 [place], 2015               

 

                          [facility]     [address] [id number]

[address] [place] [address], [place] [address]

                          15 [place], 2015               

 

                          [facility]     [address] [id number]

[address] [place] [address], [place] [address]

                                         

 

                          [facility]     [address] [id number]

[address] [place] [address], [place] [address]

                                         

 

                          [facility]     [address] [id number]

[address] [place] [address], [place] [address]

                          15 Oct, 2014               

 

                          [facility]     [address] [id number]

[address] [place] [address], [place] [address]

                          15 Oct, 2014               

 

                          [facility]     [address] [id number]

[address] [place] [address], [place] [address]

                          14 Oct, 2014               

 

                          [facility]     [address] [id number]

[address] [place] [address], [place] [address]

                          14 Oct, 2014               

 

                          [facility]     [address] [id number]

[address] [place] [address], [place] [address]

                          07 Oct, 2014               

 

                          [facility]     [address] [id number]

[address] [place] [address], [place] [address]

                          07 Oct, 2014               

 

                          [facility]     [address] [id number]

[address] [place] [address], [place] [address]

                          12 Sep,                

 

                          [facility]     [address] [id number]

[address] [place] [address], [place] [address]

                          12 Sep, 2014               

 

                          [facility]     [address] [id number]

[address] [place] [address], [place] [address]

                          04 Sep,                

 

                          [facility]     [address] [id number]

[address] [place] [address], [place] [address]

                          03 Sep,                

 

                          [facility]     [address] [id number]

[address] [place] [address], [place] [address]

                          03 Sep,                

 

                          [facility]     [address] [id number]

[address] [place] [address], [place] [address]

                          02 Sep, 2014               

 

                          [facility]     [address] [id number]

[address] [place] [address], [place] [address]

                          02 Sep, 2014               

 

                          [facility]     [address] [id number]

[address] [place] [address], [place] [address]

                          30 Aug, 2014               

 

                          [facility]     [address] [id number]

[address] [place] [address], [place] [address]

                          30 Aug, 2014               

 

                          [facility]     [address] [id number]

[address] [place] [address], [place] [address]

                          19 Aug, 2014               

 

                          [facility]     [address] [id number]

[address] [place] [address], [place] [address]

                          19 Aug, 2014               

 

                          [facility]     [address] [id number]

[address] [place] [address], [place] [address]

                          14 Aug, 2014               

 

                          [facility]     [address] [id number]

[address] [place] [address], [place] [address]

                          14 Aug, 2014               

 

                          [facility]     [address] [id number]

[address] [place] [address], [place] [address]

                          13 Aug, 2014               

 

                          [facility]     [address] [id number]

[address] [place] [address], [place] [address]

                          13 Aug, 2014               

 

                          [facility]     [address] [id number]

[address] [place] [address], [place] [address]

                          ,                

 

                          [facility]     [address] [id number]

[address] [place] [address]

                          ,                

 

                          [facility]     [address] [id number]

[address] [place] [address], [place] [address]

                                         

 

                          [facility]     [address] [id number]

[address] [place] [address], [place] [address]

                                         

 

                          [facility]     [address] [id number]

[address] [place] [address], [place] [address]

                                         

 

                          [facility]     [address] [id number]

[address] [place] [address], [place] [address]

                                         

 

                          [facility]     [address] [id number]

[address] [place] [address], [place] [address]

                                         

 

                          [facility]     [address] [id number]

[address] [place] [address], [place] [address]

                                         

 

                          [facility]     [address] [id number]

[address] [place] [address], [place] [address]

                                         

 

                          [facility]     [address] [id number]

[address] [place] [address], [place] [address]

                                         

 

                          [facility]     [address] [id number]

[address] [place] [address], [place] [address]

                                         

 

                          [facility]     [address] [id number]

[address] [place] [address], [place] [address]

                          ,                

 

                          [facility]     [address] [id number]

[address] [place] [address], [place] [address]

                                         

 

                          [facility]     [address] [id number]

[address] [place] [address], [place] [address]

                                         

 

                          [facility]     [address] [id number]

[address] [place] [address], [place] [address]

                                         

 

                          [facility]     [address] [id number]

[address] [place] [address], [place] [address]

                          ,                

 

                          [facility]     [address] [id number]

[address] [place] [address]

                                         

 

                          [facility]     [address] [id number]

[address] [place] [address]

                                         

 

                          [facility]     [address] [id number]

[address] [place] [address]

                                         

 

                          [facility]     [address] [id number]

[address] [place] [address], [place] [address]

                                         

 

                          [facility]     [address] [id number]

[address] [place] [address], [place] [address]

                                         

 

                          [facility]     [address] [id number]

[address] [place] [address], [place] [address]

                                         

 

                          [facility]     [address] [id number]

[address] [place] [address], [place] [address]

                                         

 

                          [facility]     [address] [id number]

[address] [place] [address], [place] [address]

                                         

 

                          [facility]     [address] [id number]

[address] [place] [address], [place] [address]

                                         

 

                          [facility]     [address] [id number]

[address] [place] [address], [place] [address]

                                         

 

                          [facility]     [address] [id number]

[address] [place] [address], [place] [address]

                                         

 

                          [facility]     [address] [id number]

[address] [place] [address], [place] [address]

                          27 Mar, 2014               

 

                          [facility]     [address] [id number]

[address] [place] [address], [place] [address]

                          27 Mar, 2014               

 

                          [facility]     [address] [id number]

[address] [place] [address], [place] [address]

                                         

 

                          [facility]     [address] [id number]

[address] [place] [address], [place] [address]

                                         

 

                          [facility]     [address] [id number]

[address] [place] [address], [place] [address]

                                         

 

                          [facility]     [address] [id number]

[address] [place] [address], [place] [address]

                                         

 

                          [facility]     [address] [id number]

[address] [place] [address], [place] [address]

                          08 Oct, 2013               

 

                          [facility]     [address] [id number]

[address] [place] [address], [place] [address]

                          04 Oct, 2013               

 

                          [facility]     [address] [id number]

[address] [place] [address], [place] [address]

                          03 Oct, 2013               

 

                          [facility]     [address] [id number]

[address] [place] [address], [place] [address]

                          02 Oct, 2013               

 

                          [facility]     [address] [id number]

[address] [place] [address], [place] [address]

                          01 Oct, 2013               

 

                          [facility]     [address] [id number]

[address] [place] [address], [place] [address]

                          20 Sep, 2013               

 

                          [facility]     [address] [id number]

[address] [place] [address]

                          08 Aug, 2013               

 

                          [facility]     [address] [id number]

[address] [place] [address], [place] [address]

                          24 May, 2013               

 

                          [facility]     [address] [id number]

[address] [place] [address]

                          13 May, 2013               

 

                          [facility]     [address] [id number]

[address] [place] [address]

                                         

 

                          [facility]     [address] [id number]

[address] [place] [address]

                                         

 

                          [facility]     [address] [id number]

[address] [place] [address], [place] [address]

                                         

 

                          [facility]     [address] [id number]

[address] [place] [address]

                                         

 

                          [facility]     [address] [id number]

[address] [place] [address]

                          02 Oct, 2012               

 

                          [facility]     [address] [id number]

[address] [place] [address]

                          02 Oct, 2012               

 

                          [facility]     [address] [id number]

[address] [place] [address]

                          21 Sep, 2012               

 

                          [facility]     [address] [id number]

[address] [place] [address]

                          06 Aug, 2012               

 

                          [facility]     [address] [id number]

[address] [place] [address]

                          02 Aug, 2012               

 

                          [facility]     [address] [id number]

[address] [place] [address]

                                         

 

                          [facility]     [address] [id number]

[address] [place] [address]

                                         

 

                          [facility]     [address] [id number]

[address] [place] [address]

                          15 Eusebio, 2012               

 

                          [facility]     [address] [id number]

[address] [place] [address]

                                         

 

                          [facility]     [address] [id number]

[address] [place] [address]

                          10 Apr, 2012               

 

                          [facility]     [address] [id number]

[address] [place] [address]

                          22 Mar, 2012               

 

                          [facility]     [address] [id number]

[address] [place] [address]

                          20 Mar, 2012               

 

                          [facility]     [address] [id number]

[address] [place] [address]

                          14 Mar, 2012               

 

                          [facility]     [address] [id number]

[address] [place] [address]

                                         







IMMUNIZATIONS

No Known Immunizations



SOCIAL HISTORY

Never Assessed



REASON FOR VISIT





PLAN OF CARE





VITAL SIGNS





MEDICATIONS

Unknown Medications



RESULTS

No Results



PROCEDURES

No Known procedures



INSTRUCTIONS





MEDICATIONS ADMINISTERED

No Known Medications



MEDICAL (GENERAL) HISTORY





                    Type                Description         Date

 

                          Medical History           allergic rhinitis- rec's edwige donahue allergy injections from Dr Rojas office                            

 

                    Medical History     mood disorder        

 

                    Medical History     Leukocytosis- has been to hematology  

 

                    Medical History     Hyperlipidemia       

 

                          Medical History           Left leg pain- multiple imag

ing performed and ortho referral 

placed                                   

 

                          Medical History           TUBULAR ADENOMA AND GASTRITI

S ON COLONOSOCPY AUG 2016 -MULTIPLE 

POLYPS                                   

 

                    Medical History     Helicobacter pylori (H. pylori) infectio

n Hx  

 

                    Surgical History    cholecystectomy     

 

                    Surgical History     section    , 

 

                    Surgical History    tonsillectomy       2015

 

                    Surgical History    colonoscopy         2016

 

                    Surgical History    colonscopy          2017

 

                    Surgical History    Right Knee scope    2017

## 2020-06-23 NOTE — XMS REPORT
[facility]

                             Created on: 2019



Kate April

External Reference #: [id number]

: 1975

Sex: Female



Demographics





                          Address                   [address] [place]  [address]

 

                          Preferred Language        Unknown

 

                          Marital Status            Unknown

 

                          Voodoo Affiliation     Unknown

 

                          Race                      Unknown

 

                          Ethnic Group              Unknown





Author





                          Author                    Uvaldo April YONNY

 

                          Organization              [facility]

 

                          Address                   [address] [place]  [address]



 

                          Phone                     [phone]







Care Team Providers





                    Care Team Member Name Role                Phone

 

                    YONNY Bailon   Unavailable         [phone]







PROBLEMS





          Type      Condition ICD9-CM Code EVJ87-JO Code Onset Dates Condition S

tatus SNOMED 

Code

 

          Problem   Tubular adenoma of colon           D12.6               Activ

e    [id number]

 

          Problem   Duarte's neuroma of right foot           G57.61             

 Active    92264157

 

          Problem   History of leukocytosis           Z86.2               Active

    [id number]

 

          Problem   Non morbid obesity           E66.9               Active    4

[id number]

 

          Problem   Mixed hyperlipidemia           E78.2               Active   

 [id number]

 

          Problem   Seasonal allergic rhinitis due to pollen           J30.1    

           Active    82443299

 

                          Problem                   Type 2 diabetes mellitus wit

hout complication, without long-term current

use of insulin              E11.9                     Active       [id number]

 

          Problem   Anxiety             F41.9               Active    12036711

 

           Problem    Seasonal allergic rhinitis due to other allergic trigger  

          J30.89                

Active                                  [id number]

 

          Problem   Dysthymic disorder           F34.1               Active    7

4065416

 

          Problem   Arthritis           M19.90              Active    7345487







ALLERGIES

No Information



ENCOUNTERS





                Encounter       Location        Date            Diagnosis

 

                          [place] [address] [address] [facility] [id number]

[address] [place] [address]

                                        Type 2 diabetes mellitus wit

hout complication, without long-term 

current use of insulin E11.9

 

                          [facility]     [address] [facility] [id number]

[address] [place] [address]

                                         

 

                          [place] [address] [facility] [id number]

[address] [place] [address]

                                        Type 2 diabetes mellitus wit

hout complication, without long-term 

current use of insulin E11.9

 

                          [place] [address] [facility] [id number]

[address] [place] [address]

                                        Weight loss R63.4

 

                          [place] [address] [facility] [id number]

[address] [place] [address]

                          31 May, 2019              Type 2 diabetes mellitus wit

hout complication, without long-term 

current use of insulin E11.9

 

                          [place] [address] [facility] [id number]

[address] [place] [address]

                          31 May, 2019              Type 2 diabetes mellitus wit

hout complication, without long-term 

current use of insulin E11.9 and Non morbid obesity E66.9

 

                          [facility] WALK IN [place] [address] [place] [id number]

[address] [place] [address] 

[address]                14 May, 2019              Seasonal allergic rhinitis d

ue to pollen J30.1 and Sore 

throat J02.9

 

                          [facility] IN [place] [address] [address] [place] [id number] [place] [address] 

[address]                              Arthritis M19.90

 

                          [place] [address] [address]

                                        Seasonal allergic rhinitis d

ue to other allergic trigger J30.89 and

Dysthymic disorder F34.1

 

                          [place] [address] [place] [id number]

[address] [place] [address]

                          05 Dec, 2018              Bilateral otitis media with 

effusion H65.93 and Anxiety F41.9

 

                          [place] [address] [place] [id number]

[address] [place] [address]

                                        Type 2 diabetes mellitus wit

hout complication, without long-term 

current use of insulin E11.9

 

                          [place] [address] [address]

[address] [place] [address]

                                        Pharyngitis due to Streptoco

ccus species J02.0

 

                          [place] [address] [place] [id number]

[address] [place] [address]

                                         

 

                          [facility] IN [place] [address] [address] [place] [id number] [place] [address] 

[address]                10 Jul, 2018              Fever, unspecified fever cau

se R50.9 and Lymphadenopathy

R59.1

 

                          [facility] WALK IN [place] [address] [address] [place] [address] [place] 

[address]                              Pharyngitis due to other org

anism J02.8

 

                          [facility]     [address] [place] [id number]

[address] [place] [address]

                                         

 

                          [facility]     [address] [place] [id number]

[address] [place] [address]

                                        Type 2 diabetes mellitus wit

hout complication, without long-term 

current use of insulin E11.9 and Other eczema L30.8

 

                          [facility] WALK IN [facility]  [address] [facility] [id number]

[address] [place] [address] 

[address]                              Acute pain of left shoulder 

M25.512

 

                          [place] [address] [address]

                                         

 

                          [place] [address] [address]

                          15 Feb, 2018              Type 2 diabetes mellitus wit

hout complication, without long-term 

current use of insulin E11.9

 

                          [place] [address] [place] [id number]

[address] [place] [address]

                                        Type 2 diabetes mellitus wit

hout complication, without long-term 

current use of insulin E11.9 ; Tubular adenoma of colon D12.6 ; Mixed 
hyperlipidemia E78.2 ; History of leukocytosis Z86.2 and Screening breast 
examination Z12.31

 

                          [place] [address] [address]

[address] [place] [address]

                                        Metatarsalgia of right foot 

M77.41 and Type 2 diabetes mellitus 

without complication, without long-term current use of insulin E11.9

 

                          [place] [address] [place] [id number]

[address] [place] [address]

                                        Capsulitis M77.9 and Metatar

salgia of right foot M77.41

 

                          [place] [address] [place] [id number]

[address] [place] [address]

                          08 Sep, 2017              Capsulitis M77.9

 

                          [place] [address] [place] [id number]

[address] [place] [address]

                          06 Sep, 2017               

 

                          [facility]     [address] [address]

                          03 Aug, 2017              Type 2 diabetes mellitus wit

hout complication, without long-term 

current use of insulin E11.9 ; Tubular adenoma of colon D12.6 and Mixed 
hyperlipidemia E78.2

 

                          [place] [address] [address]

                                        Metatarsalgia of right foot 

M77.41 and Capsulitis M77.9

 

                          [facility]     [address] [address]

                                         

 

                          [facility] IN [facility]  [address] 

[address]                              Duarte's neuroma of right fo

ot G57.61

 

                          [place] [address] [address]

                                        Mixed hyperlipidemia E78.2 a

nd Type 2 diabetes mellitus without 

complication, without long-term current use of insulin E11.9

 

                          [place] [address] [address]

                          10 Brandon, 2017              Type 2 diabetes mellitus wit

hout complication, without long-term 

current use of insulin E11.9 ; Tubular adenoma of colon D12.6 and Mixed 
hyperlipidemia E78.2

 

                          [place] [address] [place] [id number]

[address] [place] [address]

                          22 Dec, 2016              Mixed hyperlipidemia E78.2

 

                          [place] [address] [place] [id number]

[address] [place] [address]

                                         

 

                          [place] [address] [address]

                                        Mixed hyperlipidemia E78.2

 

                          [place] [address] [place] [id number]

[address] [place] [address]

                                        Mixed hyperlipidemia E78.2

 

                          [place] [address] [place] [id number]

[address] [place] [address]

                          08 Sep, 2016               

 

                          [place] [address] [address]

                          10 Aug, 2016              Type 2 diabetes mellitus wit

hout complication, without long-term 

current use of insulin E11.9 ; Helicobacter pylori (H. pylori) infection A04.8 
and Mixed hyperlipidemia E78.2

 

                          [place] [address] [address]

                          08 Aug, 2016              Type 2 diabetes mellitus wit

hout complication, without long-term 

current use of insulin E11.9

 

                          [place] [address] [address]

                          03 Aug, 2016              Type 2 diabetes mellitus wit

hout complication, without long-term 

current use of insulin E11.9

 

                          [place] [address] [address]

                          02 Aug, 2016              Dyspepsia R10.13 ; Upper abd

ominal pain R10.10 ; Bowel habit 

changes R19.4 ; History of leukocytosis Z86.2 and Helicobacter pylori (H. 
pylori) infection A04.8

 

                          [facility] WALK IN [facility]  [address] 

[address]                27 May, 2016              Environmental allergies Z91.

09

 

                          [place] [address] [address]

                                        Left leg pain M79.605 ; Loca

lized swelling of lower leg R22.40 and 

Upper respiratory infection J06.9

 

                          [place] [address] [place] [id number]

[address] [place] [address]

                          28 Dec, 2015               

 

                          [place] [address] [address]

                          21 Dec, 2015              Left leg pain M79.605

 

                          [place] [address] [address]

                          10 Dec, 2015              Left leg pain M79.605 and Lo

calized swelling of lower leg R22.40

 

                          [facility]     [address] [facility] [id number]

[address] [place] [address]

                          01 Dec, 2015              Left leg pain M79.605

 

                          [facility]     [address] [facility] [id number]

[address] [place] [address]

                                        Encounter for immunization Z

23

 

                          [facility]     [address] [facility] [id number]

[address] [place] [address]

                          12 Oct, 2015              Bronchitis J40

 

                          [facility]     [address] [place] [id number]

[address] [place] [address]

                          30 Sep, 2015              Physical exam, pre-employmen

t V70.5 ; Encounter for PPD test V74.1 

and Hyperlipidemia 272.4

 

                          [facility]     [address] [facility] [id number]

[address] [place] [address]

                          21 Sep, 2015               

 

                          [facility]     [address] [place] [id number] [place] [address]

                          21 Sep, 2015               

 

                          [facility]     [address] [place] [id number]

[address] [place] [address]

                          09 Sep, 2015               

 

                          [facility]     [address] [place] [id number]

[address] [place] [address]

                          04 Sep, 2015              Elevated blood sugar 790.29

 

                          [facility]     [address] [place] [id number]

[address] [place] [address]

                          03 Sep, 2015              Elevated blood sugar 790.29

 

                          [facility]     [address] [place] [id number]

[address] [place] [address]

                          03 Sep, 2015              Palpitations 785.1

 

                          [facility]     [address] [place] [id number]

[address] [place] [address]

                          01 Sep, 2015              Palpitations 785.1

 

                          [facility]     [address] [place] [id number]

[address] [place] [address]

                                         

 

                          [facility]     [address] [place] [id number]

[address] [place] [address]

                          28 May, 2015              Hand pain, right 729.5 and D

epression with anxiety 300.4

 

                          [facility]     [address] [id number]

[address] [place] [address], [place] [address]

                          14 2015               

 

                          [facility]     [address] [id number]

[address] [place] [address], [place] [address]

                                         

 

                          [facility]     [address] [id number]

[address] [place] [address]

                          05 Mar, 2015               

 

                          [facility]     [address] [id number]

[address] [place] [address]

                          05 Mar, 2015               

 

                          [facility]     [address] [id number]

[address] [place] [address], [place] [address]

                                         

 

                          [facility]     [address] [id number]

[address] [place] [address]

                                         

 

                          [facility]     [address] [id number]

[address] [place] [address], [place] [address]

                          10 Feb, 2015               

 

                          [facility]     [address] [id number]

[address] [place] [address]

                          10 Feb, 2015               

 

                          [facility]     [address] [id number]

[address] [place] [address], [place] [address]

                                         

 

                          [facility]     [address] [id number]

[address] [place] [address]

                                         

 

                          [facility]     [address] [id number]

[address] [place] [address]

                                         

 

                          [facility]     [address] [id number]

[address] [place] [address]

                                         

 

                          [facility] [facility]     [address] [id number]

[address] [place] [address]

                                         

 

                          [facility]     [address] [id number]

[address] [place] [address]

                                         

 

                          [facility]     [address] [id number]

[address] [place] [address]

                                         

 

                          [facility]     [address] [id number]

[address] [place] [address], [place] [address]

                          17 2015               

 

                          [facility]     [address] [id number]

[address] [place] [address], [place] [address]

                                         

 

                          [facility]     [address] [id number]

[address] [place] [address], [place] [address]

                          16 2015               

 

                          [facility]     [address] [id number]

[address] [place] [address], [place] [address]

                                         

 

                          [facility]     [address] [id number]

[address] [place] [address], [place] [address]

                                         

 

                          [facility]     [address] [id number]

[address] [place] [address], [place] [address]

                          15 [place], 2015               

 

                          [facility]     [address] [id number]

[address] [place] [address], [place] [address]

                          15 [place], 2015               

 

                          [facility]     [address] [id number]

[address] [place] [address], [place] [address]

                                         

 

                          [facility]     [address] [id number]

[address] [place] [address], [place] [address]

                                         

 

                          [facility]     [address] [id number]

[address] [place] [address], [place] [address]

                          15 Oct, 2014               

 

                          [facility]     [address] [id number]

[address] [place] [address], [place] [address]

                          15 Oct, 2014               

 

                          [facility]     [address] [id number]

[address] [place] [address], [place] [address]

                          14 Oct, 2014               

 

                          [facility]     [address] [id number]

[address] [place] [address], [place] [address]

                          14 Oct, 2014               

 

                          [facility]     [address] [id number]

[address] [place] [address], [place] [address]

                          07 Oct, 2014               

 

                          [facility]     [address] [id number]

[address] [place] [address], [place] [address]

                          07 Oct, 2014               

 

                          [facility]     [address] [id number]

[address] [place] [address], [place] [address]

                          12 Sep, 2014               

 

                          [facility]     [address] [id number]

[address] [place] [address], [place] [address]

                          12 Sep,                

 

                          [facility] [place] [facility]     [address] [id number]

[address] [place] [address], [place] [address]

                          04 Sep,                

 

                          [facility]     [address] [id number]

[address] [place] [address], [place] [address]

                          03 Sep,                

 

                          [facility] [place] [facility]     [address] [id number]

[address] [place] [address], [place] [address]

                          03 Sep,                

 

                          [facility]     [address] [id number]

[address] [place] [address], [place] [address]

                          02 Sep, 2014               

 

                          [facility]     [address] [id number]

[address] [place] [address], [place] [address]

                          02 Sep, 2014               

 

                          [facility]     [address] [id number]

[address] [place] [address], [place] [address]

                          30 Aug, 2014               

 

                          [facility]     [address] [id number]

[address] [place] [address], [place] [address]

                          30 Aug, 2014               

 

                          [facility]     [address] [id number]

[address] [place] [address], [place] [address]

                          19 Aug, 2014               

 

                          [facility]     [address] [id number]

[address] [place] [address], [place] [address]

                          19 Aug, 2014               

 

                          [facility]     [address] [address] [id number]

[address] [place] [address], [place] [address]

                          14 Aug, 2014               

 

                          [facility]     [address] [id number]

[address] [place] [address], [place] [address]

                          14 Aug, 2014               

 

                          [facility]     [address] [id number]

[address] [place] [address], [place] [address]

                          13 Aug, 2014               

 

                          [facility]     [address] [id number]

[address] [place] [address], [place] [address]

                          13 Aug, 2014               

 

                          [facility]     [address] [id number]

[address] [place] [address], [place] [address]

                                         

 

                          [facility]     [address] [id number]

[address] [place] [address], [place] [address]

                          ,                

 

                          [facility]     [address] [id number]

[facility] [place] [address]

                                         

 

                          [facility]     [address] [id number]

[address] [place] [address], [place] [address]

                                         

 

                          [facility]     [address] [id number]

[address] [place] [address], [place] [address]

                          ,                

 

                          [facility]     [address] [id number]

[address] [place] [address], [place] [address]

                                         

 

                          [facility]     [address] [id number]

[address] [place] [address], [place] [address]

                                         

 

                          [facility]     [address] [id number]

[address] [place] [address], [place] [address]

                                         

 

                          [facility]     [address] [id number]

[address] [place] [address], [place] [address]

                                         

 

                          [facility]     [address] [id number]

[address] [place] [address], [place] [address]

                                         

 

                          [facility]     [address] [id number]

[address] [place] [address], [place] [address]

                                         

 

                          [facility]     [address] [id number]

[address] [place] [address], [place] [address]

                                         

 

                          [facility]     [address] [id number]

[address] [place] [address], [place] [address]

                          ,                

 

                          [facility]     [address] [id number]

[address] [place] [address], [place] [address]

                          ,                

 

                          [facility]     [address] [id number]

[address] [place] [address], [place] [address]

                          ,                

 

                          [facility]     [address] [id number]

[address] [place] [address], [place] [address]

                          ,                

 

                          [facility]     [address] [id number]

[address] [place] [address], [place] [address]

                          ,                

 

                          [facility]     [address] [id number]

[address] [place] [address], [place] [address]

                                         

 

                          [facility]     [address] [id number]

[address] [place] [address], [place] [address]

                                         

 

                          [facility]     [address] [id number]

[address] [place] [address], [place] [address]

                                         

 

                          [facility]     [address] [id number]

[address] [place] [address], [place] [address]

                                         

 

                          [facility]     [address] [id number]

[address] [place] [address], [place] [address]

                                         

 

                          [facility]     [address] [id number]

[address] [place] [address], [place] [address]

                                         

 

                          [facility]     [address] [id number]

[address] [place] [address], [place] [address]

                                         

 

                          [facility]     [address] [id number]

[address] [place] [address], [place] [address]

                                         

 

                          [facility]     [address] [id number]

[address] [place] [address], [place] [address]

                                         

 

                          [facility]     [address] [id number]

[address] [place] [address], [place] [address]

                                         

 

                          [facility]     [address] [id number]

[address] [place] [address], [place] [address]

                          27 Mar, 2014               

 

                          [facility]     [address] [id number]

[address] [place] [address], [place] [address]

                          27 Mar, 2014               

 

                          [facility]     [address] [id number]

[address] [place] [address], [place] [address]

                                         

 

                          [facility]     [address] [id number]

[address] [place] [address], [place] [address]

                                         

 

                          [facility]     [address] [id number]

[address] [place] [address], [place] [address]

                                         

 

                          [facility]     [address] [id number]

[address] [place] [address], [place] [address]

                                         

 

                          [facility]     [address] [id number]

[address] [place] [address], [place] [address]

                          08 Oct, 2013               

 

                          [facility]     [address] [id number]

[address] [place] [address], [place] [address]

                          04 Oct, 2013               

 

                          [facility]     [address] [id number]

[address] [place] [address], [place] [address]

                          03 Oct, 2013               

 

                          [facility]     [address] [id number]

[address] [place] [address], [place] [address]

                          02 Oct, 2013               

 

                          [facility]     [address] [id number]

[address] [place] [address], [place] [address]

                          01 Oct, 2013               

 

                          [facility]     [address] [id number]

[address] [place] [address], [place] [address]

                          20 Sep, 2013               

 

                          [facility]     [address] [id number]

[address] [place] [address], [place] [address]

                          08 Aug, 2013               

 

                          [facility]     [address] [id number]

[address] [place] [address], [place] [address]

                          24 May, 2013               

 

                          [facility]     [address] [id number]

[address] [place] [address], [place] [address]

                          13 May, 2013               

 

                          [facility]     [address] [id number]

[address] [place] [address]

                                         

 

                          [facility]     [address] [id number]

[address] [place] [address]

                                         

 

                          [facility]     [address] [id number]

[address] [place] [address]

                                         

 

                          [facility]     [address] [id number]

[address] [place] [address], [place] [address]

                                         

 

                          [facility]     [address] [id number]

[address] [place] [address]

                          02 Oct, 2012               

 

                          [facility]     [address] [id number]

[address] [place] [address]

                          02 Oct, 2012               

 

                          [facility]     [address] [id number]

[address] [place] [address]

                          21 Sep, 2012               

 

                          [facility]     [address] [id number]

[address] [place] [address]

                          06 Aug, 2012               

 

                          [facility]     [address] [id number]

[address] [place] [address]

                          02 Aug, 2012               

 

                          [facility]     [address] [id number]

[address] [place] [address]

                                         

 

                          [facility]     [address] [id number]

[address] [place] [address]

                                         

 

                          [facility]     [address] [id number]

[address] [place] [address]

                          15 Eusebio, 2012               

 

                          [facility]     [address] [id number]

[address] [place] [address]

                                         

 

                          [facility]     [address] [id number]

[address] [place] [address]

                          10 Apr, 2012               

 

                          [facility]     [address] [id number]

[address] [place] [address]

                          22 Mar, 2012               

 

                          [facility]     [address] [id number]

[address] [place] [address]

                          20 Mar, 2012               

 

                          [facility]     [address] [id number]

[address] [place] [address]

                          14 Mar, 2012               

 

                          [facility]     [address] [id number]

[address] [place] [address]

                                         







IMMUNIZATIONS

No Known Immunizations



SOCIAL HISTORY

Never Assessed



REASON FOR VISIT





PLAN OF CARE





VITAL SIGNS





                    Height              69 in               2015

 

                    Weight              227.4 lbs           2015

 

                    Temperature         98.8 degrees Fahrenheit 2015

 

                    Heart Rate          79 bpm              2015

 

                    Respiratory Rate    2015

 

                    Blood pressure systolic 118 mmHg            2015

 

                    Blood pressure diastolic 79 mmHg             2015







MEDICATIONS

Unknown Medications



RESULTS

No Results



PROCEDURES

No Known procedures



INSTRUCTIONS





MEDICATIONS ADMINISTERED

No Known Medications



MEDICAL (GENERAL) HISTORY





                    Type                Description         Date

 

                          Medical History           allergic rhinitis- rosita's wee

kly allergy injections from Dr Rojas office                            

 

                    Medical History     mood disorder        

 

                    Medical History     Leukocytosis- has been to hematology  

 

                    Medical History     Hyperlipidemia       

 

                          Medical History           Left leg pain- multiple imag

ing performed and ortho referral 

placed                                   

 

                          Medical History           TUBULAR ADENOMA AND GASTRITI

S ON COLONOSOCPY AUG 2016 -MULTIPLE 

POLYPS                                   

 

                    Medical History     Helicobacter pylori (H. pylori) infectio

n Hx  

 

                    Surgical History    cholecystectomy     

 

                    Surgical History     section    , 

 

                    Surgical History    tonsillectomy       2015

 

                    Surgical History    colonoscopy         2016

 

                    Surgical History    colonscopy          2017

 

                    Surgical History    Right Knee scope    2017

## 2020-06-23 NOTE — XMS REPORT
[facility]

                             Created on: 2018



Kate April

External Reference #: [id number]

: 1975

Sex: Female



Demographics





                          Address                   [address] [place]  [address]

 

                          Preferred Language        Unknown

 

                          Marital Status            Unknown

 

                          Christian Affiliation     Unknown

 

                          Race                      Unknown

 

                          Ethnic Group              Unknown





Author





                          Author                    Darby SHARPE

 

                          [facility]

 

                          Address                   [address] [place]  [address]



 

                          Phone                     [phone]







Care Team Providers





                    Care Team Member Name Role                Phone

 

                    ANNEMARIE  VENECIA       Unavailable         [phone]







PROBLEMS





          Type      Condition ICD9-CM Code RJA97-SE Code Onset Dates Condition S

tatus SNOMED 

Code

 

          Problem   History of leukocytosis           Z86.2               Active

    [id number]

 

          Problem   Duarte's neuroma of right foot           G57.61             

 Active    [id number]

 

                          Problem                   Type 2 diabetes mellitus wit

hout complication, without long-term current

use of insulin              E11.9                     Active       [id number]

 

          Problem   Tubular adenoma of colon           D12.6               Activ

e    [id number]

 

          Problem   Mixed hyperlipidemia           E78.2               Active   

 [id number]







ALLERGIES

No Information



ENCOUNTERS





                Encounter       Location        Date            Diagnosis

 

                          [facility]     [address] [facility] [id number]

[address] [place] [address]

                                         

 

                          [facility]  [address] [facility] [id number]

[address] [place] [address] 

[address]                              Pharyngitis due to other org

anism J02.8

 

                          [facility]     [address] [facility] [id number]

[address] [place] [address]

                                         

 

                          [facility]     [address] [facility] [id number]

[address] [place] [address]

                                        Type 2 diabetes mellitus wit

hout complication, without long-term 

current use of insulin E11.9 and Other eczema L30.8

 

                          [facility]  [address] [facility] [id number]

[address] [place] [address] 

[address]                              Acute pain of left shoulder 

M25.512

 

                          [facility]     [address] [facility] [id number]

[address] [place] [address]

                                         

 

                          [facility]     [address] [place] [id number]

[address] [place] [address]

                          15 2018              Type 2 diabetes mellitus wit

hout complication, without long-term 

current use of insulin E11.9

 

                          [place] [address] [address]

                          06 2018              Type 2 diabetes mellitus wit

hout complication, without long-term 

current use of insulin E11.9 ; Tubular adenoma of colon D12.6 ; Mixed 
hyperlipidemia E78.2 ; History of leukocytosis Z86.2 and Screening breast 
examination Z12.31

 

                          [place] [address] [address]

                                        Metatarsalgia of right foot 

M77.41 and Type 2 diabetes mellitus 

without complication, without long-term current use of insulin E11.9

 

                          [place] [address] [address]

                                        Capsulitis M77.9 and Metatar

salgia of right foot M77.41

 

                          [place] [address] [address]

                          08 Sep, 2017              Capsulitis M77.9

 

                          [place] [address] [address]

                          06 Sep, 2017               

 

                          [place] [address] [address]

                          03 Aug, 2017              Type 2 diabetes mellitus wit

hout complication, without long-term 

current use of insulin E11.9 ; Tubular adenoma of colon D12.6 and Mixed 
hyperlipidemia E78.2

 

                          [place] [address] [place] [id number] [place] [address]

                                        Metatarsalgia of right foot 

M77.41 and Capsulitis M77.9

 

                          [facility]     [address] [id number]

[address] [place] [address]

                                         

 

                          [facility]  [address] [id number]

[address] [place] [address] 

[address]                              Duarte's neuroma of right fo

ot G57.61

 

                          [facility]     [address] [id number]

[address] [place] [address]

                                        Mixed hyperlipidemia E78.2 a

nd Type 2 diabetes mellitus without 

complication, without long-term current use of insulin E11.9

 

                          [facility]     [address] [facility] [id number]

[address] [place] [address]

                          10 Brandon, 2017              Type 2 diabetes mellitus wit

hout complication, without long-term 

current use of insulin E11.9 ; Tubular adenoma of colon D12.6 and Mixed 
hyperlipidemia E78.2

 

                          [facility]     [address] [id number]

[address] [place] [address]

                          22 Dec, 2016              Mixed hyperlipidemia E78.2

 

                          [place] [address] [id number]

[address] [place] [address]

                                         

 

                          [place] [address] [facility] [id number]

[address] [place] [address]

                                        Mixed hyperlipidemia E78.2

 

                          [facility]     [address] [id number]

[address] [place] [address]

                                        Mixed hyperlipidemia E78.2

 

                          [facility]     [address] [facility] [id number]

[address] [place] [address]

                          08 Sep, 2016               

 

                          [facility]     [address] [facility] [id number]

[address] [place] [address]

                          10 Aug, 2016              Type 2 diabetes mellitus wit

hout complication, without long-term 

current use of insulin E11.9 ; Helicobacter pylori (H. pylori) infection A04.8 
and Mixed hyperlipidemia E78.2

 

                          [facility]     [address] [id number]

[address] [place] [address]

                          08 Aug, 2016              Type 2 diabetes mellitus wit

hout complication, without long-term 

current use of insulin E11.9

 

                          [facility]     [address] [id number]

[address] [place] [address]

                          03 Aug, 2016              Type 2 diabetes mellitus wit

hout complication, without long-term 

current use of insulin E11.9

 

                          [place] [id number] [place] [id number]

[address] [place] [address]

                          02 Aug, 2016              Dyspepsia R10.13 ; Upper abd

ominal pain R10.10 ; Bowel habit 

changes R19.4 ; History of leukocytosis Z86.2 and Helicobacter pylori (H. 
pylori) infection A04.8

 

                          [facility]  [address] [id number]

[address] [place] [address] 

[address]                27 May, 2016              Environmental allergies Z91.

09

 

                          [place] [address] [address]

                                        Left leg pain M79.605 ; Loca

lized swelling of lower leg R22.40 and 

Upper respiratory infection J06.9

 

                          [place] [address] [address]

                          28 Dec, 2015               

 

                          [place] [address] [address]

                          21 Dec, 2015              Left leg pain M79.605

 

                          [place] [address] [address]

                          10 Dec, 2015              Left leg pain M79.605 and Lo

calized swelling of lower leg R22.40

 

                          [place] [address] [address]

                          01 Dec, 2015              Left leg pain M79.605

 

                          [place] [address] [address]

                                        Encounter for immunization Z

23

 

                          [place] [address] [address]

                          12 Oct, 2015              Bronchitis J40

 

                          [place] [address] [address]

                          30 Sep, 2015              Physical exam, pre-employmen

t V70.5 ; Encounter for PPD test V74.1 

and Hyperlipidemia 272.4

 

                          [place] [address] [place] [id number]

[address] [place] [address]

                          21 Sep, 2015               

 

                          [place] [id number]

[address] [place] [address]

                          21 Sep, 2015               

 

                          [facility]     [address] [id number]

[address] [place] [address]

                          09 Sep, 2015               

 

                          [facility]     [address] [id number]

[address] [place] [address]

                          04 Sep, 2015              Elevated blood sugar 790.29

 

                          [facility]     [address] [facility] [id number]

[address] [place] [address]

                          03 Sep, 2015              Elevated blood sugar 790.29

 

                          [facility]     [address] [facility] [id number]

[address] [place] [address]

                          03 Sep, 2015              Palpitations 785.1

 

                          [facility]     [address] [facility] [id number]

[address] [place] [address]

                          01 Sep, 2015              Palpitations 785.1

 

                          [facility]     [address] [facility] [id number]

[address] [place] [address]

                                         

 

                          [facility]     [address] [facility] [id number]

[address] [place] [address]

                          28 May, 2015              Hand pain, right 729.5 and D

epression with anxiety 300.4

 

                          [facility]     [address] [facility] [id number]

[address] [place] [address]

                                         

 

                          [facility]     [address] [facility] [id number]

[address] [place] [address]

                                         

 

                          [facility]     [address] [facility] [id number]

[address] [place] [address]

                          05 Mar, 2015               

 

                          [facility]     [address] [facility] [id number]

[address] [place] [address]

                          05 Mar, 2015               

 

                          [facility]     [address] [facility] [id number]

[address] [place] [address]

                                         

 

                          [facility]     [address] [facility] [id number]

[address] [place] [address]

                                         

 

                          [facility]     [address] [id number]

[address] [place] [address] [place] [address]

                          10 2015               

 

                          [facility]     [address] [id number]

[address] [place] [address], [place] [address]

                          10 Feb, 2015               

 

                          [facility]     [address] [id number]

[address] [place] [address], [place] [address]

                                         

 

                          [facility]     [address] [id number]

[address] [place] [address], [place] [address]

                                         

 

                          [facility]     [address] [id number]

[address] [place] [address], [place] [address]

                                         

 

                          [facility]     [address] [id number]

[address] [place] [address], [place] [address]

                                         

 

                          [facility]     [address] [id number]

[address] [place] [address], [place] [address]

                                         

 

                          [facility]     [address] [id number]

[address] [place] [address], [place] [address]

                                         

 

                          [facility]     [address] [id number]

[address] [place] [address], [place] [address]

                                         

 

                          [facility]     [address] [id number]

[address] [place] [address], [place] [address]

                                         

 

                          [facility]     [address] [id number]

[address] [place] [address], [place] [address]

                                         

 

                          [facility]     [address] [id number]

[address] [place] [address], [place] [address]

                                         

 

                          [facility]     [address] [id number]

[address] [place] [address], [place] [address]

                                         

 

                          [facility]     [address] [id number]

[address] [place] [address], [place] [address]

                                         

 

                          [facility]     [address] [id number]

[address] [place] [address], [place] [address]

                          15 [place], 2015               

 

                          [facility]     [address] [id number]

[address] [place] [address], [place] [address]

                          15 [place], 2015               

 

                          [facility]     [address] [id number]

[address] [place] [address], [place] [address]

                                         

 

                          [facility]     [address] [id number]

[address] [place] [address], [place] [address]

                                         

 

                          [facility]     [address] [id number]

[address] [place] [address], [place] [address]

                          15 Oct, 2014               

 

                          [facility]     [address] [id number]

[address] [place] [address], [place] [address]

                          15 Oct, 2014               

 

                          [facility]     [address] [id number]

[address] [place] [address], [place] [address]

                          14 Oct, 2014               

 

                          [facility]     [address] [id number]

[address] [place] [address], [place] [address]

                          14 Oct, 2014               

 

                          [facility]     [address] [id number]

[address] [place] [address], [place] [address]

                          07 Oct, 2014               

 

                          [facility]     [address] [id number]

[address] [place] [address], [place] [address]

                          07 Oct, 2014               

 

                          [facility]     [address] [id number]

[address] [place] [address], [place] [address]

                          12 Sep, 2014               

 

                          [facility]     [address] [id number]

[address] [place] [address], [place] [address]

                          12 Sep,                

 

                          [facility]     [address] [id number]

[address] [place] [address], [place] [address]

                          04 Sep,                

 

                          [facility]     [address] [id number]

[address] [place] [address], [place] [address]

                          03 Sep,                

 

                          [facility]     [address] [id number]

[address] [place] [address], [place] [address]

                          03 Sep,                

 

                          [facility]     [address] [id number]

[address] [place] [address], [place] [address]

                          02 Sep,                

 

                          [facility]     [address] [id number]

[address] [place] [address], [place] [address]

                          02 Sep,                

 

                          [facility]     [address] [id number]

[address] [place] [address], [place] [address]

                          30 Aug, 2014               

 

                          [facility]     [address] [id number]

[facility] [place] [address]

                          30 Aug, 2014               

 

                          [facility]     [address] [id number]

[address] [place] [address], [place] [address]

                          19 Aug, 2014               

 

                          [facility]     [address] [id number]

[address] [place] [address], [place] [address]

                          19 Aug, 2014               

 

                          [facility]     [address] [id number]

[address] [place] [address], [place] [address]

                          14 Aug, 2014               

 

                          [facility]     [address] [id number]

[address] [place] [address], [place] [address]

                          14 Aug, 2014               

 

                          [facility]     [address] [id number]

[address] [place] [address], [place] [address]

                          13 Aug, 2014               

 

                          [facility]     [address] [id number]

[address] [place] [address], [place] [address]

                          13 Aug, 2014               

 

                          [facility]     [address] [id number]

[address] [place] [address], [place] [address]

                                         

 

                          [facility]     [address] [id number]

[address] [place] [address], [place] [address]

                                         

 

                          [facility]     [address] [id number]

[address] [place] [address], [place] [address]

                                         

 

                          [facility]     [address] [id number]

[address] [place] [address], [place] [address]

                                         

 

                          [facility]     [address] [id number]

[address] [place] [address], [place] [address]

                                         

 

                          [facility]     [address] [id number]

[address] [place] [address], [place] [address]

                                         

 

                          [facility]     [address] [id number]

[address] [place] [address], [place] [address]

                                         

 

                          [facility]     [address] [id number]

[address] [place] [address], [place] [address]

                                         

 

                          [facility]     [address] [id number]

[facility] [place] [address]

                          ,                

 

                          [facility]     [address] [id number]

[facility] [place] [address]

                          ,                

 

                          [facility]     [address] [id number]

[address] [place] [address], [place] [address]

                          ,                

 

                          [facility]     [address] [id number]

[address] [place] [address], [place] [address]

                          ,                

 

                          [facility]     [address] [id number]

[address] [place] [address], [place] [address]

                          ,                

 

                          [facility]     [address] [id number]

[address] [place] [address], [place] [address]

                          ,                

 

                          [facility]     [address] [id number]

[address] [place] [address], [place] [address]

                          ,                

 

                          [facility]     [address] [id number]

[address] [place] [address], [place] [address]

                          ,                

 

                          [facility]     [address] [id number]

[address] [place] [address], [place] [address]

                                         

 

                          [facility] [facility]     [address] [id number]

[address] [place] [address], [place] [address]

                                         

 

                          [facility]     [address] [id number]

[address] [place] [address], [place] [address]

                                         

 

                          [facility]     [address] [id number]

[address] [place] [address], [place] [address]

                                         

 

                          [facility]     [address] [id number]

[address] [place] [address], [place] [address]

                                         

 

                          [facility]     [address] [id number]

[address] [place] [address], [place] [address]

                                         

 

                          [facility]     [address] [id number]

[address] [place] [address], [place] [address]

                                         

 

                          [facility]     [address] [id number]

[address] [place] [address], [place] [address]

                                         

 

                          [facility]     [address] [id number]

[address] [place] [address], [place] [address]

                                         

 

                          [facility]     [address] [id number]

[address] [place] [address], [place] [address]

                                         

 

                          [facility]     [address] [id number]

[address] [place] [address], [place] [address]

                                         

 

                          [facility]     [address] [id number]

[address] [place] [address], [place] [address]

                          27 Mar, 2014               

 

                          [facility]     [address] [id number]

[address] [place] [address], [place] [address]

                          27 Mar, 2014               

 

                          [facility]     [address] [id number]

[address] [place] [address], [place] [address]

                                         

 

                          [facility]     [address] [id number]

[address] [place] [address], [place] [address]

                                         

 

                          [facility]     [address] [id number]

[address] [place] [address], [place] [address]

                                         

 

                          [facility]     [address] [id number]

[address] [place] [address], [place] [address]

                                         

 

                          [facility]     [address] [id number]

[address] [place] [address], [place] [address]

                          08 Oct, 2013               

 

                          [facility]     [address] [id number]

[address] [place] [address], [place] [address]

                          04 Oct, 2013               

 

                          [facility]     [address] [id number]

[address] [place] [address]

                          03 Oct, 2013               

 

                          [facility]     [address] [id number]

[address] [place] [address], [place] [address]

                          02 Oct, 2013               

 

                          [facility]     [address] [id number]

[address] [place] [address], [place] [address]

                          01 Oct, 2013               

 

                          [facility]     [address] [id number]

[address] [place] [address], [place] [address]

                          20 Sep, 2013               

 

                          [facility]     [address] [id number]

[address] [place] [address], [place] [address]

                          08 Aug, 2013               

 

                          [facility]     [address] [id number]

[address] [place] [address], [place] [address]

                          24 May, 2013               

 

                          [facility]     [address] [id number]

[address] [place] [address], [place] [address]

                          13 May, 2013               

 

                          [facility]     [address] [id number]

[address] [place] [address], [place] [address]

                                         

 

                          [facility]     [address] [id number]

[address] [place] [address], [place] [address]

                                         

 

                          [facility]     [address] [id number]

[address] [place] [address], [place] [address]

                                         

 

                          [facility]     [address] [id number]

[address] [place] [address], [place] [address]

                                         

 

                          [facility]     [address] [id number]

[address] [place] [address], [place] [address]

                          02 Oct, 2012               

 

                          [facility]     [address] [id number]

[address] [place] [address], [place] [address]

                          02 Oct, 2012               

 

                          [facility]     [address] [id number]

[address] [place] [address], [place] [address]

                          21 Sep, 2012               

 

                          [facility]     [address] [id number]

[address] [place] [address], [place] [address]

                          06 Aug, 2012               

 

                          [facility]     [address] [id number]

[address] [place] [address], [place] [address]

                          02 Aug, 2012               

 

                          [facility]     [address] [id number]

[address] [place] [address], [place] [address]

                                         

 

                          [facility]     [address] [id number]

[address] [place] [address], [place] [address]

                                         

 

                          [facility]     [address] [id number]

[address] [place] [address], [place] [address]

                          15 Eusebio, 2012               

 

                          [facility]     [address] [id number]

[address] [place] [address], [place] [address]

                                         

 

                          [facility]     [address] [facility] [id number]

[address] [place] [address]

                          10 Apr, 2012               

 

                          [facility]     [address] [facility] [id number]

[address] [place] [address]

                          22 Mar, 2012               

 

                          [facility]     [address] [facility] [id number]

[address] [place] [address]

                          20 Mar, 2012               

 

                          [facility]     [address] [facility] [id number]

[address] [place] [address]

                          14 Mar, 2012               

 

                          [facility]     [address] [facility] [id number]

[address] [place] [address]

                                         







IMMUNIZATIONS

No Known Immunizations



SOCIAL HISTORY

Never Assessed



REASON FOR VISIT

Refill request



PLAN OF CARE





VITAL SIGNS





MEDICATIONS





        Medication Instructions Dosage  Frequency Start Date End Date Duration S

tatus

 

             [place] Contour Next Test Test Strips In Vitro Once a day as directed

  24h          10 Aug, 

2016                                                        Active







RESULTS

No Results



PROCEDURES

No Known procedures



INSTRUCTIONS





MEDICATIONS ADMINISTERED

No Known Medications



MEDICAL (GENERAL) HISTORY





                    Type                Description         Date

 

                          Medical History           allergic rhinitis- rec's edwige donahue allergy injections from Dr Rojas office                            

 

                    Medical History     mood disorder        

 

                    Medical History     Leukocytosis- has been to hematology  

 

                    Medical History     Hyperlipidemia       

 

                          Medical History           Left leg pain- multiple imag

ing performed and ortho referral 

placed                                   

 

                          Medical History           TUBULAR ADENOMA AND GASTRITI

S ON COLONOSOCPY AUG 2016 -MULTIPLE 

POLYPS                                   

 

                    Medical History     Helicobacter pylori (H. pylori) infectio

n Hx  

 

                    Surgical History    cholecystectomy     2006

 

                    Surgical History     section    , 

 

                    Surgical History    tonsillectomy       2015

 

                    Surgical History    colonoscopy         2016

 

                    Surgical History    colonscopy          2017

 

                    Surgical History    Right Knee scope    2017

## 2020-06-23 NOTE — XMS REPORT
[facility]

                             Created on: 2019



Jasonwhit, April

External Reference #: [id number]

: 1975

Sex: Female



Demographics





                          Address                   [address] [place]  [address]

 

                          Preferred Language        Unknown

 

                          Marital Status            Unknown

 

                          Confucianist Affiliation     Unknown

 

                          Race                      Unknown

 

                          Ethnic Group              Unknown





Author





                          Author                    ANNEMARIE April VENECIA

 

                          [facility]

 

                          Address                   [address] [place]  [address]



 

                          Phone                     [phone]







Care Team Providers





                    Care Team Member Name Role                Phone

 

                    ANNETTE SHARPEWNYA       Unavailable         [phone]







PROBLEMS





          Type      Condition ICD9-CM Code YBP81-AU Code Onset Dates Condition S

tatus SNOMED 

Code

 

          Problem   Tubular adenoma of colon           D12.6               Activ

e    [id number]

 

          Problem   Duarte's neuroma of right foot           G57.61             

 Active    [id number]

 

          Problem   History of leukocytosis           Z86.2               Active

    [id number]

 

          Problem   Non morbid obesity           E66.9               Active    4

[id number]

 

          Problem   Mixed hyperlipidemia           E78.2               Active   

 [id number]

 

          Problem   Seasonal allergic rhinitis due to pollen           J30.1    

           Active    94166208

 

                          Problem                   Type 2 diabetes mellitus wit

hout complication, without long-term current

use of insulin              E11.9                     Active       [id number]

 

          Problem   Anxiety             F41.9               Active    85963640

 

           Problem    Seasonal allergic rhinitis due to other allergic trigger  

          J30.89                

Active                                  [id number]

 

          Problem   Dysthymic disorder           F34.1               Active    7

[id number]

 

          Problem   Arthritis           M19.90              Active    5116066







ALLERGIES

No Information



ENCOUNTERS





                Encounter       Location        Date            Diagnosis

 

                          [place] [address] [address] [facility] [id number]

[address] [place] [address]

                                        Type 2 diabetes mellitus wit

hout complication, without long-term 

current use of insulin E11.9

 

                          [facility]     [address] [facility] [id number]

[address] [place] [address]

                                         

 

                          [facility]     [address] [facility] [id number]

[address] [place] [address]

                                        Type 2 diabetes mellitus wit

hout complication, without long-term 

current use of insulin E11.9

 

                          [facility]     [address] [facility] [id number]

[address] [place] [address]

                                        Weight loss R63.4

 

                          [place] [address] [address] [facility] [id number]

[address] [place] [address]

                          31 May, 2019              Type 2 diabetes mellitus wit

hout complication, without long-term 

current use of insulin E11.9

 

                          [place] [address] [place] [id number]

[address] [place] [address]

                          31 May, 2019              Type 2 diabetes mellitus wit

hout complication, without long-term 

current use of insulin E11.9 and Non morbid obesity E66.9

 

                          [facility] IN [place] [address] 

[address]                14 May, 2019              Seasonal allergic rhinitis d

ue to pollen J30.1 and Sore 

throat J02.9

 

                          [place] [address] 

[address]                              Arthritis M19.90

 

                          [place] [address] [address]

                                        Seasonal allergic rhinitis d

ue to other allergic trigger J30.89 and

Dysthymic disorder F34.1

 

                          [place] [address] [address]

                          05 Dec, 2018              Bilateral otitis media with 

effusion H65.93 and Anxiety F41.9

 

                          [place] [address] [address]

                                        Type 2 diabetes mellitus wit

hout complication, without long-term 

current use of insulin E11.9

 

                          [place] [address] [address] [place] [id number]

[address] [place] [address]

                                        Pharyngitis due to Streptoco

ccus species J02.0

 

                          [place] [address] [address]

                                         

 

                          [facility] [place] [address] 

[address]                10 Jul, 2018              Fever, unspecified fever cau

se R50.9 and Lymphadenopathy

R59.1

 

                          [facility] [place] [address] [place] 

[address]                              Pharyngitis due to other org

anism J02.8

 

                          [facility]     [address] [place] [id number]

[address] [place] [address]

                                         

 

                          [facility]     [address] [facility] [id number]

[address] [place] [address]

                                        Type 2 diabetes mellitus wit

hout complication, without long-term 

current use of insulin E11.9 and Other eczema L30.8

 

                          [facility] IN [facility]  [address] [facility] [id number]

[address] [place] [address] 

[address]                              Acute pain of left shoulder 

M25.512

 

                          [place] [address] [place] [id number] [place] [address]

                                         

 

                          [place] [address] [address]

                          15 2018              Type 2 diabetes mellitus wit

hout complication, without long-term 

current use of insulin E11.9

 

                          [place] [address] [place] [id number] [place] [address]

                                        Type 2 diabetes mellitus wit

hout complication, without long-term 

current use of insulin E11.9 ; Tubular adenoma of colon D12.6 ; Mixed 
hyperlipidemia E78.2 ; History of leukocytosis Z86.2 and Screening breast 
examination Z12.31

 

                          [place] [address] [address]

                                        Metatarsalgia of right foot 

M77.41 and Type 2 diabetes mellitus 

without complication, without long-term current use of insulin E11.9

 

                          [place] [address] [address] [place] [id number]

[address] [place] [address]

                                        Capsulitis M77.9 and Metatar

salgia of right foot M77.41

 

                          [place] [address] [place] [id number] [place] [address]

                          08 Sep, 2017              Capsulitis M77.9

 

                          [place] [address] [address] [place] [address]

                          06 Sep, 2017               

 

                          [facility]     [address] [facility] [id number]

[address] [place] [address]

                          03 Aug, 2017              Type 2 diabetes mellitus wit

hout complication, without long-term 

current use of insulin E11.9 ; Tubular adenoma of colon D12.6 and Mixed 
hyperlipidemia E78.2

 

                          [facility]     [address] [place] [id number]

[address] [place] [address]

                                        Metatarsalgia of right foot 

M77.41 and Capsulitis M77.9

 

                          [facility]     [address] [place] [id number]

[address] [place] [address]

                                         

 

                          [facility] [place] [facility]  [address] [place] [id number] [place] [address] 

[address]                              Duarte's neuroma of right fo

ot G57.61

 

                          [place] [address] [place] [id number]

[address] [place] [address]

                                        Mixed hyperlipidemia E78.2 a

nd Type 2 diabetes mellitus without 

complication, without long-term current use of insulin E11.9

 

                          [place] [address] [address]

                          10 Brandon, 2017              Type 2 diabetes mellitus wit

hout complication, without long-term 

current use of insulin E11.9 ; Tubular adenoma of colon D12.6 and Mixed 
hyperlipidemia E78.2

 

                          [place] [address] [place] [id number]

[address] [place] [address]

                          22 Dec, 2016              Mixed hyperlipidemia E78.2

 

                          [place] [address] [place] [id number]

[address] [place] [address]

                                         

 

                          [place] [address] [place] [id number]

[address] [place] [address]

                                        Mixed hyperlipidemia E78.2

 

                          [place] [address] [place] [id number]

[address] [place] [address]

                                        Mixed hyperlipidemia E78.2

 

                          [place] [address] [place] [id number]

[address] [place] [address]

                          08 Sep, 2016               

 

                          [facility]     [address] [facility] [id number]

[address] [place] [address]

                          10 Aug, 2016              Type 2 diabetes mellitus wit

hout complication, without long-term 

current use of insulin E11.9 ; Helicobacter pylori (H. pylori) infection A04.8 
and Mixed hyperlipidemia E78.2

 

                          [facility]     [address] [place] [id number] [place] [address]

                          08 Aug, 2016              Type 2 diabetes mellitus wit

hout complication, without long-term 

current use of insulin E11.9

 

                          [place] [address] [place] [id number] [place] [address]

                          03 Aug, 2016              Type 2 diabetes mellitus wit

hout complication, without long-term 

current use of insulin E11.9

 

                          [place] [address] [place] [id number] [place] [address]

                          02 Aug, 2016              Dyspepsia R10.13 ; Upper abd

ominal pain R10.10 ; Bowel habit 

changes R19.4 ; History of leukocytosis Z86.2 and Helicobacter pylori (H. 
pylori) infection A04.8

 

                          [facility] [place] [facility]  [address] 

[address]                27 May, 2016              Environmental allergies Z91.

09

 

                          [facility]     [address] [place] [id number]

[address] [place] [address]

                                        Left leg pain M79.605 ; Loca

lized swelling of lower leg R22.40 and 

Upper respiratory infection J06.9

 

                          [facility]     [address] [facility] [id number]

[address] [place] [address]

                          28 Dec, 2015               

 

                          [place] [address] [address]

                          21 Dec, 2015              Left leg pain M79.605

 

                          [facility]     [address] [place] [id number]

[address] [place] [address]

                          10 Dec, 2015              Left leg pain M79.605 and Lo

calized swelling of lower leg R22.40

 

                          [facility]     [address] [facility] [id number]

[address] [place] [address]

                          01 Dec, 2015              Left leg pain M79.605

 

                          [facility]     [address] [place] [id number]

[address] [place] [address]

                                        Encounter for immunization Z

23

 

                          [facility]     [address] [place] [id number] [place] [address]

                          12 Oct, 2015              Bronchitis J40

 

                          [facility]     [address] [place] [id number]

[address] [place] [address]

                          30 Sep, 2015              Physical exam, pre-employmen

t V70.5 ; Encounter for PPD test V74.1 

and Hyperlipidemia 272.4

 

                          [facility]     [address] [place] [id number]

[address] [place] [address]

                          21 Sep, 2015               

 

                          [facility]     [address] [place] [id number] [place] [address]

                          21 Sep, 2015               

 

                          [facility]     [address] [place] [id number]

[address] [place] [address]

                          09 Sep, 2015               

 

                          [facility]     [address] [place] [id number]

[address] [place] [address]

                          04 Sep, 2015              Elevated blood sugar 790.29

 

                          [facility]     [address] [place] [id number]

[address] [place] [address]

                          03 Sep, 2015              Elevated blood sugar 790.29

 

                          [facility]     [address] [place] [id number]

[address] [place] [address]

                          03 Sep, 2015              Palpitations 785.1

 

                          [facility]     [address] [place] [id number]

[address] [place] [address]

                          01 Sep, 2015              Palpitations 785.1

 

                          [facility]     [address] [place] [id number]

[address] [place] [address]

                                         

 

                          [facility]     [address] [place] [id number]

[address] [place] [address]

                          28 May, 2015              Hand pain, right 729.5 and D

epression with anxiety 300.4

 

                          [facility]     [address] [id number]

[address] [place] [address], [place] [address]

                          14 2015               

 

                          [facility]     [address] [id number]

[address] [place] [address], [place] [address]

                                         

 

                          [facility]     [address] [id number]

[address] [place] [address], [place] [address]

                          05 Mar, 2015               

 

                          [facility]     [address] [id number]

[address] [place] [address], [place] [address]

                          05 Mar, 2015               

 

                          [facility]     [address] [id number]

[address] [place] [address], [place] [address]

                                         

 

                          [facility]     [address] [id number]

[address] [place] [address], [place] [address]

                                         

 

                          [facility]     [address] [id number]

[address] [place] [address], [place] [address]

                          10 Feb, 2015               

 

                          [facility]     [address] [id number]

[address] [place] [address]

                          10 Feb, 2015               

 

                          [facility]     [address] [id number]

[address] [place] [address], [place] [address]

                                         

 

                          [facility]     [address] [id number]

[address] [place] [address]

                                         

 

                          [facility]     [address] [id number]

[address] [place] [address]

                                         

 

                          [facility]     [address] [id number]

[address] [place] [address]

                                         

 

                          [facility]     [address] [id number]

[address] [place] [address], [place] [address]

                                         

 

                          [facility]     [address] [id number]

[address] [place] [address]

                                         

 

                          [facility]     [address] [id number]

[address] [place] [address], [place] [address]

                                         

 

                          [facility]     [address] [id number]

[address] [place] [address], [place] [address]

                          17 2015               

 

                          [facility]     [address] [id number]

[address] [place] [address], [place] [address]

                                         

 

                          [facility]     [address] [id number]

[address] [place] [address], [place] [address]

                                         

 

                          [facility]     [address] [id number]

[address] [place] [address], [place] [address]

                                         

 

                          [facility]     [address] [id number]

[address] [place] [address], [place] [address]

                                         

 

                          [facility]     [address] [id number]

[address] [place] [address], [place] [address]

                          15 [place], 2015               

 

                          [facility]     [address] [id number]

[address] [place] [address], [place] [address]

                          15 [place], 2015               

 

                          [facility]     [address] [id number]

[address] [place] [address], [place] [address]

                                         

 

                          [facility]     [address] [id number]

[address] [place] [address], [place] [address]

                                         

 

                          [facility]     [address] [id number]

[address] [place] [address], [place] [address]

                          15 Oct, 2014               

 

                          [facility]     [address] [id number]

[address] [place] [address], [place] [address]

                          15 Oct, 2014               

 

                          [facility]     [address] [id number]

[address] [place] [address], [place] [address]

                          14 Oct, 2014               

 

                          [facility]     [address] [id number]

[address] [place] [address], [place] [address]

                          14 Oct, 2014               

 

                          [facility]     [address] [id number]

[address] [place] [address], [place] [address]

                          07 Oct, 2014               

 

                          [facility]     [address] [id number]

[address] [place] [address], [place] [address]

                          07 Oct, 2014               

 

                          [facility]     [address] [id number]

[address] [place] [address], [place] [address]

                          12 Sep, 2014               

 

                          [facility]     [address] [id number]

[address] [place] [address]

                          12 Sep,                

 

                          [facility]     [address] [id number]

[address] [place] [address]

                          04 Sep,                

 

                          [facility]     [address] [id number]

[address] [place] [address]

                          03 Sep,                

 

                          [facility]     [address] [id number]

[address] [place] [address], [place] [address]

                          03 Sep,                

 

                          [facility]     [address] [id number]

[address] [place] [address], [place] [address]

                          02 Sep,                

 

                          [facility]     [address] [id number]

[address] [place] [address], [place] [address]

                          02 Sep, 2014               

 

                          [facility]     [address] [id number]

[address] [place] [address], [place] [address]

                          30 Aug, 2014               

 

                          [facility]     [address] [id number]

[address] [place] [address], [place] [address]

                          30 Aug, 2014               

 

                          [facility]     [address] [id number]

[address] [place] [address], [place] [address]

                          19 Aug, 2014               

 

                          [facility]     [address] [id number]

[address] [place] [address], [place] [address]

                          19 Aug, 2014               

 

                          [facility]     [address] [id number]

[address] [place] [address], [place] [address]

                          14 Aug, 2014               

 

                          [facility]     [address] [id number]

[address] [place] [address], [place] [address]

                          14 Aug, 2014               

 

                          [facility]     [address] [id number]

[address] [place] [address], [place] [address]

                          13 Aug, 2014               

 

                          [facility]     [address] [id number]

[address] [place] [address], [place] [address]

                          13 Aug, 2014               

 

                          [facility]     [address] [id number]

[address] [place] [address], [place] [address]

                                         

 

                          [facility]     [address] [id number]

[address] [place] [address], [place] [address]

                                         

 

                          [facility]     [address] [id number]

[address] [place] [address]

                                         

 

                          [facility]     [address] [id number]

[address] [place] [address], [place] [address]

                                         

 

                          [facility]     [address] [id number]

[address] [place] [address]

                                         

 

                          [facility]     [address] [id number]

[address] [place] [address], [place] [address]

                                         

 

                          [facility]     [address] [id number]

[address] [place] [address], [place] [address]

                                         

 

                          [facility]     [address] [id number]

[address] [place] [address], [place] [address]

                                         

 

                          [facility]     [address] [id number]

[address] [place] [address], [place] [address]

                                         

 

                          [facility]     [address] [id number]

[address] [place] [address], [place] [address]

                                         

 

                          [facility]     [address] [id number]

[address] [place] [address], [place] [address]

                                         

 

                          [facility]     [address] [id number]

[address] [place] [address], [place] [address]

                                         

 

                          [facility]     [address] [id number]

[address] [place] [address], [place] [address]

                                         

 

                          [facility]     [address] [id number]

[address] [place] [address], [place] [address]

                          ,                

 

                          [facility]     [address] [id number]

[address] [place] [address], [place] [address]

                                         

 

                          [facility]     [address] [id number]

[address] [place] [address], [place] [address]

                                         

 

                          [facility]     [address] [id number]

[address] [place] [address], [place] [address]

                          ,                

 

                          [facility]     [address] [id number]

[address] [place] [address], [place] [address]

                                         

 

                          [facility]     [address] [id number]

[address] [place] [address], [place] [address]

                                         

 

                          [facility]     [address] [id number]

[address] [place] [address], [place] [address]

                                         

 

                          [facility]     [address] [id number]

[address] [place] [address], [place] [address]

                                         

 

                          [facility]     [address] [id number]

[address] [place] [address], [place] [address]

                                         

 

                          [facility]     [address] [id number]

[address] [place] [address], [place] [address]

                                         

 

                          [facility]     [address] [id number]

[address] [place] [address], [place] [address]

                                         

 

                          [facility]     [address] [id number]

[address] [place] [address], [place] [address]

                                         

 

                          [facility]     [address] [id number]

[address] [place] [address], [place] [address]

                                         

 

                          [facility]     [address] [id number]

[address] [place] [address], [place] [address]

                                         

 

                          [facility]     [address] [id number]

[address] [place] [address], [place] [address]

                          27 Mar, 2014               

 

                          [facility]     [address] [id number]

[address] [place] [address], [place] [address]

                          27 Mar, 2014               

 

                          [facility]     [address] [id number]

[address] [place] [address], [place] [address]

                                         

 

                          [facility]     [address] [id number]

[address] [place] [address], [place] [address]

                                         

 

                          [facility]     [address] [id number]

[address] [place] [address], [place] [address]

                                         

 

                          [facility]     [address] [id number]

[address] [place] [address], [place] [address]

                                         

 

                          [facility]     [address] [id number]

[address] [place] [address], [place] [address]

                          08 Oct, 2013               

 

                          [facility]     [address] [id number]

[address] [place] [address], [place] [address]

                          04 Oct, 2013               

 

                          [facility]     [address] [id number]

[address] [place] [address], [place] [address]

                          03 Oct, 2013               

 

                          [facility]     [address] [id number]

[address] [place] [address], [place] [address]

                          02 Oct, 2013               

 

                          [facility]     [address] [id number]

[address] [place] [address], [place] [address]

                          01 Oct, 2013               

 

                          [facility]     [address] [id number]

[address] [place] [address], [place] [address]

                          20 Sep, 2013               

 

                          [facility]     [address] [id number]

[address] [place] [address], [place] [address]

                          08 Aug, 2013               

 

                          [facility]     [address] [id number]

[address] [place] [address], [place] [address]

                          24 May, 2013               

 

                          [facility]     [address] [id number]

[address] [place] [address], [place] [address]

                          13 May, 2013               

 

                          [facility]     [address] [id number]

[address] [place] [address], [place] [address]

                                         

 

                          [facility]     [address] [id number]

[address] [place] [address], [place] [address]

                                         

 

                          [facility]     [address] [id number]

[address] [place] [address], [place] [address]

                                         

 

                          [facility]     [address] [id number]

[address] [place] [address], [place] [address]

                                         

 

                          [facility]     [address] [id number]

[address] [place] [address], [place] [address]

                          02 Oct, 2012               

 

                          [facility]     [address] [id number]

[address] [place] [address], [place] [address]

                          02 Oct, 2012               

 

                          [facility]     [address] [id number]

[address] [place] [address]

                          21 Sep, 2012               

 

                          [facility]     [address] [id number]

[address] [place] [address]

                          06 Aug, 2012               

 

                          [facility]     [address] [id number]

[address] [place] [address]

                          02 Aug, 2012               

 

                          [facility]     [address] [id number]

[address] [place] [address]

                                         

 

                          [facility]     [address] [id number]

[address] [place] [address]

                                         

 

                          [facility]     [address] [id number]

[address] [place] [address]

                          15 Eusebio, 2012               

 

                          [facility]     [address] [id number]

[address] [place] [address]

                                         

 

                          [facility]     [address] [id number]

[address] [place] [address]

                          10 Apr, 2012               

 

                          [facility]     [address] [id number]

[address] [place] [address]

                          22 Mar, 2012               

 

                          [facility]     [address] [id number]

[address] [place] [address]

                          20 Mar, 2012               

 

                          [facility]     [address] [id number]

[address] [place] [address]

                          14 Mar, 2012               

 

                          [facility]     [address] [facility] [id number]

[address] [place] [address]

                                         







IMMUNIZATIONS

No Known Immunizations



SOCIAL HISTORY

Never Assessed



REASON FOR VISIT





PLAN OF CARE





VITAL SIGNS





MEDICATIONS

Unknown Medications



RESULTS

No Results



PROCEDURES

No Known procedures



INSTRUCTIONS





MEDICATIONS ADMINISTERED

No Known Medications



MEDICAL (GENERAL) HISTORY





                    Type                Description         Date

 

                          Medical History           allergic rhinitis- rec's edwige donahue allergy injections from Dr Rojas office                            

 

                    Medical History     mood disorder        

 

                    Medical History     Leukocytosis- has been to hematology  

 

                    Medical History     Hyperlipidemia       

 

                          Medical History           Left leg pain- multiple imag

ing performed and ortho referral 

placed                                   

 

                          Medical History           TUBULAR ADENOMA AND GASTRITI

S ON COLONOSOCPY AUG 2016 -MULTIPLE 

POLYPS                                   

 

                    Medical History     Helicobacter pylori (H. pylori) infectio

n Hx  

 

                    Surgical History    cholecystectomy     2006

 

                    Surgical History     section    , 

 

                    Surgical History    tonsillectomy       2015

 

                    Surgical History    colonoscopy         2016

 

                    Surgical History    colonscopy          2017

 

                    Surgical History    Right Knee scope    2017

## 2020-06-23 NOTE — XMS REPORT
[facility]

                             Created on: 2019



Kate April

External Reference #: [id number]

: 1975

Sex: Female



Demographics





                          Address                   [address] [place]  [address]

 

                          Preferred Language        Unknown

 

                          Marital Status            Unknown

 

                          Pentecostalism Affiliation     Unknown

 

                          Race                      Unknown

 

                          Ethnic Group              Unknown





Author





                          Author                    Migration, April Doctor

 

                          Organization              [facility]

 

                          Address                   Unknown

 

                          Phone                     Unavailable







Care Team Providers





                    Care Team Member Name Role                Phone

 

                    Migration,  Doctor  Unavailable         Unavailable







PROBLEMS





          Type      Condition ICD9-CM Code WAM78-EM Code Onset Dates Condition S

tatus SNOMED 

Code

 

                          Problem                   Type 2 diabetes mellitus wit

hout complication, without long-term current

use of insulin              E11.9                     Active       [id number]

 

          Problem   Mixed hyperlipidemia           E78.2               Active   

 [id number]

 

          Problem   Tubular adenoma of colon           D12.6               Activ

e    [id number]

 

          Problem   Dysthymic disorder           F34.1               Active    7

[id number]

 

          Problem   Arthritis           M19.90              Active    0114592

 

          Problem   Duarte's neuroma of right foot           G57.61             

 Active    34473994

 

          Problem   History of leukocytosis           Z86.2               Active

    [id number]

 

          Problem   Anxiety             F41.9               Active    06516290

 

           Problem    Seasonal allergic rhinitis due to other allergic trigger  

          J30.89                

Active                                  [id number]







ALLERGIES

No Information



ENCOUNTERS





                Encounter       Location        Date            Diagnosis

 

                          [facility] IN [facility]  [id number]

[address] [place] [address] 

[address]                              Arthritis M19.90

 

                          [facility]     [address] [id number]

[address] [place] [address]

                                        Seasonal allergic rhinitis d

ue to other allergic trigger J30.89 and

Dysthymic disorder F34.1

 

                          [facility]     [address] [id number]

[address] [place] [address]

                          05 Dec, 2018              Bilateral otitis media with 

effusion H65.93 and Anxiety F41.9

 

                          [facility]     [id number]

[address] [place] [address]

                                        Type 2 diabetes mellitus wit

hout complication, without long-term 

current use of insulin E11.9

 

                          [facility]     [address] [facility] [id number]

[address] [place] [address]

                                        Pharyngitis due to Streptoco

ccus species J02.0

 

                          [place] [address] [address]

                                         

 

                          [facility] WALK IN [place] [address] 

[address]                10 Jul, 2018              Fever, unspecified fever cau

se R50.9 and Lymphadenopathy

R59.1

 

                          [facility] WALK IN [place] [address] 

[address]                              Pharyngitis due to other org

anism J02.8

 

                          [place] [address] [address]

                                         

 

                          [place] [address] [address]

                                        Type 2 diabetes mellitus wit

hout complication, without long-term 

current use of insulin E11.9 and Other eczema L30.8

 

                          [facility] IN [place] [address] 

[address]                              Acute pain of left shoulder 

M25.512

 

                          [place] [address] [address]

                                         

 

                          [place] [address] [address]

                          15 Feb, 2018              Type 2 diabetes mellitus wit

hout complication, without long-term 

current use of insulin E11.9

 

                          [place] [address] [address]

                          06 2018              Type 2 diabetes mellitus wit

hout complication, without long-term 

current use of insulin E11.9 ; Tubular adenoma of colon D12.6 ; Mixed 
hyperlipidemia E78.2 ; History of leukocytosis Z86.2 and Screening breast 
examination Z12.31

 

                          [place] [address] [address]

                                        Metatarsalgia of right foot 

M77.41 and Type 2 diabetes mellitus 

without complication, without long-term current use of insulin E11.9

 

                          [place] [id number] [facility] [id number]

[address] [place] [address]

                          17 2017              Capsulitis M77.9 and Metatar

salgia of right foot M77.41

 

                          [place] [address] [address] [facility] [id number]

[address] [place] [address]

                          08 Sep, 2017              Capsulitis M77.9

 

                          [place] [address] [address] [facility] [id number]

[address] [place] [address]

                          06 Sep, 2017               

 

                          [place] [address] [address] [facility] [id number]

[address] [place] [address]

                          03 Aug, 2017              Type 2 diabetes mellitus wit

hout complication, without long-term 

current use of insulin E11.9 ; Tubular adenoma of colon D12.6 and Mixed 
hyperlipidemia E78.2

 

                          [place] [id number]

[address] [place] [address]

                                        Metatarsalgia of right foot 

M77.41 and Capsulitis M77.9

 

                          [place] [address] [address] [facility] [id number]

[address] [place] [address]

                                         

 

                          [facility]  [address] [facility] [id number]

[address] [place] [address] 

[address]                              Duarte's neuroma of right fo

ot G57.61

 

                          [place] [address] [address] [facility] [id number]

[address] [place] [address]

                                        Mixed hyperlipidemia E78.2 a

nd Type 2 diabetes mellitus without 

complication, without long-term current use of insulin E11.9

 

                          [place] [address] [facility] [id number]

[address] [place] [address]

                          10 Brandon, 2017              Type 2 diabetes mellitus wit

hout complication, without long-term 

current use of insulin E11.9 ; Tubular adenoma of colon D12.6 and Mixed 
hyperlipidemia E78.2

 

                          [place] [address] [facility] [id number]

[address] [place] [address]

                          22 Dec, 2016              Mixed hyperlipidemia E78.2

 

                          [place] [id number]

[address] [place] [address]

                                         

 

                          [facility]     [id number]

[address] [place] [address]

                                        Mixed hyperlipidemia E78.2

 

                          [facility]     [address] [address]

                                        Mixed hyperlipidemia E78.2

 

                          [place] [address] [address]

                          08 Sep, 2016               

 

                          [place] [address] [address]

                          10 Aug, 2016              Type 2 diabetes mellitus wit

hout complication, without long-term 

current use of insulin E11.9 ; Helicobacter pylori (H. pylori) infection A04.8 
and Mixed hyperlipidemia E78.2

 

                          [place] [address] [id number]

[address] [place] [address]

                          08 Aug, 2016              Type 2 diabetes mellitus wit

hout complication, without long-term 

current use of insulin E11.9

 

                          [place] [address] [place] [id number]

[address] [place] [address]

                          03 Aug, 2016              Type 2 diabetes mellitus wit

hout complication, without long-term 

current use of insulin E11.9

 

                          [place] [id number]

[address] [place] [address]

                          02 Aug, 2016              Dyspepsia R10.13 ; Upper abd

ominal pain R10.10 ; Bowel habit 

changes R19.4 ; History of leukocytosis Z86.2 and Helicobacter pylori (H. 
pylori) infection A04.8

 

                          [facility]  [address] [id number]

[address] [place] [address] 

[address]                27 May, 2016              Environmental allergies Z91.

09

 

                          [place] [address] [place] [id number]

[address] [place] [address]

                                        Left leg pain M79.605 ; Loca

lized swelling of lower leg R22.40 and 

Upper respiratory infection J06.9

 

                          [place] [address] [place] [id number]

[address] [place] [address]

                          28 Dec, 2015               

 

                          [facility]     [address] [facility] [id number]

[address] [place] [address]

                          21 Dec, 2015              Left leg pain M79.605

 

                          [facility]     [address] [facility] [id number]

[address] [place] [address]

                          10 Dec, 2015              Left leg pain M79.605 and Lo

calized swelling of lower leg R22.40

 

                          [facility]     [address] [facility] [id number]

[address] [place] [address]

                          01 Dec, 2015              Left leg pain M79.605

 

                          [facility]     [address] [facility] [id number]

[address] [place] [address]

                                        Encounter for immunization Z

23

 

                          [facility]     [id number] [place] [address]

                          12 Oct, 2015              Bronchitis J40

 

                          [facility]     [address] [address]

                          30 Sep, 2015              Physical exam, pre-employmen

t V70.5 ; Encounter for PPD test V74.1 

and Hyperlipidemia 272.4

 

                          [facility]     [id number]

[address] [place] [address]

                          21 Sep, 2015               

 

                          [facility]     [id number]

[address] [place] [address]

                          21 Sep, 2015               

 

                          [facility]     [id number]

[address] [place] [address]

                          09 Sep, 2015               

 

                          [facility]     [address] [facility] [id number]

[address] [place] [address]

                          04 Sep, 2015              Elevated blood sugar 790.29

 

                          [facility]     [id number]

[address] [place] [address]

                          03 Sep, 2015              Elevated blood sugar 790.29

 

                          [facility]     [address] [facility] [id number]

[address] [place] [address]

                          03 Sep, 2015              Palpitations 785.1

 

                          [facility]     [id number]

[address] [place] [address]

                          01 Sep, 2015              Palpitations 785.1

 

                          [facility]     [address] [id number]

[address] [place] [address]

                                         

 

                          [facility]     [address] [id number]

[address] [place] [address]

                          28 May, 2015              Hand pain, right 729.5 and D

epression with anxiety 300.4

 

                          [facility]     [address] [id number]

[address] [place] [address]

                                         

 

                          [facility]     [address] [id number]

[address] [place] [address]

                                         

 

                          [facility]     [address] [id number]

[address] [place] [address]

                          05 Mar, 2015               

 

                          [facility]     [address] [id number]

[address] [place] [address]

                          05 Mar, 2015               

 

                          [facility]     [address] [id number]

[address] [place] [address]

                                         

 

                          [facility]     [address] [id number]

[address] [place] [address]

                                         

 

                          [facility]     [address] [facility] [id number]

[address] [place] [address]

                          10 Feb, 2015               

 

                          [facility]     [address] [id number]

[address] [place] [address]

                          10 Feb, 2015               

 

                          [facility]     [address] [id number]

[address] [place] [address]

                                         

 

                          [facility]     [address] [id number]

[address] [place] [address]

                                         

 

                          [facility]     [address] [facility] [id number]

[address] [place] [address]

                                         

 

                          [facility]     [address] [id number]

[address] [place] [address]

                                         

 

                          [facility]     [address] [id number]

[address] [place] [address], [place] [address]

                                         

 

                          [facility]     [address] [id number]

[address] [place] [address], [place] [address]

                                         

 

                          [facility]     [address] [id number]

[address] [place] [address], [place] [address]

                                         

 

                          [facility]     [address] [id number]

[address] [place] [address], [place] [address]

                                         

 

                          [facility]     [address] [id number]

[address] [place] [address], [place] [address]

                                         

 

                          [facility]     [address] [id number]

[address] [place] [address], [place] [address]

                                         

 

                          [facility]     [address] [id number]

[address] [place] [address], [place] [address]

                                         

 

                          [facility]     [address] [id number]

[address] [place] [address], [place] [address]

                                         

 

                          [facility] [place] [facility]     [address] [id number]

[address] [place] [address], [place] [address]

                          15 [place], 2015               

 

                          [facility]     [address] [id number]

[address] [place] [address], [place] [address]

                          15 [place], 2015               

 

                          [facility] [place]     [address] [id number]

[address] [place] [address], [place] [address]

                                         

 

                          [facility]     [address] [id number]

[address] [place] [address], [place] [address]

                                         

 

                          [facility]     [address] [id number]

[address] [place] [address], [place] [address]

                          15 Oct, 2014               

 

                          [facility]     [address] [id number]

[address] [place] [address], [place] [address]

                          15 Oct, 2014               

 

                          [facility]     [address] [id number]

[address] [place] [address], [place] [address]

                          14 Oct, 2014               

 

                          [facility]     [address] [id number]

[address] [place] [address], [place] [address]

                          14 Oct, 2014               

 

                          [facility]     [address] [id number]

[address] [place] [address], [place] [address]

                          07 Oct, 2014               

 

                          [facility]     [address] [id number]

[address] [place] [address], [place] [address]

                          07 Oct, 2014               

 

                          [facility]     [address] [id number]

[address] [place] [address], [place] [address]

                          12 Sep, 2014               

 

                          [facility]     [address] [id number]

[address] [place] [address], [place] [address]

                          12 Sep,                

 

                          [facility]     [address] [id number]

[address] [place] [address], [place] [address]

                          04 Sep, 2014               

 

                          [facility]     [address] [id number]

[address] [place] [address], [place] [address]

                          03 Sep, 2014               

 

                          [facility]     [address] [id number]

[address] [place] [address], [place] [address]

                          03 Sep, 2014               

 

                          [facility]     [address] [id number]

[address] [place] [address], [place] [address]

                          02 Sep, 2014               

 

                          [facility]     [address] [id number]

[address] [place] [address], [place] [address]

                          02 Sep, 2014               

 

                          [facility]     [address] [id number]

[address] [place] [address], [place] [address]

                          30 Aug, 2014               

 

                          [facility]     [address] [id number]

[address] [place] [address], [place] [address]

                          30 Aug, 2014               

 

                          [facility]     [address] [id number]

[address] [place] [address], [place] [address]

                          19 Aug, 2014               

 

                          [facility]     [address] [id number]

[address] [place] [address], [place] [address]

                          19 Aug, 2014               

 

                          [facility]     [address] [id number]

[address] [place] [address], [place] [address]

                          14 Aug, 2014               

 

                          [facility]     [address] [id number]

[address] [place] [address], [place] [address]

                          14 Aug, 2014               

 

                          [facility]     [address] [id number]

[address] [place] [address], [place] [address]

                          13 Aug, 2014               

 

                          [facility]     [address] [id number]

[facility] [place] [address]

                          13 Aug, 2014               

 

                          [facility]     [address] [id number]

[address] [place] [address], [place] [address]

                                         

 

                          [facility]     [address] [id number]

[address] [place] [address], [place] [address]

                                         

 

                          [facility]     [address] [id number]

[address] [place] [address], [place] [address]

                                         

 

                          [facility]     [address] [id number]

[address] [place] [address], [place] [address]

                                         

 

                          [facility]     [address] [id number]

[address] [place] [address], [place] [address]

                                         

 

                          [facility]     [address] [id number]

[address] [place] [address], [place] [address]

                                         

 

                          [facility]     [address] [id number]

[address] [place] [address], [place] [address]

                                         

 

                          [facility]     [address] [id number]

[address] [place] [address], [place] [address]

                                         

 

                          [facility]     [address] [id number]

[address] [place] [address], [place] [address]

                                         

 

                          [facility]     [address] [id number]

[address] [place] [address], [place] [address]

                                         

 

                          [facility]     [address] [id number]

[address] [place] [address], [place] [address]

                                         

 

                          [facility]     [address] [id number]

[address] [place] [address], [place] [address]

                                         

 

                          [facility]     [address] [id number]

[address] [place] [address], [place] [address]

                                         

 

                          [facility]     [address] [id number]

[address] [place] [address], [place] [address]

                                         

 

                          [facility]     [address] [id number]

[address] [place] [address]

                          ,                

 

                          [facility]     [address] [id number]

[address] [place] [address]

                          ,                

 

                          [facility]     [address] [id number]

[address] [place] [address], [place] [address]

                          ,                

 

                          [facility]     [address] [id number]

[address] [place] [address], [place] [address]

                          ,                

 

                          [facility]     [address] [id number]

[address] [place] [address], [place] [address]

                          ,                

 

                          [facility]     [address] [id number]

[address] [place] [address], [place] [address]

                                         

 

                          [facility]     [address] [id number]

[address] [place] [address], [place] [address]

                                         

 

                          [facility]     [address] [id number]

[address] [place] [address], [place] [address]

                                         

 

                          [facility]     [address] [id number]

[address] [place] [address], [place] [address]

                                         

 

                          [facility]     [address] [id number]

[address] [place] [address], [place] [address]

                                         

 

                          [facility]     [address] [id number]

[address] [place] [address], [place] [address]

                                         

 

                          [facility]     [address] [id number]

[address] [place] [address], [place] [address]

                                         

 

                          [facility]     [address] [id number]

[address] [place] [address], [place] [address]

                                         

 

                          [facility]     [address] [id number]

[address] [place] [address], [place] [address]

                          27 Mar, 2014               

 

                          [facility]     [address] [id number]

[address] [place] [address], [place] [address]

                          27 Mar, 2014               

 

                          [facility]     [address] [id number]

[address] [place] [address], [place] [address]

                                         

 

                          [facility]     [address] [id number]

[address] [place] [address], [place] [address]

                                         

 

                          [facility]     [address] [id number]

[address] [place] [address], [place] [address]

                                         

 

                          [facility]     [address] [id number]

[address] [place] [address], [place] [address]

                                         

 

                          [facility]     [address] [id number]

[address] [place] [address]

                          08 Oct, 2013               

 

                          [facility]     [address] [id number]

[address] [place] [address], [place] [address]

                          04 Oct, 2013               

 

                          [facility]     [address] [id number]

[address] [place] [address], [place] [address]

                          03 Oct, 2013               

 

                          [facility]     [address] [id number]

[address] [place] [address], [place] [address]

                          02 Oct, 2013               

 

                          [facility]     [address] [id number]

[address] [place] [address], [place] [address]

                          01 Oct, 2013               

 

                          [facility]     [address] [id number]

[address] [place] [address]

                          20 Sep, 2013               

 

                          [facility]     [address] [id number]

[address] [place] [address]

                          08 Aug, 2013               

 

                          [facility]     [address] [id number]

[address] [place] [address]

                          24 May, 2013               

 

                          [facility]     [address] [id number]

[address] [place] [address]

                          13 May, 2013               

 

                          [facility]     [address] [id number]

[address] [place] [address], [place] [address]

                                         

 

                          [facility]     [address] [id number]

[address] [place] [address]

                                         

 

                          [facility]     [address] [id number]

[address] [place] [address]

                                         

 

                          [facility]     [address] [id number]

[address] [place] [address]

                                         

 

                          [facility]     [address] [id number]

[address] [place] [address]

                          02 Oct, 2012               

 

                          [facility]     [address] [id number]

[address] [place] [address]

                          02 Oct, 2012               

 

                          [facility]     [address] [id number]

[address] [place] [address]

                          21 Sep, 2012               

 

                          [facility]     [address] [id number]

[address] [place] [address]

                          06 Aug, 2012               

 

                          [facility]     [address] [id number]

[address] [place] [address]

                          02 Aug, 2012               

 

                          [facility]     [address] [id number]

[address] [place] [address]

                                         

 

                          [facility]     [address] [id number]

[address] [place] [address]

                                         

 

                          [facility]     [address] [id number]

[address] [place] [address]

                          15 Eusebio, 2012               

 

                          [facility]     [address] [id number]

[address] [place] [address]

                                         

 

                          [facility]     [address] [id number]

[address] [place] [address]

                          10 Apr, 2012               

 

                          [facility]     [address] [id number]

[address] [place] [address]

                          22 Mar, 2012               

 

                          [facility]     [address] [id number]

[address] [place] [address]

                          20 Mar, 2012               

 

                          [facility]     [address] [id number]

[address] [place] [address]

                          14 Mar, 2012               

 

                          [facility]     [address] [id number]

[address] [place] [address]

                                         







IMMUNIZATIONS

No Known Immunizations



SOCIAL HISTORY

Never Assessed



REASON FOR VISIT

[facility]



PLAN OF CARE





VITAL SIGNS





MEDICATIONS

Unknown Medications



RESULTS

No Results



PROCEDURES

No Known procedures



INSTRUCTIONS





MEDICATIONS ADMINISTERED

No Known Medications



MEDICAL (GENERAL) HISTORY





                    Type                Description         Date

 

                          Medical History           allergic rhinitis- rec's edwige donahue allergy injections from Dr Rojas office                            

 

                    Medical History     mood disorder        

 

                    Medical History     Leukocytosis- has been to hematology  

 

                    Medical History     Hyperlipidemia       

 

                          Medical History           Left leg pain- multiple imag

ing performed and ortho referral 

placed                                   

 

                          Medical History           TUBULAR ADENOMA AND GASTRITI

S ON COLONOSOCPY AUG 2016 -MULTIPLE 

POLYPS                                   

 

                    Medical History     Helicobacter pylori (H. pylori) infectio

n Hx  

 

                    Surgical History    cholecystectomy     

 

                    Surgical History     section    , 

 

                    Surgical History    tonsillectomy       2015

 

                    Surgical History    colonoscopy         2016

 

                    Surgical History    colonscopy          2017

 

                    Surgical History    Right Knee scope    2017

## 2020-06-23 NOTE — XMS REPORT
[facility]

                             Created on: 2019



Jasonwhit April

External Reference #: [id number]

: 1975

Sex: Female



Demographics





                          Address                   [address] [place]  [address]

 

                          Preferred Language        Unknown

 

                          Marital Status            Unknown

 

                          Judaism Affiliation     Unknown

 

                          Race                      Unknown

 

                          Ethnic Group              Unknown





Author





                          Author                    Darby SHARPENYA

 

                          [facility]

 

                          Address                   [address] [place]  [address]



 

                          Phone                     [phone]







Care Team Providers





                    Care Team Member Name Role                Phone

 

                    ANNETTE SHARPEWNYA       Unavailable         [phone]







PROBLEMS





          Type      Condition ICD9-CM Code OFJ99-HY Code Onset Dates Condition S

tatus SNOMED 

Code

 

          Problem   Tubular adenoma of colon           D12.6               Activ

e    [id number]

 

          Problem   Duarte's neuroma of right foot           G57.61             

 Active    09815699

 

          Problem   History of leukocytosis           Z86.2               Active

    [id number]

 

          Problem   Non morbid obesity           E66.9               Active    4

[id number]

 

          Problem   Mixed hyperlipidemia           E78.2               Active   

 [id number]

 

          Problem   Seasonal allergic rhinitis due to pollen           J30.1    

           Active    38505512

 

                          Problem                   Type 2 diabetes mellitus wit

hout complication, without long-term current

use of insulin              E11.9                     Active       [id number]

 

          Problem   Anxiety             F41.9               Active    23926147

 

           Problem    Seasonal allergic rhinitis due to other allergic trigger  

          J30.89                

Active                                  [id number]

 

          Problem   Dysthymic disorder           F34.1               Active    7

9664139

 

          Problem   Arthritis           M19.90              Active    4167346







ALLERGIES

No Information



ENCOUNTERS





                Encounter       Location        Date            Diagnosis

 

                          [place] [address] [facility] [id number]

[address] [place] [address]

                                        Type 2 diabetes mellitus wit

hout complication, without long-term 

current use of insulin E11.9

 

                          [facility]     [address] [facility] [id number]

[address] [place] [address]

                                         

 

                          [facility]     [address] [facility] [id number]

[address] [place] [address]

                                        Type 2 diabetes mellitus wit

hout complication, without long-term 

current use of insulin E11.9

 

                          [facility]     [address] [facility] [id number]

[address] [place] [address]

                                        Weight loss R63.4

 

                          [place] [address] [facility] [id number]

[address] [place] [address]

                          31 May, 2019              Type 2 diabetes mellitus wit

hout complication, without long-term 

current use of insulin E11.9

 

                          [place] [address] [address] [place] [id number]

[address] [place] [address]

                          31 May, 2019              Type 2 diabetes mellitus wit

hout complication, without long-term 

current use of insulin E11.9 and Non morbid obesity E66.9

 

                          [facility] IN [place] [address] 

[address]                14 May, 2019              Seasonal allergic rhinitis d

ue to pollen J30.1 and Sore 

throat J02.9

 

                          [place] [address] 

[address]                              Arthritis M19.90

 

                          [place] [address] [address]

                                        Seasonal allergic rhinitis d

ue to other allergic trigger J30.89 and

Dysthymic disorder F34.1

 

                          [place] [address] [address]

                          05 Dec, 2018              Bilateral otitis media with 

effusion H65.93 and Anxiety F41.9

 

                          [place] [address] [address]

                                        Type 2 diabetes mellitus wit

hout complication, without long-term 

current use of insulin E11.9

 

                          [place] [address] [address] [place] [id number]

[address] [place] [address]

                                        Pharyngitis due to Streptoco

ccus species J02.0

 

                          [place] [address] [address]

                                         

 

                          [facility] [place] [address] 

[address]                10 Jul, 2018              Fever, unspecified fever cau

se R50.9 and Lymphadenopathy

R59.1

 

                          [facility] [place] [address] [place] 

[address]                              Pharyngitis due to other org

anism J02.8

 

                          [facility]     [address] [place] [id number]

[address] [place] [address]

                                         

 

                          [facility]     [address] [facility] [id number]

[address] [place] [address]

                                        Type 2 diabetes mellitus wit

hout complication, without long-term 

current use of insulin E11.9 and Other eczema L30.8

 

                          [facility] WALK IN [facility]  [address] [facility] [id number]

[address] [place] [address] 

[address]                              Acute pain of left shoulder 

M25.512

 

                          [place] [address] [address] [place] [id number] [place] [address]

                                         

 

                          [place] [address] [address]

                          15 2018              Type 2 diabetes mellitus wit

hout complication, without long-term 

current use of insulin E11.9

 

                          [place] [address] [place] [id number] [place] [address]

                                        Type 2 diabetes mellitus wit

hout complication, without long-term 

current use of insulin E11.9 ; Tubular adenoma of colon D12.6 ; Mixed 
hyperlipidemia E78.2 ; History of leukocytosis Z86.2 and Screening breast 
examination Z12.31

 

                          [place] [address] [address]

                                        Metatarsalgia of right foot 

M77.41 and Type 2 diabetes mellitus 

without complication, without long-term current use of insulin E11.9

 

                          [place] [address] [address] [place] [id number]

[address] [place] [address]

                                        Capsulitis M77.9 and Metatar

salgia of right foot M77.41

 

                          [place] [address] [address] [place] [id number] [place] [address]

                          08 Sep, 2017              Capsulitis M77.9

 

                          [place] [address] [address] [place] [address]

                          06 Sep, 2017               

 

                          [facility]     [address] [facility] [id number]

[address] [place] [address]

                          03 Aug, 2017              Type 2 diabetes mellitus wit

hout complication, without long-term 

current use of insulin E11.9 ; Tubular adenoma of colon D12.6 and Mixed 
hyperlipidemia E78.2

 

                          [facility]     [address] [place] [id number]

[address] [place] [address]

                                        Metatarsalgia of right foot 

M77.41 and Capsulitis M77.9

 

                          [facility]     [address] [place] [id number]

[address] [place] [address]

                                         

 

                          [facility] [place] [facility]  [address] [place] [id number] [place] [address] 

[address]                              Duarte's neuroma of right fo

ot G57.61

 

                          [place] [address] [address] [place] [id number]

[address] [place] [address]

                                        Mixed hyperlipidemia E78.2 a

nd Type 2 diabetes mellitus without 

complication, without long-term current use of insulin E11.9

 

                          [place] [address] [address]

                          10 Brandon, 2017              Type 2 diabetes mellitus wit

hout complication, without long-term 

current use of insulin E11.9 ; Tubular adenoma of colon D12.6 and Mixed 
hyperlipidemia E78.2

 

                          [place] [address] [place] [id number]

[address] [place] [address]

                          22 Dec, 2016              Mixed hyperlipidemia E78.2

 

                          [place] [address] [address] [place] [id number]

[address] [place] [address]

                                         

 

                          [place] [address] [address] [place] [id number]

[address] [place] [address]

                                        Mixed hyperlipidemia E78.2

 

                          [place] [address] [address] [place] [id number]

[address] [place] [address]

                                        Mixed hyperlipidemia E78.2

 

                          [place] [address] [address] [place] [id number]

[address] [place] [address]

                          08 Sep, 2016               

 

                          [facility]     [address] [facility] [id number]

[address] [place] [address]

                          10 Aug, 2016              Type 2 diabetes mellitus wit

hout complication, without long-term 

current use of insulin E11.9 ; Helicobacter pylori (H. pylori) infection A04.8 
and Mixed hyperlipidemia E78.2

 

                          [facility]     [address] [place] [id number] [place] [address]

                          08 Aug, 2016              Type 2 diabetes mellitus wit

hout complication, without long-term 

current use of insulin E11.9

 

                          [place] [address] [place] [id number] [place] [address]

                          03 Aug, 2016              Type 2 diabetes mellitus wit

hout complication, without long-term 

current use of insulin E11.9

 

                          [place] [address] [place] [id number] [place] [address]

                          02 Aug, 2016              Dyspepsia R10.13 ; Upper abd

ominal pain R10.10 ; Bowel habit 

changes R19.4 ; History of leukocytosis Z86.2 and Helicobacter pylori (H. 
pylori) infection A04.8

 

                          [facility] [place] [facility]  [address] 

[address]                27 May, 2016              Environmental allergies Z91.

09

 

                          [facility]     [address] [place] [id number]

[address] [place] [address]

                                        Left leg pain M79.605 ; Loca

lized swelling of lower leg R22.40 and 

Upper respiratory infection J06.9

 

                          [facility]     [address] [facility] [id number]

[address] [place] [address]

                          28 Dec, 2015               

 

                          [place] [address] [address]

                          21 Dec, 2015              Left leg pain M79.605

 

                          [facility]     [address] [place] [id number]

[address] [place] [address]

                          10 Dec, 2015              Left leg pain M79.605 and Lo

calized swelling of lower leg R22.40

 

                          [facility]     [address] [facility] [id number]

[address] [place] [address]

                          01 Dec, 2015              Left leg pain M79.605

 

                          [facility]     [address] [place] [id number]

[address] [place] [address]

                                        Encounter for immunization Z

23

 

                          [facility]     [address] [place] [id number] [place] [address]

                          12 Oct, 2015              Bronchitis J40

 

                          [facility]     [address] [place] [id number]

[address] [place] [address]

                          30 Sep, 2015              Physical exam, pre-employmen

t V70.5 ; Encounter for PPD test V74.1 

and Hyperlipidemia 272.4

 

                          [facility]     [address] [place] [id number]

[address] [place] [address]

                          21 Sep, 2015               

 

                          [facility]     [address] [place] [id number] [place] [address]

                          21 Sep, 2015               

 

                          [facility]     [address] [place] [id number]

[address] [place] [address]

                          09 Sep, 2015               

 

                          [facility]     [address] [place] [id number]

[address] [place] [address]

                          04 Sep, 2015              Elevated blood sugar 790.29

 

                          [facility]     [address] [place] [id number]

[address] [place] [address]

                          03 Sep, 2015              Elevated blood sugar 790.29

 

                          [facility]     [address] [place] [id number]

[address] [place] [address]

                          03 Sep, 2015              Palpitations 785.1

 

                          [facility]     [address] [place] [id number]

[address] [place] [address]

                          01 Sep, 2015              Palpitations 785.1

 

                          [facility]     [address] [place] [id number]

[address] [place] [address]

                                         

 

                          [facility]     [address] [place] [id number]

[address] [place] [address]

                          28 May, 2015              Hand pain, right 729.5 and D

epression with anxiety 300.4

 

                          [facility]     [address] [id number]

[address] [place] [address], [place] [address]

                          14 2015               

 

                          [facility]     [address] [id number]

[address] [place] [address], [place] [address]

                                         

 

                          [facility]     [address] [id number]

[address] [place] [address], [place] [address]

                          05 Mar, 2015               

 

                          [facility]     [address] [id number]

[address] [place] [address], [place] [address]

                          05 Mar, 2015               

 

                          [facility]     [address] [id number]

[address] [place] [address], [place] [address]

                                         

 

                          [facility]     [address] [id number]

[address] [place] [address], [place] [address]

                                         

 

                          [facility]     [address] [id number]

[address] [place] [address], [place] [address]

                          10 Feb, 2015               

 

                          [facility]     [address] [id number]

[address] [place] [address]

                          10 Feb, 2015               

 

                          [facility]     [address] [id number]

[address] [place] [address], [place] [address]

                                         

 

                          [facility]     [address] [id number]

[address] [place] [address]

                                         

 

                          [facility]     [address] [id number]

[address] [place] [address]

                                         

 

                          [facility]     [address] [id number]

[address] [place] [address]

                                         

 

                          [facility]     [address] [id number]

[address] [place] [address], [place] [address]

                                         

 

                          [facility]     [address] [id number]

[address] [place] [address]

                                         

 

                          [facility]     [address] [id number]

[address] [place] [address], [place] [address]

                                         

 

                          [facility]     [address] [id number]

[address] [place] [address], [place] [address]

                          17 2015               

 

                          [facility]     [address] [id number]

[address] [place] [address], [place] [address]

                                         

 

                          [facility]     [address] [id number]

[address] [place] [address], [place] [address]

                                         

 

                          [facility]     [address] [id number]

[address] [place] [address], [place] [address]

                                         

 

                          [facility]     [address] [id number]

[address] [place] [address], [place] [address]

                                         

 

                          [facility]     [address] [id number]

[address] [place] [address], [place] [address]

                          15 [place], 2015               

 

                          [facility]     [address] [id number]

[address] [place] [address], [place] [address]

                          15 [place], 2015               

 

                          [facility]     [address] [id number]

[address] [place] [address], [place] [address]

                                         

 

                          [facility]     [address] [id number]

[address] [place] [address], [place] [address]

                                         

 

                          [facility]     [address] [id number]

[address] [place] [address], [place] [address]

                          15 Oct, 2014               

 

                          [facility]     [address] [id number]

[address] [place] [address], [place] [address]

                          15 Oct, 2014               

 

                          [facility]     [address] [id number]

[address] [place] [address], [place] [address]

                          14 Oct, 2014               

 

                          [facility]     [address] [id number]

[address] [place] [address], [place] [address]

                          14 Oct, 2014               

 

                          [facility]     [address] [id number]

[address] [place] [address], [place] [address]

                          07 Oct, 2014               

 

                          [facility]     [address] [id number]

[address] [place] [address], [place] [address]

                          07 Oct, 2014               

 

                          [facility]     [address] [id number]

[address] [place] [address], [place] [address]

                          12 Sep, 2014               

 

                          [facility]     [address] [id number]

[facility] [place] [address]

                          12 Sep,                

 

                          [facility]     [address] [id number]

[facility] [place] [address]

                          04 Sep,                

 

                          [facility]     [address] [id number]

[facility] [place] [address]

                          03 Sep,                

 

                          [facility]     [address] [id number]

[address] [place] [address], [place] [address]

                          03 Sep,                

 

                          [facility]     [address] [id number]

[address] [place] [address], [place] [address]

                          02 Sep,                

 

                          [facility]     [address] [id number]

[address] [place] [address], [place] [address]

                          02 Sep, 2014               

 

                          [facility]     [address] [id number]

[address] [place] [address], [place] [address]

                          30 Aug, 2014               

 

                          [facility]     [address] [id number]

[address] [place] [address], [place] [address]

                          30 Aug, 2014               

 

                          [facility]     [address] [id number]

[address] [place] [address], [place] [address]

                          19 Aug, 2014               

 

                          [facility] [facility]     [address] [id number]

[address] [place] [address], [place] [address]

                          19 Aug, 2014               

 

                          [facility]     [address] [id number]

[address] [place] [address], [place] [address]

                          14 Aug, 2014               

 

                          [facility]     [address] [id number]

[address] [place] [address], [place] [address]

                          14 Aug, 2014               

 

                          [facility]     [address] [id number]

[address] [place] [address], [place] [address]

                          13 Aug, 2014               

 

                          [facility]     [address] [id number]

[address] [place] [address], [place] [address]

                          13 Aug, 2014               

 

                          [facility]     [address] [id number]

[address] [place] [address], [place] [address]

                                         

 

                          [facility]     [address] [id number]

[address] [place] [address], [place] [address]

                                         

 

                          [facility]     [address] [id number]

[facility] [place] [address]

                                         

 

                          [facility]     [address] [id number]

[address] [place] [address], [place] [address]

                                         

 

                          [facility]     [address] [id number]

[facility] [place] [address]

                                         

 

                          [facility]     [address] [id number]

[address] [place] [address], [place] [address]

                                         

 

                          [facility]     [address] [id number]

[address] [place] [address], [place] [address]

                                         

 

                          [facility]     [address] [id number]

[address] [place] [address], [place] [address]

                                         

 

                          [facility]     [address] [id number]

[address] [place] [address], [place] [address]

                                         

 

                          [facility]     [address] [id number]

[address] [place] [address], [place] [address]

                                         

 

                          [facility]     [address] [id number]

[address] [place] [address], [place] [address]

                                         

 

                          [facility]     [address] [id number]

[address] [place] [address], [place] [address]

                                         

 

                          [facility]     [address] [id number]

[address] [place] [address], [place] [address]

                                         

 

                          [facility]     [address] [id number]

[address] [place] [address], [place] [address]

                          ,                

 

                          [facility]     [address] [id number]

[address] [place] [address], [place] [address]

                                         

 

                          [facility]     [address] [id number]

[address] [place] [address], [place] [address]

                                         

 

                          [facility]     [address] [id number]

[address] [place] [address], [place] [address]

                          ,                

 

                          [facility]     [address] [id number]

[address] [place] [address], [place] [address]

                                         

 

                          [facility]     [address] [id number]

[address] [place] [address], [place] [address]

                                         

 

                          [facility]     [address] [id number]

[address] [place] [address], [place] [address]

                                         

 

                          [facility]     [address] [id number]

[address] [place] [address], [place] [address]

                                         

 

                          [facility]     [address] [id number]

[address] [place] [address], [place] [address]

                                         

 

                          [facility]     [address] [id number]

[address] [place] [address], [place] [address]

                                         

 

                          [facility]     [address] [id number]

[address] [place] [address], [place] [address]

                                         

 

                          [facility]     [address] [id number]

[address] [place] [address], [place] [address]

                                         

 

                          [facility]     [address] [id number]

[address] [place] [address], [place] [address]

                                         

 

                          [facility]     [address] [id number]

[address] [place] [address], [place] [address]

                                         

 

                          [facility]     [address] [id number]

[address] [place] [address], [place] [address]

                          27 Mar, 2014               

 

                          [facility]     [address] [id number]

[address] [place] [address], [place] [address]

                          27 Mar, 2014               

 

                          [facility]     [address] [id number]

[address] [place] [address], [place] [address]

                                         

 

                          [facility]     [address] [id number]

[address] [place] [address], [place] [address]

                                         

 

                          [facility]     [address] [id number]

[address] [place] [address], [place] [address]

                                         

 

                          [facility]     [address] [id number]

[address] [place] [address], [place] [address]

                                         

 

                          [facility]     [address] [id number]

[address] [place] [address], [place] [address]

                          08 Oct, 2013               

 

                          [facility]     [address] [id number]

[address] [place] [address], [place] [address]

                          04 Oct, 2013               

 

                          [facility]     [address] [id number]

[address] [place] [address], [place] [address]

                          03 Oct, 2013               

 

                          [facility]     [address] [id number]

[address] [place] [address], [place] [address]

                          02 Oct, 2013               

 

                          [facility]     [address] [id number]

[address] [place] [address], [place] [address]

                          01 Oct, 2013               

 

                          [facility]     [address] [id number]

[address] [place] [address], [place] [address]

                          20 Sep, 2013               

 

                          [facility]     [address] [id number]

[address] [place] [address], [place] [address]

                          08 Aug, 2013               

 

                          [facility]     [address] [id number]

[address] [place] [address], [place] [address]

                          24 May, 2013               

 

                          [facility]     [address] [id number]

[address] [place] [address], [place] [address]

                          13 May, 2013               

 

                          [facility]     [address] [id number]

[address] [place] [address], [place] [address]

                                         

 

                          [facility]     [address] [id number]

[address] [place] [address], [place] [address]

                                         

 

                          [facility]     [address] [id number]

[address] [place] [address], [place] [address]

                                         

 

                          [facility]     [address] [id number]

[address] [place] [address], [place] [address]

                                         

 

                          [facility]     [address] [id number]

[address] [place] [address], [place] [address]

                          02 Oct, 2012               

 

                          [facility]     [address] [id number]

[address] [place] [address], [place] [address]

                          02 Oct, 2012               

 

                          [facility]     [address] [id number]

[address] [place] [address]

                          21 Sep, 2012               

 

                          [facility]     [address] [id number]

[address] [place] [address]

                          06 Aug, 2012               

 

                          [facility]     [address] [id number]

[address] [place] [address]

                          02 Aug, 2012               

 

                          [facility]     [address] [id number]

[address] [place] [address]

                                         

 

                          [facility]     [address] [id number]

[address] [place] [address]

                                         

 

                          [facility]     [address] [id number]

[address] [place] [address]

                          15 Eusebio, 2012               

 

                          [facility]     [address] [id number]

[address] [place] [address]

                                         

 

                          [facility]     [address] [id number]

[address] [place] [address]

                          10 Apr, 2012               

 

                          [facility]     [address] [id number]

[address] [place] [address]

                          22 Mar, 2012               

 

                          [facility]     [address] [id number]

[address] [place] [address]

                          20 Mar, 2012               

 

                          [facility]     [address] [id number]

[address] [place] [address]

                          14 Mar, 2012               

 

                          [facility]     [address] [id number]

[address] [place] [address]

                                         







IMMUNIZATIONS

No Known Immunizations



SOCIAL HISTORY

Never Assessed



REASON FOR VISIT





PLAN OF CARE





VITAL SIGNS





                    Height              69 in               2014

 

                    Weight              213.4 lbs           2014

 

                    Temperature         97 degrees Fahrenheit 2014

 

                    Heart Rate          86 bpm              2014

 

                    Respiratory Rate    18                  2014

 

                    Blood pressure systolic 110 mmHg            2014

 

                    Blood pressure diastolic 68 mmHg             2014







MEDICATIONS

Unknown Medications



RESULTS

No Results



PROCEDURES





                Procedure       Date Ordered    Result          Body Site

 

                THER/PROPH/DIAG INJ, SC/IM 2014                    

 

                IM 5OOMG ROCEPHIN 2014                    







INSTRUCTIONS





MEDICATIONS ADMINISTERED

No Known Medications



MEDICAL (GENERAL) HISTORY





                    Type                Description         Date

 

                          Medical History           allergic rhinitis- rec's edwige

kly allergy injections from Dr Rojas office                            

 

                    Medical History     mood disorder        

 

                    Medical History     Leukocytosis- has been to hematology  

 

                    Medical History     Hyperlipidemia       

 

                          Medical History           Left leg pain- multiple imag

ing performed and ortho referral 

placed                                   

 

                          Medical History           TUBULAR ADENOMA AND GASTRITI

S ON COLONOSOCPY AUG 2016 -MULTIPLE 

POLYPS                                   

 

                    Medical History     Helicobacter pylori (H. pylori) infectio

n Hx  

 

                    Surgical History    cholecystectomy     

 

                    Surgical History     section    , 

 

                    Surgical History    tonsillectomy       2015

 

                    Surgical History    colonoscopy         2016

 

                    Surgical History    colonscopy          2017

 

                    Surgical History    Right Knee scope    2017

## 2020-06-23 NOTE — XMS REPORT
[facility]

                             Created on: 2019



Jasonwhit April

External Reference #: [id number]

: 1975

Sex: Female



Demographics





                          Address                   [address] [place]  [address]

 

                          Preferred Language        Unknown

 

                          Marital Status            Unknown

 

                          Buddhism Affiliation     Unknown

 

                          Race                      Unknown

 

                          Ethnic Group              Unknown





Author





                          Author                    RAFFAELEDarby

 

                          [facility]

 

                          Address                   [address] [place]  [address]



 

                          Phone                     [phone]







Care Team Providers





                    Care Team Member Name Role                Phone

 

                    ABRAHAN CASTORENA     Unavailable         [phone]







PROBLEMS





          Type      Condition ICD9-CM Code VBM70-WN Code Onset Dates Condition S

tatus SNOMED 

Code

 

          Problem   Tubular adenoma of colon           D12.6               Activ

e    [id number]

 

          Problem   Duarte's neuroma of right foot           G57.61             

 Active    [id number]

 

          Problem   History of leukocytosis           Z86.2               Active

    [id number]

 

          Problem   Non morbid obesity           E66.9               Active    4

[id number]

 

          Problem   Mixed hyperlipidemia           E78.2               Active   

 [id number]

 

          Problem   Seasonal allergic rhinitis due to pollen           J30.1    

           Active    04285630

 

                          Problem                   Type 2 diabetes mellitus wit

hout complication, without long-term current

use of insulin              E11.9                     Active       [id number]

 

          Problem   Anxiety             F41.9               Active    30475009

 

           Problem    Seasonal allergic rhinitis due to other allergic trigger  

          J30.89                

Active                                  [id number]

 

          Problem   Dysthymic disorder           F34.1               Active    7

[id number]

 

          Problem   Arthritis           M19.90              Active    9501981







ALLERGIES

No Information



ENCOUNTERS





                Encounter       Location        Date            Diagnosis

 

                          [place] [address] [address] [facility] [id number]

[address] [place] [address]

                                        Type 2 diabetes mellitus wit

hout complication, without long-term 

current use of insulin E11.9

 

                          [facility]     [address] [facility] [id number]

[address] [place] [address]

                                        Weight loss R63.4

 

                          [place] [address] [address] [facility] [id number]

[address] [place] [address]

                          31 May, 2019              Type 2 diabetes mellitus wit

hout complication, without long-term 

current use of insulin E11.9

 

                          [place] [id number] [facility] [id number]

[address] [place] [address]

                          31 May, 2019              Type 2 diabetes mellitus wit

hout complication, without long-term 

current use of insulin E11.9 and Non morbid obesity E66.9

 

                          [facility] WALK IN [place] [address] 

[address]                14 May, 2019              Seasonal allergic rhinitis d

ue to pollen J30.1 and Sore 

throat J02.9

 

                          [facility] WALK IN [place] [address] 

[address]                              Arthritis M19.90

 

                          [place] [address] [address]

                                        Seasonal allergic rhinitis d

ue to other allergic trigger J30.89 and

Dysthymic disorder F34.1

 

                          [place] [address] [address]

                          05 Dec, 2018              Bilateral otitis media with 

effusion H65.93 and Anxiety F41.9

 

                          [place] [address] [address]

                                        Type 2 diabetes mellitus wit

hout complication, without long-term 

current use of insulin E11.9

 

                          [place] [address] [address]

                                        Pharyngitis due to Streptoco

ccus species J02.0

 

                          [place] [address] [address]

                                         

 

                          [facility] [place] [address] 

[address]                10 Jul, 2018              Fever, unspecified fever cau

se R50.9 and Lymphadenopathy

R59.1

 

                          [facility] [place] [address] 

[address]                              Pharyngitis due to other org

anism J02.8

 

                          [place] [address] [address] [place] [id number] [place] [address]

                                         

 

                          [place] [address] [address]

                                        Type 2 diabetes mellitus wit

hout complication, without long-term 

current use of insulin E11.9 and Other eczema L30.8

 

                          [facility] [place] [facility]  [address] 

[address]                              Acute pain of left shoulder 

M25.512

 

                          [facility]     [address] [address]

                                         

 

                          [place] [address] [address]

                          15 2018              Type 2 diabetes mellitus wit

hout complication, without long-term 

current use of insulin E11.9

 

                          [place] [address] [address]

                                        Type 2 diabetes mellitus wit

hout complication, without long-term 

current use of insulin E11.9 ; Tubular adenoma of colon D12.6 ; Mixed 
hyperlipidemia E78.2 ; History of leukocytosis Z86.2 and Screening breast 
examination Z12.31

 

                          [place] [address] [address]

                                        Metatarsalgia of right foot 

M77.41 and Type 2 diabetes mellitus 

without complication, without long-term current use of insulin E11.9

 

                          [place] [address] [address]

                                        Capsulitis M77.9 and Metatar

salgia of right foot M77.41

 

                          [place] [address] [address]

                          08 Sep, 2017              Capsulitis M77.9

 

                          [place] [address] [address]

                          06 Sep, 2017               

 

                          [place] [address] [address]

                          03 Aug, 2017              Type 2 diabetes mellitus wit

hout complication, without long-term 

current use of insulin E11.9 ; Tubular adenoma of colon D12.6 and Mixed 
hyperlipidemia E78.2

 

                          [facility]     [address] [facility] [id number]

[address] [place] [address]

                          14 2017              Metatarsalgia of right foot 

M77.41 and Capsulitis M77.9

 

                          [facility]     [address] [facility] [id number]

[address] [place] [address]

                                         

 

                          [facility] WALK IN [facility]  [address] [facility] [id number]

[address] [place] [address] 

[address]                              Duarte's neuroma of right fo

ot G57.61

 

                          [facility]     [address] [facility] [id number]

[address] [place] [address]

                                        Mixed hyperlipidemia E78.2 a

nd Type 2 diabetes mellitus without 

complication, without long-term current use of insulin E11.9

 

                          [place] [address] [address] [facility] [id number]

[address] [place] [address]

                          10 Brandon, 2017              Type 2 diabetes mellitus wit

hout complication, without long-term 

current use of insulin E11.9 ; Tubular adenoma of colon D12.6 and Mixed 
hyperlipidemia E78.2

 

                          [place] [address] [address] [facility] [id number]

[address] [place] [address]

                          22 Dec, 2016              Mixed hyperlipidemia E78.2

 

                          [place] [address] [address] [facility] [id number]

[address] [place] [address]

                                         

 

                          [place] [address] [address] [facility] [id number]

[address] [place] [address]

                                        Mixed hyperlipidemia E78.2

 

                          [place] [address] [address] [facility] [id number]

[address] [place] [address]

                                        Mixed hyperlipidemia E78.2

 

                          [place] [address] [address] [facility] [id number]

[address] [place] [address]

                          08 Sep, 2016               

 

                          [facility]     [address] [facility] [id number]

[address] [place] [address]

                          10 Aug, 2016              Type 2 diabetes mellitus wit

hout complication, without long-term 

current use of insulin E11.9 ; Helicobacter pylori (H. pylori) infection A04.8 
and Mixed hyperlipidemia E78.2

 

                          [place] [address] [address]

                          08 Aug, 2016              Type 2 diabetes mellitus wit

hout complication, without long-term 

current use of insulin E11.9

 

                          [place] [address] [address]

                          03 Aug, 2016              Type 2 diabetes mellitus wit

hout complication, without long-term 

current use of insulin E11.9

 

                          [place] [address] [address]

                          02 Aug, 2016              Dyspepsia R10.13 ; Upper abd

ominal pain R10.10 ; Bowel habit 

changes R19.4 ; History of leukocytosis Z86.2 and Helicobacter pylori (H. 
pylori) infection A04.8

 

                          [facility] [place] [facility]  [address] 

[address]                27 May, 2016              Environmental allergies Z91.

09

 

                          [place] [address] [address]

                                        Left leg pain M79.605 ; Loca

lized swelling of lower leg R22.40 and 

Upper respiratory infection J06.9

 

                          [place] [address] [address]

                          28 Dec, 2015               

 

                          [place] [address] [address]

                          21 Dec, 2015              Left leg pain M79.605

 

                          [place] [address] [address]

                          10 Dec, 2015              Left leg pain M79.605 and Lo

calized swelling of lower leg R22.40

 

                          [place] [address] [address]

                          01 Dec, 2015              Left leg pain M79.605

 

                          [place] [address] [address]

                                        Encounter for immunization Z

23

 

                          [place] [address] [address] [place] [id number]

[address] [place] [address]

                          12 Oct, 2015              Bronchitis J40

 

                          [facility]     [address] [place] [id number]

[address] [place] [address]

                          30 Sep, 2015              Physical exam, pre-employmen

t V70.5 ; Encounter for PPD test V74.1 

and Hyperlipidemia 272.4

 

                          [facility]     [address] [place] [id number]

[address] [place] [address]

                          21 Sep, 2015               

 

                          [facility]     [address] [place] [id number] [place] [address]

                          21 Sep, 2015               

 

                          [facility]     [address] [address]

                          09 Sep, 2015               

 

                          [facility]     [address] [place] [id number] [place] [address]

                          04 Sep, 2015              Elevated blood sugar 790.29

 

                          [facility]     [address] [place] [id number] [place] [address]

                          03 Sep, 2015              Elevated blood sugar 790.29

 

                          [facility]     [address] [place] [id number]

[address] [place] [address]

                          03 Sep, 2015              Palpitations 785.1

 

                          [facility]     [address] [place] [id number]

[address] [place] [address]

                          01 Sep, 2015              Palpitations 785.1

 

                          [facility]     [address] [place] [id number]

[address] [place] [address]

                                         

 

                          [facility]     [address] [place] [id number] [place] [address]

                          28 May, 2015              Hand pain, right 729.5 and D

epression with anxiety 300.4

 

                          [facility]     [address] [place] [id number]

[address] [place] [address]

                                         

 

                          [facility]     [address] [place] [id number]

[address] [place] [address]

                                         

 

                          [facility]     [address] [place] [id number]

[address] [place] [address]

                          05 Mar, 2015               

 

                          [facility]     [address] [id number]

[address] [place] [address], [place] [address]

                          05 Mar, 2015               

 

                          [facility]     [address] [id number]

[address] [place] [address], [place] [address]

                                         

 

                          [facility]     [address] [id number]

[address] [place] [address], [place] [address]

                                         

 

                          [facility]     [address] [id number]

[address] [place] [address], [place] [address]

                          10 Feb, 2015               

 

                          [facility]     [address] [id number]

[address] [place] [address], [place] [address]

                          10 Feb, 2015               

 

                          [facility]     [address] [id number]

[address] [place] [address], [place] [address]

                                         

 

                          [facility]     [address] [id number]

[address] [place] [address], [place] [address]

                                         

 

                          [facility]     [address] [id number]

[address] [place] [address], [place] [address]

                                         

 

                          [facility]     [address] [id number]

[address] [place] [address], [place] [address]

                                         

 

                          [facility]     [address] [id number]

[address] [place] [address], [place] [address]

                                         

 

                          [facility]     [address] [id number]

[address] [place] [address], [place] [address]

                                         

 

                          [facility]     [address] [id number]

[address] [place] [address], [place] [address]

                                         

 

                          [facility]     [address] [id number]

[address] [place] [address], [place] [address]

                                         

 

                          [facility]     [address] [id number]

[address] [place] [address], [place] [address]

                                         

 

                          [facility]     [address] [id number]

[address] [place] [address], [place] [address]

                                         

 

                          [facility]     [address] [id number]

[address] [place] [address], [place] [address]

                          16 2015               

 

                          [facility]     [address] [id number]

[address] [place] [address], [place] [address]

                          16 2015               

 

                          [facility]     [address] [id number]

[address] [place] [address], [place] [address]

                          15 2015               

 

                          [facility]     [address] [id number]

[address] [place] [address], [place] [address]

                          15 2015               

 

                          [facility]     [address] [id number]

[address] [place] [address], [place] [address]

                                         

 

                          [facility]     [address] [id number]

[address] [place] [address], [place] [address]

                                         

 

                          [facility]     [address] [id number]

[address] [place] [address], [place] [address]

                          15 Oct, 2014               

 

                          [facility]     [address] [id number]

[address] [place] [address], [place] [address]

                          15 Oct, 2014               

 

                          [facility]     [address] [id number]

[address] [place] [address], [place] [address]

                          14 Oct, 2014               

 

                          [facility]     [address] [id number]

[address] [place] [address], [place] [address]

                          14 Oct, 2014               

 

                          [facility]     [address] [id number]

[address] [place] [address], [place] [address]

                          07 Oct, 2014               

 

                          [facility]     [address] [id number]

[address] [place] [address], [place] [address]

                          07 Oct, 2014               

 

                          [facility]     [address] [id number]

[address] [place] [address], [place] [address]

                          12 Sep, 2014               

 

                          [facility]     [address] [id number]

[address] [place] [address], [place] [address]

                          12 Sep, 2014               

 

                          [facility]     [address] [id number]

[address] [place] [address], [place] [address]

                          04 Sep, 2014               

 

                          [facility]     [address] [id number]

[address] [place] [address], [place] [address]

                          03 Sep, 2014               

 

                          [facility]     [address] [id number]

[address] [address]

                          03 Sep, 2014               

 

                          [facility]     [address] [id number]

[address] [place] [address], [place] [address]

                          02 Sep, 2014               

 

                          [facility]     [address] [id number]

[address] [place] [address], [place] [address]

                          02 Sep, 2014               

 

                          [facility]     [address] [id number]

[address] [place] [address], [place] [address]

                          30 Aug, 2014               

 

                          [facility]     [address] [id number]

[address] [place] [address], [place] [address]

                          30 Aug, 2014               

 

                          [facility]     [address] [id number]

[address] [place] [address], [place] [address]

                          19 Aug, 2014               

 

                          [facility]     [address] [id number]

[address] [place] [address], [place] [address]

                          19 Aug, 2014               

 

                          [facility]     [address] [id number]

[address] [place] [address], [place] [address]

                          14 Aug, 2014               

 

                          [facility]     [address] [id number]

[address] [place] [address], [place] [address]

                          14 Aug, 2014               

 

                          [facility]     [address] [id number]

[address] [place] [address], [place] [address]

                          13 Aug, 2014               

 

                          [facility]     [address] [id number]

[address] [place] [address], [place] [address]

                          13 Aug, 2014               

 

                          [facility]     [address] [id number]

[address] [place] [address], [place] [address]

                                         

 

                          [facility]     [address] [id number]

[address] [place] [address], [place] [address]

                                         

 

                          [facility]     [address] [id number]

[address] [place] [address], [place] [address]

                                         

 

                          [facility]     [address] [id number]

[address] [place] [address], [place] [address]

                                         

 

                          [facility]     [address] [id number]

[facility] [place] [address]

                          ,                

 

                          [facility]     [address] [id number]

[address] [place] [address], [place] [address]

                          ,                

 

                          [facility]     [address] [id number]

[facility] [place] [address]

                          ,                

 

                          [facility]     [address] [id number]

[address] [place] [address], [place] [address]

                          ,                

 

                          [facility]     [address] [id number]

[address] [place] [address], [place] [address]

                          ,                

 

                          [facility]     [address] [id number]

[address] [place] [address], [place] [address]

                          ,                

 

                          [facility]     [address] [id number]

[address] [place] [address], [place] [address]

                          ,                

 

                          [facility]     [address] [id number]

[address] [place] [address], [place] [address]

                          ,                

 

                          [facility]     [address] [id number]

[address] [place] [address], [place] [address]

                          ,                

 

                          [facility]     [address] [id number]

[address] [place] [address], [place] [address]

                          ,                

 

                          [facility]     [address] [id number]

[address] [place] [address], [place] [address]

                          ,                

 

                          [facility]     [address] [id number]

[address] [place] [address], [place] [address]

                          ,                

 

                          [facility]     [address] [id number]

[address] [place] [address], [place] [address]

                          ,                

 

                          [facility]     [address] [id number]

[address] [place] [address], [place] [address]

                          ,                

 

                          [facility]     [address] [id number]

[address] [place] [address], [place] [address]

                          ,                

 

                          [facility]     [address] [id number]

[address] [place] [address], [place] [address]

                                         

 

                          [facility]     [address] [id number]

[address] [place] [address], [place] [address]

                                         

 

                          [facility]     [address] [id number]

[address] [place] [address], [place] [address]

                                         

 

                          [facility]     [address] [id number]

[address] [place] [address], [place] [address]

                                         

 

                          [facility]     [address] [id number]

[address] [place] [address], [place] [address]

                                         

 

                          [facility]     [address] [id number]

[address] [place] [address], [place] [address]

                                         

 

                          [facility]     [address] [id number]

[address] [place] [address], [place] [address]

                                         

 

                          [facility]     [address] [id number]

[address] [place] [address], [place] [address]

                                         

 

                          [facility]     [address] [id number]

[address] [place] [address], [place] [address]

                          27 Mar, 2014               

 

                          [facility]     [address] [id number]

[address] [place] [address], [place] [address]

                          27 Mar, 2014               

 

                          [facility]     [address] [id number]

[address] [place] [address], [place] [address]

                                         

 

                          [facility]     [address] [id number]

[address] [place] [address], [place] [address]

                                         

 

                          [facility]     [address] [id number]

[address] [place] [address], [place] [address]

                                         

 

                          [facility]     [address] [id number]

[address] [place] [address], [place] [address]

                                         

 

                          [facility]     [address] [id number]

[address] [place] [address], [place] [address]

                          08 Oct, 2013               

 

                          [facility]     [address] [id number]

[address] [place] [address], [place] [address]

                          04 Oct, 2013               

 

                          [facility]     [address] [id number]

[address] [place] [address], [place] [address]

                          03 Oct, 2013               

 

                          [facility]     [address] [id number]

[address] [place] [address], [place] [address]

                          02 Oct, 2013               

 

                          [facility]     [address] [id number]

[address] [place] [address], [place] [address]

                          01 Oct, 2013               

 

                          [facility]     [address] [id number]

[address] [place] [address], [place] [address]

                          20 Sep, 2013               

 

                          [facility]     [address] [id number]

[address] [place] [address], [place] [address]

                          08 Aug, 2013               

 

                          [facility]     [address] [id number]

[address] [place] [address], [place] [address]

                          24 May, 2013               

 

                          [facility]     [address] [id number]

[address] [place] [address], [place] [address]

                          13 May, 2013               

 

                          [facility]     [address] [id number]

[address] [place] [address], [place] [address]

                                         

 

                          [facility]     [address] [id number]

[address] [place] [address], [place] [address]

                                         

 

                          [facility]     [address] [id number]

[address] [place] [address], [place] [address]

                                         

 

                          [facility]     [address] [id number]

[address] [place] [address], [place] [address]

                                         

 

                          [facility]     [address] [id number]

[address] [place] [address], [place] [address]

                          02 Oct, 2012               

 

                          [facility]     [address] [id number]

[address] [place] [address], [place] [address]

                          02 Oct, 2012               

 

                          [facility]     [address] [id number]

[address] [place] [address], [place] [address]

                          21 Sep, 2012               

 

                          [facility]     [address] [id number]

[address] [place] [address], [place] [address]

                          06 Aug, 2012               

 

                          [facility]     [address] [id number]

[address] [place] [address], [place] [address]

                          02 Aug, 2012               

 

                          [facility]     [address] [id number]

[address] [place] [address]

                                         

 

                          [facility]     [address] [id number]

[address] [place] [address]

                                         

 

                          [facility]     [address] [id number]

[address] [place] [address]

                          15 Eusebio, 2012               

 

                          [facility]     [address] [id number]

[address] [place] [address]

                                         

 

                          [facility]     [address] [id number]

[address] [place] [address]

                          10 Apr, 2012               

 

                          [facility]     [address] [facility] [id number]

[address] [place] [address]

                          22 Mar, 2012               

 

                          [facility]     [address] [facility] [id number]

[address] [place] [address]

                          20 Mar, 2012               

 

                          [facility]     [address] [facility] [id number]

[address] [place] [address]

                          14 Mar, 2012               

 

                          [facility]     [address] [facility] [id number]

[address] [place] [address]

                                         







IMMUNIZATIONS

No Known Immunizations



SOCIAL HISTORY

Never Assessed



REASON FOR VISIT





PLAN OF CARE





VITAL SIGNS





                    Height              69 in               2015

 

                    Weight              231 lbs             2015

 

                    Temperature         98 degrees Fahrenheit 2015

 

                    Heart Rate          78 bpm              2015

 

                    Respiratory Rate    18                  2015

 

                    Blood pressure systolic 120 mmHg            2015

 

                    Blood pressure diastolic 78 mmHg             2015







MEDICATIONS

Unknown Medications



RESULTS

No Results



PROCEDURES





                Procedure       Date Ordered    Result          Body Site

 

                REMOVAL OF NAIL PLATE 2015                   







INSTRUCTIONS





MEDICATIONS ADMINISTERED

No Known Medications



MEDICAL (GENERAL) HISTORY





                    Type                Description         Date

 

                          Medical History           allergic rhinitis- rec's edwige donahue allergy injections from Dr Rjoas office                            

 

                    Medical History     mood disorder        

 

                    Medical History     Leukocytosis- has been to hematology  

 

                    Medical History     Hyperlipidemia       

 

                          Medical History           Left leg pain- multiple imag

ing performed and ortho referral 

placed                                   

 

                          Medical History           TUBULAR ADENOMA AND GASTRITI

S ON COLONOSOCPY AUG 2016 -MULTIPLE 

POLYPS                                   

 

                    Medical History     Helicobacter pylori (H. pylori) infectio

n Hx  

 

                    Surgical History    cholecystectomy     2006

 

                    Surgical History     section    , 

 

                    Surgical History    tonsillectomy       2015

 

                    Surgical History    colonoscopy         2016

 

                    Surgical History    colonscopy          2017

 

                    Surgical History    Right Knee scope    2017

## 2020-06-23 NOTE — XMS REPORT
[facility]

                             Created on: 2020



Kate April

External Reference #: [id number]

: 1975

Sex: Female



Demographics





                          Address                   [address] [place]  [address]

 

                          Preferred Language        Unknown

 

                          Marital Status            Unknown

 

                          Roman Catholic Affiliation     Unknown

 

                          Race                      Unknown

 

                          Ethnic Group              Unknown





Author





                          Author                    Migration, April Doctor

 

                          Organization              [facility]

 

                          Address                   Unknown

 

                          Phone                     Unavailable







Care Team Providers





                    Care Team Member Name Role                Phone

 

                    Migration,  Doctor  Unavailable         Unavailable







PROBLEMS





          Type      Condition ICD9-CM Code QCG97-WG Code Onset Dates Condition S

tatus SNOMED 

Code

 

          Problem   Duarte's neuroma of right foot           G57.61             

 Active    70953215

 

          Problem   History of leukocytosis           Z86.2               Active

    [id number]

 

          Problem   Anxiety             F41.9               Active    95296779

 

                          Problem                   Type 2 diabetes mellitus wit

hout complication, without long-term current

use of insulin              E11.9                     Active       [id number]

 

          Problem   Seasonal allergic rhinitis due to pollen           J30.1    

           Active    59724565

 

          Problem   Tubular adenoma of colon           D12.6               Activ

e    699903593

 

          Problem   GERD with esophagitis           K21.0               Active  

  [id number]

 

          Problem   Mixed hyperlipidemia           E78.2               Active   

 [id number]

 

           Problem    Seasonal allergic rhinitis due to other allergic trigger  

          J30.89                

Active                                  [id number]

 

          Problem   Dysthymic disorder           F34.1               Active    7

[id number]

 

          Problem   Arthritis           M19.90              Active    5344069

 

          Problem   Non morbid obesity           E66.9               Active    4

[id number]







ALLERGIES

No Information



ENCOUNTERS





                Encounter       Location        Date            Diagnosis

 

                          [facility]     [address] [facility] [address] [id number]

[address] [place] [address]

                                         

 

                          [facility]     [address] [facility] [address] [id number]

[address] [place] [address]

                          04 Mar, 2020              Type 2 diabetes mellitus wit

hout complication, without long-term 

current use of insulin E11.9

 

                          [facility]     [address] [facility] [address] [id number]

[address] [place] [address]

                                        Acute pain of left knee M25.

562

 

                          [facility]     [address] [facility] [address] [id number]

[address] [place] [address]

                          15 2020              Leg pain, left M79.605

 

                          [facility]  [address] [id number]

[address] [place] [address] 

[address]                              Leg pain, left M79.605

 

                          [place] [address] [id number]

[address] [place] [address]

                                        Type 2 diabetes mellitus wit

hout complication, without long-term 

current use of insulin E11.9 and GERD with esophagitis K21.0

 

                          [place] [address] [facility] [id number]

[address] [place] [address]

                          01 Oct, 2019              Encounter for immunization Z

23

 

                          [place] [address] [facility] [id number]

[address] [place] [address]

                                        Type 2 diabetes mellitus wit

hout complication, without long-term 

current use of insulin E11.9

 

                          [place] [address] [facility] [id number]

[address] [place] [address]

                                         

 

                          [place] [address] [place] [id number] [place] [address]

                                        Type 2 diabetes mellitus wit

hout complication, without long-term 

current use of insulin E11.9

 

                          [place] [address] [facility] [id number]

[address] [place] [address]

                                        Weight loss R63.4

 

                          [place] [address] [place] [id number]

[address] [place] [address]

                          31 May, 2019              Type 2 diabetes mellitus wit

hout complication, without long-term 

current use of insulin E11.9

 

                          [place] [address] [place] [id number]

[address] [place] [address]

                          31 May, 2019              Type 2 diabetes mellitus wit

hout complication, without long-term 

current use of insulin E11.9 and Non morbid obesity E66.9

 

                          [facility]  [address] [id number]

[address] [place] [address] 

[address]                14 May, 2019              Seasonal allergic rhinitis d

ue to pollen J30.1 and Sore 

throat J02.9

 

                          [facility]  [address] [facility] [address] [id number]

[address] [place] [address] 

[address]                              Arthritis M19.90

 

                          [place] [address] [address]

                                        Seasonal allergic rhinitis d

ue to other allergic trigger J30.89 and

Dysthymic disorder F34.1

 

                          [place] [address] [address]

                          05 Dec, 2018              Bilateral otitis media with 

effusion H65.93 and Anxiety F41.9

 

                          [place] [address] [address]

                                        Type 2 diabetes mellitus wit

hout complication, without long-term 

current use of insulin E11.9

 

                          [place] [address] [address]

                                        Pharyngitis due to Streptoco

ccus species J02.0

 

                          [place] [address] [address]

                                         

 

                          [facility]  [address] 

[address]                10 Jul, 2018              Fever, unspecified fever cau

se R50.9 and Lymphadenopathy

R59.1

 

                          [facility] [place] [address] 

[address]                              Pharyngitis due to other org

anism J02.8

 

                          [place] [address] [address]

                                         

 

                          [place] [address] [address]

                                        Type 2 diabetes mellitus wit

hout complication, without long-term 

current use of insulin E11.9 and Other eczema L30.8

 

                          [facility] [place] [address] 

[address]                              Acute pain of left shoulder 

M25.512

 

                          [place] [address] [address]

                                         

 

                          [place] [address] [place] [address] [place] [address]

                          15 2018              Type 2 diabetes mellitus wit

hout complication, without long-term 

current use of insulin E11.9

 

                          [place] [address] [address]

                          06 2018              Type 2 diabetes mellitus wit

hout complication, without long-term 

current use of insulin E11.9 ; Tubular adenoma of colon D12.6 ; Mixed 
hyperlipidemia E78.2 ; History of leukocytosis Z86.2 and Screening breast 
examination Z12.31

 

                          [place] [address] [address]

                                        Metatarsalgia of right foot 

M77.41 and Type 2 diabetes mellitus 

without complication, without long-term current use of insulin E11.9

 

                          [place] [address] [address]

                                        Capsulitis M77.9 and Metatar

salgia of right foot M77.41

 

                          [place] [address] [address]

                          08 Sep, 2017              Capsulitis M77.9

 

                          [place] [address] [address]

                          06 Sep, 2017               

 

                          [place] [address] [address]

                          03 Aug, 2017              Type 2 diabetes mellitus wit

hout complication, without long-term 

current use of insulin E11.9 ; Tubular adenoma of colon D12.6 and Mixed 
hyperlipidemia E78.2

 

                          [place] [address] [place] [id number] [place] [address]

                                        Metatarsalgia of right foot 

M77.41 and Capsulitis M77.9

 

                          [facility]     [address] [place] [id number]

[address] [place] [address]

                                         

 

                          [facility]  [address] [place] [id number]

[address] [place] [address] 

[address]                              Duarte's neuroma of right fo

ot G57.61

 

                          [facility]     [address] [id number]

[address] [place] [address]

                                        Mixed hyperlipidemia E78.2 a

nd Type 2 diabetes mellitus without 

complication, without long-term current use of insulin E11.9

 

                          [facility]     [address] [facility] [id number]

[address] [place] [address]

                          10 Brandon, 2017              Type 2 diabetes mellitus wit

hout complication, without long-term 

current use of insulin E11.9 ; Tubular adenoma of colon D12.6 and Mixed 
hyperlipidemia E78.2

 

                          [facility]     [address] [id number]

[address] [place] [address]

                          22 Dec, 2016              Mixed hyperlipidemia E78.2

 

                          [place] [address] [id number]

[address] [place] [address]

                                         

 

                          [place] [address] [facility] [id number]

[address] [place] [address]

                                        Mixed hyperlipidemia E78.2

 

                          [facility]     [address] [id number]

[address] [place] [address]

                                        Mixed hyperlipidemia E78.2

 

                          [facility]     [address] [facility] [id number]

[address] [place] [address]

                          08 Sep, 2016               

 

                          [facility]     [address] [facility] [address] [id number]

[address] [place] [address]

                          10 Aug, 2016              Type 2 diabetes mellitus wit

hout complication, without long-term 

current use of insulin E11.9 ; Helicobacter pylori (H. pylori) infection A04.8 
and Mixed hyperlipidemia E78.2

 

                          [facility]     [address] [id number]

[address] [place] [address]

                          08 Aug, 2016              Type 2 diabetes mellitus wit

hout complication, without long-term 

current use of insulin E11.9

 

                          [facility]     [address] [id number]

[address] [place] [address]

                          03 Aug, 2016              Type 2 diabetes mellitus wit

hout complication, without long-term 

current use of insulin E11.9

 

                          [place] [id number] [place] [id number]

[address] [place] [address]

                          02 Aug, 2016              Dyspepsia R10.13 ; Upper abd

ominal pain R10.10 ; Bowel habit 

changes R19.4 ; History of leukocytosis Z86.2 and Helicobacter pylori (H. 
pylori) infection A04.8

 

                          [facility]  [address] [place] [id number]

[address] [place] [address] 

[address]                27 May, 2016              Environmental allergies Z91.

09

 

                          [place] [address] [address]

                                        Left leg pain M79.605 ; Loca

lized swelling of lower leg R22.40 and 

Upper respiratory infection J06.9

 

                          [place] [address] [address]

                          28 Dec, 2015               

 

                          [place] [address] [address]

                          21 Dec, 2015              Left leg pain M79.605

 

                          [place] [address] [address]

                          10 Dec, 2015              Left leg pain M79.605 and Lo

calized swelling of lower leg R22.40

 

                          [place] [address] [address]

                          01 Dec, 2015              Left leg pain M79.605

 

                          [place] [address] [address]

                                        Encounter for immunization Z

23

 

                          [place] [address] [address]

                          12 Oct, 2015              Bronchitis J40

 

                          [place] [address] [address]

                          30 Sep, 2015              Physical exam, pre-employmen

t V70.5 ; Encounter for PPD test V74.1 

and Hyperlipidemia 272.4

 

                          [place] [address] [place] [id number]

[address] [place] [address]

                          21 Sep, 2015               

 

                          [place] [id number]

[address] [place] [address]

                          21 Sep, 2015               

 

                          [facility]     [address] [id number]

[address] [place] [address]

                          09 Sep, 2015               

 

                          [facility]     [address] [id number]

[address] [place] [address]

                          04 Sep, 2015              Elevated blood sugar 790.29

 

                          [facility]     [address] [facility] [address] [id number]

[address] [place] [address]

                          03 Sep, 2015              Elevated blood sugar 790.29

 

                          [facility]     [address] [facility] [address] [id number]

[address] [place] [address]

                          03 Sep, 2015              Palpitations 785.1

 

                          [facility]     [address] [facility] [id number]

[address] [place] [address]

                          01 Sep, 2015              Palpitations 785.1

 

                          [facility]     [address] [facility] [address] [id number]

[address] [place] [address]

                                         

 

                          [facility]     [address] [facility] [address] [id number]

[address] [place] [address]

                          28 May, 2015              Hand pain, right 729.5 and D

epression with anxiety 300.4

 

                          [facility]     [address] [facility] [address] [id number]

[address] [place] [address]

                                         

 

                          [facility]     [address] [facility] [address] [id number]

[address] [place] [address]

                                         

 

                          [facility]     [address] [facility] [address] [id number]

[address] [place] [address]

                          05 Mar, 2015               

 

                          [facility]     [address] [facility] [address] [id number]

[address] [place] [address]

                          05 Mar, 2015               

 

                          [facility]     [address] [facility] [address] [id number]

[address] [place] [address]

                                         

 

                          [facility]     [address] [facility] [address] [facility] [id number]

[address] [place] [address]

                                         

 

                          [facility]     [address] [id number]

[address] [place] [address] [place] [address]

                          10 2015               

 

                          [facility]     [address] [id number]

[address] [place] [address], [place] [address]

                          10 Feb, 2015               

 

                          [facility]     [address] [id number]

[address] [place] [address], [place] [address]

                                         

 

                          [facility]     [address] [id number]

[address] [place] [address], [place] [address]

                                         

 

                          [facility]     [address] [id number]

[address] [place] [address], [place] [address]

                                         

 

                          [facility]     [address] [id number]

[address] [place] [address], [place] [address]

                                         

 

                          [facility]     [address] [id number]

[address] [place] [address], [place] [address]

                                         

 

                          [facility]     [address] [id number]

[address] [place] [address], [place] [address]

                                         

 

                          [facility]     [address] [id number]

[address] [place] [address], [place] [address]

                                         

 

                          [facility]     [address] [id number]

[address] [place] [address], [place] [address]

                                         

 

                          [facility]     [address] [id number]

[address] [place] [address], [place] [address]

                                         

 

                          [facility]     [address] [id number]

[address] [place] [address], [place] [address]

                                         

 

                          [facility]     [address] [id number]

[address] [place] [address], [place] [address]

                                         

 

                          [facility]     [address] [id number]

[address] [place] [address], [place] [address]

                                         

 

                          [facility]     [address] [id number]

[address] [place] [address], [place] [address]

                          15 Brandon, 2015               

 

                          [facility]     [address] [id number]

[address] [place] [address], [place] [address]

                          15 [place], 2015               

 

                          [facility] [place] [facility]     [address] [id number]

[address] [place] [address], [place] [address]

                                         

 

                          [facility]     [address] [id number]

[address] [place] [address], [place] [address]

                                         

 

                          [facility]     [address] [id number]

[address] [place] [address], [place] [address]

                          15 Oct, 2014               

 

                          [facility]     [address] [id number]

[address] [place] [address], [place] [address]

                          15 Oct, 2014               

 

                          [facility]     [address] [id number]

[address] [place] [address], [place] [address]

                          14 Oct, 2014               

 

                          [facility]     [address] [id number]

[address] [place] [address], [place] [address]

                          14 Oct, 2014               

 

                          [facility]     [address] [id number]

[address] [place] [address], [place] [address]

                          07 Oct, 2014               

 

                          [facility]     [address] [id number]

[address] [place] [address], [place] [address]

                          07 Oct, 2014               

 

                          [facility]     [address] [id number]

[address] [place] [address], [place] [address]

                          12 Sep, 2014               

 

                          [facility]     [address] [id number]

[address] [place] [address], [place] [address]

                          12 Sep,                

 

                          [facility]     [address] [id number]

[address] [place] [address], [place] [address]

                          04 Sep,                

 

                          [facility]     [address] [id number]

[address] [place] [address], [place] [address]

                          03 Sep,                

 

                          [facility]     [address] [id number]

[address] [place] [address], [place] [address]

                          03 Sep,                

 

                          [facility] [place] [facility]     [address] [id number]

[address] [place] [address], [place] [address]

                          02 Sep,                

 

                          [facility]     [address] [id number]

[address] [place] [address], [place] [address]

                          02 Sep,                

 

                          [facility] [place] [facility]     [address] [id number]

[address] [place] [address], [place] [address]

                          30 Aug, 2014               

 

                          [facility]     [address] [id number]

[facility] [place] [address]

                          30 Aug, 2014               

 

                          [facility]     [address] [id number]

[address] [place] [address], [place] [address]

                          19 Aug, 2014               

 

                          [facility]     [address] [id number]

[address] [place] [address], [place] [address]

                          19 Aug, 2014               

 

                          [facility]     [address] [id number]

[address] [place] [address], [place] [address]

                          14 Aug, 2014               

 

                          [facility]     [address] [id number]

[address] [place] [address], [place] [address]

                          14 Aug, 2014               

 

                          [facility]     [address] [id number]

[address] [place] [address], [place] [address]

                          13 Aug, 2014               

 

                          [facility]     [address] [id number]

[address] [place] [address], [place] [address]

                          13 Aug, 2014               

 

                          [facility]     [address] [id number]

[address] [place] [address], [place] [address]

                                         

 

                          [facility]     [address] [id number]

[address] [place] [address], [place] [address]

                                         

 

                          [facility]     [address] [id number]

[address] [place] [address], [place] [address]

                                         

 

                          [facility]     [address] [id number]

[address] [place] [address], [place] [address]

                                         

 

                          [facility]     [address] [id number]

[address] [place] [address], [place] [address]

                                         

 

                          [facility]     [address] [id number]

[address] [place] [address], [place] [address]

                                         

 

                          [facility]     [address] [id number]

[address] [place] [address], [place] [address]

                                         

 

                          [facility]     [address] [id number]

[address] [place] [address], [place] [address]

                                         

 

                          [facility]     [address] [id number]

[facility] [place] [address]

                          ,                

 

                          [facility]     [address] [id number]

[facility] [place] [address]

                          ,                

 

                          [facility]     [address] [id number]

[address] [place] [address], [place] [address]

                          ,                

 

                          [facility]     [address] [id number]

[address] [place] [address], [place] [address]

                          ,                

 

                          [facility]     [address] [id number]

[address] [place] [address], [place] [address]

                          ,                

 

                          [facility]     [address] [id number]

[address] [place] [address], [place] [address]

                          ,                

 

                          [facility]     [address] [id number]

[address] [place] [address], [place] [address]

                          ,                

 

                          [facility]     [address] [id number]

[address] [place] [address], [place] [address]

                          ,                

 

                          [facility]     [address] [id number]

[address] [place] [address], [place] [address]

                                         

 

                          [facility]     [address] [id number]

[address] [place] [address], [place] [address]

                                         

 

                          [facility]     [address] [id number]

[address] [place] [address], [place] [address]

                                         

 

                          [facility]     [address] [id number]

[address] [place] [address], [place] [address]

                                         

 

                          [facility]     [address] [id number]

[address] [place] [address], [place] [address]

                                         

 

                          [facility]     [address] [id number]

[address] [place] [address], [place] [address]

                                         

 

                          [facility]     [address] [id number]

[address] [place] [address], [place] [address]

                                         

 

                          [facility]     [address] [id number]

[address] [place] [address], [place] [address]

                                         

 

                          [facility]     [address] [id number]

[address] [place] [address], [place] [address]

                                         

 

                          [facility]     [address] [id number]

[address] [place] [address], [place] [address]

                                         

 

                          [facility]     [address] [id number]

[address] [place] [address], [place] [address]

                                         

 

                          [facility]     [address] [id number]

[address] [place] [address], [place] [address]

                          27 Mar, 2014               

 

                          [facility]     [address] [id number]

[address] [place] [address], [place] [address]

                          27 Mar, 2014               

 

                          [facility]     [address] [id number]

[address] [place] [address], [place] [address]

                                         

 

                          [facility]     [address] [id number]

[address] [place] [address], [place] [address]

                                         

 

                          [facility]     [address] [id number]

[address] [place] [address], [place] [address]

                                         

 

                          [facility]     [address] [id number]

[address] [place] [address], [place] [address]

                                         

 

                          [facility]     [address] [id number]

[address] [place] [address], [place] [address]

                          08 Oct, 2013               

 

                          [facility]     [address] [id number]

[address] [place] [address], [place] [address]

                          04 Oct, 2013               

 

                          [facility]     [address] [id number]

[address] [place] [address]

                          03 Oct, 2013               

 

                          [facility]     [address] [id number]

[address] [place] [address], [place] [address]

                          02 Oct, 2013               

 

                          [facility]     [address] [id number]

[address] [place] [address], [place] [address]

                          01 Oct, 2013               

 

                          [facility]     [address] [id number]

[address] [place] [address], [place] [address]

                          20 Sep, 2013               

 

                          [facility]     [address] [id number]

[address] [place] [address], [place] [address]

                          08 Aug, 2013               

 

                          [facility]     [address] [id number]

[address] [place] [address], [place] [address]

                          24 May, 2013               

 

                          [facility]     [address] [id number]

[address] [place] [address], [place] [address]

                          13 May, 2013               

 

                          [facility]     [address] [id number]

[address] [place] [address], [place] [address]

                                         

 

                          [facility]     [address] [id number]

[address] [place] [address], [place] [address]

                                         

 

                          [facility]     [address] [id number]

[address] [place] [address], [place] [address]

                                         

 

                          [facility]     [address] [id number]

[address] [place] [address], [place] [address]

                                         

 

                          [facility]     [address] [id number]

[address] [place] [address], [place] [address]

                          02 Oct, 2012               

 

                          [facility]     [address] [id number]

[address] [place] [address], [place] [address]

                          02 Oct, 2012               

 

                          [facility]     [address] [id number]

[address] [place] [address], [place] [address]

                          21 Sep, 2012               

 

                          [facility]     [address] [id number]

[address] [place] [address], [place] [address]

                          06 Aug, 2012               

 

                          [facility]     [address] [id number]

[address] [place] [address], [place] [address]

                          02 Aug, 2012               

 

                          [facility]     [address] [id number]

[address] [place] [address], [place] [address]

                                         

 

                          [facility]     [address] [id number]

[address] [place] [address], [place] [address]

                                         

 

                          [facility]     [address] [id number]

[address] [place] [address], [place] [address]

                          15 Eusebio, 2012               

 

                          [facility]     [address] [id number]

[address] [place] [address], [place] [address]

                                         

 

                          [facility]     [address] [facility] [id number]

[address] [place] [address]

                          10 Apr, 2012               

 

                          [facility]     [address] [facility] [id number]

[address] [place] [address]

                          22 Mar, 2012               

 

                          [facility]     [address] [facility] [id number]

[address] [place] [address]

                          20 Mar, 2012               

 

                          [facility]     [address] [facility] [id number]

[address] [place] [address]

                          14 Mar, 2012               

 

                          [facility]     [address] [facility] [id number]

[address] [place] [address]

                                         







IMMUNIZATIONS

No Known Immunizations



SOCIAL HISTORY

Never Assessed



REASON FOR VISIT





PLAN OF CARE





VITAL SIGNS





MEDICATIONS

Unknown Medications



RESULTS

No Results



PROCEDURES

No Known procedures



INSTRUCTIONS





MEDICATIONS ADMINISTERED

No Known Medications



MEDICAL (GENERAL) HISTORY





                    Type                Description         Date

 

                          Medical History           allergic rhinitis- rec's edwige donahue allergy injections from Dr Rojas office                            

 

                    Medical History     mood disorder        

 

                    Medical History     Leukocytosis- has been to hematology  

 

                    Medical History     Hyperlipidemia       

 

                          Medical History           Left leg pain- multiple imag

ing performed and ortho referral 

placed                                   

 

                          Medical History           TUBULAR ADENOMA AND GASTRITI

S ON COLONOSOCPY AUG 2016 -MULTIPLE 

POLYPS                                   

 

                    Medical History     Helicobacter pylori (H. pylori) infectio

n Hx  

 

                    Surgical History    cholecystectomy     

 

                    Surgical History     section    , 

 

                    Surgical History    tonsillectomy       2015

 

                    Surgical History    colonoscopy         2016

 

                    Surgical History    colonscopy          2017

 

                    Surgical History    Right Knee scope    2017

## 2020-06-23 NOTE — XMS REPORT
[facility]

                             Created on: 10/06/2019



Kate April

External Reference #: [id number]

: 1975

Sex: Female



Demographics





                          Address                   [address] [place]  [address]

 

                          Preferred Language        Unknown

 

                          Marital Status            Unknown

 

                          Sabianism Affiliation     Unknown

 

                          Race                      Unknown

 

                          Ethnic Group              Unknown





Author





                          Author                    Darby SHARPENYA

 

                          [facility]

 

                          Address                   [address] [place]  [address]



 

                          Phone                     [phone]







Care Team Providers





                    Care Team Member Name Role                Phone

 

                    ANNETTE SHARPEWNYA       Unavailable         [phone]







PROBLEMS





          Type      Condition ICD9-CM Code NMT78-BS Code Onset Dates Condition S

tatus SNOMED 

Code

 

          Problem   Tubular adenoma of colon           D12.6               Activ

e    [id number]

 

          Problem   Duarte's neuroma of right foot           G57.61             

 Active    92999241

 

          Problem   History of leukocytosis           Z86.2               Active

    [id number]

 

          Problem   Non morbid obesity           E66.9               Active    4

[id number]

 

          Problem   Mixed hyperlipidemia           E78.2               Active   

 [id number]

 

          Problem   Seasonal allergic rhinitis due to pollen           J30.1    

           Active    97198832

 

                          Problem                   Type 2 diabetes mellitus wit

hout complication, without long-term current

use of insulin              E11.9                     Active       [id number]

 

          Problem   Anxiety             F41.9               Active    61552589

 

           Problem    Seasonal allergic rhinitis due to other allergic trigger  

          J30.89                

Active                                  [id number]

 

          Problem   Dysthymic disorder           F34.1               Active    7

[id number]

 

          Problem   Arthritis           M19.90              Active    9883679







ALLERGIES

No Information



ENCOUNTERS





                Encounter       Location        Date            Diagnosis

 

                          [place] [address] [address] [facility] [id number]

[address] [place] [address]

                          01 Oct, 2019              Encounter for immunization Z

23

 

                          [place] [address] [address] [facility] [id number]

[address] [place] [address]

                                        Type 2 diabetes mellitus wit

hout complication, without long-term 

current use of insulin E11.9

 

                          [place] [address] [address] [facility] [id number]

[address] [place] [address]

                                         

 

                          [place] [address] [address] [facility] [id number]

[address] [place] [address]

                                        Type 2 diabetes mellitus wit

hout complication, without long-term 

current use of insulin E11.9

 

                          [place] [address] [address] [place] [id number]

[address] [place] [address]

                                        Weight loss R63.4

 

                          [place] [address] [address]

                          31 May, 2019              Type 2 diabetes mellitus wit

hout complication, without long-term 

current use of insulin E11.9

 

                          [place] [address] [address] [place] [id number]

[address] [place] [address]

                          31 May, 2019              Type 2 diabetes mellitus wit

hout complication, without long-term 

current use of insulin E11.9 and Non morbid obesity E66.9

 

                          [place] [address] 

[address]                14 May, 2019              Seasonal allergic rhinitis d

ue to pollen J30.1 and Sore 

throat J02.9

 

                          [place] [address] 

[address]                              Arthritis M19.90

 

                          [place] [address] [address]

                                        Seasonal allergic rhinitis d

ue to other allergic trigger J30.89 and

Dysthymic disorder F34.1

 

                          [place] [address] [address]

                          05 Dec, 2018              Bilateral otitis media with 

effusion H65.93 and Anxiety F41.9

 

                          [place] [address] [address]

                                        Type 2 diabetes mellitus wit

hout complication, without long-term 

current use of insulin E11.9

 

                          [place] [address] [address] [place] [id number]

[address] [place] [address]

                                        Pharyngitis due to Streptoco

ccus species J02.0

 

                          [place] [address] [address] [place] [id number]

[address] [place] [address]

                                         

 

                          [place] [address] 

[address]                10 Jul, 2018              Fever, unspecified fever cau

se R50.9 and Lymphadenopathy

R59.1

 

                          [facility]  [address] 

[address]                              Pharyngitis due to other org

anism J02.8

 

                          [place] [address] [address]

                                         

 

                          [place] [address] [address]

                                        Type 2 diabetes mellitus wit

hout complication, without long-term 

current use of insulin E11.9 and Other eczema L30.8

 

                          [facility] [place] [address] 

[address]                              Acute pain of left shoulder 

M25.512

 

                          [place] [address] [address]

                                         

 

                          [place] [address] [address]

                          15 2018              Type 2 diabetes mellitus wit

hout complication, without long-term 

current use of insulin E11.9

 

                          [place] [address] [address]

                          06 2018              Type 2 diabetes mellitus wit

hout complication, without long-term 

current use of insulin E11.9 ; Tubular adenoma of colon D12.6 ; Mixed 
hyperlipidemia E78.2 ; History of leukocytosis Z86.2 and Screening breast 
examination Z12.31

 

                          [place] [address] [address]

                                        Metatarsalgia of right foot 

M77.41 and Type 2 diabetes mellitus 

without complication, without long-term current use of insulin E11.9

 

                          [place] [address] [address]

                                        Capsulitis M77.9 and Metatar

salgia of right foot M77.41

 

                          [place] [address] [place] [address]

                          08 Sep, 2017              Capsulitis M77.9

 

                          [facility]     [address] [facility] [id number]

[address] [place] [address]

                          06 Sep, 2017               

 

                          [facility]     [address] [facility] [id number]

[address] [place] [address]

                          03 Aug, 2017              Type 2 diabetes mellitus wit

hout complication, without long-term 

current use of insulin E11.9 ; Tubular adenoma of colon D12.6 and Mixed 
hyperlipidemia E78.2

 

                          [facility]     [address] [facility] [id number]

[address] [place] [address]

                                        Metatarsalgia of right foot 

M77.41 and Capsulitis M77.9

 

                          [facility]     [address] [facility] [id number]

[address] [place] [address]

                                         

 

                          [facility]ALK IN [facility]  [address] [place] [id number]

[address] [place] [address] 

[address]                              Duarte's neuroma of right fo

ot G57.61

 

                          [facility]     [address] [facility] [id number]

[address] [place] [address]

                                        Mixed hyperlipidemia E78.2 a

nd Type 2 diabetes mellitus without 

complication, without long-term current use of insulin E11.9

 

                          [place] [address] [address] [place] [id number]

[address] [place] [address]

                          10 Brandon, 2017              Type 2 diabetes mellitus wit

hout complication, without long-term 

current use of insulin E11.9 ; Tubular adenoma of colon D12.6 and Mixed 
hyperlipidemia E78.2

 

                          [place] [address] [address] [facility] [id number]

[address] [place] [address]

                          22 Dec, 2016              Mixed hyperlipidemia E78.2

 

                          [place] [address] [address] [facility] [id number]

[address] [place] [address]

                                         

 

                          [place] [address] [address] [place] [id number]

[address] [place] [address]

                                        Mixed hyperlipidemia E78.2

 

                          [place] [address] [address]

                                        Mixed hyperlipidemia E78.2

 

                          [facility]     [address] [address]

                          08 Sep, 2016               

 

                          [place] [address] [address]

                          10 Aug, 2016              Type 2 diabetes mellitus wit

hout complication, without long-term 

current use of insulin E11.9 ; Helicobacter pylori (H. pylori) infection A04.8 
and Mixed hyperlipidemia E78.2

 

                          [place] [address] [address]

                          08 Aug, 2016              Type 2 diabetes mellitus wit

hout complication, without long-term 

current use of insulin E11.9

 

                          [place] [address] [address]

                          03 Aug, 2016              Type 2 diabetes mellitus wit

hout complication, without long-term 

current use of insulin E11.9

 

                          [place] [address] [address]

                          02 Aug, 2016              Dyspepsia R10.13 ; Upper abd

ominal pain R10.10 ; Bowel habit 

changes R19.4 ; History of leukocytosis Z86.2 and Helicobacter pylori (H. 
pylori) infection A04.8

 

                          [facility] [place] [facility]  [address] 

[address]                27 May, 2016              Environmental allergies Z91.

09

 

                          [place] [address] [address]

                                        Left leg pain M79.605 ; Loca

lized swelling of lower leg R22.40 and 

Upper respiratory infection J06.9

 

                          [facility]     [address] [address]

                          28 Dec, 2015               

 

                          [place] [address] [address]

                          21 Dec, 2015              Left leg pain M79.605

 

                          [facility]     [address] [address]

                          10 Dec, 2015              Left leg pain M79.605 and Lo

calized swelling of lower leg R22.40

 

                          [facility]     [address] [facility] [id number]

[address] [place] [address]

                          01 Dec, 2015              Left leg pain M79.605

 

                          [facility]     [address] [facility] [id number]

[address] [place] [address]

                                        Encounter for immunization Z

23

 

                          [facility]     [address] [facility] [id number]

[address] [place] [address]

                          12 Oct, 2015              Bronchitis J40

 

                          [facility]     [address] [facility] [id number]

[address] [place] [address]

                          30 Sep, 2015              Physical exam, pre-employmen

t V70.5 ; Encounter for PPD test V74.1 

and Hyperlipidemia 272.4

 

                          [facility]     [address] [facility] [id number]

[address] [place] [address]

                          21 Sep, 2015               

 

                          [facility]     [address] [facility] [id number]

[address] [place] [address]

                          21 Sep, 2015               

 

                          [facility]     [address] [facility] [id number]

[address] [place] [address]

                          09 Sep, 2015               

 

                          [facility]     [address] [facility] [id number]

[address] [place] [address]

                          04 Sep, 2015              Elevated blood sugar 790.29

 

                          [facility]     [address] [facility] [id number]

[address] [place] [address]

                          03 Sep, 2015              Elevated blood sugar 790.29

 

                          [facility]     [address] [facility] [id number]

[address] [place] [address]

                          03 Sep, 2015              Palpitations 785.1

 

                          [facility]     [address] [facility] [id number]

[address] [place] [address]

                          01 Sep, 2015              Palpitations 785.1

 

                          [facility]     [address] [facility] [id number]

[address] [place] [address]

                                         

 

                          [facility]     [address] [id number]

[address] [place] [address]

                          28 May, 2015              Hand pain, right 729.5 and D

epression with anxiety 300.4

 

                          [facility]     [address] [id number]

[address] [place] [address]

                                         

 

                          [facility]     [address] [id number]

[address] [place] [address]

                                         

 

                          [facility]     [address] [id number]

[address] [place] [address]

                          05 Mar, 2015               

 

                          [facility]     [address] [id number]

[address] [place] [address]

                          05 Mar, 2015               

 

                          [facility]     [address] [id number]

[address] [place] [address]

                                         

 

                          [facility]     [address] [id number]

[address] [place] [address]

                                         

 

                          [facility]     [address] [id number]

[address] [place] [address]

                          10 Feb, 2015               

 

                          [facility]     [address] [id number]

[address] [place] [address]

                          10 Feb, 2015               

 

                          [facility]     [address] [id number]

[address] [place] [address]

                                         

 

                          [facility]     [address] [id number]

[address] [place] [address]

                                         

 

                          [facility]     [address] [id number]

[address] [place] [address]

                                         

 

                          [facility]     [address] [id number]

[address] [place] [address]

                                         

 

                          [facility]     [address] [id number]

[address] [place] [address]

                                         

 

                          [facility]     [address] [id number]

[address] [place] [address]

                                         

 

                          [facility]     [address] [id number]

[address] [place] [address], [place] [address]

                          19 2015               

 

                          [facility]     [address] [id number]

[address] [place] [address], [place] [address]

                                         

 

                          [facility]     [address] [id number]

[address] [place] [address], [place] [address]

                          16 2015               

 

                          [facility]     [address] [id number]

[address] [place] [address], [place] [address]

                                         

 

                          [facility]     [address] [id number]

[address] [place] [address], [place] [address]

                                         

 

                          [facility]     [address] [id number]

[address] [place] [address], [place] [address]

                                         

 

                          [facility]     [address] [id number]

[address] [place] [address], [place] [address]

                          15 [place], 2015               

 

                          [facility]     [address] [id number]

[address] [place] [address], [place] [address]

                          15 [place], 2015               

 

                          [facility]     [address] [id number]

[address] [place] [address], [place] [address]

                                         

 

                          [facility]     [address] [id number]

[address] [place] [address], [place] [address]

                                         

 

                          [facility]     [address] [id number]

[address] [place] [address], [place] [address]

                          15 Oct, 2014               

 

                          [facility]     [address] [id number]

[address] [place] [address], [place] [address]

                          15 Oct, 2014               

 

                          [facility]     [address] [id number]

[address] [place] [address], [place] [address]

                          14 Oct, 2014               

 

                          [facility]     [address] [id number]

[address] [place] [address], [place] [address]

                          14 Oct, 2014               

 

                          [facility]     [address] [id number]

[address] [place] [address], [place] [address]

                          07 Oct, 2014               

 

                          [facility]     [address] [id number]

[address] [place] [address], [place] [address]

                          07 Oct, 2014               

 

                          [facility]     [address] [id number]

[address] [place] [address], [place] [address]

                          12 Sep,                

 

                          [facility]     [address] [id number]

[address] [place] [address], [place] [address]

                          12 Sep, 2014               

 

                          [facility]     [address] [id number]

[address] [place] [address], [place] [address]

                          04 Sep,                

 

                          [facility]     [address] [id number]

[address] [place] [address], [place] [address]

                          03 Sep,                

 

                          [facility]     [address] [id number]

[address] [place] [address], [place] [address]

                          03 Sep,                

 

                          [facility]     [address] [id number]

[address] [place] [address], [place] [address]

                          02 Sep, 2014               

 

                          [facility]     [address] [id number]

[address] [place] [address], [place] [address]

                          02 Sep, 2014               

 

                          [facility]     [address] [id number]

[address] [place] [address], [place] [address]

                          30 Aug, 2014               

 

                          [facility]     [address] [id number]

[address] [place] [address], [place] [address]

                          30 Aug, 2014               

 

                          [facility]     [address] [id number]

[address] [place] [address], [place] [address]

                          19 Aug, 2014               

 

                          [facility]     [address] [id number]

[address] [place] [address], [place] [address]

                          19 Aug, 2014               

 

                          [facility]     [address] [id number]

[address] [place] [address], [place] [address]

                          14 Aug, 2014               

 

                          [facility]     [address] [id number]

[address] [place] [address], [place] [address]

                          14 Aug, 2014               

 

                          [facility]     [address] [id number]

[address] [place] [address], [place] [address]

                          13 Aug, 2014               

 

                          [facility]     [address] [id number]

[address] [place] [address], [place] [address]

                          13 Aug, 2014               

 

                          [facility]     [address] [id number]

[address] [place] [address], [place] [address]

                          ,                

 

                          [facility]     [address] [id number]

[facility] [place] [address]

                          ,                

 

                          [facility]     [address] [id number]

[address] [place] [address], [place] [address]

                                         

 

                          [facility]     [address] [id number]

[address] [place] [address], [place] [address]

                                         

 

                          [facility]     [address] [id number]

[address] [place] [address], [place] [address]

                                         

 

                          [facility]     [address] [id number]

[address] [place] [address], [place] [address]

                                         

 

                          [facility]     [address] [id number]

[address] [place] [address], [place] [address]

                                         

 

                          [facility]     [address] [id number]

[address] [place] [address], [place] [address]

                                         

 

                          [facility]     [address] [id number]

[address] [place] [address], [place] [address]

                                         

 

                          [facility]     [address] [id number]

[address] [place] [address], [place] [address]

                                         

 

                          [facility]     [address] [id number]

[address] [place] [address], [place] [address]

                                         

 

                          [facility]     [address] [id number]

[address] [place] [address], [place] [address]

                          ,                

 

                          [facility]     [address] [id number]

[address] [place] [address], [place] [address]

                                         

 

                          [facility]     [address] [id number]

[address] [place] [address], [place] [address]

                                         

 

                          [facility]     [address] [id number]

[address] [place] [address], [place] [address]

                                         

 

                          [facility]     [address] [id number]

[address] [place] [address], [place] [address]

                          ,                

 

                          [facility]     [address] [id number]

[facility] [place] [address]

                                         

 

                          [facility]     [address] [id number]

[facility] [place] [address]

                                         

 

                          [facility]     [address] [id number]

[facility] [place] [address]

                                         

 

                          [facility]     [address] [id number]

[address] [place] [address], [place] [address]

                                         

 

                          [facility]     [address] [id number]

[address] [place] [address], [place] [address]

                                         

 

                          [facility]     [address] [id number]

[address] [place] [address], [place] [address]

                                         

 

                          [facility]     [address] [id number]

[address] [place] [address], [place] [address]

                                         

 

                          [facility]     [address] [id number]

[address] [place] [address], [place] [address]

                                         

 

                          [facility]     [address] [id number]

[address] [place] [address], [place] [address]

                                         

 

                          [facility]     [address] [id number]

[address] [place] [address], [place] [address]

                                         

 

                          [facility]     [address] [id number]

[address] [place] [address], [place] [address]

                                         

 

                          [facility]     [address] [id number]

[address] [place] [address], [place] [address]

                          27 Mar, 2014               

 

                          [facility]     [address] [id number]

[address] [place] [address], [place] [address]

                          27 Mar, 2014               

 

                          [facility]     [address] [id number]

[address] [place] [address], [place] [address]

                                         

 

                          [facility]     [address] [id number]

[address] [place] [address], [place] [address]

                                         

 

                          [facility]     [address] [id number]

[address] [place] [address], [place] [address]

                                         

 

                          [facility]     [address] [id number]

[address] [place] [address], [place] [address]

                                         

 

                          [facility]     [address] [id number]

[address] [place] [address], [place] [address]

                          08 Oct, 2013               

 

                          [facility]     [address] [id number]

[address] [place] [address], [place] [address]

                          04 Oct, 2013               

 

                          [facility]     [address] [id number]

[address] [place] [address], [place] [address]

                          03 Oct, 2013               

 

                          [facility]     [address] [id number]

[address] [place] [address], [place] [address]

                          02 Oct, 2013               

 

                          [facility]     [address] [id number]

[address] [place] [address], [place] [address]

                          01 Oct, 2013               

 

                          [facility]     [address] [id number]

[address] [place] [address], [place] [address]

                          20 Sep, 2013               

 

                          [facility]     [address] [id number]

[address] [place] [address]

                          08 Aug, 2013               

 

                          [facility]     [address] [id number]

[address] [place] [address], [place] [address]

                          24 May, 2013               

 

                          [facility]     [address] [id number]

[address] [place] [address]

                          13 May, 2013               

 

                          [facility]     [address] [id number]

[address] [place] [address]

                                         

 

                          [facility]     [address] [id number]

[address] [place] [address]

                                         

 

                          [facility]     [address] [id number]

[address] [place] [address], [place] [address]

                                         

 

                          [facility]     [address] [id number]

[address] [place] [address]

                                         

 

                          [facility]     [address] [id number]

[address] [place] [address]

                          02 Oct, 2012               

 

                          [facility]     [address] [id number]

[address] [place] [address]

                          02 Oct, 2012               

 

                          [facility]     [address] [id number]

[address] [place] [address]

                          21 Sep, 2012               

 

                          [facility]     [address] [id number]

[address] [place] [address]

                          06 Aug, 2012               

 

                          [facility]     [address] [id number]

[address] [place] [address]

                          02 Aug, 2012               

 

                          [facility]     [address] [id number]

[address] [place] [address]

                                         

 

                          [facility]     [address] [id number]

[address] [place] [address]

                                         

 

                          [facility]     [address] [id number]

[address] [place] [address]

                          15 Eusebio, 2012               

 

                          [facility]     [address] [id number]

[address] [place] [address]

                                         

 

                          [facility]     [address] [id number]

[address] [place] [address]

                          10 Apr, 2012               

 

                          [facility]     [address] [id number]

[address] [place] [address]

                          22 Mar, 2012               

 

                          [facility]     [address] [id number]

[address] [place] [address]

                          20 Mar, 2012               

 

                          [facility]     [address] [id number]

[address] [place] [address]

                          14 Mar, 2012               

 

                          [facility]     [address] [id number]

[address] [place] [address]

                                         







IMMUNIZATIONS

No Known Immunizations



SOCIAL HISTORY

Never Assessed



REASON FOR VISIT





PLAN OF CARE





VITAL SIGNS





MEDICATIONS

Unknown Medications



RESULTS

No Results



PROCEDURES

No Known procedures



INSTRUCTIONS





MEDICATIONS ADMINISTERED

No Known Medications



MEDICAL (GENERAL) HISTORY





                    Type                Description         Date

 

                          Medical History           allergic rhinitis- rec's edwige donahue allergy injections from Dr Rojas office                            

 

                    Medical History     mood disorder        

 

                    Medical History     Leukocytosis- has been to hematology  

 

                    Medical History     Hyperlipidemia       

 

                          Medical History           Left leg pain- multiple imag

ing performed and ortho referral 

placed                                   

 

                          Medical History           TUBULAR ADENOMA AND GASTRITI

S ON COLONOSOCPY AUG 2016 -MULTIPLE 

POLYPS                                   

 

                    Medical History     Helicobacter pylori (H. pylori) infectio

n Hx  

 

                    Surgical History    cholecystectomy     

 

                    Surgical History     section    , 

 

                    Surgical History    tonsillectomy       2015

 

                    Surgical History    colonoscopy         2016

 

                    Surgical History    colonscopy          2017

 

                    Surgical History    Right Knee scope    2017

## 2020-06-23 NOTE — XMS REPORT
[facility]

                             Created on: 2018



Jasonwhit April

External Reference #: [id number]

: 1975

Sex: Female



Demographics





                          Address                   [address] [place]  [address]

 

                          Preferred Language        Unknown

 

                          Marital Status            Unknown

 

                          Jainism Affiliation     Unknown

 

                          Race                      Unknown

 

                          Ethnic Group              Unknown





Author





                          Author                    Darby SHARPE

 

                          [facility]

 

                          Address                   [address] [place]  [address]



 

                          Phone                     [phone]







Care Team Providers





                    Care Team Member Name Role                Phone

 

                    ANNETTE SHARPEWNYA       Unavailable         [phone]







PROBLEMS





          Type      Condition ICD9-CM Code AGJ60-SN Code Onset Dates Condition S

tatus SNOMED 

Code

 

          Problem   History of leukocytosis           Z86.2               Active

    [id number]

 

          Problem   Duarte's neuroma of right foot           G57.61             

 Active    [id number]

 

                          Problem                   Type 2 diabetes mellitus wit

hout complication, without long-term current

use of insulin              E11.9                     Active       [id number]

 

          Problem   Tubular adenoma of colon           D12.6               Activ

e    [id number]

 

          Problem   Mixed hyperlipidemia           E78.2               Active   

 [id number]







ALLERGIES

No Information



ENCOUNTERS





                Encounter       Location        Date            Diagnosis

 

                          [facility]  [address] [facility] [id number]

[address] [place] [address] 

[address]                              Acute pain of left shoulder 

M25.512

 

                          [facility]     [address] [facility] [id number]

[address] [place] [address]

                          23 2018               

 

                          [facility]     [address] [facility] [id number]

[address] [place] [address]

                          15 2018              Type 2 diabetes mellitus wit

hout complication, without long-term 

current use of insulin E11.9

 

                          [facility]     [address] [facility] [id number]

[address] [place] [address]

                          06 2018              Type 2 diabetes mellitus wit

hout complication, without long-term 

current use of insulin E11.9 ; Tubular adenoma of colon D12.6 ; Mixed 
hyperlipidemia E78.2 ; History of leukocytosis Z86.2 and Screening breast 
examination Z12.31

 

                          [facility]     [address] [facility] [id number]

[address] [place] [address]

                                        Metatarsalgia of right foot 

M77.41 and Type 2 diabetes mellitus 

without complication, without long-term current use of insulin E11.9

 

                          [place] [address] [facility] [id number]

[address] [place] [address]

                                        Capsulitis M77.9 and Metatar

salgia of right foot M77.41

 

                          [place] [address] [place] [id number]

[address] [place] [address]

                          08 Sep, 2017              Capsulitis M77.9

 

                          [place] [address] [place] [id number]

[address] [place] [address]

                          06 Sep, 2017               

 

                          [place] [address] [place] [id number] [place] [address]

                          03 Aug, 2017              Type 2 diabetes mellitus wit

hout complication, without long-term 

current use of insulin E11.9 ; Tubular adenoma of colon D12.6 and Mixed 
hyperlipidemia E78.2

 

                          [place] [address] [place] [id number]

[address] [place] [address]

                                        Metatarsalgia of right foot 

M77.41 and Capsulitis M77.9

 

                          [place] [address] [place] [id number]

[address] [place] [address]

                                         

 

                          [facility]  [address] [place] [id number] [place] [address] 

[address]                              Duarte's neuroma of right fo

ot G57.61

 

                          [place] [address] [place] [id number]

[address] [place] [address]

                                        Mixed hyperlipidemia E78.2 a

nd Type 2 diabetes mellitus without 

complication, without long-term current use of insulin E11.9

 

                          [place] [address] [facility] [id number]

[address] [place] [address]

                          10 Brandon, 2017              Type 2 diabetes mellitus wit

hout complication, without long-term 

current use of insulin E11.9 ; Tubular adenoma of colon D12.6 and Mixed 
hyperlipidemia E78.2

 

                          [place] [address] [place] [id number]

[address] [place] [address]

                          22 Dec, 2016              Mixed hyperlipidemia E78.2

 

                          [place] [address] [facility] [id number]

[address] [place] [address]

                                         

 

                          [place] [address] [address]

                                        Mixed hyperlipidemia E78.2

 

                          [place] [address] [address]

                                        Mixed hyperlipidemia E78.2

 

                          [place] [address] [address]

                          08 Sep, 2016               

 

                          [place] [address] [place] [id number] [place] [address]

                          10 Aug, 2016              Type 2 diabetes mellitus wit

hout complication, without long-term 

current use of insulin E11.9 ; Helicobacter pylori (H. pylori) infection A04.8 
and Mixed hyperlipidemia E78.2

 

                          [place] [address] [address]

                          08 Aug, 2016              Type 2 diabetes mellitus wit

hout complication, without long-term 

current use of insulin E11.9

 

                          [place] [address] [address]

                          03 Aug, 2016              Type 2 diabetes mellitus wit

hout complication, without long-term 

current use of insulin E11.9

 

                          [place] [address] [place] [id number]

[address] [place] [address]

                          02 Aug, 2016              Dyspepsia R10.13 ; Upper abd

ominal pain R10.10 ; Bowel habit 

changes R19.4 ; History of leukocytosis Z86.2 and Helicobacter pylori (H. 
pylori) infection A04.8

 

                          [facility]  [address] 

[address]                27 May, 2016              Environmental allergies Z91.

09

 

                          [place] [address] [address]

                                        Left leg pain M79.605 ; Loca

lized swelling of lower leg R22.40 and 

Upper respiratory infection J06.9

 

                          [place] [address] [facility] [id number]

[address] [place] [address]

                          28 Dec, 2015               

 

                          [facility]     [address] [facility] [id number]

[address] [place] [address]

                          21 Dec, 2015              Left leg pain M79.605

 

                          [facility]     [address] [facility] [id number]

[address] [place] [address]

                          10 Dec, 2015              Left leg pain M79.605 and Lo

calized swelling of lower leg R22.40

 

                          [facility]     [address] [facility] [id number]

[address] [place] [address]

                          01 Dec, 2015              Left leg pain M79.605

 

                          [place] [address] [facility] [id number] [place] [address]

                                        Encounter for immunization Z

23

 

                          [place] [address] [facility] [id number] [place] [address]

                          12 Oct, 2015              Bronchitis J40

 

                          [place] [address] [place] [id number]

[address] [place] [address]

                          30 Sep, 2015              Physical exam, pre-employmen

t V70.5 ; Encounter for PPD test V74.1 

and Hyperlipidemia 272.4

 

                          [place] [address] [facility] [id number]

[address] [place] [address]

                          21 Sep, 2015               

 

                          [facility]     [address] [facility] [id number]

[address] [place] [address]

                          21 Sep, 2015               

 

                          [facility]     [address] [facility] [id number]

[address] [place] [address]

                          09 Sep, 2015               

 

                          [facility]     [address] [facility] [id number]

[address] [place] [address]

                          04 Sep, 2015              Elevated blood sugar 790.29

 

                          [place] [address] [facility] [id number]

[address] [place] [address]

                          03 Sep, 2015              Elevated blood sugar 790.29

 

                          [facility]     [address] [facility] [id number]

[address] [place] [address]

                          03 Sep, 2015              Palpitations 785.1

 

                          [facility]     [address] [id number]

[address] [place] [address]

                          01 Sep, 2015              Palpitations 785.1

 

                          [facility]     [address] [id number]

[address] [place] [address]

                                         

 

                          [facility]     [address] [facility] [id number]

[address] [place] [address]

                          28 May, 2015              Hand pain, right 729.5 and D

epression with anxiety 300.4

 

                          [facility]     [address] [id number]

[address] [place] [address]

                                         

 

                          [facility]     [address] [id number]

[address] [place] [address]

                                         

 

                          [facility]     [address] [facility] [id number]

[address] [place] [address]

                          05 Mar, 2015               

 

                          [facility]     [address] [id number]

[address] [place] [address]

                          05 Mar, 2015               

 

                          [facility]     [address] [id number]

[address] [place] [address]

                                         

 

                          [facility]     [address] [id number]

[address] [place] [address]

                                         

 

                          [facility]     [address] [facility] [id number]

[address] [place] [address]

                          10 Feb, 2015               

 

                          [facility]     [address] [id number]

[address] [place] [address]

                          10 Feb, 2015               

 

                          [facility]     [address] [facility] [id number]

[address] [place] [address]

                                         

 

                          [facility]     [address] [id number]

[address] [place] [address]

                                         

 

                          [facility]     [address] [facility] [id number]

[address] [place] [address]

                                         

 

                          [facility]     [address] [id number]

[address] [place] [address]

                                         

 

                          [facility]     [address] [id number]

[address] [place] [address], [place] [address]

                                         

 

                          [facility]     [address] [id number]

[address] [place] [address], [place] [address]

                                         

 

                          [facility]     [address] [id number]

[address] [place] [address], [place] [address]

                                         

 

                          [facility]     [address] [id number]

[address] [place] [address]

                                         

 

                          [facility]     [address] [id number]

[address] [place] [address], [place] [address]

                                         

 

                          [facility]     [address] [id number]

[address] [place] [address]

                                         

 

                          [facility]     [address] [id number]

[address] [place] [address], [place] [address]

                                         

 

                          [facility]     [address] [id number]

[address] [place] [address]

                                         

 

                          [facility]     [address] [id number]

[address] [place] [address]

                          15 [place], 2015               

 

                          [facility]     [address] [id number]

[address] [place] [address]

                          15 [place], 2015               

 

                          [facility]     [address] [id number]

[address] [place] [address]

                                         

 

                          [facility]     [address] [id number]

[address] [place] [address]

                                         

 

                          [facility]     [address] [id number]

[address] [place] [address], [place] [address]

                          15 Oct, 2014               

 

                          [facility]     [address] [id number]

[address] [place] [address]

                          15 Oct, 2014               

 

                          [facility]     [address] [id number]

[address] [place] [address]

                          14 Oct, 2014               

 

                          [facility]     [address] [id number]

[address] [place] [address], [place] [address]

                          14 Oct, 2014               

 

                          [facility]     [address] [id number]

[address] [place] [address], [place] [address]

                          07 Oct, 2014               

 

                          [facility]     [address] [id number]

[address] [place] [address], [place] [address]

                          07 Oct, 2014               

 

                          [facility]     [address] [id number]

[address] [place] [address], [place] [address]

                          12 Sep,                

 

                          [facility]     [address] [id number]

[address] [place] [address], [place] [address]

                          12 Sep,                

 

                          [facility]     [address] [id number]

[address] [place] [address], [place] [address]

                          04 Sep, 2014               

 

                          [facility]     [address] [id number]

[address] [place] [address], [place] [address]

                          03 Sep,                

 

                          [facility]     [address] [id number]

[address] [place] [address], [place] [address]

                          03 Sep,                

 

                          [facility]     [address] [id number]

[address] [place] [address], [place] [address]

                          02 Sep, 2014               

 

                          [facility]     [address] [id number]

[address] [place] [address], [place] [address]

                          02 Sep, 2014               

 

                          [facility]     [address] [id number]

[address] [place] [address], [place] [address]

                          30 Aug, 2014               

 

                          [facility]     [address] [id number]

[address] [place] [address], [place] [address]

                          30 Aug, 2014               

 

                          [facility]     [address] [id number]

[address] [place] [address], [place] [address]

                          19 Aug, 2014               

 

                          [facility]     [address] [id number]

[address] [place] [address], [place] [address]

                          19 Aug, 2014               

 

                          [facility]     [address] [id number]

[address] [place] [address], [place] [address]

                          14 Aug, 2014               

 

                          [facility]     [address] [id number]

[address] [place] [address], [place] [address]

                          14 Aug, 2014               

 

                          [facility]     [address] [id number]

[address] [address]

                          13 Aug, 2014               

 

                          [facility]     [address] [id number]

[address] [place] [address], [place] [address]

                          13 Aug, 2014               

 

                          [facility]     [address] [id number]

[address] [place] [address], [place] [address]

                                         

 

                          [facility]     [address] [id number]

[address] [place] [address], [place] [address]

                                         

 

                          [facility]     [address] [id number]

[address] [place] [address], [place] [address]

                                         

 

                          [facility]     [address] [id number]

[address] [place] [address], [place] [address]

                                         

 

                          [facility]     [address] [id number]

[address] [place] [address], [place] [address]

                                         

 

                          [facility]     [address] [id number]

[address] [place] [address], [place] [address]

                                         

 

                          [facility]     [address] [id number]

[address] [place] [address], [place] [address]

                                         

 

                          [facility]     [address] [id number]

[address] [place] [address], [place] [address]

                                         

 

                          [facility]     [address] [id number]

[address] [place] [address], [place] [address]

                                         

 

                          [facility]     [address] [id number]

[address] [place] [address], [place] [address]

                                         

 

                          [facility]     [address] [id number]

[address] [place] [address], [place] [address]

                                         

 

                          [facility]     [address] [id number]

[address] [place] [address], [place] [address]

                                         

 

                          [facility]     [address] [id number]

[address] [place] [address], [place] [address]

                                         

 

                          [facility]     [address] [id number]

[address] [place] [address], [place] [address]

                          16 Jul,                

 

                          [facility]     [address] [id number]

[address] [place] [address], [place] [address]

                          ,                

 

                          [facility]     [address] [id number]

[address] [place] [address], [place] [address]

                          ,                

 

                          [facility]     [address] [id number]

[address] [place] [address], [place] [address]

                          ,                

 

                          [facility]     [address] [id number]

[address] [place] [address], [place] [address]

                          ,                

 

                          [facility]     [address] [id number]

[address] [place] [address], [place] [address]

                                         

 

                          [facility]     [address] [id number]

[address] [place] [address], [place] [address]

                                         

 

                          [facility]     [address] [id number]

[address] [place] [address], [place] [address]

                                         

 

                          [facility]     [address] [id number]

[address] [place] [address], [place] [address]

                                         

 

                          [facility]     [address] [id number]

[address] [place] [address], [place] [address]

                                         

 

                          [facility]     [address] [id number]

[address] [place] [address], [place] [address]

                                         

 

                          [facility]     [address] [id number]

[address] [place] [address], [place] [address]

                                         

 

                          [facility]     [address] [id number]

[address] [place] [address], [place] [address]

                                         

 

                          [facility]     [address] [id number]

[address] [place] [address], [place] [address]

                                         

 

                          [facility]     [address] [id number]

[address] [place] [address], [place] [address]

                          27 Mar, 2014               

 

                          [facility]     [address] [id number]

[address] [place] [address], [place] [address]

                          27 Mar, 2014               

 

                          [facility]     [address] [id number]

[address] [place] [address], [place] [address]

                                         

 

                          [facility]     [address] [id number]

[address] [place] [address], [place] [address]

                                         

 

                          [facility]     [address] [id number]

[address] [place] [address], [place] [address]

                                         

 

                          [facility]     [address] [id number]

[address] [place] [address], [place] [address]

                                         

 

                          [facility]     [address] [id number]

[address] [place] [address], [place] [address]

                          08 Oct, 2013               

 

                          [facility]     [address] [id number]

[address] [place] [address], [place] [address]

                          04 Oct, 2013               

 

                          [facility]     [address] [id number]

[address] [place] [address], [place] [address]

                          03 Oct, 2013               

 

                          [facility]     [address] [id number]

[address] [place] [address], [place] [address]

                          02 Oct, 2013               

 

                          [facility]     [address] [id number]

[address] [place] [address], [place] [address]

                          01 Oct, 2013               

 

                          [facility]     [address] [id number]

[address] [place] [address], [place] [address]

                          20 Sep, 2013               

 

                          [facility]     [address] [id number]

[address] [place] [address], [place] [address]

                          08 Aug, 2013               

 

                          [facility]     [address] [id number]

[address] [place] [address], [place] [address]

                          24 May, 2013               

 

                          [facility]     [address] [id number]

[address] [place] [address], [place] [address]

                          13 May, 2013               

 

                          [facility]     [address] [id number]

[address] [place] [address], [place] [address]

                                         

 

                          [facility]     [address] [id number]

[address] [place] [address], [place] [address]

                                         

 

                          [facility]     [address] [id number]

[address] [place] [address]

                                         

 

                          [facility]     [address] [id number]

[address] [place] [address]

                                         

 

                          [facility]     [address] [id number]

[address] [place] [address]

                          02 Oct, 2012               

 

                          [facility]     [address] [id number]

[address] [place] [address]

                          02 Oct, 2012               

 

                          [facility]     [address] [id number]

[address] [place] [address]

                          21 Sep, 2012               

 

                          [facility]     [address] [id number]

[address] [place] [address]

                          06 Aug, 2012               

 

                          [facility]     [address] [id number]

[address] [place] [address]

                          02 Aug, 2012               

 

                          [facility]     [address] [id number]

[address] [place] [address]

                                         

 

                          [facility]     [address] [id number]

[address] [place] [address]

                                         

 

                          [facility]     [address] [id number]

[address] [place] [address]

                          15 Eusebio, 2012               

 

                          [facility]     [address] [id number]

[address] [place] [address]

                                         

 

                          [facility]     [address] [id number]

[address] [place] [address]

                          10 Apr, 2012               

 

                          [facility]     [address] [id number]

[address] [place] [address]

                          22 Mar, 2012               

 

                          [facility]     [address] [id number]

[address] [place] [address]

                          20 Mar, 2012               

 

                          [facility]     [address] [id number]

[address] [place] [address]

                          14 Mar, 2012               

 

                          [facility]     [address] [id number]

[address] [place] [address]

                                         







IMMUNIZATIONS

No Known Immunizations



SOCIAL HISTORY

Never Assessed



REASON FOR VISIT

Resend Rx



PLAN OF CARE





VITAL SIGNS





MEDICATIONS





        Medication Instructions Dosage  Frequency Start Date End Date Duration S

tatus

 

        Nexium 40 mg Orally Once a day 1 capsule 24h                     30     

 Active

 

        Crestor 10 mg Orally Once a day 1 tablet 24h                     30     

 Active







RESULTS

No Results



PROCEDURES

No Known procedures



INSTRUCTIONS





MEDICATIONS ADMINISTERED

No Known Medications



MEDICAL (GENERAL) HISTORY





                    Type                Description         Date

 

                          Medical History           allergic rhinitis- rec's edwige

kly allergy injections from Dr Rojas office                            

 

                    Medical History     mood disorder        

 

                    Medical History     Leukocytosis- has been to hematology  

 

                    Medical History     Hyperlipidemia       

 

                          Medical History           Left leg pain- multiple imag

ing performed and ortho referral 

placed                                   

 

                          Medical History           TUBULAR ADENOMA AND GASTRITI

S ON COLONOSOCPY AUG 2016 -MULTIPLE 

POLYPS                                   

 

                    Medical History     Helicobacter pylori (H. pylori) infectio

n Hx  

 

                    Surgical History    cholecystectomy     2006

 

                    Surgical History     section    , 

 

                    Surgical History    tonsillectomy       2015

 

                    Surgical History    colonoscopy         2016

 

                    Surgical History    colonscopy          2017

 

                    Surgical History    Right Knee scope    2017

## 2020-06-23 NOTE — XMS REPORT
Continuity of Care Document

                             Created on: 2020



DELONSTEVEN, APRIL

External Reference #: [id number]

: 1975

Sex: Female



Demographics





                          Home Phone                [phone]

 

                          Preferred Language        Unknown

 

                          Marital Status            Unknown

 

                          Evangelical Affiliation     Unknown

 

                          Race                      Unknown

 

                          Ethnic Group              Unknown





Author





                          Organization              Unknown

 

                          Address                   Unknown

 

                          Phone                     Unavailable



              



Allergies

      



             Active           Description           Code           Type         

  Severity   

                Reaction           Onset           Reported/Identified          

 

Relationship to Patient                 Clinical Status        

 

             Yes           Penicillins                        Drug Allergy      

              

                                       10/02/2012                               

  

 

             Yes           Penicillins                        Drug Allergy      

     N/A      

             N/A                        10/02/2012                              

   

 

             Yes           Penicillins           [id number]           Drug Aller

gy           

Unknown           N/A                        2017                         

  

     



                      



Medications

      



There is no data.                  



Problems

      



             Date Dx Coded           Attending           Type           Code    

       

Diagnosis                               Diagnosed By        

 

             10/06/2008                                     V65.11           NEW

 MOMMY VISIT   

                                                 

 

             10/06/2008                                     V65.11           NEW

 MOMMY VISIT   

                                                 

 

             10/06/2008                                     V65.11           NEW

 MOMMY VISIT   

                                                 

 

             10/06/2008                                     V65.11           NEW

 MOMMY VISIT   

                                                 

 

             10/06/2008                                     V65.11           NEW

 MOMMY VISIT   

                                                 

 

             10/06/2008                                     V65.11           NEW

 MOMMY VISIT   

                                                 

 

             10/06/2008           MARY GARCIA DO                        V65.11

           NEW

MOMMY VISIT                                      

 

             10/06/2008           MARY GARCIA DO K                        V65.11

           NEW

MOMMY VISIT                                      

 

             10/06/2008           MARY GARCIA DO K                        V65.11

           NEW

MOMMY VISIT                                      

 

             10/06/2008           MARY GARCIA DO K                        V65.11

           NEW

MOMMY VISIT                                      

 

                10/06/2008           FRANCESCA BUSBY R                  

         V65.11     

                          NEW MOMMY VISIT                    

 

             10/06/2008           MADL APRN, VENECIA L                        V65

.11           

NEW MOMMY VISIT                                  

 

             10/06/2008           MADL APRN, VENECIA L                        V65

.11           

NEW MOMMY VISIT                                  

 

             10/06/2008           MADL APRN, VENECIA L                        V65

.11           

NEW MOMMY VISIT                                  

 

             10/06/2008           MADL APRN, VENECIA L                        V65

.11           

NEW MOMMY VISIT                                  

 

             10/06/2008           MADL APRN, VENECIA L                        V65

.11           

NEW MOMMY VISIT                                  

 

             10/06/2008           MADL APRN, VENECIA L                        V65

.11           

NEW MOMMY VISIT                                  

 

                10/06/2008           DAHLIA PARKINSON YONNY R                      

     V65.11         

                          NEW MOMMY VISIT                    

 

             10/06/2008           MADL APRN, VENECIA L                        V65

.11           

NEW MOMMY VISIT                                  

 

             10/06/2008           MARY GARCIA DO                        V65.11

           NEW

MOMMY VISIT                                      

 

             10/06/2008           ANNEMARIE APRHUMBERTO, VENECIA L                        V65

.11           

NEW MOMMY VISIT                                  

 

             10/06/2008           MARY GARCIA DO                        V65.11

           NEW

MOMMY VISIT                                      

 

             10/06/2008           MADL APRN, VENECIA L                        V65

.11           

NEW MOMMY VISIT                                  

 

           2009                       Ot           845.00                 

           

   

 

           2009                       Ot           959.7                  

           

  

 

           2009                       Ot           E849.0                 

           

   

 

           2009                       Ot           E917.9                 

           

   

 

             2011                                     034.0           STRE

PTOCOCCAL SORE 

THROAT                                           

 

             2011                                     034.0           STRE

PTOCOCCAL SORE 

THROAT                                           

 

             2011                                     034.0           STRE

PTOCOCCAL SORE 

THROAT                                           

 

             2011                                     034.0           STRE

PTOCOCCAL SORE 

THROAT                                           

 

             2011                                     034.0           STRE

PTOCOCCAL SORE 

THROAT                                           

 

             2011                                     034.0           STRE

PTOCOCCAL SORE 

THROAT                                           

 

             2011           MARY GARCIA DO                        034.0 

          

STREPTOCOCCAL SORE THROAT                        

 

             2011           MARY GARCIA DO                        034.0 

          

STREPTOCOCCAL SORE THROAT                        

 

             2011           MARY GARCIA DO                        034.0 

          

STREPTOCOCCAL SORE THROAT                        

 

             2011           MARY GARCIA DO                        034.0 

          

STREPTOCOCCAL SORE THROAT                        

 

                2011           FRANCESCA BUSBY                  

         034.0      

                          STREPTOCOCCAL SORE THROAT                    

 

             2011           MADL APRN, VENECIA L                        034

.0           

STREPTOCOCCAL SORE THROAT                        

 

             2011           MADL APRN, VENECIA L                        034

.0           

STREPTOCOCCAL SORE THROAT                        

 

             2011           MADL APRN, VENECIA L                        034

.0           

STREPTOCOCCAL SORE THROAT                        

 

             2011           MADL APRN, VENECIA L                        034

.0           

STREPTOCOCCAL SORE THROAT                        

 

             2011           MADL APRN, VENECIA L                        034

.0           

STREPTOCOCCAL SORE THROAT                        

 

             2011           MADL APRN, VENECIA L                        034

.0           

STREPTOCOCCAL SORE THROAT                        

 

                2011           YONNY TRENT                      

     034.0          

                          STREPTOCOCCAL SORE THROAT                    

 

             2011           MADL APRN, VENECIA L                        034

.0           

STREPTOCOCCAL SORE THROAT                        

 

             2011           MARY GARCIA DO                        034.0 

          

STREPTOCOCCAL SORE THROAT                        

 

             2011           VENECIA HOBSON L                        034

.0           

STREPTOCOCCAL SORE THROAT                        

 

             2011           MARY GARCIA DO K                        034.0 

          

STREPTOCOCCAL SORE THROAT                        

 

             2011           MADL APRNEGRO PAULA L                        034

.0           

STREPTOCOCCAL SORE THROAT                        

 

             2011                                     V25.02           GEN

ERAL COUNSELING

ON INITIATION OF OTHER CONTRACEPTIVE MEASURES                    

 

             2011                                     V72.31           ROU

[facility] 

GYNECOLOGICAL EXAMINATION                        

 

             2011                                     V25.02           GEN

ERAL COUNSELING

ON INITIATION OF OTHER CONTRACEPTIVE MEASURES                    

 

             2011                                     V72.31           ROU

[facility] 

GYNECOLOGICAL EXAMINATION                        

 

             2011                                     V25.02           GEN

ERAL COUNSELING

ON INITIATION OF OTHER CONTRACEPTIVE MEASURES                    

 

             2011                                     V72.31           ROU

[facility] 

GYNECOLOGICAL EXAMINATION                        

 

             2011                                     V25.02           GEN

ERAL COUNSELING

ON INITIATION OF OTHER CONTRACEPTIVE MEASURES                    

 

             2011                                     V72.31           ROU

[facility] 

GYNECOLOGICAL EXAMINATION                        

 

             2011                                     V25.02           GEN

ERAL COUNSELING

ON INITIATION OF OTHER CONTRACEPTIVE MEASURES                    

 

             2011                                     V72.31           ROU

[facility] 

GYNECOLOGICAL EXAMINATION                        

 

             2011                                     V25.02           GEN

ERAL COUNSELING

ON INITIATION OF OTHER CONTRACEPTIVE MEASURES                    

 

             2011                                     V72.31           ROU

[place] 

GYNECOLOGICAL EXAMINATION                        

 

             2011           MARY GARCIA DO                        V25.02

           

GENERAL COUNSELING ON INITIATION OF OTHER CONTRACEPTIVE MEASURES                

   

 

             2011           MARY GARCIA DO                        V72.31

           

ROUTINE GYNECOLOGICAL EXAMINATION                    

 

             2011           MARY GARCIA DO                        V25.02

           

GENERAL COUNSELING ON INITIATION OF OTHER CONTRACEPTIVE MEASURES                

   

 

             2011           MARY GARCIA DO                        V72.31

           

ROUTINE GYNECOLOGICAL EXAMINATION                    

 

             2011           MARY GARCIA DO                        V25.02

           

GENERAL COUNSELING ON INITIATION OF OTHER CONTRACEPTIVE MEASURES                

   

 

             2011           MARY GARCIA DO                        V72.31

           

ROUTINE GYNECOLOGICAL EXAMINATION                    

 

             2011           MARY GARCIA DO                        V25.02

           

GENERAL COUNSELING ON INITIATION OF OTHER CONTRACEPTIVE MEASURES                

   

 

             2011           MARY GARCIA DO                        V72.31

           

ROUTINE GYNECOLOGICAL EXAMINATION                    

 

                2011           FRANCESCA BUSBY                  

         V25.02     

                          GENERAL COUNSELING ON INITIATION OF OTHER CONTRACEPTIV

E MEASURES           

        

 

                2011           FRANCESCA BUSBY                  

         V72.31     

                          ROUTINE GYNECOLOGICAL EXAMINATION                    

 

             2011           VENECIA HOBSON L                        V25

.02           

GENERAL COUNSELING ON INITIATION OF OTHER CONTRACEPTIVE MEASURES                

   

 

             2011           MADL APRN, VENECIA L                        V72

.31           

ROUTINE GYNECOLOGICAL EXAMINATION                    

 

             2011           MADL APRN, VENECIA L                        V25

.02           

GENERAL COUNSELING ON INITIATION OF OTHER CONTRACEPTIVE MEASURES                

   

 

             2011           MADL APRN, VENECIA L                        V72

.31           

ROUTINE GYNECOLOGICAL EXAMINATION                    

 

             2011           MADL APRN, VENECIA L                        V25

.02           

GENERAL COUNSELING ON INITIATION OF OTHER CONTRACEPTIVE MEASURES                

   

 

             2011           MADL APRN, VENECIA L                        V72

.31           

ROUTINE GYNECOLOGICAL EXAMINATION                    

 

             2011           MADL APRN, VENECIA L                        V25

.02           

GENERAL COUNSELING ON INITIATION OF OTHER CONTRACEPTIVE MEASURES                

   

 

             2011           MADL APRN, VENECIA L                        V72

.31           

ROUTINE GYNECOLOGICAL EXAMINATION                    

 

             2011           MADL APRN, VENECAI L                        V25

.02           

GENERAL COUNSELING ON INITIATION OF OTHER CONTRACEPTIVE MEASURES                

   

 

             2011           MADL APRN, VENECIA L                        V72

.31           

ROUTINE GYNECOLOGICAL EXAMINATION                    

 

             2011           MADL APRN, VENECIA L                        V25

.02           

GENERAL COUNSELING ON INITIATION OF OTHER CONTRACEPTIVE MEASURES                

   

 

             2011           MADL APRN, VENECIA L                        V72

.31           

ROUTINE GYNECOLOGICAL EXAMINATION                    

 

                2011           DAHLIA PARKINSON YONNY R                      

     V25.02         

                          GENERAL COUNSELING ON INITIATION OF OTHER CONTRACEPTIV

E MEASURES               

    

 

                2011           DAHLIA PARKINSON YONNY R                      

     V72.31         

                          ROUTINE GYNECOLOGICAL EXAMINATION                    

 

             2011           MADL APRN, VENECIA L                        V25

.02           

GENERAL COUNSELING ON INITIATION OF OTHER CONTRACEPTIVE MEASURES                

   

 

             2011           MADL APRN, VENECIA L                        V72

.31           

ROUTINE GYNECOLOGICAL EXAMINATION                    

 

             2011           GARCIA DO MARY K                        V25.02

           

GENERAL COUNSELING ON INITIATION OF OTHER CONTRACEPTIVE MEASURES                

   

 

             2011           GARCIA DO MARY K                        V72.31

           

ROUTINE GYNECOLOGICAL EXAMINATION                    

 

             2011           MADL APRN, VENECIA L                        V25

.02           

GENERAL COUNSELING ON INITIATION OF OTHER CONTRACEPTIVE MEASURES                

   

 

             2011           MADL APRN, VENECIA L                        V72

.31           

ROUTINE GYNECOLOGICAL EXAMINATION                    

 

             2011           GARCIA DO MARY K                        V25.02

           

GENERAL COUNSELING ON INITIATION OF OTHER CONTRACEPTIVE MEASURES                

   

 

             2011           GARCIA DO MARY K                        V72.31

           

ROUTINE GYNECOLOGICAL EXAMINATION                    

 

             2011           MADL APRN, VENECIA L                        V25

.02           

GENERAL COUNSELING ON INITIATION OF OTHER CONTRACEPTIVE MEASURES                

   

 

             2011           MADL APRN, VENECIA L                        V72

.31           

ROUTINE GYNECOLOGICAL EXAMINATION                    

 

             2011                                     V25.49           DAILY

VEILLANCE OF 

OTHER CONTRACEPTIVE METHOD                       

 

             2011                                     V25.49           DAILY

VEILLANCE OF 

OTHER CONTRACEPTIVE METHOD                       

 

             2011                                     V25.49           DAILY

VEILLANCE OF 

OTHER CONTRACEPTIVE METHOD                       

 

             2011                                     V25.49           DAILY

VEILLANCE OF 

OTHER CONTRACEPTIVE METHOD                       

 

             2011                                     V25.49           DAILY

VEILLANCE OF 

OTHER CONTRACEPTIVE METHOD                       

 

             2011                                     V25.49           DAILY

VEILLANCE OF 

OTHER CONTRACEPTIVE METHOD                       

 

             2011           GARCIA DO, MARY K                        V25.49

           

SURVEILLANCE OF OTHER CONTRACEPTIVE METHOD                    

 

             2011           GARCIA DO MARY K                        V25.49

           

SURVEILLANCE OF OTHER CONTRACEPTIVE METHOD                    

 

             2011           GARCIA DO, MARY K                        V25.49

           

SURVEILLANCE OF OTHER CONTRACEPTIVE METHOD                    

 

             2011           GARCIA DO MARY K                        V25.49

           

SURVEILLANCE OF OTHER CONTRACEPTIVE METHOD                    

 

                2011           ALEXANDER BUSBYIA R                  

         V25.49     

                          SURVEILLANCE OF OTHER CONTRACEPTIVE METHOD            

        

 

             2011           MADL APRN, VENECIA L                        V25

.49           

SURVEILLANCE OF OTHER CONTRACEPTIVE METHOD                    

 

             2011           MADL APRN, VENECIA L                        V25

.49           

SURVEILLANCE OF OTHER CONTRACEPTIVE METHOD                    

 

             2011           MADL APRN, VENECIA L                        V25

.49           

SURVEILLANCE OF OTHER CONTRACEPTIVE METHOD                    

 

             2011           MADL APRN, VENECIA L                        V25

.49           

SURVEILLANCE OF OTHER CONTRACEPTIVE METHOD                    

 

             2011           MADL APRN, VENECIA L                        V25

.49           

SURVEILLANCE OF OTHER CONTRACEPTIVE METHOD                    

 

             2011           MADL APRN, VENECIA L                        V25

.49           

SURVEILLANCE OF OTHER CONTRACEPTIVE METHOD                    

 

                2011           DAHLIA PARKINSON YONNY R                      

     V25.49         

                          SURVEILLANCE OF OTHER CONTRACEPTIVE METHOD            

        

 

             2011           MADL APRN, VENECIA L                        V25

.49           

SURVEILLANCE OF OTHER CONTRACEPTIVE METHOD                    

 

             2011           GARCIA DO, MARY K                        V25.49

           

SURVEILLANCE OF OTHER CONTRACEPTIVE METHOD                    

 

             2011           MADL APRN, VENECIA L                        V25

.49           

SURVEILLANCE OF OTHER CONTRACEPTIVE METHOD                    

 

             2011           GARCIA DO MARY K                        V25.49

           

SURVEILLANCE OF OTHER CONTRACEPTIVE METHOD                    

 

             2011           MADL APRN, VENECIA L                        V25

.49           

SURVEILLANCE OF OTHER CONTRACEPTIVE METHOD                    

 

             2012                                     V25.9           CONT

RACEPTION 

MANAGEMENT                                       

 

             2012                                     V76.2           CERV

ICAL CANCER 

SCREENING (PAP SMEAR)                            

 

             2012                                     V25.9           CONT

RACEPTION 

MANAGEMENT                                       

 

             2012                                     V76.2           CERV

ICAL CANCER 

SCREENING (PAP SMEAR)                            

 

             2012                                     V25.9           CONT

RACEPTION 

MANAGEMENT                                       

 

             2012                                     V76.2           CERV

ICAL CANCER 

SCREENING (PAP SMEAR)                            

 

             2012                                     V25.9           CONT

RACEPTION 

MANAGEMENT                                       

 

             2012                                     V76.2           CERV

ICAL CANCER 

SCREENING (PAP SMEAR)                            

 

             2012                                     V25.9           CONT

RACEPTION 

MANAGEMENT                                       

 

             2012                                     V76.2           CERV

ICAL CANCER 

SCREENING (PAP SMEAR)                            

 

             2012                                     V25.9           CONT

RACEPTION 

MANAGEMENT                                       

 

             2012                                     V76.2           CERV

ICAL CANCER 

SCREENING (PAP SMEAR)                            

 

             2012           MARY GARCIA DO                        V25.9 

          

CONTRACEPTION MANAGEMENT                         

 

             2012           MARY GARCIA DO                        V76.2 

          

CERVICAL CANCER SCREENING (PAP SMEAR)                    

 

             2012           MARY GARCIA DO                        V25.9 

          

CONTRACEPTION MANAGEMENT                         

 

             2012           MARY GARCIA DO                        V76.2 

          

CERVICAL CANCER SCREENING (PAP SMEAR)                    

 

             2012           MARY GARCIA DO                        V25.9 

          

CONTRACEPTION MANAGEMENT                         

 

             2012           MARY GARCIA DO K                        V76.2 

          

CERVICAL CANCER SCREENING (PAP SMEAR)                    

 

             2012           MARY GARCIA DO K                        V25.9 

          

CONTRACEPTION MANAGEMENT                         

 

             2012           MARY GARCIA DO K                        V76.2 

          

CERVICAL CANCER SCREENING (PAP SMEAR)                    

 

                2012           JULIUS PARKINSON FRANCESCA R                  

         V25.9      

                          CONTRACEPTION MANAGEMENT                    

 

                2012           MADRID APRHUMBERTO, FRANCESCA R                  

         V76.2      

                          CERVICAL CANCER SCREENING (PAP SMEAR)                 

   

 

             2012           MADL APRLAMAR PAULNYA L                        V25

.9           

CONTRACEPTION MANAGEMENT                         

 

             2012           MADL APRLAMAR PAULNYA L                        V76

.2           

CERVICAL CANCER SCREENING (PAP SMEAR)                    

 

             2012           MADL APRNEGRO PAULA L                        V25

.9           

CONTRACEPTION MANAGEMENT                         

 

             2012           MADL APRHUMBERTO, VENECIA L                        V76

.2           

CERVICAL CANCER SCREENING (PAP SMEAR)                    

 

             2012           MADL APRN, VENECIA L                        V25

.9           

CONTRACEPTION MANAGEMENT                         

 

             2012           MADL APRN, VENECIA L                        V76

.2           

CERVICAL CANCER SCREENING (PAP SMEAR)                    

 

             2012           MADL APRN, VENECIA L                        V25

.9           

CONTRACEPTION MANAGEMENT                         

 

             2012           MADL APRN, VENECIA L                        V76

.2           

CERVICAL CANCER SCREENING (PAP SMEAR)                    

 

             2012           MADL APRN, VENECIA L                        V25

.9           

CONTRACEPTION MANAGEMENT                         

 

             2012           MADL APRN, VENECIA L                        V76

.2           

CERVICAL CANCER SCREENING (PAP SMEAR)                    

 

             2012           MADL APRN, VENECIA L                        V25

.9           

CONTRACEPTION MANAGEMENT                         

 

             2012           MADL APRN, VENECIA L                        V76

.2           

CERVICAL CANCER SCREENING (PAP SMEAR)                    

 

                2012           DAHLIA APRN, YONNY R                      

     V25.9          

                          CONTRACEPTION MANAGEMENT                    

 

                2012           DAHLIA APRN, YONNY R                      

     V76.2          

                          CERVICAL CANCER SCREENING (PAP SMEAR)                 

   

 

             2012           MADL APRN, VENECIA L                        V25

.9           

CONTRACEPTION MANAGEMENT                         

 

             2012           MADL APRN, VENECIA L                        V76

.2           

CERVICAL CANCER SCREENING (PAP SMEAR)                    

 

             2012           MARY GARCIA DO K                        V25.9 

          

CONTRACEPTION MANAGEMENT                         

 

             2012           GARCIA MARY MEJIA K                        V76.2 

          

CERVICAL CANCER SCREENING (PAP SMEAR)                    

 

             2012           MADL APRN, VENECIA L                        V25

.9           

CONTRACEPTION MANAGEMENT                         

 

             2012           MADL APRN, VENECIA L                        V76

.2           

CERVICAL CANCER SCREENING (PAP SMEAR)                    

 

             2012           MARY GARCIA DO K                        V25.9 

          

CONTRACEPTION MANAGEMENT                         

 

             2012           GARCIA NGA MEJIAA K                        V76.2 

          

CERVICAL CANCER SCREENING (PAP SMEAR)                    

 

             2012           MADL APRN, VENECIA L                        V25

.9           

CONTRACEPTION MANAGEMENT                         

 

             2012           MADL APRN, VENECIA L                        V76

.2           

CERVICAL CANCER SCREENING (PAP SMEAR)                    

 

             04/10/2012                                     078.12           LAISHA

NTAR WART      

                                                 

 

             04/10/2012                                     078.12           LAISHA

NTAR WART      

                                                 

 

             04/10/2012                                     078.12           LAISHA

NTAR WART      

                                                 

 

             04/10/2012                                     078.12           LAISHA

NTAR WART      

                                                 

 

             04/10/2012                                     078.12           LAISHA

NTAR WART      

                                                 

 

             04/10/2012                                     078.12           LAISHA

NTAR WART      

                                                 

 

             04/10/2012           GARCIA DO, MARY K                        078.12

           

PLANTAR WART                                     

 

             04/10/2012           GARCIA DO, MARY K                        078.12

           

PLANTAR WART                                     

 

             04/10/2012           GARCIA DO, MARY K                        078.12

           

PLANTAR WART                                     

 

             04/10/2012           GARCIA DO, MARY K                        078.12

           

PLANTAR WART                                     

 

                04/10/2012           MADRID APRN, FRANCESCA R                  

         078.12     

                          PLANTAR WART                       

 

             04/10/2012           MADL APRN, VENECIA L                        078

.12           

PLANTAR WART                                     

 

             04/10/2012           MADL APRN, VENECIA L                        078

.12           

PLANTAR WART                                     

 

             04/10/2012           MADL APRN, VENECIA L                        078

.12           

PLANTAR WART                                     

 

             04/10/2012           MADL APRN, VENECIA L                        078

.12           

PLANTAR WART                                     

 

             04/10/2012           MADL APRN, VENECIA L                        078

.12           

PLANTAR WART                                     

 

             04/10/2012           MADL APRN, VENECIA L                        078

.12           

PLANTAR WART                                     

 

                04/10/2012           DAHLIA APRN, YONNY R                      

     078.12         

                          PLANTAR WART                       

 

             04/10/2012           MADL APRN, VENECIA L                        078

.12           

PLANTAR WART                                     

 

             04/10/2012           GARCIA DO, MARY K                        078.12

           

PLANTAR WART                                     

 

             04/10/2012           MADL APRN, VENECIA L                        078

.12           

PLANTAR WART                                     

 

             04/10/2012           GARCIA DO, MARY K                        078.12

           

PLANTAR WART                                     

 

             04/10/2012           MADL APRN, VENECIA L                        078

.12           

PLANTAR WART                                     

 

             2012                                     786.50           BALTA

ST PAIN        

                                                 

 

             2012                                     786.50           BALTA

ST PAIN        

                                                 

 

             2012                                     786.50           BALTA

ST PAIN        

                                                 

 

             2012                                     786.50           BALTA

ST PAIN        

                                                 

 

             2012                                     786.50           BALTA

ST PAIN        

                                                 

 

             2012                                     786.50           BALTA

ST PAIN        

                                                 

 

             2012           GARCIA DO, MARY K                        786.50

           

CHEST PAIN                                       

 

             2012           GARCIA DO, MARY K                        786.50

           

CHEST PAIN                                       

 

             2012           GARCIA DO, MARY K                        786.50

           

CHEST PAIN                                       

 

             2012           GARCIA DO, MARY K                        786.50

           

CHEST PAIN                                       

 

                2012           MADRID APRN, FRANCESCA R                  

         786.50     

                          CHEST PAIN                         

 

             2012           MADL APRN, VENECIA L                        786

.50           

CHEST PAIN                                       

 

             2012           MADL APRN, VENECIA L                        786

.50           

CHEST PAIN                                       

 

             2012           MADL APRN, VENECIA L                        786

.50           

CHEST PAIN                                       

 

             2012           MADL APRN, VENECIA L                        786

.50           

CHEST PAIN                                       

 

             2012           MADL APRN, VENECIA L                        786

.50           

CHEST PAIN                                       

 

             2012           MADL APRN, VENECIA L                        786

.50           

CHEST PAIN                                       

 

                2012           DAHLIA APRN, YONNY R                      

     786.50         

                          CHEST PAIN                         

 

             2012           MADL APRN, VENECIA L                        786

.50           

CHEST PAIN                                       

 

             2012           GARCIA DO, MARY K                        786.50

           

CHEST PAIN                                       

 

             2012           MADL APRN, VENECIA L                        786

.50           

CHEST PAIN                                       

 

             2012           GARCIA DO, MARY K                        786.50

           

CHEST PAIN                                       

 

             2012           MADL APRN, VENECIA L                        786

.50           

CHEST PAIN                                       

 

             10/02/2012                                     465.9           UPPE

R RESPIRATORY 

INFECTION                                        

 

           10/02/2012                                   786.2           COUGH   

           

     

 

             10/02/2012                                     465.9           UPPE

R RESPIRATORY 

INFECTION                                        

 

           10/02/2012                                   786.2           COUGH   

           

     

 

             10/02/2012                                     465.9           UPPE

R RESPIRATORY 

INFECTION                                        

 

           10/02/2012                                   786.2           COUGH   

           

     

 

             10/02/2012                                     465.9           UPPE

R RESPIRATORY 

INFECTION                                        

 

           10/02/2012                                   786.2           COUGH   

           

     

 

             10/02/2012                                     465.9           UPPE

R RESPIRATORY 

INFECTION                                        

 

           10/02/2012                                   786.2           COUGH   

           

     

 

             10/02/2012                                     465.9           UPPE

R RESPIRATORY 

INFECTION                                        

 

           10/02/2012                                   786.2           COUGH   

           

     

 

             10/02/2012           GARCIA DO, MARY K                        465.9 

          

UPPER RESPIRATORY INFECTION                      

 

             10/02/2012           GARCIA DO, MARY K                        786.2 

          

COUGH                                            

 

             10/02/2012           GARCIA DO, MARY K                        465.9 

          

UPPER RESPIRATORY INFECTION                      

 

             10/02/2012           GARCIA DO, MARY K                        786.2 

          

COUGH                                            

 

             10/02/2012           GARCIA DO, MARY K                        465.9 

          

UPPER RESPIRATORY INFECTION                      

 

             10/02/2012           GARCIA DO, MARY K                        786.2 

          

COUGH                                            

 

             10/02/2012           GARCIA DO, MARY K                        465.9 

          

UPPER RESPIRATORY INFECTION                      

 

             10/02/2012           GARCIA DO, MARY K                        786.2 

          

COUGH                                            

 

                10/02/2012           MADRID APRN, FRANCESCA R                  

         465.9      

                          UPPER RESPIRATORY INFECTION                    

 

                10/02/2012           MADRID APRN, FRANCESCA R                  

         786.2      

                          COUGH                              

 

             10/02/2012           MADL APRN, VENECIA L                        465

.9           

UPPER RESPIRATORY INFECTION                      

 

             10/02/2012           MADL APRN, VENECIA L                        786

.2           

COUGH                                            

 

             10/02/2012           MADL APRN, VENECIA L                        465

.9           

UPPER RESPIRATORY INFECTION                      

 

             10/02/2012           MADL APRN, VENECIA L                        786

.2           

COUGH                                            

 

             10/02/2012           MADL APRN, VENECIA L                        465

.9           

UPPER RESPIRATORY INFECTION                      

 

             10/02/2012           MADL APRN, VENECIA L                        786

.2           

COUGH                                            

 

             10/02/2012           MADL APRN, VENECIA L                        465

.9           

UPPER RESPIRATORY INFECTION                      

 

             10/02/2012           MADL APRN, VENECIA L                        786

.2           

COUGH                                            

 

             10/02/2012           MADL APRN, VENECIA L                        465

.9           

UPPER RESPIRATORY INFECTION                      

 

             10/02/2012           MADL APRN, VENECIA L                        786

.2           

COUGH                                            

 

             10/02/2012           MADL APRN, VENECIA L                        465

.9           

UPPER RESPIRATORY INFECTION                      

 

             10/02/2012           MADL APRN, VENECIA L                        786

.2           

COUGH                                            

 

                10/02/2012           DAHLIA APRN, YONNY R                      

     465.9          

                          UPPER RESPIRATORY INFECTION                    

 

                10/02/2012           DAHLIA APRN, YONNY R                      

     786.2          

                          COUGH                              

 

             10/02/2012           MADL APRN, VENECIA L                        465

.9           

UPPER RESPIRATORY INFECTION                      

 

             10/02/2012           MADL APRN, VENECIA L                        786

.2           

COUGH                                            

 

             10/02/2012           GARCIA DO, MARY K                        465.9 

          

UPPER RESPIRATORY INFECTION                      

 

             10/02/2012           GARCIA DO, MARY K                        786.2 

          

COUGH                                            

 

             10/02/2012           MADL APRN, VENECIA L                        465

.9           

UPPER RESPIRATORY INFECTION                      

 

             10/02/2012           MADL APRN, VENECIA L                        786

.2           

COUGH                                            

 

             10/02/2012           GARCIA DO, MARY K                        465.9 

          

UPPER RESPIRATORY INFECTION                      

 

             10/02/2012           GARCIA DO, MARY K                        786.2 

          

COUGH                                            

 

             10/02/2012           MADL APRN, VENECIA L                        465

.9           

UPPER RESPIRATORY INFECTION                      

 

             10/02/2012           MADL APRN, VENECIA L                        786

.2           

COUGH                                            

 

             2013                                     461.9           S[facility]

SITIS ACUTE    

                                                 

 

             2013                                     784.91           POS

TNASAL DRIP    

                                                 

 

             2013                                     461.9           [facility]U

SITIS ACUTE    

                                                 

 

             2013                                     784.91           POS

TNASAL DRIP    

                                                 

 

             2013                                     461.9           [facility]U

SITIS ACUTE    

                                                 

 

             2013                                     784.91           POS

TNASAL DRIP    

                                                 

 

             2013                                     461.9           SINU

SITIS ACUTE    

                                                 

 

             2013                                     784.91           POS

TNASAL DRIP    

                                                 

 

             2013                                     461.9           SINU

SITIS ACUTE    

                                                 

 

             2013                                     784.91           POS

TNASAL DRIP    

                                                 

 

             2013           GARCIA DO, MARY K                        461.9 

          

SINUSITIS ACUTE                                  

 

             2013           GARCIA DO, MARY K                        784.91

           

POSTNASAL DRIP                                   

 

             2013           GARCIA DO, MARY K                        461.9 

          

SINUSITIS ACUTE                                  

 

             2013           GARCIA DO, MARY K                        784.91

           

POSTNASAL DRIP                                   

 

             2013           GARCIA DO, MARY K                        461.9 

          

SINUSITIS ACUTE                                  

 

             2013           GARCIA DO, MARY K                        784.91

           

POSTNASAL DRIP                                   

 

             2013           GARCIA DO, MARY K                        461.9 

          

SINUSITIS ACUTE                                  

 

             2013           GARCIA DO, MARY K                        784.91

           

POSTNASAL DRIP                                   

 

                2013           MADRID APRN, FRANCESCA R                  

         461.9      

                          SINUSITIS ACUTE                    

 

                2013           MADRID APRN, FRANCESCA R                  

         784.91     

                          POSTNASAL DRIP                     

 

             2013           MADL APRN, VENECIA L                        461

.9           

SINUSITIS ACUTE                                  

 

             2013           MADL APRN, VENECIA L                        784

.91           

POSTNASAL DRIP                                   

 

             2013           MADL APRN, VENECIA L                        461

.9           

SINUSITIS ACUTE                                  

 

             2013           MADL APRN, VENECIA L                        784

.91           

POSTNASAL DRIP                                   

 

             2013           MADL APRN, VENECIA L                        461

.9           

SINUSITIS ACUTE                                  

 

             2013           MADL APRN, VENECIA L                        784

.91           

POSTNASAL DRIP                                   

 

             2013           MADL APRN, VENECIA L                        461

.9           

SINUSITIS ACUTE                                  

 

             2013           MADL APRN, VENECIA L                        784

.91           

POSTNASAL DRIP                                   

 

             2013           MADL APRN, VENECIA L                        461

.9           

SINUSITIS ACUTE                                  

 

             2013           MADL APRN, VENECIA L                        784

.91           

POSTNASAL DRIP                                   

 

             2013           MADL APRN, VENECIA L                        461

.9           

SINUSITIS ACUTE                                  

 

             2013           MADL APRN, VENECIA L                        784

.91           

POSTNASAL DRIP                                   

 

                2013           DAHLIA APRN, YONNY R                      

     461.9          

                          SINUSITIS ACUTE                    

 

                2013           DAHLIA APRN, YONNY R                      

     784.91         

                          POSTNASAL DRIP                     

 

             2013           MADL APRN, VENECIA L                        461

.9           

SINUSITIS ACUTE                                  

 

             2013           MADL APRN, VENECIA L                        784

.91           

POSTNASAL DRIP                                   

 

             2013           GARCIA DO, MARY K                        461.9 

          

SINUSITIS ACUTE                                  

 

             2013           GARCIA DO MARY K                        784.91

           

POSTNASAL DRIP                                   

 

             2013           MADL APRN, VENECIA L                        461

.9           

SINUSITIS ACUTE                                  

 

             2013           MADL APRN, VENECIA L                        784

.91           

POSTNASAL DRIP                                   

 

             2013           GARCIA DO, MARY K                        461.9 

          

SINUSITIS ACUTE                                  

 

             2013           GARCIA DO, MARY K                        784.91

           

POSTNASAL DRIP                                   

 

             2013           MADL APRN, VENECIA L                        461

.9           

SINUSITIS ACUTE                                  

 

             2013           MADL APRN, VENECIA L                        784

.91           

POSTNASAL DRIP                                   

 

             2013                                     V76.10           ANDREW

AST CANCER 

SCREENING                                        

 

             2013                                     V76.10           ANDREW

AST CANCER 

SCREENING                                        

 

             2013                                     V76.10           ANDREW

AST CANCER 

SCREENING                                        

 

             2013           GARCIA DO MARY K                        V76.10

           

BREAST CANCER SCREENING                          

 

             2013           GARCIA DO MARY K                        V76.10

           

BREAST CANCER SCREENING                          

 

             2013           GARCIA DO MARY K                        V76.10

           

BREAST CANCER SCREENING                          

 

             2013           GARCIA DO MARY K                        V76.10

           

BREAST CANCER SCREENING                          

 

                2013           FRANCESCA BUSBY R                  

         V76.10     

                          BREAST CANCER SCREENING                    

 

             2013           MADL APRN, VENECIA L                        V76

.10           

BREAST CANCER SCREENING                          

 

             2013           MADL APRN, VENECIA L                        V76

.10           

BREAST CANCER SCREENING                          

 

             2013           MADL APRN, VENECIA L                        V76

.10           

BREAST CANCER SCREENING                          

 

             2013           MADL APRN, VENECIA L                        V76

.10           

BREAST CANCER SCREENING                          

 

             2013           MADL APRN, VENECIA L                        V76

.10           

BREAST CANCER SCREENING                          

 

             2013           MADL APRN, VENECIA L                        V76

.10           

BREAST CANCER SCREENING                          

 

                2013           DAHLIA APRN, YONNY R                      

     V76.10         

                          BREAST CANCER SCREENING                    

 

             2013           MADL APRN, VENECIA L                        V76

.10           

BREAST CANCER SCREENING                          

 

             2013           JOSE MEJIA MARY K                        V76.10

           

BREAST CANCER SCREENING                          

 

             2013           MADL APRN, VENECIA L                        V76

.10           

BREAST CANCER SCREENING                          

 

             2013           JOES MEJIA MARY K                        V76.10

           

BREAST CANCER SCREENING                          

 

             2013           MADL APRN, VENECIA L                        V76

.10           

BREAST CANCER SCREENING                          

 

             2013                                     V15.82           Apolinar

otine abuse    

                                                 

 

             2013                                     V15.82           Apolinar

otine abuse    

                                                 

 

             2013           GARCIA DO, MARY K                        V15.82

           

Nicotine abuse                                   

 

             2013           GARCIA DO MARY K                        V15.82

           

Nicotine abuse                                   

 

             2013           GARCIA DO, MARY K                        V15.82

           

Nicotine abuse                                   

 

             2013           GARCIA DO, MARY K                        V15.82

           

Nicotine abuse                                   

 

                2013           FRANCESCA BUSBY R                  

         V15.82     

                          Nicotine abuse                     

 

             2013           MADL APRN, VENECIA L                        V15

.82           

Nicotine abuse                                   

 

             2013           MADL APRN, VENECIA L                        V15

.82           

Nicotine abuse                                   

 

             2013           MADL APRN, VENECIA L                        V15

.82           

Nicotine abuse                                   

 

             2013           MADL APRN, VENECIA L                        V15

.82           

Nicotine abuse                                   

 

             2013           MADL APRN, VENECIA L                        V15

.82           

Nicotine abuse                                   

 

             2013           MADL APRN, VENECIA L                        V15

.82           

Nicotine abuse                                   

 

                2013           DAHLIA APRN YONNY R                      

     V15.82         

                          Nicotine abuse                     

 

             2013           MADL APRN, VENECIA L                        V15

.82           

Nicotine abuse                                   

 

             2013           GARCIA DO, MARY K                        V15.82

           

Nicotine abuse                                   

 

             2013           MADL APRN, VENECIA L                        V15

.82           

Nicotine abuse                                   

 

             2013           GARCIA DO, MARY K                        V15.82

           

Nicotine abuse                                   

 

             2013           MADL APRN, VENECIA L                        V15

.82           

Nicotine abuse                                   

 

             2013           GARCIA DO, MARY K                        785.6 

          

ENLARGEMENT OF LYMPH NODES                       

 

             2013           GARCIA DO, MARY K                        785.6 

          

ENLARGEMENT OF LYMPH NODES                       

 

             2013           GARCIA DO, MARY K                        785.6 

          

ENLARGEMENT OF LYMPH NODES                       

 

             2013           GARCIA DO, MARY K                        785.6 

          

ENLARGEMENT OF LYMPH NODES                       

 

                2013           ALEXANDER BUSBYIA R                  

         785.6      

                          ENLARGEMENT OF LYMPH NODES                    

 

             2013           MADL APRN, VENECIA L                        785

.6           

ENLARGEMENT OF LYMPH NODES                       

 

             2013           MADL APRN, VENECIA L                        785

.6           

ENLARGEMENT OF LYMPH NODES                       

 

             2013           MADL APRN, VENECIA L                        785

.6           

ENLARGEMENT OF LYMPH NODES                       

 

             2013           MADL APRN, VENECIA L                        785

.6           

ENLARGEMENT OF LYMPH NODES                       

 

             2013           MADL APRN, VENECIA L                        785

.6           

ENLARGEMENT OF LYMPH NODES                       

 

             2013           MADL APRN, VENECIA L                        785

.6           

ENLARGEMENT OF LYMPH NODES                       

 

                2013           DAHLIA PARKINSON YONNY R                      

     785.6          

                          ENLARGEMENT OF LYMPH NODES                    

 

             2013           MADL APRN, VENECIA L                        785

.6           

ENLARGEMENT OF LYMPH NODES                       

 

             2013           GARCIA DO, MARY K                        785.6 

          

ENLARGEMENT OF LYMPH NODES                       

 

             2013           MADL APRN, VENECIA L                        785

.6           

ENLARGEMENT OF LYMPH NODES                       

 

             2013           GARCIA DO, MARY K                        785.6 

          

ENLARGEMENT OF LYMPH NODES                       

 

             2013           MADL APRN, VENECIA L                        785

.6           

ENLARGEMENT OF LYMPH NODES                       

 

             2014           GARCIA DO, MARY K                        296.90

           

MOOD DISORDER                                    

 

             2014           GARCIA DO MARY K                        782.1 

          RASH

                                                 

 

                2014           FRANCESCA BUSBY R                  

         296.90     

                          MOOD DISORDER                      

 

                2014           JULIUS APRNAZALEAFRANCESCA R                  

         782.1      

                          RASH                               

 

             2014           MADL APRN, VENECIA L                        296

.90           

MOOD DISORDER                                    

 

             2014           MADL APRN, VENECIA L                        782

.1           

RASH                                             

 

             2014           MADL APRN, VENECIA L                        296

.90           

MOOD DISORDER                                    

 

             2014           MADL APRN, VENECIA L                        782

.1           

RASH                                             

 

             2014           MADL APRN, VENECIA L                        296

.90           

MOOD DISORDER                                    

 

             2014           MADL APRN, VENECIA L                        782

.1           

RASH                                             

 

             2014           MADL APRN, VENECIA L                        296

.90           

MOOD DISORDER                                    

 

             2014           MADL APRN, VENECIA L                        782

.1           

RASH                                             

 

             2014           MADL APRN, VENECIA L                        296

.90           

MOOD DISORDER                                    

 

             2014           MADL APRN, VENECIA L                        782

.1           

RASH                                             

 

             2014           MADL APRN, VENECIA L                        296

.90           

MOOD DISORDER                                    

 

             2014           MADL APRN, VENECIA L                        782

.1           

RASH                                             

 

                2014           DAHLIA APRN, YONNY R                      

     296.90         

                          MOOD DISORDER                      

 

                2014           DAHLIA APRN, YONNY R                      

     782.1          

                          RASH                               

 

             2014           MADL APRN, VENECIA L                        296

.90           

MOOD DISORDER                                    

 

             2014           MADL APRN, VENECIA L                        782

.1           

RASH                                             

 

             2014           GARCIA DO, MARY K                        296.90

           

MOOD DISORDER                                    

 

             2014           GARCIA DO, MARY K                        782.1 

          RASH

                                                 

 

             2014           MADL APRN, VENECIA L                        296

.90           

MOOD DISORDER                                    

 

             2014           MADL APRN, VENECIA L                        782

.1           

RASH                                             

 

             2014           GARCIA DO, MARY K                        296.90

           

MOOD DISORDER                                    

 

             2014           GARCIA DO, MARY K                        782.1 

          RASH

                                                 

 

             2014           MADL APRN, VENECIA L                        296

.90           

MOOD DISORDER                                    

 

             2014           MADL APRN, VENECIA L                        782

.1           

RASH                                             

 

                2014           FRANCESCA BUSBY R                  

         477.9      

                          ALLERGIC RHINITIS CAUSE UNSPECIFIED                   

 

 

             2014           MADL APRN, VENECIA L                        477

.9           

ALLERGIC RHINITIS CAUSE UNSPECIFIED                    

 

             2014           MADL APRN, VENECIA L                        477

.9           

ALLERGIC RHINITIS CAUSE UNSPECIFIED                    

 

             2014           MADL APRN, VENECIA L                        477

.9           

ALLERGIC RHINITIS CAUSE UNSPECIFIED                    

 

             2014           MADL APRN, VENECIA L                        477

.9           

ALLERGIC RHINITIS CAUSE UNSPECIFIED                    

 

             2014           MADL APRN, VENECIA L                        477

.9           

ALLERGIC RHINITIS CAUSE UNSPECIFIED                    

 

             2014           MADL APRN, VENECIA L                        477

.9           

ALLERGIC RHINITIS CAUSE UNSPECIFIED                    

 

                2014           DAHLIA APRN, YONNY R                      

     477.9          

                          ALLERGIC RHINITIS CAUSE UNSPECIFIED                   

 

 

             2014           MADL APRN, VENECIA L                        477

.9           

ALLERGIC RHINITIS CAUSE UNSPECIFIED                    

 

             2014           GARCIA DO, MARY K                        477.9 

          

ALLERGIC RHINITIS CAUSE UNSPECIFIED                    

 

             2014           MADL APRN, VENECIA L                        477

.9           

ALLERGIC RHINITIS CAUSE UNSPECIFIED                    

 

             2014           GARCIA DO, MARY K                        477.9 

          

ALLERGIC RHINITIS CAUSE UNSPECIFIED                    

 

             2014           MADL APRN, VENECIA L                        477

.9           

ALLERGIC RHINITIS CAUSE UNSPECIFIED                    

 

             2014           MADL APRN, VENECIA L                        719

.46           

PAIN IN JOINT INVOLVING LOWER LEG                    

 

             2014           MADL APRN, VENECIA L                        723

.1           

CERVICALGIA                                      

 

             2014           MADL APRN, VENECIA L                        790

.21           

IMPAIRED FASTING GLUCOSE                         

 

             2014           MADL APRN, VENECIA L                        719

.46           

PAIN IN JOINT INVOLVING LOWER LEG                    

 

             2014           MADL APRN, VENECIA L                        723

.1           

CERVICALGIA                                      

 

             2014           MADL APRN, VENECIA L                        790

.21           

IMPAIRED FASTING GLUCOSE                         

 

             2014           MADL APRN, VENECIA L                        719

.46           

PAIN IN JOINT INVOLVING LOWER LEG                    

 

             2014           MADL APRN, VENECIA L                        723

.1           

CERVICALGIA                                      

 

             2014           MADL APRN, VENECIA L                        790

.21           

IMPAIRED FASTING GLUCOSE                         

 

             2014           MADL APRN, VENECIA L                        719

.46           

PAIN IN JOINT INVOLVING LOWER LEG                    

 

             2014           ANNEMARIE APRN, VENECIA L                        723

.1           

CERVICALGIA                                      

 

             2014           ANNEMARIE APRN, VENECIA L                        790

.21           

IMPAIRED FASTING GLUCOSE                         

 

             2014           MADL APRN, VENECIA L                        719

.46           

PAIN IN JOINT INVOLVING LOWER LEG                    

 

             2014           MADL APRN, VENECIA L                        723

.1           

CERVICALGIA                                      

 

             2014           ADRIENNEL APRN, VENECIA L                        790

.21           

IMPAIRED FASTING GLUCOSE                         

 

             2014           ADRIENNEL APRN, VENECIA L                        719

.46           

PAIN IN JOINT INVOLVING LOWER LEG                    

 

             2014           MADL APRN, VENECIA L                        723

.1           

CERVICALGIA                                      

 

             2014           ADRIENNEL APRN, VENECIA L                        790

.21           

IMPAIRED FASTING GLUCOSE                         

 

                2014           DAHLIA PARKINSON YONNY R                      

     719.46         

                          PAIN IN JOINT INVOLVING LOWER LEG                    

 

                2014           DAHLIA PARKINSON YONNY R                      

     723.1          

                          CERVICALGIA                        

 

                2014           DAHLIA PARKINSON YONNY R                      

     790.21         

                          IMPAIRED FASTING GLUCOSE                    

 

             2014           ANNEMARIE CELESTINNANNETTEVENECIA L                        719

.46           

PAIN IN JOINT INVOLVING LOWER LEG                    

 

             2014           ANNEMARIE APRN, VENECIA L                        723

.1           

CERVICALGIA                                      

 

             2014           ADRIENNE APRN, VENECIA L                        790

.21           

IMPAIRED FASTING GLUCOSE                         

 

             2014           JOSE DONGAA K                        719.46

           

PAIN IN JOINT INVOLVING LOWER LEG                    

 

             2014           GARCIA DONGAA K                        723.1 

          

CERVICALGIA                                      

 

             2014           JOSE DONGAA K                        790.21

           

IMPAIRED FASTING GLUCOSE                         

 

             2014           ANNEMARIE APRN, VENECIA L                        719

.46           

PAIN IN JOINT INVOLVING LOWER LEG                    

 

             2014           MADL APRN, VENECIA L                        723

.1           

CERVICALGIA                                      

 

             2014           MADL APRN, VENECIA L                        790

.21           

IMPAIRED FASTING GLUCOSE                         

 

             2014           GARCIA DO MARY K                        719.46

           

PAIN IN JOINT INVOLVING LOWER LEG                    

 

             2014           GARCIA DO MARY K                        723.1 

          

CERVICALGIA                                      

 

             2014           GARCIA DONGAA K                        790.21

           

IMPAIRED FASTING GLUCOSE                         

 

             2014           MADL APRN, VENECIA L                        719

.46           

PAIN IN JOINT INVOLVING LOWER LEG                    

 

             2014           MADL APRN, VENECIA L                        723

.1           

CERVICALGIA                                      

 

             2014           MADL APRN, VENECIA L                        790

.21           

IMPAIRED FASTING GLUCOSE                         

 

             2014           MADL APRN, VENECIA L                        288

.60           

LEUKOCYTOSIS UNSPECIFIED                         

 

             2014           MADL APRN, VENECIA L                        288

.60           

LEUKOCYTOSIS UNSPECIFIED                         

 

             2014           MADL APRN, VENECIA L                        288

.60           

LEUKOCYTOSIS UNSPECIFIED                         

 

             2014           MADL APRN, VENECIA L                        288

.60           

LEUKOCYTOSIS UNSPECIFIED                         

 

                2014           DAHLIA APRN, YONNY R                      

     288.60         

                          LEUKOCYTOSIS UNSPECIFIED                    

 

             2014           MADL APRN, VENECIA L                        288

.60           

LEUKOCYTOSIS UNSPECIFIED                         

 

             2014           GARCIA DO, MARY K                        288.60

           

LEUKOCYTOSIS UNSPECIFIED                         

 

             2014           MADL APRN, VENECIA L                        288

.60           

LEUKOCYTOSIS UNSPECIFIED                         

 

             2014           GARCIA DO, MARY K                        288.60

           

LEUKOCYTOSIS UNSPECIFIED                         

 

             2014           MADL APRN, VENECIA L                        288

.60           

LEUKOCYTOSIS UNSPECIFIED                         

 

             2014           MADL APRN, VENECIA L                        381

.02           

ACUTE MUCOID OTITIS MEDIA                        

 

             2014           MADL APRN, VENECIA L                        461

.9           

SINUSITIS ACUTE                                  

 

             2014           MADL APRN, VENECIA L                        465

.9           

UPPER RESPIRATORY INFECTION                      

 

                2014           DAHLIA CELESTINN YONNY R                      

     381.02         

                          ACUTE MUCOID OTITIS MEDIA                    

 

                2014           DAHLIA APRN YONNY R                      

     461.9          

                          SINUSITIS ACUTE                    

 

                2014           DAHLIA APRN, YONNY R                      

     465.9          

                          UPPER RESPIRATORY INFECTION                    

 

             2014           MADL APRN, VENECIA L                        381

.02           

ACUTE MUCOID OTITIS MEDIA                        

 

             2014           MADL APRN, VENECIA L                        461

.9           

SINUSITIS ACUTE                                  

 

             2014           MADL APRN, VENECIA L                        465

.9           

UPPER RESPIRATORY INFECTION                      

 

             2014           GARCIA DO MARY K                        381.02

           

ACUTE MUCOID OTITIS MEDIA                        

 

             2014           GARCIA DO MARY K                        461.9 

          

SINUSITIS ACUTE                                  

 

             2014           GARCIA DO, MARY K                        465.9 

          

UPPER RESPIRATORY INFECTION                      

 

             2014           MADL APRN, VENECIA L                        381

.02           

ACUTE MUCOID OTITIS MEDIA                        

 

             2014           MADL APRN, VENECIA L                        461

.9           

SINUSITIS ACUTE                                  

 

             2014           MADL APRN, VENECIA L                        465

.9           

UPPER RESPIRATORY INFECTION                      

 

             2014           GARCIA DO, MARY K                        381.02

           

ACUTE MUCOID OTITIS MEDIA                        

 

             2014           GARCIA DO, MARY K                        461.9 

          

SINUSITIS ACUTE                                  

 

             2014           GARCIA DO, MARY K                        465.9 

          

UPPER RESPIRATORY INFECTION                      

 

             2014           MADL APRN, VENECIA L                        381

.02           

ACUTE MUCOID OTITIS MEDIA                        

 

             2014           MADL APRN, VENECIA L                        461

.9           

SINUSITIS ACUTE                                  

 

             2014           MADL APRN, VENECIA L                        465

.9           

UPPER RESPIRATORY INFECTION                      

 

                2014           DAHLIA PARKINSON, YONNY R                      

     477.0          

                          ALLERGIC RHINITIS DUE TO POLLEN                    

 

             2014           MADL APRN, VENECIA L                        477

.0           

ALLERGIC RHINITIS DUE TO POLLEN                    

 

             2014           GARCIA DO, MARY K                        477.0 

          

ALLERGIC RHINITIS DUE TO POLLEN                    

 

             2014           MADL APRN, VENECIA L                        477

.0           

ALLERGIC RHINITIS DUE TO POLLEN                    

 

             2014           GARCIA DO, MARY K                        477.0 

          

ALLERGIC RHINITIS DUE TO POLLEN                    

 

             2014           MADL APRN, VENECIA L                        477

.0           

ALLERGIC RHINITIS DUE TO POLLEN                    

 

                2014           ANDER ZENG MD           Ot        

      620.2        

                          OVARIAN CYST NEC/NOS                    

 

                2014           ANDER ZENG MD           Ot        

      623.8        

                          NONINFLAM DIS VAGINA NEC                    

 

                2014           ANDER ZENG MD           Ot        

      625.3        

                          DYSMENORRHEA                       

 

             10/07/2014           MADL APRN, VENECIA L                        620

.2           

OTHER AND UNSPECIFIED OVARIAN CYST                    

 

             10/07/2014           MARY GARCIA DO K                        620.2 

          

OTHER AND UNSPECIFIED OVARIAN CYST                    

 

             10/07/2014           MADL APRN, VENECIA L                        620

.2           

OTHER AND UNSPECIFIED OVARIAN CYST                    

 

             10/07/2014           GARCIA MARY MEJIA K                        620.2 

          

OTHER AND UNSPECIFIED OVARIAN CYST                    

 

             10/07/2014           MADL APRN, VENECIA L                        620

.2           

OTHER AND UNSPECIFIED OVARIAN CYST                    

 

             2015           GARCIA DO, MARY K                        V04.81

           FLU

SHOT                                             

 

             2015           MADL APRN, VENECIA L                        V04

.81           

FLU SHOT                                         

 

             2015           GARCIA DO, MARY K                        V04.81

           FLU

SHOT                                             

 

             2015           MADL APRN, VENECIA L                        V04

.81           

FLU SHOT                                         

 

             2015           MADL, VENECIA L ARNP           Ot           620

.2           

                                                 

 

             2015           MADL, VENECIA L ARNP           Ot           620

.2           

                                                 

 

             2015           MADL, VENECIA L ARNP           Ot           620

.2           

                                                 

 

             2015           HARVINDER GONZALEZ, MEMO SANDERS           Ot           278.00 

             

                                                 

 

             2015           HARVINDER GONZALEZ, MEMO SANDERS           Ot           288.60 

             

                                                 

 

             2015           HARVINDER GONZALEZ, MEMO SANDERS           Ot           305.1  

             

                                                 

 

             2015           HARVINDER GONZALEZ, MEMO SANDERS           Ot           V58.69 

             

                                                 

 

             2015           HARVINDER GONZALEZ, MEMO SANDERS           Ot           V85.34 

             

                                                 

 

             2015           HARVINDER GONZALEZ, MEMO SANDERS           Ot           278.00 

             

                                                 

 

             2015           HARVINDER GONZALEZ, MEMO SANDERS           Ot           288.60 

             

                                                 

 

             2015           HARVINDER GONZALEZ, MEMO SANDERS           Ot           305.1  

             

                                                 

 

             2015           HARVINDER GONZALEZ, MEMO SANDERS           Ot           V58.69 

             

                                                 

 

             2015           HARVINDER GONZALEZ, MEMO SANDERS           Ot           V85.34 

             

                                                 

 

             2015           TANNER GONZALEZ, STACIA PARSONS           Ot           473

.9           

                                                 

 

             2015           HARVINDER GONZALEZ, MEMO SANDERS           Ot           278.00 

          

OBESITY, NOS                                     

 

             2015           HARVINDER GONZALEZ, MEMO SANDERS           Ot           288.60 

          

LEUKOCYTOSIS, UNSPECIFIED                        

 

             2015           HARVINDER GONZALEZ, MEMO SANDERS           Ot           305.1  

         

TOBACCO USE DISORDER                             

 

             2015           HARVINDER GONZALEZ, MEMO SANDERS           Ot           V58.69 

          OTH

MED,LT,CURRENT USE                               

 

             2015           HARVINDER GONZALEZ, MEMO SANDERS           Ot           V85.34 

          

BODY MASS INDEX 34.0-34.9, ADULT                    

 

             2015           HARVINDER GONZALEZ, MEMO SANDERS           Ot           278.00 

             

                                                 

 

             2015           HARVINDER GONZALEZ, MEMO SANDERS           Ot           288.60 

             

                                                 

 

             2015           HARVINDER GONZALEZ, MEMO SANDERS           Ot           305.1  

             

                                                 

 

             2015           HARVINDER GONZALEZ, MEMO SANDERS           Ot           V58.69 

             

                                                 

 

             2015           HARVINDER GONZALEZ, MEMO SANDERS           Ot           V85.34 

             

                                                 

 

             2015           MADL, VENECIA L ARNP           Ot           620

.2           

                                                 

 

             2015           MADL, VENECIA L ARNP           Ot           620

.2           

                                                 

 

             2015           TANNER GONZALEZ, STACIA P           Ot           473

.9           

                                                 

 

             2015           HARVINDER GONZALEZ, MEMO SANDERS           Ot           278.00 

             

                                                 

 

             2015           HARVINDER GONZALEZ, MEMO SANDERS           Ot           288.60 

             

                                                 

 

             2015           HARVINDER GONZALEZ, MEMO SANDERS           Ot           305.1  

             

                                                 

 

             2015           HARVINDER GONZALEZ, MEMO SANDERS           Ot           V58.69 

             

                                                 

 

             2015           HARVINDER GONZALEZ, MEMO SANDERS           Ot           V85.34 

             

                                                 

 

             2015           MADL, VENECIA L ARNP           Ot           620

.2           

                                                 

 

             2015           MADL, VENECIA L ARNP           Ot           620

.2           

                                                 

 

             2015           TANNER GONZALEZ, STACIA P           Ot           473

.9           

                                                 

 

             2015           HARVINDER GONZALEZ, MEMO SANDERS           Ot           278.00 

             

                                                 

 

             2015           HARVINDER GONZALEZ, MEMO SANDERS           Ot           288.60 

             

                                                 

 

             2015           HARVINDER GONZALEZ, MEMO SANDERS           Ot           305.1  

             

                                                 

 

             2015           HARVINDER GONZALEZ, MEMO SANDERS           Ot           V58.69 

             

                                                 

 

             2015           HARVINDER GONZALEZ, MEMO SANDERS           Ot           V85.34 

             

                                                 

 

                2015           SHARON GONZALEZ, NADINE T           Ot      

        276.8      

                          HYPOPOTASSEMIA                     

 

                2015           SHARON GONZALEZ, NADINE T           Ot      

        785.1      

                          PALPITATIONS                       

 

                2015           SHARON GONZALEZ, NADINE T           Ot      

        786.50     

                          CHEST PAIN NOS                     

 

             2015           MADL, VENECIA L ARNP           Ot           620

.2           

                                                 

 

             2015           MADL, VENECIA L ARNP           Ot           620

.2           

                                                 

 

             2015           TANNER GONZALEZ, STACIA P           Ot           473

.9           

                                                 

 

             2015           HARVINDER GONZALEZ, MEMO SANDERS           Ot           278.00 

             

                                                 

 

             2015           HARVINDER GONZALEZ, MEMO SANDERS           Ot           288.60 

             

                                                 

 

             2015           HARVINDER GONZALEZ, MEMO SANDERS           Ot           305.1  

             

                                                 

 

             2015           HARVINDER GONZALEZ, MEMO SANDERS           Ot           V58.69 

             

                                                 

 

             2015           HAVRINDER GONZALEZ, MEMO SANDERS           Ot           V85.34 

             

                                                 

 

             2015           MADL, VENECIA L ARNP           Ot           785

.1           

                                                 

 

                2015           FRANCINE GONZALEZ FAC, ALI FACP CCDS           Ot 

             288.8 

                                                             

 

                2015           FRANCINE GONZALEZ FAC, ALI FACP CCDS           Ot 

             305.1 

                                                             

 

                2015           FRANCINE GONZALEZ [facility], ALI FACP CCDS           Ot 

             785.1 

                                                             

 

                2015           FRANCINE GONZALEZ FACC, ALI FACP CCDS           Ot 

             786.59

                                                             

 

                2015           FRANCINE GONZALEZ FACC, ALI FACP CCDS           Ot 

             790.6 

                                                             

 

                2015           FRANCINE GONZALEZ FACC, ALI FACP CCDS           Ot 

             288.8 

                                                             

 

                2015           FRANCINE GONZALEZ FACC, ALI FACP CCDS           Ot 

             305.1 

                                                             

 

                2015           FRANCINE GONZALEZ FACC, ALI FACP CCDS           Ot 

             785.1 

                                                             

 

                2015           FRANCINE GONZALEZ FAC, ALI FACP CCDS           Ot 

             786.59

                                                             

 

                2015           FRANCINE GONZALEZ FAC, ALI FACP CCDS           Ot 

             790.6 

                                                             

 

                2015           FRANCINE GONZALEZ FAC, ALI FACP CCDS           Ot 

             288.8 

                                                             

 

                2015           FRANCINE GONZALEZ FACC, ALI FACP CCDS           Ot 

             305.1 

                                                             

 

                2015           FRANCINE GONZALEZ FACC, ALI FACP CCDS           Ot 

             785.1 

                                                             

 

                2015           FRANCINE GONZALEZ [facility], ALI FACP CCDS           Ot 

             786.59

                                                             

 

                2015           FRANCINE GONZALEZ [facility], ALI FACP CCDS           Ot 

             790.6 

                                                             

 

                2015           FRANCINE GONZALEZ [facility], ALI FACP CCDS           Ot 

             288.8 

                                                             

 

                2015           FRANCINE GONZALEZ [facility], ALI FACP CCDS           Ot 

             305.1 

                                                             

 

                2015           FRANCINE GONZALEZ [facility], ALI FACP CCDS           Ot 

             785.1 

                                                             

 

                2015           FRANCINE GONZALEZ [facility], ALI FACP CCDS           Ot 

             786.59

                                                             

 

                2015           FRANCINE GONZALEZ [facility], ALI FACP CCDS           Ot 

             790.6 

                                                             

 

                09/15/2015           FRANCINE GONZALEZ FAC, ALI FACP CCDS           Ot 

             305.1 

                                                             

 

                09/15/2015           FRANCINE GONZALEZ FAC, ALI FACP CCDS           Ot 

             786.59

                                                             

 

                09/15/2015           FRANCINE GONZALEZ FAC, ALI FACP CCDS           Ot 

             794.31

                                                             

 

                09/15/2015           FRANCINE GONZALEZ FAC, ALI FACP CCDS           Ot 

             V58.69

                                                             

 

             2015           VENECIA SHARPE ARNP           Ot           785

.1           

                                                 

 

                10/05/2015           FRANCINE GONZALEZ FACC, ALI FACP CCDS           Ot 

             288.8 

                                                             

 

                10/05/2015           FRANCINE GONZALEZ FACC, ALI FACP CCDS           Ot 

             305.1 

                                                             

 

                10/05/2015           FRANCINE GONZALEZ FACC, ALI FACP CCDS           Ot 

             785.1 

                                                             

 

                10/05/2015           FRANCINE GONZALEZ FACC, ALI FACP CCDS           Ot 

             786.59

                                                             

 

                10/05/2015           FRANCINE GONZALEZ FACC, ALI FACP CCDS           Ot 

             790.6 

                                                             

 

                10/19/2015           FRANCINE GONZALEZ FACC, ALI FACP CCDS           Ot 

             288.8 

                                                             

 

                10/19/2015           FRANCINE GONZALEZ FACC, ALI FACP CCDS           Ot 

             305.1 

                                                             

 

                10/19/2015           FRANCINE GONZALEZ FACC, ALI FACP CCDS           Ot 

             785.1 

                                                             

 

                10/19/2015           FRANCINE GONZALEZ FACC, ALI FACP CCDS           Ot 

             786.59

                                                             

 

                10/19/2015           FRANCINE GONZALEZ FACC, ALI FACP CCDS           Ot 

             790.6 

                                                             

 

                10/19/2015           FRANCINE GONZALEZ FACC, ALI FACP CCDS           Ot 

             E78.5 

                                                             

 

                10/19/2015           FRANCINE GONZALEZ FACC, ALI FACP CCDS           Ot 

             288.8 

                                                             

 

                10/19/2015           FRANCINE GONZALEZ FACC, ALI FACP CCDS           Ot 

             305.1 

                                                             

 

                10/19/2015           FRANCINE GONZALEZ FACC, ALI FACP CCDS           Ot 

             785.1 

                                                             

 

                10/19/2015           FRANCINE GONZALEZ FACC, ALI FACP CCDS           Ot 

             786.59

                                                             

 

                10/19/2015           FRANCINE GONZALEZ FACC, ALI FACP CCDS           Ot 

             790.6 

                                                             

 

                10/21/2015           FRANCINE GONZALEZ FACC, ALI FACP CCDS           Ot 

             288.8 

                                                             

 

                10/21/2015           FRANCINE GONZALEZ FACC, ALI FACP CCDS           Ot 

             305.1 

                                                             

 

                10/21/2015           FRANCINE GONZALEZ FACC, ALI FACP CCDS           Ot 

             785.1 

                                                             

 

                10/21/2015           FRANCINE GONZALEZ FACC, ALI FACP CCDS           Ot 

             786.59

                                                             

 

                10/21/2015           FRANCINE GONZALEZ FACC, ALI FACP CCDS           Ot 

             790.6 

                                                             

 

                10/22/2015           FRANCINE GONZALEZ FACC, ALI FACP CCDS           Ot 

             288.8 

                                                             

 

                10/22/2015           FRANCINE GONZALEZ FACC, ALI FACP CCDS           Ot 

             305.1 

                                                             

 

                10/22/2015           FRANCINE GONZALEZ FACC, ALI FACP CCDS           Ot 

             785.1 

                                                             

 

                10/22/2015           FRANCINE GONZALEZ FACC, ALI FACP CCDS           Ot 

             786.59

                                                             

 

                10/22/2015           FRANCINE GONZALEZ FACC, ALI FACP CCDS           Ot 

             790.6 

                                                             

 

                2015           FRANCINE GONZALEZ FACC, ALI FACP CCDS           Ot 

             E78.5 

                                                             

 

                2015           FRANCINE GONZALEZ FACC, ALI FACP CCDS           Ot 

             E78.5 

                                                             

 

             2015           MADL, VENECIA L ARNP           Ot           620

.2           

                                                 

 

             2015           MADL, VENECIA L ARNP           Ot           620

.2           

                                                 

 

             2015           TANNER GONZALEZ, STACIA P           Ot           473

.9           

                                                 

 

             2015           HARVINDER GONZALEZ, MEMO SANDERS           Ot           278.00 

             

                                                 

 

             2015           HARVINDER GONZALEZ, MEMO SANDERS           Ot           288.60 

             

                                                 

 

             2015           HARVINDER GONZALEZ, MEMO SANDERS           Ot           305.1  

             

                                                 

 

             2015           HARVINDER GONZALEZ, MEMO SANDERS           Ot           V58.69 

             

                                                 

 

             2015           HARVINDER GONZALEZ, MEMO SANDERS           Ot           V85.34 

             

                                                 

 

             2015           MADL, VENECIA L ARNP           Ot           785

.1           

                                                 

 

                2015           FRANCINE GONZALEZ FACC, ALI FACP CCDS           Ot 

             288.8 

                                                             

 

                2015           FRANCINE GONZALEZ FACC, ALI FACP CCDS           Ot 

             305.1 

                                                             

 

                2015           FRANCINE GONZALEZ FACC, ALI FACP CCDS           Ot 

             785.1 

                                                             

 

                2015           FRANCINE GONZALEZ FACC, ALI FACP CCDS           Ot 

             786.59

                                                             

 

                2015           FRANCINE GONZALEZ FACC, ALI FACP CCDS           Ot 

             790.6 

                                                             

 

             2015           AZUCENA LEDBETTER DO           Ot           G47.

10           

HYPERSOMNIA, UNSPECIFIED                         

 

             2015           AZUCENA LEDBETTER DO           Ot           I49.

9           

CARDIAC ARRHYTHMIA, UNSPECIFIED                    

 

             2015           AZUCENA LEDBETTER DO           Ot           R51 

          

HEADACHE                                         

 

             2016           MADL, VENECIA L ARNP           Ot           620

.2           

                                                 

 

             2016           ADRIENNEL, VENECIA L ARNP           Ot           620

.2           

                                                 

 

             2016           TANNER GONZALEZ, STACIA P           Ot           473

.9           

                                                 

 

             2016           HARVINDER GONZALEZ, MEMO SANDERS           Ot           278.00 

             

                                                 

 

             2016           HARVINDER GONZALEZ, MEMO SANDERS           Ot           288.60 

             

                                                 

 

             2016           HARVINDER GONZALEZ, MEMO SANDERS           Ot           305.1  

             

                                                 

 

             2016           HARVINDER GONZALEZ, MEMO SANDERS           Ot           V58.69 

             

                                                 

 

             2016           HARVINDER GONZALEZ, MEMO SANDERS           Ot           V85.34 

             

                                                 

 

             2016           MADL, VENECIA L ARNP           Ot           785

.1           

                                                 

 

                2016           FRANCINE GONZALEZ FACC, ALI FACP CCDS           Ot 

             288.8 

                                                             

 

                2016           FRANCINE GONZALEZ FACC, ALI FACP CCDS           Ot 

             305.1 

                                                             

 

                2016           FRANCINE GONZALEZ [facility], ALI FACP CCDS           Ot 

             785.1 

                                                             

 

                2016           FRANCINE GONZALEZ [facility], ALI FACP CCDS           Ot 

             786.59

                                                             

 

                2016           FRANCINE GONZALEZ [facility], ALI FACP CCDS           Ot 

             790.6 

                                                             

 

                2016           MADL, VENECIA L ARNP           Ot           

   R22.40          

                                                             

 

             2016           MADL, VENECIA L ARNP           Ot           620

.2           

                                                 

 

             2016           MADL, VENECIA L ARNP           Ot           620

.2           

                                                 

 

             2016           TANNER GONZALEZ, STACIA PARSONS           Ot           473

.9           

                                                 

 

             2016           HARVINDER GONZALEZ, MEMO SANDERS           Ot           278.00 

             

                                                 

 

             2016           HARVINDER GONZALEZ, MEMO SANDERS           Ot           288.60 

             

                                                 

 

             2016           HARVINDER GONZALEZ, MEMO SANDERS           Ot           305.1  

             

                                                 

 

             2016           MEMO BLANTON MD           Ot           V58.69 

             

                                                 

 

             2016           HARVINDER GONZALEZ, MEMO SANDERS           Ot           V85.34 

             

                                                 

 

             2016           MADL, VENECIA L ARNP           Ot           785

.1           

                                                 

 

                2016           FRANCINE GONZALEZ [facility], ALI FACP CCDS           Ot 

             288.8 

                                                             

 

                2016           FRANCINE GONZALEZ [facility], ALI FACP CCDS           Ot 

             305.1 

                                                             

 

                2016           FRANCINE GONZALEZ [facility], ALI FACP CCDS           Ot 

             785.1 

                                                             

 

                2016           FRANCINE GONZALEZ [facility], ALI FACP CCDS           Ot 

             786.59

                                                             

 

                2016           FRANCINE GONZALEZ [facility], ALI FACP CCDS           Ot 

             790.6 

                                                             

 

                2016           MADL, VENECIA L ARNP           Ot           

   R22.40          

                                                             

 

             2016           MADL, VENECIA L ARNP           Ot           620

.2           

OVARIAN CYST NEC/NOS                             

 

             2016           MADL, VENECIA L ARNP           Ot           620

.2           

OVARIAN CYST NEC/NOS                             

 

             2016           TANNER GONZALEZ, STACIA PARSONS           Ot           473

.9           

CHRONIC SINUSITIS NOS                            

 

             2016           HARVINDER GONZALEZ, MEMO SANDERS           Ot           278.00 

          

OBESITY, NOS                                     

 

             2016           HARVINDER GONZALEZ, MEMO SANDERS           Ot           288.60 

          

LEUKOCYTOSIS, UNSPECIFIED                        

 

             2016           HARVINDER GONZALEZ, MEMO SANDERS           Ot           305.1  

         

TOBACCO USE DISORDER                             

 

             2016           HARVINDER GONZALEZ, MEMO SANDERS           Ot           V58.69 

          OT

MED,LT,CURRENT USE                               

 

             2016           MEMO BLANTON MD           Ot           V85.34 

          

BODY MASS INDEX 34.0-34.9, ADULT                    

 

             2016           MADVENECIA FERREIRA ARNP           Ot           785

.1           

PALPITATIONS                                     

 

                2016           FRANCINE GONZALEZ FACC, LAN FACP CCDS           Ot 

             288.8 

                          WBC DISEASE NEC                    

 

                2016           FRANCINE GONZALEZ FACC, ALI FACP CCDS           Ot 

             305.1 

                          TOBACCO USE DISORDER                    

 

                2016           FRANCINE GONZALEZ FACC, ALI FACP CCDS           Ot 

             785.1 

                          PALPITATIONS                       

 

                2016           FRANCINE GONZALEZ FACC, ALI FACP CCDS           Ot 

             786.59

                          CHEST PAIN NEC                     

 

                2016           FRANCINE GONZALEZ FACC, ALI FACP CCDS           Ot 

             790.6 

                          ABN BLOOD CHEMISTRY NEC                    

 

                2016           VENECIA SHARPE ARNP           Ot           

   R22.40          

                          LOCALIZED SWELLING, MASS AND LUMP, UNSPE              

      

 

                2016           VENECIA SHARPE ARNP           Ot           

   M79.605         

                          PAIN IN LEFT LEG                    

 

             2016           VENECIA SHARPE L ARNP           Ot           620

.2           

OVARIAN CYST NEC/NOS                             

 

             2016           VENECIA SHARPE ARNP           Ot           620

.2           

OVARIAN CYST NEC/NOS                             

 

             2016           TANNER GONZALEZ, STACIA PARSONS           Ot           473

.9           

CHRONIC SINUSITIS NOS                            

 

             2016           MEMO BLANTON MD           Ot           278.00 

          

OBESITY, NOS                                     

 

             2016           MEMO BLANTON MD           Ot           288.60 

          

LEUKOCYTOSIS, UNSPECIFIED                        

 

             2016           MEMO BLANTON MD           Ot           305.1  

         

TOBACCO USE DISORDER                             

 

             2016           MEMO BLANTON MD           Ot           V58.69 

          OT

MED,LT,CURRENT USE                               

 

             2016           MEMO BLANTON MD           Ot           V85.34 

          

BODY MASS INDEX 34.0-34.9, ADULT                    

 

             2016           MADHANNAVENECIA ARNP           Ot           785

.1           

PALPITATIONS                                     

 

                2016           FRANCINE GONZALEZ FACC, ALI FACP CCDS           Ot 

             288.8 

                          WBC DISEASE NEC                    

 

                2016           FRANCINE GONZALEZ FACC, ALI FACP CCDS           Ot 

             305.1 

                          TOBACCO USE DISORDER                    

 

                2016           FRANCINE GONZALEZ FACC, ALI FACP CCDS           Ot 

             785.1 

                          PALPITATIONS                       

 

                2016           FRANCINE GONZALEZ FACC, ALI FACP CCDS           Ot 

             786.59

                          CHEST PAIN NEC                     

 

                2016           FRANCINE GONZALEZ FACC, ALI FACP CCDS           Ot 

             790.6 

                          ABN BLOOD CHEMISTRY NEC                    

 

                2016           MADLVENECIA ARNP           Ot           

   R22.40          

                          LOCALIZED SWELLING, MASS AND LUMP, UNSPE              

      

 

                2016           MADLVENECIA ARNP           Ot           

   M79.605         

                          PAIN IN LEFT LEG                    

 

             2016           MADLVENECIA L ARNP           Ot           620

.2           

OVARIAN CYST NEC/NOS                             

 

             2016           TANNER GONZALEZ, STACIA PARSONS           Ot           473

.9           

CHRONIC SINUSITIS NOS                            

 

             2016           MADLVENECIA L ARNP           Ot           785

.1           

PALPITATIONS                                     

 

                2016           MADVENECIA FERREIRA L ARNP           Ot           

   M79.605         

                          PAIN IN LEFT LEG                    

 

             2016           IVÁN HOLLAND DO           Ot           B96.

81           

HELICOBACTER PYLORI AS THE CAUSE OF DISE                    

 

             2016           IVÁN HOLLAND DO           Ot           R19.

4           

CHANGE IN BOWEL HABIT                            

 

                2016           IVÁN HOLLAND DO           Ot            

  Z01.818          

                          ENCOUNTER FOR OTHER PREPROCEDURAL EXAMIN              

      

 

             2016           IVÁN HOLLAND DO           Ot           D12.

2           

BENIGN NEOPLASM OF ASCENDING COLON                    

 

             2016           IVÁN HOLLAND DO           Ot           D12.

3           

BENIGN NEOPLASM OF TRANSVERSE COLON                    

 

             2016           IVÁN HOLLAND DO           Ot           K21.

9           

GASTRO-ESOPHAGEAL REFLUX DISEASE WITHOUT                    

 

             2016           IVÁN HOLLAND DO           Ot           K44.

9           

DIAPHRAGMATIC HERNIA WITHOUT OBSTRUCTION                    

 

             2016           IVÁN HOLLAND DO           Ot           K62.

1           

RECTAL POLYP                                     

 

             2016           IVÁN HOLLAND DO           Ot           K63.

5           

POLYP OF COLON                                   

 

             2016           IVÁN HOLLAND DO           Ot           R19.

4           

CHANGE IN BOWEL HABIT                            

 

             2016           IVÁN HOLLAND DO           Ot           D12.

2           

BENIGN NEOPLASM OF ASCENDING COLON                    

 

             2016           IVÁN HOLLAND DO           Ot           D12.

3           

BENIGN NEOPLASM OF TRANSVERSE COLON                    

 

             2016           IVÁN HOLLAND DO           Ot           K21.

9           

GASTRO-ESOPHAGEAL REFLUX DISEASE WITHOUT                    

 

             2016           IVÁN HOLLNAD DO           Ot           K44.

9           

DIAPHRAGMATIC HERNIA WITHOUT OBSTRUCTION                    

 

             2016           IVÁN HOLLAND DO           Ot           K62.

1           

RECTAL POLYP                                     

 

             2016           IVÁN HOLLAND DO           Ot           K63.

5           

POLYP OF COLON                                   

 

             2016           IVÁN HOLLAND DO           Ot           R19.

4           

CHANGE IN BOWEL HABIT                            

 

                2017           IVÁN HOLLAND DO           Ot            

  Z01.818          

                          ENCOUNTER FOR OTHER PREPROCEDURAL EXAMIN              

      

 

                2017           IVÁN HOLLAND DO           Ot            

  Z86.010          

                          PERSONAL HISTORY OF COLONIC POLYPS                    

 

                2017           IVÁN HOLLAND DO           Ot            

  Z01.818          

                          ENCOUNTER FOR OTHER PREPROCEDURAL EXAMIN              

      

 

                2017           IVÁN HOLLAND DO           Ot            

  Z86.010          

                          PERSONAL HISTORY OF COLONIC POLYPS                    

 

                2017           IVÁN HOLLAND DO           Ot            

  Z01.818          

                          ENCOUNTER FOR OTHER PREPROCEDURAL EXAMIN              

      

 

                2017           IVÁN HOLLAND DO           Ot            

  Z86.010          

                          PERSONAL HISTORY OF COLONIC POLYPS                    

 

             2017           IVÁN HOLLAND DO           Ot           Z09 

          

ENCNTR FOR F/U EXAM AFT TRTMT FOR COND O                    

 

                2017           IVÁN HOLLAND DO           Ot            

  Z86.010          

                          PERSONAL HISTORY OF COLONIC POLYPS                    

 

             2017           IVÁN HOLLAND DO           Ot           Z09 

          

ENCNTR FOR F/U EXAM AFT TRTMT FOR COND O                    

 

                2017           IVÁN HOLLAND DO           Ot            

  Z86.010          

                          PERSONAL HISTORY OF COLONIC POLYPS                    

 

             2017           VENECIA SHARPE ARNP           Ot           620

.2           

OVARIAN CYST NEC/NOS                             

 

             2017           VENECIA SHARPE ARNP           Ot           620

.2           

OVARIAN CYST NEC/NOS                             

 

             2017           TANNER GONZALEZ, STACIA PARSONS           Ot           473

.9           

CHRONIC SINUSITIS NOS                            

 

             2017           MEMO BLANTON MD           Ot           278.00 

          

OBESITY, NOS                                     

 

             2017           MEMO BLANTON MD           Ot           288.60 

          

LEUKOCYTOSIS, UNSPECIFIED                        

 

             2017           MEMO BLANTON MD           Ot           305.1  

         

TOBACCO USE DISORDER                             

 

             2017           MEMO BLANTON MD           Ot           V58.69 

          OTH

MED,LT,CURRENT USE                               

 

             2017           MEMO BLANTON MD           Ot           V85.34 

          

BODY MASS INDEX 34.0-34.9, ADULT                    

 

             2017           VENECIA SHARPE ARNP           Ot           785

.1           

PALPITATIONS                                     

 

                2017           FRANCINE GONZALEZ FACC, ALI FACP CCDS           Ot 

             288.8 

                          WBC DISEASE NEC                    

 

                2017           FRANCINE GONZALEZ FACC, LAN FACP CCDS           Ot 

             305.1 

                          TOBACCO USE DISORDER                    

 

                2017           FRANCINE GONZALEZ FACC, LAN FACP CCDS           Ot 

             785.1 

                          PALPITATIONS                       

 

                2017           FRANCINE GONZALEZ FACC, LAN FACP CCDS           Ot 

             786.59

                          CHEST PAIN NEC                     

 

                2017           FRANCINE GONZALEZ FACC, LAN FACP CCDS           Ot 

             790.6 

                          ABN BLOOD CHEMISTRY NEC                    

 

                2017           MADL, VENECIA HANNA ARNP           Ot           

   R22.40          

                          LOCALIZED SWELLING, MASS AND LUMP, UNSPE              

      

 

                2017           MADL, VENECIA L ARNP           Ot           

   M79.605         

                          PAIN IN LEFT LEG                    

 

                2017           IVÁN HOLLAND DO           Ot            

  Z01.818          

                          ENCOUNTER FOR OTHER PREPROCEDURAL EXAMIN              

      

 

                2017           IVÁN HOLLAND DO           Ot            

  Z86.010          

                          PERSONAL HISTORY OF COLONIC POLYPS                    

 

                2020           ANNEMARIE, VENECIA FERREIRA ARNP           Ot           

   Z12.31          

                          ENCNTR SCREEN MAMMOGRAM FOR MALIGNANT NE              

      

 

                2020           VILMA DELGADO ARNP           Ot          

    M17.12         

                          UNILATERAL PRIMARY OSTEOARTHRITIS, LEFT               

      

 

                2020           VILMA DELGADO ARNP           Ot          

    M17.12         

                          UNILATERAL PRIMARY OSTEOARTHRITIS, LEFT               

      

 

                2020           VILMA DELGADO ARNP           Ot          

    M17.12         

                          UNILATERAL PRIMARY OSTEOARTHRITIS, LEFT               

      

 

                2020           FRANCINE GONZALEZ FACC, LAN FACP CCDS           Ot 

             E11.9 

                          TYPE 2 DIABETES MELLITUS WITHOUT COMPLIC              

      

 

                2020           FRANCINE GONZALEZ FACC, LAN FACP CCDS           Ot 

             R00.2 

                          PALPITATIONS                       

 

                2020           FRANCINE GONZALEZ FACC, LAN FACP CCDS           Ot 

             R06.02

                          SHORTNESS OF BREATH                    

 

                2020           LAMAR SHARPENYA L ARNP           Ot           

   Z12.31          

                          ENCNTR SCREEN MAMMOGRAM FOR MALIGNANT NE              

      

 

                2020           VILMA DELGADO ARNP           Ot          

    M17.12         

                          UNILATERAL PRIMARY OSTEOARTHRITIS, LEFT               

      

 

                2020           FRANCINE GONZALEZ FACC, LAN FACP CCDS           Ot 

             E11.9 

                          TYPE 2 DIABETES MELLITUS WITHOUT COMPLIC              

      

 

                2020           FRANCINE GONZALEZ FACC, LAN FACP CCDS           Ot 

             R00.2 

                          PALPITATIONS                       

 

                2020           FRANCINE GONZALEZ FACC, LAN FACP CCDS           Ot 

             R06.02

                          SHORTNESS OF BREATH                    

 

                2020           FRANCINE GONZALEZ FACC, ALI FACP CCDS           Ot 

             E11.9 

                          TYPE 2 DIABETES MELLITUS WITHOUT COMPLIC              

      

 

                2020           FRANCINE GONZALEZ [facility], ALI FACP CCDS           Ot 

             R00.2 

                          PALPITATIONS                       

 

                2020           FRANCINE MD [facility], ALI FACP CCDS           Ot 

             R06.02

                          SHORTNESS OF BREATH                    

 

                2020           VENECIA SHARPE ARNP           Ot           

   Z12.31          

                          ENCNTR SCREEN MAMMOGRAM FOR MALIGNANT NE              

      

 

                2020           VILMA DELGADO ARNP           Ot          

    M17.12         

                          UNILATERAL PRIMARY OSTEOARTHRITIS, LEFT               

      

 

                2020           FRANCINE MD [facility], ALI FACP CCDS           Ot 

             E11.9 

                          TYPE 2 DIABETES MELLITUS WITHOUT COMPLIC              

      

 

                2020           FRANCINE MD [facility], ALI FACP CCDS           Ot 

             R00.2 

                          PALPITATIONS                       

 

                2020           FRANCINE MD [facility], ALI FACP CCDS           Ot 

             R06.02

                          SHORTNESS OF BREATH                    

 

                2020           [facility] MD [facility], ALI FACP CCDS           Ot 

             E11.9 

                          TYPE 2 DIABETES MELLITUS WITHOUT COMPLIC              

      

 

                2020           FRANCINE MD [facility], ALI FACP CCDS           Ot 

             R00.2 

                          PALPITATIONS                       

 

                2020           FRANCINE MD [facility], ALI FACP CCDS           Ot 

             R06.02

                          SHORTNESS OF BREATH                    

 

                2020           BAIMA, NICOLA L ARNP           Ot         

     I49.3         

                          VENTRICULAR PREMATURE DEPOLARIZATION                  

  

 

                2020           BAIMA, NICOLA L ARNP           Ot         

     R00.2         

                          PALPITATIONS                       

 

                2020           BAIMA, NICOLA L ARNP           Ot         

     R06.02        

                          SHORTNESS OF BREATH                    

 

                2020           BAIMA, NICOLA L ARNP           Ot         

     R94.39        

                          ABNORMAL RESULT OF OTHER CARDIOVASCULAR               

      

 

           2020                       Ot           305.1                  

           

  

 

           2020                       Ot           574.10                 

           

   

 

           2020                       Ot           V72.83                 

           

   

 

           2020                       Ot           654.23                 

           

   

 

           2020                       Ot           V72.83                 

           

   

 

           2020                       Ot           V74.8                  

           

  

 

             2020           MEMO BLANTON MD           Ot           278.00 

          

OBESITY, NOS                                     

 

             2020           MEMO BLANTON MD           Ot           288.60 

          

LEUKOCYTOSIS, UNSPECIFIED                        

 

             2020           MEMO BLANTON MD           Ot           305.1  

         

TOBACCO USE DISORDER                             

 

             2020           MEMO BLANTON MD           Ot           V58.69 

          OTH

MED,LT,CURRENT USE                               

 

             2020           MEMO BLANTON MD           Ot           V85.34 

          

BODY MASS INDEX 34.0-34.9, ADULT                    

 

                2020           VENECIA SHARPE ARNP           Ot           

   Z12.31          

                          ENCNTR SCREEN MAMMOGRAM FOR MALIGNANT NE              

      

 

                2020           VILMA DELGADO ARNP           Ot          

    M17.12         

                          UNILATERAL PRIMARY OSTEOARTHRITIS, LEFT               

      

 

                2020           FRANCINE MD FAC, ALI FACP CCDS           Ot 

             E11.9 

                          TYPE 2 DIABETES MELLITUS WITHOUT COMPLIC              

      

 

                2020           FRANCINE MD FAC, ALI FACP CCDS           Ot 

             R00.2 

                          PALPITATIONS                       

 

                2020           FRANCINE MD FAC, ALI FACP CCDS           Ot 

             R06.02

                          SHORTNESS OF BREATH                    

 

                2020           FRANCINE MD FAC, ALI FACP CCDS           Ot 

             E11.9 

                          TYPE 2 DIABETES MELLITUS WITHOUT COMPLIC              

      

 

                2020           FRANCINE MD FAC, ALI FACP CCDS           Ot 

             R00.2 

                          PALPITATIONS                       

 

                2020           [facility] MD FAC, ALI FACP CCDS           Ot 

             R06.02

                          SHORTNESS OF BREATH                    

 

                2020           FRANCINE MD [facility], ALI FACP CCDS           Ot 

             E11.9 

                          TYPE 2 DIABETES MELLITUS WITHOUT COMPLIC              

      

 

                2020           FRANCINE MD [facility], ALI FACP CCDS           Ot 

             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 

                          RHEUMATIC TRICUSPID INSUFFICIENCY                    

 

                2020           FRANCINE MD [facility], ALI FACP CCDS           Ot 

             R00.2 

                          PALPITATIONS                       

 

                2020           [facility] MD [facility], ALI FACP CCDS           Ot 

             R06.02

                          SHORTNESS OF BREATH                    

 

                2020           BAIMA, NICOLA L ARNP           Ot         

     I49.3         

                          VENTRICULAR PREMATURE DEPOLARIZATION                  

  

 

                2020           BAIMA, NICOLA L ARNP           Ot         

     R00.2         

                          PALPITATIONS                       

 

                2020           BAIMA, NICOLA L ARNP           Ot         

     R06.02        

                          SHORTNESS OF BREATH                    

 

                2020           BAIMA, NICOLA L ARNP           Ot         

     R94.39        

                          ABNORMAL RESULT OF OTHER CARDIOVASCULAR               

      



                                                                                
                                                                                
                                                                                
                                                                                
                                                                                
                                                                                
                                                                                
                                                                                
                                                                                
                                                                                
                                                                                
                                                                                
                                                                                
                                                                                
                                                                                
                                                                                
                                                                                
                                                                                
                                                                                
                 



Procedures

      



                Code            Description           Performed By           Per

aura On        

 

                                      74.1                                 LOW C

ERVICAL          

                                                    10/21/2008        

 

                                      68177                                 THER

APUTIC INJ SQ/IM          

                                                    2012        

 

                                                                       DEPO

-PROVERA  MG       

                                                    2012        

 

                                      [id number]                                 URIN

E PREGNANCY TEST (IN-

HOUSE)                                               2012        

 

                                      38302                                 URIN

E PREGNANCY TEST (IN-

HOUSE)                                               2013        

 

                                                                       DEPO

-PROVERA  MG       

                                                    2013        

 

                                      75864                                 THER

APUTIC INJ SQ/IM          

                                                    2013        

 

                                      71603                                 THER

APUTIC INJ SQ/IM          

                                                    2013        

 

                                                                       DEPO

 PROVERA                  

                                                    2013        

 

                                      [id number]                                 URIN

E PREGNANCY TEST (IN-

HOUSE)                                               2013        

 

                                      81761                                 PULM

ONARY FUNCTION TEST (IN-

HOUSE)                                               2013        

 

                                      97097                                 RESP

IRATORY FLOW VOLUME LOOP  

                                                    2013        

 

                                      83043                                 THER

APUTIC INJ SQ/IM          

                                                    2013        

 

                                      60742                                 URIN

E PREGNANCY TEST (IN-

HOUSE)                                               2013        

 

                                                                       DEPO

 PROVERA                  

                                                    2013        

 

                                      [id number]                                 ROUT

INE VENIPUNCTURE          

                                                    10/04/2013        

 

                                      36697                                 XRAY

 CHEST 2 VIEW             

                                                    10/04/2013        

 

                                49786                                 CBC       

                    

                                        10/04/2013        

 

                                      15673                                 MONO

 TEST (IN-HOUSE)          

                                                    10/07/2013        

 

                                      97162                                 URIN

E PREGNANCY TEST (IN-

HOUSE)                                               2013        

 

                                                                       DEPO

 PROVERA                  

                                                    2013        

 

                                                                       DEPO

 PROVERA                  

                                                    2014        

 

                                      [id number]                                 PREG

JESUS TEST, URINE (IN-

HOUSE)                                               2014        

 

                                      25131                                 THER

APUTIC INJ SQ/IM          

                                                    2014        

 

                                      98829                                 TB T

EST INTRADERMAL           

                                                    2014        

 

                                      51333                                 XRAY

 CERVICAL SPINE, 2 OR 3 

VIEWS                                               2014        

 

                                      37262                                 XRAY

 KNEE RIGHT 1 OR 2 VIEWS  

                                                    2014        

 

                                      67967                                 ROUT

INE VENIPUNCTURE          

                                                    2014        

 

                                99246                                 CBC       

                    

                                        2014        

 

                                      60688                                 LIPI

D PANEL                   

                                                    2014        

 

                                59408                                 CMP       

                    

                                        2014        

 

                                      [id number]                                 GF

R CALC (RESULT ONLY)      

                                                    2014        

 

                                53636                                 TSH       

                    

                                        2014        

 

                                      71872                                 GLUC

OSE JANET 2 HOUR            

                                                    2014        

 

                                      60108                                 A1C 

(IN-HOUSE)                

                                                    2014        

 

                                      58206                                 ROUT

INE VENIPUNCTURE          

                                                    2014        

 

                                46478                                 CBC       

                    

                                        2014        

 

                                      [id number]                                 GF

R CALC (RESULT ONLY)      

                                                    2014        

 

                                26054                                 BMP       

                    

                                        2014        

 

                                                                       ROCE

PHIN INJ 1 G              

                                                    2014        

 

                                      37880                                 THER

APUTIC INJ SQ/IM          

                                                    2014        

 

                                      67930                                 US P

ELVIC COMPL (REFLEX CPT- 

04537)                                               10/07/2014        

 

                                      06227                                 ROUT

INE VENIPUNCTURE          

                                                    01/15/2015        

 

                                      18229                                 US P

ELVIC COMPL (REFLEX CPT- 

28943)                                               01/15/2015        

 

                                      00382                                 A1C 

(IN-HOUSE)                

                                                    01/15/2015        

 

                                01161                                 CBC       

                    

                                        01/15/2015        

 

                                      [id number]                                 GF

R CALC (RESULT ONLY)      

                                                    01/15/2015        

 

                                59207                                 CMP       

                    

                                        01/15/2015        

 

                                      97553                                 ROUT

INE VENIPUNCTURE          

                                                    2015        

 

                                      13867                                 KEENA

PHERAL BLOOD SMEAR W/ PATH

REPORT                                               2015        



                                                                                
                                   



Results

      



                    Test                Result              Range        

 

                                        Urine beta human chorionic gonadotropin 

(hCG) measurement - 16 07:40      

  

 

                          Urine beta human chorionic gonadotropin (hCG) measurem

ent           NEGATIVE    

                                        NEGATIVE        

 

                                        Lipid Panel - 16 08:32         

 

                    Cholesterol, Total           213 mg/dL           100-199    

    

 

                    Triglycerides           220 mg/dL           0-149        

 

                    HDL Cholesterol           33 mg/dL            >39        

 

                    VLDL Cholesterol Jani           44 mg/dL            5-40     

   

 

                    LDL Cholesterol Calc           136 mg/dL           0-99     

   

 

                                        Comp. Metabolic Panel (14) - 16 09

:36         

 

                    Glucose, Serum           203 mg/dL           65-99        

 

                    BUN                 7 mg/dL             6-24        

 

                    Creatinine, Serum           0.66 mg/dL           0.57-1.00  

      

 

                    eGFR If NonAfricn Am           111 mL/min/1.73              

 >59        

 

                    eGFR If Africn Am           128 mL/min/1.73               >5

9        

 

                    BUN/Creatinine Ratio           11                  9-23     

   

 

                    Sodium, Serum           138 mmol/L           134-144        

 

                    Potassium, Serum           4.4 mmol/L           3.5-5.2     

   

 

                    Chloride, Serum           101 mmol/L                  

 

 

                    Carbon Dioxide, Total           23 mmol/L           18-29   

     

 

                    Calcium, Serum           9.6 mg/dL           8.7-10.2       

 

 

                    Protein, Total, Serum           6.9 g/dL            6.0-8.5 

       

 

                    Albumin, Serum           4.0 g/dL            3.5-5.5        

 

                    Globulin, Total           2.9 g/dL            1.5-4.5       

 

 

                    A/G Ratio           1.4                 1.1-2.5        

 

                    Bilirubin, Total           0.3 mg/dL           0.0-1.2      

  

 

                    Alkaline Phosphatase, S           61 IU/L             

        

 

                    AST (SGOT)           14 IU/L             0-40        

 

                    ALT (SGPT)           13 IU/L             0-32        

 

                                        Comp. Metabolic Panel (14) - 17 07

:57         

 

                    Glucose, Serum           145 mg/dL           65-99        

 

                    BUN                 6 mg/dL             6-24        

 

                    Creatinine, Serum           0.62 mg/dL           0.57-1.00  

      

 

                    eGFR If NonAfricn Am           113 mL/min/1.73              

 >59        

 

                    eGFR If Africn Am           130 mL/min/1.73               >5

9        

 

                    BUN/Creatinine Ratio           10                  9-23     

   

 

                    Sodium, Serum           143 mmol/L           134-144        

 

                    Potassium, Serum           4.1 mmol/L           3.5-5.2     

   

 

                    Chloride, Serum           103 mmol/L                  

 

 

                    Carbon Dioxide, Total           25 mmol/L           18-29   

     

 

                    Calcium, Serum           9.2 mg/dL           8.7-10.2       

 

 

                    Protein, Total, Serum           6.8 g/dL            6.0-8.5 

       

 

                    Albumin, Serum           4.0 g/dL            3.5-5.5        

 

                    Globulin, Total           2.8 g/dL            1.5-4.5       

 

 

                    A/G Ratio           1.4                 1.1-2.5        

 

                    Bilirubin, Total           <0.2 mg/dL           0.0-1.2     

   

 

                    Alkaline Phosphatase, S           60 IU/L             

        

 

                    AST (SGOT)           11 IU/L             0-40        

 

                    ALT (SGPT)           12 IU/L             0-32        

 

                                        Lipid Panel - 17 07:57         

 

                    Cholesterol, Total           135 mg/dL           100-199    

    

 

                    Triglycerides           140 mg/dL           0-149        

 

                    HDL Cholesterol           38 mg/dL            >39        

 

                    VLDL Cholesterol Jani           28 mg/dL            5-40     

   

 

                    LDL Cholesterol Calc           69 mg/dL            0-99     

   

 

                                        Urine beta human chorionic gonadotropin 

(hCG) measurement - 17 09:15      

  

 

                          Urine beta human chorionic gonadotropin (hCG) measurem

ent           NEGATIVE    

                                        NEGATIVE        

 

                                        Capillary blood glucose measurement by g

lucometer (mass/volume) - 17 09:16

        

 

                          Capillary blood glucose measurement by glucometer (mas

s/volume)           159 

mg/dL                                           

 

                                        Comp. Metabolic Panel (14) - 17 18

:29         

 

                    Glucose, Serum           286 mg/dL           65-99        

 

                    BUN                 6 mg/dL             6-24        

 

                    Creatinine, Serum           0.62 mg/dL           0.57-1.00  

      

 

                    eGFR If NonAfricn Am           112 mL/min/1.73              

 >59        

 

                    eGFR If Africn Am           130 mL/min/1.73               >5

9        

 

                    BUN/Creatinine Ratio           10                  9-23     

   

 

                    Sodium, Serum           137 mmol/L           134-144        

 

                    Potassium, Serum           3.9 mmol/L           3.5-5.2     

   

 

                    Chloride, Serum           98 mmol/L                   

 

                    Carbon Dioxide, Total           23 mmol/L           18-29   

     

 

                    Calcium, Serum           9.6 mg/dL           8.7-10.2       

 

 

                    Protein, Total, Serum           6.6 g/dL            6.0-8.5 

       

 

                    Albumin, Serum           3.9 g/dL            3.5-5.5        

 

                    Globulin, Total           2.7 g/dL            1.5-4.5       

 

 

                    A/G Ratio           1.4                 1.2-2.2        

 

                    Bilirubin, Total           <0.2 mg/dL           0.0-1.2     

   

 

                    Alkaline Phosphatase, S           62 IU/L             

        

 

                    AST (SGOT)           8 IU/L              0-40        

 

                    ALT (SGPT)           13 IU/L             0-32        

 

                                        CBC - 18 09:28         

 

                    WHITE BLOOD CELL COUNT           12.1 Thousand/uL           

3.8-10.8        

 

                    RED BLOOD CELL COUNT           4.89 Million/uL           3.8

0-5.10        

 

                    HEMOGLOBIN           12.5 g/dL           11.7-15.5        

 

                    HEMATOCRIT           39.0 %              35.0-45.0        

 

                    MCV                 79.8 fL             80.0-100.0        

 

                    MCH                 25.6 pg             27.0-33.0        

 

                    MCHC                32.1 g/dL           32.0-36.0        

 

                    RDW                 15.0 %              11.0-15.0        

 

                    PLATELET COUNT           375 Thousand/uL           140-400  

      

 

                    MPV                 10.1 fL             7.5-12.5        

 

                    ABSOLUTE NEUTROPHILS           7950 cells/uL           1500-

7800        

 

                    ABSOLUTE LYMPHOCYTES           3194 cells/uL           850-3

900        

 

                    ABSOLUTE MONOCYTES           581 cells/uL           200-950 

       

 

                    ABSOLUTE EOSINOPHILS           303 cells/uL           

        

 

                    ABSOLUTE BASOPHILS           73 cells/uL           0-200    

    

 

                    NEUTROPHILS           65.7 %              NRG        

 

                    LYMPHOCYTES           26.4 %              NRG        

 

                    MONOCYTES           4.8 %               NRG        

 

                    EOSINOPHILS           2.5 %               NRG        

 

                    BASOPHILS           0.6 %               NRG        

 

                                        LYME, TOTAL ANTIBODY/REFLEX WESTERN BLOT

 - 07/10/18 13:16         

 

                    LYME AB SCREEN           <0.90 index           NRG        

 

                                        [facility] SPOTTED FEVER, IgG, IgM -  13:16         

 

                    RMSF IGG            NOT DETECTED            NRG        

 

                    RMSF IGM            NOT DETECTED            NRG        

 

                                        LIPID PANEL - 19 08:56         

 

                    CHOLESTEROL, TOTAL           168 mg/dL           <200       

 

 

                    HDL CHOLESTEROL           45 mg/dL            >50        

 

                    TRIGLYCERIDES           164 mg/dL           <150        

 

                    LDL-CHOLESTEROL           97 mg/dL (calc)           NRG     

   

 

                    CHOL/HDLC RATIO           3.7 (calc)           <5.0        

 

                    NON HDL CHOLESTEROL           123 mg/dL (calc)           <13

0        

 

                                        VITAMIN D, 25-H - 20 09:27        

 

 

                    VITAMIN D,25-OH,TOTAL,IA           25 ng/mL            30-10

0        

 

                                        [facility]ERMAN MTN SPOTTED FEVER, IgG, IgM -  09:27         

 

                    RMSF IGG            NOT DETECTED            NRG        

 

                    RMSF IGM            NOT DETECTED            NRG        

 

                                        CBC - 20 09:39         

 

                    WHITE BLOOD CELL COUNT           12.2 Thousand/uL           

3.8-10.8        

 

                    RED BLOOD CELL COUNT           4.26 Million/uL           3.8

0-5.10        

 

                    HEMOGLOBIN           11.9 g/dL           11.7-15.5        

 

                    HEMATOCRIT           38.0 %              35.0-45.0        

 

                    MCV                 89.2 fL             80.0-100.0        

 

                    MCH                 27.9 pg             27.0-33.0        

 

                    MCHC                31.3 g/dL           32.0-36.0        

 

                    RDW                 13.7 %              11.0-15.0        

 

                    PLATELET COUNT           367 Thousand/uL           140-400  

      

 

                    MPV                 9.4 fL              7.5-12.5        

 

                    ABSOLUTE NEUTROPHILS           8906 cells/uL           1500-

7800        

 

                    ABSOLUTE LYMPHOCYTES           2233 cells/uL           850-3

900        

 

                    ABSOLUTE MONOCYTES           634 cells/uL           200-950 

       

 

                    ABSOLUTE EOSINOPHILS           354 cells/uL           

        

 

                    ABSOLUTE BASOPHILS           73 cells/uL           0-200    

    

 

                    NEUTROPHILS           73 %                NRG        

 

                    LYMPHOCYTES           18.3 %              NRG        

 

                    MONOCYTES           5.2 %               NRG        

 

                    EOSINOPHILS           2.9 %               NRG        

 

                    BASOPHILS           0.6 %               NRG        

 

                                        BNP - 20 09:39         

 

                    B TYPE NATRIURETIC PEPTIDE (BNP)           21 pg/mL         

   <100        

 

                                        Serum or plasma choriogonadotropin (preg

jesus test) detection - 20 16:00  

      

 

                          Serum or plasma choriogonadotropin (pregnancy test) de

tection           NEGATIVE

                                        NEGATIVE        



                                                



Encounters

      



                ACCT No.           Visit Date/Time           Discharge          

 Status         

             Pt. Type           Provider           Facility           Loc./Unit 

          

Complaint        

 

             [id number]           2017 08:36:00                         

            

Document Registration                                                           

         

 

             [id number]           2017 07:05:00                         

            

Document Registration                                                           

         

 

             [id number]           2016 08:36:00                         

            

Document Registration                                                           

         

 

                    [id number]           2020 10:33:00           

020 23:59:59        

                [facility]             Outpatient           NICOLA LOWERY      

     [facility]           LAB                       R94.39,I49.3,R00.2,R06.

02        

 

                    [id number]           2020 08:31:00            23:59:59        

                CLS             Outpatient           LAN ESCAMILLA MD, FACC, FACP CC

DS           Via

[facility]           CARD                      PALPITATIONS,SO

B        

 

                    [id number]           2020 07:07:00            23:59:59        

                CLS             Outpatient           LAN ESCAMILLA MD, FACC, FACP CC

DS           Via

[facility]           CARD                      PALPITATIONS,SO

B        

 

                    [id number]           2020 15:17:00            23:59:59        

                CLS             Outpatient           LAN ESCAMILLA MD, FACC, FACP CC

DS           Via

[facility]           LAB                       PALPITATIONS   

     

 

                    [id number]           2020 17:25:00            23:59:59        

                CLS             Outpatient           VILMA DELGADOP       

    [facility]           RAD                       ACUTE PAIN OF LEFT KNEE

        

 

                    [id number]           2018 07:21:00           

018 23:59:59        

                CLS             Outpatient           VENECIA SHARPE ARNP        

   [facility]           RAD                       SCREENING        

 

                    [id number]           2017 08:57:00           

017 11:25:00        

                DIS             Outpatient           IVÁN HOLLAND DO         

  [facility]           ENDO                      HX POLYPS        

 

                    [id number]           2017 09:00:00           

017 10:55:00        

                DIS             Outpatient           IVÁN HOLLAND DO         

  [facility]           PREOP                     HX POLPYS        

 

                    [id number]           2016 07:25:00           

016 11:15:00        

                DIS             Outpatient           IVÁN HOLLAND DO         

  [facility]           SDC                       CHANGE IN BOWEL HABITS;

 GERD       



 

                    [id number]           2016 06:07:00           

016 15:44:00        

                DIS             Outpatient           IVÁN HOLLAND DO         

  [facility]           PREOP                     CHANGE IN BOWEL; GERD  

      

 

                    [id number]           2016 08:36:00           

016 23:59:59        

                CLS             Outpatient           VENECIA SHARPE ARNP        

   [facility]           RAD                       LEFT LEG PAIN        

 

                    [id number]           2015 17:32:00            17:54:00        

                DIS             Outpatient           AZUCENA LEDBETTER DO         

  [facility]           SLEEP                     EDS, ARRHYTHMIAS, AM HE

ADACHE    

   

 

                    [id number]           2015 10:40:00            23:59:59        

                CLS             Outpatient           MADL, VENECIA L ARNP        

   [facility]           RAD                       LOCALIZED SWELLING OF L

OWER LEG    

   

 

                    [id number]           2015 11:52:00            23:59:59        

                CLS             Outpatient           MADLNEGROA L ARNP        

   [facility]           RAD                                

 

                    [id number]           2015 07:16:00            23:59:59        

                CLS             Outpatient           AZUCENA LEDBETTER DO         

  [facility]           RT                                 

 

                    [id number]           10/05/2015 08:28:00           10/05/2

015 23:59:59        

                CLS             Outpatient           FRANCINE GONZALEZ FACC, ALI FACP CC

DS           Via

[facility]           LAB                                

 

                    [id number]           09/15/2015 09:02:00           09/15/2

015 15:30:00        

                DIS             Outpatient           FRANCINE GONZALEZ FACC, ALI FACP CC

DS           Via

[facility]           CATH                               

 

                    [id number]           2015 08:19:00            23:59:59        

                CLS             Outpatient           FRANCINE GONZALEZ FACC, LAN FACP CC

DS           Via

[facility]           CARD                      PALP        

 

                    [id number]           2015 15:35:00            18:05:00        

                DIS             Emergency           SHARON GONZALEZ, NADINE KEYES    

       Via 

[facility]           ER                        CP        

 

                    [id number]           2015 09:48:00            23:59:59        

                CLS             Outpatient           MADHANNAVENECIA ARNP        

   [facility]           CARD                      PALPATATIONS        

 

                    [id number]           2015 00:12:00            23:59:59        

                CLS             Preadmit           MEMO BLANTON MD           Via

 [facility]           ONC                                

 

                    [id number]           2015 11:45:00           03/23/2

015 23:59:59        

                CLS             Outpatient           TANNER GONZALEZ, STACIA PARSONS        

   [facility]           RAD                       CHRONIC SINUSITIS      

  

 

                    [id number]           2015 13:36:00           

015 23:59:59        

                CLS             Outpatient           HARVINDER GONZALEZ, MEMO SAINZ

ia [facility]           ONC                                

 

                    [id number]           2015 11:58:00           

015 23:59:59        

                CLS             Outpatient           MADLVENECIA ARNP        

   [facility]           RAD                       OVARIAN CYST        

 

                    [id number]           10/10/2014 13:50:00           10/10/2

014 23:59:59        

                CLS             Outpatient           MADLVENECIA L ARNP        

   [facility]           RAD                       4-5CM OVARIAN CYST, RIG

HT PELVIC 

PAIN        

 

                    [id number]           2014 21:05:00           

014 23:13:00        

                DIS             Emergency           KARRI GONZALEZ, ANDER BENAVIDES      

     [facility]           ER                        VAGINAL BLEEDING       

 

 

             [id number]           2020 11:00:00                        P

ISABEL ESCAMILLA MD FACC, LAN FACP CCDS           [facility]               CATH                      ABNORMAL STRESS TEST,PALPITA

TIONS,PVCS,SOB 

      

 

             [id number]           2020 06:26:00                         

            

Document Registration                                                           

         

 

             [id number]           2009 18:46:00                         

            

Document Registration                                                           

         

 

             [id number]           10/21/2008 05:47:00                         

            

Document Registration                                                           

         

 

             [id number]           10/15/2008 09:13:00                         

            

Document Registration                                                           

         

 

             [id number]           2006 10:12:00                         

            

Document Registration                                                           

         

 

                [id number]           2020 17:20:00           2020 23:59:5

9           CLS 

                Outpatient           [facility] TRINIDAD                       

    [facility]                                   

 

             [id number]           2020 09:00:00                              

       Document

Registration                                                                    

 

             [id number]           2020 09:00:00                              

       Document

Registration                                                                    

 

             [id number]           2019 08:40:00                              

       Document

Registration                                                                    

 

             [id number]           07/10/2018 11:55:00                              

       Document

Registration                                                                    

 

             [id number]           2018 08:20:00                              

       Document

Registration                                                                    

 

                [id number]           02/10/2015 10:24:00           02/10/2015 23:59:

59           CLS

                Outpatient           VENECIA HOBSON                        

          

                                                 

 

                [id number]           2015 08:48:00           2015 23:59:

59           CLS

                Outpatient           GARCIA DOMARY                           

          

                                                 

 

                [id number]           01/15/2015 14:25:00           01/15/2015 23:59:

59           CLS

                Outpatient           MADL APRN, VENECIA L                        

          

                                                 

 

                [id number]           2015 16:35:00           2015 23:59:

59           CLS

                Outpatient           GARCIA DOMARY                           

          

                                                 

 

                [id number]           10/07/2014 08:36:00           10/07/2014 23:59:

59           CLS

                Outpatient           MADL APRN, VENECIA L                        

          

                                                 

 

                [id number]           2014 08:30:00           2014 23:59:

59           CLS

                Outpatient           DAHLIA APRN, YONNY R                      

          

                                                 

 

                [id number]           2014 15:42:00           2014 23:59:

59           CLS

                Outpatient           MADL APRN, VENECIA L                        

          

                                                 

 

                [id number]           2014 09:02:00           2014 23:59:

59           CLS

                Outpatient           MADL APRN, VENECIA L                        

          

                                                 

 

                [id number]           2014 15:00:00           2014 23:59:

59           CLS

                Outpatient           MADL APRN, VENECIA L                        

          

                                                 

 

                [id number]           2014 11:03:00           2014 23:59:

59           CLS

                Outpatient           MADL APRN, VENECIA L                        

          

                                                 

 

                [id number]           2014 07:59:00           2014 23:59:

59           CLS

                Outpatient           MADL APRN, VENECIA L                        

          

                                                 

 

                [id number]           2014 09:59:00           2014 23:59:

59           CLS

                Outpatient           MADL APRN, VENECIA L                        

          

                                                 

 

                [id number]           2014 15:28:00           2014 23:59:

59           CLS

                Outpatient           MADRID APRN FRANCESCA R                  

          

                                                 

 

                [id number]           2014 08:33:00           2014 23:59:

59           CLS

                Outpatient           GARCIA DOMARY                           

          

                                                 

 

                [id number]           2013 09:07:00           2013 23:59:

59           CLS

                Outpatient           GARCIA DOMARY                           

          

                                                 

 

                [id number]           10/04/2013 08:57:00           10/04/2013 23:59:

59           CLS

                Outpatient           GARCIA DOMARY                           

          

                                                 

 

                [id number]           2013 10:37:00           2013 23:59:

59           CLS

                Outpatient           MARY GARCIA DO                           

          

                                                 

 

                [id number]           2013 09:42:00           2013 23:59:

59           CLS

             Outpatient                                                         

  

 

                [id number]           2013 10:41:00           2013 23:59:

59           CLS

             Outpatient                                                         

  

 

                [id number]           2012 08:24:00           2012 23:59:5

9           CLS 

             Outpatient                                                         

  

 

             [id number]           2013 11:39:00                               

      Document 

Registration                                                                    

 

             [id number]           2013 15:03:00                               

      Document 

Registration                                                                    

 

             [id number]           2013 15:04:00                               

      Document 

Registration                                                                    

 

             [id number]           2016 07:05:00                         

            

Document Registration

## 2020-06-23 NOTE — XMS REPORT
[facility]

                             Created on: 2017



Jasonwhit April

External Reference #: [id number]

: 1975

Sex: Female



Demographics





                          Address                   [address] [place]  [address]

 

                          Preferred Language        Unknown

 

                          Marital Status            Unknown

 

                          Holiness Affiliation     Unknown

 

                          Race                      Unknown

 

                          Ethnic Group              Unknown





Author





                          Author                    ANNEMARIE April VENECIA

 

                          Organization              [facility]

 

                          Address                   [address] [place]  [address]



 

                          Phone                     [phone]







Care Team Providers





                    Care Team Member Name Role                Phone

 

                    ANNEMARIE  VENECIA       Unavailable         [phone]







PROBLEMS





          Type      Condition ICD9-CM Code VUO33-HO Code Onset Dates Condition S

tatus SNOMED 

Code

 

                          Problem                   Type 2 diabetes mellitus wit

hout complication, without long-term current

use of insulin              E11.9                     Active       [id number]

 

          Problem   Dyspepsia           R10.13              Active    706182092

 

          Problem   Helicobacter pylori (H. pylori) infection           A04.8   

            Active    8714364

 

          Problem   Duarte's neuroma of right foot           G57.61             

 Active    40735889

 

          Problem   Tubular adenoma of colon           D12.6               Activ

e    [id number]

 

          Problem   Bowel habit changes           R19.4               Active    

90098008

 

          Problem   Upper abdominal pain           R10.10              Active   

 67952943

 

          Problem   Mixed hyperlipidemia           E78.2               Active   

 [id number]

 

          Problem   History of leukocytosis           Z86.2               Active

    [id number]







ALLERGIES





             Substance    Reaction     Event Type   Date         Status

 

             Penicillin V Potassium Unknown      Drug Allergy 10 Brandon, 2017 Activ

e







SOCIAL HISTORY

No smoking Hx information available



PLAN OF CARE





                          Activity                  Details

 

                                         

 

                          Follow Up                 lab 3 weeks and 3 months [place]

son:DM







VITAL SIGNS





                    Height              69 in               2017-01-10

 

                    Weight              214.8 lbs           2017-01-10

 

                    Temperature         98.2 degrees Fahrenheit 2017-01-10

 

                    Heart Rate          62 bpm              2017-01-10

 

                    Respiratory Rate    18                  2017-01-10

 

                    BMI                 31.72 kg/m2         2017-01-10

 

                    Blood pressure systolic 124 mmHg            2017-01-10

 

                    Blood pressure diastolic 76 mmHg             2017-01-10







MEDICATIONS





        Medication Instructions Dosage  Frequency Start Date End Date Duration S

tatus

 

        Fish Oil 300 MG Orally Twice a day 1 capsule 12h                        

     Active

 

             Los Contour Next Test Test Strips In Vitro Once a day as directed

  24h          10 Aug, 

2016                                                        Active

 

        Los Contour Next Monitor w/Device         as directed         10 Aug, 

2016                 Active

 

        Singulair 10 MG Orally Once a day 1 tablet in the evening 24h           

                  Active

 

        MetFORMIN HCl  MG Orally twice a day 2 tablets 12h                

     30      Active

 

                    Triamcinolone Acetonide 0.1 % Externally Twice a day 1 appli

cation to affected 

area         12h          10 2017                           Active

 

        Nexium 40 mg Orally Once a day 1 capsule 24h     02 Aug, 2016           

      Active

 

        Crestor 10 mg Orally Once a day 1 tablet 24h     23 2016         30

 day(s) Active

 

        Depo-Provera 150 MG/ML         1 ml                                    A

ctive

 

             Silvadene 1 % Externally Once a day 1 application to affected area 

24h          21 Sep, 

2015                                                        Active

 

        Glimepiride 1 MG Orally Once a day 1 tablet 24h     10 Brandon, 2017        

 30 day(s) Active

 

        Zoloft 25 MG Orally Once a day 1 tablet 24h     28 May, 2015         30 

days Active







RESULTS





                Name            Result          Date            Reference Range

 

                A1C (IN HOUSE)                  2017-01-10       

 

                A1C IN HOUSE    9.2                             4.3 - 5.6 %

 

                Previous A1c    11.2                             

 

                Lot             [id number]                             

 

                Exp date        10/2018                          







PROCEDURES





                Procedure       Date Ordered    Related Diagnosis Body Site

 

                GLYCATED HEMOGLOBIN TEST Brandon 10, 2017                     

 

                COMPREHEN METABOLIC PANEL Brandon 10, 2017                     

 

                Office Visit, Est Pt., Level 4 Brandon 10, 2017                     







IMMUNIZATIONS

No Known Immunizations

## 2020-06-23 NOTE — XMS REPORT
[facility]

                             Created on: 2019



Jasonwhit April

External Reference #: [id number]

: 1975

Sex: Female



Demographics





                          Address                   [address] [place]  [address]

 

                          Preferred Language        Unknown

 

                          Marital Status            Unknown

 

                          Gnosticism Affiliation     Unknown

 

                          Race                      Unknown

 

                          Ethnic Group              Unknown





Author





                          Author                    ANNEMARIE April VENECIA

 

                          [facility]

 

                          Address                   [address] [place]  [address]



 

                          Phone                     [phone]







Care Team Providers





                    Care Team Member Name Role                Phone

 

                    ANNETTE SHARPEWNYA       Unavailable         [phone]







PROBLEMS





          Type      Condition ICD9-CM Code SOG56-FD Code Onset Dates Condition S

tatus SNOMED 

Code

 

          Problem   Tubular adenoma of colon           D12.6               Activ

e    [id number]

 

          Problem   Duarte's neuroma of right foot           G57.61             

 Active    38680475

 

          Problem   History of leukocytosis           Z86.2               Active

    [id number]

 

          Problem   Non morbid obesity           E66.9               Active    4

[id number]

 

          Problem   Mixed hyperlipidemia           E78.2               Active   

 [id number]

 

          Problem   Seasonal allergic rhinitis due to pollen           J30.1    

           Active    [id number]

 

                          Problem                   Type 2 diabetes mellitus wit

hout complication, without long-term current

use of insulin              E11.9                     Active       [id number]

 

          Problem   Anxiety             F41.9               Active    94883772

 

           Problem    Seasonal allergic rhinitis due to other allergic trigger  

          J30.89                

Active                                  [id number]

 

          Problem   Dysthymic disorder           F34.1               Active    7

9763531

 

          Problem   Arthritis           M19.90              Active    4031433







ALLERGIES

No Information



ENCOUNTERS





                Encounter       Location        Date            Diagnosis

 

                          [facility]     [id number]

[address] [place] [address]

                          31 May, 2019              Type 2 diabetes mellitus wit

hout complication, without long-term 

current use of insulin E11.9

 

                          [facility]     [address] [id number]

[address] [place] [address]

                          31 May, 2019              Type 2 diabetes mellitus wit

hout complication, without long-term 

current use of insulin E11.9 and Non morbid obesity E66.9

 

                          [facility]  [address] [id number]

[address] [place] [address] 

[address]                14 May, 2019              Seasonal allergic rhinitis d

ue to pollen J30.1 and Sore 

throat J02.9

 

                          [facility]  [address] 

[address]                12 2019              Arthritis M19.90

 

                          [place] [address] [address]

                                        Seasonal allergic rhinitis d

ue to other allergic trigger J30.89 and

Dysthymic disorder F34.1

 

                          [place] [address] [address]

                          05 Dec, 2018              Bilateral otitis media with 

effusion H65.93 and Anxiety F41.9

 

                          [place] [address] [address]

                                        Type 2 diabetes mellitus wit

hout complication, without long-term 

current use of insulin E11.9

 

                          [place] [address] [address]

                                        Pharyngitis due to Streptoco

ccus species J02.0

 

                          [place] [address] [address]

                                         

 

                          [facility]  [address] 

[address]                10 Jul, 2018              Fever, unspecified fever cau

se R50.9 and Lymphadenopathy

R59.1

 

                          [facility]  [address] 

[address]                              Pharyngitis due to other org

anism J02.8

 

                          [place] [address] [address]

                                         

 

                          [place] [address] [address]

                                        Type 2 diabetes mellitus wit

hout complication, without long-term 

current use of insulin E11.9 and Other eczema L30.8

 

                          [facility]  [address] 

[address]                28 2018              Acute pain of left shoulder 

M25.512

 

                          [place] [id number]

[address] [place] [address]

                                         

 

                          [place] [address] [address]

                          15 Feb, 2018              Type 2 diabetes mellitus wit

hout complication, without long-term 

current use of insulin E11.9

 

                          [place] [address] [address]

                                        Type 2 diabetes mellitus wit

hout complication, without long-term 

current use of insulin E11.9 ; Tubular adenoma of colon D12.6 ; Mixed 
hyperlipidemia E78.2 ; History of leukocytosis Z86.2 and Screening breast 
examination Z12.31

 

                          [place] [address] [address]

                                        Metatarsalgia of right foot 

M77.41 and Type 2 diabetes mellitus 

without complication, without long-term current use of insulin E11.9

 

                          [place] [address] [address]

                                        Capsulitis M77.9 and Metatar

salgia of right foot M77.41

 

                          [place] [address] [address]

                          08 Sep, 2017              Capsulitis M77.9

 

                          [place] [address] [address]

                          06 Sep, 2017               

 

                          [place] [address] [address]

                          03 Aug, 2017              Type 2 diabetes mellitus wit

hout complication, without long-term 

current use of insulin E11.9 ; Tubular adenoma of colon D12.6 and Mixed 
hyperlipidemia E78.2

 

                          [place] [address] [address]

                                        Metatarsalgia of right foot 

M77.41 and Capsulitis M77.9

 

                          [place] [address] [address]

                                         

 

                          [facility]  [id number]

[address] [place] [address] 

[address]                              Duarte's neuroma of right fo

ot G57.61

 

                          [facility]     [id number]

[address] [place] [address]

                                        Mixed hyperlipidemia E78.2 a

nd Type 2 diabetes mellitus without 

complication, without long-term current use of insulin E11.9

 

                          [place] [address] [address]

                          10 Brandon, 2017              Type 2 diabetes mellitus wit

hout complication, without long-term 

current use of insulin E11.9 ; Tubular adenoma of colon D12.6 and Mixed 
hyperlipidemia E78.2

 

                          [place] [address] [address]

                          22 Dec, 2016              Mixed hyperlipidemia E78.2

 

                          [place] [address] [address]

                                         

 

                          [place] [address] [address]

                                        Mixed hyperlipidemia E78.2

 

                          [place] [address] [address]

                                        Mixed hyperlipidemia E78.2

 

                          [facility]     [address] [address]

                          08 Sep, 2016               

 

                          [place] [address] [address]

                          10 Aug, 2016              Type 2 diabetes mellitus wit

hout complication, without long-term 

current use of insulin E11.9 ; Helicobacter pylori (H. pylori) infection A04.8 
and Mixed hyperlipidemia E78.2

 

                          [place] [address] [id number]

[address] [place] [address]

                          08 Aug, 2016              Type 2 diabetes mellitus wit

hout complication, without long-term 

current use of insulin E11.9

 

                          [place] [address] [address]

                          03 Aug, 2016              Type 2 diabetes mellitus wit

hout complication, without long-term 

current use of insulin E11.9

 

                          [place] [address] [address]

                          02 Aug, 2016              Dyspepsia R10.13 ; Upper abd

ominal pain R10.10 ; Bowel habit 

changes R19.4 ; History of leukocytosis Z86.2 and Helicobacter pylori (H. 
pylori) infection A04.8

 

                          [facility]  [address] 

[address]                27 May, 2016              Environmental allergies Z91.

09

 

                          [place] [address] [address]

                                        Left leg pain M79.605 ; Loca

lized swelling of lower leg R22.40 and 

Upper respiratory infection J06.9

 

                          [place] [address] [address]

                          28 Dec, 2015               

 

                          [place] [address] [address]

                          21 Dec, 2015              Left leg pain M79.605

 

                          [place] [address] [address]

                          10 Dec, 2015              Left leg pain M79.605 and Lo

calized swelling of lower leg R22.40

 

                          [place] [address] [address]

                          01 Dec, 2015              Left leg pain M79.605

 

                          [place] [address] [address]

                                        Encounter for immunization Z

23

 

                          [place] [address] [address]

                          12 Oct, 2015              Bronchitis J40

 

                          [place] [address] [address]

                          30 Sep, 2015              Physical exam, pre-employmen

t V70.5 ; Encounter for PPD test V74.1 

and Hyperlipidemia 272.4

 

                          [place] [address] [id number]

[address] [place] [address]

                          21 Sep, 2015               

 

                          [facility]     [address] [id number]

[address] [place] [address]

                          21 Sep, 2015               

 

                          [facility]     [address] [id number]

[address] [place] [address]

                          09 Sep, 2015               

 

                          [facility]     [address] [id number]

[address] [place] [address]

                          04 Sep, 2015              Elevated blood sugar 790.29

 

                          [facility]     [address] [id number]

[address] [place] [address]

                          03 Sep, 2015              Elevated blood sugar 790.29

 

                          [facility]     [address] [id number]

[address] [place] [address]

                          03 Sep, 2015              Palpitations 785.1

 

                          [facility]     [address] [id number]

[address] [place] [address]

                          01 Sep, 2015              Palpitations 785.1

 

                          [facility]     [address] [id number]

[address] [place] [address]

                                         

 

                          [facility]     [address] [id number]

[address] [place] [address]

                          28 May, 2015              Hand pain, right 729.5 and D

epression with anxiety 300.4

 

                          [facility]     [address] [id number]

[address] [place] [address]

                                         

 

                          [facility]     [address] [id number]

[address] [place] [address]

                                         

 

                          [facility]     [address] [facility] [id number]

[address] [place] [address]

                          05 Mar, 2015               

 

                          [facility]     [address] [id number]

[address] [place] [address]

                          05 Mar, 2015               

 

                          [facility]     [address] [facility] [id number]

[address] [place] [address]

                                         

 

                          [facility]     [address] [id number]

[address] [place] [address], [place] [address]

                          20 2015               

 

                          [facility]     [address] [id number]

[address] [place] [address], [place] [address]

                          10 Feb, 2015               

 

                          [facility]     [address] [id number]

[address] [place] [address], [place] [address]

                          10 Feb,                

 

                          [facility]     [address] [id number]

[address] [place] [address], [place] [address]

                                         

 

                          [facility]     [address] [id number]

[address] [place] [address], [place] [address]

                                         

 

                          [facility]     [address] [id number]

[address] [place] [address], [place] [address]

                                         

 

                          [facility]     [address] [id number]

[address] [place] [address], [place] [address]

                                         

 

                          [facility]     [address] [id number]

[address] [place] [address], [place] [address]

                                         

 

                          [facility]     [address] [id number]

[address] [place] [address], [place] [address]

                                         

 

                          [facility]     [address] [id number]

[address] [place] [address], [place] [address]

                                         

 

                          [facility]     [address] [id number]

[address] [place] [address], [place] [address]

                                         

 

                          [facility]     [address] [id number]

[address] [place] [address], [place] [address]

                                         

 

                          [facility]     [address] [id number]

[address] [place] [address], [place] [address]

                                         

 

                          [facility]     [address] [id number]

[address] [place] [address], [place] [address]

                                         

 

                          [facility]     [address] [id number]

[address] [place] [address], [place] [address]

                                         

 

                          [facility]     [address] [id number]

[address] [place] [address], [place] [address]

                          15 Brandon, 2015               

 

                          [facility]     [address] [id number]

[address] [place] [address], [place] [address]

                          15 2015               

 

                          [facility]     [address] [id number]

[address] [place] [address], [place] [address]

                                         

 

                          [facility]     [address] [id number]

[address] [place] [address], [place] [address]

                                         

 

                          [facility]     [address] [id number]

[address] [place] [address], [place] [address]

                          15 Oct, 2014               

 

                          [facility]     [address] [id number]

[address] [place] [address], [place] [address]

                          15 Oct, 2014               

 

                          [facility]     [address] [id number]

[address] [place] [address], [place] [address]

                          14 Oct, 2014               

 

                          [facility]     [address] [id number]

[address] [place] [address], [place] [address]

                          14 Oct, 2014               

 

                          [facility]     [address] [id number]

[address] [place] [address], [place] [address]

                          07 Oct, 2014               

 

                          [facility]     [address] [id number]

[address] [place] [address], [place] [address]

                          07 Oct, 2014               

 

                          [facility]     [address] [id number]

[address] [place] [address]

                          12 Sep,                

 

                          [facility]     [address] [id number]

[address] [place] [address]

                          12 Sep,                

 

                          [facility]     [address] [id number]

[address] [place] [address]

                          04 Sep,                

 

                          [facility]     [address] [id number]

[address] [place] [address], [place] [address]

                          03 Sep,                

 

                          [facility]     [address] [id number]

[address] [place] [address], [place] [address]

                          03 Sep,                

 

                          [facility]     [address] [id number]

[address] [place] [address], [place] [address]

                          02 Sep,                

 

                          [facility]     [address] [id number]

[address] [place] [address]

                          02 Sep, 2014               

 

                          [facility]     [address] [id number]

[address] [place] [address], [place] [address]

                          30 Aug, 2014               

 

                          [facility]     [address] [id number]

[address] [place] [address], [place] [address]

                          30 Aug, 2014               

 

                          [facility]     [address] [id number]

[address] [place] [address], [place] [address]

                          19 Aug, 2014               

 

                          [facility]     [address] [id number]

[address] [place] [address], [place] [address]

                          19 Aug, 2014               

 

                          [facility]     [address] [id number]

[address] [place] [address], [place] [address]

                          14 Aug, 2014               

 

                          [facility]     [address] [id number]

[address] [place] [address], [place] [address]

                          14 Aug, 2014               

 

                          [facility]     [address] [id number]

[address] [place] [address], [place] [address]

                          13 Aug, 2014               

 

                          [facility]     [address] [id number]

[address] [place] [address], [place] [address]

                          13 Aug, 2014               

 

                          [facility]     [address] [id number]

[address] [place] [address], [place] [address]

                                         

 

                          [facility]     [address] [id number]

[address] [place] [address], [place] [address]

                                         

 

                          [facility]     [address] [id number]

[address] [place] [address], [place] [address]

                                         

 

                          [facility]     [address] [id number]

[address] [place] [address], [place] [address]

                                         

 

                          [facility]     [address] [id number]

[address] [place] [address], [place] [address]

                                         

 

                          [facility]     [address] [id number]

[address] [place] [address], [place] [address]

                                         

 

                          [facility]     [address] [id number]

[address] [place] [address], [place] [address]

                                         

 

                          [facility]     [address] [id number]

[address] [place] [address]

                          ,                

 

                          [facility]     [address] [id number]

[facility] [place] [address]

                          ,                

 

                          [facility]     [address] [id number]

[facility] [place] [address]

                          ,                

 

                          [facility]     [address] [id number]

[address] [place] [address], [place] [address]

                          ,                

 

                          [facility]     [address] [id number]

[address] [place] [address], [place] [address]

                          ,                

 

                          [facility]     [address] [id number]

[address] [place] [address], [place] [address]

                                         

 

                          [facility]     [address] [id number]

[address] [place] [address], [place] [address]

                          ,                

 

                          [facility]     [address] [id number]

[address] [place] [address], [place] [address]

                                         

 

                          [facility]     [address] [id number]

[address] [place] [address], [place] [address]

                                         

 

                          [facility]     [address] [id number]

[address] [place] [address], [place] [address]

                                         

 

                          [facility]     [address] [id number]

[address] [place] [address], [place] [address]

                                         

 

                          [facility]     [address] [id number]

[address] [place] [address], [place] [address]

                                         

 

                          [facility]     [address] [id number]

[address] [place] [address], [place] [address]

                                         

 

                          [facility]     [address] [id number]

[address] [place] [address], [place] [address]

                                         

 

                          [facility]     [address] [id number]

[address] [place] [address], [place] [address]

                                         

 

                          [facility]     [address] [id number]

[address] [place] [address], [place] [address]

                                         

 

                          [facility]     [address] [id number]

[address] [place] [address], [place] [address]

                                         

 

                          [facility]     [address] [id number]

[address] [place] [address], [place] [address]

                                         

 

                          [facility]     [address] [id number]

[address] [place] [address], [place] [address]

                                         

 

                          [facility]     [address] [id number]

[address] [place] [address], [place] [address]

                                         

 

                          [facility]     [address] [id number]

[address] [place] [address], [place] [address]

                          27 Mar, 2014               

 

                          [facility]     [address] [id number]

[address] [place] [address], [place] [address]

                          27 Mar, 2014               

 

                          [facility]     [address] [id number]

[address] [place] [address], [place] [address]

                                         

 

                          [facility]     [address] [id number]

[address] [place] [address], [place] [address]

                                         

 

                          [facility]     [address] [id number]

[address] [place] [address], [place] [address]

                                         

 

                          [facility]     [address] [id number]

[address] [place] [address], [place] [address]

                                         

 

                          [facility]     [address] [id number]

[address] [place] [address], [place] [address]

                          08 Oct, 2013               

 

                          [facility]     [address] [id number]

[address] [place] [address]

                          04 Oct, 2013               

 

                          [facility]     [address] [id number]

[address] [place] [address], [place] [address]

                          03 Oct, 2013               

 

                          [facility]     [address] [id number]

[address] [place] [address], [place] [address]

                          02 Oct, 2013               

 

                          [facility]     [address] [id number]

[address] [place] [address], [place] [address]

                          01 Oct, 2013               

 

                          [facility]     [address] [id number]

[address] [place] [address], [place] [address]

                          20 Sep, 2013               

 

                          [facility]     [address] [id number]

[address] [place] [address], [place] [address]

                          08 Aug, 2013               

 

                          [facility]     [address] [id number]

[address] [place] [address], [place] [address]

                          24 May, 2013               

 

                          [facility]     [address] [id number]

[address] [place] [address], [place] [address]

                          13 May, 2013               

 

                          [facility]     [address] [id number]

[address] [place] [address], [place] [address]

                                         

 

                          [facility]     [address] [id number]

[address] [place] [address], [place] [address]

                                         

 

                          [facility]     [address] [id number]

[address] [place] [address], [place] [address]

                                         

 

                          [facility]     [address] [id number]

[address] [place] [address], [place] [address]

                                         

 

                          [facility]     [address] [id number]

[address] [place] [address], [place] [address]

                          02 Oct, 2012               

 

                          [facility]     [address] [id number]

[address] [place] [address], [place] [address]

                          02 Oct, 2012               

 

                          [facility]     [address] [id number]

[address] [place] [address], [place] [address]

                          21 Sep, 2012               

 

                          [facility]     [address] [id number]

[address] [place] [address], [place] [address]

                          06 Aug, 2012               

 

                          [facility]     [address] [id number]

[address] [place] [address], [place] [address]

                          02 Aug, 2012               

 

                          [facility]     [address] [id number]

[address] [place] [address], [place] [address]

                                         

 

                          [facility]     [address] [id number]

[address] [place] [address], [place] [address]

                                         

 

                          [facility]     [address] [id number]

[address] [place] [address], [place] [address]

                          15 Eusebio, 2012               

 

                          [facility]     [address] [facility] [id number]

[address] [place] [address]

                                         

 

                          [facility]     [address] [facility] [id number]

[address] [place] [address]

                          10 Apr, 2012               

 

                          [facility]     [address] [facility] [id number]

[address] [place] [address]

                          22 Mar, 2012               

 

                          [facility]     [address] [facility] [id number]

[address] [place] [address]

                          20 Mar, 2012               

 

                          [facility]     [address] [facility] [id number]

[address] [place] [address]

                          14 Mar, 2012               

 

                          [facility]     [address] [facility] [id number]

[address] [place] [address]

                                         







IMMUNIZATIONS

No Known Immunizations



SOCIAL HISTORY

Never Assessed



REASON FOR VISIT





PLAN OF CARE





VITAL SIGNS





                    Height              69 in               2015-02-10

 

                    Weight              226.5 lbs           2015-02-10

 

                    Temperature         98.4 degrees Fahrenheit 2015-02-10

 

                    Heart Rate          78 bpm              2015-02-10

 

                    Respiratory Rate    18                  2015-02-10

 

                    Blood pressure systolic 132 mmHg            2015-02-10

 

                    Blood pressure diastolic 84 mmHg             2015-02-10







MEDICATIONS

Unknown Medications



RESULTS

No Results



PROCEDURES

No Known procedures



INSTRUCTIONS





MEDICATIONS ADMINISTERED

No Known Medications



MEDICAL (GENERAL) HISTORY





                    Type                Description         Date

 

                          Medical History           allergic rhinitis- rec's edwige donahue allergy injections from Dr Rojas office                            

 

                    Medical History     mood disorder        

 

                    Medical History     Leukocytosis- has been to hematology  

 

                    Medical History     Hyperlipidemia       

 

                          Medical History           Left leg pain- multiple imag

ing performed and ortho referral 

placed                                   

 

                          Medical History           TUBULAR ADENOMA AND GASTRITI

S ON COLONOSOCPY AUG 2016 -MULTIPLE 

POLYPS                                   

 

                    Medical History     Helicobacter pylori (H. pylori) infectio

n Hx  

 

                    Surgical History    cholecystectomy     2006

 

                    Surgical History     section    , 

 

                    Surgical History    tonsillectomy       2015

 

                    Surgical History    colonoscopy         2016

 

                    Surgical History    colonscopy          2017

 

                    Surgical History    Right Knee scope    2017

## 2020-06-23 NOTE — XMS REPORT
[facility]

                             Created on: 2020



Kate April

External Reference #: [id number]

: 1975

Sex: Female



Demographics





                          Address                   [address] [place]  [address]

 

                          Preferred Language        Unknown

 

                          Marital Status            Unknown

 

                          Bahai Affiliation     Unknown

 

                          Race                      Unknown

 

                          Ethnic Group              Unknown





Author





                          Author                    ANNEMARIE April VENECIA

 

                          [facility]

 

                          Address                   [address] [place]  [address]



 

                          Phone                     [phone]







Care Team Providers





                    Care Team Member Name Role                Phone

 

                    ANNEMARIE  VENECIA       Unavailable         [phone]







PROBLEMS





          Type      Condition ICD9-CM Code KOX50-LV Code Onset Dates Condition S

tatus SNOMED 

Code

 

          Problem   Duarte's neuroma of right foot           G57.61             

 Active    [id number]

 

          Problem   History of leukocytosis           Z86.2               Active

    [id number]

 

          Problem   Anxiety             F41.9               Active    87907482

 

                          Problem                   Type 2 diabetes mellitus wit

hout complication, without long-term current

use of insulin              E11.9                     Active       [id number]

 

          Problem   Seasonal allergic rhinitis due to pollen           J30.1    

           Active    24203890

 

          Problem   Tubular adenoma of colon           D12.6               Activ

e    [id number]

 

          Problem   GERD with esophagitis           K21.0               Active  

  [id number]

 

          Problem   Mixed hyperlipidemia           E78.2               Active   

 [id number]

 

           Problem    Seasonal allergic rhinitis due to other allergic trigger  

          J30.89                

Active                                  [id number]

 

          Problem   Dysthymic disorder           F34.1               Active    7

[id number]

 

          Problem   Arthritis           M19.90              Active    2786863

 

          Problem   Non morbid obesity           E66.9               Active    4

[id number]







ALLERGIES

No Information



ENCOUNTERS





                Encounter       Location        Date            Diagnosis

 

                          [place] [address] [facility] [id number]

[address] [place] [address]

                                         

 

                          [place] [address] [facility] [id number]

[address] [place] [address]

                          04 Mar, 2020              Type 2 diabetes mellitus wit

hout complication, without long-term 

current use of insulin E11.9

 

                          [facility]     [address] [facility] [id number]

[address] [place] [address]

                                        Acute pain of left knee M25.

562

 

                          [place] [address] [facility] [id number]

[address] [place] [address]

                          15 2020              Leg pain, left M79.605

 

                          [facility] WALK IN [facility]  [address] [facility] [id number]

[address] [place] [address] 

[address]                              Leg pain, left M79.605

 

                          [place] [address] [facility] [id number]

[address] [place] [address]

                                        Type 2 diabetes mellitus wit

hout complication, without long-term 

current use of insulin E11.9 and GERD with esophagitis K21.0

 

                          [place] [address] [place] [address]

                          01 Oct, 2019              Encounter for immunization Z

23

 

                          [place] [address] [address]

                                        Type 2 diabetes mellitus wit

hout complication, without long-term 

current use of insulin E11.9

 

                          [place] [address] [address]

                                         

 

                          [place] [address] [address]

                                        Type 2 diabetes mellitus wit

hout complication, without long-term 

current use of insulin E11.9

 

                          [place] [address] [address]

                                        Weight loss R63.4

 

                          [place] [address] [place] [id number] [place] [address]

                          31 May, 2019              Type 2 diabetes mellitus wit

hout complication, without long-term 

current use of insulin E11.9

 

                          [place] [address] [facility] [id number]

[address] [place] [address]

                          31 May, 2019              Type 2 diabetes mellitus wit

hout complication, without long-term 

current use of insulin E11.9 and Non morbid obesity E66.9

 

                          [facility] WALK IN [place] [address] [place] [id number]

[address] [place] [address] 

[address]                14 May, 2019              Seasonal allergic rhinitis d

ue to pollen J30.1 and Sore 

throat J02.9

 

                          [facility] IN [facility]  [address] [place] [id number]

[address] [place] [address] 

[address]                12 2019              Arthritis M19.90

 

                          [place] [address] [address]

                                        Seasonal allergic rhinitis d

ue to other allergic trigger J30.89 and

Dysthymic disorder F34.1

 

                          [place] [address] [address]

                          05 Dec, 2018              Bilateral otitis media with 

effusion H65.93 and Anxiety F41.9

 

                          [place] [address] [address]

                                        Type 2 diabetes mellitus wit

hout complication, without long-term 

current use of insulin E11.9

 

                          [place] [address] [address]

                                        Pharyngitis due to Streptoco

ccus species J02.0

 

                          [place] [address] [address]

                                         

 

                          [facility] IN [facility]  [address] 

[address]                10 Jul, 2018              Fever, unspecified fever cau

se R50.9 and Lymphadenopathy

R59.1

 

                          [facility] WALK IN [place] [address] 

[address]                              Pharyngitis due to other org

anism J02.8

 

                          [place] [address] [place] [address]

                                         

 

                          [place] [address] [address]

                                        Type 2 diabetes mellitus wit

hout complication, without long-term 

current use of insulin E11.9 and Other eczema L30.8

 

                          [facility] IN [facility]ARE  [address] [place] [id number]

[address] [place] [address] 

[address]                28 2018              Acute pain of left shoulder 

M25.512

 

                          [place] [address] [place] [address]

                                         

 

                          [facility]     [address] [place] [id number]

[address] [place] [address]

                          15 Feb, 2018              Type 2 diabetes mellitus wit

hout complication, without long-term 

current use of insulin E11.9

 

                          [place] [address] [address]

                                        Type 2 diabetes mellitus wit

hout complication, without long-term 

current use of insulin E11.9 ; Tubular adenoma of colon D12.6 ; Mixed 
hyperlipidemia E78.2 ; History of leukocytosis Z86.2 and Screening breast 
examination Z12.31

 

                          [place] [address] [address]

                                        Metatarsalgia of right foot 

M77.41 and Type 2 diabetes mellitus 

without complication, without long-term current use of insulin E11.9

 

                          [place] [address] [address]

                                        Capsulitis M77.9 and Metatar

salgia of right foot M77.41

 

                          [place] [address] [address]

                          08 Sep, 2017              Capsulitis M77.9

 

                          [facility]     [address] [address]

                          06 Sep, 2017               

 

                          [place] [address] [address]

                          03 Aug, 2017              Type 2 diabetes mellitus wit

hout complication, without long-term 

current use of insulin E11.9 ; Tubular adenoma of colon D12.6 and Mixed 
hyperlipidemia E78.2

 

                          [place] [address] [address]

                                        Metatarsalgia of right foot 

M77.41 and Capsulitis M77.9

 

                          [facility]     [address] [place] [id number] [place] [address]

                                         

 

                          [facility]  [address] [place] [address]

[address] [place] [address] 

[address]                              Duarte's neuroma of right fo

ot G57.61

 

                          [facility]     [address] [place] [id number]

[address] [place] [address]

                                        Mixed hyperlipidemia E78.2 a

nd Type 2 diabetes mellitus without 

complication, without long-term current use of insulin E11.9

 

                          [place] [address] [address]

                          10 Brandon, 2017              Type 2 diabetes mellitus wit

hout complication, without long-term 

current use of insulin E11.9 ; Tubular adenoma of colon D12.6 and Mixed 
hyperlipidemia E78.2

 

                          [place] [address] [place] [id number]

[address] [place] [address]

                          22 Dec, 2016              Mixed hyperlipidemia E78.2

 

                          [place] [address] [place] [id number]

[address] [place] [address]

                                         

 

                          [place] [address] [address]

                                        Mixed hyperlipidemia E78.2

 

                          [place] [address] [place] [id number]

[address] [place] [address]

                                        Mixed hyperlipidemia E78.2

 

                          [facility]     [address] [place] [id number]

[address] [place] [address]

                          08 Sep, 2016               

 

                          [place] [address] [place] [id number]

[address] [place] [address]

                          10 Aug, 2016              Type 2 diabetes mellitus wit

hout complication, without long-term 

current use of insulin E11.9 ; Helicobacter pylori (H. pylori) infection A04.8 
and Mixed hyperlipidemia E78.2

 

                          [facility]     [address] [place] [id number]

[address] [place] [address]

                          08 Aug, 2016              Type 2 diabetes mellitus wit

hout complication, without long-term 

current use of insulin E11.9

 

                          [facility]     [address] [place] [id number]

[address] [place] [address]

                          03 Aug, 2016              Type 2 diabetes mellitus wit

hout complication, without long-term 

current use of insulin E11.9

 

                          [facility]     [address] [place] [id number]

[address] [place] [address]

                          02 Aug, 2016              Dyspepsia R10.13 ; Upper abd

ominal pain R10.10 ; Bowel habit 

changes R19.4 ; History of leukocytosis Z86.2 and Helicobacter pylori (H. 
pylori) infection A04.8

 

                          [facility] WALK IN CARE  [address] 

[address]                27 May, 2016              Environmental allergies Z91.

09

 

                          [facility]     [address] [address]

                                        Left leg pain M79.605 ; Loca

lized swelling of lower leg R22.40 and 

Upper respiratory infection J06.9

 

                          [place] [address] [address]

                          28 Dec, 2015               

 

                          [place] [address] [address]

                          21 Dec, 2015              Left leg pain M79.605

 

                          [place] [address] [address]

                          10 Dec, 2015              Left leg pain M79.605 and Lo

calized swelling of lower leg R22.40

 

                          [place] [address] [address]

                          01 Dec, 2015              Left leg pain M79.605

 

                          [place] [address] [address]

                                        Encounter for immunization Z

23

 

                          [place] [address] [address]

                          12 Oct, 2015              Bronchitis J40

 

                          [place] [address] [address]

                          30 Sep, 2015              Physical exam, pre-employmen

t V70.5 ; Encounter for PPD test V74.1 

and Hyperlipidemia 272.4

 

                          [place] [address] [place] [address]

[address] [place] [address]

                          21 Sep, 2015               

 

                          [facility]     [address] [id number]

[address] [place] [address]

                          21 Sep, 2015               

 

                          [facility]     [address] [id number]

[address] [place] [address]

                          09 Sep, 2015               

 

                          [facility]     [address] [id number]

[address] [place] [address]

                          04 Sep, 2015              Elevated blood sugar 790.29

 

                          [facility]     [address] [id number]

[address] [place] [address]

                          03 Sep, 2015              Elevated blood sugar 790.29

 

                          [facility]     [address] [id number]

[address] [place] [address]

                          03 Sep, 2015              Palpitations 785.1

 

                          [facility]     [address] [facility] [id number]

[address] [place] [address]

                          01 Sep, 2015              Palpitations 785.1

 

                          [facility]     [address] [id number]

[address] [place] [address]

                                         

 

                          [facility]     [address] [facility] [id number]

[address] [place] [address]

                          28 May, 2015              Hand pain, right 729.5 and D

epression with anxiety 300.4

 

                          [facility]     [address] [facility] [id number]

[address] [place] [address]

                                         

 

                          [facility]     [address] [id number]

[address] [place] [address]

                                         

 

                          [facility]     [address] [facility] [id number]

[address] [place] [address]

                          05 Mar, 2015               

 

                          [facility]     [address] [facility] [id number]

[address] [place] [address]

                          05 Mar, 2015               

 

                          [facility]     [address] [facility] [id number]

[address] [place] [address]

                                         

 

                          [facility]     [address] [facility] [id number]

[address] [place] [address]

                                         

 

                          [facility]     [address] [id number]

[address] [place] [address], [place] [address]

                          10 Feb, 2015               

 

                          [facility]     [address] [id number]

[address] [place] [address], [place] [address]

                          10 Feb, 2015               

 

                          [facility]     [address] [id number]

[address] [place] [address], [place] [address]

                                         

 

                          [facility]     [address] [id number]

[address] [place] [address], [place] [address]

                                         

 

                          [facility]     [address] [id number]

[address] [place] [address], [place] [address]

                                         

 

                          [facility]     [address] [id number]

[address] [place] [address], [place] [address]

                                         

 

                          [facility]     [address] [id number]

[address] [place] [address], [place] [address]

                                         

 

                          [facility]     [address] [id number]

[address] [place] [address], [place] [address]

                                         

 

                          [facility]     [address] [id number]

[address] [place] [address], [place] [address]

                                         

 

                          [facility]     [address] [id number]

[address] [place] [address], [place] [address]

                                         

 

                          [facility]     [address] [id number]

[address] [place] [address], [place] [address]

                                         

 

                          [facility]     [address] [id number]

[address] [place] [address]

                                         

 

                          [facility]     [address] [id number]

[address] [place] [address], [place] [address]

                                         

 

                          [facility]     [address] [id number]

[address] [place] [address], [place] [address]

                                         

 

                          [facility]     [address] [id number]

[address] [place] [address]

                          15 [place], 2015               

 

                          [facility]     [address] [id number]

[address] [place] [address], [place] [address]

                          15 2015               

 

                          [facility]     [address] [id number]

[address] [place] [address], [place] [address]

                                         

 

                          [facility]     [address] [id number]

[address] [place] [address], [place] [address]

                                         

 

                          [facility]     [address] [id number]

[address] [place] [address], [place] [address]

                          15 Oct, 2014               

 

                          [facility]     [address] [id number]

[address] [place] [address], [place] [address]

                          15 Oct, 2014               

 

                          [facility]     [address] [id number]

[address] [place] [address], [place] [address]

                          14 Oct, 2014               

 

                          [facility]     [address] [id number]

[address] [place] [address], [place] [address]

                          14 Oct, 2014               

 

                          [facility]     [address] [id number]

[address] [place] [address], [place] [address]

                          07 Oct, 2014               

 

                          [facility]     [address] [id number]

[address] [place] [address], [place] [address]

                          07 Oct, 2014               

 

                          [facility]     [address] [id number]

[address] [place] [address], [place] [address]

                          12 Sep,                

 

                          [facility]     [address] [id number]

[address] [place] [address], [place] [address]

                          12 Sep,                

 

                          [facility]     [address] [id number]

[address] [place] [address], [place] [address]

                          04 Sep,                

 

                          [facility]     [address] [address] [id number]

[address] [place] [address], [place] [address]

                          03 Sep,                

 

                          [facility]     [address] [id number]

[address] [place] [address], [place] [address]

                          03 Sep,                

 

                          [facility]     [address] [id number]

[address] [place] [address], [place] [address]

                          02 Sep,                

 

                          [facility]     [address] [id number]

[address] [place] [address], [place] [address]

                          02 Sep, 2014               

 

                          [facility]     [address] [id number]

[address] [place] [address], [place] [address]

                          30 Aug, 2014               

 

                          [facility]     [address] [id number]

[address] [place] [address], [place] [address]

                          30 Aug, 2014               

 

                          [facility]     [address] [id number]

[address] [place] [address], [place] [address]

                          19 Aug, 2014               

 

                          [facility]     [address] [id number]

[address] [place] [address], [place] [address]

                          19 Aug, 2014               

 

                          [facility]     [address] [id number]

[address] [place] [address], [place] [address]

                          14 Aug, 2014               

 

                          [facility]     [address] [id number]

[address] [place] [address], [place] [address]

                          14 Aug, 2014               

 

                          [facility]     [address] [id number]

[address] [place] [address], [place] [address]

                          13 Aug, 2014               

 

                          [facility]     [address] [id number]

[address] [place] [address], [place] [address]

                          13 Aug, 2014               

 

                          [facility]     [address] [id number]

[address] [place] [address], [place] [address]

                                         

 

                          [facility]     [address] [id number]

[address] [place] [address], [place] [address]

                                         

 

                          [facility]     [address] [id number]

[address] [place] [address], [place] [address]

                                         

 

                          [facility]     [address] [id number]

[address] [place] [address], [place] [address]

                                         

 

                          [facility]     [address] [id number]

[address] [place] [address], [place] [address]

                                         

 

                          [facility]     [address] [id number]

[address] [place] [address], [place] [address]

                                         

 

                          [facility]     [address] [id number]

[address] [place] [address], [place] [address]

                                         

 

                          [facility]     [address] [id number]

[facility] [place] [address]

                          ,                

 

                          [facility]     [address] [id number]

[address] [place] [address], [place] [address]

                          ,                

 

                          [facility]     [address] [id number]

[address] [place] [address], [place] [address]

                          ,                

 

                          [facility]     [address] [id number]

[address] [place] [address], [place] [address]

                          ,                

 

                          [facility]     [address] [id number]

[address] [place] [address], [place] [address]

                          ,                

 

                          [facility]     [address] [id number]

[address] [place] [address], [place] [address]

                                         

 

                          [facility]     [address] [id number]

[address] [place] [address], [place] [address]

                          ,                

 

                          [facility]     [address] [id number]

[address] [place] [address], [place] [address]

                                         

 

                          [facility]     [address] [id number]

[address] [place] [address], [place] [address]

                                         

 

                          [facility]     [address] [id number]

[address] [place] [address], [place] [address]

                                         

 

                          [facility]     [address] [id number]

[address] [place] [address], [place] [address]

                                         

 

                          [facility]     [address] [id number]

[address] [place] [address], [place] [address]

                                         

 

                          [facility]     [address] [id number]

[address] [place] [address], [place] [address]

                                         

 

                          [facility]     [address] [id number]

[address] [place] [address], [place] [address]

                                         

 

                          [facility]     [address] [id number]

[address] [place] [address], [place] [address]

                                         

 

                          [facility]     [address] [id number]

[address] [place] [address], [place] [address]

                                         

 

                          [facility]     [address] [id number]

[address] [place] [address], [place] [address]

                                         

 

                          [facility]     [address] [id number]

[address] [place] [address], [place] [address]

                                         

 

                          [facility]     [address] [id number]

[address] [place] [address], [place] [address]

                                         

 

                          [facility]     [address] [id number]

[address] [place] [address], [place] [address]

                                         

 

                          [facility]     [address] [id number]

[address] [place] [address], [place] [address]

                          27 Mar, 2014               

 

                          [facility]     [address] [id number]

[address] [place] [address], [place] [address]

                          27 Mar, 2014               

 

                          [facility]     [address] [id number]

[address] [place] [address], [place] [address]

                                         

 

                          [facility]     [address] [id number]

[address] [place] [address], [place] [address]

                                         

 

                          [facility]     [address] [id number]

[address] [place] [address], [place] [address]

                                         

 

                          [facility]     [address] [id number]

[address] [place] [address], [place] [address]

                                         

 

                          [facility]     [address] [id number]

[address] [place] [address], [place] [address]

                          08 Oct, 2013               

 

                          [facility]     [address] [id number]

[address] [place] [address]

                          04 Oct, 2013               

 

                          [facility]     [address] [id number]

[address] [place] [address], [place] [address]

                          03 Oct, 2013               

 

                          [facility]     [address] [id number]

[address] [place] [address], [place] [address]

                          02 Oct, 2013               

 

                          [facility]     [address] [id number]

[address] [place] [address], [place] [address]

                          01 Oct, 2013               

 

                          [facility]     [address] [id number]

[address] [place] [address] [place] [address]

                          20 Sep, 2013               

 

                          [facility]     [address] [id number]

[address] [place] [address], [place] [address]

                          08 Aug, 2013               

 

                          [facility]     [address] [id number]

[address] [place] [address], [place] [address]

                          24 May, 2013               

 

                          [facility]     [address] [id number]

[address] [place] [address], [place] [address]

                          13 May, 2013               

 

                          [facility]     [address] [id number]

[address] [place] [address], [place] [address]

                                         

 

                          [facility]     [address] [id number]

[address] [place] [address], [place] [address]

                                         

 

                          [facility]     [address] [id number]

[address] [place] [address], [place] [address]

                                         

 

                          [facility]     [address] [id number]

[address] [place] [address], [place] [address]

                                         

 

                          [facility]     [address] [id number]

[address] [place] [address], [place] [address]

                          02 Oct, 2012               

 

                          [facility]     [address] [id number]

[address] [place] [address], [place] [address]

                          02 Oct, 2012               

 

                          [facility]     [address] [id number]

[address] [place] [address], [place] [address]

                          21 Sep, 2012               

 

                          [facility]     [address] [id number]

[address] [place] [address], [place] [address]

                          06 Aug, 2012               

 

                          [facility]     [address] [id number]

[address] [place] [address], [place] [address]

                          02 Aug, 2012               

 

                          [facility]     [address] [id number]

[address] [place] [address], [place] [address]

                                         

 

                          [facility]     [address] [id number]

[address] [place] [address], [place] [address]

                                         

 

                          [facility]     [address] [id number]

[address] [place] [address], [place] [address]

                          15 Eusebio, 2012               

 

                          [facility]     [address] [facility] [id number]

[address] [place] [address]

                                         

 

                          [facility]     [address] [facility] [id number]

[address] [place] [address]

                          10 Apr, 2012               

 

                          [facility]     [address] [facility] [id number]

[address] [place] [address]

                          22 Mar, 2012               

 

                          [facility]     [address] [facility] [id number]

[address] [place] [address]

                          20 Mar, 2012               

 

                          [facility]     [address] [facility] [id number]

[address] [place] [address]

                          14 Mar, 2012               

 

                          [facility]     [address] [facility] [id number]

[address] [place] [address]

                                         







IMMUNIZATIONS

No Known Immunizations



SOCIAL HISTORY

Never Assessed



REASON FOR VISIT





PLAN OF CARE





VITAL SIGNS





                    Height              69 in               2014-10-07

 

                    Weight              211.5 lbs           2014-10-07

 

                    Temperature         98.7 degrees Fahrenheit 2014-10-07

 

                    Heart Rate          68 bpm              2014-10-07

 

                    Respiratory Rate    20                  2014-10-07

 

                    Blood pressure systolic 122 mmHg            2014-10-07

 

                    Blood pressure diastolic 82 mmHg             2014-10-07







MEDICATIONS

Unknown Medications



RESULTS

No Results



PROCEDURES





                Procedure       Date Ordered    Result          Body Site

 

                US EXAM, PELVIC, COMPLETE Oct 07, 2014                     







INSTRUCTIONS





MEDICATIONS ADMINISTERED

No Known Medications



MEDICAL (GENERAL) HISTORY





                    Type                Description         Date

 

                          Medical History           allergic rhinitis- rec's werena

kly allergy injections from Dr Rojas office                            

 

                    Medical History     mood disorder        

 

                    Medical History     Leukocytosis- has been to hematology  

 

                    Medical History     Hyperlipidemia       

 

                          Medical History           Left leg pain- multiple imag

ing performed and ortho referral 

placed                                   

 

                          Medical History           TUBULAR ADENOMA AND GASTRITI

S ON COLONOSOCPY AUG 2016 -MULTIPLE 

POLYPS                                   

 

                    Medical History     Helicobacter pylori (H. pylori) infectio

n Hx  

 

                    Surgical History    cholecystectomy     2006

 

                    Surgical History     section    , 

 

                    Surgical History    tonsillectomy       2015

 

                    Surgical History    colonoscopy         2016

 

                    Surgical History    colonscopy          2017

 

                    Surgical History    Right Knee scope    2017

## 2020-06-23 NOTE — XMS REPORT
[facility]

                             Created on: 2019



Kate April

External Reference #: [id number]

: 1975

Sex: Female



Demographics





                          Address                   [address] [place]  [address]

 

                          Preferred Language        Unknown

 

                          Marital Status            Unknown

 

                          Episcopalian Affiliation     Unknown

 

                          Race                      Unknown

 

                          Ethnic Group              Unknown





Author





                          Author                    Uvaldo April YONNY

 

                          Organization              [facility]

 

                          Address                   [address] [place]  [address]



 

                          Phone                     [phone]







Care Team Providers





                    Care Team Member Name Role                Phone

 

                    YONNY Bailon   Unavailable         [phone]







PROBLEMS





          Type      Condition ICD9-CM Code GYY03-SX Code Onset Dates Condition S

tatus SNOMED 

Code

 

          Problem   Tubular adenoma of colon           D12.6               Activ

e    [id number]

 

          Problem   Duarte's neuroma of right foot           G57.61             

 Active    24499494

 

          Problem   History of leukocytosis           Z86.2               Active

    [id number]

 

          Problem   Non morbid obesity           E66.9               Active    4

[id number]

 

          Problem   Mixed hyperlipidemia           E78.2               Active   

 [id number]

 

          Problem   Seasonal allergic rhinitis due to pollen           J30.1    

           Active    93117701

 

                          Problem                   Type 2 diabetes mellitus wit

hout complication, without long-term current

use of insulin              E11.9                     Active       [id number]

 

          Problem   Anxiety             F41.9               Active    25446829

 

           Problem    Seasonal allergic rhinitis due to other allergic trigger  

          J30.89                

Active                                  [id number]

 

          Problem   Dysthymic disorder           F34.1               Active    7

[id number]

 

          Problem   Arthritis           M19.90              Active    7131742







ALLERGIES

No Information



ENCOUNTERS





                Encounter       Location        Date            Diagnosis

 

                          [place] [address] [facility] [id number]

[address] [place] [address]

                                        Type 2 diabetes mellitus wit

hout complication, without long-term 

current use of insulin E11.9

 

                          [facility]     [address] [facility] [id number]

[address] [place] [address]

                                         

 

                          [place] [address] [facility] [id number]

[address] [place] [address]

                                        Type 2 diabetes mellitus wit

hout complication, without long-term 

current use of insulin E11.9

 

                          [place] [address] [facility] [id number]

[address] [place] [address]

                                        Weight loss R63.4

 

                          [place] [address] [facility] [id number]

[address] [place] [address]

                          31 May, 2019              Type 2 diabetes mellitus wit

hout complication, without long-term 

current use of insulin E11.9

 

                          [place] [address] [facility] [id number]

[address] [place] [address]

                          31 May, 2019              Type 2 diabetes mellitus wit

hout complication, without long-term 

current use of insulin E11.9 and Non morbid obesity E66.9

 

                          [facility] WALK IN [place] [address] [place] [id number]

[address] [place] [address] 

[address]                14 May, 2019              Seasonal allergic rhinitis d

ue to pollen J30.1 and Sore 

throat J02.9

 

                          [facility] [place] [address] [place] [id number] [place] [address] 

[address]                              Arthritis M19.90

 

                          [place] [address] [address]

                                        Seasonal allergic rhinitis d

ue to other allergic trigger J30.89 and

Dysthymic disorder F34.1

 

                          [place] [address] [place] [id number]

[address] [place] [address]

                          05 Dec, 2018              Bilateral otitis media with 

effusion H65.93 and Anxiety F41.9

 

                          [place] [address] [place] [id number]

[address] [place] [address]

                                        Type 2 diabetes mellitus wit

hout complication, without long-term 

current use of insulin E11.9

 

                          [place] [address]

[address] [place] [address]

                                        Pharyngitis due to Streptoco

ccus species J02.0

 

                          [place] [address] [place] [id number]

[address] [place] [address]

                                         

 

                          [facility] [place] [address] [place] [id number] [place] [address] 

[address]                10 Jul, 2018              Fever, unspecified fever cau

se R50.9 and Lymphadenopathy

R59.1

 

                          [facility] WALK IN [place] [address] [place] [address] [place] 

[address]                              Pharyngitis due to other org

anism J02.8

 

                          [facility]     [address] [place] [id number]

[address] [place] [address]

                                         

 

                          [facility]     [address] [place] [id number]

[address] [place] [address]

                                        Type 2 diabetes mellitus wit

hout complication, without long-term 

current use of insulin E11.9 and Other eczema L30.8

 

                          [facility] WALK IN [facility]  [address] [facility] [id number]

[address] [place] [address] 

[address]                              Acute pain of left shoulder 

M25.512

 

                          [place] [address] [address]

                                         

 

                          [place] [address] [address]

                          15 Feb, 2018              Type 2 diabetes mellitus wit

hout complication, without long-term 

current use of insulin E11.9

 

                          [place] [address] [place] [id number]

[address] [place] [address]

                                        Type 2 diabetes mellitus wit

hout complication, without long-term 

current use of insulin E11.9 ; Tubular adenoma of colon D12.6 ; Mixed 
hyperlipidemia E78.2 ; History of leukocytosis Z86.2 and Screening breast 
examination Z12.31

 

                          [place] [address]

[address] [place] [address]

                                        Metatarsalgia of right foot 

M77.41 and Type 2 diabetes mellitus 

without complication, without long-term current use of insulin E11.9

 

                          [place] [address] [place] [id number]

[address] [place] [address]

                                        Capsulitis M77.9 and Metatar

salgia of right foot M77.41

 

                          [place] [address] [place] [id number]

[address] [place] [address]

                          08 Sep, 2017              Capsulitis M77.9

 

                          [place] [address] [place] [id number]

[address] [place] [address]

                          06 Sep, 2017               

 

                          [facility]     [address] [address]

                          03 Aug, 2017              Type 2 diabetes mellitus wit

hout complication, without long-term 

current use of insulin E11.9 ; Tubular adenoma of colon D12.6 and Mixed 
hyperlipidemia E78.2

 

                          [place] [address] [address]

                                        Metatarsalgia of right foot 

M77.41 and Capsulitis M77.9

 

                          [facility]     [address] [address]

                                         

 

                          [facility] IN [facility]  [address] 

[address]                              Duarte's neuroma of right fo

ot G57.61

 

                          [place] [address] [address]

                                        Mixed hyperlipidemia E78.2 a

nd Type 2 diabetes mellitus without 

complication, without long-term current use of insulin E11.9

 

                          [place] [address] [address]

                          10 Brandon, 2017              Type 2 diabetes mellitus wit

hout complication, without long-term 

current use of insulin E11.9 ; Tubular adenoma of colon D12.6 and Mixed 
hyperlipidemia E78.2

 

                          [place] [address] [place] [id number]

[address] [place] [address]

                          22 Dec, 2016              Mixed hyperlipidemia E78.2

 

                          [place] [address] [place] [id number]

[address] [place] [address]

                                         

 

                          [place] [address] [address]

                                        Mixed hyperlipidemia E78.2

 

                          [place] [address] [place] [id number]

[address] [place] [address]

                                        Mixed hyperlipidemia E78.2

 

                          [place] [address] [place] [id number]

[address] [place] [address]

                          08 Sep, 2016               

 

                          [place] [address] [address]

                          10 Aug, 2016              Type 2 diabetes mellitus wit

hout complication, without long-term 

current use of insulin E11.9 ; Helicobacter pylori (H. pylori) infection A04.8 
and Mixed hyperlipidemia E78.2

 

                          [place] [address] [address]

                          08 Aug, 2016              Type 2 diabetes mellitus wit

hout complication, without long-term 

current use of insulin E11.9

 

                          [place] [address] [address]

                          03 Aug, 2016              Type 2 diabetes mellitus wit

hout complication, without long-term 

current use of insulin E11.9

 

                          [place] [address] [address]

                          02 Aug, 2016              Dyspepsia R10.13 ; Upper abd

ominal pain R10.10 ; Bowel habit 

changes R19.4 ; History of leukocytosis Z86.2 and Helicobacter pylori (H. 
pylori) infection A04.8

 

                          [facility] WALK IN [facility]  [address] 

[address]                27 May, 2016              Environmental allergies Z91.

09

 

                          [place] [address] [address]

                                        Left leg pain M79.605 ; Loca

lized swelling of lower leg R22.40 and 

Upper respiratory infection J06.9

 

                          [place] [address] [place] [id number]

[address] [place] [address]

                          28 Dec, 2015               

 

                          [place] [address] [address]

                          21 Dec, 2015              Left leg pain M79.605

 

                          [place] [address] [address]

                          10 Dec, 2015              Left leg pain M79.605 and Lo

calized swelling of lower leg R22.40

 

                          [facility]     [address] [facility] [id number]

[address] [place] [address]

                          01 Dec, 2015              Left leg pain M79.605

 

                          [facility]     [address] [facility] [id number]

[address] [place] [address]

                                        Encounter for immunization Z

23

 

                          [facility]     [address] [facility] [id number]

[address] [place] [address]

                          12 Oct, 2015              Bronchitis J40

 

                          [facility]     [address] [place] [id number]

[address] [place] [address]

                          30 Sep, 2015              Physical exam, pre-employmen

t V70.5 ; Encounter for PPD test V74.1 

and Hyperlipidemia 272.4

 

                          [facility]     [address] [facility] [id number]

[address] [place] [address]

                          21 Sep, 2015               

 

                          [facility]     [address] [place] [id number] [place] [address]

                          21 Sep, 2015               

 

                          [facility]     [address] [place] [id number]

[address] [place] [address]

                          09 Sep, 2015               

 

                          [facility]     [address] [place] [id number]

[address] [place] [address]

                          04 Sep, 2015              Elevated blood sugar 790.29

 

                          [facility]     [address] [place] [id number]

[address] [place] [address]

                          03 Sep, 2015              Elevated blood sugar 790.29

 

                          [facility]     [address] [place] [id number]

[address] [place] [address]

                          03 Sep, 2015              Palpitations 785.1

 

                          [facility]     [address] [place] [id number]

[address] [place] [address]

                          01 Sep, 2015              Palpitations 785.1

 

                          [facility]     [address] [place] [id number]

[address] [place] [address]

                                         

 

                          [facility]     [address] [place] [id number]

[address] [place] [address]

                          28 May, 2015              Hand pain, right 729.5 and D

epression with anxiety 300.4

 

                          [facility]     [address] [id number]

[address] [place] [address], [place] [address]

                          14 2015               

 

                          [facility]     [address] [id number]

[address] [place] [address], [place] [address]

                                         

 

                          [facility]     [address] [id number]

[address] [place] [address]

                          05 Mar, 2015               

 

                          [facility]     [address] [id number]

[address] [place] [address]

                          05 Mar, 2015               

 

                          [facility]     [address] [id number]

[address] [place] [address], [place] [address]

                                         

 

                          [facility]     [address] [id number]

[address] [place] [address]

                                         

 

                          [facility]     [address] [id number]

[address] [place] [address], [place] [address]

                          10 Feb, 2015               

 

                          [facility]     [address] [id number]

[address] [place] [address]

                          10 Feb, 2015               

 

                          [facility]     [address] [id number]

[address] [place] [address], [place] [address]

                                         

 

                          [facility]     [address] [id number]

[address] [place] [address]

                                         

 

                          [facility]     [address] [id number]

[address] [place] [address]

                                         

 

                          [facility]     [address] [id number]

[address] [place] [address]

                                         

 

                          [facility]     [address] [id number]

[address] [place] [address]

                                         

 

                          [facility]     [address] [id number]

[address] [place] [address]

                                         

 

                          [facility]     [address] [id number]

[address] [place] [address]

                                         

 

                          [facility]     [address] [id number]

[address] [place] [address], [place] [address]

                          17 2015               

 

                          [facility]     [address] [id number]

[address] [place] [address], [place] [address]

                                         

 

                          [facility]     [address] [id number]

[address] [place] [address], [place] [address]

                          16 2015               

 

                          [facility]     [address] [id number]

[address] [place] [address], [place] [address]

                                         

 

                          [facility]     [address] [id number]

[address] [place] [address], [place] [address]

                                         

 

                          [facility]     [address] [id number]

[address] [place] [address], [place] [address]

                          15 [place], 2015               

 

                          [facility]     [address] [id number]

[address] [place] [address], [place] [address]

                          15 [place], 2015               

 

                          [facility]     [address] [id number]

[address] [place] [address], [place] [address]

                                         

 

                          [facility]     [address] [id number]

[address] [place] [address], [place] [address]

                                         

 

                          [facility]     [address] [id number]

[address] [place] [address], [place] [address]

                          15 Oct, 2014               

 

                          [facility]     [address] [id number]

[address] [place] [address], [place] [address]

                          15 Oct, 2014               

 

                          [facility]     [address] [id number]

[address] [place] [address], [place] [address]

                          14 Oct, 2014               

 

                          [facility]     [address] [id number]

[address] [place] [address], [place] [address]

                          14 Oct, 2014               

 

                          [facility]     [address] [id number]

[address] [place] [address], [place] [address]

                          07 Oct, 2014               

 

                          [facility]     [address] [id number]

[address] [place] [address], [place] [address]

                          07 Oct, 2014               

 

                          [facility]     [address] [id number]

[address] [place] [address], [place] [address]

                          12 Sep, 2014               

 

                          [facility]     [address] [id number]

[address] [place] [address], [place] [address]

                          12 Sep,                

 

                          [facility] [place] [facility]     [address] [id number]

[address] [place] [address], [place] [address]

                          04 Sep,                

 

                          [facility]     [address] [id number]

[address] [place] [address], [place] [address]

                          03 Sep,                

 

                          [facility] [place] [facility]     [address] [id number]

[address] [place] [address], [place] [address]

                          03 Sep,                

 

                          [facility]     [address] [id number]

[address] [place] [address], [place] [address]

                          02 Sep, 2014               

 

                          [facility]     [address] [id number]

[address] [place] [address], [place] [address]

                          02 Sep, 2014               

 

                          [facility]     [address] [id number]

[address] [place] [address], [place] [address]

                          30 Aug, 2014               

 

                          [facility]     [address] [id number]

[address] [place] [address], [place] [address]

                          30 Aug, 2014               

 

                          [facility]     [address] [id number]

[address] [place] [address], [place] [address]

                          19 Aug, 2014               

 

                          [facility]     [address] [id number]

[address] [place] [address], [place] [address]

                          19 Aug, 2014               

 

                          [facility]     [address] [id number]

[address] [place] [address], [place] [address]

                          14 Aug, 2014               

 

                          [facility]     [address] [id number]

[address] [place] [address], [place] [address]

                          14 Aug, 2014               

 

                          [facility]     [address] [id number]

[address] [place] [address], [place] [address]

                          13 Aug, 2014               

 

                          [facility]     [address] [id number]

[address] [place] [address], [place] [address]

                          13 Aug, 2014               

 

                          [facility]     [address] [id number]

[address] [place] [address], [place] [address]

                                         

 

                          [facility]     [address] [id number]

[address] [place] [address], [place] [address]

                          ,                

 

                          [facility]     [address] [id number]

[facility] [place] [address]

                                         

 

                          [facility]     [address] [id number]

[address] [place] [address], [place] [address]

                                         

 

                          [facility]     [address] [id number]

[address] [place] [address], [place] [address]

                          ,                

 

                          [facility]     [address] [id number]

[address] [place] [address], [place] [address]

                                         

 

                          [facility]     [address] [id number]

[address] [place] [address], [place] [address]

                                         

 

                          [facility]     [address] [id number]

[address] [place] [address], [place] [address]

                                         

 

                          [facility]     [address] [id number]

[address] [place] [address], [place] [address]

                                         

 

                          [facility]     [address] [id number]

[address] [place] [address], [place] [address]

                                         

 

                          [facility]     [address] [id number]

[address] [place] [address], [place] [address]

                                         

 

                          [facility]     [address] [id number]

[address] [place] [address], [place] [address]

                                         

 

                          [facility]     [address] [id number]

[address] [place] [address], [place] [address]

                          ,                

 

                          [facility]     [address] [id number]

[address] [place] [address], [place] [address]

                          ,                

 

                          [facility]     [address] [id number]

[address] [place] [address], [place] [address]

                          ,                

 

                          [facility]     [address] [id number]

[address] [place] [address], [place] [address]

                          ,                

 

                          [facility]     [address] [id number]

[address] [place] [address], [place] [address]

                          ,                

 

                          [facility]     [address] [id number]

[address] [place] [address], [place] [address]

                                         

 

                          [facility]     [address] [id number]

[address] [place] [address], [place] [address]

                                         

 

                          [facility]     [address] [id number]

[address] [place] [address], [place] [address]

                                         

 

                          [facility]     [address] [id number]

[address] [place] [address], [place] [address]

                                         

 

                          [facility]     [address] [id number]

[address] [place] [address], [place] [address]

                                         

 

                          [facility]     [address] [id number]

[address] [place] [address], [place] [address]

                                         

 

                          [facility]     [address] [id number]

[address] [place] [address], [place] [address]

                                         

 

                          [facility]     [address] [id number]

[address] [place] [address], [place] [address]

                                         

 

                          [facility]     [address] [id number]

[address] [place] [address], [place] [address]

                                         

 

                          [facility]     [address] [id number]

[address] [place] [address], [place] [address]

                                         

 

                          [facility]     [address] [id number]

[address] [place] [address], [place] [address]

                          27 Mar, 2014               

 

                          [facility]     [address] [id number]

[address] [place] [address], [place] [address]

                          27 Mar, 2014               

 

                          [facility]     [address] [id number]

[address] [place] [address], [place] [address]

                                         

 

                          [facility]     [address] [id number]

[address] [place] [address], [place] [address]

                                         

 

                          [facility]     [address] [id number]

[address] [place] [address], [place] [address]

                                         

 

                          [facility]     [address] [id number]

[address] [place] [address], [place] [address]

                                         

 

                          [facility]     [address] [id number]

[address] [place] [address], [place] [address]

                          08 Oct, 2013               

 

                          [facility]     [address] [id number]

[address] [place] [address], [place] [address]

                          04 Oct, 2013               

 

                          [facility]     [address] [id number]

[address] [place] [address], [place] [address]

                          03 Oct, 2013               

 

                          [facility]     [address] [id number]

[address] [place] [address], [place] [address]

                          02 Oct, 2013               

 

                          [facility]     [address] [id number]

[address] [place] [address], [place] [address]

                          01 Oct, 2013               

 

                          [facility]     [address] [id number]

[address] [place] [address], [place] [address]

                          20 Sep, 2013               

 

                          [facility]     [address] [id number]

[address] [place] [address], [place] [address]

                          08 Aug, 2013               

 

                          [facility]     [address] [id number]

[address] [place] [address], [place] [address]

                          24 May, 2013               

 

                          [facility]     [address] [id number]

[address] [place] [address], [place] [address]

                          13 May, 2013               

 

                          [facility]     [address] [id number]

[address] [place] [address]

                                         

 

                          [facility]     [address] [id number]

[address] [place] [address]

                                         

 

                          [facility]     [address] [id number]

[address] [place] [address]

                                         

 

                          [facility]     [address] [id number]

[address] [place] [address], [place] [address]

                                         

 

                          [facility]     [address] [id number]

[address] [place] [address]

                          02 Oct, 2012               

 

                          [facility]     [address] [id number]

[address] [place] [address]

                          02 Oct, 2012               

 

                          [facility]     [address] [id number]

[address] [place] [address]

                          21 Sep, 2012               

 

                          [facility]     [address] [id number]

[address] [place] [address]

                          06 Aug, 2012               

 

                          [facility]     [address] [id number]

[address] [place] [address]

                          02 Aug, 2012               

 

                          [facility]     [address] [id number]

[address] [place] [address]

                                         

 

                          [facility]     [address] [id number]

[address] [place] [address]

                                         

 

                          [facility]     [address] [id number]

[address] [place] [address]

                          15 Eusebio, 2012               

 

                          [facility]     [address] [id number]

[address] [place] [address]

                                         

 

                          [facility]     [address] [id number]

[address] [place] [address]

                          10 Apr, 2012               

 

                          [facility]     [address] [id number]

[address] [place] [address]

                          22 Mar, 2012               

 

                          [facility]     [address] [id number]

[address] [place] [address]

                          20 Mar, 2012               

 

                          [facility]     [address] [id number]

[address] [place] [address]

                          14 Mar, 2012               

 

                          [facility]     [address] [id number]

[address] [place] [address]

                                         







IMMUNIZATIONS

No Known Immunizations



SOCIAL HISTORY

Never Assessed



REASON FOR VISIT





PLAN OF CARE





VITAL SIGNS





                    Height              69 in               2014

 

                    Weight              215 lbs             2014

 

                    Temperature         97.9 degrees Fahrenheit 2014

 

                    Heart Rate          64 bpm              2014

 

                    Respiratory Rate    20                  2014

 

                    Blood pressure systolic 110 mmHg            2014

 

                    Blood pressure diastolic 74 mmHg             2014







MEDICATIONS

Unknown Medications



RESULTS

No Results



PROCEDURES

No Known procedures



INSTRUCTIONS





MEDICATIONS ADMINISTERED

No Known Medications



MEDICAL (GENERAL) HISTORY





                    Type                Description         Date

 

                          Medical History           allergic rhinitis- rosita's edwige donahue allergy injections from Dr Rojas office                            

 

                    Medical History     mood disorder        

 

                    Medical History     Leukocytosis- has been to hematology  

 

                    Medical History     Hyperlipidemia       

 

                          Medical History           Left leg pain- multiple imag

ing performed and ortho referral 

placed                                   

 

                          Medical History           TUBULAR ADENOMA AND GASTRITI

S ON COLONOSOCPY AUG 2016 -MULTIPLE 

POLYPS                                   

 

                    Medical History     Helicobacter pylori (H. pylori) infectio

n Hx  

 

                    Surgical History    cholecystectomy     

 

                    Surgical History     section    , 

 

                    Surgical History    tonsillectomy       2015

 

                    Surgical History    colonoscopy         2016

 

                    Surgical History    colonscopy          2017

 

                    Surgical History    Right Knee scope    2017

## 2020-06-23 NOTE — XMS REPORT
[facility]

                             Created on: 11/15/2018



Jasonwhit April

External Reference #: [id number]

: 1975

Sex: Female



Demographics





                          Address                   [address] [place]  [address]

 

                          Preferred Language        Unknown

 

                          Marital Status            Unknown

 

                          Scientology Affiliation     Unknown

 

                          Race                      Unknown

 

                          Ethnic Group              Unknown





Author





                          Author                    Darby LOPEZCY

 

                          Organization              [facility]

 

                          Address                   [address] [place]  [address]



 

                          Phone                     [phone]







Care Team Providers





                    Care Team Member Name Role                Phone

 

                    MILADIS LOPEZ       Unavailable         [phone]







PROBLEMS





          Type      Condition ICD9-CM Code WDD68-RO Code Onset Dates Condition S

tatus SNOMED 

Code

 

          Problem   History of leukocytosis           Z86.2               Active

    [id number]

 

          Problem   Duarte's neuroma of right foot           G57.61             

 Active    [id number]

 

                          Problem                   Type 2 diabetes mellitus wit

hout complication, without long-term current

use of insulin              E11.9                     Active       [id number]

 

          Problem   Tubular adenoma of colon           D12.6               Activ

e    [id number]

 

          Problem   Mixed hyperlipidemia           E78.2               Active   

 [id number]







ALLERGIES

No Information



ENCOUNTERS





                Encounter       Location        Date            Diagnosis

 

                          [facility]     [address] [address]

                          05 Dec, 2018               

 

                          [facility]     [address] [address]

                                        Type 2 diabetes mellitus wit

hout complication, without long-term 

current use of insulin E11.9

 

                          [facility]     [address] [address]

                                        Pharyngitis due to Streptoco

ccus species J02.0

 

                          [facility]     [address] [address]

                                         

 

                          [facility]  [address] 

[address]                10 Jul, 2018              Fever, unspecified fever cau

se R50.9 and Lymphadenopathy

R59.1

 

                          [facility]  [address] 

[address]                              Pharyngitis due to other org

anism J02.8

 

                          [facility]     [address] [facility] [id number]

[address] [place] [address]

                                         

 

                          [place] [address] [address]

                                        Type 2 diabetes mellitus wit

hout complication, without long-term 

current use of insulin E11.9 and Other eczema L30.8

 

                          [facility]  [address] [id number]

[address] [place] [address] 

[address]                              Acute pain of left shoulder 

M25.512

 

                          [place] [address] [address]

                                         

 

                          [place] [address] [address]

                          15 2018              Type 2 diabetes mellitus wit

hout complication, without long-term 

current use of insulin E11.9

 

                          [place] [address] [address]

                                        Type 2 diabetes mellitus wit

hout complication, without long-term 

current use of insulin E11.9 ; Tubular adenoma of colon D12.6 ; Mixed 
hyperlipidemia E78.2 ; History of leukocytosis Z86.2 and Screening breast 
examination Z12.31

 

                          [place] [address] [place] [id number]

[address] [place] [address]

                                        Metatarsalgia of right foot 

M77.41 and Type 2 diabetes mellitus 

without complication, without long-term current use of insulin E11.9

 

                          [place] [address] [place] [id number]

[address] [place] [address]

                                        Capsulitis M77.9 and Metatar

salgia of right foot M77.41

 

                          [place] [address] [place] [id number]

[address] [place] [address]

                          08 Sep, 2017              Capsulitis M77.9

 

                          [place] [address] [place] [id number]

[address] [place] [address]

                          06 Sep, 2017               

 

                          [place] [address] [place] [address]

                          03 Aug, 2017              Type 2 diabetes mellitus wit

hout complication, without long-term 

current use of insulin E11.9 ; Tubular adenoma of colon D12.6 and Mixed 
hyperlipidemia E78.2

 

                          [facility]     [address] [facility] [address] [id number]

[address] [place] [address]

                          14 2017              Metatarsalgia of right foot 

M77.41 and Capsulitis M77.9

 

                          [facility]     [address] [facility] [id number]

[address] [place] [address]

                                         

 

                          [facility]  [address] [id number]

[address] [place] [address] 

[address]                              Duarte's neuroma of right fo

ot G57.61

 

                          [facility]     [address] [id number]

[address] [place] [address]

                                        Mixed hyperlipidemia E78.2 a

nd Type 2 diabetes mellitus without 

complication, without long-term current use of insulin E11.9

 

                          [facility]     [address] [id number]

[address] [place] [address]

                          10 Brandon, 2017              Type 2 diabetes mellitus wit

hout complication, without long-term 

current use of insulin E11.9 ; Tubular adenoma of colon D12.6 and Mixed 
hyperlipidemia E78.2

 

                          [facility]     [address] [facility] [address] [id number]

[address] [place] [address]

                          22 Dec, 2016              Mixed hyperlipidemia E78.2

 

                          [facility]     [address] [facility] [address] [id number]

[address] [place] [address]

                                         

 

                          [facility]     [address] [address] [id number]

[address] [place] [address]

                                        Mixed hyperlipidemia E78.2

 

                          [facility]     [address] [id number]

[address] [place] [address]

                                        Mixed hyperlipidemia E78.2

 

                          [facility]     [address] [facility] [id number]

[address] [place] [address]

                          08 Sep, 2016               

 

                          [facility]     [address] [address]

                          10 Aug, 2016              Type 2 diabetes mellitus wit

hout complication, without long-term 

current use of insulin E11.9 ; Helicobacter pylori (H. pylori) infection A04.8 
and Mixed hyperlipidemia E78.2

 

                          [facility]     [address] [address]

                          08 Aug, 2016              Type 2 diabetes mellitus wit

hout complication, without long-term 

current use of insulin E11.9

 

                          [place] [address] [address]

                          03 Aug, 2016              Type 2 diabetes mellitus wit

hout complication, without long-term 

current use of insulin E11.9

 

                          [place] [address] [address]

                          02 Aug, 2016              Dyspepsia R10.13 ; Upper abd

ominal pain R10.10 ; Bowel habit 

changes R19.4 ; History of leukocytosis Z86.2 and Helicobacter pylori (H. 
pylori) infection A04.8

 

                          [facility]  [address] 

[address]                27 May, 2016              Environmental allergies Z91.

09

 

                          [place] [address] [address]

                                        Left leg pain M79.605 ; Loca

lized swelling of lower leg R22.40 and 

Upper respiratory infection J06.9

 

                          [place] [address] [address]

                          28 Dec, 2015               

 

                          [place] [address] [address]

                          21 Dec, 2015              Left leg pain M79.605

 

                          [place] [address] [address]

                          10 Dec, 2015              Left leg pain M79.605 and Lo

calized swelling of lower leg R22.40

 

                          [place] [address] [address]

                          01 Dec, 2015              Left leg pain M79.605

 

                          [facility]     [address] [facility] [id number]

[address] [place] [address]

                                        Encounter for immunization Z

23

 

                          [facility]     [address] [facility] [id number]

[address] [place] [address]

                          12 Oct, 2015              Bronchitis J40

 

                          [facility]     [address] [place] [id number]

[address] [place] [address]

                          30 Sep, 2015              Physical exam, pre-employmen

t V70.5 ; Encounter for PPD test V74.1 

and Hyperlipidemia 272.4

 

                          [facility]     [address] [facility] [id number]

[address] [place] [address]

                          21 Sep, 2015               

 

                          [facility]     [address] [id number]

[address] [place] [address]

                          21 Sep, 2015               

 

                          [facility]     [address] [id number]

[address] [place] [address]

                          09 Sep, 2015               

 

                          [facility]     [address] [id number]

[address] [place] [address]

                          04 Sep, 2015              Elevated blood sugar 790.29

 

                          [facility]     [address] [id number]

[address] [place] [address]

                          03 Sep, 2015              Elevated blood sugar 790.29

 

                          [facility]     [address] [facility] [id number]

[address] [place] [address]

                          03 Sep, 2015              Palpitations 785.1

 

                          [facility]     [address] [id number]

[address] [place] [address]

                          01 Sep, 2015              Palpitations 785.1

 

                          [facility]     [address] [facility] [id number]

[address] [place] [address]

                                         

 

                          [facility]     [address] [id number]

[address] [place] [address]

                          28 May, 2015              Hand pain, right 729.5 and D

epression with anxiety 300.4

 

                          [facility]     [address] [id number]

[address] [place] [address]

                                         

 

                          [facility]     [address] [id number]

[address] [place] [address], [place] [address]

                                         

 

                          [facility]     [address] [id number]

[address] [place] [address], [place] [address]

                          05 Mar, 2015               

 

                          [facility]     [address] [id number]

[address] [place] [address], [place] [address]

                          05 Mar, 2015               

 

                          [facility]     [address] [id number]

[address] [place] [address], [place] [address]

                                         

 

                          [facility]     [address] [id number]

[address] [place] [address], [place] [address]

                                         

 

                          [facility]     [address] [id number]

[address] [place] [address], [place] [address]

                          10 Feb, 2015               

 

                          [facility]     [address] [id number]

[address] [place] [address], [place] [address]

                          10 Feb, 2015               

 

                          [facility]     [address] [id number]

[address] [place] [address], [place] [address]

                                         

 

                          [facility]     [address] [id number]

[address] [place] [address], [place] [address]

                                         

 

                          [facility]     [address] [id number]

[address] [place] [address], [place] [address]

                                         

 

                          [facility]     [address] [id number]

[address] [place] [address], [place] [address]

                                         

 

                          [facility]     [address] [id number]

[address] [place] [address]

                                         

 

                          [facility]     [address] [id number]

[address] [place] [address], [place] [address]

                                         

 

                          [facility]     [address] [id number]

[address] [place] [address], [place] [address]

                                         

 

                          [facility]     [address] [id number]

[address] [place] [address], [place] [address]

                                         

 

                          [facility]     [address] [id number]

[address] [place] [address], [place] [address]

                          16 2015               

 

                          [facility]     [address] [id number]

[address] [place] [address], [place] [address]

                                         

 

                          [facility]     [address] [id number]

[address] [place] [address], [place] [address]

                                         

 

                          [facility]     [address] [id number]

[address] [place] [address], [place] [address]

                                         

 

                          [facility]     [address] [id number]

[address] [place] [address], [place] [address]

                          15 2015               

 

                          [facility]     [address] [id number]

[address] [place] [address], [place] [address]

                          15 [place], 2015               

 

                          [facility]     [address] [id number]

[address] [place] [address], [place] [address]

                                         

 

                          [facility]     [address] [id number]

[address] [place] [address], [place] [address]

                                         

 

                          [facility]     [address] [id number]

[address] [place] [address], [place] [address]

                          15 Oct, 2014               

 

                          [facility]     [address] [id number]

[address] [place] [address], [place] [address]

                          15 Oct, 2014               

 

                          [facility]     [address] [id number]

[address] [place] [address], [place] [address]

                          14 Oct, 2014               

 

                          [facility]     [address] [id number]

[address] [place] [address], [place] [address]

                          14 Oct, 2014               

 

                          [facility]     [address] [id number]

[address] [place] [address], [place] [address]

                          07 Oct, 2014               

 

                          [facility]     [address] [id number]

[address] [place] [address], [place] [address]

                          07 Oct, 2014               

 

                          [facility]     [address] [id number]

[address] [place] [address], [place] [address]

                          12 Sep, 2014               

 

                          [facility]     [address] [id number]

[address] [place] [address], [place] [address]

                          12 Sep, 2014               

 

                          [facility]     [address] [id number]

[facility] [place] [address]

                          04 Sep,                

 

                          [facility]     [address] [id number]

[facility] [place] [address]

                          03 Sep, 2014               

 

                          [facility]     [address] [id number]

[facility] [place] [address]

                          03 Sep, 2014               

 

                          [facility]     [address] [id number]

[address] [place] [address], [place] [address]

                          02 Sep, 2014               

 

                          [facility]     [address] [id number]

[address] [place] [address], [place] [address]

                          02 Sep, 2014               

 

                          [facility]     [address] [id number]

[address] [place] [address], [place] [address]

                          30 Aug, 2014               

 

                          [facility]     [address] [id number]

[address] [place] [address], [place] [address]

                          30 Aug, 2014               

 

                          [facility]     [address] [id number]

[address] [place] [address], [place] [address]

                          19 Aug, 2014               

 

                          [facility]     [address] [id number]

[address] [place] [address], [place] [address]

                          19 Aug, 2014               

 

                          [facility]     [address] [id number]

[address] [place] [address], [place] [address]

                          14 Aug, 2014               

 

                          [facility]     [address] [id number]

[address] [place] [address], [place] [address]

                          14 Aug, 2014               

 

                          [facility]     [address] [id number]

[address] [place] [address], [place] [address]

                          13 Aug, 2014               

 

                          [facility]     [address] [id number]

[address] [place] [address], [place] [address]

                          13 Aug, 2014               

 

                          [facility]     [address] [id number]

[address] [place] [address], [place] [address]

                                         

 

                          [facility]     [address] [id number]

[address] [place] [address], [place] [address]

                                         

 

                          [facility]     [address] [id number]

[address] [place] [address], [place] [address]

                                         

 

                          [facility]     [address] [id number]

[facility] [place] [address]

                                         

 

                          [facility]     [address] [id number]

[address] [place] [address], [place] [address]

                                         

 

                          [facility]     [address] [id number]

[address] [place] [address], [place] [address]

                                         

 

                          [facility]     [address] [id number]

[address] [place] [address], [place] [address]

                                         

 

                          [facility]     [address] [id number]

[address] [place] [address], [place] [address]

                                         

 

                          [facility]     [address] [id number]

[address] [place] [address], [place] [address]

                                         

 

                          [facility]     [address] [id number]

[address] [place] [address], [place] [address]

                                         

 

                          [facility]     [address] [id number]

[address] [place] [address], [place] [address]

                                         

 

                          [facility]     [address] [id number]

[address] [place] [address], [place] [address]

                                         

 

                          [facility]     [address] [id number]

[address] [place] [address], [place] [address]

                                         

 

                          [facility]     [address] [id number]

[address] [place] [address], [place] [address]

                                         

 

                          [facility]     [address] [id number]

[address] [place] [address], [place] [address]

                          ,                

 

                          [facility]     [address] [id number]

[address] [place] [address], [place] [address]

                          ,                

 

                          [facility]     [address] [id number]

[address] [place] [address], [place] [address]

                                         

 

                          [facility]     [address] [id number]

[address] [place] [address], [place] [address]

                          ,                

 

                          [facility]     [address] [id number]

[address] [place] [address], [place] [address]

                                         

 

                          [facility]     [address] [id number]

[address] [place] [address], [place] [address]

                                         

 

                          [facility]     [address] [id number]

[address] [place] [address], [place] [address]

                                         

 

                          [facility]     [address] [id number]

[address] [place] [address], [place] [address]

                                         

 

                          [facility]     [address] [id number]

[address] [place] [address], [place] [address]

                                         

 

                          [facility]     [address] [id number]

[address] [place] [address], [place] [address]

                                         

 

                          [facility]     [address] [id number]

[address] [place] [address], [place] [address]

                                         

 

                          [facility]     [address] [id number]

[address] [place] [address], [place] [address]

                                         

 

                          [facility]     [address] [id number]

[address] [place] [address], [place] [address]

                                         

 

                          [facility]     [address] [id number]

[address] [place] [address], [place] [address]

                          27 Mar, 2014               

 

                          [facility]     [address] [id number]

[address] [place] [address], [place] [address]

                          27 Mar, 2014               

 

                          [facility]     [address] [id number]

[address] [place] [address], [place] [address]

                                         

 

                          [facility]     [address] [id number]

[address] [place] [address], [place] [address]

                                         

 

                          [facility]     [address] [id number]

[address] [place] [address], [place] [address]

                                         

 

                          [facility]     [address] [id number]

[address] [place] [address], [place] [address]

                                         

 

                          [facility]     [address] [id number]

[address] [place] [address], [place] [address]

                          08 Oct, 2013               

 

                          [facility]     [address] [id number]

[address] [place] [address], [place] [address]

                          04 Oct, 2013               

 

                          [facility]     [address] [id number]

[address] [place] [address], [place] [address]

                          03 Oct, 2013               

 

                          [facility]     [address] [id number]

[address] [place] [address], [place] [address]

                          02 Oct, 2013               

 

                          [facility]     [address] [id number]

[address] [place] [address], [place] [address]

                          01 Oct, 2013               

 

                          [facility]     [address] [id number]

[address] [place] [address], [place] [address]

                          20 Sep, 2013               

 

                          [facility]     [address] [id number]

[address] [place] [address], [place] [address]

                          08 Aug, 2013               

 

                          [facility]     [address] [id number]

[address] [place] [address], [place] [address]

                          24 May, 2013               

 

                          [facility]     [address] [id number]

[address] [place] [address], [place] [address]

                          13 May, 2013               

 

                          [facility]     [address] [id number]

[address] [place] [address], [place] [address]

                                         

 

                          [facility]     [address] [id number]

[address] [place] [address], [place] [address]

                                         

 

                          [facility]     [address] [id number]

[address] [place] [address], [place] [address]

                                         

 

                          [facility]     [address] [id number]

[address] [place] [address], [place] [address]

                                         

 

                          [facility]     [address] [id number]

[address] [place] [address], [place] [address]

                          02 Oct, 2012               

 

                          [facility]     [address] [id number]

[address] [place] [address], [place] [address]

                          02 Oct, 2012               

 

                          [facility]     [address] [id number]

[address] [place] [address], [place] [address]

                          21 Sep, 2012               

 

                          [facility]     [address] [id number]

[address] [place] [address]

                          06 Aug, 2012               

 

                          [facility]     [address] [id number]

[address] [place] [address]

                          02 Aug, 2012               

 

                          [facility]     [address] [id number]

[address] [place] [address]

                                         

 

                          [facility]     [address] [id number]

[address] [place] [address]

                                         

 

                          [facility]     [address] [id number]

[address] [place] [address]

                          15 Eusebio, 2012               

 

                          [facility]     [address] [id number]

[address] [place] [address]

                                         

 

                          [facility]     [address] [id number]

[address] [place] [address]

                          10 Apr, 2012               

 

                          [facility]     [address] [id number]

[address] [place] [address]

                          22 Mar, 2012               

 

                          [facility]     [address] [id number]

[address] [place] [address]

                          20 Mar, 2012               

 

                          [facility]     [address] [id number]

[address] [place] [address]

                          14 Mar, 2012               

 

                          [facility]     [address] [id number]

[address] [place] [address]

                                         







IMMUNIZATIONS

No Known Immunizations



SOCIAL HISTORY

Never Assessed



REASON FOR VISIT

FREE [id number]



PLAN OF CARE





VITAL SIGNS





MEDICATIONS

Unknown Medications



RESULTS





                Name            Result          Date            Reference Range

 

                A1C (IN HOUSE)                                   

 

                A1C IN HOUSE    8.9                             4.3 - 5.6 %

 

                Previous A1c    10.9                             

 

                Lot             [id number]                             

 

                Exp date        2020                          







PROCEDURES





                Procedure       Date Ordered    Result          Body Site

 

                GLYCATED HEMOGLOBIN TEST 2018                     

 

                Billing Notes on claim 2018                     







INSTRUCTIONS





MEDICATIONS ADMINISTERED

No Known Medications



MEDICAL (GENERAL) HISTORY





                    Type                Description         Date

 

                          Medical History           allergic rhinitis- rec's edwige donahue allergy injections from Dr Rojas office                            

 

                    Medical History     mood disorder        

 

                    Medical History     Leukocytosis- has been to hematology  

 

                    Medical History     Hyperlipidemia       

 

                          Medical History           Left leg pain- multiple imag

ing performed and ortho referral 

placed                                   

 

                          Medical History           TUBULAR ADENOMA AND GASTRITI

S ON COLONOSOCPY AUG 2016 -MULTIPLE 

POLYPS                                   

 

                    Medical History     Helicobacter pylori (H. pylori) infectio

n Hx  

 

                    Surgical History    cholecystectomy     

 

                    Surgical History     section    , 

 

                    Surgical History    tonsillectomy       2015

 

                    Surgical History    colonoscopy         2016

 

                    Surgical History    colonscopy          2017

 

                    Surgical History    Right Knee scope    2017

## 2020-06-23 NOTE — XMS REPORT
[facility]

                             Created on: 2018



Darby Love

External Reference #: [id number]

: 1975

Sex: Female



Demographics





                          Address                   [address] [place]  [address]

 

                          Preferred Language        Unknown

 

                          Marital Status            Unknown

 

                          Christian Affiliation     Unknown

 

                          Race                      Unknown

 

                          Ethnic Group              Unknown





Author





                          Author                    Darby DELGADO

 

                          Organization              [facility]

 

                          Address                   [address] [place]  [address]



 

                          Phone                     [phone]







Care Team Providers





                    Care Team Member Name Role                Phone

 

                    VILMA DELGADO      Unavailable         [phone]







PROBLEMS





          Type      Condition ICD9-CM Code CVL75-EC Code Onset Dates Condition S

tatus SNOMED 

Code

 

          Problem   History of leukocytosis           Z86.2               Active

    [id number]

 

          Problem   Duarte's neuroma of right foot           G57.61             

 Active    [id number]

 

                          Problem                   Type 2 diabetes mellitus wit

hout complication, without long-term current

use of insulin              E11.9                     Active       [id number]

 

          Problem   Tubular adenoma of colon           D12.6               Activ

e    [id number]

 

          Problem   Mixed hyperlipidemia           E78.2               Active   

 [id number]







ALLERGIES





             Substance    Reaction     Event Type   Date         Status

 

             Penicillin V Potassium Unknown      Drug Allergy  Activ

e







ENCOUNTERS





                Encounter       Location        Date            Diagnosis

 

                          [place] [id number] [facility] [id number]

[address] [place] [address]

                                        Pharyngitis due to Streptoco

ccus species J02.0

 

                          [facility]     [address] [facility] [id number]

[address] [place] [address]

                                         

 

                          [facility]  [address] [place] [id number]

[address] [place] [address] 

[address]                10 Jul, 2018              Fever, unspecified fever cau

se R50.9 and Lymphadenopathy

R59.1

 

                          [facility]  [address] [facility] [id number]

[address] [place] [address] 

[address]                              Pharyngitis due to other org

anism J02.8

 

                          [facility]     [address] [facility] [id number]

[address] [place] [address]

                                         

 

                          [facility]     [address] [facility] [id number]

[address] [place] [address]

                                        Type 2 diabetes mellitus wit

hout complication, without long-term 

current use of insulin E11.9 and Other eczema L30.8

 

                          [facility]  [address] 

[address]                28 2018              Acute pain of left shoulder 

M25.512

 

                          [facility]     [address] [address]

                          23 2018               

 

                          [facility]     [address] [address]

                          15 2018              Type 2 diabetes mellitus wit

hout complication, without long-term 

current use of insulin E11.9

 

                          [place] [address] [address]

                                        Type 2 diabetes mellitus wit

hout complication, without long-term 

current use of insulin E11.9 ; Tubular adenoma of colon D12.6 ; Mixed 
hyperlipidemia E78.2 ; History of leukocytosis Z86.2 and Screening breast 
examination Z12.31

 

                          [place] [address] [address]

                                        Metatarsalgia of right foot 

M77.41 and Type 2 diabetes mellitus 

without complication, without long-term current use of insulin E11.9

 

                          [place] [address] [address]

                                        Capsulitis M77.9 and Metatar

salgia of right foot M77.41

 

                          [place] [address] [address]

                          08 Sep, 2017              Capsulitis M77.9

 

                          [place] [address] [address]

                          06 Sep, 2017               

 

                          [place] [address] [address]

                          03 Aug, 2017              Type 2 diabetes mellitus wit

hout complication, without long-term 

current use of insulin E11.9 ; Tubular adenoma of colon D12.6 and Mixed 
hyperlipidemia E78.2

 

                          [place] [address] [address]

                                        Metatarsalgia of right foot 

M77.41 and Capsulitis M77.9

 

                          [facility]     [address] [place] [id number]

[address] [place] [address]

                                         

 

                          [facility]  [address] [place] [id number]

[address] [place] [address] 

[address]                              Duarte's neuroma of right fo

ot G57.61

 

                          [facility]     [address] [place] [id number]

[address] [place] [address]

                                        Mixed hyperlipidemia E78.2 a

nd Type 2 diabetes mellitus without 

complication, without long-term current use of insulin E11.9

 

                          [place] [address] [address]

                          10 Brandon, 2017              Type 2 diabetes mellitus wit

hout complication, without long-term 

current use of insulin E11.9 ; Tubular adenoma of colon D12.6 and Mixed 
hyperlipidemia E78.2

 

                          [place] [id number]

[address] [place] [address]

                          22 Dec, 2016              Mixed hyperlipidemia E78.2

 

                          [place] [address] [place] [id number]

[address] [place] [address]

                                         

 

                          [place] [address] [place] [id number] [place] [address]

                                        Mixed hyperlipidemia E78.2

 

                          [place] [address] [place] [id number]

[address] [place] [address]

                                        Mixed hyperlipidemia E78.2

 

                          [place] [address] [place] [id number]

[address] [place] [address]

                          08 Sep, 2016               

 

                          [facility]     [address] [place] [id number]

[address] [place] [address]

                          10 Aug, 2016              Type 2 diabetes mellitus wit

hout complication, without long-term 

current use of insulin E11.9 ; Helicobacter pylori (H. pylori) infection A04.8 
and Mixed hyperlipidemia E78.2

 

                          [place] [address] [address]

                          08 Aug, 2016              Type 2 diabetes mellitus wit

hout complication, without long-term 

current use of insulin E11.9

 

                          [facility]     [address] [address]

                          03 Aug, 2016              Type 2 diabetes mellitus wit

hout complication, without long-term 

current use of insulin E11.9

 

                          [facility]     [address] [address]

                          02 Aug, 2016              Dyspepsia R10.13 ; Upper abd

ominal pain R10.10 ; Bowel habit 

changes R19.4 ; History of leukocytosis Z86.2 and Helicobacter pylori (H. 
pylori) infection A04.8

 

                          [facility]  [address] 

[address]                27 May, 2016              Environmental allergies Z91.

09

 

                          [place] [address] [address]

                                        Left leg pain M79.605 ; Loca

lized swelling of lower leg R22.40 and 

Upper respiratory infection J06.9

 

                          [place] [address] [address]

                          28 Dec, 2015               

 

                          [place] [address] [address]

                          21 Dec, 2015              Left leg pain M79.605

 

                          [facility]     [address] [address]

                          10 Dec, 2015              Left leg pain M79.605 and Lo

calized swelling of lower leg R22.40

 

                          [facility]     [address] [address]

                          01 Dec, 2015              Left leg pain M79.605

 

                          [place] [address] [address]

                                        Encounter for immunization Z

23

 

                          [facility]     [address] [address]

                          12 Oct, 2015              Bronchitis J40

 

                          [facility]     [address] [facility] [id number]

[address] [place] [address]

                          30 Sep, 2015              Physical exam, pre-employmen

t V70.5 ; Encounter for PPD test V74.1 

and Hyperlipidemia 272.4

 

                          [facility]     [address] [facility] [id number]

[address] [place] [address]

                          21 Sep, 2015               

 

                          [facility]     [address] [facility] [id number] [place] [address]

                          21 Sep, 2015               

 

                          [facility]     [address] [facility] [id number]

[address] [place] [address]

                          09 Sep, 2015               

 

                          [facility]     [address] [place] [id number] [place] [address]

                          04 Sep, 2015              Elevated blood sugar 790.29

 

                          [facility]     [address] [place] [id number]

[address] [place] [address]

                          03 Sep, 2015              Elevated blood sugar 790.29

 

                          [facility]     [address] [facility] [id number]

[address] [place] [address]

                          03 Sep, 2015              Palpitations 785.1

 

                          [facility]     [address] [place] [id number] [place] [address]

                          01 Sep, 2015              Palpitations 785.1

 

                          [facility]     [address] [place] [id number]

[address] [place] [address]

                                         

 

                          [facility]     [address] [facility] [id number]

[address] [place] [address]

                          28 May, 2015              Hand pain, right 729.5 and D

epression with anxiety 300.4

 

                          [facility]     [address] [facility] [id number]

[address] [place] [address]

                                         

 

                          [facility]     [address] [place] [id number]

[address] [place] [address]

                                         

 

                          [facility]     [address] [place] [id number]

[address] [place] [address]

                          05 Mar, 2015               

 

                          [facility]     [address] [id number]

[address] [place] [address], [place] [address]

                          05 Mar, 2015               

 

                          [facility]     [address] [id number]

[address] [place] [address], [place] [address]

                                         

 

                          [facility]     [address] [id number]

[address] [place] [address], [place] [address]

                                         

 

                          [facility]     [address] [id number]

[address] [place] [address], [place] [address]

                          10 Feb, 2015               

 

                          [facility]     [address] [id number]

[address] [place] [address], [place] [address]

                          10 Feb, 2015               

 

                          [facility]     [address] [id number]

[address] [place] [address], [place] [address]

                                         

 

                          [facility]     [address] [id number]

[address] [place] [address], [place] [address]

                                         

 

                          [facility]     [address] [id number]

[address] [place] [address], [place] [address]

                                         

 

                          [facility]     [address] [id number]

[address] [place] [address], [place] [address]

                                         

 

                          [facility]     [address] [id number]

[address] [place] [address], [place] [address]

                                         

 

                          [facility]     [address] [id number]

[address] [place] [address], [place] [address]

                                         

 

                          [facility]     [address] [id number]

[address] [place] [address], [place] [address]

                                         

 

                          [facility]     [address] [id number]

[address] [place] [address], [place] [address]

                                         

 

                          [facility]     [address] [id number]

[address] [place] [address], [place] [address]

                                         

 

                          [facility]     [address] [id number]

[address] [place] [address], [place] [address]

                                         

 

                          [facility]     [address] [id number]

[address] [place] [address], [place] [address]

                          16 2015               

 

                          [facility]     [address] [id number]

[address] [place] [address], [place] [address]

                          16 2015               

 

                          [facility]     [address] [id number]

[address] [place] [address], [place] [address]

                          15 2015               

 

                          [facility]     [address] [id number]

[address] [place] [address], [place] [address]

                          15 2015               

 

                          [facility]     [address] [id number]

[address] [place] [address], [place] [address]

                                         

 

                          [facility]     [address] [id number]

[address] [place] [address], [place] [address]

                                         

 

                          [facility]     [address] [id number]

[address] [place] [address], [place] [address]

                          15 Oct, 2014               

 

                          [facility]     [address] [id number]

[address] [place] [address], [place] [address]

                          15 Oct, 2014               

 

                          [facility]     [address] [id number]

[address] [place] [address], [place] [address]

                          14 Oct, 2014               

 

                          [facility]     [address] [id number]

[address] [place] [address], [place] [address]

                          14 Oct, 2014               

 

                          [facility]     [address] [id number]

[address] [place] [address], [place] [address]

                          07 Oct, 2014               

 

                          [facility]     [address] [id number]

[address] [place] [address], [place] [address]

                          07 Oct, 2014               

 

                          [facility]     [address] [id number]

[address] [place] [address]

                          12 Sep,                

 

                          [facility]     [address] [id number]

[address] [place] [address], [place] [address]

                          12 Sep, 2014               

 

                          [facility]     [address] [id number]

[address] [place] [address], [place] [address]

                          04 Sep, 2014               

 

                          [facility]     [address] [id number]

[address] [place] [address], [place] [address]

                          03 Sep,                

 

                          [facility]     [address] [id number]

[facility] [place] [address]

                          03 Sep, 2014               

 

                          [facility]     [address] [id number]

[address] [place] [address], [place] [address]

                          02 Sep, 2014               

 

                          [facility]     [address] [id number]

[address] [place] [address], [place] [address]

                          02 Sep, 2014               

 

                          [facility]     [address] [id number]

[address] [place] [address], [place] [address]

                          30 Aug, 2014               

 

                          [facility]     [address] [id number]

[address] [place] [address], [place] [address]

                          30 Aug, 2014               

 

                          [facility]     [address] [id number]

[address] [place] [address], [place] [address]

                          19 Aug, 2014               

 

                          [facility]     [address] [id number]

[address] [place] [address], [place] [address]

                          19 Aug, 2014               

 

                          [facility]     [address] [id number]

[address] [place] [address], [place] [address]

                          14 Aug, 2014               

 

                          [facility]     [address] [id number]

[address] [place] [address], [place] [address]

                          14 Aug, 2014               

 

                          [facility]     [address] [id number]

[address] [place] [address], [place] [address]

                          13 Aug, 2014               

 

                          [facility]     [address] [id number]

[address] [place] [address], [place] [address]

                          13 Aug, 2014               

 

                          [facility]     [address] [id number]

[address] [place] [address], [place] [address]

                                         

 

                          [facility]     [address] [id number]

[address] [place] [address], [place] [address]

                                         

 

                          [facility]     [address] [id number]

[address] [place] [address], [place] [address]

                                         

 

                          [facility]     [address] [id number]

[address] [place] [address], [place] [address]

                                         

 

                          [facility]     [address] [id number]

[address] [place] [address], [place] [address]

                                         

 

                          [facility]     [address] [id number]

[address] [place] [address], [place] [address]

                          ,                

 

                          [facility]     [address] [id number]

[address] [place] [address], [place] [address]

                          ,                

 

                          [facility]     [address] [id number]

[address] [place] [address], [place] [address]

                          ,                

 

                          [facility]     [address] [id number]

[address] [place] [address], [place] [address]

                          ,                

 

                          [facility]     [address] [id number]

[address] [place] [address], [place] [address]

                          ,                

 

                          [facility]     [address] [id number]

[address] [place] [address], [place] [address]

                          ,                

 

                          [facility]     [address] [id number]

[address] [place] [address], [place] [address]

                          ,                

 

                          [facility]     [address] [id number]

[address] [place] [address], [place] [address]

                          ,                

 

                          [facility]     [address] [id number]

[address] [place] [address], [place] [address]

                          ,                

 

                          [facility]     [address] [id number]

[address] [place] [address], [place] [address]

                                         

 

                          [facility]     [address] [id number]

[address] [place] [address], [place] [address]

                          ,                

 

                          [facility]     [address] [id number]

[address] [place] [address], [place] [address]

                          ,                

 

                          [facility]     [address] [id number]

[address] [place] [address], [place] [address]

                          ,                

 

                          [facility]     [address] [id number]

[address] [place] [address], [place] [address]

                                         

 

                          [facility]     [address] [id number]

[address] [place] [address], [place] [address]

                          ,                

 

                          [facility]     [address] [id number]

[address] [place] [address]

                                         

 

                          [facility]     [address] [id number]

[address] [place] [address], [place] [address]

                                         

 

                          [facility]     [address] [id number]

[address] [place] [address], [place] [address]

                                         

 

                          [facility]     [address] [id number]

[address] [place] [address], [place] [address]

                                         

 

                          [facility]     [address] [id number]

[address] [place] [address], [place] [address]

                                         

 

                          [facility]     [address] [id number]

[address] [place] [address], [place] [address]

                                         

 

                          [facility]     [address] [id number]

[address] [place] [address], [place] [address]

                                         

 

                          [facility]     [address] [id number]

[address] [place] [address], [place] [address]

                          27 Mar, 2014               

 

                          [facility]     [address] [id number]

[address] [place] [address], [place] [address]

                          27 Mar, 2014               

 

                          [facility]     [address] [id number]

[address] [place] [address], [place] [address]

                                         

 

                          [facility]     [address] [id number]

[address] [place] [address], [place] [address]

                                         

 

                          [facility]     [address] [id number]

[address] [place] [address], [place] [address]

                                         

 

                          [facility]     [address] [id number]

[address] [place] [address]

                                         

 

                          [facility]     [address] [id number]

[address] [place] [address]

                          08 Oct, 2013               

 

                          [facility]     [address] [id number]

[address] [place] [address]

                          04 Oct, 2013               

 

                          [facility]     [address] [id number]

[address] [place] [address]

                          03 Oct, 2013               

 

                          [facility]     [address] [id number]

[address] [place] [address], [place] [address]

                          02 Oct, 2013               

 

                          [facility]     [address] [id number]

[address] [place] [address], [place] [address]

                          01 Oct, 2013               

 

                          [facility]     [address] [id number]

[address] [place] [address], [place] [address]

                          20 Sep, 2013               

 

                          [facility]     [address] [id number]

[address] [place] [address], [place] [address]

                          08 Aug, 2013               

 

                          [facility]     [address] [id number]

[address] [place] [address], [place] [address]

                          24 May, 2013               

 

                          [facility]     [address] [id number]

[address] [place] [address], [place] [address]

                          13 May, 2013               

 

                          [facility]     [address] [id number]

[address] [place] [address], [place] [address]

                                         

 

                          [facility]     [address] [id number]

[address] [place] [address], [place] [address]

                                         

 

                          [facility]     [address] [id number]

[address] [place] [address], [place] [address]

                                         

 

                          [facility]     [address] [id number]

[address] [place] [address], [place] [address]

                                         

 

                          [facility]     [address] [id number]

[address] [place] [address], [place] [address]

                          02 Oct, 2012               

 

                          [facility]     [address] [id number]

[address] [place] [address], [place] [address]

                          02 Oct, 2012               

 

                          [facility]     [address] [id number]

[address] [place] [address], [place] [address]

                          21 Sep, 2012               

 

                          [facility]     [address] [id number]

[address] [place] [address], [place] [address]

                          06 Aug, 2012               

 

                          [facility]     [address] [id number]

[address] [place] [address], [place] [address]

                          02 Aug, 2012               

 

                          [facility]     [address] [id number]

[address] [place] [address], [place] [address]

                                         

 

                          [facility]     [address] [id number]

[address] [place] [address]

                                         

 

                          [facility]     [address] [id number]

[address] [place] [address]

                          15 Eusebio, 2012               

 

                          [facility]     [address] [id number]

[address] [place] [address]

                                         

 

                          [facility]     [address] [id number]

[address] [place] [address]

                          10 Apr, 2012               

 

                          [facility]     [address] [id number]

[address] [place] [address]

                          22 Mar, 2012               

 

                          [facility]     [address] [id number]

[address] [place] [address]

                          20 Mar, 2012               

 

                          [facility]     [address] [id number]

[address] [place] [address]

                          14 Mar, 2012               

 

                          [facility]     [address] [id number]

[address] [place] [address]

                                         







IMMUNIZATIONS





                Vaccine         Route           Administration Date Status

 

                SOLUMEDROL (UP  MG) IM Intramuscular 2018   Admin

istered







SOCIAL HISTORY

Never Assessed



REASON FOR VISIT

sore throat/fever [id number]



PLAN OF CARE





VITAL SIGNS





                    Height              69 in               2018

 

                    Weight              246.8 lbs           2018

 

                    Temperature         repeat:104.4 degrees Fahrenheit 

 

                    Heart Rate          88 bpm              2018

 

                    Respiratory Rate    22                  2018

 

                    BMI                 36.44 kg/m2         2018

 

                    Blood pressure systolic 120 mmHg            2018

 

                    Blood pressure diastolic 64 mmHg             2018







MEDICATIONS





        Medication Instructions Dosage  Frequency Start Date End Date Duration S

melissaus

 

        Los Contour Next Monitor w/Device         as directed         10 Aug, 

2016                 Active

 

                    Triamcinolone Acetonide 0.1 % Externally Twice a day 1 appli

cation to affected 

area         12h                                     Active

 

        Glimepiride 4 MG Orally 2 times a day 1 tablet 12h                      

       Active

 

        Depo-Provera 150 MG/ML         1 ml                                    A

ctive

 

        Prenatal Advantage - Orally Once a day 1 tablet 24h                     

        Active

 

             Los Contour Next Test Test Strips In Vitro Once a day as directed

  24h          10 Aug, 

2016                                                        Active

 

                    Zithromax Z-Miguelangel 250 MG Orally Once a day   2 tablets  on the

 first day, then 1 

tablet daily for 4 days 24h            5 day(s)     Acti

ve

 

        Xopenex HFA 45 MCG/ACT Inhalation every 4 hrs 1 puff as needed 4h       

                       Active

 

        Fish Oil 1200 MG Orally Twice a day 1 capsule 12h                       

      Active

 

        Allegra Allergy 180 MG Orally Once a day 1 tablet as needed 24h         

                    Active

 

        Crestor 10 mg Orally Once a day 1 tablet 24h                     30     

 Active

 

        MetFORMIN HCl  mg Orally twice a day 2 tablets 12h                

     30      Active

 

        Nexium 40 mg Orally Once a day 1 capsule 24h                     30     

 Active







RESULTS

No Results



PROCEDURES





                Procedure       Date Ordered    Result          Body Site

 

                SOLUMEDROL (UP  MG) 2018                    

 

                THER/PROPH/DIAG INJ, SC/IM 2018                    







INSTRUCTIONS





MEDICATIONS ADMINISTERED

No Known Medications



MEDICAL (GENERAL) HISTORY





                    Type                Description         Date

 

                          Medical History           allergic rhinitis- rec's edwige perriny allergy injections from Dr Rojas office                            

 

                    Medical History     mood disorder        

 

                    Medical History     Leukocytosis- has been to hematology  

 

                    Medical History     Hyperlipidemia       

 

                          Medical History           Left leg pain- multiple imag

ing performed and ortho referral 

placed                                   

 

                          Medical History           TUBULAR ADENOMA AND GASTRITI

S ON COLONOSOCPY AUG 2016 -MULTIPLE 

POLYPS                                   

 

                    Medical History     Helicobacter pylori (H. pylori) infectio

n Hx  

 

                    Surgical History    cholecystectomy     

 

                    Surgical History     section    2008

 

                    Surgical History    tonsillectomy       2015

 

                    Surgical History    colonoscopy         2016

 

                    Surgical History    colonscopy          2017

 

                    Surgical History    Right Knee scope    2017

## 2020-06-23 NOTE — XMS REPORT
[facility]

                             Created on: 2019



Jasonwhit, April

External Reference #: [id number]

: 1975

Sex: Female



Demographics





                          Address                   [address] [place]  [address]

 

                          Preferred Language        Unknown

 

                          Marital Status            Unknown

 

                          Sabianism Affiliation     Unknown

 

                          Race                      Unknown

 

                          Ethnic Group              Unknown





Author





                          Author                    Darby SHARPENYA

 

                          [facility]

 

                          Address                   [address] [place]  [address]



 

                          Phone                     [phone]







Care Team Providers





                    Care Team Member Name Role                Phone

 

                    ANNETTE SHARPEWNYA       Unavailable         [phone]







PROBLEMS





          Type      Condition ICD9-CM Code [id number] Code Onset Dates Condition S

tatus SNOMED 

Code

 

          Problem   Tubular adenoma of colon           D12.6               Activ

e    [id number]

 

          Problem   Duarte's neuroma of right foot           G57.61             

 Active    28222012

 

          Problem   History of leukocytosis           Z86.2               Active

    [id number]

 

          Problem   Non morbid obesity           E66.9               Active    4

[id number]

 

          Problem   Mixed hyperlipidemia           E78.2               Active   

 [id number]

 

          Problem   Seasonal allergic rhinitis due to pollen           J30.1    

           Active    80040009

 

                          Problem                   Type 2 diabetes mellitus wit

hout complication, without long-term current

use of insulin              E11.9                     Active       [id number]

 

          Problem   Anxiety             F41.9               Active    06822011

 

           Problem    Seasonal allergic rhinitis due to other allergic trigger  

          J30.89                

Active                                  [id number]

 

          Problem   Dysthymic disorder           F34.1               Active    7

[id number]

 

          Problem   Arthritis           M19.90              Active    0535778







ALLERGIES

No Information



ENCOUNTERS





                Encounter       Location        Date            Diagnosis

 

                          [place] [address] [facility] [id number]

[address] [place] [address]

                                        Type 2 diabetes mellitus wit

hout complication, without long-term 

current use of insulin E11.9

 

                          [facility]     [address] [facility] [id number]

[address] [place] [address]

                                         

 

                          [facility]     [address] [facility] [id number]

[address] [place] [address]

                                        Type 2 diabetes mellitus wit

hout complication, without long-term 

current use of insulin E11.9

 

                          [facility]     [address] [facility] [id number]

[address] [place] [address]

                                        Weight loss R63.4

 

                          [place] [address] [facility] [id number]

[address] [place] [address]

                          31 May, 2019              Type 2 diabetes mellitus wit

hout complication, without long-term 

current use of insulin E11.9

 

                          [place] [address] [place] [id number]

[address] [place] [address]

                          31 May, 2019              Type 2 diabetes mellitus wit

hout complication, without long-term 

current use of insulin E11.9 and Non morbid obesity E66.9

 

                          [facility] [place] [address] 

[address]                14 May, 2019              Seasonal allergic rhinitis d

ue to pollen J30.1 and Sore 

throat J02.9

 

                          [place] [address] 

[address]                              Arthritis M19.90

 

                          [place] [address] [address]

                                        Seasonal allergic rhinitis d

ue to other allergic trigger J30.89 and

Dysthymic disorder F34.1

 

                          [place] [address] [address]

                          05 Dec, 2018              Bilateral otitis media with 

effusion H65.93 and Anxiety F41.9

 

                          [place] [address] [address]

                                        Type 2 diabetes mellitus wit

hout complication, without long-term 

current use of insulin E11.9

 

                          [place] [address] [place] [id number]

[address] [place] [address]

                                        Pharyngitis due to Streptoco

ccus species J02.0

 

                          [place] [address] [address]

                                         

 

                          [facility] [place] [address] 

[address]                10 Jul, 2018              Fever, unspecified fever cau

se R50.9 and Lymphadenopathy

R59.1

 

                          [facility] [place] [address] [place] 

[address]                              Pharyngitis due to other org

anism J02.8

 

                          [facility]     [address] [place] [id number]

[address] [place] [address]

                                         

 

                          [facility]     [address] [facility] [id number]

[address] [place] [address]

                                        Type 2 diabetes mellitus wit

hout complication, without long-term 

current use of insulin E11.9 and Other eczema L30.8

 

                          [facility]  [address] [facility] [id number]

[address] [place] [address] 

[address]                              Acute pain of left shoulder 

M25.512

 

                          [place] [address] [place] [id number] [place] [address]

                                         

 

                          [place] [address] [address]

                          15 2018              Type 2 diabetes mellitus wit

hout complication, without long-term 

current use of insulin E11.9

 

                          [place] [address] [place] [id number] [place] [address]

                                        Type 2 diabetes mellitus wit

hout complication, without long-term 

current use of insulin E11.9 ; Tubular adenoma of colon D12.6 ; Mixed 
hyperlipidemia E78.2 ; History of leukocytosis Z86.2 and Screening breast 
examination Z12.31

 

                          [place] [address] [address]

                                        Metatarsalgia of right foot 

M77.41 and Type 2 diabetes mellitus 

without complication, without long-term current use of insulin E11.9

 

                          [place] [address] [place] [id number]

[address] [place] [address]

                                        Capsulitis M77.9 and Metatar

salgia of right foot M77.41

 

                          [place] [address] [place] [id number] [place] [address]

                          08 Sep, 2017              Capsulitis M77.9

 

                          [place] [address] [place] [address]

                          06 Sep, 2017               

 

                          [facility]     [address] [facility] [id number]

[address] [place] [address]

                          03 Aug, 2017              Type 2 diabetes mellitus wit

hout complication, without long-term 

current use of insulin E11.9 ; Tubular adenoma of colon D12.6 and Mixed 
hyperlipidemia E78.2

 

                          [facility]     [address] [place] [id number]

[address] [place] [address]

                                        Metatarsalgia of right foot 

M77.41 and Capsulitis M77.9

 

                          [facility]     [address] [place] [id number]

[address] [place] [address]

                                         

 

                          [facility] [place] [facility]  [address] [place] [id number] [place] [address] 

[address]                              Duarte's neuroma of right fo

ot G57.61

 

                          [place] [address] [place] [id number]

[address] [place] [address]

                                        Mixed hyperlipidemia E78.2 a

nd Type 2 diabetes mellitus without 

complication, without long-term current use of insulin E11.9

 

                          [place] [address] [address]

                          10 Brandon, 2017              Type 2 diabetes mellitus wit

hout complication, without long-term 

current use of insulin E11.9 ; Tubular adenoma of colon D12.6 and Mixed 
hyperlipidemia E78.2

 

                          [place] [address] [place] [id number]

[address] [place] [address]

                          22 Dec, 2016              Mixed hyperlipidemia E78.2

 

                          [place] [address] [place] [id number]

[address] [place] [address]

                                         

 

                          [place] [address] [place] [id number]

[address] [place] [address]

                                        Mixed hyperlipidemia E78.2

 

                          [place] [address] [place] [id number]

[address] [place] [address]

                                        Mixed hyperlipidemia E78.2

 

                          [place] [address] [place] [id number]

[address] [place] [address]

                          08 Sep, 2016               

 

                          [facility]     [address] [facility] [id number]

[address] [place] [address]

                          10 Aug, 2016              Type 2 diabetes mellitus wit

hout complication, without long-term 

current use of insulin E11.9 ; Helicobacter pylori (H. pylori) infection A04.8 
and Mixed hyperlipidemia E78.2

 

                          [facility]     [address] [place] [id number] [place] [address]

                          08 Aug, 2016              Type 2 diabetes mellitus wit

hout complication, without long-term 

current use of insulin E11.9

 

                          [place] [address] [place] [id number] [place] [address]

                          03 Aug, 2016              Type 2 diabetes mellitus wit

hout complication, without long-term 

current use of insulin E11.9

 

                          [place] [address] [place] [id number] [place] [address]

                          02 Aug, 2016              Dyspepsia R10.13 ; Upper abd

ominal pain R10.10 ; Bowel habit 

changes R19.4 ; History of leukocytosis Z86.2 and Helicobacter pylori (H. 
pylori) infection A04.8

 

                          [facility] [place] [facility]  [address] 

[address]                27 May, 2016              Environmental allergies Z91.

09

 

                          [facility]     [address] [place] [id number]

[address] [place] [address]

                                        Left leg pain M79.605 ; Loca

lized swelling of lower leg R22.40 and 

Upper respiratory infection J06.9

 

                          [facility]     [address] [facility] [id number]

[address] [place] [address]

                          28 Dec, 2015               

 

                          [place] [address] [address]

                          21 Dec, 2015              Left leg pain M79.605

 

                          [facility]     [address] [place] [id number]

[address] [place] [address]

                          10 Dec, 2015              Left leg pain M79.605 and Lo

calized swelling of lower leg R22.40

 

                          [facility]     [address] [facility] [id number]

[address] [place] [address]

                          01 Dec, 2015              Left leg pain M79.605

 

                          [facility]     [address] [place] [id number]

[address] [place] [address]

                                        Encounter for immunization Z

23

 

                          [facility]     [address] [place] [id number] [place] [address]

                          12 Oct, 2015              Bronchitis J40

 

                          [facility]     [address] [place] [id number]

[address] [place] [address]

                          30 Sep, 2015              Physical exam, pre-employmen

t V70.5 ; Encounter for PPD test V74.1 

and Hyperlipidemia 272.4

 

                          [facility]     [address] [place] [id number]

[address] [place] [address]

                          21 Sep, 2015               

 

                          [facility]     [address] [place] [id number] [place] [address]

                          21 Sep, 2015               

 

                          [facility]     [address] [place] [id number]

[address] [place] [address]

                          09 Sep, 2015               

 

                          [facility]     [address] [place] [id number]

[address] [place] [address]

                          04 Sep, 2015              Elevated blood sugar 790.29

 

                          [facility]     [address] [place] [id number]

[address] [place] [address]

                          03 Sep, 2015              Elevated blood sugar 790.29

 

                          [facility]     [address] [place] [id number]

[address] [place] [address]

                          03 Sep, 2015              Palpitations 785.1

 

                          [facility]     [address] [place] [id number]

[address] [place] [address]

                          01 Sep, 2015              Palpitations 785.1

 

                          [facility]     [address] [place] [id number]

[address] [place] [address]

                                         

 

                          [facility]     [address] [place] [id number]

[address] [place] [address]

                          28 May, 2015              Hand pain, right 729.5 and D

epression with anxiety 300.4

 

                          [facility]     [address] [id number]

[address] [place] [address], [place] [address]

                          14 2015               

 

                          [facility]     [address] [id number]

[address] [place] [address], [place] [address]

                                         

 

                          [facility]     [address] [id number]

[address] [place] [address], [place] [address]

                          05 Mar, 2015               

 

                          [facility]     [address] [id number]

[address] [place] [address], [place] [address]

                          05 Mar, 2015               

 

                          [facility]     [address] [id number]

[address] [place] [address], [place] [address]

                                         

 

                          [facility]     [address] [id number]

[address] [place] [address], [place] [address]

                                         

 

                          [facility]     [address] [id number]

[address] [place] [address], [place] [address]

                          10 Feb, 2015               

 

                          [facility]     [address] [id number]

[address] [place] [address]

                          10 Feb, 2015               

 

                          [facility]     [address] [id number]

[address] [place] [address], [place] [address]

                                         

 

                          [facility]     [address] [id number]

[address] [place] [address]

                                         

 

                          [facility]     [address] [id number]

[address] [place] [address]

                                         

 

                          [facility]     [address] [id number]

[address] [place] [address]

                                         

 

                          [facility]     [address] [id number]

[address] [place] [address], [place] [address]

                                         

 

                          [facility]     [address] [id number]

[address] [place] [address]

                                         

 

                          [facility]     [address] [id number]

[address] [place] [address], [place] [address]

                                         

 

                          [facility]     [address] [id number]

[address] [place] [address], [place] [address]

                          17 2015               

 

                          [facility]     [address] [id number]

[address] [place] [address], [place] [address]

                                         

 

                          [facility]     [address] [id number]

[address] [place] [address], [place] [address]

                                         

 

                          [facility]     [address] [id number]

[address] [place] [address], [place] [address]

                                         

 

                          [facility]     [address] [id number]

[address] [place] [address], [place] [address]

                                         

 

                          [facility]     [address] [id number]

[address] [place] [address], [place] [address]

                          15 [place], 2015               

 

                          [facility]     [address] [id number]

[address] [place] [address], [place] [address]

                          15 [place], 2015               

 

                          [facility]     [address] [id number]

[address] [place] [address], [place] [address]

                                         

 

                          [facility]     [address] [id number]

[address] [place] [address], [place] [address]

                                         

 

                          [facility]     [address] [id number]

[address] [place] [address], [place] [address]

                          15 Oct, 2014               

 

                          [facility]     [address] [id number]

[address] [place] [address], [place] [address]

                          15 Oct, 2014               

 

                          [facility]     [address] [id number]

[address] [place] [address], [place] [address]

                          14 Oct, 2014               

 

                          [facility]     [address] [id number]

[address] [place] [address], [place] [address]

                          14 Oct, 2014               

 

                          [facility]     [address] [id number]

[address] [place] [address], [place] [address]

                          07 Oct, 2014               

 

                          [facility]     [address] [id number]

[address] [place] [address], [place] [address]

                          07 Oct, 2014               

 

                          [facility]     [address] [id number]

[address] [place] [address], [place] [address]

                          12 Sep, 2014               

 

                          [facility]     [address] [id number]

[facility] [place] [address]

                          12 Sep,                

 

                          [facility]     [address] [id number]

[facility] [place] [address]

                          04 Sep,                

 

                          [facility]     [address] [id number]

[facility] [place] [address]

                          03 Sep,                

 

                          [facility]     [address] [id number]

[address] [place] [address], [place] [address]

                          03 Sep,                

 

                          [facility]     [address] [id number]

[address] [place] [address], [place] [address]

                          02 Sep,                

 

                          [facility]     [address] [id number]

[address] [place] [address], [place] [address]

                          02 Sep, 2014               

 

                          [facility]     [address] [id number]

[address] [place] [address], [place] [address]

                          30 Aug, 2014               

 

                          [facility]     [address] [id number]

[address] [place] [address], [place] [address]

                          30 Aug, 2014               

 

                          [facility]     [address] [id number]

[address] [place] [address], [place] [address]

                          19 Aug, 2014               

 

                          [facility]     [address] [id number]

[address] [place] [address], [place] [address]

                          19 Aug, 2014               

 

                          [facility]     [address] [id number]

[address] [place] [address], [place] [address]

                          14 Aug, 2014               

 

                          [facility]     [address] [id number]

[address] [place] [address], [place] [address]

                          14 Aug, 2014               

 

                          [facility]     [address] [id number]

[address] [place] [address], [place] [address]

                          13 Aug, 2014               

 

                          [facility]     [address] [id number]

[address] [place] [address], [place] [address]

                          13 Aug, 2014               

 

                          [facility]     [address] [id number]

[address] [place] [address], [place] [address]

                                         

 

                          [facility]     [address] [id number]

[address] [place] [address], [place] [address]

                                         

 

                          [facility]     [address] [id number]

[facility] [place] [address]

                                         

 

                          [facility]     [address] [id number]

[address] [place] [address], [place] [address]

                                         

 

                          [facility]     [address] [id number]

[facility] [place] [address]

                                         

 

                          [facility]     [address] [id number]

[address] [place] [address], [place] [address]

                                         

 

                          [facility]     [address] [id number]

[address] [place] [address], [place] [address]

                                         

 

                          [facility]     [address] [id number]

[address] [place] [address], [place] [address]

                                         

 

                          [facility]     [address] [id number]

[address] [place] [address], [place] [address]

                                         

 

                          [facility]     [address] [id number]

[address] [place] [address], [place] [address]

                                         

 

                          [facility]     [address] [id number]

[address] [place] [address], [place] [address]

                                         

 

                          [facility]     [address] [id number]

[address] [place] [address], [place] [address]

                                         

 

                          [facility]     [address] [id number]

[address] [place] [address], [place] [address]

                                         

 

                          [facility]     [address] [id number]

[address] [place] [address], [place] [address]

                          ,                

 

                          [facility]     [address] [id number]

[address] [place] [address], [place] [address]

                                         

 

                          [facility]     [address] [id number]

[address] [place] [address], [place] [address]

                                         

 

                          [facility]     [address] [id number]

[address] [place] [address], [place] [address]

                          ,                

 

                          [facility]     [address] [id number]

[address] [place] [address], [place] [address]

                                         

 

                          [facility]     [address] [id number]

[address] [place] [address], [place] [address]

                                         

 

                          [facility]     [address] [id number]

[address] [place] [address], [place] [address]

                                         

 

                          [facility]     [address] [id number]

[address] [place] [address], [place] [address]

                                         

 

                          [facility]     [address] [id number]

[address] [place] [address], [place] [address]

                                         

 

                          [facility]     [address] [id number]

[address] [place] [address], [place] [address]

                                         

 

                          [facility]     [address] [id number]

[address] [place] [address], [place] [address]

                                         

 

                          [facility]     [address] [id number]

[address] [place] [address], [place] [address]

                                         

 

                          [facility]     [address] [id number]

[address] [place] [address], [place] [address]

                                         

 

                          [facility]     [address] [id number]

[address] [place] [address], [place] [address]

                                         

 

                          [facility]     [address] [id number]

[address] [place] [address], [place] [address]

                          27 Mar, 2014               

 

                          [facility]     [address] [id number]

[address] [place] [address], [place] [address]

                          27 Mar, 2014               

 

                          [facility]     [address] [id number]

[address] [place] [address], [place] [address]

                                         

 

                          [facility]     [address] [id number]

[address] [place] [address], [place] [address]

                                         

 

                          [facility]     [address] [id number]

[address] [place] [address], [place] [address]

                                         

 

                          [facility]     [address] [id number]

[address] [place] [address], [place] [address]

                                         

 

                          [facility]     [address] [id number]

[address] [place] [address], [place] [address]

                          08 Oct, 2013               

 

                          [facility]     [address] [id number]

[address] [place] [address], [place] [address]

                          04 Oct, 2013               

 

                          [facility]     [address] [id number]

[address] [place] [address], [place] [address]

                          03 Oct, 2013               

 

                          [facility]     [address] [id number]

[address] [place] [address], [place] [address]

                          02 Oct, 2013               

 

                          [facility]     [address] [id number]

[address] [place] [address], [place] [address]

                          01 Oct, 2013               

 

                          [facility]     [address] [id number]

[address] [place] [address], [place] [address]

                          20 Sep, 2013               

 

                          [facility]     [address] [id number]

[address] [place] [address], [place] [address]

                          08 Aug, 2013               

 

                          [facility]     [address] [id number]

[address] [place] [address], [place] [address]

                          24 May, 2013               

 

                          [facility]     [address] [id number]

[address] [place] [address], [place] [address]

                          13 May, 2013               

 

                          [facility]     [address] [id number]

[address] [place] [address], [place] [address]

                                         

 

                          [facility]     [address] [id number]

[address] [place] [address], [place] [address]

                                         

 

                          [facility]     [address] [id number]

[address] [place] [address], [place] [address]

                                         

 

                          [facility]     [address] [id number]

[address] [place] [address], [place] [address]

                                         

 

                          [facility]     [address] [id number]

[address] [place] [address], [place] [address]

                          02 Oct, 2012               

 

                          [facility]     [address] [id number]

[address] [place] [address], [place] [address]

                          02 Oct, 2012               

 

                          [facility]     [address] [id number]

[address] [place] [address]

                          21 Sep, 2012               

 

                          [facility]     [address] [id number]

[address] [place] [address]

                          06 Aug, 2012               

 

                          [facility]     [address] [id number]

[address] [place] [address]

                          02 Aug, 2012               

 

                          [facility]     [address] [id number]

[address] [place] [address]

                                         

 

                          [facility]     [address] [id number]

[address] [place] [address]

                                         

 

                          [facility]     [address] [id number]

[address] [place] [address]

                          15 Eusebio, 2012               

 

                          [facility]     [address] [id number]

[address] [place] [address]

                                         

 

                          [facility]     [address] [id number]

[address] [place] [address]

                          10 Apr, 2012               

 

                          [facility]     [address] [id number]

[address] [place] [address]

                          22 Mar, 2012               

 

                          [facility]     [address] [id number]

[address] [place] [address]

                          20 Mar, 2012               

 

                          [facility]     [address] [id number]

[address] [place] [address]

                          14 Mar, 2012               

 

                          [facility]     [address] [facility] [id number]

[address] [place] [address]

                                         







IMMUNIZATIONS

No Known Immunizations



SOCIAL HISTORY

Never Assessed



REASON FOR VISIT





PLAN OF CARE





VITAL SIGNS





MEDICATIONS

Unknown Medications



RESULTS

No Results



PROCEDURES

No Known procedures



INSTRUCTIONS





MEDICATIONS ADMINISTERED

No Known Medications



MEDICAL (GENERAL) HISTORY





                    Type                Description         Date

 

                          Medical History           allergic rhinitis- rec's edwige donahue allergy injections from Dr Rojas office                            

 

                    Medical History     mood disorder        

 

                    Medical History     Leukocytosis- has been to hematology  

 

                    Medical History     Hyperlipidemia       

 

                          Medical History           Left leg pain- multiple imag

ing performed and ortho referral 

placed                                   

 

                          Medical History           TUBULAR ADENOMA AND GASTRITI

S ON COLONOSOCPY AUG 2016 -MULTIPLE 

POLYPS                                   

 

                    Medical History     Helicobacter pylori (H. pylori) infectio

n Hx  

 

                    Surgical History    cholecystectomy     2006

 

                    Surgical History     section    , 

 

                    Surgical History    tonsillectomy       2015

 

                    Surgical History    colonoscopy         2016

 

                    Surgical History    colonscopy          2017

 

                    Surgical History    Right Knee scope    2017

## 2020-06-23 NOTE — XMS REPORT
[facility]

                             Created on: 2019



Jasonwhit, April

External Reference #: [id number]

: 1975

Sex: Female



Demographics





                          Address                   [address] [place]  [address]

 

                          Preferred Language        Unknown

 

                          Marital Status            Unknown

 

                          Jainism Affiliation     Unknown

 

                          Race                      Unknown

 

                          Ethnic Group              Unknown





Author





                          Author                    Darby SHARPE

 

                          [facility]

 

                          Address                   [address] [place]  [address]



 

                          Phone                     [phone]







Care Team Providers





                    Care Team Member Name Role                Phone

 

                    ANNETTE SHARPEWNYA       Unavailable         [phone]







PROBLEMS





          Type      Condition ICD9-CM Code QYK15-RT Code Onset Dates Condition S

tatus SNOMED 

Code

 

          Problem   Tubular adenoma of colon           D12.6               Activ

e    [id number]

 

          Problem   Duarte's neuroma of right foot           G57.61             

 Active    54844861

 

          Problem   History of leukocytosis           Z86.2               Active

    [id number]

 

          Problem   Non morbid obesity           E66.9               Active    4

[id number]

 

          Problem   Mixed hyperlipidemia           E78.2               Active   

 [id number]

 

          Problem   Seasonal allergic rhinitis due to pollen           J30.1    

           Active    48993203

 

                          Problem                   Type 2 diabetes mellitus wit

hout complication, without long-term current

use of insulin              E11.9                     Active       [id number]

 

          Problem   Anxiety             F41.9               Active    40616291

 

           Problem    Seasonal allergic rhinitis due to other allergic trigger  

          J30.89                

Active                                  [id number]

 

          Problem   Dysthymic disorder           F34.1               Active    7

6810032

 

          Problem   Arthritis           M19.90              Active    5376144







ALLERGIES

No Information



ENCOUNTERS





                Encounter       Location        Date            Diagnosis

 

                          [facility]     [address] [facility] [id number]

[address] [place] [address]

                                        Weight loss R63.4

 

                          [facility]     [address] [facility] [id number]

[address] [place] [address]

                          31 May, 2019              Type 2 diabetes mellitus wit

hout complication, without long-term 

current use of insulin E11.9

 

                          [facility]     [address] [place] [id number]

[address] [place] [address]

                          31 May, 2019              Type 2 diabetes mellitus wit

hout complication, without long-term 

current use of insulin E11.9 and Non morbid obesity E66.9

 

                          [facility]  [address] [facility] [id number]

[address] [place] [address] 

[address]                14 May, 2019              Seasonal allergic rhinitis d

ue to pollen J30.1 and Sore 

throat J02.9

 

                          [facility] IN [place] [address] 

[address]                              Arthritis M19.90

 

                          [address] [address]

                                        Seasonal allergic rhinitis d

ue to other allergic trigger J30.89 and

Dysthymic disorder F34.1

 

                          [place] [address] [address]

                          05 Dec, 2018              Bilateral otitis media with 

effusion H65.93 and Anxiety F41.9

 

                          [address] [address]

                                        Type 2 diabetes mellitus wit

hout complication, without long-term 

current use of insulin E11.9

 

                          [address] [address]

                                        Pharyngitis due to Streptoco

ccus species J02.0

 

                          [address] [address]

                                         

 

                          [facility] W[facility] IN [address] 

[address]                10 Jul, 2018              Fever, unspecified fever cau

se R50.9 and Lymphadenopathy

R59.1

 

                          [facility] IN [place] [address] 

[address]                              Pharyngitis due to other org

anism J02.8

 

                          [place] [address] [address]

                                         

 

                          [address] [address]

                                        Type 2 diabetes mellitus wit

hout complication, without long-term 

current use of insulin E11.9 and Other eczema L30.8

 

                          [facility] [place] 

[address]                28 2018              Acute pain of left shoulder 

M25.512

 

                          [place] [address] [place] [id number]

[address] [place] [address]

                          23 2018               

 

                          [place] [address] [facility] [id number]

[address] [place] [address]

                          15 2018              Type 2 diabetes mellitus wit

hout complication, without long-term 

current use of insulin E11.9

 

                          [place] [address] [place] [id number]

[address] [place] [address]

                          06 2018              Type 2 diabetes mellitus wit

hout complication, without long-term 

current use of insulin E11.9 ; Tubular adenoma of colon D12.6 ; Mixed 
hyperlipidemia E78.2 ; History of leukocytosis Z86.2 and Screening breast 
examination Z12.31

 

                          [place] [address] [place] [id number]

[address] [place] [address]

                                        Metatarsalgia of right foot 

M77.41 and Type 2 diabetes mellitus 

without complication, without long-term current use of insulin E11.9

 

                          [place] [address] [place] [id number]

[address] [place] [address]

                                        Capsulitis M77.9 and Metatar

salgia of right foot M77.41

 

                          [place] [address] [place] [id number]

[address] [place] [address]

                          08 Sep, 2017              Capsulitis M77.9

 

                          [place] [address] [place] [id number]

[address] [place] [address]

                          06 Sep, 2017               

 

                          [place] [address] [facility] [id number]

[address] [place] [address]

                          03 Aug, 2017              Type 2 diabetes mellitus wit

hout complication, without long-term 

current use of insulin E11.9 ; Tubular adenoma of colon D12.6 and Mixed 
hyperlipidemia E78.2

 

                          [place] [address] [place] [id number]

[address] [place] [address]

                                        Metatarsalgia of right foot 

M77.41 and Capsulitis M77.9

 

                          [facility]     [address] [facility] [id number]

[address] [place] [address]

                                         

 

                          [facility]  [address] [facility] [id number]

[address] [place] [address] 

[address]                              Duarte's neuroma of right fo

ot G57.61

 

                          [facility]     [address] [facility] [id number]

[address] [place] [address]

                                        Mixed hyperlipidemia E78.2 a

nd Type 2 diabetes mellitus without 

complication, without long-term current use of insulin E11.9

 

                          [facility]     [address] [facility] [id number]

[address] [place] [address]

                          10 Brandon, 2017              Type 2 diabetes mellitus wit

hout complication, without long-term 

current use of insulin E11.9 ; Tubular adenoma of colon D12.6 and Mixed 
hyperlipidemia E78.2

 

                          [facility]     [address] [facility] [id number]

[address] [place] [address]

                          22 Dec, 2016              Mixed hyperlipidemia E78.2

 

                          [facility]     [address] [facility] [id number]

[address] [place] [address]

                                         

 

                          [place] [address] [facility] [id number]

[address] [place] [address]

                                        Mixed hyperlipidemia E78.2

 

                          [facility]     [address] [facility] [id number]

[address] [place] [address]

                                        Mixed hyperlipidemia E78.2

 

                          [place] [address] [facility] [id number]

[address] [place] [address]

                          08 Sep, 2016               

 

                          [facility]     [address] [facility] [id number]

[address] [place] [address]

                          10 Aug, 2016              Type 2 diabetes mellitus wit

hout complication, without long-term 

current use of insulin E11.9 ; Helicobacter pylori (H. pylori) infection A04.8 
and Mixed hyperlipidemia E78.2

 

                          [facility]     [address] [facility] [id number]

[address] [place] [address]

                          08 Aug, 2016              Type 2 diabetes mellitus wit

hout complication, without long-term 

current use of insulin E11.9

 

                          [place] [address] [facility] [id number]

[address] [place] [address]

                          03 Aug, 2016              Type 2 diabetes mellitus wit

hout complication, without long-term 

current use of insulin E11.9

 

                          [facility]     [address] [place] [id number]

[address] [place] [address]

                          02 Aug, 2016              Dyspepsia R10.13 ; Upper abd

ominal pain R10.10 ; Bowel habit 

changes R19.4 ; History of leukocytosis Z86.2 and Helicobacter pylori (H. 
pylori) infection A04.8

 

                          [facility]  [address] 

[address]                27 May, 2016              Environmental allergies Z91.

09

 

                          [place] [address] [address]

                                        Left leg pain M79.605 ; Loca

lized swelling of lower leg R22.40 and 

Upper respiratory infection J06.9

 

                          [place] [address] [address]

                          28 Dec, 2015               

 

                          [place] [address] [address]

                          21 Dec, 2015              Left leg pain M79.605

 

                          [place] [address] [address]

                          10 Dec, 2015              Left leg pain M79.605 and Lo

calized swelling of lower leg R22.40

 

                          [place] [address] [place] [id number]

[address] [place] [address]

                          01 Dec, 2015              Left leg pain M79.605

 

                          [place] [address] [address]

                                        Encounter for immunization Z

23

 

                          [place] [address] [place] [id number]

[address] [place] [address]

                          12 Oct, 2015              Bronchitis J40

 

                          [place] [address] [address]

                          30 Sep, 2015              Physical exam, pre-employmen

t V70.5 ; Encounter for PPD test V74.1 

and Hyperlipidemia 272.4

 

                          [facility]     [address] [place] [id number]

[address] [place] [address]

                          21 Sep, 2015               

 

                          [facility]     [address] [place] [id number]

[address] [place] [address]

                          21 Sep, 2015               

 

                          [facility]     [address] [address]

                          09 Sep, 2015               

 

                          [facility]     [address] [place] [id number]

[address] [place] [address]

                          04 Sep, 2015              Elevated blood sugar 790.29

 

                          [facility]     [address] [address]

                          03 Sep, 2015              Elevated blood sugar 790.29

 

                          [facility]     [address] [address]

                          03 Sep, 2015              Palpitations 785.1

 

                          [facility]     [address] [place] [id number]

[address] [place] [address]

                          01 Sep, 2015              Palpitations 785.1

 

                          [facility]     [address] [place] [id number]

[address] [place] [address]

                                         

 

                          [facility]     [address] [place] [id number]

[address] [place] [address]

                          28 May, 2015              Hand pain, right 729.5 and D

epression with anxiety 300.4

 

                          [facility]     [address] [place] [id number]

[address] [place] [address]

                                         

 

                          [facility]     [address] [facility] [id number]

[address] [place] [address]

                                         

 

                          [facility]     [address] [place] [id number]

[address] [place] [address]

                          05 Mar, 2015               

 

                          [facility]     [address] [place] [id number]

[address] [place] [address]

                          05 Mar, 2015               

 

                          [facility]     [address] [id number]

[address] [place] [address], [place] [address]

                                         

 

                          [facility]     [address] [id number]

[address] [place] [address], [place] [address]

                                         

 

                          [facility]     [address] [id number]

[address] [place] [address], [place] [address]

                          10 Feb,                

 

                          [facility]     [address] [id number]

[address] [place] [address], [place] [address]

                          10 Feb,                

 

                          [facility]     [address] [id number]

[address] [place] [address], [place] [address]

                                         

 

                          [facility]     [address] [id number]

[address] [place] [address], [place] [address]

                                         

 

                          [facility]     [address] [id number]

[address] [place] [address], [place] [address]

                                         

 

                          [facility]     [address] [id number]

[address] [place] [address], [place] [address]

                                         

 

                          [facility]     [address] [id number]

[address] [place] [address], [place] [address]

                                         

 

                          [facility]     [address] [id number]

[address] [place] [address], [place] [address]

                                         

 

                          [facility]     [address] [id number]

[address] [place] [address], [place] [address]

                                         

 

                          [facility]     [address] [id number]

[address] [place] [address], [place] [address]

                                         

 

                          [facility]     [address] [id number]

[address] [place] [address], [place] [address]

                                         

 

                          [facility]     [address] [id number]

[address] [place] [address], [place] [address]

                                         

 

                          [facility]     [address] [id number]

[address] [place] [address], [place] [address]

                                         

 

                          [facility]     [address] [id number]

[address] [place] [address], [place] [address]

                          16 2015               

 

                          [facility]     [address] [id number]

[address] [place] [address], [place] [address]

                          15 2015               

 

                          [facility]     [address] [id number]

[address] [place] [address], [place] [address]

                          15 2015               

 

                          [facility]     [address] [id number]

[address] [place] [address], [place] [address]

                                         

 

                          [facility]     [address] [id number]

[address] [place] [address], [place] [address]

                                         

 

                          [facility]     [address] [id number]

[address] [place] [address], [place] [address]

                          15 Oct, 2014               

 

                          [facility]     [address] [id number]

[address] [place] [address], [place] [address]

                          15 Oct, 2014               

 

                          [facility]     [address] [id number]

[address] [place] [address], [place] [address]

                          14 Oct, 2014               

 

                          [facility]     [address] [id number]

[address] [place] [address], [place] [address]

                          14 Oct, 2014               

 

                          [facility]     [address] [id number]

[address] [place] [address], [place] [address]

                          07 Oct, 2014               

 

                          [facility]     [address] [id number]

[address] [place] [address], [place] [address]

                          07 Oct, 2014               

 

                          [facility]     [address] [id number]

[address] [place] [address], [place] [address]

                          12 Sep,                

 

                          [facility]     [address] [id number]

[address] [place] [address]

                          12 Sep,                

 

                          [facility]     [address] [id number]

[address] [place] [address], [place] [address]

                          04 Sep,                

 

                          [facility]     [address] [id number]

[address] [place] [address], [place] [address]

                          03 Sep,                

 

                          [facility]     [address] [id number]

[address] [place] [address], [place] [address]

                          03 Sep,                

 

                          [facility]     [address] [id number]

[facility] [place] [address]

                          02 Sep, 2014               

 

                          [facility]     [address] [id number]

[address] [place] [address], [place] [address]

                          02 Sep, 2014               

 

                          [facility]     [address] [id number]

[address] [place] [address], [place] [address]

                          30 Aug, 2014               

 

                          [facility]     [address] [id number]

[address] [place] [address], [place] [address]

                          30 Aug, 2014               

 

                          [facility]     [address] [id number]

[address] [place] [address], [place] [address]

                          19 Aug, 2014               

 

                          [facility]     [address] [id number]

[address] [place] [address], [place] [address]

                          19 Aug, 2014               

 

                          [facility]     [address] [id number]

[address] [place] [address], [place] [address]

                          14 Aug, 2014               

 

                          [facility]     [address] [id number]

[address] [place] [address], [place] [address]

                          14 Aug, 2014               

 

                          [facility]     [address] [id number]

[address] [place] [address], [place] [address]

                          13 Aug, 2014               

 

                          [facility]     [address] [id number]

[address] [place] [address], [place] [address]

                          13 Aug, 2014               

 

                          [facility]     [address] [id number]

[address] [place] [address], [place] [address]

                                         

 

                          [facility] [facility]     [address] [id number]

[address] [place] [address], [place] [address]

                                         

 

                          [facility]     [address] [id number]

[address] [place] [address], [place] [address]

                                         

 

                          [facility]     [address] [id number]

[address] [place] [address], [place] [address]

                                         

 

                          [facility]     [address] [id number]

[address] [place] [address], [place] [address]

                                         

 

                          [facility]     [address] [id number]

[address] [place] [address], [place] [address]

                                         

 

                          [facility]     [address] [id number]

[address] [place] [address], [place] [address]

                          ,                

 

                          [facility]     [address] [id number]

[address] [place] [address], [place] [address]

                          ,                

 

                          [facility]     [address] [id number]

[address] [place] [address], [place] [address]

                          ,                

 

                          [facility]     [address] [id number]

[address] [place] [address], [place] [address]

                          ,                

 

                          [facility]     [address] [id number]

[address] [place] [address], [place] [address]

                                         

 

                          [facility]     [address] [id number]

[address] [place] [address], [place] [address]

                          ,                

 

                          [facility]     [address] [id number]

[address] [place] [address], [place] [address]

                          ,                

 

                          [facility]     [address] [id number]

[address] [place] [address], [place] [address]

                          ,                

 

                          [facility]     [address] [id number]

[address] [place] [address], [place] [address]

                                         

 

                          [facility]     [address] [id number]

[address] [place] [address], [place] [address]

                                         

 

                          [facility]     [address] [id number]

[address] [place] [address], [place] [address]

                          ,                

 

                          [facility]     [address] [id number]

[address] [place] [address], [place] [address]

                          ,                

 

                          [facility]     [address] [id number]

[address] [place] [address], [place] [address]

                                         

 

                          [facility]     [address] [id number]

[address] [place] [address], [place] [address]

                                         

 

                          [facility]     [address] [id number]

[address] [place] [address], [place] [address]

                                         

 

                          [facility]     [address] [id number]

[address] [place] [address]

                                         

 

                          [facility]     [address] [id number]

[address] [place] [address], [place] [address]

                                         

 

                          [facility]     [address] [id number]

[address] [place] [address], [place] [address]

                                         

 

                          [facility]     [address] [id number]

[address] [place] [address], [place] [address]

                                         

 

                          [facility]     [address] [id number]

[address] [place] [address], [place] [address]

                                         

 

                          [facility]     [address] [id number]

[address] [place] [address], [place] [address]

                                         

 

                          [facility]     [address] [id number]

[address] [place] [address], [place] [address]

                          27 Mar, 2014               

 

                          [facility]     [address] [id number]

[address] [place] [address], [place] [address]

                          27 Mar, 2014               

 

                          [facility]     [address] [id number]

[address] [place] [address], [place] [address]

                                         

 

                          [facility]     [address] [id number]

[address] [place] [address], [place] [address]

                                         

 

                          [facility]     [address] [id number]

[address] [place] [address], [place] [address]

                                         

 

                          [facility]     [address] [id number]

[address] [place] [address], [place] [address]

                                         

 

                          [facility]     [address] [id number]

[address] [place] [address]

                          08 Oct, 2013               

 

                          [facility]     [address] [id number]

[address] [place] [address], [place] [address]

                          04 Oct, 2013               

 

                          [facility]     [address] [id number]

[address] [place] [address], [place] [address]

                          03 Oct, 2013               

 

                          [facility]     [address] [id number]

[address] [place] [address], [place] [address]

                          02 Oct, 2013               

 

                          [facility]     [address] [id number]

[address] [place] [address], [place] [address]

                          01 Oct, 2013               

 

                          [facility]     [address] [id number]

[address] [place] [address], [place] [address]

                          20 Sep, 2013               

 

                          [facility]     [address] [id number]

[address] [place] [address], [place] [address]

                          08 Aug, 2013               

 

                          [facility]     [address] [id number]

[address] [place] [address], [place] [address]

                          24 May, 2013               

 

                          [facility]     [address] [id number]

[address] [place] [address], [place] [address]

                          13 May, 2013               

 

                          [facility]     [address] [id number]

[address] [place] [address], [place] [address]

                                         

 

                          [facility]     [address] [id number]

[address] [place] [address], [place] [address]

                                         

 

                          [facility]     [address] [id number]

[address] [place] [address], [place] [address]

                                         

 

                          [facility]     [address] [id number]

[address] [place] [address], [place] [address]

                                         

 

                          [facility]     [address] [id number]

[address] [place] [address], [place] [address]

                          02 Oct, 2012               

 

                          [facility]     [address] [id number]

[address] [place] [address], [place] [address]

                          02 Oct, 2012               

 

                          [facility]     [address] [id number]

[address] [place] [address], [place] [address]

                          21 Sep, 2012               

 

                          [facility]     [address] [id number]

[address] [place] [address], [place] [address]

                          06 Aug, 2012               

 

                          [facility]     [address] [id number]

[address] [place] [address], [place] [address]

                          02 Aug, 2012               

 

                          [facility]     [address] [id number]

[address] [place] [address], [place] [address]

                                         

 

                          [facility]     [address] [id number]

[address] [place] [address], [place] [address]

                           2012               

 

                          [facility]     [address] [facility] [id number]

[address] [place] [address]

                          15 2012               

 

                          [facility]     [address] [facility] [id number]

[address] [place] [address]

                          20 2012               

 

                          [facility]     [address] [facility] [id number]

[address] [place] [address]

                          10 2012               

 

                          [facility]     [address] [facility] [id number]

[address] [place] [address]

                          22 Mar, 2012               

 

                          [facility]     [address] [facility] [id number]

[address] [place] [address]

                          20 Mar, 2012               

 

                          [facility]     [address] [facility] [id number]

[address] [place] [address]

                          14 Mar, 2012               

 

                          [facility]     [address] [facility] [id number]

[address] [place] [address]

                                         







IMMUNIZATIONS

No Known Immunizations



SOCIAL HISTORY

Never Assessed



REASON FOR VISIT





PLAN OF CARE





VITAL SIGNS





MEDICATIONS

Unknown Medications



RESULTS

No Results



PROCEDURES

No Known procedures



INSTRUCTIONS





MEDICATIONS ADMINISTERED

No Known Medications



MEDICAL (GENERAL) HISTORY





                    Type                Description         Date

 

                          Medical History           allergic rhinitis- rec's edwige donahue allergy injections from Dr Rojas office                            

 

                    Medical History     mood disorder        

 

                    Medical History     Leukocytosis- has been to hematology  

 

                    Medical History     Hyperlipidemia       

 

                          Medical History           Left leg pain- multiple imag

ing performed and ortho referral 

placed                                   

 

                          Medical History           TUBULAR ADENOMA AND GASTRITI

S ON COLONOSOCPY AUG 2016 -MULTIPLE 

POLYPS                                   

 

                    Medical History     Helicobacter pylori (H. pylori) infectio

n Hx  

 

                    Surgical History    cholecystectomy     2006

 

                    Surgical History     section    , 

 

                    Surgical History    tonsillectomy       2015

 

                    Surgical History    colonoscopy         2016

 

                    Surgical History    colonscopy          2017

 

                    Surgical History    Right Knee scope    2017

## 2020-06-23 NOTE — XMS REPORT
[facility]

                             Created on: 2019



aKte April

External Reference #: [id number]

: 1975

Sex: Female



Demographics





                          Address                   [address] [place]  [address]

 

                          Preferred Language        Unknown

 

                          Marital Status            Unknown

 

                          Jewish Affiliation     Unknown

 

                          Race                      Unknown

 

                          Ethnic Group              Unknown





Author





                          Author                    Migration, April Doctor

 

                          Organization              [facility]

 

                          Address                   Unknown

 

                          Phone                     Unavailable







Care Team Providers





                    Care Team Member Name Role                Phone

 

                    Migration,  Doctor  Unavailable         Unavailable







PROBLEMS





          Type      Condition ICD9-CM Code [id number] Code Onset Dates Condition S

tatus SNOMED 

Code

 

          Problem   Tubular adenoma of colon           D12.6               Activ

e    [id number]

 

          Problem   Duarte's neuroma of right foot           G57.61             

 Active    82109178

 

          Problem   History of leukocytosis           Z86.2               Active

    [id number]

 

          Problem   Non morbid obesity           E66.9               Active    4

[id number]

 

          Problem   Mixed hyperlipidemia           E78.2               Active   

 [id number]

 

          Problem   Seasonal allergic rhinitis due to pollen           J30.1    

           Active    82343831

 

                          Problem                   Type 2 diabetes mellitus wit

hout complication, without long-term current

use of insulin              E11.9                     Active       [id number]

 

          Problem   Anxiety             F41.9               Active    82284000

 

           Problem    Seasonal allergic rhinitis due to other allergic trigger  

          J30.89                

Active                                  [id number]

 

          Problem   Dysthymic disorder           F34.1               Active    7

[id number]

 

          Problem   Arthritis           M19.90              Active    8837318







ALLERGIES

No Information



ENCOUNTERS





                Encounter       Location        Date            Diagnosis

 

                          [place] [address] [facility] [id number]

[address] [place] [address]

                                        Type 2 diabetes mellitus wit

hout complication, without long-term 

current use of insulin E11.9

 

                          [place] [address] [facility] [id number]

[address] [place] [address]

                                         

 

                          [place] [address] [facility] [id number]

[address] [place] [address]

                                        Type 2 diabetes mellitus wit

hout complication, without long-term 

current use of insulin E11.9

 

                          [place] [address] [facility] [id number]

[address] [place] [address]

                                        Weight loss R63.4

 

                          [facility]     [address] [facility] [id number]

[address] [place] [address]

                          31 May, 2019              Type 2 diabetes mellitus wit

hout complication, without long-term 

current use of insulin E11.9

 

                          [place] [address] [address]

                          31 May, 2019              Type 2 diabetes mellitus wit

hout complication, without long-term 

current use of insulin E11.9 and Non morbid obesity E66.9

 

                          [facility] IN [place] [address] 

[address]                14 May, 2019              Seasonal allergic rhinitis d

ue to pollen J30.1 and Sore 

throat J02.9

 

                          [facility] IN [place] [address] 

[address]                              Arthritis M19.90

 

                          [place] [address] [address]

                                        Seasonal allergic rhinitis d

ue to other allergic trigger J30.89 and

Dysthymic disorder F34.1

 

                          [place] [address] [address]

                          05 Dec, 2018              Bilateral otitis media with 

effusion H65.93 and Anxiety F41.9

 

                          [place] [address] [address]

                                        Type 2 diabetes mellitus wit

hout complication, without long-term 

current use of insulin E11.9

 

                          [place] [address] [address]

                                        Pharyngitis due to Streptoco

ccus species J02.0

 

                          [place] [address] [address]

                                         

 

                          [facility] [place] [address] 

[address]                10 Jul, 2018              Fever, unspecified fever cau

se R50.9 and Lymphadenopathy

R59.1

 

                          [facility] [place] [address] 

[address]                              Pharyngitis due to other org

anism J02.8

 

                          [facility]     [address] [place] [id number]

[address] [place] [address]

                                         

 

                          [place] [address] [address]

                                        Type 2 diabetes mellitus wit

hout complication, without long-term 

current use of insulin E11.9 and Other eczema L30.8

 

                          [facility] W[facility] IN [facility]  [address] [place] [id number]

[address] [place] [address] 

[address]                              Acute pain of left shoulder 

M25.512

 

                          [place] [address] [address]

                                         

 

                          [place] [address] [address]

                          15 2018              Type 2 diabetes mellitus wit

hout complication, without long-term 

current use of insulin E11.9

 

                          [place] [address] [address]

                                        Type 2 diabetes mellitus wit

hout complication, without long-term 

current use of insulin E11.9 ; Tubular adenoma of colon D12.6 ; Mixed 
hyperlipidemia E78.2 ; History of leukocytosis Z86.2 and Screening breast 
examination Z12.31

 

                          [place] [address] [place] [id number]

[address] [place] [address]

                                        Metatarsalgia of right foot 

M77.41 and Type 2 diabetes mellitus 

without complication, without long-term current use of insulin E11.9

 

                          [place] [id number]

[address] [place] [address]

                                        Capsulitis M77.9 and Metatar

salgia of right foot M77.41

 

                          [place] [address] [place] [id number]

[address] [place] [address]

                          08 Sep, 2017              Capsulitis M77.9

 

                          [place] [address] [place] [id number]

[address] [place] [address]

                          06 Sep, 2017               

 

                          [place] [address] [place] [address] [place] [address]

                          03 Aug, 2017              Type 2 diabetes mellitus wit

hout complication, without long-term 

current use of insulin E11.9 ; Tubular adenoma of colon D12.6 and Mixed 
hyperlipidemia E78.2

 

                          [facility]     [address] [place] [id number]

[address] [place] [address]

                                        Metatarsalgia of right foot 

M77.41 and Capsulitis M77.9

 

                          [facility]     [address] [place] [id number]

[address] [place] [address]

                                         

 

                          [facility]  [address] [facility] [id number]

[address] [place] [address] 

[address]                              Duarte's neuroma of right fo

ot G57.61

 

                          [facility]     [address] [place] [id number]

[address] [place] [address]

                                        Mixed hyperlipidemia E78.2 a

nd Type 2 diabetes mellitus without 

complication, without long-term current use of insulin E11.9

 

                          [facility]     [address] [place] [id number]

[address] [place] [address]

                          10 Brandon, 2017              Type 2 diabetes mellitus wit

hout complication, without long-term 

current use of insulin E11.9 ; Tubular adenoma of colon D12.6 and Mixed 
hyperlipidemia E78.2

 

                          [facility]     [address] [place] [id number]

[address] [place] [address]

                          22 Dec, 2016              Mixed hyperlipidemia E78.2

 

                          [facility]     [address] [place] [id number]

[address] [place] [address]

                                         

 

                          [facility]     [address] [place] [id number]

[address] [place] [address]

                                        Mixed hyperlipidemia E78.2

 

                          [place] [address] [place] [id number]

[address] [place] [address]

                                        Mixed hyperlipidemia E78.2

 

                          [facility]     [address] [place] [id number]

[address] [place] [address]

                          08 Sep, 2016               

 

                          [facility]     [address] [address]

                          10 Aug, 2016              Type 2 diabetes mellitus wit

hout complication, without long-term 

current use of insulin E11.9 ; Helicobacter pylori (H. pylori) infection A04.8 
and Mixed hyperlipidemia E78.2

 

                          [place] [address] [address]

                          08 Aug, 2016              Type 2 diabetes mellitus wit

hout complication, without long-term 

current use of insulin E11.9

 

                          [place] [address] [address]

                          03 Aug, 2016              Type 2 diabetes mellitus wit

hout complication, without long-term 

current use of insulin E11.9

 

                          [place] [address] [address]

                          02 Aug, 2016              Dyspepsia R10.13 ; Upper abd

ominal pain R10.10 ; Bowel habit 

changes R19.4 ; History of leukocytosis Z86.2 and Helicobacter pylori (H. 
pylori) infection A04.8

 

                          [facility] [place] [facility]  [address] 

[address]                27 May, 2016              Environmental allergies Z91.

09

 

                          [place] [address] [address]

                                        Left leg pain M79.605 ; Loca

lized swelling of lower leg R22.40 and 

Upper respiratory infection J06.9

 

                          [place] [address] [address]

                          28 Dec, 2015               

 

                          [place] [address] [address]

                          21 Dec, 2015              Left leg pain M79.605

 

                          [place] [address] [address]

                          10 Dec, 2015              Left leg pain M79.605 and Lo

calized swelling of lower leg R22.40

 

                          [place] [address] [address]

                          01 Dec, 2015              Left leg pain M79.605

 

                          [facility]     [address] [facility] [id number]

[address] [place] [address]

                                        Encounter for immunization Z

23

 

                          [facility]     [address] [facility] [id number]

[address] [place] [address]

                          12 Oct, 2015              Bronchitis J40

 

                          [facility]     [address] [place] [id number]

[address] [place] [address]

                          30 Sep, 2015              Physical exam, pre-employmen

t V70.5 ; Encounter for PPD test V74.1 

and Hyperlipidemia 272.4

 

                          [facility]     [address] [facility] [id number]

[address] [place] [address]

                          21 Sep, 2015               

 

                          [facility]     [address] [place] [id number]

[address] [place] [address]

                          21 Sep, 2015               

 

                          [facility]     [address] [place] [id number]

[address] [place] [address]

                          09 Sep, 2015               

 

                          [facility]     [address] [place] [id number]

[address] [place] [address]

                          04 Sep, 2015              Elevated blood sugar 790.29

 

                          [facility]     [address] [place] [id number]

[address] [place] [address]

                          03 Sep, 2015              Elevated blood sugar 790.29

 

                          [facility]     [address] [place] [id number]

[address] [place] [address]

                          03 Sep, 2015              Palpitations 785.1

 

                          [facility]     [address] [place] [id number]

[address] [place] [address]

                          01 Sep, 2015              Palpitations 785.1

 

                          [facility]     [address] [facility] [id number]

[address] [place] [address]

                                         

 

                          [facility]     [address] [place] [id number]

[address] [place] [address]

                          28 May, 2015              Hand pain, right 729.5 and D

epression with anxiety 300.4

 

                          [facility]     [address] [place] [id number]

[address] [place] [address]

                          14 2015               

 

                          [facility]     [address] [id number]

[address] [place] [address], [place] [address]

                          13 2015               

 

                          [facility] [place] [facility]     [address] [id number]

[address] [place] [address], [place] [address]

                          05 Mar, 2015               

 

                          [facility]     [address] [id number]

[address] [place] [address], [place] [address]

                          05 Mar, 2015               

 

                          [facility] [place] [facility]     [address] [id number]

[address] [place] [address], [place] [address]

                                         

 

                          [facility]     [address] [id number]

[address] [place] [address], [place] [address]

                                         

 

                          [facility]     [address] [id number]

[address] [place] [address], [place] [address]

                          10 Feb, 2015               

 

                          [facility]     [address] [id number]

[address] [place] [address], [place] [address]

                          10 Feb, 2015               

 

                          [facility]     [address] [id number]

[address] [place] [address], [place] [address]

                                         

 

                          [facility]     [address] [id number]

[address] [place] [address], [place] [address]

                                         

 

                          [facility]     [address] [id number]

[address] [place] [address], [place] [address]

                                         

 

                          [facility]     [address] [id number]

[address] [place] [address], [place] [address]

                                         

 

                          [facility] [place] [facility]     [address] [id number]

[address] [place] [address]

                                         

 

                          [facility] [place] [facility]     [address] [id number]

[address] [place] [address], [place] [address]

                                         

 

                          [facility] [place] [facility]     [address] [id number]

[address] [place] [address], [place] [address]

                                         

 

                          [facility] [place] [facility]     [address] [id number]

[address] [place] [address], [place] [address]

                                         

 

                          [facility] [place] [facility]     [address] [id number]

[address] [place] [address], [place] [address]

                          16 2015               

 

                          [facility]     [address] [id number]

[address] [place] [address], [place] [address]

                                         

 

                          [facility]     [address] [id number]

[address] [place] [address], [place] [address]

                                         

 

                          [facility]     [address] [id number]

[address] [place] [address], [place] [address]

                                         

 

                          [facility] [place] [facility]     [address] [id number]

[address] [place] [address], [place] [address]

                          15 2015               

 

                          [facility]     [address] [id number]

[address] [place] [address], [place] [address]

                          15 Brandon, 2015               

 

                          [facility]     [address] [id number]

[address] [place] [address], [place] [address]

                                         

 

                          [facility]     [address] [id number]

[address] [place] [address], [place] [address]

                                         

 

                          [facility]     [address] [id number]

[address] [place] [address], [place] [address]

                          15 Oct, 2014               

 

                          [facility]     [address] [id number]

[address] [place] [address], [place] [address]

                          15 Oct, 2014               

 

                          [facility]     [address] [id number]

[address] [place] [address], [place] [address]

                          14 Oct, 2014               

 

                          [facility] [place] [facility]     [address] [id number]

[address] [place] [address], [place] [address]

                          14 Oct, 2014               

 

                          [facility] [place] [facility]     [address] [id number]

[address] [place] [address], [place] [address]

                          07 Oct, 2014               

 

                          [facility] [place] [facility]     [address] [id number]

[address] [place] [address], [place] [address]

                          07 Oct, 2014               

 

                          [facility] [place] [facility]     [address] [id number]

[address] [place] [address], [place] [address]

                          12 Sep, 2014               

 

                          [facility]     [address] [id number]

[address] [place] [address], [place] [address]

                          12 Sep, 2014               

 

                          [facility] [place] [facility]     [address] [id number]

[facility] [address]

                          04 Sep,                

 

                          [facility]     [address] [id number]

[address] [place] [address], [place] [address]

                          03 Sep, 2014               

 

                          [facility]     [address] [id number]

[address] [place] [address], [place] [address]

                          03 Sep, 2014               

 

                          [facility]     [address] [id number]

[address] [place] [address], [place] [address]

                          02 Sep, 2014               

 

                          [facility]     [address] [id number]

[address] [place] [address], [place] [address]

                          02 Sep, 2014               

 

                          [facility]     [address] [id number]

[address] [place] [address], [place] [address]

                          30 Aug, 2014               

 

                          [facility]     [address] [id number]

[address] [place] [address], [place] [address]

                          30 Aug, 2014               

 

                          [facility]     [address] [id number]

[address] [place] [address], [place] [address]

                          19 Aug, 2014               

 

                          [facility]     [address] [id number]

[address] [place] [address], [place] [address]

                          19 Aug, 2014               

 

                          [facility]     [address] [id number]

[address] [place] [address], [place] [address]

                          14 Aug, 2014               

 

                          [facility]     [address] [address] [id number]

[address] [place] [address], [place] [address]

                          14 Aug, 2014               

 

                          [facility]     [address] [id number]

[address] [place] [address], [place] [address]

                          13 Aug, 2014               

 

                          [facility]     [address] [id number]

[address] [place] [address], [place] [address]

                          13 Aug, 2014               

 

                          [facility]     [address] [id number]

[address] [place] [address], [place] [address]

                                         

 

                          [facility]     [address] [id number]

[address] [place] [address], [place] [address]

                                         

 

                          [facility]     [address] [id number]

[address] [place] [address], [place] [address]

                          ,                

 

                          [facility]     [address] [id number]

[facility] [place] [address]

                                         

 

                          [facility]     [address] [id number]

[address] [place] [address], [place] [address]

                                         

 

                          [facility]     [address] [id number]

[address] [place] [address], [place] [address]

                                         

 

                          [facility]     [address] [id number]

[address] [place] [address], [place] [address]

                          ,                

 

                          [facility]     [address] [id number]

[address] [place] [address], [place] [address]

                                         

 

                          [facility]     [address] [id number]

[address] [place] [address], [place] [address]

                                         

 

                          [facility]     [address] [id number]

[address] [place] [address], [place] [address]

                                         

 

                          [facility]     [address] [id number]

[address] [place] [address], [place] [address]

                                         

 

                          [facility]     [address] [id number]

[address] [place] [address], [place] [address]

                                         

 

                          [facility]     [address] [id number]

[address] [place] [address], [place] [address]

                                         

 

                          [facility]     [address] [id number]

[address] [place] [address], [place] [address]

                          ,                

 

                          [facility]     [address] [id number]

[address] [place] [address], [place] [address]

                                         

 

                          [facility]     [address] [id number]

[address] [place] [address], [place] [address]

                          ,                

 

                          [facility]     [address] [id number]

[address] [place] [address], [place] [address]

                                         

 

                          [facility]     [address] [id number]

[address] [place] [address], [place] [address]

                          ,                

 

                          [facility]     [address] [id number]

[address] [place] [address], [place] [address]

                                         

 

                          [facility]     [address] [id number]

[address] [place] [address], [place] [address]

                                         

 

                          [facility]     [address] [id number]

[address] [place] [address], [place] [address]

                                         

 

                          [facility]     [address] [id number]

[address] [place] [address], [place] [address]

                                         

 

                          [facility]     [address] [id number]

[address] [place] [address], [place] [address]

                                         

 

                          [facility]     [address] [id number]

[address] [place] [address], [place] [address]

                                         

 

                          [facility]     [address] [id number]

[address] [place] [address], [place] [address]

                                         

 

                          [facility]     [address] [id number]

[address] [place] [address], [place] [address]

                                         

 

                          [facility]     [address] [id number]

[address] [place] [address], [place] [address]

                                         

 

                          [facility]     [address] [id number]

[address] [place] [address], [place] [address]

                          27 Mar, 2014               

 

                          [facility]     [address] [id number]

[address] [place] [address], [place] [address]

                          27 Mar, 2014               

 

                          [facility]     [address] [id number]

[address] [place] [address], [place] [address]

                                         

 

                          [facility]     [address] [id number]

[address] [place] [address], [place] [address]

                                         

 

                          [facility]     [address] [id number]

[address] [place] [address], [place] [address]

                                         

 

                          [facility]     [address] [id number]

[address] [place] [address], [place] [address]

                                         

 

                          [facility]     [address] [id number]

[address] [place] [address], [place] [address]

                          08 Oct, 2013               

 

                          [facility]     [address] [id number]

[address] [place] [address], [place] [address]

                          04 Oct, 2013               

 

                          [facility]     [address] [id number]

[address] [place] [address], [place] [address]

                          03 Oct, 2013               

 

                          [facility]     [address] [id number]

[address] [place] [address], [place] [address]

                          02 Oct, 2013               

 

                          [facility]     [address] [id number]

[address] [place] [address], [place] [address]

                          01 Oct, 2013               

 

                          [facility]     [address] [id number]

[address] [place] [address], [place] [address]

                          20 Sep, 2013               

 

                          [facility]     [address] [id number]

[address] [place] [address], [place] [address]

                          08 Aug, 2013               

 

                          [facility]     [address] [id number]

[address] [place] [address], [place] [address]

                          24 May, 2013               

 

                          [facility]     [address] [id number]

[address] [place] [address], [place] [address]

                          13 May, 2013               

 

                          [facility]     [address] [id number]

[address] [place] [address], [place] [address]

                                         

 

                          [facility]     [address] [id number]

[address] [place] [address], [place] [address]

                                         

 

                          [facility]     [address] [id number]

[address] [place] [address], [place] [address]

                                         

 

                          [facility]     [address] [id number]

[address] [place] [address]

                                         

 

                          [facility]     [address] [id number]

[address] [place] [address], [place] [address]

                          02 Oct, 2012               

 

                          [facility]     [address] [id number]

[address] [place] [address], [place] [address]

                          02 Oct, 2012               

 

                          [facility]     [address] [id number]

[address] [place] [address], [place] [address]

                          21 Sep, 2012               

 

                          [facility]     [address] [id number]

[address] [place] [address]

                          06 Aug, 2012               

 

                          [facility]     [address] [id number]

[address] [place] [address]

                          02 Aug, 2012               

 

                          [facility]     [address] [id number]

[address] [place] [address]

                                         

 

                          [facility]     [address] [id number]

[address] [place] [address]

                                         

 

                          [facility]     [address] [id number]

[address] [place] [address]

                          15 Eusebio, 2012               

 

                          [facility]     [address] [id number]

[address] [place] [address]

                                         

 

                          [facility]     [address] [id number]

[address] [place] [address]

                          10 Apr, 2012               

 

                          [facility]     [address] [id number]

[address] [place] [address]

                          22 Mar, 2012               

 

                          [facility]     [address] [id number]

[address] [place] [address]

                          20 Mar, 2012               

 

                          [facility]     [address] [id number]

[address] [place] [address]

                          14 Mar, 2012               

 

                          [facility]     [address] [id number]

[address] [place] [address]

                                         







IMMUNIZATIONS

No Known Immunizations



SOCIAL HISTORY

Never Assessed



REASON FOR VISIT





PLAN OF CARE





VITAL SIGNS





MEDICATIONS

Unknown Medications



RESULTS

No Results



PROCEDURES

No Known procedures



INSTRUCTIONS





MEDICATIONS ADMINISTERED

No Known Medications



MEDICAL (GENERAL) HISTORY





                    Type                Description         Date

 

                          Medical History           allergic rhinitis- rec's edwige perriny allergy injections from Dr Rojas office                            

 

                    Medical History     mood disorder        

 

                    Medical History     Leukocytosis- has been to hematology  

 

                    Medical History     Hyperlipidemia       

 

                          Medical History           Left leg pain- multiple imag

ing performed and ortho referral 

placed                                   

 

                          Medical History           TUBULAR ADENOMA AND GASTRITI

S ON COLONOSOCPY AUG 2016 -MULTIPLE 

POLYPS                                   

 

                    Medical History     Helicobacter pylori (H. pylori) infectio

n Hx  

 

                    Surgical History    cholecystectomy     2006

 

                    Surgical History     section    , 

 

                    Surgical History    tonsillectomy       2015

 

                    Surgical History    colonoscopy         2016

 

                    Surgical History    colonscopy          2017

 

                    Surgical History    Right Knee scope    2017

## 2020-06-23 NOTE — XMS REPORT
[facility]

                             Created on: 2019



Jasonwhit, April

External Reference #: [id number]

: 1975

Sex: Female



Demographics





                          Address                   [address] [place]  [address]

 

                          Preferred Language        Unknown

 

                          Marital Status            Unknown

 

                          Scientology Affiliation     Unknown

 

                          Race                      Unknown

 

                          Ethnic Group              Unknown





Author





                          Author                    Darby SHARPENYA

 

                          [facility]

 

                          Address                   [address] [place]  [address]



 

                          Phone                     [phone]







Care Team Providers





                    Care Team Member Name Role                Phone

 

                    ANNETTE SHARPEWNYA       Unavailable         [phone]







PROBLEMS





          Type      Condition ICD9-CM Code PEY30-KP Code Onset Dates Condition S

tatus SNOMED 

Code

 

          Problem   Tubular adenoma of colon           D12.6               Activ

e    [id number]

 

          Problem   Udarte's neuroma of right foot           G57.61             

 Active    95383520

 

          Problem   History of leukocytosis           Z86.2               Active

    [id number]

 

          Problem   Non morbid obesity           E66.9               Active    4

[id number]

 

          Problem   Mixed hyperlipidemia           E78.2               Active   

 [id number]

 

          Problem   Seasonal allergic rhinitis due to pollen           J30.1    

           Active    90708381

 

                          Problem                   Type 2 diabetes mellitus wit

hout complication, without long-term current

use of insulin              E11.9                     Active       [id number]

 

          Problem   Anxiety             F41.9               Active    97833921

 

           Problem    Seasonal allergic rhinitis due to other allergic trigger  

          J30.89                

Active                                  [id number]

 

          Problem   Dysthymic disorder           F34.1               Active    7

[id number]

 

          Problem   Arthritis           M19.90              Active    7935698







ALLERGIES

No Information



ENCOUNTERS





                Encounter       Location        Date            Diagnosis

 

                          [place] [address] [address] [facility] [id number]

[address] [place] [address]

                                        Type 2 diabetes mellitus wit

hout complication, without long-term 

current use of insulin E11.9

 

                          [facility]     [address] [facility] [id number]

[address] [place] [address]

                                         

 

                          [facility]     [address] [facility] [id number]

[address] [place] [address]

                                        Type 2 diabetes mellitus wit

hout complication, without long-term 

current use of insulin E11.9

 

                          [facility]     [address] [facility] [id number]

[address] [place] [address]

                                        Weight loss R63.4

 

                          [place] [address] [address] [facility] [id number]

[address] [place] [address]

                          31 May, 2019              Type 2 diabetes mellitus wit

hout complication, without long-term 

current use of insulin E11.9

 

                          [place] [address] [address] [place] [id number]

[address] [place] [address]

                          31 May, 2019              Type 2 diabetes mellitus wit

hout complication, without long-term 

current use of insulin E11.9 and Non morbid obesity E66.9

 

                          [facility] [place] [address] 

[address]                14 May, 2019              Seasonal allergic rhinitis d

ue to pollen J30.1 and Sore 

throat J02.9

 

                          [place] [address] 

[address]                              Arthritis M19.90

 

                          [place] [address] [address]

                                        Seasonal allergic rhinitis d

ue to other allergic trigger J30.89 and

Dysthymic disorder F34.1

 

                          [place] [address] [address]

                          05 Dec, 2018              Bilateral otitis media with 

effusion H65.93 and Anxiety F41.9

 

                          [place] [address] [address]

                                        Type 2 diabetes mellitus wit

hout complication, without long-term 

current use of insulin E11.9

 

                          [place] [address] [address] [place] [id number]

[address] [place] [address]

                                        Pharyngitis due to Streptoco

ccus species J02.0

 

                          [place] [address] [address]

                                         

 

                          [facility] [place] [address] 

[address]                10 Jul, 2018              Fever, unspecified fever cau

se R50.9 and Lymphadenopathy

R59.1

 

                          [facility] [place] [address] [place] 

[address]                              Pharyngitis due to other org

anism J02.8

 

                          [facility]     [address] [place] [id number]

[address] [place] [address]

                                         

 

                          [facility]     [address] [facility] [id number]

[address] [place] [address]

                                        Type 2 diabetes mellitus wit

hout complication, without long-term 

current use of insulin E11.9 and Other eczema L30.8

 

                          [facility]  [address] [facility] [id number]

[address] [place] [address] 

[address]                              Acute pain of left shoulder 

M25.512

 

                          [place] [address] [address] [place] [id number] [place] [address]

                                         

 

                          [place] [address] [address]

                          15 2018              Type 2 diabetes mellitus wit

hout complication, without long-term 

current use of insulin E11.9

 

                          [place] [address] [address] [place] [id number] [place] [address]

                                        Type 2 diabetes mellitus wit

hout complication, without long-term 

current use of insulin E11.9 ; Tubular adenoma of colon D12.6 ; Mixed 
hyperlipidemia E78.2 ; History of leukocytosis Z86.2 and Screening breast 
examination Z12.31

 

                          [place] [address] [address]

                                        Metatarsalgia of right foot 

M77.41 and Type 2 diabetes mellitus 

without complication, without long-term current use of insulin E11.9

 

                          [place] [address] [address] [place] [id number]

[address] [place] [address]

                                        Capsulitis M77.9 and Metatar

salgia of right foot M77.41

 

                          [place] [address] [address] [place] [id number] [place] [address]

                          08 Sep, 2017              Capsulitis M77.9

 

                          [place] [address] [address] [place] [address]

                          06 Sep, 2017               

 

                          [facility]     [address] [facility] [id number]

[address] [place] [address]

                          03 Aug, 2017              Type 2 diabetes mellitus wit

hout complication, without long-term 

current use of insulin E11.9 ; Tubular adenoma of colon D12.6 and Mixed 
hyperlipidemia E78.2

 

                          [facility]     [address] [place] [id number]

[address] [place] [address]

                                        Metatarsalgia of right foot 

M77.41 and Capsulitis M77.9

 

                          [facility]     [address] [place] [id number]

[address] [place] [address]

                                         

 

                          [facility] [place] [facility]  [address] [place] [id number] [place] [address] 

[address]                              Duarte's neuroma of right fo

ot G57.61

 

                          [place] [address] [address] [place] [id number]

[address] [place] [address]

                                        Mixed hyperlipidemia E78.2 a

nd Type 2 diabetes mellitus without 

complication, without long-term current use of insulin E11.9

 

                          [place] [address] [address]

                          10 Brandon, 2017              Type 2 diabetes mellitus wit

hout complication, without long-term 

current use of insulin E11.9 ; Tubular adenoma of colon D12.6 and Mixed 
hyperlipidemia E78.2

 

                          [place] [address] [address] [place] [id number]

[address] [place] [address]

                          22 Dec, 2016              Mixed hyperlipidemia E78.2

 

                          [place] [address] [address] [place] [id number]

[address] [place] [address]

                                         

 

                          [place] [address] [address] [place] [id number]

[address] [place] [address]

                                        Mixed hyperlipidemia E78.2

 

                          [place] [address] [address] [place] [id number]

[address] [place] [address]

                                        Mixed hyperlipidemia E78.2

 

                          [place] [address] [address] [place] [id number]

[address] [place] [address]

                          08 Sep, 2016               

 

                          [facility]     [address] [facility] [id number]

[address] [place] [address]

                          10 Aug, 2016              Type 2 diabetes mellitus wit

hout complication, without long-term 

current use of insulin E11.9 ; Helicobacter pylori (H. pylori) infection A04.8 
and Mixed hyperlipidemia E78.2

 

                          [facility]     [address] [place] [id number] [place] [address]

                          08 Aug, 2016              Type 2 diabetes mellitus wit

hout complication, without long-term 

current use of insulin E11.9

 

                          [place] [address] [address] [place] [id number] [place] [address]

                          03 Aug, 2016              Type 2 diabetes mellitus wit

hout complication, without long-term 

current use of insulin E11.9

 

                          [place] [address] [address] [place] [id number] [place] [address]

                          02 Aug, 2016              Dyspepsia R10.13 ; Upper abd

ominal pain R10.10 ; Bowel habit 

changes R19.4 ; History of leukocytosis Z86.2 and Helicobacter pylori (H. 
pylori) infection A04.8

 

                          [facility] [place] [facility]  [address] 

[address]                27 May, 2016              Environmental allergies Z91.

09

 

                          [facility]     [address] [place] [id number]

[address] [place] [address]

                                        Left leg pain M79.605 ; Loca

lized swelling of lower leg R22.40 and 

Upper respiratory infection J06.9

 

                          [facility]     [address] [facility] [id number]

[address] [place] [address]

                          28 Dec, 2015               

 

                          [place] [address] [address]

                          21 Dec, 2015              Left leg pain M79.605

 

                          [facility]     [address] [place] [id number]

[address] [place] [address]

                          10 Dec, 2015              Left leg pain M79.605 and Lo

calized swelling of lower leg R22.40

 

                          [facility]     [address] [facility] [id number]

[address] [place] [address]

                          01 Dec, 2015              Left leg pain M79.605

 

                          [facility]     [address] [place] [id number]

[address] [place] [address]

                                        Encounter for immunization Z

23

 

                          [facility]     [address] [place] [id number] [place] [address]

                          12 Oct, 2015              Bronchitis J40

 

                          [facility]     [address] [place] [id number]

[address] [place] [address]

                          30 Sep, 2015              Physical exam, pre-employmen

t V70.5 ; Encounter for PPD test V74.1 

and Hyperlipidemia 272.4

 

                          [facility]     [address] [place] [id number]

[address] [place] [address]

                          21 Sep, 2015               

 

                          [facility]     [address] [place] [id number] [place] [address]

                          21 Sep, 2015               

 

                          [facility]     [address] [place] [id number]

[address] [place] [address]

                          09 Sep, 2015               

 

                          [facility]     [address] [place] [id number]

[address] [place] [address]

                          04 Sep, 2015              Elevated blood sugar 790.29

 

                          [facility]     [address] [place] [id number]

[address] [place] [address]

                          03 Sep, 2015              Elevated blood sugar 790.29

 

                          [facility]     [address] [place] [id number]

[address] [place] [address]

                          03 Sep, 2015              Palpitations 785.1

 

                          [facility]     [address] [place] [id number]

[address] [place] [address]

                          01 Sep, 2015              Palpitations 785.1

 

                          [facility]     [address] [place] [id number]

[address] [place] [address]

                                         

 

                          [facility]     [address] [place] [id number]

[address] [place] [address]

                          28 May, 2015              Hand pain, right 729.5 and D

epression with anxiety 300.4

 

                          [facility]     [address] [id number]

[address] [place] [address], [place] [address]

                          14 2015               

 

                          [facility]     [address] [id number]

[address] [place] [address], [place] [address]

                                         

 

                          [facility]     [address] [id number]

[address] [place] [address], [place] [address]

                          05 Mar, 2015               

 

                          [facility]     [address] [id number]

[address] [place] [address], [place] [address]

                          05 Mar, 2015               

 

                          [facility]     [address] [id number]

[address] [place] [address], [place] [address]

                                         

 

                          [facility]     [address] [id number]

[address] [place] [address], [place] [address]

                                         

 

                          [facility]     [address] [id number]

[address] [place] [address], [place] [address]

                          10 Feb, 2015               

 

                          [facility]     [address] [id number]

[address] [place] [address]

                          10 Feb, 2015               

 

                          [facility]     [address] [id number]

[address] [place] [address], [place] [address]

                                         

 

                          [facility]     [address] [id number]

[address] [place] [address]

                                         

 

                          [facility]     [address] [id number]

[address] [place] [address]

                                         

 

                          [facility]     [address] [id number]

[address] [place] [address]

                                         

 

                          [facility]     [address] [id number]

[address] [place] [address], [place] [address]

                                         

 

                          [facility]     [address] [id number]

[address] [place] [address]

                                         

 

                          [facility]     [address] [id number]

[address] [place] [address], [place] [address]

                                         

 

                          [facility]     [address] [id number]

[address] [place] [address], [place] [address]

                          17 2015               

 

                          [facility]     [address] [id number]

[address] [place] [address], [place] [address]

                                         

 

                          [facility]     [address] [id number]

[address] [place] [address], [place] [address]

                                         

 

                          [facility]     [address] [id number]

[address] [place] [address], [place] [address]

                                         

 

                          [facility]     [address] [id number]

[address] [place] [address], [place] [address]

                                         

 

                          [facility]     [address] [id number]

[address] [place] [address], [place] [address]

                          15 [place], 2015               

 

                          [facility]     [address] [id number]

[address] [place] [address], [place] [address]

                          15 [place], 2015               

 

                          [facility]     [address] [id number]

[address] [place] [address], [place] [address]

                                         

 

                          [facility]     [address] [id number]

[address] [place] [address], [place] [address]

                                         

 

                          [facility]     [address] [id number]

[address] [place] [address], [place] [address]

                          15 Oct, 2014               

 

                          [facility]     [address] [id number]

[address] [place] [address], [place] [address]

                          15 Oct, 2014               

 

                          [facility]     [address] [id number]

[address] [place] [address], [place] [address]

                          14 Oct, 2014               

 

                          [facility]     [address] [id number]

[address] [place] [address], [place] [address]

                          14 Oct, 2014               

 

                          [facility]     [address] [id number]

[address] [place] [address], [place] [address]

                          07 Oct, 2014               

 

                          [facility]     [address] [id number]

[address] [place] [address], [place] [address]

                          07 Oct, 2014               

 

                          [facility]     [address] [id number]

[address] [place] [address], [place] [address]

                          12 Sep, 2014               

 

                          [facility]     [address] [id number]

[facility] [place] [address]

                          12 Sep,                

 

                          [facility]     [address] [id number]

[facility] [place] [address]

                          04 Sep,                

 

                          [facility]     [address] [id number]

[facility] [place] [address]

                          03 Sep,                

 

                          [facility]     [address] [id number]

[address] [place] [address], [place] [address]

                          03 Sep,                

 

                          [facility]     [address] [id number]

[address] [place] [address], [place] [address]

                          02 Sep,                

 

                          [facility]     [address] [id number]

[address] [place] [address], [place] [address]

                          02 Sep, 2014               

 

                          [facility]     [address] [id number]

[address] [place] [address], [place] [address]

                          30 Aug, 2014               

 

                          [facility]     [address] [id number]

[address] [place] [address], [place] [address]

                          30 Aug, 2014               

 

                          [facility]     [address] [id number]

[address] [place] [address], [place] [address]

                          19 Aug, 2014               

 

                          [facility]     [address] [id number]

[address] [place] [address] [address]

                          19 Aug, 2014               

 

                          [facility]     [address] [id number]

[address] [place] [address], [place] [address]

                          14 Aug, 2014               

 

                          [facility]     [address] [id number]

[address] [place] [address], [place] [address]

                          14 Aug, 2014               

 

                          [facility]     [address] [id number]

[address] [place] [address], [place] [address]

                          13 Aug, 2014               

 

                          [facility]     [address] [id number]

[address] [place] [address], [place] [address]

                          13 Aug, 2014               

 

                          [facility]     [address] [id number]

[address] [place] [address], [place] [address]

                                         

 

                          [facility]     [address] [id number]

[address] [place] [address], [place] [address]

                                         

 

                          [facility]     [address] [id number]

[facility] [place] [address]

                                         

 

                          [facility]     [address] [id number]

[address] [place] [address], [place] [address]

                                         

 

                          [facility]     [address] [id number]

[facility] [place] [address]

                                         

 

                          [facility]     [address] [id number]

[address] [place] [address], [place] [address]

                                         

 

                          [facility]     [address] [id number]

[address] [place] [address], [place] [address]

                                         

 

                          [facility]     [address] [id number]

[address] [place] [address], [place] [address]

                                         

 

                          [facility]     [address] [id number]

[address] [place] [address], [place] [address]

                                         

 

                          [facility]     [address] [id number]

[address] [place] [address], [place] [address]

                                         

 

                          [facility]     [address] [id number]

[address] [place] [address], [place] [address]

                                         

 

                          [facility]     [address] [id number]

[address] [place] [address], [place] [address]

                                         

 

                          [facility]     [address] [id number]

[address] [place] [address], [place] [address]

                                         

 

                          [facility]     [address] [id number]

[address] [place] [address], [place] [address]

                          ,                

 

                          [facility]     [address] [id number]

[address] [place] [address], [place] [address]

                                         

 

                          [facility]     [address] [id number]

[address] [place] [address], [place] [address]

                                         

 

                          [facility]     [address] [id number]

[address] [place] [address], [place] [address]

                          ,                

 

                          [facility]     [address] [id number]

[address] [place] [address], [place] [address]

                                         

 

                          [facility]     [address] [id number]

[address] [place] [address], [place] [address]

                                         

 

                          [facility]     [address] [id number]

[address] [place] [address], [place] [address]

                                         

 

                          [facility]     [address] [id number]

[address] [place] [address], [place] [address]

                                         

 

                          [facility]     [address] [id number]

[address] [place] [address], [place] [address]

                                         

 

                          [facility]     [address] [id number]

[address] [place] [address], [place] [address]

                                         

 

                          [facility]     [address] [id number]

[address] [place] [address], [place] [address]

                                         

 

                          [facility]     [address] [id number]

[address] [place] [address], [place] [address]

                                         

 

                          [facility]     [address] [id number]

[address] [place] [address], [place] [address]

                                         

 

                          [facility]     [address] [id number]

[address] [place] [address], [place] [address]

                                         

 

                          [facility]     [address] [id number]

[address] [place] [address], [place] [address]

                          27 Mar, 2014               

 

                          [facility]     [address] [id number]

[address] [place] [address], [place] [address]

                          27 Mar, 2014               

 

                          [facility]     [address] [id number]

[address] [place] [address], [place] [address]

                                         

 

                          [facility]     [address] [id number]

[address] [place] [address], [place] [address]

                                         

 

                          [facility]     [address] [id number]

[address] [place] [address], [place] [address]

                                         

 

                          [facility]     [address] [id number]

[address] [place] [address], [place] [address]

                                         

 

                          [facility]     [address] [id number]

[address] [place] [address], [place] [address]

                          08 Oct, 2013               

 

                          [facility]     [address] [id number]

[address] [place] [address], [place] [address]

                          04 Oct, 2013               

 

                          [facility]     [address] [id number]

[address] [place] [address], [place] [address]

                          03 Oct, 2013               

 

                          [facility]     [address] [id number]

[address] [place] [address], [place] [address]

                          02 Oct, 2013               

 

                          [facility]     [address] [id number]

[address] [place] [address], [place] [address]

                          01 Oct, 2013               

 

                          [facility]     [address] [id number]

[address] [place] [address], [place] [address]

                          20 Sep, 2013               

 

                          [facility]     [address] [id number]

[address] [place] [address], [place] [address]

                          08 Aug, 2013               

 

                          [facility]     [address] [id number]

[address] [place] [address], [place] [address]

                          24 May, 2013               

 

                          [facility]     [address] [id number]

[address] [place] [address], [place] [address]

                          13 May, 2013               

 

                          [facility]     [address] [id number]

[address] [place] [address], [place] [address]

                                         

 

                          [facility]     [address] [id number]

[address] [place] [address], [place] [address]

                                         

 

                          [facility]     [address] [id number]

[address] [place] [address], [place] [address]

                                         

 

                          [facility]     [address] [id number]

[address] [place] [address], [place] [address]

                                         

 

                          [facility]     [address] [id number]

[address] [place] [address], [place] [address]

                          02 Oct, 2012               

 

                          [facility]     [address] [id number]

[address] [place] [address], [place] [address]

                          02 Oct, 2012               

 

                          [facility]     [address] [id number]

[address] [place] [address]

                          21 Sep, 2012               

 

                          [facility]     [address] [id number]

[address] [place] [address]

                          06 Aug, 2012               

 

                          [facility]     [address] [id number]

[address] [place] [address]

                          02 Aug, 2012               

 

                          [facility]     [address] [id number]

[address] [place] [address]

                                         

 

                          [facility]     [address] [id number]

[address] [place] [address]

                                         

 

                          [facility]     [address] [id number]

[address] [place] [address]

                          15 Eusebio, 2012               

 

                          [facility]     [address] [id number]

[address] [place] [address]

                                         

 

                          [facility]     [address] [id number]

[address] [place] [address]

                          10 Apr, 2012               

 

                          [facility]     [address] [id number]

[address] [place] [address]

                          22 Mar, 2012               

 

                          [facility]     [address] [id number]

[address] [place] [address]

                          20 Mar, 2012               

 

                          [facility]     [address] [id number]

[address] [place] [address]

                          14 Mar, 2012               

 

                          [facility]     [address] [facility] [id number]

[address] [place] [address]

                                         







IMMUNIZATIONS

No Known Immunizations



SOCIAL HISTORY

Never Assessed



REASON FOR VISIT





PLAN OF CARE





VITAL SIGNS





MEDICATIONS

Unknown Medications



RESULTS

No Results



PROCEDURES

No Known procedures



INSTRUCTIONS





MEDICATIONS ADMINISTERED

No Known Medications



MEDICAL (GENERAL) HISTORY





                    Type                Description         Date

 

                          Medical History           allergic rhinitis- rec's edwige donahue allergy injections from Dr Rojas office                            

 

                    Medical History     mood disorder        

 

                    Medical History     Leukocytosis- has been to hematology  

 

                    Medical History     Hyperlipidemia       

 

                          Medical History           Left leg pain- multiple imag

ing performed and ortho referral 

placed                                   

 

                          Medical History           TUBULAR ADENOMA AND GASTRITI

S ON COLONOSOCPY AUG 2016 -MULTIPLE 

POLYPS                                   

 

                    Medical History     Helicobacter pylori (H. pylori) infectio

n Hx  

 

                    Surgical History    cholecystectomy     2006

 

                    Surgical History     section    , 

 

                    Surgical History    tonsillectomy       2015

 

                    Surgical History    colonoscopy         2016

 

                    Surgical History    colonscopy          2017

 

                    Surgical History    Right Knee scope    2017

## 2020-06-23 NOTE — DISCHARGE INST-POST CATH
Discharge Inst-CATH/EP


Post Cardiac Cath/EP D/C Inst


Follow Up/Plan


F/u with Dr Chiu in 2 weeks








ACTIVITY





* Go Home directly and rest.


* Limit activity of the leg (or wrist if it was used) for 7 days including 

aerobics, swimming,


   jogging, bicycling, etc.


* Restrict stair-climbing for 7 days if possible, if not, climb up with your n

on-cath leg, then


   bring together on the same step.


* Avoid lifting, pushing, pulling or excessive movement of the affected ex

tremity for 7 days.


* Customary sexual activity may be resumed after 2 days-use caution not to use a

position  


   that strains or causes pain to the affected extremity.


* No driving for 24 hours.


* NO SMOKING. 


* Avoid straining for bowel movements for 7 days.


* Gentle walking on level ground is allowed.


* Returning to work will depend on the type of procedure and the results. Your 

doctor will discuss


   this with you.





CALL YOUR DOCTOR FOR ANY OF THE FOLLOWING:





*If bleeding from the puncture site occurs- Apply gentle pressure to site with 

clean cloth and call


   your doctor or EMS.


* If a knot or lump forms under the skin, increases in size, or causes pain.


* If bruising appears to be worsening or moving further down your leg instead of

disappearing.


* Temperature above 101 F.





CARE OF YOUR GROIN INCISION;





* Bruising or purple discoloration of the skin near the puncture site is common.


* You may shower only, no bathtub bathing for 5 days.  Be careful to avoid 

slipping as your


   leg may feel stiff.


* If a closure device was used on your femoral artery, please see the attached 

guide regarding


   care of the device and your leg.


* Leave dressing on FOR 24 hours.





CARE OF YOUR WRIST INCISION;





* Bruising or purple discoloration of the skin near the puncture site is common.


* You may shower.


* DO NOT submerge wrist.


* Leave dressing on FOR 24 hours.











LAN CHIU MD FACP FAC [facility]   Jun 23, 2020 12:41

## 2020-06-23 NOTE — XMS REPORT
[facility]

                             Created on: 2018



Darby Love

External Reference #: [id number]

: 1975

Sex: Female



Demographics





                          Address                   [address] [place]  [address]

 

                          Preferred Language        Unknown

 

                          Marital Status            Unknown

 

                          Roman Catholic Affiliation     Unknown

 

                          Race                      Unknown

 

                          Ethnic Group              Unknown





Author





                          Author                    Darby DELGADO

 

                          Organization              [facility]

 

                          Address                   [address] [place]  [address]



 

                          Phone                     [phone]







Care Team Providers





                    Care Team Member Name Role                Phone

 

                    VILMA DELGADO      Unavailable         [phone]







PROBLEMS





          Type      Condition ICD9-CM Code TOG07-RM Code Onset Dates Condition S

tatus SNOMED 

Code

 

          Problem   History of leukocytosis           Z86.2               Active

    [id number]

 

          Problem   Duarte's neuroma of right foot           G57.61             

 Active    [id number]

 

                          Problem                   Type 2 diabetes mellitus wit

hout complication, without long-term current

use of insulin              E11.9                     Active       [id number]

 

          Problem   Tubular adenoma of colon           D12.6               Activ

e    [id number]

 

          Problem   Mixed hyperlipidemia           E78.2               Active   

 [id number]







ALLERGIES





             Substance    Reaction     Event Type   Date         Status

 

             Penicillin V Potassium Unknown      Drug Allergy  Activ

e







ENCOUNTERS





                Encounter       Location        Date            Diagnosis

 

                          [facility]     [address] [facility] [id number]

[address] [place] [address]

                                        Pharyngitis due to Streptoco

ccus species J02.0

 

                          [facility]     [address] [facility] [id number]

[address] [place] [address]

                                         

 

                          [facility]  [address] [facility] [id number]

[address] [place] [address] 

[address]                10 Jul, 2018              Fever, unspecified fever cau

se R50.9 and Lymphadenopathy

R59.1

 

                          [facility]  [address] [facility] [id number]

[address] [place] [address] 

[address]                              Pharyngitis due to other org

anism J02.8

 

                          [facility]     [address] [facility] [id number]

[address] [place] [address]

                                         

 

                          [facility]     [address] [facility] [id number]

[address] [place] [address]

                                        Type 2 diabetes mellitus wit

hout complication, without long-term 

current use of insulin E11.9 and Other eczema L30.8

 

                          [facility]  [address] 

[address]                28 2018              Acute pain of left shoulder 

M25.512

 

                          [facility]     [address] [address]

                          23 2018               

 

                          [facility]     [address] [address]

                          15 2018              Type 2 diabetes mellitus wit

hout complication, without long-term 

current use of insulin E11.9

 

                          [place] [address] [address]

                                        Type 2 diabetes mellitus wit

hout complication, without long-term 

current use of insulin E11.9 ; Tubular adenoma of colon D12.6 ; Mixed 
hyperlipidemia E78.2 ; History of leukocytosis Z86.2 and Screening breast 
examination Z12.31

 

                          [place] [address] [address]

                                        Metatarsalgia of right foot 

M77.41 and Type 2 diabetes mellitus 

without complication, without long-term current use of insulin E11.9

 

                          [place] [address] [address]

                                        Capsulitis M77.9 and Metatar

salgia of right foot M77.41

 

                          [place] [address] [address]

                          08 Sep, 2017              Capsulitis M77.9

 

                          [place] [address] [address]

                          06 Sep, 2017               

 

                          [place] [address] [address]

                          03 Aug, 2017              Type 2 diabetes mellitus wit

hout complication, without long-term 

current use of insulin E11.9 ; Tubular adenoma of colon D12.6 and Mixed 
hyperlipidemia E78.2

 

                          [place] [address] [address]

                                        Metatarsalgia of right foot 

M77.41 and Capsulitis M77.9

 

                          [facility]     [address] [place] [id number]

[address] [place] [address]

                                         

 

                          [facility]  [address] [place] [id number]

[address] [place] [address] 

[address]                              Duarte's neuroma of right fo

ot G57.61

 

                          [facility]     [address] [place] [id number]

[address] [place] [address]

                                        Mixed hyperlipidemia E78.2 a

nd Type 2 diabetes mellitus without 

complication, without long-term current use of insulin E11.9

 

                          [place] [address] [address]

                          10 Brandon, 2017              Type 2 diabetes mellitus wit

hout complication, without long-term 

current use of insulin E11.9 ; Tubular adenoma of colon D12.6 and Mixed 
hyperlipidemia E78.2

 

                          [place] [id number]

[address] [place] [address]

                          22 Dec, 2016              Mixed hyperlipidemia E78.2

 

                          [place] [address] [place] [id number]

[address] [place] [address]

                                         

 

                          [place] [address] [place] [id number] [place] [address]

                                        Mixed hyperlipidemia E78.2

 

                          [place] [address] [place] [id number]

[address] [place] [address]

                                        Mixed hyperlipidemia E78.2

 

                          [place] [address] [place] [id number]

[address] [place] [address]

                          08 Sep, 2016               

 

                          [facility]     [address] [place] [id number]

[address] [place] [address]

                          10 Aug, 2016              Type 2 diabetes mellitus wit

hout complication, without long-term 

current use of insulin E11.9 ; Helicobacter pylori (H. pylori) infection A04.8 
and Mixed hyperlipidemia E78.2

 

                          [place] [address] [address]

                          08 Aug, 2016              Type 2 diabetes mellitus wit

hout complication, without long-term 

current use of insulin E11.9

 

                          [facility]     [address] [address]

                          03 Aug, 2016              Type 2 diabetes mellitus wit

hout complication, without long-term 

current use of insulin E11.9

 

                          [facility]     [address] [address]

                          02 Aug, 2016              Dyspepsia R10.13 ; Upper abd

ominal pain R10.10 ; Bowel habit 

changes R19.4 ; History of leukocytosis Z86.2 and Helicobacter pylori (H. 
pylori) infection A04.8

 

                          [facility] [place] [facility]  [address] 

[address]                27 May, 2016              Environmental allergies Z91.

09

 

                          [place] [address] [address]

                                        Left leg pain M79.605 ; Loca

lized swelling of lower leg R22.40 and 

Upper respiratory infection J06.9

 

                          [place] [address] [address]

                          28 Dec, 2015               

 

                          [place] [address] [address]

                          21 Dec, 2015              Left leg pain M79.605

 

                          [facility]     [address] [address]

                          10 Dec, 2015              Left leg pain M79.605 and Lo

calized swelling of lower leg R22.40

 

                          [facility]     [address] [address]

                          01 Dec, 2015              Left leg pain M79.605

 

                          [place] [address] [address]

                                        Encounter for immunization Z

23

 

                          [facility]     [address] [address]

                          12 Oct, 2015              Bronchitis J40

 

                          [facility]     [address] [facility] [id number]

[address] [place] [address]

                          30 Sep, 2015              Physical exam, pre-employmen

t V70.5 ; Encounter for PPD test V74.1 

and Hyperlipidemia 272.4

 

                          [facility]     [address] [facility] [id number]

[address] [place] [address]

                          21 Sep, 2015               

 

                          [facility]     [address] [facility] [id number] [place] [address]

                          21 Sep, 2015               

 

                          [facility]     [address] [facility] [id number]

[address] [place] [address]

                          09 Sep, 2015               

 

                          [facility]     [address] [place] [id number] [place] [address]

                          04 Sep, 2015              Elevated blood sugar 790.29

 

                          [facility]     [address] [place] [id number]

[address] [place] [address]

                          03 Sep, 2015              Elevated blood sugar 790.29

 

                          [facility]     [address] [facility] [id number]

[address] [place] [address]

                          03 Sep, 2015              Palpitations 785.1

 

                          [facility]     [address] [place] [id number] [place] [address]

                          01 Sep, 2015              Palpitations 785.1

 

                          [facility]     [address] [place] [id number]

[address] [place] [address]

                                         

 

                          [facility]     [address] [facility] [id number]

[address] [place] [address]

                          28 May, 2015              Hand pain, right 729.5 and D

epression with anxiety 300.4

 

                          [facility]     [address] [facility] [id number]

[address] [place] [address]

                                         

 

                          [facility]     [address] [place] [id number]

[address] [place] [address]

                                         

 

                          [facility]     [address] [place] [id number]

[address] [place] [address]

                          05 Mar, 2015               

 

                          [facility]     [address] [id number]

[address] [place] [address], [place] [address]

                          05 Mar, 2015               

 

                          [facility]     [address] [id number]

[address] [place] [address], [place] [address]

                                         

 

                          [facility]     [address] [id number]

[address] [place] [address], [place] [address]

                                         

 

                          [facility]     [address] [id number]

[address] [place] [address], [place] [address]

                          10 Feb, 2015               

 

                          [facility]     [address] [id number]

[address] [place] [address], [place] [address]

                          10 Feb, 2015               

 

                          [facility]     [address] [id number]

[address] [place] [address], [place] [address]

                                         

 

                          [facility]     [address] [id number]

[address] [place] [address], [place] [address]

                                         

 

                          [facility]     [address] [id number]

[address] [place] [address], [place] [address]

                                         

 

                          [facility]     [address] [id number]

[address] [place] [address], [place] [address]

                                         

 

                          [facility]     [address] [id number]

[address] [place] [address], [place] [address]

                                         

 

                          [facility]     [address] [id number]

[address] [place] [address], [place] [address]

                                         

 

                          [facility]     [address] [id number]

[address] [place] [address], [place] [address]

                                         

 

                          [facility]     [address] [id number]

[address] [place] [address], [place] [address]

                                         

 

                          [facility]     [address] [id number]

[address] [place] [address], [place] [address]

                                         

 

                          [facility]     [address] [id number]

[address] [place] [address], [place] [address]

                                         

 

                          [facility]     [address] [id number]

[address] [place] [address], [place] [address]

                          16 2015               

 

                          [facility]     [address] [id number]

[address] [place] [address], [place] [address]

                          16 2015               

 

                          [facility]     [address] [id number]

[address] [place] [address], [place] [address]

                          15 2015               

 

                          [facility]     [address] [id number]

[address] [place] [address], [place] [address]

                          15 2015               

 

                          [facility]     [address] [id number]

[address] [place] [address], [place] [address]

                                         

 

                          [facility]     [address] [id number]

[address] [place] [address], [place] [address]

                                         

 

                          [facility]     [address] [id number]

[address] [place] [address], [place] [address]

                          15 Oct, 2014               

 

                          [facility]     [address] [id number]

[address] [place] [address], [place] [address]

                          15 Oct, 2014               

 

                          [facility]     [address] [id number]

[address] [place] [address], [place] [address]

                          14 Oct, 2014               

 

                          [facility]     [address] [id number]

[address] [place] [address], [place] [address]

                          14 Oct, 2014               

 

                          [facility]     [address] [id number]

[address] [place] [address], [place] [address]

                          07 Oct, 2014               

 

                          [facility]     [address] [id number]

[address] [place] [address], [place] [address]

                          07 Oct, 2014               

 

                          [facility]     [address] [id number]

[address] [place] [address]

                          12 Sep,                

 

                          [facility]     [address] [id number]

[address] [place] [address], [place] [address]

                          12 Sep, 2014               

 

                          [facility]     [address] [id number]

[address] [place] [address], [place] [address]

                          04 Sep, 2014               

 

                          [facility]     [address] [id number]

[address] [place] [address], [place] [address]

                          03 Sep,                

 

                          [facility]     [address] [id number]

[address] [place] [address]

                          03 Sep, 2014               

 

                          [facility]     [address] [id number]

[address] [place] [address], [place] [address]

                          02 Sep, 2014               

 

                          [facility]     [address] [id number]

[address] [place] [address], [place] [address]

                          02 Sep, 2014               

 

                          [facility]     [address] [id number]

[address] [place] [address], [place] [address]

                          30 Aug, 2014               

 

                          [facility]     [address] [id number]

[address] [place] [address], [place] [address]

                          30 Aug, 2014               

 

                          [facility]     [address] [id number]

[address] [place] [address], [place] [address]

                          19 Aug, 2014               

 

                          [facility]     [address] [id number]

[address] [place] [address], [place] [address]

                          19 Aug, 2014               

 

                          [facility]     [address] [id number]

[address] [place] [address], [place] [address]

                          14 Aug, 2014               

 

                          [facility]     [address] [id number]

[address] [place] [address], [place] [address]

                          14 Aug, 2014               

 

                          [facility]     [address] [id number]

[address] [place] [address], [place] [address]

                          13 Aug, 2014               

 

                          [facility]     [address] [id number]

[address] [place] [address], [place] [address]

                          13 Aug, 2014               

 

                          [facility]     [address] [id number]

[address] [place] [address], [place] [address]

                                         

 

                          [facility]     [address] [id number]

[address] [place] [address], [place] [address]

                                         

 

                          [facility]     [address] [id number]

[address] [place] [address], [place] [address]

                                         

 

                          [facility]     [address] [id number]

[address] [place] [address], [place] [address]

                                         

 

                          [facility]     [address] [id number]

[address] [place] [address], [place] [address]

                                         

 

                          [facility]     [address] [id number]

[address] [place] [address], [place] [address]

                          ,                

 

                          [facility]     [address] [id number]

[address] [place] [address], [place] [address]

                          ,                

 

                          [facility]     [address] [id number]

[address] [place] [address], [place] [address]

                          ,                

 

                          [facility]     [address] [id number]

[address] [place] [address], [place] [address]

                          ,                

 

                          [facility]     [address] [id number]

[address] [place] [address], [place] [address]

                          ,                

 

                          [facility]     [address] [id number]

[address] [place] [address], [place] [address]

                          ,                

 

                          [facility]     [address] [id number]

[address] [place] [address], [place] [address]

                          ,                

 

                          [facility]     [address] [id number]

[address] [place] [address], [place] [address]

                          ,                

 

                          [facility]     [address] [address] [id number]

[address] [place] [address], [place] [address]

                          ,                

 

                          [facility]     [address] [id number]

[address] [place] [address], [place] [address]

                                         

 

                          [facility]     [address] [id number]

[address] [place] [address], [place] [address]

                          ,                

 

                          [facility]     [address] [id number]

[address] [place] [address], [place] [address]

                          ,                

 

                          [facility]     [address] [id number]

[address] [place] [address], [place] [address]

                          ,                

 

                          [facility]     [address] [id number]

[address] [place] [address], [place] [address]

                                         

 

                          [facility]     [address] [id number]

[address] [place] [address], [place] [address]

                          ,                

 

                          [facility]     [address] [id number]

[address] [place] [address]

                                         

 

                          [facility]     [address] [id number]

[address] [place] [address], [place] [address]

                                         

 

                          [facility]     [address] [id number]

[address] [place] [address], [place] [address]

                                         

 

                          [facility]     [address] [id number]

[address] [place] [address], [place] [address]

                                         

 

                          [facility]     [address] [id number]

[address] [place] [address], [place] [address]

                                         

 

                          [facility]     [address] [id number]

[address] [place] [address], [place] [address]

                                         

 

                          [facility]     [address] [id number]

[address] [place] [address], [place] [address]

                                         

 

                          [facility]     [address] [id number]

[address] [place] [address], [place] [address]

                          27 Mar, 2014               

 

                          [facility]     [address] [id number]

[address] [place] [address], [place] [address]

                          27 Mar, 2014               

 

                          [facility]     [address] [id number]

[address] [place] [address], [place] [address]

                                         

 

                          [facility]     [address] [id number]

[address] [place] [address], [place] [address]

                                         

 

                          [facility]     [address] [id number]

[address] [place] [address], [place] [address]

                                         

 

                          [facility]     [address] [id number]

[address] [place] [address]

                                         

 

                          [facility]     [address] [id number]

[address] [place] [address]

                          08 Oct, 2013               

 

                          [facility]     [address] [id number]

[address] [place] [address]

                          04 Oct, 2013               

 

                          [facility]     [address] [id number]

[address] [place] [address]

                          03 Oct, 2013               

 

                          [facility]     [address] [id number]

[address] [place] [address], [place] [address]

                          02 Oct, 2013               

 

                          [facility]     [address] [id number]

[address] [place] [address], [place] [address]

                          01 Oct, 2013               

 

                          [facility]     [address] [id number]

[address] [place] [address], [place] [address]

                          20 Sep, 2013               

 

                          [facility]     [address] [id number]

[address] [place] [address], [place] [address]

                          08 Aug, 2013               

 

                          [facility]     [address] [id number]

[address] [place] [address], [place] [address]

                          24 May, 2013               

 

                          [facility]     [address] [id number]

[address] [place] [address], [place] [address]

                          13 May, 2013               

 

                          [facility]     [address] [id number]

[address] [place] [address], [place] [address]

                                         

 

                          [facility]     [address] [id number]

[address] [place] [address], [place] [address]

                                         

 

                          [facility]     [address] [id number]

[address] [place] [address], [place] [address]

                                         

 

                          [facility]     [address] [id number]

[address] [place] [address], [place] [address]

                                         

 

                          [facility]     [address] [id number]

[address] [place] [address], [place] [address]

                          02 Oct, 2012               

 

                          [facility]     [address] [id number]

[address] [place] [address], [place] [address]

                          02 Oct, 2012               

 

                          [facility]     [address] [id number]

[address] [place] [address], [place] [address]

                          21 Sep, 2012               

 

                          [facility]     [address] [id number]

[address] [place] [address], [place] [address]

                          06 Aug, 2012               

 

                          [facility]     [address] [id number]

[address] [place] [address], [place] [address]

                          02 Aug, 2012               

 

                          [facility]     [address] [id number]

[address] [place] [address], [place] [address]

                                         

 

                          [facility]     [address] [id number]

[address] [place] [address]

                          27 2012               

 

                          [facility]     [address] [id number]

[address] [place] [address]

                          15 Eusebio, 2012               

 

                          [facility]     [address] [id number]

[address] [place] [address]

                                         

 

                          [facility]     [address] [id number]

[address] [place] [address]

                          10 Apr, 2012               

 

                          [facility]     [address] [id number]

[address] [place] [address]

                          22 Mar, 2012               

 

                          [facility]     [address] [id number]

[address] [place] [address]

                          20 Mar, 2012               

 

                          [facility]     [address] [id number]

[address] [place] [address]

                          14 Mar, 2012               

 

                          [facility]     [address] [id number]

[address] [place] [address]

                                         







IMMUNIZATIONS

No Known Immunizations



SOCIAL HISTORY

Never Assessed



REASON FOR VISIT

Transition/DM- ANGUS Quesada rn



PLAN OF CARE





                          Activity                  Details

 

                                         

 

                          Follow Up                 4 Weeks Reason:dm2







VITAL SIGNS





                    Height              69 in               2018

 

                    Weight              247 lbs             2018

 

                    Temperature         98.0 degrees Fahrenheit 2018

 

                    Heart Rate          74 bpm              2018

 

                    Respiratory Rate    18                  2018

 

                    BMI                 36.47 kg/m2         2018

 

                    Blood pressure systolic 122 mmHg            2018

 

                    Blood pressure diastolic 74 mmHg             2018







MEDICATIONS





        Medication Instructions Dosage  Frequency Start Date End Date Duration S

tatus

 

        Allegra Allergy 180 MG Orally Once a day 1 tablet as needed 24h         

                    Active

 

        MetFORMIN HCl  mg Orally twice a day 2 tablets 12h                

     30      Active

 

             Los Contour Next Test Test Strips In Vitro Once a day as directed

  24h          10 Aug, 

2016                                                        Active

 

        Fish Oil 1200 MG Orally Twice a day 1 capsule 12h                       

      Active

 

        Glimepiride 4 MG Orally 2 times a day 1 tablet 12h                      

       Active

 

        Depo-Provera 150 MG/ML         1 ml                                    A

ctive

 

        Xopenex HFA 45 MCG/ACT Inhalation every 4 hrs 1 puff as needed 4h       

                       Active

 

        Nexium 40 mg Orally Once a day 1 capsule 24h                     30     

 Active

 

        Prenatal Advantage - Orally Once a day 1 tablet 24h                     

        Active

 

        Los Contour Next Monitor w/Device         as directed         10 Aug, 

2016                 Active

 

        Crestor 10 mg Orally Once a day 1 tablet 24h                     30     

 Active

 

                    Triamcinolone Acetonide 0.1 % Externally Twice a day 1 appli

cation to affected 

area         12h          26 2018                           Active







RESULTS





                Name            Result          Date            Reference Range

 

                A1C (IN HOUSE)                  2018       

 

                A1C IN HOUSE    10.9                            4.3 - 5.6 %

 

                Previous A1c    8.5                              

 

                Lot             [id number]                             

 

                Exp date        2020                          







PROCEDURES





                Procedure       Date Ordered    Result          Body Site

 

                GLYCATED HEMOGLOBIN TEST 2018                    







INSTRUCTIONS





MEDICATIONS ADMINISTERED

No Known Medications



MEDICAL (GENERAL) HISTORY





                    Type                Description         Date

 

                          Medical History           allergic rhinitis- rec's edwige donahue allergy injections from Dr Rojas office                            

 

                    Medical History     mood disorder        

 

                    Medical History     Leukocytosis- has been to hematology  

 

                    Medical History     Hyperlipidemia       

 

                          Medical History           Left leg pain- multiple imag

ing performed and ortho referral 

placed                                   

 

                          Medical History           TUBULAR ADENOMA AND GASTRITI

S ON COLONOSOCPY AUG 2016 -MULTIPLE 

POLYPS                                   

 

                    Medical History     Helicobacter pylori (H. pylori) infectio

n Hx  

 

                    Surgical History    cholecystectomy     2006

 

                    Surgical History     section    , 

 

                    Surgical History    tonsillectomy       2015

 

                    Surgical History    colonoscopy         2016

 

                    Surgical History    colonscopy          2017

 

                    Surgical History    Right Knee scope    2017

## 2020-06-23 NOTE — XMS REPORT
[facility]

                             Created on: 2019



Jasonwhit, April

External Reference #: [id number]

: 1975

Sex: Female



Demographics





                          Address                   [address] [place]  [address]

 

                          Preferred Language        Unknown

 

                          Marital Status            Unknown

 

                          Gnosticism Affiliation     Unknown

 

                          Race                      Unknown

 

                          Ethnic Group              Unknown





Author





                          Author                    Darby SHARPE

 

                          [facility]

 

                          Address                   [address] [place]  [address]



 

                          Phone                     [phone]







Care Team Providers





                    Care Team Member Name Role                Phone

 

                    ANNETTE SHARPEWNYA       Unavailable         [phone]







PROBLEMS





          Type      Condition ICD9-CM Code ECO62-DD Code Onset Dates Condition S

tatus SNOMED 

Code

 

          Problem   Tubular adenoma of colon           D12.6               Activ

e    [id number]

 

          Problem   Duarte's neuroma of right foot           G57.61             

 Active    26373835

 

          Problem   History of leukocytosis           Z86.2               Active

    [id number]

 

          Problem   Non morbid obesity           E66.9               Active    4

[id number]

 

          Problem   Mixed hyperlipidemia           E78.2               Active   

 [id number]

 

          Problem   Seasonal allergic rhinitis due to pollen           J30.1    

           Active    [id number]

 

                          Problem                   Type 2 diabetes mellitus wit

hout complication, without long-term current

use of insulin              E11.9                     Active       [id number]

 

          Problem   Anxiety             F41.9               Active    85440338

 

           Problem    Seasonal allergic rhinitis due to other allergic trigger  

          J30.89                

Active                                  [id number]

 

          Problem   Dysthymic disorder           F34.1               Active    7

[id number]

 

          Problem   Arthritis           M19.90              Active    1306687







ALLERGIES

No Information



ENCOUNTERS





                Encounter       Location        Date            Diagnosis

 

                          [facility]     [address] [facility] [id number]

[address] [place] [address]

                                        Weight loss R63.4

 

                          [facility]     [address] [facility] [id number]

[address] [place] [address]

                          31 May, 2019              Type 2 diabetes mellitus wit

hout complication, without long-term 

current use of insulin E11.9

 

                          [facility]     [address] [place] [id number]

[address] [place] [address]

                          31 May, 2019              Type 2 diabetes mellitus wit

hout complication, without long-term 

current use of insulin E11.9 and Non morbid obesity E66.9

 

                          [facility]  [address] [facility] [id number]

[address] [place] [address] 

[address]                14 May, 2019              Seasonal allergic rhinitis d

ue to pollen J30.1 and Sore 

throat J02.9

 

                          [facility] WALK IN [place] [address] 

[address]                              Arthritis M19.90

 

                          [address] [address]

                                        Seasonal allergic rhinitis d

ue to other allergic trigger J30.89 and

Dysthymic disorder F34.1

 

                          [place] [address] [address]

                          05 Dec, 2018              Bilateral otitis media with 

effusion H65.93 and Anxiety F41.9

 

                          [address] [address]

                                        Type 2 diabetes mellitus wit

hout complication, without long-term 

current use of insulin E11.9

 

                          [address] [address]

                                        Pharyngitis due to Streptoco

ccus species J02.0

 

                          [address] [address]

                                         

 

                          [facility] WALK IN [address] 

[address]                10 Jul, 2018              Fever, unspecified fever cau

se R50.9 and Lymphadenopathy

R59.1

 

                          [facility] WALK IN [place] [address] 

[address]                              Pharyngitis due to other org

anism J02.8

 

                          [place] [address] [address]

                                         

 

                          [address] [address]

                                        Type 2 diabetes mellitus wit

hout complication, without long-term 

current use of insulin E11.9 and Other eczema L30.8

 

                          [facility] [place] [address] [place] 

[address]                28 2018              Acute pain of left shoulder 

M25.512

 

                          [place] [address] [place] [id number]

[address] [place] [address]

                          23 2018               

 

                          [place] [address] [facility] [id number]

[address] [place] [address]

                          15 2018              Type 2 diabetes mellitus wit

hout complication, without long-term 

current use of insulin E11.9

 

                          [place] [address] [place] [id number]

[address] [place] [address]

                          06 2018              Type 2 diabetes mellitus wit

hout complication, without long-term 

current use of insulin E11.9 ; Tubular adenoma of colon D12.6 ; Mixed 
hyperlipidemia E78.2 ; History of leukocytosis Z86.2 and Screening breast 
examination Z12.31

 

                          [place] [address] [place] [id number]

[address] [place] [address]

                                        Metatarsalgia of right foot 

M77.41 and Type 2 diabetes mellitus 

without complication, without long-term current use of insulin E11.9

 

                          [place] [address] [place] [id number]

[address] [place] [address]

                                        Capsulitis M77.9 and Metatar

salgia of right foot M77.41

 

                          [place] [address] [place] [id number]

[address] [place] [address]

                          08 Sep, 2017              Capsulitis M77.9

 

                          [place] [address] [place] [id number]

[address] [place] [address]

                          06 Sep, 2017               

 

                          [place] [address] [facility] [id number]

[address] [place] [address]

                          03 Aug, 2017              Type 2 diabetes mellitus wit

hout complication, without long-term 

current use of insulin E11.9 ; Tubular adenoma of colon D12.6 and Mixed 
hyperlipidemia E78.2

 

                          [place] [address] [place] [id number]

[address] [place] [address]

                                        Metatarsalgia of right foot 

M77.41 and Capsulitis M77.9

 

                          [facility]     [address] [facility] [id number]

[address] [place] [address]

                                         

 

                          [facility]  [address] [facility] [id number]

[address] [place] [address] 

[address]                              Duarte's neuroma of right fo

ot G57.61

 

                          [facility]     [address] [facility] [id number]

[address] [place] [address]

                                        Mixed hyperlipidemia E78.2 a

nd Type 2 diabetes mellitus without 

complication, without long-term current use of insulin E11.9

 

                          [facility]     [address] [facility] [id number]

[address] [place] [address]

                          10 Brandon, 2017              Type 2 diabetes mellitus wit

hout complication, without long-term 

current use of insulin E11.9 ; Tubular adenoma of colon D12.6 and Mixed 
hyperlipidemia E78.2

 

                          [facility]     [address] [facility] [id number]

[address] [place] [address]

                          22 Dec, 2016              Mixed hyperlipidemia E78.2

 

                          [facility]     [address] [facility] [id number]

[address] [place] [address]

                                         

 

                          [place] [address] [facility] [id number]

[address] [place] [address]

                                        Mixed hyperlipidemia E78.2

 

                          [facility]     [address] [facility] [id number]

[address] [place] [address]

                                        Mixed hyperlipidemia E78.2

 

                          [place] [address] [facility] [id number]

[address] [place] [address]

                          08 Sep, 2016               

 

                          [facility]     [address] [facility] [id number]

[address] [place] [address]

                          10 Aug, 2016              Type 2 diabetes mellitus wit

hout complication, without long-term 

current use of insulin E11.9 ; Helicobacter pylori (H. pylori) infection A04.8 
and Mixed hyperlipidemia E78.2

 

                          [facility]     [address] [facility] [id number]

[address] [place] [address]

                          08 Aug, 2016              Type 2 diabetes mellitus wit

hout complication, without long-term 

current use of insulin E11.9

 

                          [place] [address] [facility] [id number]

[address] [place] [address]

                          03 Aug, 2016              Type 2 diabetes mellitus wit

hout complication, without long-term 

current use of insulin E11.9

 

                          [facility]     [address] [place] [id number]

[address] [place] [address]

                          02 Aug, 2016              Dyspepsia R10.13 ; Upper abd

ominal pain R10.10 ; Bowel habit 

changes R19.4 ; History of leukocytosis Z86.2 and Helicobacter pylori (H. 
pylori) infection A04.8

 

                          [facility]  [address] 

[address]                27 May, 2016              Environmental allergies Z91.

09

 

                          [place] [address] [address]

                                        Left leg pain M79.605 ; Loca

lized swelling of lower leg R22.40 and 

Upper respiratory infection J06.9

 

                          [place] [address] [address]

                          28 Dec, 2015               

 

                          [place] [address] [address]

                          21 Dec, 2015              Left leg pain M79.605

 

                          [place] [address] [address]

                          10 Dec, 2015              Left leg pain M79.605 and Lo

calized swelling of lower leg R22.40

 

                          [place] [address] [place] [id number]

[address] [place] [address]

                          01 Dec, 2015              Left leg pain M79.605

 

                          [place] [address] [address]

                                        Encounter for immunization Z

23

 

                          [place] [address] [place] [id number]

[address] [place] [address]

                          12 Oct, 2015              Bronchitis J40

 

                          [place] [address] [address]

                          30 Sep, 2015              Physical exam, pre-employmen

t V70.5 ; Encounter for PPD test V74.1 

and Hyperlipidemia 272.4

 

                          [facility]     [address] [place] [id number]

[address] [place] [address]

                          21 Sep, 2015               

 

                          [facility]     [address] [place] [id number]

[address] [place] [address]

                          21 Sep, 2015               

 

                          [facility]     [address] [address]

                          09 Sep, 2015               

 

                          [facility]     [address] [place] [id number]

[address] [place] [address]

                          04 Sep, 2015              Elevated blood sugar 790.29

 

                          [facility]     [address] [address]

                          03 Sep, 2015              Elevated blood sugar 790.29

 

                          [facility]     [address] [address]

                          03 Sep, 2015              Palpitations 785.1

 

                          [facility]     [address] [place] [id number]

[address] [place] [address]

                          01 Sep, 2015              Palpitations 785.1

 

                          [facility]     [address] [place] [id number]

[address] [place] [address]

                                         

 

                          [facility]     [address] [place] [id number]

[address] [place] [address]

                          28 May, 2015              Hand pain, right 729.5 and D

epression with anxiety 300.4

 

                          [facility]     [address] [place] [id number]

[address] [place] [address]

                                         

 

                          [facility]     [address] [facility] [id number]

[address] [place] [address]

                                         

 

                          [facility]     [address] [place] [id number]

[address] [place] [address]

                          05 Mar, 2015               

 

                          [facility]     [address] [place] [id number]

[address] [place] [address]

                          05 Mar, 2015               

 

                          [facility]     [address] [id number]

[address] [place] [address], [place] [address]

                                         

 

                          [facility]     [address] [id number]

[address] [place] [address], [place] [address]

                                         

 

                          [facility]     [address] [id number]

[address] [place] [address], [place] [address]

                          10 Feb,                

 

                          [facility]     [address] [id number]

[address] [place] [address], [place] [address]

                          10 Feb,                

 

                          [facility]     [address] [id number]

[address] [place] [address], [place] [address]

                                         

 

                          [facility]     [address] [id number]

[address] [place] [address], [place] [address]

                                         

 

                          [facility]     [address] [id number]

[address] [place] [address], [place] [address]

                                         

 

                          [facility]     [address] [id number]

[address] [place] [address], [place] [address]

                                         

 

                          [facility]     [address] [id number]

[address] [place] [address], [place] [address]

                                         

 

                          [facility]     [address] [id number]

[address] [place] [address], [place] [address]

                                         

 

                          [facility]     [address] [id number]

[address] [place] [address], [place] [address]

                                         

 

                          [facility]     [address] [id number]

[address] [place] [address], [place] [address]

                                         

 

                          [facility]     [address] [id number]

[address] [place] [address], [place] [address]

                                         

 

                          [facility]     [address] [id number]

[address] [place] [address], [place] [address]

                                         

 

                          [facility]     [address] [id number]

[address] [place] [address], [place] [address]

                                         

 

                          [facility]     [address] [id number]

[address] [place] [address], [place] [address]

                          16 2015               

 

                          [facility]     [address] [id number]

[address] [place] [address], [place] [address]

                          15 2015               

 

                          [facility]     [address] [id number]

[address] [place] [address], [place] [address]

                          15 2015               

 

                          [facility]     [address] [id number]

[address] [place] [address], [place] [address]

                                         

 

                          [facility]     [address] [id number]

[address] [place] [address], [place] [address]

                                         

 

                          [facility]     [address] [id number]

[address] [place] [address], [place] [address]

                          15 Oct, 2014               

 

                          [facility]     [address] [id number]

[address] [place] [address], [place] [address]

                          15 Oct, 2014               

 

                          [facility]     [address] [id number]

[address] [place] [address], [place] [address]

                          14 Oct, 2014               

 

                          [facility]     [address] [id number]

[address] [place] [address], [place] [address]

                          14 Oct, 2014               

 

                          [facility]     [address] [id number]

[address] [place] [address], [place] [address]

                          07 Oct, 2014               

 

                          [facility]     [address] [id number]

[address] [place] [address], [place] [address]

                          07 Oct, 2014               

 

                          [facility]     [address] [id number]

[address] [place] [address], [place] [address]

                          12 Sep,                

 

                          [facility]     [address] [id number]

[address] [place] [address]

                          12 Sep,                

 

                          [facility]     [address] [id number]

[address] [place] [address], [place] [address]

                          04 Sep,                

 

                          [facility]     [address] [id number]

[address] [place] [address], [place] [address]

                          03 Sep,                

 

                          [facility]     [address] [id number]

[address] [place] [address], [place] [address]

                          03 Sep,                

 

                          [facility]     [address] [id number]

[address] [place] [address]

                          02 Sep, 2014               

 

                          [facility]     [address] [id number]

[address] [place] [address], [place] [address]

                          02 Sep, 2014               

 

                          [facility]     [address] [id number]

[address] [place] [address], [place] [address]

                          30 Aug, 2014               

 

                          [facility]     [address] [id number]

[address] [place] [address], [place] [address]

                          30 Aug, 2014               

 

                          [facility]     [address] [id number]

[address] [place] [address], [place] [address]

                          19 Aug, 2014               

 

                          [facility]     [address] [id number]

[address] [place] [address], [place] [address]

                          19 Aug, 2014               

 

                          [facility]     [address] [id number]

[address] [place] [address], [place] [address]

                          14 Aug, 2014               

 

                          [facility]     [address] [id number]

[address] [place] [address], [place] [address]

                          14 Aug, 2014               

 

                          [facility]     [address] [id number]

[address] [place] [address], [place] [address]

                          13 Aug, 2014               

 

                          [facility]     [address] [id number]

[address] [place] [address], [place] [address]

                          13 Aug, 2014               

 

                          [facility]     [address] [address] [id number]

[address] [place] [address], [place] [address]

                                         

 

                          [facility]     [address] [id number]

[address] [place] [address], [place] [address]

                                         

 

                          [facility]     [address] [id number]

[address] [place] [address], [place] [address]

                                         

 

                          [facility]     [address] [id number]

[address] [place] [address], [place] [address]

                                         

 

                          [facility]     [address] [id number]

[address] [place] [address], [place] [address]

                                         

 

                          [facility]     [address] [id number]

[address] [place] [address], [place] [address]

                                         

 

                          [facility]     [address] [id number]

[address] [place] [address], [place] [address]

                          ,                

 

                          [facility]     [address] [id number]

[address] [place] [address], [place] [address]

                          ,                

 

                          [facility]     [address] [id number]

[address] [place] [address], [place] [address]

                          ,                

 

                          [facility]     [address] [id number]

[address] [place] [address], [place] [address]

                          ,                

 

                          [facility]     [address] [id number]

[address] [place] [address], [place] [address]

                                         

 

                          [facility]     [address] [id number]

[address] [place] [address], [place] [address]

                          ,                

 

                          [facility]     [address] [id number]

[address] [place] [address], [place] [address]

                          ,                

 

                          [facility]     [address] [id number]

[address] [place] [address], [place] [address]

                          ,                

 

                          [facility]     [address] [id number]

[address] [place] [address], [place] [address]

                                         

 

                          [facility]     [address] [id number]

[address] [place] [address], [place] [address]

                                         

 

                          [facility]     [address] [id number]

[address] [place] [address], [place] [address]

                          ,                

 

                          [facility]     [address] [id number]

[address] [place] [address], [place] [address]

                          ,                

 

                          [facility]     [address] [id number]

[address] [place] [address], [place] [address]

                                         

 

                          [facility]     [address] [id number]

[address] [place] [address], [place] [address]

                                         

 

                          [facility]     [address] [id number]

[address] [place] [address], [place] [address]

                                         

 

                          [facility]     [address] [id number]

[address] [place] [address]

                                         

 

                          [facility]     [address] [id number]

[address] [place] [address], [place] [address]

                                         

 

                          [facility]     [address] [id number]

[address] [place] [address], [place] [address]

                                         

 

                          [facility]     [address] [id number]

[address] [place] [address], [place] [address]

                                         

 

                          [facility]     [address] [id number]

[address] [place] [address], [place] [address]

                                         

 

                          [facility]     [address] [id number]

[address] [place] [address], [place] [address]

                                         

 

                          [facility]     [address] [id number]

[address] [place] [address], [place] [address]

                          27 Mar, 2014               

 

                          [facility]     [address] [id number]

[address] [place] [address], [place] [address]

                          27 Mar, 2014               

 

                          [facility]     [address] [id number]

[address] [place] [address], [place] [address]

                                         

 

                          [facility]     [address] [id number]

[address] [place] [address], [place] [address]

                                         

 

                          [facility]     [address] [id number]

[address] [place] [address], [place] [address]

                                         

 

                          [facility]     [address] [id number]

[address] [place] [address], [place] [address]

                                         

 

                          [facility]     [address] [id number]

[address] [place] [address]

                          08 Oct, 2013               

 

                          [facility]     [address] [id number]

[address] [place] [address], [place] [address]

                          04 Oct, 2013               

 

                          [facility]     [address] [id number]

[address] [place] [address], [place] [address]

                          03 Oct, 2013               

 

                          [facility]     [address] [id number]

[address] [place] [address], [place] [address]

                          02 Oct, 2013               

 

                          [facility]     [address] [id number]

[address] [place] [address], [place] [address]

                          01 Oct, 2013               

 

                          [facility]     [address] [id number]

[address] [place] [address], [place] [address]

                          20 Sep, 2013               

 

                          [facility]     [address] [id number]

[address] [place] [address], [place] [address]

                          08 Aug, 2013               

 

                          [facility]     [address] [id number]

[address] [place] [address], [place] [address]

                          24 May, 2013               

 

                          [facility]     [address] [id number]

[address] [place] [address], [place] [address]

                          13 May, 2013               

 

                          [facility]     [address] [id number]

[address] [place] [address], [place] [address]

                                         

 

                          [facility]     [address] [id number]

[address] [place] [address], [place] [address]

                                         

 

                          [facility]     [address] [id number]

[address] [place] [address], [place] [address]

                                         

 

                          [facility]     [address] [id number]

[address] [place] [address], [place] [address]

                                         

 

                          [facility]     [address] [id number]

[address] [place] [address], [place] [address]

                          02 Oct, 2012               

 

                          [facility]     [address] [id number]

[address] [place] [address], [place] [address]

                          02 Oct, 2012               

 

                          [facility]     [address] [id number]

[address] [place] [address], [place] [address]

                          21 Sep, 2012               

 

                          [facility]     [address] [id number]

[address] [place] [address], [place] [address]

                          06 Aug, 2012               

 

                          [facility]     [address] [id number]

[address] [place] [address], [place] [address]

                          02 Aug, 2012               

 

                          [facility]     [address] [id number]

[address] [place] [address], [place] [address]

                                         

 

                          [facility]     [address] [id number]

[address] [place] [address], [place] [address]

                           2012               

 

                          [facility]     [address] [id number]

[address] [place] [address]

                          15 2012               

 

                          [facility]     [address] [id number]

[address] [place] [address]

                          20 2012               

 

                          [facility]     [address] [id number]

[address] [place] [address]

                          10 2012               

 

                          [facility]     [address] [id number]

[address] [place] [address]

                          22 Mar, 2012               

 

                          [facility]     [address] [id number]

[address] [place] [address]

                          20 Mar, 2012               

 

                          [facility]     [address] [id number]

[address] [place] [address]

                          14 Mar, 2012               

 

                          [facility]     [address] [facility] [id number]

[address] [place] [address]

                                         







IMMUNIZATIONS

No Known Immunizations



SOCIAL HISTORY

Never Assessed



REASON FOR VISIT





PLAN OF CARE





VITAL SIGNS





MEDICATIONS

Unknown Medications



RESULTS

No Results



PROCEDURES





                Procedure       Date Ordered    Result          Body Site

 

                AUTOMATED RETICULOCYTE COUNT 2015                     

 

                BL SMEAR W/DIFF WBC COUNT 2015                     

 

                LAB PATHOLOGY CONSULTATION 2015                     

 

                VENIPUNCT, ROUTINE* 2015                     







INSTRUCTIONS





MEDICATIONS ADMINISTERED

No Known Medications



MEDICAL (GENERAL) HISTORY





                    Type                Description         Date

 

                          Medical History           allergic rhinitis- rec's wee

kly allergy injections from Dr Rojas office                            

 

                    Medical History     mood disorder        

 

                    Medical History     Leukocytosis- has been to hematology  

 

                    Medical History     Hyperlipidemia       

 

                          Medical History           Left leg pain- multiple imag

ing performed and ortho referral 

placed                                   

 

                          Medical History           TUBULAR ADENOMA AND GASTRITI

S ON COLONOSOCPY AUG 2016 -MULTIPLE 

POLYPS                                   

 

                    Medical History     Helicobacter pylori (H. pylori) infectio

n Hx  

 

                    Surgical History    cholecystectomy     2006

 

                    Surgical History     section    2008

 

                    Surgical History    tonsillectomy       2015

 

                    Surgical History    colonoscopy         2016

 

                    Surgical History    colonscopy          2017

 

                    Surgical History    Right Knee scope    2017

## 2020-06-23 NOTE — XMS REPORT
[facility]

                             Created on: 2020



Darby Love

External Reference #: [id number]

: 1975

Sex: Female



Demographics





                          Address                   [address]

[place]  [address]

 

                          Preferred Language        Unknown

 

                          Marital Status            Unknown

 

                          Quaker Affiliation     Unknown

 

                          Race                      Unknown

 

                          Ethnic Group              Unknown





Author





                          Author                    Darby BALTAZAR

 

                          Organization              [facility]

 

                          Address                   [address] [place]  [address]



 

                          Phone                     [phone]







Care Team Providers





                    Care Team Member Name Role                Phone

 

                    NAT BALTAZAR       Unavailable         [phone]







PROBLEMS





          Type      Condition ICD9-CM Code HSH70-UA Code Onset Dates Condition S

tatus SNOMED 

Code

 

          Problem   Duarte's neuroma of right foot           G57.61             

 Active    53214292

 

          Problem   History of leukocytosis           Z86.2               Active

    [id number]

 

          Problem   Anxiety             F41.9               Active    33965005

 

                          Problem                   Type 2 diabetes mellitus wit

hout complication, without long-term current

use of insulin              E11.9                     Active       [id number]

 

          Problem   Seasonal allergic rhinitis due to pollen           J30.1    

           Active    01444356

 

          Problem   Tubular adenoma of colon           D12.6               Activ

e    260049006

 

          Problem   GERD with esophagitis           K21.0               Active  

  484631954

 

          Problem   Mixed hyperlipidemia           E78.2               Active   

 597019326

 

           Problem    Seasonal allergic rhinitis due to other allergic trigger  

          J30.89                

Active                                  [id number]

 

          Problem   Dysthymic disorder           F34.1               Active    7

9744661

 

          Problem   Arthritis           M19.90              Active    0760748

 

          Problem   Non morbid obesity           E66.9               Active    4

[id number]







ALLERGIES

No Information



ENCOUNTERS





                Encounter       Location        Date            Diagnosis

 

                    [facility] [id number] [place] [address] 10 

Feb, 2020                                

 

                    [facility] [id number] [place] [address]                                 

 

                    [facility] [address] [place] [address] 15 

2020                               Leg pain, left M79.605

 

                          [facility]  [address] [facility] [id number]

[address] [place] 

[address]                              Leg pain, left M79.605

 

                    [facility] [address] [place] [address]                                Type 2 diabetes mellitus without complic

ation, without long-term 

current use of insulin E11.9 and GERD with esophagitis K21.0

 

                    [place] [address] [address] 01 

Oct, 2019                               Encounter for immunization Z23

 

                    [place] [address] [address]                                Type 2 diabetes mellitus without complic

ation, without long-term 

current use of insulin E11.9

 

                    [place] [address] [address]                                 

 

                    [place] [address] [address]                                Type 2 diabetes mellitus without complic

ation, without long-term 

current use of insulin E11.9

 

                    [place] [address] [address]                                Weight loss R63.4

 

                    [place] [address] [address] 31 

May, 2019                               Type 2 diabetes mellitus without complic

ation, without long-term 

current use of insulin E11.9

 

                    [place] [address] [address] 31 

May, 2019                               Type 2 diabetes mellitus without complic

ation, without long-term 

current use of insulin E11.9 and Non morbid obesity E66.9

 

                          [facility] [place] [address]

[address] [place] [address] 

[address]                14 May, 2019              Seasonal allergic rhinitis d

ue to pollen J30.1 and Sore 

throat J02.9

 

                          [facility] [place] [address]

[address] [place] [address] 

[address]                              Arthritis M19.90

 

                    [place] [address] [address]                                Seasonal allergic rhinitis due to other 

allergic trigger J30.89 and 

Dysthymic disorder F34.1

 

                    [facility] [place] [address] [address] 05 

Dec, 2018                               Bilateral otitis media with effusion H65

.93 and Anxiety F41.9

 

                    [address] [address]                                Type 2 diabetes mellitus without complic

ation, without long-term 

current use of insulin E11.9

 

                    [address] [address]                                Pharyngitis due to Streptococcus species

 J02.0

 

                    [address] [address]                                 

 

                          [facility] [address] 

[address]                10 Jul, 2018              Fever, unspecified fever cau

se R50.9 and Lymphadenopathy

R59.1

 

                          [facility] [address] 

[address]                              Pharyngitis due to other org

anism J02.8

 

                    [address] [address]                                 

 

                    [address] [address]                                Type 2 diabetes mellitus without complic

ation, without long-term 

current use of insulin E11.9 and Other eczema L30.8

 

                          [facility] [address] 

[address]                              Acute pain of left shoulder 

M25.512

 

                    [address] [address]                                 

 

                    [address] [address] 15 

2018                               Type 2 diabetes mellitus without complic

ation, without long-term 

current use of insulin E11.9

 

                    [address] [address]                                Type 2 diabetes mellitus without complic

ation, without long-term 

current use of insulin E11.9 ; Tubular adenoma of colon D12.6 ; Mixed 
hyperlipidemia E78.2 ; History of leukocytosis Z86.2 and Screening breast 
examination Z12.31

 

                    [place] [address] [address]                                Metatarsalgia of right foot M77.41 and T

ype 2 diabetes mellitus 

without complication, without long-term current use of insulin E11.9

 

                    [place] [address] [address]                                Capsulitis M77.9 and Metatarsalgia of ri

ght foot M77.41

 

                    [place] [address] [address] 08 

Sep, 2017                               Capsulitis M77.9

 

                    [place] [address] [address] 06 

Sep, 2017                                

 

                    [place] [address] [address] 03 

Aug, 2017                               Type 2 diabetes mellitus without complic

ation, without long-term 

current use of insulin E11.9 ; Tubular adenoma of colon D12.6 and Mixed 
hyperlipidemia E78.2

 

                    [place] [address] [address]                                Metatarsalgia of right foot M77.41 and C

apsulitis M77.9

 

                    [place] [address] [address]                                 

 

                          [facility]  [facility] [address] [facility] [id number]

[address] 

[address]                              Duarte's neuroma of right fo

ot G57.61

 

                    [place] [address] [address]                                Mixed hyperlipidemia E78.2 and Type 2 di

abetes mellitus without 

complication, without long-term current use of insulin E11.9

 

                    [place] [address] [address] 10 

Brandon, 2017                               Type 2 diabetes mellitus without complic

ation, without long-term 

current use of insulin E11.9 ; Tubular adenoma of colon D12.6 and Mixed 
hyperlipidemia E78.2

 

                    [place] [address] [address] 22 

Dec, 2016                               Mixed hyperlipidemia E78.2

 

                    [place] [address] [address]                                 

 

                    [place] [address] [address]                                Mixed hyperlipidemia E78.2

 

                    [place] [address] [address]                                Mixed hyperlipidemia E78.2

 

                    [place] [address] [address] 08 

Sep, 2016                                

 

                    [place] [address] [address] 10 

Aug, 2016                               Type 2 diabetes mellitus without complic

ation, without long-term 

current use of insulin E11.9 ; Helicobacter pylori (H. pylori) infection A04.8 
and Mixed hyperlipidemia E78.2

 

                    [place] [address] [address] 08 

Aug, 2016                               Type 2 diabetes mellitus without complic

ation, without long-term 

current use of insulin E11.9

 

                    [place] [address] [address] 03 

Aug, 2016                               Type 2 diabetes mellitus without complic

ation, without long-term 

current use of insulin E11.9

 

                    [place] [address] [address] 02 

Aug, 2016                               Dyspepsia R10.13 ; Upper abdominal pain 

R10.10 ; Bowel habit changes 

R19.4 ; History of leukocytosis Z86.2 and Helicobacter pylori (H. pylori) 
infection A04.8

 

                          [facility] [place] [facility]  [address] [id number]

[address] [place] 

[address]                27 May, 2016              Environmental allergies Z91.

09

 

                    [place] [address] [address]                                Left leg pain M79.605 ; Localized swelli

ng of lower leg R22.40 and 

Upper respiratory infection J06.9

 

                    [place] [address] [address] 28 

Dec, 2015                                

 

                    [place] [address] [address] 21 

Dec, 2015                               Left leg pain M79.605

 

                    [place] [address] [address] 10 

Dec, 2015                               Left leg pain M79.605 and Localized swel

ling of lower leg R22.40

 

                    [place] [address] [address] 01 

Dec, 2015                               Left leg pain M79.605

 

                    [place] [address] [address]                                Encounter for immunization Z23

 

                    [address] [address] 12 

Oct, 2015                               Bronchitis J40

 

                    [place] [address] [address] 30 

Sep, 2015                               Physical exam, pre-employment V70.5 ; En

counter for PPD test V74.1 and

Hyperlipidemia 272.4

 

                    [address] [address] 21 

Sep, 2015                                

 

                    [place] [address] [address] 21 

Sep, 2015                                

 

                    [address] [address] 09 

Sep, 2015                                

 

                    [place] [address] [address] 04 

Sep, 2015                               Elevated blood sugar 790.29

 

                    [address] [address] 03 

Sep, 2015                               Elevated blood sugar 790.29

 

                    [facility] [address] [place] [id number] [place] [address] 03 

Sep, 2015                               Palpitations 785.1

 

                    [facility] [address] [place] [id number] [place] [address] 01 

Sep, 2015                               Palpitations 785.1

 

                    [facility] [address] [place] [id number] [place] [address]                                 

 

                    [facility] [address] [place] [id number] [place] [address] 28 

May, 2015                               Hand pain, right 729.5 and Depression wi

th anxiety 300.4

 

                    [facility] [address] [place] [id number] [place] [address]                                 

 

                    [facility] [address] [place] [id number] [place] [address]                                 

 

                    [facility] [address] [place] [id number] [place] [address] 05 

Mar, 2015                                

 

                    [facility] [address] [place] [id number] [place] [address] 05 

Mar, 2015                                

 

                    [facility] [address] [place] [id number] [place] [address]                                 

 

                    [facility] [address] [place] [id number] [place] [address]                                 

 

                    [facility] [address] [place] [id number] [place] [address] 10 

Feb, 2015                                

 

                    [facility] [address] [place] [address] [id number] [place] [address] 10 

Feb, 2015                                

 

                    [facility] [address] [place] [id number] [place] [address]                                 

 

                    [facility] [address] [place] [id number] [place] [address]                                 

 

                    [facility] [address] [place] [id number] [place] [address]                                 

 

                    [facility] [address] [place] [address] [address] [place] [address]                                 

 

                    [facility] [place] [facility] [address] [facility] [id number] [place] [address]                                 

 

                    [facility] [place] [facility] [id number] [place] [address]                                 

 

                    [facility] [place] [facility] [id number] [place] [address]                                 

 

                    [facility] [place] [facility] [id number] [place] [address]                                 

 

                    [facility] [place] [facility] [id number] [place] [address]                                 

 

                    [facility] [place] [facility] [id number] [place] [address]                                 

 

                    [facility] [place] [facility] [id number] [place] [address]                                 

 

                    [facility] [place] [facility] [id number] [place] [address]                                 

 

                    [facility] [place] [facility] [id number] [place] [address] 15 

Brandon, 2015                                

 

                    [facility] [place] [facility] [id number] [place] [address] 15 

Br[place], 2015                                

 

                    [facility] [place] [facility] [id number] [place] [address]                                 

 

                    [facility] [place] [facility] [id number] [place] [address]                                 

 

                    [facility] [place] [facility] [id number] [place] [address] 15 

Oct, 2014                                

 

                    [facility] [place] [facility] [id number] [place] [address] 15 

Oct, 2014                                

 

                    [facility] [place] [facility] [id number] [place] [address] 14 

Oct, 2014                                

 

                    [facility] [place] [facility] [id number] [place] [address] 14 

Oct, 2014                                

 

                    [facility] [place] [facility] [address] [id number] [place] [address] 07 

Oct, 2014                                

 

                    [facility] [place] [facility] [address] [facility] [id number] [place] [address] 07 

Oct, 2014                                

 

                    [facility] [place] [facility] [id number] [place] [address] 12 

Sep,                                 

 

                    [facility] [place] [facility] [id number] [place] [address] 12 

Sep,                                 

 

                    [facility] [place] [facility] [address] [facility] [id number] [place] [address] 04 

Sep,                                 

 

                    [facility] [place] [facility] [id number] [place] [address] 03 

Sep, 2014                                

 

                    [facility] [place] [facility] [id number] [place] [address] 03 

Sep,                                 

 

                    [facility] [place] [facility] [id number] [place] [address] 02 

Sep, 2014                                

 

                    [facility] [place] [facility] [id number] [place] [address] 02 

Sep, 2014                                

 

                    [facility] [place] [facility] [id number] [place] [address] 30 

Aug, 2014                                

 

                    [facility] [place] [facility] [id number] [place] [address] 30 

Aug, 2014                                

 

                    [facility] [place] [facility] [id number] [place] [address] 19 

Aug, 2014                                

 

                    [facility] [place] [facility] [id number] [place] [address] 19 

Aug, 2014                                

 

                    [facility] [place] [facility] [address] [facility] [id number] [place] [address] 14 

Aug, 2014                                

 

                    [facility] [place] [facility] [id number] [place] [address] 14 

Aug, 2014                                

 

                    [facility] [place] [facility] [id number] [place] [address] 13 

Aug, 2014                                

 

                    [facility] [place] [facility] [id number] [place] [address] 13 

Aug, 2014                                

 

                    [facility] [place] [facility] [address] [id number] [facility] [place] [address]                                 

 

                    [facility] [place] [facility] [address] [facility] [id number] [place] [address]                                 

 

                    [facility] [place] [facility] [address] [facility] [id number] [facility] [place] [address]                                 

 

                    [facility] [place] [facility] [address] [id number] [place] [address]                                 

 

                    [facility] [place] [facility] [address] [id number] [place] [address]                                 

 

                    [facility] [place] [facility] [address] [id number] [place] [address]                                 

 

                    [facility] [place] [facility] [address] [facility] [id number] [facility] [place] [address]                                 

 

                    [facility] [place] [facility] [address] [id number] [place] [address] ,                                 

 

                    [facility] [place] [facility] [address] [facility] [id number] [facility] [place] [address]                                 

 

                    [facility] [place] [facility] [address] [id number] [place] [address]                                 

 

                    [facility] [place] [facility] [address] [facility] [id number] [place] [address]                                 

 

                    [facility] [place] [facility] [address] [id number] [place] [address]                                 

 

                    [facility] [place] [facility] [address] [facility] [id number] [facility] [place] [address] ,                                 

 

                    [facility] [place] [facility] [address] [id number] [place] [address]                                 

 

                    [facility] [place] [facility] [address] [id number] [place] [address]                                 

 

                    [facility] [place] [facility] [address] [facility] [id number] [place] [address] ,                                 

 

                    [facility] [place] [facility] [id number] [place] [address]                                 

 

                    [facility] [place] [facility] [address] [id number] [place] [address]                                 

 

                    [facility] [place] [facility] [id number] [place] [address]                                 

 

                    [facility] [place] [facility] [id number] [place] [address]                                 

 

                    [facility] [place] [facility] [id number] [place] [address]                                 

 

                    [facility] [place] [facility] [id number] [place] [address]                                 

 

                    [facility] [place] [facility] [id number] [place] [address]                                 

 

                    [facility] [id number] [place]

 [place] [address]                                 

 

                    [facility] [id number] [place] [address]                                 

 

                    [facility] [id number] [place] [address]                                 

 

                    [facility] [id number] [place] [address]                                 

 

                    [facility] [id number] [place] [address] 27 

Mar, 2014                                

 

                    [facility] [id number] [place] [address] 27 

Mar, 2014                                

 

                    [facility] [id number] [place] [address]                                 

 

                    [facility] [id number] [place] [address]                                 

 

                    [facility] [id number] [place] [address]                                 

 

                    [facility] [id number] [place] [address]                                 

 

                    [facility] [id number] [place] [address] 08 

Oct, 2013                                

 

                    [facility] [id number] [place] [address] 04 

Oct, 2013                                

 

                    [facility] [id number] [place] [address] 03 

Oct, 2013                                

 

                    [facility] [id number] [place] [address] 02 

Oct, 2013                                

 

                    [facility] [id number] [place] [address] 01 

Oct, 2013                                

 

                    [facility] [id number] [place] [address] 20 

Sep, 2013                                

 

                    [facility] [id number] [place] [address] 08 

Aug, 2013                                

 

                    [facility] [id number] [place] [address] 24 

May, 2013                                

 

                    [facility] [id number] [place] [address] 13 

May, 2013                                

 

                    [facility] [id number] [place] [address]                                 

 

                    [facility] [id number] [place] [address]                                 

 

                    [facility] [id number] [place] [address]                                 

 

                    [facility] [id number] [place] [address]                                 

 

                    [facility] [id number] [place] [address] 02 

Oct, 2012                                

 

                    [facility] [id number] [place] [address] 02 

Oct, 2012                                

 

                    [facility] [id number] [place] [address] 21 

Sep, 2012                                

 

                    [facility] [id number] [place] [address] 06 

Aug, 2012                                

 

                    [facility] [id number] [place] [address] 02 

Aug, 2012                                

 

                    [facility] [id number] [place] [address]                                 

 

                    [facility] [id number] [place] [address]                                 

 

                    [facility] [id number] [place] [address] 15 

Eusebio, 2012                                

 

                    [facility] [id number] [place] [address]                                 

 

                    [facility] [id number] [place] [address] 10 

Apr, 2012                                

 

                    [facility] [id number] [place] [address] 22 

Mar, 2012                                

 

                    [facility] [id number] [place] [address] 20 

Mar, 2012                                

 

                    [facility] [id number] [place] [address] 14 

Mar, 2012                                

 

                    [facility] [id number] [place] [address]                                 







IMMUNIZATIONS

No Known Immunizations



SOCIAL HISTORY

Never Assessed



REASON FOR VISIT





PLAN OF CARE





VITAL SIGNS





MEDICATIONS

Unknown Medications



RESULTS

No Results



PROCEDURES





                Procedure       Date Ordered    Result          Body Site

 

                TB INTRADERMAL TEST 2014                    







INSTRUCTIONS





MEDICATIONS ADMINISTERED

No Known Medications



MEDICAL (GENERAL) HISTORY





                    Type                Description         Date

 

                          Medical History           allergic rhinitis- rec's edwige donahue allergy injections from Dr Rojas office                            

 

                    Medical History     mood disorder        

 

                    Medical History     Leukocytosis- has been to hematology  

 

                    Medical History     Hyperlipidemia       

 

                          Medical History           Left leg pain- multiple imag

ing performed and ortho referral 

placed                                   

 

                          Medical History           TUBULAR ADENOMA AND GASTRITI

S ON COLONOSOCPY AUG 2016 -MULTIPLE 

POLYPS                                   

 

                    Medical History     Helicobacter pylori (H. pylori) infectio

n Hx  

 

                    Surgical History    cholecystectomy     2006

 

                    Surgical History     section    , 

 

                    Surgical History    tonsillectomy       2015

 

                    Surgical History    colonoscopy         2016

 

                    Surgical History    colonscopy          2017

 

                    Surgical History    Right Knee scope    2017

## 2020-06-23 NOTE — XMS REPORT
[facility]

                             Created on: 2018



Kate April

External Reference #: [id number]

: 1975

Sex: Female



Demographics





                          Address                   [address] [place]  [address]

 

                          Preferred Language        Unknown

 

                          Marital Status            Unknown

 

                          Caodaism Affiliation     Unknown

 

                          Race                      Unknown

 

                          Ethnic Group              Unknown





Author





                          Author                    Darby SHARPE

 

                          [facility]

 

                          Address                   [address] [place]  [address]



 

                          Phone                     [phone]







Care Team Providers





                    Care Team Member Name Role                Phone

 

                    NEGRO SHARPEA       Unavailable         [phone]







PROBLEMS





          Type      Condition ICD9-CM Code MSP56-NA Code Onset Dates Condition S

tatus SNOMED 

Code

 

          Problem   History of leukocytosis           Z86.2               Active

    [id number]

 

          Problem   Duarte's neuroma of right foot           G57.61             

 Active    [id number]

 

                          Problem                   Type 2 diabetes mellitus wit

hout complication, without long-term current

use of insulin              E11.9                     Active       [id number]

 

          Problem   Tubular adenoma of colon           D12.6               Activ

e    [id number]

 

          Problem   Mixed hyperlipidemia           E78.2               Active   

 [id number]







ALLERGIES





             Substance    Reaction     Event Type   Date         Status

 

             Penicillin V Potassium Unknown      Drug Allergy 03 Aug, 2017 Activ

e







ENCOUNTERS





                Encounter       Location        Date            Diagnosis

 

                          [facility]  [address] [facility] [id number]

[address] [place] [address] 

[address]                              Acute pain of left shoulder 

M25.512

 

                          [facility]     [address] [id number]

[address] [place] [address]

                                         

 

                          [facility]     [address] [facility] [id number]

[address] [place] [address]

                          15 Feb, 2018              Type 2 diabetes mellitus wit

hout complication, without long-term 

current use of insulin E11.9

 

                          [facility]     [address] [facility] [id number]

[address] [place] [address]

                                        Type 2 diabetes mellitus wit

hout complication, without long-term 

current use of insulin E11.9 ; Tubular adenoma of colon D12.6 ; Mixed 
hyperlipidemia E78.2 ; History of leukocytosis Z86.2 and Screening breast 
examination Z12.31

 

                          [facility]     [address] [facility] [id number]

[address] [place] [address]

                                        Metatarsalgia of right foot 

M77.41 and Type 2 diabetes mellitus 

without complication, without long-term current use of insulin E11.9

 

                          [place] [address] [address]

                                        Capsulitis M77.9 and Metatar

salgia of right foot M77.41

 

                          [place] [address] [address]

                          08 Sep, 2017              Capsulitis M77.9

 

                          [place] [address] [address]

                          06 Sep, 2017               

 

                          [place] [address] [address]

                          03 Aug, 2017              Type 2 diabetes mellitus wit

hout complication, without long-term 

current use of insulin E11.9 ; Tubular adenoma of colon D12.6 and Mixed 
hyperlipidemia E78.2

 

                          [place] [address] [address]

                                        Metatarsalgia of right foot 

M77.41 and Capsulitis M77.9

 

                          [place] [address] [address]

                                         

 

                          [facility]  [address] 

[address]                              Duarte's neuroma of right fo

ot G57.61

 

                          [place] [address] [address]

                                        Mixed hyperlipidemia E78.2 a

nd Type 2 diabetes mellitus without 

complication, without long-term current use of insulin E11.9

 

                          [place] [address] [address]

                          10 Brandon, 2017              Type 2 diabetes mellitus wit

hout complication, without long-term 

current use of insulin E11.9 ; Tubular adenoma of colon D12.6 and Mixed 
hyperlipidemia E78.2

 

                          [place] [address] [address]

                          22 Dec, 2016              Mixed hyperlipidemia E78.2

 

                          [place] [address] [address]

                                         

 

                          [place] [address] [address]

                                        Mixed hyperlipidemia E78.2

 

                          [place] [address] [address]

                                        Mixed hyperlipidemia E78.2

 

                          [place] [address] [address]

                          08 Sep, 2016               

 

                          [place] [address] [address]

                          10 Aug, 2016              Type 2 diabetes mellitus wit

hout complication, without long-term 

current use of insulin E11.9 ; Helicobacter pylori (H. pylori) infection A04.8 
and Mixed hyperlipidemia E78.2

 

                          [place] [address] [address]

                          08 Aug, 2016              Type 2 diabetes mellitus wit

hout complication, without long-term 

current use of insulin E11.9

 

                          [place] [address] [address]

                          03 Aug, 2016              Type 2 diabetes mellitus wit

hout complication, without long-term 

current use of insulin E11.9

 

                          [place] [address] [address]

                          02 Aug, 2016              Dyspepsia R10.13 ; Upper abd

ominal pain R10.10 ; Bowel habit 

changes R19.4 ; History of leukocytosis Z86.2 and Helicobacter pylori (H. 
pylori) infection A04.8

 

                          [facility]  [address] 

[address]                27 May, 2016              Environmental allergies Z91.

09

 

                          [facility]     [address] [address]

                                        Left leg pain M79.605 ; Loca

lized swelling of lower leg R22.40 and 

Upper respiratory infection J06.9

 

                          [facility]     [address] [id number]

[address] [place] [address]

                          28 Dec, 2015               

 

                          [facility]     [address] [id number]

[address] [place] [address]

                          21 Dec, 2015              Left leg pain M79.605

 

                          [facility]     [address] [id number]

[address] [place] [address]

                          10 Dec, 2015              Left leg pain M79.605 and Lo

calized swelling of lower leg R22.40

 

                          [facility]     [address] [id number]

[address] [place] [address]

                          01 Dec, 2015              Left leg pain M79.605

 

                          [facility]     [address] [facility] [id number]

[address] [place] [address]

                                        Encounter for immunization Z

23

 

                          [facility]     [address] [facility] [id number]

[address] [place] [address]

                          12 Oct, 2015              Bronchitis J40

 

                          [place] [address] [facility] [id number]

[address] [place] [address]

                          30 Sep, 2015              Physical exam, pre-employmen

t V70.5 ; Encounter for PPD test V74.1 

and Hyperlipidemia 272.4

 

                          [place] [address] [facility] [id number]

[address] [place] [address]

                          21 Sep, 2015               

 

                          [facility]     [address] [facility] [id number]

[address] [place] [address]

                          21 Sep, 2015               

 

                          [facility]     [address] [facility] [id number]

[address] [place] [address]

                          09 Sep, 2015               

 

                          [place] [address] [facility] [id number]

[address] [place] [address]

                          04 Sep, 2015              Elevated blood sugar 790.29

 

                          [place] [address] [facility] [id number]

[address] [place] [address]

                          03 Sep, 2015              Elevated blood sugar 790.29

 

                          [facility]     [address] [facility] [id number]

[address] [place] [address]

                          03 Sep, 2015              Palpitations 785.1

 

                          [facility]     [address] [id number]

[address] [place] [address]

                          01 Sep, 2015              Palpitations 785.1

 

                          [facility]     [address] [id number]

[address] [place] [address]

                                         

 

                          [facility]     [address] [id number]

[address] [place] [address]

                          28 May, 2015              Hand pain, right 729.5 and D

epression with anxiety 300.4

 

                          [facility]     [address] [id number]

[address] [place] [address]

                                         

 

                          [facility]     [address] [id number]

[address] [place] [address]

                                         

 

                          [facility]     [address] [id number]

[address] [place] [address]

                          05 Mar, 2015               

 

                          [facility]     [address] [id number]

[address] [place] [address]

                          05 Mar, 2015               

 

                          [facility]     [address] [id number]

[address] [place] [address]

                                         

 

                          [facility]     [address] [id number]

[address] [place] [address]

                                         

 

                          [facility]     [address] [id number]

[address] [place] [address]

                          10 Feb, 2015               

 

                          [facility]     [address] [id number]

[address] [place] [address]

                          10 Feb, 2015               

 

                          [facility]     [address] [id number]

[address] [place] [address]

                                         

 

                          [facility]     [address] [id number]

[address] [place] [address]

                                         

 

                          [facility]     [address] [id number]

[address] [place] [address]

                                         

 

                          [facility]     [address] [id number]

[address] [place] [address], [place] [address]

                                         

 

                          [facility]     [address] [id number]

[address] [place] [address], [place] [address]

                                         

 

                          [facility]     [address] [id number]

[address] [place] [address], [place] [address]

                                         

 

                          [facility]     [address] [id number]

[address] [place] [address], [place] [address]

                                         

 

                          [facility]     [address] [id number]

[address] [place] [address], [place] [address]

                                         

 

                          [facility]     [address] [id number]

[address] [place] [address], [place] [address]

                                         

 

                          [facility]     [address] [id number]

[address] [place] [address], [place] [address]

                                         

 

                          [facility]     [address] [id number]

[address] [place] [address], [place] [address]

                                         

 

                          [facility]     [address] [id number]

[address] [place] [address], [place] [address]

                                         

 

                          [facility]     [address] [id number]

[address] [place] [address], [place] [address]

                          15 [place], 2015               

 

                          [facility]     [address] [id number]

[address] [place] [address], [place] [address]

                          15 [place], 2015               

 

                          [facility]     [address] [id number]

[address] [place] [address], [place] [address]

                                         

 

                          [facility]     [address] [id number]

[address] [place] [address], [place] [address]

                                         

 

                          [facility]     [address] [id number]

[address] [place] [address], [place] [address]

                          15 Oct, 2014               

 

                          [facility]     [address] [id number]

[address] [place] [address], [place] [address]

                          15 Oct, 2014               

 

                          [facility]     [address] [id number]

[address] [place] [address], [place] [address]

                          14 Oct, 2014               

 

                          [facility]     [address] [id number]

[address] [place] [address], [place] [address]

                          14 Oct, 2014               

 

                          [facility]     [address] [id number]

[address] [place] [address], [place] [address]

                          07 Oct, 2014               

 

                          [facility]     [address] [id number]

[address] [place] [address], [place] [address]

                          07 Oct, 2014               

 

                          [facility]     [address] [id number]

[address] [place] [address], [place] [address]

                          12 Sep,                

 

                          [facility]     [address] [id number]

[address] [place] [address], [place] [address]

                          12 Sep, 2014               

 

                          [facility]     [address] [id number]

[address] [place] [address], [place] [address]

                          04 Sep, 2014               

 

                          [facility]     [address] [id number]

[address] [place] [address], [place] [address]

                          03 Sep, 2014               

 

                          [facility]     [address] [id number]

[address] [place] [address], [place] [address]

                          03 Sep, 2014               

 

                          [facility]     [address] [id number]

[address] [place] [address], [place] [address]

                          02 Sep, 2014               

 

                          [facility]     [address] [id number]

[address] [place] [address], [place] [address]

                          02 Sep, 2014               

 

                          [facility]     [address] [id number]

[address] [place] [address], [place] [address]

                          30 Aug, 2014               

 

                          [facility]     [address] [id number]

[address] [place] [address], [place] [address]

                          30 Aug, 2014               

 

                          [facility]     [address] [id number]

[address] [place] [address], [place] [address]

                          19 Aug, 2014               

 

                          [facility]     [address] [id number]

[address] [place] [address], [place] [address]

                          19 Aug, 2014               

 

                          [facility]     [address] [id number]

[address] [place] [address], [place] [address]

                          14 Aug, 2014               

 

                          [facility]     [address] [id number]

[address] [place] [address]

                          14 Aug, 2014               

 

                          [facility]     [address] [id number]

[facility] [place] [address]

                          13 Aug, 2014               

 

                          [facility]     [address] [id number]

[address] [place] [address], [place] [address]

                          13 Aug, 2014               

 

                          [facility]     [address] [id number]

[address] [place] [address], [place] [address]

                                         

 

                          [facility]     [address] [id number]

[address] [place] [address], [place] [address]

                                         

 

                          [facility]     [address] [id number]

[address] [place] [address], [place] [address]

                                         

 

                          [facility]     [address] [id number]

[address] [place] [address], [place] [address]

                                         

 

                          [facility]     [address] [id number]

[address] [place] [address], [place] [address]

                                         

 

                          [facility]     [address] [id number]

[address] [place] [address], [place] [address]

                                         

 

                          [facility]     [address] [id number]

[address] [place] [address], [place] [address]

                                         

 

                          [facility]     [address] [id number]

[address] [place] [address], [place] [address]

                                         

 

                          [facility]     [address] [id number]

[address] [place] [address], [place] [address]

                                         

 

                          [facility]     [address] [id number]

[address] [place] [address], [place] [address]

                                         

 

                          [facility]     [address] [id number]

[address] [place] [address], [place] [address]

                                         

 

                          [facility]     [address] [id number]

[address] [place] [address], [place] [address]

                                         

 

                          [facility]     [address] [id number]

[address] [place] [address], [place] [address]

                                         

 

                          [facility]     [address] [id number]

[address] [place] [address]

                          ,                

 

                          [facility]     [address] [id number]

[address] [place] [address]

                          ,                

 

                          [facility]     [address] [id number]

[address] [place] [address]

                          ,                

 

                          [facility]     [address] [id number]

[address] [place] [address], [place] [address]

                          ,                

 

                          [facility]     [address] [id number]

[address] [place] [address], [place] [address]

                          ,                

 

                          [facility]     [address] [id number]

[address] [place] [address], [place] [address]

                                         

 

                          [facility]     [address] [id number]

[address] [place] [address], [place] [address]

                                         

 

                          [facility]     [address] [id number]

[address] [place] [address], [place] [address]

                                         

 

                          [facility]     [address] [id number]

[address] [place] [address], [place] [address]

                                         

 

                          [facility]     [address] [id number]

[address] [place] [address], [place] [address]

                                         

 

                          [facility]     [address] [id number]

[address] [place] [address], [place] [address]

                                         

 

                          [facility]     [address] [id number]

[address] [place] [address], [place] [address]

                                         

 

                          [facility]     [address] [id number]

[address] [place] [address], [place] [address]

                                         

 

                          [facility]     [address] [id number]

[address] [place] [address], [place] [address]

                                         

 

                          [facility]     [address] [id number]

[address] [place] [address], [place] [address]

                          27 Mar, 2014               

 

                          [facility]     [address] [id number]

[address] [place] [address], [place] [address]

                          27 Mar, 2014               

 

                          [facility]     [address] [id number]

[address] [place] [address], [place] [address]

                                         

 

                          [facility]     [address] [id number]

[address] [place] [address], [place] [address]

                                         

 

                          [facility]     [address] [id number]

[address] [place] [address], [place] [address]

                                         

 

                          [facility]     [address] [id number]

[address] [place] [address]

                                         

 

                          [facility]     [address] [id number]

[address] [place] [address], [place] [address]

                          08 Oct, 2013               

 

                          [facility]     [address] [id number]

[address] [place] [address], [place] [address]

                          04 Oct, 2013               

 

                          [facility]     [address] [id number]

[address] [place] [address], [place] [address]

                          03 Oct, 2013               

 

                          [facility]     [address] [id number]

[address] [place] [address], [place] [address]

                          02 Oct, 2013               

 

                          [facility]     [address] [id number]

[address] [place] [address], [place] [address]

                          01 Oct, 2013               

 

                          [facility]     [address] [id number]

[address] [place] [address], [place] [address]

                          20 Sep, 2013               

 

                          [facility]     [address] [id number]

[address] [place] [address]

                          08 Aug, 2013               

 

                          [facility]     [address] [id number]

[address] [place] [address]

                          24 May, 2013               

 

                          [facility]     [address] [id number]

[address] [place] [address], [place] [address]

                          13 May, 2013               

 

                          [facility]     [address] [id number]

[address] [place] [address], [place] [address]

                                         

 

                          [facility]     [address] [id number]

[address] [place] [address], [place] [address]

                                         

 

                          [facility]     [address] [id number]

[address] [place] [address], [place] [address]

                                         

 

                          [facility]     [address] [id number]

[address] [place] [address], [place] [address]

                                         

 

                          [facility]     [address] [id number]

[address] [place] [address], [place] [address]

                          02 Oct, 2012               

 

                          [facility]     [address] [id number]

[address] [place] [address], [place] [address]

                          02 Oct, 2012               

 

                          [facility]     [address] [id number]

[address] [place] [address], [place] [address]

                          21 Sep, 2012               

 

                          [facility]     [address] [id number]

[address] [place] [address], [place] [address]

                          06 Aug, 2012               

 

                          [facility]     [address] [id number]

[address] [place] [address], [place] [address]

                          02 Aug, 2012               

 

                          [facility]     [address] [id number]

[address] [place] [address], [place] [address]

                                         

 

                          [facility]     [address] [id number]

[address] [place] [address], [place] [address]

                                         

 

                          [facility]     [address] [id number]

[address] [place] [address], [place] [address]

                          15 Eusebio, 2012               

 

                          [facility]     [address] [id number]

[address] [place] [address], [place] [address]

                                         

 

                          [facility]     [address] [id number]

[address] [place] [address], [place] [address]

                          10 2012               

 

                          [facility]     [address] [id number]

[address] [place] [address], [place] [address]

                          22 Mar, 2012               

 

                          [facility]     [address] [id number]

[address] [place] [address], [place] [address]

                          20 Mar, 2012               

 

                          [facility]     [address] [id number]

[address] [place] [address], [place] [address]

                          14 Mar, 2012               

 

                          [facility]     [address] [id number]

[address] [place] [address], [place] [address]

                                         







IMMUNIZATIONS

No Known Immunizations



SOCIAL HISTORY

Never Assessed



REASON FOR VISIT

Diabetes-doing well-Vic



PLAN OF CARE





                          Activity                  Details

 

                                         

 

                          Follow Up                 3 Months Reason:DM







VITAL SIGNS





                    Height              69 in               2017

 

                    Weight              228.5 lbs           2017

 

                    Temperature         98.4 degrees Fahrenheit 2017

 

                    Heart Rate          76 bpm              2017

 

                    Respiratory Rate    20                  2017

 

                    BMI                 33.74 kg/m2         2017

 

                    Blood pressure systolic 114 mmHg            2017

 

                    Blood pressure diastolic 72 mmHg             2017







MEDICATIONS





        Medication Instructions Dosage  Frequency Start Date End Date Duration S

tatus

 

        Nexium 40 mg Orally Once a day 1 capsule 24h                     30     

 Active

 

        Los Contour Next Monitor w/Device         as directed         10 Aug, 

2016                 Active

 

        Depo-Provera 150 MG/ML         1 ml                                    A

ctive

 

        Flonase 50 MCG/ACT Nasally Once a day 1 spray in each nostril 24h       

                      Active

 

        Glimepiride 1 MG Orally twice a day 1 tablet 12h                     30 

day(s) Active

 

             Los Contour Next Test Test Strips In Vitro Once a day as directed

  24h          10 Aug, 

2016                                                        Active

 

        Glimepiride 1 MG Orally Once a day 1 tablet 24h                     30  

    Active

 

        Fish Oil 300 mg Orally Twice a day 1 capsule 12h                        

     Active

 

                    Triamcinolone Acetonide 0.1 % Externally Twice a day 1 appli

cation to affected 

area         12h          10 Brandon, 2017                           Active

 

        Crestor 10 mg Orally Once a day 1 tablet 24h                     30 day(

s) Active

 

        MetFORMIN HCl  MG Orally twice a day 2 tablets 12h                

     30      Active







RESULTS





                Name            Result          Date            Reference Range

 

                A1C (IN HOUSE)                  2017       

 

                A1C IN HOUSE    7.8                             4.3 - 5.6 %

 

                Previous A1c    9.2                              

 

                Lot             [id number]                             

 

                Exp date        2019                          

 

                CMP                             2017       

 

                Glucose, Serum  286                             65-99

 

                BUN             6                               6-24

 

                Creatinine, Serum 0.62                            0.57-1.00

 

                eGFR If NonAfricn Am 112                                 >59

 

                eGFR If Africn Am 130                                 >59

 

                BUN/Creatinine Ratio 10                              9-23

 

                Sodium, Serum   137                             134-144

 

                Potassium, Serum 3.9                             3.5-5.2

 

                Chloride, Serum 98                              

 

                Carbon Dioxide, Total 23                              18-29

 

                Calcium, Serum  9.6                             8.7-10.2

 

                Protein, Total, Serum 6.6                             6.0-8.5

 

                Albumin, Serum  3.9                             3.5-5.5

 

                Globulin, Total 2.7                             1.5-4.5

 

                A/G Ratio       1.4                             1.2-2.2

 

                Bilirubin, Total <0.2                            0.0-1.2

 

                Alkaline Phosphatase, S 62                              

 

                AST (SGOT)      8                               0-40

 

                ALT (SGPT)      13                              0-32







PROCEDURES





                Procedure       Date Ordered    Result          Body Site

 

                COMPREHEN METABOLIC PANEL Aug 03, 2017                     

 

                GLYCATED HEMOGLOBIN TEST Aug 03, 2017                     

 

                VENIPUNCT, ROUTINE* Aug 03, 2017                     







INSTRUCTIONS





MEDICATIONS ADMINISTERED

No Known Medications



MEDICAL (GENERAL) HISTORY





                    Type                Description         Date

 

                          Medical History           allergic rhinitis- rec's wee

kly allergy injections from Dr Rojas office                            

 

                    Medical History     mood disorder        

 

                    Medical History     Leukocytosis- has been to hematology  

 

                    Medical History     Hyperlipidemia       

 

                          Medical History           Left leg pain- multiple imag

ing performed and ortho referral 

placed                                   

 

                          Medical History           TUBULAR ADENOMA AND GASTRITI

S ON COLONOSOCPY AUG 2016 -MULTIPLE 

POLYPS                                   

 

                    Medical History     Helicobacter pylori (H. pylori) infectio

n Hx  

 

                    Surgical History    cholecystectomy     

 

                    Surgical History     section    , 

 

                    Surgical History    tonsillectomy       2015

 

                    Surgical History    colonoscopy         2016

 

                    Surgical History    colonscopy          2017

 

                    Surgical History    Right Knee scope    2017

## 2020-06-23 NOTE — XMS REPORT
[facility]

                             Created on: 2018



Darby Love

External Reference #: [id number]

: 1975

Sex: Female



Demographics





                          Address                   [address] [place]  [address]

 

                          Preferred Language        Unknown

 

                          Marital Status            Unknown

 

                          Mu-ism Affiliation     Unknown

 

                          Race                      Unknown

 

                          Ethnic Group              Unknown





Author





                          Author                    DANNY April VILMA

 

                          Organization              [facility] [place]

 

                          Address                   [address] [place]  [address]



 

                          Phone                     [phone]







Care Team Providers





                    Care Team Member Name Role                Phone

 

                    VILMA DELGADO      Unavailable         [phone]







PROBLEMS





          Type      Condition ICD9-CM Code JZY72-DX Code Onset Dates Condition S

tatus SNOMED 

Code

 

          Problem   Anxiety             F41.9               Active    72110451

 

          Problem   History of leukocytosis           Z86.2               Active

    [id number]

 

          Problem   Mixed hyperlipidemia           E78.2               Active   

 [id number]

 

                          Problem                   Type 2 diabetes mellitus wit

hout complication, without long-term current

use of insulin              E11.9                     Active       [id number]

 

          Problem   Duarte's neuroma of right foot           G57.61             

 Active    [id number]

 

          Problem   Tubular adenoma of colon           D12.6               Activ

e    [id number]







ALLERGIES





             Substance    Reaction     Event Type   Date         Status

 

             Penicillin V Potassium Unknown      Drug Allergy 05 Dec, 2018 Activ

e







ENCOUNTERS





                Encounter       Location        Date            Diagnosis

 

                          [facility] [place]     [address] [facility] [id number]

[address] [place] [address]

                                         

 

                          [facility] [place]     [address] [facility] [id number]

[address] [place] [address]

                          05 Dec, 2018              Bilateral otitis media with 

effusion H65.93 and Anxiety F41.9

 

                          [facility] [place]     [address] [facility] [id number]

[address] [place] [address]

                                        Type 2 diabetes mellitus wit

hout complication, without long-term 

current use of insulin E11.9

 

                          [facility] [place]     [address] [facility] [id number]

[address] [place] [address]

                                        Pharyngitis due to Streptoco

ccus species J02.0

 

                          [facility] [place]     [address] [facility] [id number]

[address] [place] [address]

                                         

 

                          [facility]  [address] [facility] [address] [id number]

[address] [place] [address] 

[address]                10 Jul, 2018              Fever, unspecified fever cau

se R50.9 and Lymphadenopathy

R59.1

 

                          [facility]  [address] 

[address]                              Pharyngitis due to other org

anism J02.8

 

                          [place] [address] [address]

                                         

 

                          [place] [address] [address]

                                        Type 2 diabetes mellitus wit

hout complication, without long-term 

current use of insulin E11.9 and Other eczema L30.8

 

                          [facility] [place] [address] 

[address]                              Acute pain of left shoulder 

M25.512

 

                          [place] [address] [address]

                                         

 

                          [place] [address] [address]

                          15 2018              Type 2 diabetes mellitus wit

hout complication, without long-term 

current use of insulin E11.9

 

                          [place] [address] [address]

                          06 2018              Type 2 diabetes mellitus wit

hout complication, without long-term 

current use of insulin E11.9 ; Tubular adenoma of colon D12.6 ; Mixed 
hyperlipidemia E78.2 ; History of leukocytosis Z86.2 and Screening breast 
examination Z12.31

 

                          [place] [address] [address]

                                        Metatarsalgia of right foot 

M77.41 and Type 2 diabetes mellitus 

without complication, without long-term current use of insulin E11.9

 

                          [place] [address] [address]

                                        Capsulitis M77.9 and Metatar

salgia of right foot M77.41

 

                          [place] [address] [place] [address]

                          08 Sep, 2017              Capsulitis M77.9

 

                          [facility] [place]     [address] [facility] [id number]

[address] [place] [address]

                          06 Sep, 2017               

 

                          [facility] [place]     [address] [facility] [id number]

[address] [place] [address]

                          03 Aug, 2017              Type 2 diabetes mellitus wit

hout complication, without long-term 

current use of insulin E11.9 ; Tubular adenoma of colon D12.6 and Mixed 
hyperlipidemia E78.2

 

                          [facility] [place]     [address] [facility] [id number]

[address] [place] [address]

                                        Metatarsalgia of right foot 

M77.41 and Capsulitis M77.9

 

                          [facility] [place]     [address] [facility] [id number]

[address] [place] [address]

                                         

 

                          [facility]  [address] [place] [id number]

[address] [place] [address] 

[address]                              Duarte's neuroma of right fo

ot G57.61

 

                          [facility] [place]     [address] [facility] [id number]

[address] [place] [address]

                                        Mixed hyperlipidemia E78.2 a

nd Type 2 diabetes mellitus without 

complication, without long-term current use of insulin E11.9

 

                          [place] [address] [place] [id number]

[address] [place] [address]

                          10 Brandon, 2017              Type 2 diabetes mellitus wit

hout complication, without long-term 

current use of insulin E11.9 ; Tubular adenoma of colon D12.6 and Mixed 
hyperlipidemia E78.2

 

                          [place] [address] [facility] [id number]

[address] [place] [address]

                          22 Dec, 2016              Mixed hyperlipidemia E78.2

 

                          [place] [address] [facility] [id number]

[address] [place] [address]

                                         

 

                          [place] [address] [place] [id number]

[address] [place] [address]

                                        Mixed hyperlipidemia E78.2

 

                          [place] [address] [address]

                                        Mixed hyperlipidemia E78.2

 

                          [facility] [place]     [address] [address]

                          08 Sep, 2016               

 

                          [place] [address] [address]

                          10 Aug, 2016              Type 2 diabetes mellitus wit

hout complication, without long-term 

current use of insulin E11.9 ; Helicobacter pylori (H. pylori) infection A04.8 
and Mixed hyperlipidemia E78.2

 

                          [place] [address] [address]

                          08 Aug, 2016              Type 2 diabetes mellitus wit

hout complication, without long-term 

current use of insulin E11.9

 

                          [place] [address] [address]

                          03 Aug, 2016              Type 2 diabetes mellitus wit

hout complication, without long-term 

current use of insulin E11.9

 

                          [place] [address] [address]

                          02 Aug, 2016              Dyspepsia R10.13 ; Upper abd

ominal pain R10.10 ; Bowel habit 

changes R19.4 ; History of leukocytosis Z86.2 and Helicobacter pylori (H. 
pylori) infection A04.8

 

                          [facility]  [address] 

[address]                27 May, 2016              Environmental allergies Z91.

09

 

                          [place] [address] [address]

                                        Left leg pain M79.605 ; Loca

lized swelling of lower leg R22.40 and 

Upper respiratory infection J06.9

 

                          [facility] [place]     [address] [address]

                          28 Dec, 2015               

 

                          [place] [address] [address]

                          21 Dec, 2015              Left leg pain M79.605

 

                          [facility] [place]     [address] [address]

                          10 Dec, 2015              Left leg pain M79.605 and Lo

calized swelling of lower leg R22.40

 

                          [facility] [place]     [address] [facility] [id number]

[address] [place] [address]

                          01 Dec, 2015              Left leg pain M79.605

 

                          [facility] [place]     [address] [facility] [id number]

[address] [place] [address]

                                        Encounter for immunization Z

23

 

                          [facility] [place]     [address] [facility] [id number]

[address] [place] [address]

                          12 Oct, 2015              Bronchitis J40

 

                          [facility] [place]     [address] [facility] [id number]

[address] [place] [address]

                          30 Sep, 2015              Physical exam, pre-employmen

t V70.5 ; Encounter for PPD test V74.1 

and Hyperlipidemia 272.4

 

                          [facility] [place]     [address] [facility] [id number]

[address] [place] [address]

                          21 Sep, 2015               

 

                          [facility] [place]     [address] [facility] [id number]

[address] [place] [address]

                          21 Sep, 2015               

 

                          [facility] [place]     [address] [facility] [id number]

[address] [place] [address]

                          09 Sep, 2015               

 

                          [facility] [place]     [address] [facility] [id number]

[address] [place] [address]

                          04 Sep, 2015              Elevated blood sugar 790.29

 

                          [facility] [place]     [address] [facility] [id number]

[address] [place] [address]

                          03 Sep, 2015              Elevated blood sugar 790.29

 

                          [facility] [place]     [address] [facility] [id number]

[address] [place] [address]

                          03 Sep, 2015              Palpitations 785.1

 

                          [facility] [place]     [address] [facility] [id number]

[address] [place] [address]

                          01 Sep, 2015              Palpitations 785.1

 

                          [facility] [place]     [address] [facility] [id number]

[address] [place] [address]

                                         

 

                          [facility] [place]     [address] [id number]

[address] [place] [address]

                          28 May, 2015              Hand pain, right 729.5 and D

epression with anxiety 300.4

 

                          [facility]     [address] [id number]

[address] [place] [address]

                                         

 

                          [facility]     [address] [id number]

[address] [place] [address]

                                         

 

                          [facility]     [address] [id number]

[address] [place] [address]

                          05 Mar, 2015               

 

                          [facility]     [address] [id number]

[address] [place] [address]

                          05 Mar, 2015               

 

                          [facility]     [address] [id number]

[address] [place] [address]

                                         

 

                          [facility]     [address] [id number]

[address] [place] [address]

                                         

 

                          [facility]     [address] [id number]

[address] [place] [address]

                          10 Feb, 2015               

 

                          [facility]     [address] [id number]

[address] [place] [address]

                          10 Feb, 2015               

 

                          [facility]     [address] [id number]

[address] [place] [address]

                                         

 

                          [facility]     [address] [id number]

[address] [place] [address]

                                         

 

                          [facility]     [address] [id number]

[address] [place] [address]

                                         

 

                          [facility]     [address] [id number]

[address] [place] [address]

                                         

 

                          [facility]     [address] [id number]

[address] [place] [address]

                                         

 

                          [facility]     [address] [id number]

[address] [place] [address]

                                         

 

                          [facility]     [address] [id number]

[address] [place] [address], [place] [address]

                          19 2015               

 

                          [facility]     [address] [id number]

[address] [place] [address], [place] [address]

                                         

 

                          [facility]     [address] [id number]

[address] [place] [address], [place] [address]

                          16 2015               

 

                          [facility]     [address] [id number]

[address] [place] [address], [place] [address]

                                         

 

                          [facility]     [address] [id number]

[address] [place] [address], [place] [address]

                                         

 

                          [facility]     [address] [id number]

[address] [place] [address], [place] [address]

                                         

 

                          [facility]     [address] [id number]

[address] [place] [address], [place] [address]

                          15 [place], 2015               

 

                          [facility]     [address] [id number]

[address] [place] [address], [place] [address]

                          15 [place], 2015               

 

                          [facility]     [address] [id number]

[address] [place] [address], [place] [address]

                                         

 

                          [facility]     [address] [id number]

[address] [place] [address], [place] [address]

                                         

 

                          [facility]     [address] [id number]

[address] [place] [address], [place] [address]

                          15 Oct, 2014               

 

                          [facility]     [address] [id number]

[address] [place] [address], [place] [address]

                          15 Oct, 2014               

 

                          [facility]     [address] [id number]

[address] [place] [address], [place] [address]

                          14 Oct, 2014               

 

                          [facility]     [address] [id number]

[address] [place] [address], [place] [address]

                          14 Oct, 2014               

 

                          [facility]     [address] [id number]

[address] [place] [address], [place] [address]

                          07 Oct, 2014               

 

                          [facility]     [address] [id number]

[address] [place] [address], [place] [address]

                          07 Oct, 2014               

 

                          [facility]     [address] [id number]

[address] [place] [address], [place] [address]

                          12 Sep,                

 

                          [facility]     [address] [id number]

[address] [place] [address], [place] [address]

                          12 Sep, 2014               

 

                          [facility]     [address] [id number]

[address] [place] [address], [place] [address]

                          04 Sep,                

 

                          [facility]     [address] [id number]

[address] [place] [address], [place] [address]

                          03 Sep,                

 

                          [facility]     [address] [id number]

[address] [place] [address], [place] [address]

                          03 Sep,                

 

                          [facility]     [address] [id number]

[address] [place] [address], [place] [address]

                          02 Sep, 2014               

 

                          [facility]     [address] [id number]

[address] [place] [address], [place] [address]

                          02 Sep, 2014               

 

                          [facility]     [address] [id number]

[address] [place] [address], [place] [address]

                          30 Aug, 2014               

 

                          [facility]     [address] [id number]

[address] [place] [address], [place] [address]

                          30 Aug, 2014               

 

                          [facility]     [address] [id number]

[address] [place] [address], [place] [address]

                          19 Aug, 2014               

 

                          [facility]     [address] [id number]

[address] [place] [address], [place] [address]

                          19 Aug, 2014               

 

                          [facility]     [address] [id number]

[address] [place] [address], [place] [address]

                          14 Aug, 2014               

 

                          [facility]     [address] [id number]

[address] [place] [address], [place] [address]

                          14 Aug, 2014               

 

                          [facility]     [address] [id number]

[address] [place] [address], [place] [address]

                          13 Aug, 2014               

 

                          [facility]     [address] [id number]

[address] [place] [address], [place] [address]

                          13 Aug, 2014               

 

                          [facility]     [address] [id number]

[address] [place] [address], [place] [address]

                          ,                

 

                          [facility]     [address] [id number]

[address] [address]

                          ,                

 

                          [facility]     [address] [id number]

[address] [place] [address], [place] [address]

                                         

 

                          [facility]     [address] [id number]

[address] [place] [address], [place] [address]

                                         

 

                          [facility]     [address] [id number]

[address] [place] [address], [place] [address]

                                         

 

                          [facility]     [address] [id number]

[address] [place] [address], [place] [address]

                                         

 

                          [facility]     [address] [id number]

[address] [place] [address], [place] [address]

                                         

 

                          [facility]     [address] [id number]

[address] [place] [address], [place] [address]

                                         

 

                          [facility]     [address] [id number]

[address] [place] [address], [place] [address]

                                         

 

                          [facility]     [address] [id number]

[address] [place] [address], [place] [address]

                                         

 

                          [facility]     [address] [id number]

[address] [place] [address], [place] [address]

                                         

 

                          [facility]     [address] [id number]

[address] [place] [address], [place] [address]

                          ,                

 

                          [facility]     [address] [id number]

[address] [place] [address], [place] [address]

                                         

 

                          [facility]     [address] [id number]

[address] [place] [address], [place] [address]

                                         

 

                          [facility]     [address] [id number]

[address] [place] [address], [place] [address]

                                         

 

                          [facility]     [address] [id number]

[address] [place] [address], [place] [address]

                          ,                

 

                          [facility]     [address] [id number]

[facility] [address]

                                         

 

                          [facility]     [address] [id number]

[facility] [address]

                                         

 

                          [facility]     [address] [id number]

[facility] [address]

                                         

 

                          [facility]     [address] [id number]

[address] [place] [address], [place] [address]

                                         

 

                          [facility]     [address] [id number]

[address] [place] [address], [place] [address]

                                         

 

                          [facility]     [address] [id number]

[address] [place] [address], [place] [address]

                                         

 

                          [facility]     [address] [id number]

[address] [place] [address], [place] [address]

                                         

 

                          [facility]     [address] [id number]

[address] [place] [address], [place] [address]

                                         

 

                          [facility]     [address] [id number]

[address] [place] [address], [place] [address]

                                         

 

                          [facility]     [address] [id number]

[address] [place] [address], [place] [address]

                                         

 

                          [facility]     [address] [id number]

[address] [place] [address], [place] [address]

                                         

 

                          [facility]     [address] [id number]

[address] [place] [address], [place] [address]

                          27 Mar, 2014               

 

                          [facility]     [address] [id number]

[address] [place] [address], [place] [address]

                          27 Mar, 2014               

 

                          [facility]     [address] [id number]

[address] [place] [address], [place] [address]

                                         

 

                          [facility]     [address] [id number]

[address] [place] [address], [place] [address]

                                         

 

                          [facility]     [address] [id number]

[address] [place] [address], [place] [address]

                                         

 

                          [facility]     [address] [id number]

[address] [place] [address], [place] [address]

                                         

 

                          [facility]     [address] [id number]

[address] [place] [address], [place] [address]

                          08 Oct, 2013               

 

                          [facility]     [address] [id number]

[address] [place] [address], [place] [address]

                          04 Oct, 2013               

 

                          [facility]     [address] [id number]

[address] [place] [address], [place] [address]

                          03 Oct, 2013               

 

                          [facility]     [address] [id number]

[address] [place] [address], [place] [address]

                          02 Oct, 2013               

 

                          [facility]     [address] [id number]

[address] [place] [address], [place] [address]

                          01 Oct, 2013               

 

                          [facility]     [address] [id number]

[address] [place] [address], [place] [address]

                          20 Sep, 2013               

 

                          [facility]     [address] [id number]

[address] [place] [address]

                          08 Aug, 2013               

 

                          [facility]     [address] [id number]

[address] [place] [address], [place] [address]

                          24 May, 2013               

 

                          [facility]     [address] [id number]

[address] [place] [address]

                          13 May, 2013               

 

                          [facility]     [address] [id number]

[address] [place] [address]

                                         

 

                          [facility]     [address] [id number]

[address] [place] [address]

                                         

 

                          [facility]     [address] [id number]

[address] [place] [address], [place] [address]

                                         

 

                          [facility]     [address] [id number]

[address] [place] [address]

                                         

 

                          [facility]     [address] [id number]

[address] [place] [address]

                          02 Oct, 2012               

 

                          [facility]     [address] [id number]

[address] [place] [address]

                          02 Oct, 2012               

 

                          [facility] [place]     [address] [id number]

[address] [place] [address]

                          21 Sep, 2012               

 

                          [facility] [place]     [address] [id number]

[address] [place] [address]

                          06 Aug, 2012               

 

                          [facility] [place]     [address] [id number]

[address] [place] [address]

                          02 Aug, 2012               

 

                          [facility] [place]     [address] [id number]

[address] [place] [address]

                                         

 

                          [facility] [place]     [address] [id number]

[address] [place] [address]

                                         

 

                          [facility] [place]     [address] [id number]

[address] [place] [address]

                          15 Eusebio, 2012               

 

                          [facility] [place]     [address] [id number]

[address] [place] [address]

                                         

 

                          [facility] [place]     [address] [id number]

[address] [place] [address]

                          10 Apr, 2012               

 

                          [facility] [place]     [address] [id number]

[address] [place] [address]

                          22 Mar, 2012               

 

                          [facility] [place]     [address] [id number]

[address] [place] [address]

                          20 Mar, 2012               

 

                          [facility] [place]     [address] [id number]

[address] [place] [address]

                          14 Mar, 2012               

 

                          [facility] [place]     [address] [id number]

[address] [place] [address]

                                         







IMMUNIZATIONS

No Known Immunizations



SOCIAL HISTORY

Never Assessed



REASON FOR VISIT

Anxiety/Diabetes-BALTAZAR zavaleta, Needs A1C, done 18., pt has some concerns ab
out her anxeity meds want to discuss 



PLAN OF CARE





VITAL SIGNS





                    Height              69 in               2018

 

                    Weight              248.1 lbs           2018

 

                    Temperature         98.4 degrees Fahrenheit 2018

 

                    Heart Rate          63 bpm              2018

 

                    Respiratory Rate    20                  2018

 

                    Oximetry            on room air:98 %    2018

 

                    BMI                 36.63 kg/m2         2018

 

                    Blood pressure systolic 130 mmHg            2018

 

                    Blood pressure diastolic 82 mmHg             2018







MEDICATIONS





        Medication Instructions Dosage  Frequency Start Date End Date Duration S

tatus

 

        Nexium 40 mg Orally Once a day 1 capsule 24h                     30     

 Active

 

        Crestor 10 mg Orally Once a day 1 tablet 24h                     30     

 Active

 

        MetFORMIN HCl  mg Orally twice a day 2 tablets 12h                

     30      Active

 

        SudoGest 60 MG Orally every 6 hrs 1 tablet as needed 6h      05 Dec, 201

8                 Active

 

        Depo-Provera 150 MG/ML         1 ml                                    A

ctive

 

        Allegra Allergy 180 MG Orally Once a day 1 tablet as needed 24h         

                    Active

 

        Los Contour Next Monitor w/Device         as directed         10 Aug, 

2016                 Active

 

        Zoloft 50 MG Orally Once a day 1 tablet 24h     05 Dec, 2018         30 

day(s) Active

 

        Xopenex HFA 45 MCG/ACT Inhalation every 4 hrs 1 puff as needed 4h       

                       Active

 

                    Triamcinolone Acetonide 0.1 % Externally Twice a day 1 appli

cation to affected 

area         12h                                     Active

 

             Los Contour Next Test Test Strips In Vitro Once a day as directed

  24h          10 Aug, 

2016                                                        Active

 

        Glimepiride 4 MG Orally 2 times a day 1 tablet 12h                      

       Active

 

        Prenatal Advantage - Orally Once a day 1 tablet 24h                     

        Active

 

        Fish Oil 1200 MG Orally Twice a day 1 capsule 12h                       

      Active







RESULTS

No Results



PROCEDURES

No Known procedures



INSTRUCTIONS





MEDICATIONS ADMINISTERED

No Known Medications



MEDICAL (GENERAL) HISTORY





                    Type                Description         Date

 

                          Medical History           allergic rhinitis- rosita's edwige donahue allergy injections from Dr Rojas office                            

 

                    Medical History     mood disorder        

 

                    Medical History     Leukocytosis- has been to hematology  

 

                    Medical History     Hyperlipidemia       

 

                          Medical History           Left leg pain- multiple imag

ing performed and ortho referral 

placed                                   

 

                          Medical History           TUBULAR ADENOMA AND GASTRITI

S ON COLONOSOCPY AUG 2016 -MULTIPLE 

POLYPS                                   

 

                    Medical History     Helicobacter pylori (H. pylori) infectio

n Hx  

 

                    Surgical History    cholecystectomy     2006

 

                    Surgical History     section    , 

 

                    Surgical History    tonsillectomy       2015

 

                    Surgical History    colonoscopy         2016

 

                    Surgical History    colonscopy          2017

 

                    Surgical History    Right Knee scope    2017

## 2020-06-23 NOTE — XMS REPORT
[facility]

                             Created on: 2020



Kate April

External Reference #: [id number]

: 1975

Sex: Female



Demographics





                          Address                   [address] [place]  [address]

 

                          Preferred Language        Unknown

 

                          Marital Status            Unknown

 

                          Hoahaoism Affiliation     Unknown

 

                          Race                      Unknown

 

                          Ethnic Group              Unknown





Author





                          Author                    Migration, April Doctor

 

                          Organization              [facility]

 

                          Address                   Unknown

 

                          Phone                     Unavailable







Care Team Providers





                    Care Team Member Name Role                Phone

 

                    Migration,  Doctor  Unavailable         Unavailable







PROBLEMS





          Type      Condition ICD9-CM Code GME41-BX Code Onset Dates Condition S

tatus SNOMED 

Code

 

          Problem   Mixed hyperlipidemia           E78.2               Active   

 [id number]

 

                          Problem                   Type 2 diabetes mellitus wit

hout complication, without long-term current

use of insulin              E11.9                     Active       340100427

 

          Problem   History of leukocytosis           Z86.2               Active

    821114995

 

          Problem   Anxiety             F41.9               Active    85258052

 

           Problem    Seasonal allergic rhinitis due to other allergic trigger  

          J30.89                

Active                                  [id number]

 

          Problem   GERD with esophagitis           K21.0               Active  

  [id number]

 

          Problem   Duarte's neuroma of right foot           G57.61             

 Active    96950131

 

          Problem   Chronic fatigue           R53.82              Active    8422

9001

 

          Problem   Tubular adenoma of colon           D12.6               Activ

e    642023631

 

          Problem   Dysthymic disorder           F34.1               Active    7

3010767

 

          Problem   Arthritis           M19.90              Active    8513526

 

          Problem   Non morbid obesity           E66.9               Active    4

79398269

 

          Problem   Seasonal allergic rhinitis due to pollen           J30.1    

           Active    80877743







ALLERGIES

No Information



ENCOUNTERS





                Encounter       Location        Date            Diagnosis

 

                          [place] [address] [address] [facility] [id number]

[address] [place] [address]

                                         

 

                          [place] [address] [place] [id number]

[address] [place] [address]

                          01 May, 2020              Chronic fatigue R53.82 ; Mal

aise R53.81 ; Type 2 diabetes mellitus 

without complication, without long-term current use of insulin E11.9 ; Arthritis
M19.90 ; Insect bite (nonvenomous), right lower leg, initial encounter S80.861A 
and Bitten or stung by nonvenomous insect and other nonvenomous arthropods, 
initial encounter W57.XXXA

 

                          [place] [address] [facility] [id number]

[address] [place] [address]

                                        Chronic fatigue R53.82 ; Mal

aise R53.81 ; Type 2 diabetes mellitus 

without complication, without long-term current use of insulin E11.9 ; Arthritis
M19.90 ; Insect bite (nonvenomous), right lower leg, initial encounter S80.861A 
and Bitten or stung by nonvenomous insect and other nonvenomous arthropods, 
initial encounter W57.XXXA

 

                          [place] [address] [address]

[address] [place] [address]

                          04 Mar, 2020              Type 2 diabetes mellitus wit

hout complication, without long-term 

current use of insulin E11.9

 

                          [place] [address] [place] [id number]

[address] [place] [address]

                                        Acute pain of left knee M25.

562

 

                          [place] [address] [place] [id number]

[address] [place] [address]

                          15 Brandon, 2020              Leg pain, left M79.605

 

                          [facility] WALK IN [facility]  [address] [place] [id number]

[address] [place] [address] 

[address]                              Leg pain, left M79.605

 

                          [place] [address]

[address] [place] [address]

                                        Type 2 diabetes mellitus wit

hout complication, without long-term 

current use of insulin E11.9 and GERD with esophagitis K21.0

 

                          [place] [address] [place] [id number]

[address] [place] [address]

                          01 Oct, 2019              Encounter for immunization Z

23

 

                          [place] [address] [place] [id number]

[address] [place] [address]

                                        Type 2 diabetes mellitus wit

hout complication, without long-term 

current use of insulin E11.9

 

                          [place] [address] [place] [id number]

[address] [place] [address]

                                         

 

                          [place] [address] [place] [id number]

[address] [place] [address]

                                        Type 2 diabetes mellitus wit

hout complication, without long-term 

current use of insulin E11.9

 

                          [place] [address] [place] [id number]

[address] [place] [address]

                                        Weight loss R63.4

 

                          [place] [address] [place] [id number]

[address] [place] [address]

                          31 May, 2019              Type 2 diabetes mellitus wit

hout complication, without long-term 

current use of insulin E11.9

 

                          [place] [address] [place] [id number]

[address] [place] [address]

                          31 May, 2019              Type 2 diabetes mellitus wit

hout complication, without long-term 

current use of insulin E11.9 and Non morbid obesity E66.9

 

                          [facility] [place] [address] 

[address]                14 May, 2019              Seasonal allergic rhinitis d

ue to pollen J30.1 and Sore 

throat J02.9

 

                          [place] [address] 

[address]                              Arthritis M19.90

 

                          [place] [address] [address]

                                        Seasonal allergic rhinitis d

ue to other allergic trigger J30.89 and

Dysthymic disorder F34.1

 

                          [place] [address] [address]

                          05 Dec, 2018              Bilateral otitis media with 

effusion H65.93 and Anxiety F41.9

 

                          [place] [address] [address]

                                        Type 2 diabetes mellitus wit

hout complication, without long-term 

current use of insulin E11.9

 

                          [place] [address] [place] [id number]

[address] [place] [address]

                                        Pharyngitis due to Streptoco

ccus species J02.0

 

                          [place] [address] [place] [id number]

[address] [place] [address]

                                         

 

                          [facility] [place] [facility]  [address] [place] [id number] [place] [address] 

[address]                10 Jul, 2018              Fever, unspecified fever cau

se R50.9 and Lymphadenopathy

R59.1

 

                          [facility]  [address] 

[address]                              Pharyngitis due to other org

anism J02.8

 

                          [place] [address] [address]

                                         

 

                          [place] [address] [address]

                                        Type 2 diabetes mellitus wit

hout complication, without long-term 

current use of insulin E11.9 and Other eczema L30.8

 

                          [facility] WALK IN [place] [address] 

[address]                              Acute pain of left shoulder 

M25.512

 

                          [place] [address] [address]

                                         

 

                          [place] [address] [address]

                          15 Feb, 2018              Type 2 diabetes mellitus wit

hout complication, without long-term 

current use of insulin E11.9

 

                          [place] [address] [address]

                          06 2018              Type 2 diabetes mellitus wit

hout complication, without long-term 

current use of insulin E11.9 ; Tubular adenoma of colon D12.6 ; Mixed 
hyperlipidemia E78.2 ; History of leukocytosis Z86.2 and Screening breast 
examination Z12.31

 

                          [place] [address] [address]

                                        Metatarsalgia of right foot 

M77.41 and Type 2 diabetes mellitus 

without complication, without long-term current use of insulin E11.9

 

                          [place] [address] [address]

                                        Capsulitis M77.9 and Metatar

salgia of right foot M77.41

 

                          [place] [address] [address]

[address] [place] [address]

                          08 Sep, 2017              Capsulitis M77.9

 

                          [facility]     [address] [facility] [id number]

[address] [place] [address]

                          06 Sep, 2017               

 

                          [facility]     [address] [facility] [id number]

[address] [place] [address]

                          03 Aug, 2017              Type 2 diabetes mellitus wit

hout complication, without long-term 

current use of insulin E11.9 ; Tubular adenoma of colon D12.6 and Mixed 
hyperlipidemia E78.2

 

                          [facility]     [address] [facility] [id number]

[address] [place] [address]

                                        Metatarsalgia of right foot 

M77.41 and Capsulitis M77.9

 

                          [facility]     [address] [facility] [id number]

[address] [place] [address]

                                         

 

                          [facility]  [address] [facility] [id number]

[address] [place] [address] 

[address]                              Duarte's neuroma of right fo

ot G57.61

 

                          [facility]     [address] [facility] [id number]

[address] [place] [address]

                                        Mixed hyperlipidemia E78.2 a

nd Type 2 diabetes mellitus without 

complication, without long-term current use of insulin E11.9

 

                          [place] [address] [facility] [id number]

[address] [place] [address]

                          10 Brandon, 2017              Type 2 diabetes mellitus wit

hout complication, without long-term 

current use of insulin E11.9 ; Tubular adenoma of colon D12.6 and Mixed 
hyperlipidemia E78.2

 

                          [place] [address] [facility] [id number]

[address] [place] [address]

                          22 Dec, 2016              Mixed hyperlipidemia E78.2

 

                          [place] [address] [facility] [id number]

[address] [place] [address]

                                         

 

                          [place] [address] [facility] [id number]

[address] [place] [address]

                                        Mixed hyperlipidemia E78.2

 

                          [place] [address] [place] [id number]

[address] [place] [address]

                                        Mixed hyperlipidemia E78.2

 

                          [place] [address] [address]

                          08 Sep, 2016               

 

                          [place] [address] [address]

                          10 Aug, 2016              Type 2 diabetes mellitus wit

hout complication, without long-term 

current use of insulin E11.9 ; Helicobacter pylori (H. pylori) infection A04.8 
and Mixed hyperlipidemia E78.2

 

                          [place] [address] [address]

                          08 Aug, 2016              Type 2 diabetes mellitus wit

hout complication, without long-term 

current use of insulin E11.9

 

                          [place] [address] [address]

                          03 Aug, 2016              Type 2 diabetes mellitus wit

hout complication, without long-term 

current use of insulin E11.9

 

                          [place] [address] [place] [id number]

[address] [place] [address]

                          02 Aug, 2016              Dyspepsia R10.13 ; Upper abd

ominal pain R10.10 ; Bowel habit 

changes R19.4 ; History of leukocytosis Z86.2 and Helicobacter pylori (H. 
pylori) infection A04.8

 

                          [facility] [place] [facility]  [address] [place] [id number]

[address] [place] [address] 

[address]                27 May, 2016              Environmental allergies Z91.

09

 

                          [place] [address] [place] [id number]

[address] [place] [address]

                                        Left leg pain M79.605 ; Loca

lized swelling of lower leg R22.40 and 

Upper respiratory infection J06.9

 

                          [place] [address] [place] [id number]

[address] [place] [address]

                          28 Dec, 2015               

 

                          [place] [address] [place] [id number]

[address] [place] [address]

                          21 Dec, 2015              Left leg pain M79.605

 

                          [place] [address] [place] [address]

[address] [place] [address]

                          10 Dec, 2015              Left leg pain M79.605 and Lo

calized swelling of lower leg R22.40

 

                          [facility]     [address] [place] [id number]

[address] [place] [address]

                          01 Dec, 2015              Left leg pain M79.605

 

                          [facility]     [id number]

[address] [place] [address]

                                        Encounter for immunization Z

23

 

                          [facility]     [address] [place] [id number] [place] [address]

                          12 Oct, 2015              Bronchitis J40

 

                          [place] [address] [address]

                          30 Sep, 2015              Physical exam, pre-employmen

t V70.5 ; Encounter for PPD test V74.1 

and Hyperlipidemia 272.4

 

                          [facility]     [address] [place] [id number]

[address] [place] [address]

                          21 Sep, 2015               

 

                          [facility]     [address] [place] [id number]

[address] [place] [address]

                          21 Sep, 2015               

 

                          [facility]     [address] [address]

                          09 Sep, 2015               

 

                          [facility]     [address] [place] [id number]

[address] [place] [address]

                          04 Sep, 2015              Elevated blood sugar 790.29

 

                          [facility]     [address] [place] [id number]

[address] [place] [address]

                          03 Sep, 2015              Elevated blood sugar 790.29

 

                          [facility]     [address] [place] [id number]

[address] [place] [address]

                          03 Sep, 2015              Palpitations 785.1

 

                          [facility]     [address] [place] [id number]

[address] [place] [address]

                          01 Sep, 2015              Palpitations 785.1

 

                          [facility]     [address] [place] [id number]

[address] [place] [address]

                                         

 

                          [facility]     [address] [id number]

[address] [place] [address]

                          28 May, 2015              Hand pain, right 729.5 and D

epression with anxiety 300.4

 

                          [facility]     [address] [id number]

[address] [place] [address]

                                         

 

                          [facility]     [address] [id number]

[address] [place] [address]

                                         

 

                          [facility]     [address] [id number]

[address] [place] [address]

                          05 Mar, 2015               

 

                          [facility]     [address] [id number]

[address] [place] [address]

                          05 Mar, 2015               

 

                          [facility]     [address] [id number]

[address] [place] [address]

                                         

 

                          [facility]     [address] [id number]

[address] [place] [address]

                                         

 

                          [facility]     [address] [id number]

[address] [place] [address]

                          10 Feb, 2015               

 

                          [facility]     [address] [id number]

[address] [place] [address]

                          10 Feb, 2015               

 

                          [facility]     [address] [id number]

[address] [place] [address]

                                         

 

                          [facility]     [address] [id number]

[address] [place] [address]

                                         

 

                          [facility]     [address] [id number]

[address] [place] [address]

                                         

 

                          [facility]     [address] [id number]

[address] [place] [address]

                                         

 

                          [facility]     [address] [id number]

[address] [place] [address]

                                         

 

                          [facility]     [address] [id number]

[address] [place] [address]

                                         

 

                          [facility]     [address] [id number]

[address] [place] [address], [place] [address]

                                         

 

                          [facility]     [address] [id number]

[address] [place] [address], [place] [address]

                          17 2015               

 

                          [facility]     [address] [id number]

[address] [place] [address], [place] [address]

                                         

 

                          [facility]     [address] [id number]

[address] [place] [address], [place] [address]

                                         

 

                          [facility]     [address] [id number]

[address] [place] [address], [place] [address]

                                         

 

                          [facility]     [address] [id number]

[address] [place] [address], [place] [address]

                                         

 

                          [facility]     [address] [id number]

[address] [place] [address], [place] [address]

                          15 Brandon, 2015               

 

                          [facility]     [address] [id number]

[address] [place] [address], [place] [address]

                          15 Brandon, 2015               

 

                          [facility]     [address] [id number]

[address] [place] [address], [place] [address]

                                         

 

                          [facility]     [address] [id number]

[address] [place] [address], [place] [address]

                                         

 

                          [facility]     [address] [id number]

[address] [place] [address], [place] [address]

                          15 Oct, 2014               

 

                          [facility]     [address] [id number]

[address] [place] [address], [place] [address]

                          15 Oct, 2014               

 

                          [facility]     [address] [id number]

[address] [place] [address], [place] [address]

                          14 Oct, 2014               

 

                          [facility]     [address] [id number]

[address] [place] [address], [place] [address]

                          14 Oct, 2014               

 

                          [facility]     [address] [id number]

[address] [place] [address], [place] [address]

                          07 Oct, 2014               

 

                          [facility]     [address] [id number]

[address] [place] [address], [place] [address]

                          07 Oct, 2014               

 

                          [facility]     [address] [id number]

[address] [place] [address]

                          12 Sep,                

 

                          [facility]     [address] [id number]

[address] [place] [address], [place] [address]

                          12 Sep, 2014               

 

                          [facility]     [address] [id number]

[address] [place] [address], [place] [address]

                          04 Sep,                

 

                          [facility]     [address] [id number]

[address] [place] [address], [place] [address]

                          03 Sep,                

 

                          [facility]     [address] [id number]

[address] [place] [address], [place] [address]

                          03 Sep, 2014               

 

                          [facility]     [address] [id number]

[address] [place] [address], [place] [address]

                          02 Sep, 2014               

 

                          [facility]     [address] [id number]

[address] [place] [address], [place] [address]

                          02 Sep, 2014               

 

                          [facility]     [address] [id number]

[address] [place] [address], [place] [address]

                          30 Aug, 2014               

 

                          [facility]     [address] [id number]

[address] [place] [address], [place] [address]

                          30 Aug, 2014               

 

                          [facility]     [address] [id number]

[address] [place] [address], [place] [address]

                          19 Aug, 2014               

 

                          [facility]     [address] [id number]

[address] [place] [address], [place] [address]

                          19 Aug, 2014               

 

                          [facility]     [address] [id number]

[address] [place] [address], [place] [address]

                          14 Aug, 2014               

 

                          [facility]     [address] [id number]

[address] [place] [address], [place] [address]

                          14 Aug, 2014               

 

                          [facility]     [address] [id number]

[address] [place] [address], [place] [address]

                          13 Aug, 2014               

 

                          [facility]     [address] [id number]

[address] [place] [address], [place] [address]

                          13 Aug, 2014               

 

                          [facility]     [address] [id number]

[facility] [address]

                          ,                

 

                          [facility]     [address] [id number]

[address] [place] [address]

                          ,                

 

                          [facility]     [address] [id number]

[facility] [place] [address]

                                         

 

                          [facility]     [address] [id number]

[address] [place] [address], [place] [address]

                                         

 

                          [facility]     [address] [id number]

[address] [place] [address], [place] [address]

                                         

 

                          [facility]     [address] [id number]

[address] [place] [address], [place] [address]

                                         

 

                          [facility]     [address] [id number]

[address] [place] [address], [place] [address]

                                         

 

                          [facility]     [address] [id number]

[address] [place] [address], [place] [address]

                                         

 

                          [facility]     [address] [id number]

[address] [place] [address], [place] [address]

                                         

 

                          [facility]     [address] [id number]

[address] [place] [address], [place] [address]

                                         

 

                          [facility]     [address] [id number]

[address] [place] [address], [place] [address]

                                         

 

                          [facility]     [address] [id number]

[address] [place] [address], [place] [address]

                                         

 

                          [facility]     [address] [id number]

[address] [place] [address], [place] [address]

                                         

 

                          [facility]     [address] [id number]

[address] [place] [address], [place] [address]

                          ,                

 

                          [facility]     [address] [id number]

[address] [place] [address], [place] [address]

                                         

 

                          [facility]     [address] [id number]

[address] [place] [address], [place] [address]

                          ,                

 

                          [facility]     [address] [id number]

[address] [place] [address], [place] [address]

                          ,                

 

                          [facility]     [address] [id number]

[address] [place] [address], [place] [address]

                                         

 

                          [facility]     [address] [id number]

[address] [place] [address], [place] [address]

                                         

 

                          [facility]     [address] [id number]

[address] [place] [address], [place] [address]

                                         

 

                          [facility]     [address] [id number]

[address] [place] [address], [place] [address]

                                         

 

                          [facility]     [address] [id number]

[address] [place] [address], [place] [address]

                                         

 

                          [facility]     [address] [id number]

[address] [place] [address], [place] [address]

                                         

 

                          [facility]     [address] [id number]

[address] [place] [address], [place] [address]

                                         

 

                          [facility]     [address] [id number]

[address] [place] [address], [place] [address]

                                         

 

                          [facility]     [address] [id number]

[address] [place] [address], [place] [address]

                                         

 

                          [facility]     [address] [id number]

[address] [place] [address], [place] [address]

                                         

 

                          [facility]     [address] [id number]

[address] [place] [address], [place] [address]

                          27 Mar, 2014               

 

                          [facility]     [address] [id number]

[address] [place] [address], [place] [address]

                          27 Mar, 2014               

 

                          [facility]     [address] [id number]

[address] [place] [address], [place] [address]

                                         

 

                          [facility]     [address] [id number]

[address] [place] [address], [place] [address]

                                         

 

                          [facility]     [address] [id number]

[address] [place] [address]

                                         

 

                          [facility]     [address] [id number]

[address] [place] [address], [place] [address]

                                         

 

                          [facility]     [address] [id number]

[address] [place] [address]

                          08 Oct, 2013               

 

                          [facility]     [address] [id number]

[address] [place] [address], [place] [address]

                          04 Oct, 2013               

 

                          [facility]     [address] [id number]

[address] [place] [address], [place] [address]

                          03 Oct, 2013               

 

                          [facility]     [address] [id number]

[address] [place] [address], [place] [address]

                          02 Oct, 2013               

 

                          [facility]     [address] [id number]

[address] [place] [address], [place] [address]

                          01 Oct, 2013               

 

                          [facility]     [address] [id number]

[address] [place] [address], [place] [address]

                          20 Sep, 2013               

 

                          [facility]     [address] [id number]

[address] [place] [address], [place] [address]

                          08 Aug, 2013               

 

                          [facility]     [address] [id number]

[address] [place] [address]

                          24 May, 2013               

 

                          [facility]     [address] [id number]

[address] [place] [address]

                          13 May, 2013               

 

                          [facility]     [address] [id number]

[address] [place] [address]

                                         

 

                          [facility]     [address] [id number]

[address] [place] [address]

                                         

 

                          [facility]     [address] [id number]

[address] [place] [address], [place] [address]

                                         

 

                          [facility]     [address] [id number]

[address] [place] [address]

                                         

 

                          [facility]     [address] [id number]

[address] [place] [address]

                          02 Oct, 2012               

 

                          [facility]     [address] [id number]

[address] [place] [address]

                          02 Oct, 2012               

 

                          [facility]     [address] [id number]

[address] [place] [address]

                          21 Sep, 2012               

 

                          [facility]     [address] [id number]

[address] [place] [address]

                          06 Aug, 2012               

 

                          [facility]     [address] [id number]

[address] [place] [address]

                          02 Aug, 2012               

 

                          [facility]     [address] [id number]

[address] [place] [address]

                                         

 

                          [facility]     [address] [id number]

[address] [place] [address]

                                         

 

                          [facility]     [address] [id number]

[address] [place] [address]

                          15 Eusebio, 2012               

 

                          [facility]     [address] [id number]

[address] [place] [address]

                                         

 

                          [facility]     [address] [id number]

[address] [place] [address]

                          10 Apr, 2012               

 

                          [facility]     [address] [id number]

[address] [place] [address]

                          22 Mar, 2012               

 

                          [facility]     [address] [id number]

[address] [place] [address]

                          20 Mar, 2012               

 

                          [facility]     [address] [id number]

[address] [place] [address]

                          14 Mar, 2012               

 

                          [facility]     [address] [id number]

[address] [place] [address]

                                         







IMMUNIZATIONS

No Known Immunizations



SOCIAL HISTORY

Never Assessed



REASON FOR VISIT





PLAN OF CARE





VITAL SIGNS





MEDICATIONS

Unknown Medications



RESULTS

No Results



PROCEDURES

No Known procedures



INSTRUCTIONS





MEDICATIONS ADMINISTERED

No Known Medications



MEDICAL (GENERAL) HISTORY





                    Type                Description         Date

 

                          Medical History           allergic rhinitis- rec's edwige donahue allergy injections from Dr Rojas office                            

 

                    Medical History     mood disorder        

 

                    Medical History     Leukocytosis- has been to hematology  

 

                    Medical History     Hyperlipidemia       

 

                          Medical History           Left leg pain- multiple imag

ing performed and ortho referral 

placed                                   

 

                          Medical History           TUBULAR ADENOMA AND GASTRITI

S ON COLONOSOCPY AUG 2016 -MULTIPLE 

POLYPS                                   

 

                    Medical History     Helicobacter pylori (H. pylori) infectio

n Hx  

 

                    Surgical History    cholecystectomy     

 

                    Surgical History     section    , 

 

                    Surgical History    tonsillectomy       2015

 

                    Surgical History    colonoscopy         2016

 

                    Surgical History    colonscopy          2017

 

                    Surgical History    Right Knee scope    2017

## 2020-06-23 NOTE — XMS REPORT
[facility]

                             Created on: 2018



Kate April

External Reference #: [id number]

: 1975

Sex: Female



Demographics





                          Address                   [address] [place]  [address]

 

                          Preferred Language        Unknown

 

                          Marital Status            Unknown

 

                          B[employer]ist Affiliation     Unknown

 

                          Race                      Unknown

 

                          Ethnic Group              Unknown





Author





                          Author                    DANNY April VILMA

 

                          Organization              [facility]

 

                          Address                   [address] [place]  [address]



 

                          Phone                     [phone]







Care Team Providers





                    Care Team Member Name Role                Phone

 

                    VILMA DELGADO      Unavailable         [phone]







PROBLEMS





          Type      Condition ICD9-CM Code GSA44-QM Code Onset Dates Condition S

tatus SNOMED 

Code

 

          Problem   History of leukocytosis           Z86.2               Active

    [id number]

 

          Problem   Duarte's neuroma of right foot           G57.61             

 Active    [id number]

 

                          Problem                   Type 2 diabetes mellitus wit

hout complication, without long-term current

use of insulin              E11.9                     Active       [id number]

 

          Problem   Tubular adenoma of colon           D12.6               Activ

e    [id number]

 

          Problem   Mixed hyperlipidemia           E78.2               Active   

 [id number]







ALLERGIES

No Information



ENCOUNTERS





                Encounter       Location        Date            Diagnosis

 

                          [facility]     [address] [address]

                                        Pharyngitis due to Streptoco

ccus species J02.0

 

                          [facility]     [address] [address]

                                         

 

                          [facility]  [address] 

[address]                10 Jul, 2018              Fever, unspecified fever cau

se R50.9 and Lymphadenopathy

R59.1

 

                          [facility]  [address] 

[address]                              Pharyngitis due to other org

anism J02.8

 

                          [facility]     [address] [address]

                                         

 

                          [facility]     [id number] [place] [address]

                                        Type 2 diabetes mellitus wit

hout complication, without long-term 

current use of insulin E11.9 and Other eczema L30.8

 

                          [facility]  [address] [facility] [id number]

[address] [place] [address] 

[address]                28 2018              Acute pain of left shoulder 

M25.512

 

                          [facility]     [id number]

[address] [place] [address]

                          23 2018               

 

                          [facility]     [id number]

[address] [place] [address]

                          15 2018              Type 2 diabetes mellitus wit

hout complication, without long-term 

current use of insulin E11.9

 

                          [place] [id number]

[address] [place] [address]

                                        Type 2 diabetes mellitus wit

hout complication, without long-term 

current use of insulin E11.9 ; Tubular adenoma of colon D12.6 ; Mixed 
hyperlipidemia E78.2 ; History of leukocytosis Z86.2 and Screening breast 
examination Z12.31

 

                          [place] [id number] [place] [address]

                                        Metatarsalgia of right foot 

M77.41 and Type 2 diabetes mellitus 

without complication, without long-term current use of insulin E11.9

 

                          [place] [id number] [place] [address]

                                        Capsulitis M77.9 and Metatar

salgia of right foot M77.41

 

                          [place] [id number]

[address] [place] [address]

                          08 Sep, 2017              Capsulitis M77.9

 

                          [place] [id number]

[address] [place] [address]

                          06 Sep, 2017               

 

                          [place] [id number] [place] [address]

                          03 Aug, 2017              Type 2 diabetes mellitus wit

hout complication, without long-term 

current use of insulin E11.9 ; Tubular adenoma of colon D12.6 and Mixed 
hyperlipidemia E78.2

 

                          [place] [id number]

[address] [place] [address]

                                        Metatarsalgia of right foot 

M77.41 and Capsulitis M77.9

 

                          [facility]     [address] [facility] [address] [id number]

[address] [place] [address]

                                         

 

                          [facility]  [id number]

[address] [place] [address] 

[address]                              Duarte's neuroma of right fo

ot G57.61

 

                          [facility]     [id number]

[address] [place] [address]

                                        Mixed hyperlipidemia E78.2 a

nd Type 2 diabetes mellitus without 

complication, without long-term current use of insulin E11.9

 

                          [place] [id number] [place] [address]

                          10 Brandon, 2017              Type 2 diabetes mellitus wit

hout complication, without long-term 

current use of insulin E11.9 ; Tubular adenoma of colon D12.6 and Mixed 
hyperlipidemia E78.2

 

                          [facility]     [id number]

[address] [place] [address]

                          22 Dec, 2016              Mixed hyperlipidemia E78.2

 

                          [facility]     [id number]

[address] [place] [address]

                                         

 

                          [place] [id number]

[address] [place] [address]

                                        Mixed hyperlipidemia E78.2

 

                          [facility]     [id number]

[address] [place] [address]

                                        Mixed hyperlipidemia E78.2

 

                          [facility]     [id number]

[address] [place] [address]

                          08 Sep, 2016               

 

                          [facility]     [id number]

[address] [place] [address]

                          10 Aug, 2016              Type 2 diabetes mellitus wit

hout complication, without long-term 

current use of insulin E11.9 ; Helicobacter pylori (H. pylori) infection A04.8 
and Mixed hyperlipidemia E78.2

 

                          [facility]     [id number]

[address] [place] [address]

                          08 Aug, 2016              Type 2 diabetes mellitus wit

hout complication, without long-term 

current use of insulin E11.9

 

                          [place] [address] [facility] [id number]

[address] [place] [address]

                          03 Aug, 2016              Type 2 diabetes mellitus wit

hout complication, without long-term 

current use of insulin E11.9

 

                          [facility]     [id number]

[address] [place] [address]

                          02 Aug, 2016              Dyspepsia R10.13 ; Upper abd

ominal pain R10.10 ; Bowel habit 

changes R19.4 ; History of leukocytosis Z86.2 and Helicobacter pylori (H. 
pylori) infection A04.8

 

                          [facility]  [address] [facility] [address] [id number]

[address] [place] [address] 

[address]                27 May, 2016              Environmental allergies Z91.

09

 

                          [place] [id number]

[address] [place] [address]

                                        Left leg pain M79.605 ; Loca

lized swelling of lower leg R22.40 and 

Upper respiratory infection J06.9

 

                          [place] [address] [facility] [address] [id number]

[address] [place] [address]

                          28 Dec, 2015               

 

                          [place] [address] [place] [address]

                          21 Dec, 2015              Left leg pain M79.605

 

                          [place] [id number]

[address] [place] [address]

                          10 Dec, 2015              Left leg pain M79.605 and Lo

calized swelling of lower leg R22.40

 

                          [place] [address] [facility] [id number]

[address] [place] [address]

                          01 Dec, 2015              Left leg pain M79.605

 

                          [place] [id number]

[address] [place] [address]

                                        Encounter for immunization Z

23

 

                          [facility]     [id number]

[address] [place] [address]

                          12 Oct, 2015              Bronchitis J40

 

                          [place] [id number]

[address] [place] [address]

                          30 Sep, 2015              Physical exam, pre-employmen

t V70.5 ; Encounter for PPD test V74.1 

and Hyperlipidemia 272.4

 

                          [facility]     [id number]

[address] [place] [address]

                          21 Sep, 2015               

 

                          [facility]     [id number]

[address] [place] [address]

                          21 Sep, 2015               

 

                          [facility]     [id number] [place] [address]

                          09 Sep, 2015               

 

                          [facility]     [id number] [place] [address]

                          04 Sep, 2015              Elevated blood sugar 790.29

 

                          [facility]     [id number] [place] [address]

                          03 Sep, 2015              Elevated blood sugar 790.29

 

                          [facility]     [address] [address]

                          03 Sep, 2015              Palpitations 785.1

 

                          [facility]     [id number] [place] [address]

                          01 Sep, 2015              Palpitations 785.1

 

                          [facility]     [id number] [place] [address]

                                         

 

                          [facility]     [id number]

[address] [place] [address]

                          28 May, 2015              Hand pain, right 729.5 and D

epression with anxiety 300.4

 

                          [facility]     [id number]

[address] [place] [address]

                                         

 

                          [facility]     [id number] [place] [address]

                                         

 

                          [facility]     [id number]

[address] [place] [address]

                          05 Mar, 2015               

 

                          [facility]     [id number] [place] [address]

                          05 Mar, 2015               

 

                          [facility]     [address] [id number]

[address] [place] [address], [place] [address]

                                         

 

                          [facility]     [address] [id number]

[address] [place] [address], [place] [address]

                                         

 

                          [facility]     [address] [id number]

[address] [place] [address], [place] [address]

                          10 Feb, 2015               

 

                          [facility]     [address] [id number]

[address] [place] [address], [place] [address]

                          10 Feb, 2015               

 

                          [facility]     [address] [id number]

[address] [place] [address], [place] [address]

                                         

 

                          [facility]     [address] [id number]

[address] [place] [address], [place] [address]

                                         

 

                          [facility]     [address] [id number]

[address] [place] [address], [place] [address]

                                         

 

                          [facility]     [address] [id number]

[address] [place] [address], [place] [address]

                                         

 

                          [facility]     [address] [id number]

[address] [place] [address], [place] [address]

                                         

 

                          [facility]     [address] [id number]

[address] [place] [address], [place] [address]

                                         

 

                          [facility]     [address] [id number]

[address] [place] [address], [place] [address]

                                         

 

                          [facility]     [address] [id number]

[address] [place] [address], [place] [address]

                                         

 

                          [facility]     [address] [id number]

[address] [place] [address], [place] [address]

                                         

 

                          [facility]     [address] [id number]

[address] [place] [address], [place] [address]

                                         

 

                          [facility]     [address] [id number]

[address] [place] [address], [place] [address]

                                         

 

                          [facility]     [address] [id number]

[address] [place] [address], [place] [address]

                          16 2015               

 

                          [facility]     [address] [id number]

[address] [place] [address], [place] [address]

                          15 [place], 2015               

 

                          [facility]     [address] [id number]

[address] [place] [address], [place] [address]

                          15 2015               

 

                          [facility]     [address] [id number]

[address] [place] [address], [place] [address]

                                         

 

                          [facility]     [address] [id number]

[address] [place] [address], [place] [address]

                                         

 

                          [facility]     [address] [id number]

[address] [place] [address], [place] [address]

                          15 Oct, 2014               

 

                          [facility]     [address] [id number]

[address] [place] [address], [place] [address]

                          15 Oct, 2014               

 

                          [facility]     [address] [id number]

[address] [place] [address], [place] [address]

                          14 Oct, 2014               

 

                          [facility]     [address] [id number]

[address] [place] [address], [place] [address]

                          14 Oct, 2014               

 

                          [facility]     [address] [id number]

[address] [place] [address], [place] [address]

                          07 Oct, 2014               

 

                          [facility]     [address] [id number]

[address] [place] [address], [place] [address]

                          07 Oct, 2014               

 

                          [facility]     [address] [id number]

[address] [place] [address], [place] [address]

                          12 Sep,                

 

                          [facility]     [address] [id number]

[address] [place] [address], [place] [address]

                          12 Sep,                

 

                          [facility]     [address] [id number]

[address] [place] [address], [place] [address]

                          04 Sep,                

 

                          [facility]     [address] [id number]

[address] [place] [address], [place] [address]

                          03 Sep,                

 

                          [facility]     [address] [id number]

[address] [place] [address], [place] [address]

                          03 Sep, 2014               

 

                          [facility]     [address] [id number]

[facility] [place] [address]

                          02 Sep, 2014               

 

                          [facility]     [address] [id number]

[address] [place] [address], [place] [address]

                          02 Sep, 2014               

 

                          [facility]     [address] [id number]

[address] [place] [address], [place] [address]

                          30 Aug, 2014               

 

                          [facility]     [address] [id number]

[address] [place] [address], [place] [address]

                          30 Aug, 2014               

 

                          [facility]     [address] [id number]

[address] [place] [address], [place] [address]

                          19 Aug, 2014               

 

                          [facility]     [address] [id number]

[address] [place] [address], [place] [address]

                          19 Aug, 2014               

 

                          [facility]     [address] [id number]

[address] [place] [address], [place] [address]

                          14 Aug, 2014               

 

                          [facility]     [address] [id number]

[address] [place] [address], [place] [address]

                          14 Aug, 2014               

 

                          [facility]     [address] [id number]

[address] [place] [address], [place] [address]

                          13 Aug, 2014               

 

                          [facility]     [address] [id number]

[address] [place] [address], [place] [address]

                          13 Aug, 2014               

 

                          [facility]     [address] [id number]

[address] [place] [address], [place] [address]

                                         

 

                          [facility]     [address] [id number]

[address] [place] [address], [place] [address]

                                         

 

                          [facility]     [address] [id number]

[address] [place] [address], [place] [address]

                                         

 

                          [facility]     [address] [id number]

[address] [place] [address], [place] [address]

                                         

 

                          [facility]     [address] [id number]

[address] [place] [address], [place] [address]

                                         

 

                          [facility]     [address] [id number]

[address] [place] [address], [place] [address]

                          ,                

 

                          [facility]     [address] [id number]

[facility] [place] [address]

                          ,                

 

                          [facility]     [address] [id number]

[facility] [place] [address]

                          ,                

 

                          [facility]     [address] [id number]

[address] [place] [address], [place] [address]

                          ,                

 

                          [facility]     [address] [id number]

[address] [place] [address], [place] [address]

                          ,                

 

                          [facility]     [address] [id number]

[address] [place] [address], [place] [address]

                                         

 

                          [facility]     [address] [address] [id number]

[address] [place] [address], [place] [address]

                          ,                

 

                          [facility]     [address] [id number]

[address] [place] [address], [place] [address]

                          ,                

 

                          [facility]     [address] [id number]

[address] [place] [address], [place] [address]

                          ,                

 

                          [facility]     [address] [id number]

[address] [place] [address], [place] [address]

                                         

 

                          [facility]     [address] [id number]

[address] [place] [address], [place] [address]

                                         

 

                          [facility]     [address] [id number]

[address] [place] [address], [place] [address]

                          ,                

 

                          [facility]     [address] [id number]

[address] [place] [address], [place] [address]

                          ,                

 

                          [facility]     [address] [id number]

[address] [place] [address], [place] [address]

                                         

 

                          [facility]     [address] [id number]

[address] [place] [address], [place] [address]

                                         

 

                          [facility]     [address] [id number]

[address] [place] [address], [place] [address]

                                         

 

                          [facility]     [address] [id number]

[address] [place] [address], [place] [address]

                                         

 

                          [facility]     [address] [id number]

[address] [place] [address], [place] [address]

                                         

 

                          [facility]     [address] [id number]

[address] [place] [address], [place] [address]

                                         

 

                          [facility]     [address] [id number]

[address] [place] [address], [place] [address]

                                         

 

                          [facility]     [address] [id number]

[address] [place] [address], [place] [address]

                                         

 

                          [facility]     [address] [id number]

[address] [place] [address], [place] [address]

                                         

 

                          [facility]     [address] [id number]

[address] [place] [address], [place] [address]

                          27 Mar, 2014               

 

                          [facility]     [address] [id number]

[address] [place] [address], [place] [address]

                          27 Mar, 2014               

 

                          [facility]     [address] [id number]

[address] [place] [address], [place] [address]

                                         

 

                          [facility]     [address] [id number]

[address] [place] [address], [place] [address]

                                         

 

                          [facility]     [address] [id number]

[address] [place] [address], [place] [address]

                                         

 

                          [facility]     [address] [id number]

[address] [place] [address], [place] [address]

                                         

 

                          [facility]     [address] [id number]

[address] [place] [address], [place] [address]

                          08 Oct, 2013               

 

                          [facility]     [address] [id number]

[address] [place] [address], [place] [address]

                          04 Oct, 2013               

 

                          [facility]     [address] [id number]

[address] [place] [address], [place] [address]

                          03 Oct, 2013               

 

                          [facility]     [address] [id number]

[address] [place] [address], [place] [address]

                          02 Oct, 2013               

 

                          [facility]     [address] [id number]

[address] [place] [address], [place] [address]

                          01 Oct, 2013               

 

                          [facility]     [address] [id number]

[address] [place] [address], [place] [address]

                          20 Sep, 2013               

 

                          [facility]     [address] [id number]

[address] [place] [address], [place] [address]

                          08 Aug, 2013               

 

                          [facility]     [address] [id number]

[address] [place] [address], [place] [address]

                          24 May, 2013               

 

                          [facility]     [address] [id number]

[address] [place] [address], [place] [address]

                          13 May, 2013               

 

                          [facility]     [address] [id number]

[address] [place] [address], [place] [address]

                                         

 

                          [facility]     [address] [id number]

[address] [place] [address], [place] [address]

                                         

 

                          [facility]     [address] [id number]

[address] [place] [address], [place] [address]

                                         

 

                          [facility]     [address] [id number]

[address] [place] [address], [place] [address]

                                         

 

                          [facility]     [address] [id number]

[address] [place] [address], [place] [address]

                          02 Oct, 2012               

 

                          [facility]     [address] [id number]

[address] [place] [address], [place] [address]

                          02 Oct, 2012               

 

                          [facility]     [address] [id number]

[address] [place] [address], [place] [address]

                          21 Sep, 2012               

 

                          [facility]     [address] [id number]

[address] [place] [address], [place] [address]

                          06 Aug, 2012               

 

                          [facility]     [address] [id number]

[address] [place] [address], [place] [address]

                          02 Aug, 2012               

 

                          [facility]     [address] [id number]

[address] [place] [address], [place] [address]

                                         

 

                          [facility]     [address] [id number]

[address] [place] [address]

                          27 2012               

 

                          [facility]     [address] [id number]

[address] [place] [address]

                          15 2012               

 

                          [facility]     [address] [id number]

[address] [place] [address]

                          20 2012               

 

                          [facility]     [address] [id number]

[address] [place] [address]

                          10 2012               

 

                          [facility]     [address] [id number]

[address] [place] [address]

                          22 Mar, 2012               

 

                          [facility]     [address] [id number]

[address] [place] [address]

                          20 Mar, 2012               

 

                          [facility]     [address] [id number]

[address] [place] [address]

                          14 Mar, 2012               

 

                          [facility]     [address] [id number]

[address] [place] [address]

                                         







IMMUNIZATIONS

No Known Immunizations



SOCIAL HISTORY

Never Assessed



REASON FOR VISIT

Cream



PLAN OF CARE





VITAL SIGNS





MEDICATIONS

No Known Medications



RESULTS

No Results



PROCEDURES

No Known procedures



INSTRUCTIONS





MEDICATIONS ADMINISTERED

No Known Medications



MEDICAL (GENERAL) HISTORY





                    Type                Description         Date

 

                          Medical History           allergic rhinitis- rec's edwige donahue allergy injections from Dr Rojas office                            

 

                    Medical History     mood disorder        

 

                    Medical History     Leukocytosis- has been to hematology  

 

                    Medical History     Hyperlipidemia       

 

                          Medical History           Left leg pain- multiple imag

ing performed and ortho referral 

placed                                   

 

                          Medical History           TUBULAR ADENOMA AND GASTRITI

S ON COLONOSOCPY AUG 2016 -MULTIPLE 

POLYPS                                   

 

                    Medical History     Helicobacter pylori (H. pylori) infectio

n Hx  

 

                    Surgical History    cholecystectomy     2006

 

                    Surgical History     section    , 

 

                    Surgical History    tonsillectomy       2015

 

                    Surgical History    colonoscopy         2016

 

                    Surgical History    colonscopy          2017

 

                    Surgical History    Right Knee scope    2017

## 2020-06-23 NOTE — XMS REPORT
[facility]

                             Created on: 01/15/2020



Kate April

External Reference #: [id number]

: 1975

Sex: Female



Demographics





                          Address                   [address] [place]  [address]

 

                          Preferred Language        Unknown

 

                          Marital Status            Unknown

 

                          Rastafarian Affiliation     Unknown

 

                          Race                      Unknown

 

                          Ethnic Group              Unknown





Author





                          Author                    ANNEMARIE April VENECIA

 

                          [facility]

 

                          Address                   [address] [place]  [address]



 

                          Phone                     [phone]







Care Team Providers





                    Care Team Member Name Role                Phone

 

                    ANNETTE SHARPEWNYA       Unavailable         [phone]







PROBLEMS





          Type      Condition ICD9-CM Code JEM46-QL Code Onset Dates Condition S

tatus SNOMED 

Code

 

          Problem   Duarte's neuroma of right foot           G57.61             

 Active    76806851

 

          Problem   History of leukocytosis           Z86.2               Active

    [id number]

 

          Problem   Anxiety             F41.9               Active    14316066

 

                          Problem                   Type 2 diabetes mellitus wit

hout complication, without long-term current

use of insulin              E11.9                     Active       [id number]

 

          Problem   Seasonal allergic rhinitis due to pollen           J30.1    

           Active    23183401

 

          Problem   Tubular adenoma of colon           D12.6               Activ

e    996762313

 

          Problem   GERD with esophagitis           K21.0               Active  

  [id number]

 

          Problem   Mixed hyperlipidemia           E78.2               Active   

 389713775

 

           Problem    Seasonal allergic rhinitis due to other allergic trigger  

          J30.89                

Active                                  [id number]

 

          Problem   Dysthymic disorder           F34.1               Active    7

[id number]

 

          Problem   Arthritis           M19.90              Active    4598653

 

          Problem   Non morbid obesity           E66.9               Active    4

[id number]







ALLERGIES

No Information



ENCOUNTERS





                Encounter       Location        Date            Diagnosis

 

                    [facility] [id number] [place] [address] 10 

2020                                

 

                    [facility] [address] [facility] [place] [address] 15 

2020                               Leg pain, left M79.605

 

                          [facility]  [address] [facility] [id number]

[address] [place] 

[address]                13 2020              Leg pain, left M79.605

 

                    [facility] [address] [facility] [place] [address] 11 

2019                               Type 2 diabetes mellitus without complic

ation, without long-term 

current use of insulin E11.9 and GERD with esophagitis K21.0

 

                    [place] [address] [address] 01 

Oct, 2019                               Encounter for immunization Z23

 

                    [place] [address] [address]                                Type 2 diabetes mellitus without complic

ation, without long-term 

current use of insulin E11.9

 

                    [place] [address] [address]                                 

 

                    [place] [address] [address]                                Type 2 diabetes mellitus without complic

ation, without long-term 

current use of insulin E11.9

 

                    [place] [address] [address]                                Weight loss R63.4

 

                    [place] [address] [address] 31 

May, 2019                               Type 2 diabetes mellitus without complic

ation, without long-term 

current use of insulin E11.9

 

                    [place] [address] [address] 31 

May, 2019                               Type 2 diabetes mellitus without complic

ation, without long-term 

current use of insulin E11.9 and Non morbid obesity E66.9

 

                          [facility] [place] [address] [address]

[address] [place] [address] 

[address]                14 May, 2019              Seasonal allergic rhinitis d

ue to pollen J30.1 and Sore 

throat J02.9

 

                          [facility] [place] [address] [id number]

[address] [place] [address] 

[address]                              Arthritis M19.90

 

                    [place] [address] [address]                                Seasonal allergic rhinitis due to other 

allergic trigger J30.89 and 

Dysthymic disorder F34.1

 

                    [place] [address] [address] 05 

Dec, 2018                               Bilateral otitis media with effusion H65

.93 and Anxiety F41.9

 

                    [place] [address] [address]                                Type 2 diabetes mellitus without complic

ation, without long-term 

current use of insulin E11.9

 

                    [place] [address] [address]                                Pharyngitis due to Streptococcus species

 J02.0

 

                    [place] [address] [address]                                 

 

                          [facility] WALK IN [address] 

[address]                10 Jul, 2018              Fever, unspecified fever cau

se R50.9 and Lymphadenopathy

R59.1

 

                          [facility] IN [place] [address] 

[address]                              Pharyngitis due to other org

anism J02.8

 

                    [place] [address] [address]                                 

 

                    [place] [address] [address]                                Type 2 diabetes mellitus without complic

ation, without long-term 

current use of insulin E11.9 and Other eczema L30.8

 

                          [facility] [place] [address] 

[address]                              Acute pain of left shoulder 

M25.512

 

                    [place] [address] [address]                                 

 

                    [address] [address] 15 

Feb, 2018                               Type 2 diabetes mellitus without complic

ation, without long-term 

current use of insulin E11.9

 

                    [place] [address] [address]                                Type 2 diabetes mellitus without complic

ation, without long-term 

current use of insulin E11.9 ; Tubular adenoma of colon D12.6 ; Mixed 
hyperlipidemia E78.2 ; History of leukocytosis Z86.2 and Screening breast 
examination Z12.31

 

                    [place] [address] [address]                                Metatarsalgia of right foot M77.41 and T

ype 2 diabetes mellitus 

without complication, without long-term current use of insulin E11.9

 

                    [place] [address] [address]                                Capsulitis M77.9 and Metatarsalgia of ri

ght foot M77.41

 

                    [place] [address] [address] 08 

Sep, 2017                               Capsulitis M77.9

 

                    [place] [address] [address] 06 

Sep, 2017                                

 

                    [place] [address] [address] 03 

Aug, 2017                               Type 2 diabetes mellitus without complic

ation, without long-term 

current use of insulin E11.9 ; Tubular adenoma of colon D12.6 and Mixed 
hyperlipidemia E78.2

 

                    [place] [address] [address]                                Metatarsalgia of right foot M77.41 and C

apsulitis M77.9

 

                    [place] [address] [address]                                 

 

                          [facility]  [address] [facility] [id number]

[address] 

[address]                              Duarte's neuroma of right fo

ot G57.61

 

                    [place] [address] [address]                                Mixed hyperlipidemia E78.2 and Type 2 di

abetes mellitus without 

complication, without long-term current use of insulin E11.9

 

                    [place] [address] [address] 10 

Brandon, 2017                               Type 2 diabetes mellitus without complic

ation, without long-term 

current use of insulin E11.9 ; Tubular adenoma of colon D12.6 and Mixed 
hyperlipidemia E78.2

 

                    [place] [address] [address] 22 

Dec, 2016                               Mixed hyperlipidemia E78.2

 

                    [place] [address] [address]                                 

 

                    [place] [address] [address]                                Mixed hyperlipidemia E78.2

 

                    [place] [address] [address]                                Mixed hyperlipidemia E78.2

 

                    [place] [address] [address] 08 

Sep, 2016                                

 

                    [place] [address] [address] 10 

Aug, 2016                               Type 2 diabetes mellitus without complic

ation, without long-term 

current use of insulin E11.9 ; Helicobacter pylori (H. pylori) infection A04.8 
and Mixed hyperlipidemia E78.2

 

                    [place] [address] [address] 08 

Aug, 2016                               Type 2 diabetes mellitus without complic

ation, without long-term 

current use of insulin E11.9

 

                    [place] [address] [address] 03 

Aug, 2016                               Type 2 diabetes mellitus without complic

ation, without long-term 

current use of insulin E11.9

 

                    [place] [address] [address] 02 

Aug, 2016                               Dyspepsia R10.13 ; Upper abdominal pain 

R10.10 ; Bowel habit changes 

R19.4 ; History of leukocytosis Z86.2 and Helicobacter pylori (H. pylori) 
infection A04.8

 

                          [facility]  [address] [facility] [id number]

[address] [place] 

[address]                27 May, 2016              Environmental allergies Z91.

09

 

                    [place] [address] [address]                                Left leg pain M79.605 ; Localized swelli

ng of lower leg R22.40 and 

Upper respiratory infection J06.9

 

                    [place] [address] [address] 28 

Dec, 2015                                

 

                    [place] [address] [address] 21 

Dec, 2015                               Left leg pain M79.605

 

                    [place] [address] [address] 10 

Dec, 2015                               Left leg pain M79.605 and Localized swel

ling of lower leg R22.40

 

                    [address] [address] 01 

Dec, 2015                               Left leg pain M79.605

 

                    [place] [address] [address]                                Encounter for immunization Z23

 

                    [address] [address] 12 

Oct, 2015                               Bronchitis J40

 

                    [place] [address] [address] 30 

Sep, 2015                               Physical exam, pre-employment V70.5 ; En

counter for PPD test V74.1 and

Hyperlipidemia 272.4

 

                    [address] [address] 21 

Sep, 2015                                

 

                    [place] [address] [address] 21 

Sep, 2015                                

 

                    [place] [address] [address] 09 

Sep, 2015                                

 

                    [address] [address] 04 

Sep, 2015                               Elevated blood sugar 790.29

 

                    [place] [address] [address] 03 

Sep, 2015                               Elevated blood sugar 790.29

 

                    [place] [address] [address] 03 

Sep, 2015                               Palpitations 785.1

 

                    [facility] [address] [id number] [place] [address] 01 

Sep, 2015                               Palpitations 785.1

 

                    [facility] [address] [id number] [place] [address]                                 

 

                    [facility] [address] [id number] [place] [address] 28 

May, 2015                               Hand pain, right 729.5 and Depression wi

th anxiety 300.4

 

                    [facility] [address] [id number] [place] [address]                                 

 

                    [facility] [address] [address]                                 

 

                    [facility] [id number] [place] [address] 05 

Mar, 2015                                

 

                    [facility] [address] [id number] [place] [address] 05 

Mar, 2015                                

 

                    [facility] [address] [id number] [place] [address]                                 

 

                    [facility] [address] [address]                                 

 

                    [facility] [id number] [place] [address] 10 

Feb, 2015                                

 

                    [facility] [id number] [place] [address] 10 

Feb, 2015                                

 

                    [facility] [address] [id number] [place] [address]                                 

 

                    [facility] [id number] [place] [address]                                 

 

                    [facility] [id number] [place] [address]                                 

 

                    [facility] [id number] [place] [address]                                 

 

                    [facility] [place] [facility] [address] [facility] [id number] [place] [address]                                 

 

                    [facility] [place] [facility] [address] [facility] [id number] [place] [address]                                 

 

                    [facility] [place] [facility] [address] [facility] [place] [address]                                 

 

                    [facility] [place] [facility] [address] [facility] [place] [address]                                 

 

                    [facility] [place] [facility] [address] [facility] [place] [address]                                 

 

                    [facility] [place] [facility] [address] [facility] [place] [address]                                 

 

                    [facility] [place] [facility] [address] [facility] [place] [address]                                 

 

                    [facility] [place] [facility] [address] [facility] [place] [address]                                 

 

                    [facility] [place] [facility] [address] [facility] [place] [address] 15 

Brandon, 2015                                

 

                    [facility] [place] [facility] [address] [facility] [place] [address] 15 

Br[place], 2015                                

 

                    [facility] [place] [facility] [address] [facility] [place] [address]                                 

 

                    [facility] [place] [facility] [address] [facility] [place] [address]                                 

 

                    [facility] [place] [facility] [address] [facility] [place] [address] 15 

Oct, 2014                                

 

                    [facility] [place] [facility] [address] [facility] [place] [address] 15 

Oct, 2014                                

 

                    [facility] [place] [facility] [address] [facility] [place] [address] 14 

Oct, 2014                                

 

                    [facility] [place] [facility] [address] [facility] [place] [address] 14 

Oct, 2014                                

 

                    [facility] [place] [facility] [address] [facility] [place] [address] 07 

Oct, 2014                                

 

                    [facility] [place] [facility] [address] [facility] [id number] [place] [address] 07 

Oct,                                 

 

                    [facility] [place] [facility] [address] [facility] [id number] [place] [address] 12 

Sep,                                 

 

                    [facility] [place] [facility] [address] [facility] [place] [address] 12 

Sep,                                 

 

                    [facility] [place] [facility] [address] [facility] [place] [address] 04 

Sep,                                 

 

                    [facility] [place] [facility] [address] [facility] [id number] [place] [address] 03 

Sep,                                 

 

                    [facility] [place] [facility] [address] [facility] [place] [address] 03 

Sep,                                 

 

                    [facility] [place] [facility] [address] [facility] [place] [address] 02 

Sep,                                 

 

                    [facility] [place] [facility] [address] [facility] [place] [address] 02 

Sep,                                 

 

                    [facility] [place] [facility] [address] [facility] [place] [address] 30 

Aug, 2014                                

 

                    [facility] [place] [facility] [address] [facility] [place] [address] 30 

Aug, 2014                                

 

                    [facility] [place] [facility] [address] [facility] [place] [address] 19 

Aug, 2014                                

 

                    [facility] [place] [facility] [address] [facility] [place] [address] 19 

Aug, 2014                                

 

                    [facility] [place] [facility] [address] [facility] [place] [address] 14 

Aug, 2014                                

 

                    [facility] [place] [facility] [address] [facility] [place] [address] 14 

Aug, 2014                                

 

                    [facility] [place] [facility] [address] [facility] [place] [address] 13 

Aug, 2014                                

 

                    [facility] [place] [facility] [address] [facility] [place] [address] 13 

Aug, 2014                                

 

                    [facility] [place] [facility] [address] [facility] [place] [address] ,                                 

 

                    [facility] [place] [facility] [address] [facility] [id number] [place] [address] ,                                 

 

                    [facility] [place] [facility] [address] [facility] [id number] [place] [address] ,                                 

 

                    [facility] [place] [facility] [address] [facility] [id number] [place] [address] ,                                 

 

                    [facility] [place] [facility] [address] [facility] [id number] [place] [address] ,                                 

 

                    [facility] [place] [facility] [address] [facility] [id number] [place] [address] ,                                 

 

                    [facility] [place] [facility] [address] [facility] [id number] [place] [address] ,                                 

 

                    [facility] [place] [facility] [address] [id number] [facility] [place] [address] ,                                 

 

                    [facility] [place] [facility] [address] [facility] [id number] [place] [address] ,                                 

 

                    [facility] [place] [facility] [address] [facility] [id number] [place] [address] ,                                 

 

                    [facility] [place] [facility] [address] [facility] [id number] [place] [address] ,                                 

 

                    [facility] [place] [facility] [address] [facility] [id number] [facility] [place] [address] ,                                 

 

                    [facility] [place] [facility] [address] [facility] [id number] [facility] [place] [address] ,                                 

 

                    [facility] [place] [facility] [address] [facility] [id number] [place] [address] ,                                 

 

                    [facility] [place] [facility] [address] [facility] [id number] [facility] [place] [address] ,                                 

 

                    [facility] [place] [facility] [address] [facility] [id number] [facility] [place] [address] ,                                 

 

                    [facility] [address] [id number] [facility] [place] [address] ,                                 

 

                    [facility] [address] [id number] [place] [address]                                 

 

                    [facility] [address] [facility] [id number] [place] [address]                                 

 

                    [facility] [address] [id number] [place] [address]                                 

 

                    [facility] [address] [id number] [place] [address]                                 

 

                    [facility] [address] [id number] [place] [address]                                 

 

                    [facility] [address] [id number] [place] [address]                                 

 

                    [facility] [address] [id number] [place] [address]                                 

 

                    [facility] [address] [id number] [place] [address]                                 

 

                    [facility] [address] [id number] [place] [address]                                 

 

                    [facility] [address] [id number] [place] [address]                                 

 

                    [facility] [address] [id number] [place] [address] 27 

Mar, 2014                                

 

                    [facility] [address] [id number] [place] [address] 27 

Mar, 2014                                

 

                    [facility] [address] [id number] [place] [address]                                 

 

                    [facility] [address] [id number] [place] [address]                                 

 

                    [facility] [address] [id number] [place] [address]                                 

 

                    [facility] [address] [id number] [place] [address]                                 

 

                    [facility] [address] [id number] [place] [address] 08 

Oct, 2013                                

 

                    [facility] [address] [id number] [place] [address] 04 

Oct, 2013                                

 

                    [facility] [address] [id number] [place] [address] 03 

Oct, 2013                                

 

                    [facility] [address] [id number] [place] [address] 02 

Oct, 2013                                

 

                    [facility] [address] [id number] [place] [address] 01 

Oct, 2013                                

 

                    [facility] [address] [id number] [place] [address] 20 

Sep, 2013                                

 

                    [facility] [address] [id number] [place] [address] 08 

Aug, 2013                                

 

                    [facility] [address] [id number] [place] [address] 24 

May, 2013                                

 

                    [facility] [id number] [place] [address] 13 

May, 2013                                

 

                    [facility] [address] [id number] [place] [address]                                 

 

                    [facility] [address] [id number] [place] [address]                                 

 

                    [facility] [address] [id number] [place] [address]                                 

 

                    [facility] [address] [id number] [place] [address]                                 

 

                    [facility] [address] [id number] [place] [address] 02 

Oct, 2012                                

 

                    [facility] [address] [id number] [place] [address] 

Oct, 2012                                

 

                    [facility] [address] [id number] [place] [address] 21 

Sep, 2012                                

 

                    [facility] [address] [id number] [place] [address] 06 

Aug, 2012                                

 

                    [facility] [address] [id number] [place] [address] 02 

Aug, 2012                                

 

                    [facility] [id number] [place] [address]                                 

 

                    [facility] [id number] [place] [address]                                 

 

                    [facility] [address] [address] 15 

Eusebio, 2012                                

 

                    [facility] [address] [address]                                 

 

                    [facility] [address] [address] 10 

Apr, 2012                                

 

                    [facility] [address] [address] 22 

Mar, 2012                                

 

                    [facility] [address] [address] 20 

Mar, 2012                                

 

                    [facility] [address] [address] 14 

Mar, 2012                                

 

                    [facility] [id number] [place] [address]                                 







IMMUNIZATIONS

No Known Immunizations



SOCIAL HISTORY

Never Assessed



REASON FOR VISIT





PLAN OF CARE





VITAL SIGNS





                    Height              69 in               2014

 

                    Weight              211.7 lbs           2014

 

                    Temperature         98.4 degrees Fahrenheit 2014

 

                    Heart Rate          76 bpm              2014

 

                    Respiratory Rate    16                  2014

 

                    Blood pressure systolic 112 mmHg            2014

 

                    Blood pressure diastolic 68 mmHg             2014







MEDICATIONS

Unknown Medications



RESULTS

No Results



PROCEDURES





                Procedure       Date Ordered    Result          Body Site

 

                X-RAY EXAM OF KNEE, 1 OR 2 2014                    

 

                X-RAY EXAM OF NECK SPINE 2014                    







INSTRUCTIONS





MEDICATIONS ADMINISTERED

No Known Medications



MEDICAL (GENERAL) HISTORY





                    Type                Description         Date

 

                          Medical History           allergic rhinitis- rosita's edwige donahue allergy injections from Dr Rojas office                            

 

                    Medical History     mood disorder        

 

                    Medical History     Leukocytosis- has been to hematology  

 

                    Medical History     Hyperlipidemia       

 

                          Medical History           Left leg pain- multiple imag

ing performed and ortho referral 

placed                                   

 

                          Medical History           TUBULAR ADENOMA AND GASTRITI

S ON COLONOSOCPY AUG 2016 -MULTIPLE 

POLYPS                                   

 

                    Medical History     Helicobacter pylori (H. pylori) infectio

n Hx  

 

                    Surgical History    cholecystectomy     2006

 

                    Surgical History     section    , 

 

                    Surgical History    tonsillectomy       2015

 

                    Surgical History    colonoscopy         2016

 

                    Surgical History    colonscopy          2017

 

                    Surgical History    Right Knee scope    2017

## 2020-06-23 NOTE — XMS REPORT
[facility]

                             Created on: 2020



Kate April

External Reference #: [id number]

: 1975

Sex: Female



Demographics





                          Address                   [address] [place]  [address]

 

                          Preferred Language        Unknown

 

                          Marital Status            Unknown

 

                          Pentecostal Affiliation     Unknown

 

                          Race                      Unknown

 

                          Ethnic Group              Unknown





Author





                          Author                    Migration, April Doctor

 

                          Organization              [facility]

 

                          Address                   Unknown

 

                          Phone                     Unavailable







Care Team Providers





                    Care Team Member Name Role                Phone

 

                    Migration,  Doctor  Unavailable         Unavailable







PROBLEMS





          Type      Condition ICD9-CM Code GTP08-OB Code Onset Dates Condition S

tatus SNOMED 

Code

 

          Problem   Duarte's neuroma of right foot           G57.61             

 Active    00814749

 

          Problem   History of leukocytosis           Z86.2               Active

    [id number]

 

          Problem   Anxiety             F41.9               Active    23580421

 

                          Problem                   Type 2 diabetes mellitus wit

hout complication, without long-term current

use of insulin              E11.9                     Active       [id number]

 

          Problem   Seasonal allergic rhinitis due to pollen           J30.1    

           Active    28265934

 

          Problem   Tubular adenoma of colon           D12.6               Activ

e    386973598

 

          Problem   GERD with esophagitis           K21.0               Active  

  [id number]

 

          Problem   Mixed hyperlipidemia           E78.2               Active   

 132876011

 

           Problem    Seasonal allergic rhinitis due to other allergic trigger  

          J30.89                

Active                                  [id number]

 

          Problem   Dysthymic disorder           F34.1               Active    7

0601505

 

          Problem   Arthritis           M19.90              Active    5398696

 

          Problem   Non morbid obesity           E66.9               Active    4

[id number]







ALLERGIES

No Information



ENCOUNTERS





                Encounter       Location        Date            Diagnosis

 

                          [facility]     [address] [id number]

[address] [place] [address]

                                         

 

                          [facility]     [address] [id number]

[address] [place] [address]

                          04 Mar, 2020              Type 2 diabetes mellitus wit

hout complication, without long-term 

current use of insulin E11.9

 

                          [facility]     [address] [id number]

[address] [place] [address]

                                        Acute pain of left knee M25.

562

 

                          [facility]     [address] [id number]

[address] [place] [address]

                          15 2020              Leg pain, left M79.605

 

                          [facility]  [address] [id number]

[address] [place] [address] 

[address]                              Leg pain, left M79.605

 

                          [place] [address] [id number]

[address] [place] [address]

                                        Type 2 diabetes mellitus wit

hout complication, without long-term 

current use of insulin E11.9 and GERD with esophagitis K21.0

 

                          [place] [address] [facility] [id number]

[address] [place] [address]

                          01 Oct, 2019              Encounter for immunization Z

23

 

                          [place] [address] [facility] [id number]

[address] [place] [address]

                                        Type 2 diabetes mellitus wit

hout complication, without long-term 

current use of insulin E11.9

 

                          [place] [address] [facility] [id number]

[address] [place] [address]

                                         

 

                          [place] [address] [place] [id number] [place] [address]

                                        Type 2 diabetes mellitus wit

hout complication, without long-term 

current use of insulin E11.9

 

                          [place] [address] [facility] [id number]

[address] [place] [address]

                                        Weight loss R63.4

 

                          [place] [address] [place] [id number]

[address] [place] [address]

                          31 May, 2019              Type 2 diabetes mellitus wit

hout complication, without long-term 

current use of insulin E11.9

 

                          [place] [address] [place] [id number]

[address] [place] [address]

                          31 May, 2019              Type 2 diabetes mellitus wit

hout complication, without long-term 

current use of insulin E11.9 and Non morbid obesity E66.9

 

                          [facility]  [address] [id number]

[address] [place] [address] 

[address]                14 May, 2019              Seasonal allergic rhinitis d

ue to pollen J30.1 and Sore 

throat J02.9

 

                          [facility]  [address] [id number]

[address] [place] [address] 

[address]                              Arthritis M19.90

 

                          [place] [address] [address]

                                        Seasonal allergic rhinitis d

ue to other allergic trigger J30.89 and

Dysthymic disorder F34.1

 

                          [place] [address] [address]

                          05 Dec, 2018              Bilateral otitis media with 

effusion H65.93 and Anxiety F41.9

 

                          [place] [address] [address]

                                        Type 2 diabetes mellitus wit

hout complication, without long-term 

current use of insulin E11.9

 

                          [place] [address] [address]

                                        Pharyngitis due to Streptoco

ccus species J02.0

 

                          [place] [address] [address]

                                         

 

                          [facility]  [address] 

[address]                10 Jul, 2018              Fever, unspecified fever cau

se R50.9 and Lymphadenopathy

R59.1

 

                          [facility] [place] [address] 

[address]                              Pharyngitis due to other org

anism J02.8

 

                          [place] [address] [address]

                                         

 

                          [place] [address] [address]

                                        Type 2 diabetes mellitus wit

hout complication, without long-term 

current use of insulin E11.9 and Other eczema L30.8

 

                          [facility] [place] [address] 

[address]                              Acute pain of left shoulder 

M25.512

 

                          [place] [address] [address]

                                         

 

                          [place] [address] [place] [address] [place] [address]

                          15 2018              Type 2 diabetes mellitus wit

hout complication, without long-term 

current use of insulin E11.9

 

                          [place] [address] [address]

                          06 2018              Type 2 diabetes mellitus wit

hout complication, without long-term 

current use of insulin E11.9 ; Tubular adenoma of colon D12.6 ; Mixed 
hyperlipidemia E78.2 ; History of leukocytosis Z86.2 and Screening breast 
examination Z12.31

 

                          [place] [address] [address]

                                        Metatarsalgia of right foot 

M77.41 and Type 2 diabetes mellitus 

without complication, without long-term current use of insulin E11.9

 

                          [place] [address] [address]

                                        Capsulitis M77.9 and Metatar

salgia of right foot M77.41

 

                          [place] [address] [address]

                          08 Sep, 2017              Capsulitis M77.9

 

                          [place] [address] [address]

                          06 Sep, 2017               

 

                          [place] [address] [address]

                          03 Aug, 2017              Type 2 diabetes mellitus wit

hout complication, without long-term 

current use of insulin E11.9 ; Tubular adenoma of colon D12.6 and Mixed 
hyperlipidemia E78.2

 

                          [place] [address] [place] [id number] [place] [address]

                                        Metatarsalgia of right foot 

M77.41 and Capsulitis M77.9

 

                          [facility]     [address] [place] [id number]

[address] [place] [address]

                                         

 

                          [facility]  [address] [place] [id number]

[address] [place] [address] 

[address]                              Duarte's neuroma of right fo

ot G57.61

 

                          [facility]     [address] [id number]

[address] [place] [address]

                                        Mixed hyperlipidemia E78.2 a

nd Type 2 diabetes mellitus without 

complication, without long-term current use of insulin E11.9

 

                          [facility]     [address] [facility] [id number]

[address] [place] [address]

                          10 [place], 2017              Type 2 diabetes mellitus wit

hout complication, without long-term 

current use of insulin E11.9 ; Tubular adenoma of colon D12.6 and Mixed 
hyperlipidemia E78.2

 

                          [facility]     [address] [id number]

[address] [place] [address]

                          22 Dec, 2016              Mixed hyperlipidemia E78.2

 

                          [place] [address] [id number]

[address] [place] [address]

                                         

 

                          [place] [address] [facility] [id number]

[address] [place] [address]

                                        Mixed hyperlipidemia E78.2

 

                          [facility]     [address] [id number]

[address] [place] [address]

                                        Mixed hyperlipidemia E78.2

 

                          [facility]     [address] [facility] [id number]

[address] [place] [address]

                          08 Sep, 2016               

 

                          [facility]     [address] [id number]

[address] [place] [address]

                          10 Aug, 2016              Type 2 diabetes mellitus wit

hout complication, without long-term 

current use of insulin E11.9 ; Helicobacter pylori (H. pylori) infection A04.8 
and Mixed hyperlipidemia E78.2

 

                          [facility]     [address] [id number]

[address] [place] [address]

                          08 Aug, 2016              Type 2 diabetes mellitus wit

hout complication, without long-term 

current use of insulin E11.9

 

                          [facility]     [address] [id number]

[address] [place] [address]

                          03 Aug, 2016              Type 2 diabetes mellitus wit

hout complication, without long-term 

current use of insulin E11.9

 

                          [place] [address] [place] [id number]

[address] [place] [address]

                          02 Aug, 2016              Dyspepsia R10.13 ; Upper abd

ominal pain R10.10 ; Bowel habit 

changes R19.4 ; History of leukocytosis Z86.2 and Helicobacter pylori (H. 
pylori) infection A04.8

 

                          [facility]  [address] [place] [id number]

[address] [place] [address] 

[address]                27 May, 2016              Environmental allergies Z91.

09

 

                          [place] [address] [address]

                                        Left leg pain M79.605 ; Loca

lized swelling of lower leg R22.40 and 

Upper respiratory infection J06.9

 

                          [place] [address] [address]

                          28 Dec, 2015               

 

                          [place] [address] [address]

                          21 Dec, 2015              Left leg pain M79.605

 

                          [place] [address] [address]

                          10 Dec, 2015              Left leg pain M79.605 and Lo

calized swelling of lower leg R22.40

 

                          [place] [address] [address]

                          01 Dec, 2015              Left leg pain M79.605

 

                          [place] [address] [address]

                                        Encounter for immunization Z

23

 

                          [place] [address] [address]

                          12 Oct, 2015              Bronchitis J40

 

                          [place] [address] [address]

                          30 Sep, 2015              Physical exam, pre-employmen

t V70.5 ; Encounter for PPD test V74.1 

and Hyperlipidemia 272.4

 

                          [place] [address] [place] [id number]

[address] [place] [address]

                          21 Sep, 2015               

 

                          [place] [id number]

[address] [place] [address]

                          21 Sep, 2015               

 

                          [facility]     [address] [id number]

[address] [place] [address]

                          09 Sep, 2015               

 

                          [facility]     [address] [id number]

[address] [place] [address]

                          04 Sep, 2015              Elevated blood sugar 790.29

 

                          [facility]     [address] [facility] [id number]

[address] [place] [address]

                          03 Sep, 2015              Elevated blood sugar 790.29

 

                          [facility]     [address] [id number]

[address] [place] [address]

                          03 Sep, 2015              Palpitations 785.1

 

                          [facility]     [address] [id number]

[address] [place] [address]

                          01 Sep, 2015              Palpitations 785.1

 

                          [facility]     [address] [id number]

[address] [place] [address]

                                         

 

                          [facility]     [address] [id number]

[address] [place] [address]

                          28 May, 2015              Hand pain, right 729.5 and D

epression with anxiety 300.4

 

                          [facility]     [address] [facility] [id number]

[address] [place] [address]

                                         

 

                          [facility]     [address] [id number]

[address] [place] [address]

                                         

 

                          [facility]     [address] [id number]

[address] [place] [address]

                          05 Mar, 2015               

 

                          [facility]     [address] [id number]

[address] [place] [address]

                          05 Mar, 2015               

 

                          [facility]     [address] [id number]

[address] [place] [address]

                                         

 

                          [facility]     [address] [id number]

[address] [place] [address]

                                         

 

                          [facility]     [address] [id number]

[address] [place] [address] [place] [address]

                          10 2015               

 

                          [facility]     [address] [id number]

[address] [place] [address], [place] [address]

                          10 Feb, 2015               

 

                          [facility]     [address] [id number]

[address] [place] [address], [place] [address]

                                         

 

                          [facility]     [address] [id number]

[address] [place] [address], [place] [address]

                                         

 

                          [facility]     [address] [id number]

[address] [place] [address], [place] [address]

                                         

 

                          [facility]     [address] [id number]

[address] [place] [address], [place] [address]

                                         

 

                          [facility]     [address] [id number]

[address] [place] [address], [place] [address]

                                         

 

                          [facility]     [address] [id number]

[address] [place] [address], [place] [address]

                                         

 

                          [facility]     [address] [id number]

[address] [place] [address], [place] [address]

                                         

 

                          [facility]     [address] [id number]

[address] [place] [address], [place] [address]

                                         

 

                          [facility]     [address] [id number]

[address] [place] [address], [place] [address]

                                         

 

                          [facility]     [address] [id number]

[address] [place] [address], [place] [address]

                                         

 

                          [facility]     [address] [id number]

[address] [place] [address], [place] [address]

                                         

 

                          [facility]     [address] [id number]

[address] [place] [address], [place] [address]

                                         

 

                          [facility]     [address] [id number]

[address] [place] [address], [place] [address]

                          15 [place], 2015               

 

                          [facility]     [address] [id number]

[address] [place] [address], [place] [address]

                          15 [place], 2015               

 

                          [facility] [place] [facility]     [address] [id number]

[address] [place] [address], [place] [address]

                                         

 

                          [facility]     [address] [id number]

[address] [place] [address], [place] [address]

                                         

 

                          [facility]     [address] [id number]

[address] [place] [address], [place] [address]

                          15 Oct, 2014               

 

                          [facility]     [address] [id number]

[address] [place] [address], [place] [address]

                          15 Oct, 2014               

 

                          [facility]     [address] [id number]

[address] [place] [address], [place] [address]

                          14 Oct, 2014               

 

                          [facility]     [address] [id number]

[address] [place] [address], [place] [address]

                          14 Oct, 2014               

 

                          [facility]     [address] [id number]

[address] [place] [address], [place] [address]

                          07 Oct, 2014               

 

                          [facility]     [address] [id number]

[address] [place] [address], [place] [address]

                          07 Oct, 2014               

 

                          [facility]     [address] [id number]

[address] [place] [address], [place] [address]

                          12 Sep, 2014               

 

                          [facility]     [address] [id number]

[address] [place] [address], [place] [address]

                          12 Sep,                

 

                          [facility]     [address] [id number]

[address] [place] [address], [place] [address]

                          04 Sep,                

 

                          [facility]     [address] [id number]

[address] [place] [address], [place] [address]

                          03 Sep,                

 

                          [facility]     [address] [id number]

[address] [place] [address], [place] [address]

                          03 Sep,                

 

                          [facility] [place] [facility]     [address] [id number]

[address] [place] [address], [place] [address]

                          02 Sep,                

 

                          [facility]     [address] [id number]

[address] [place] [address], [place] [address]

                          02 Sep,                

 

                          [facility]     [address] [id number]

[address] [place] [address], [place] [address]

                          30 Aug, 2014               

 

                          [facility]     [address] [id number]

[address] [address]

                          30 Aug, 2014               

 

                          [facility]     [address] [id number]

[address] [place] [address], [place] [address]

                          19 Aug, 2014               

 

                          [facility]     [address] [id number]

[address] [place] [address], [place] [address]

                          19 Aug, 2014               

 

                          [facility]     [address] [id number]

[address] [place] [address], [place] [address]

                          14 Aug, 2014               

 

                          [facility]     [address] [id number]

[address] [place] [address], [place] [address]

                          14 Aug, 2014               

 

                          [facility]     [address] [id number]

[address] [place] [address], [place] [address]

                          13 Aug, 2014               

 

                          [facility]     [address] [id number]

[address] [place] [address], [place] [address]

                          13 Aug, 2014               

 

                          [facility]     [address] [id number]

[address] [place] [address], [place] [address]

                                         

 

                          [facility]     [address] [id number]

[address] [place] [address], [place] [address]

                                         

 

                          [facility]     [address] [id number]

[address] [place] [address], [place] [address]

                                         

 

                          [facility]     [address] [id number]

[address] [place] [address], [place] [address]

                                         

 

                          [facility]     [address] [id number]

[address] [place] [address], [place] [address]

                                         

 

                          [facility]     [address] [id number]

[address] [place] [address], [place] [address]

                                         

 

                          [facility]     [address] [id number]

[address] [place] [address], [place] [address]

                                         

 

                          [facility]     [address] [id number]

[address] [place] [address], [place] [address]

                                         

 

                          [facility]     [address] [id number]

[address] [place] [address]

                          ,                

 

                          [facility]     [address] [id number]

[address] [place] [address]

                          ,                

 

                          [facility]     [address] [id number]

[address] [place] [address], [place] [address]

                          ,                

 

                          [facility]     [address] [id number]

[address] [place] [address], [place] [address]

                          ,                

 

                          [facility]     [address] [id number]

[address] [place] [address], [place] [address]

                          ,                

 

                          [facility]     [address] [id number]

[address] [place] [address], [place] [address]

                          ,                

 

                          [facility]     [address] [id number]

[address] [place] [address], [place] [address]

                          ,                

 

                          [facility]     [address] [id number]

[address] [place] [address], [place] [address]

                          ,                

 

                          [facility]     [address] [id number]

[address] [place] [address], [place] [address]

                                         

 

                          [facility]     [address] [id number]

[address] [place] [address], [place] [address]

                                         

 

                          [facility]     [address] [id number]

[address] [place] [address], [place] [address]

                                         

 

                          [facility]     [address] [id number]

[address] [place] [address], [place] [address]

                                         

 

                          [facility]     [address] [id number]

[address] [place] [address], [place] [address]

                                         

 

                          [facility]     [address] [id number]

[address] [place] [address], [place] [address]

                                         

 

                          [facility]     [address] [id number]

[address] [place] [address], [place] [address]

                                         

 

                          [facility]     [address] [id number]

[address] [place] [address], [place] [address]

                                         

 

                          [facility]     [address] [id number]

[address] [place] [address], [place] [address]

                                         

 

                          [facility]     [address] [id number]

[address] [place] [address], [place] [address]

                                         

 

                          [facility]     [address] [id number]

[address] [place] [address], [place] [address]

                                         

 

                          [facility]     [address] [id number]

[address] [place] [address], [place] [address]

                          27 Mar, 2014               

 

                          [facility]     [address] [id number]

[address] [place] [address], [place] [address]

                          27 Mar, 2014               

 

                          [facility]     [address] [id number]

[address] [place] [address], [place] [address]

                                         

 

                          [facility]     [address] [id number]

[address] [place] [address], [place] [address]

                                         

 

                          [facility]     [address] [id number]

[address] [place] [address], [place] [address]

                                         

 

                          [facility]     [address] [id number]

[address] [place] [address], [place] [address]

                                         

 

                          [facility]     [address] [id number]

[address] [place] [address], [place] [address]

                          08 Oct, 2013               

 

                          [facility]     [address] [id number]

[address] [place] [address], [place] [address]

                          04 Oct, 2013               

 

                          [facility]     [address] [id number]

[address] [place] [address]

                          03 Oct, 2013               

 

                          [facility]     [address] [id number]

[address] [place] [address], [place] [address]

                          02 Oct, 2013               

 

                          [facility]     [address] [id number]

[address] [place] [address], [place] [address]

                          01 Oct, 2013               

 

                          [facility]     [address] [id number]

[address] [place] [address], [place] [address]

                          20 Sep, 2013               

 

                          [facility]     [address] [id number]

[address] [place] [address], [place] [address]

                          08 Aug, 2013               

 

                          [facility]     [address] [id number]

[address] [place] [address], [place] [address]

                          24 May, 2013               

 

                          [facility]     [address] [id number]

[address] [place] [address], [place] [address]

                          13 May, 2013               

 

                          [facility]     [address] [id number]

[address] [place] [address], [place] [address]

                                         

 

                          [facility]     [address] [id number]

[address] [place] [address], [place] [address]

                                         

 

                          [facility]     [address] [id number]

[address] [place] [address], [place] [address]

                                         

 

                          [facility]     [address] [id number]

[address] [place] [address], [place] [address]

                                         

 

                          [facility]     [address] [id number]

[address] [place] [address], [place] [address]

                          02 Oct, 2012               

 

                          [facility]     [address] [id number]

[address] [place] [address], [place] [address]

                          02 Oct, 2012               

 

                          [facility]     [address] [id number]

[address] [place] [address], [place] [address]

                          21 Sep, 2012               

 

                          [facility]     [address] [id number]

[address] [place] [address], [place] [address]

                          06 Aug, 2012               

 

                          [facility]     [address] [id number]

[address] [place] [address], [place] [address]

                          02 Aug, 2012               

 

                          [facility]     [address] [id number]

[address] [place] [address], [place] [address]

                                         

 

                          [facility]     [address] [id number]

[address] [place] [address], [place] [address]

                                         

 

                          [facility]     [address] [id number]

[address] [place] [address], [place] [address]

                          15 Eusebio, 2012               

 

                          [facility]     [address] [id number]

[address] [place] [address], [place] [address]

                                         

 

                          [facility]     [address] [facility] [id number]

[address] [place] [address]

                          10 Apr, 2012               

 

                          [facility]     [address] [facility] [id number]

[address] [place] [address]

                          22 Mar, 2012               

 

                          [facility]     [address] [facility] [id number]

[address] [place] [address]

                          20 Mar, 2012               

 

                          [facility]     [address] [facility] [id number]

[address] [place] [address]

                          14 Mar, 2012               

 

                          [facility]     [address] [facility] [id number]

[address] [place] [address]

                                         







IMMUNIZATIONS

No Known Immunizations



SOCIAL HISTORY

Never Assessed



REASON FOR VISIT





PLAN OF CARE





VITAL SIGNS





MEDICATIONS

Unknown Medications



RESULTS

No Results



PROCEDURES

No Known procedures



INSTRUCTIONS





MEDICATIONS ADMINISTERED

No Known Medications



MEDICAL (GENERAL) HISTORY





                    Type                Description         Date

 

                          Medical History           allergic rhinitis- rec's edwige donahue allergy injections from Dr Rojas office                            

 

                    Medical History     mood disorder        

 

                    Medical History     Leukocytosis- has been to hematology  

 

                    Medical History     Hyperlipidemia       

 

                          Medical History           Left leg pain- multiple imag

ing performed and ortho referral 

placed                                   

 

                          Medical History           TUBULAR ADENOMA AND GASTRITI

S ON COLONOSOCPY AUG 2016 -MULTIPLE 

POLYPS                                   

 

                    Medical History     Helicobacter pylori (H. pylori) infectio

n Hx  

 

                    Surgical History    cholecystectomy     

 

                    Surgical History     section    , 

 

                    Surgical History    tonsillectomy       2015

 

                    Surgical History    colonoscopy         2016

 

                    Surgical History    colonscopy          2017

 

                    Surgical History    Right Knee scope    2017

## 2020-06-23 NOTE — XMS REPORT
[facility]

                             Created on: 2017



Kate April

External Reference #: [id number]

: 1975

Sex: Female



Demographics





                          Address                   [address] [place]  [address]

 

                          Preferred Language        Unknown

 

                          Marital Status            Unknown

 

                          Anabaptism Affiliation     Unknown

 

                          Race                      Unknown

 

                          Ethnic Group              Unknown





Author





                          Author                    ANNEMARIE April VENECIA

 

                          [facility]

 

                          Address                   [address] [place]  [address]



 

                          Phone                     [phone]







Care Team Providers





                    Care Team Member Name Role                Phone

 

                    ADRIENNEHANNA  VENECIA       Unavailable         [phone]







PROBLEMS





          Type      Condition ICD9-CM Code HIA13-BX Code Onset Dates Condition S

tatus SNOMED 

Code

 

                          Problem                   Type 2 diabetes mellitus wit

hout complication, without long-term current

use of insulin              E11.9                     Active       [id number]

 

          Problem   History of leukocytosis           Z86.2               Active

    [id number]

 

          Problem   Helicobacter pylori (H. pylori) infection           A04.8   

            Active    3752252

 

          Problem   Duarte's neuroma of right foot           G57.61             

 Active    [id number]

 

          Problem   Tubular adenoma of colon           D12.6               Activ

e    [id number]

 

          Problem   Upper abdominal pain           R10.10              Active   

 18903794

 

          Problem   Bowel habit changes           R19.4               Active    

[id number]

 

          Problem   Mixed hyperlipidemia           E78.2               Active   

 [id number]

 

          Problem   Dyspepsia           R10.13              Active    [id number]







ALLERGIES

Unknown Allergies



SOCIAL HISTORY

No smoking Hx information available



PLAN OF CARE





VITAL SIGNS





MEDICATIONS

Unknown Medications



RESULTS





                Name            Result          Date            Reference Range

 

                [facility]                             2016       

 

                Glucose, Serum  203                             65-99

 

                BUN             7                               6-24

 

                Creatinine, Serum 0.66                            0.57-1.00

 

                eGFR If NonAfricn Am 111                                 >59

 

                eGFR If Africn Am 128                                 >59

 

                BUN/Creatinine Ratio 11                              9-23

 

                Sodium, Serum   138                             134-144

 

                Potassium, Serum 4.4                             3.5-5.2

 

                Chloride, Serum 101                             

 

                Carbon Dioxide, Total 23                              18-29

 

                Calcium, Serum  9.6                             8.7-10.2

 

                Protein, Total, Serum 6.9                             6.0-8.5

 

                Albumin, Serum  4.0                             3.5-5.5

 

                Globulin, Total 2.9                             1.5-4.5

 

                A/G Ratio       1.4                             1.1-2.5

 

                Bilirubin, Total 0.3                             0.0-1.2

 

                Alkaline Phosphatase, S 61                              

 

                AST (SGOT)      14                              0-40

 

                ALT (SGPT)      13                              0-32







PROCEDURES





                Procedure       Date Ordered    Related Diagnosis Body Site

 

                VENIPUNCT, ROUTINE* Dec 22, 2016                     







IMMUNIZATIONS

No Known Immunizations

## 2020-06-23 NOTE — CARDIAC CATHETERIZATION
DATE OF SERVICE:  06/23/2020



CARDIAC CATHETERIZATION REPORT



The patient is a 45-year-old lady with multiple coronary artery disease risk

factors, who has been experiencing chest discomfort and is concerned that this

is of coronary origin.  Cardiac catheterization was carried out today after

having obtained an informed consent.



DESCRIPTION OF PROCEDURE:

She was brought to the cardiac catheterization laboratory in a fasting state. 

Right groin was prepared and draped in the usual sterile fashion.  Lidocaine 1%

for local anesthesia.  Modified Seldinger technique was used to advance a

5-Vietnamese sheath in the right femoral artery, 5-Vietnamese JL4 catheter for left

coronary angiography, 5-Vietnamese JR4 catheter was used for right coronary

angiography, 5-Vietnamese pigtail catheter was used for left heart catheterization

and left ventricular angiography.  Angiography of the right femoral artery was

carried out through the sheath and Mynx was used to achieve hemostasis following

sheath removal.  She tolerated the procedure well.



HEMODYNAMICS:

Left ventricular end-diastolic pressure following coronary angiography was 8

mmHg.  There was no significant pressure gradient on pullback across the aortic

valve.  Ascending aortic pressure was 112/67 with a mean of 67 mmHg.



CORONARY ANGIOGRAPHY:

Left main coronary artery, left anterior descending artery, left circumflex

artery, right coronary artery are free of any angiographically significant

coronary artery disease.  Left main coronary artery is short.  Right coronary

artery is dominant.



LEFT VENTRICULAR ANGIOGRAPHY:

Left ventricular angiography was carried out in the right anterior oblique

projection.  Global left ventricular systolic function is normal.  No regional

wall motion abnormalities seen.  Left ventricular ejection fraction

approximately 60%.



CONCLUSIONS:

1.  No angiographically significant coronary artery disease.

2.  Normal global left ventricular systolic function with ejection fraction of

60%.

3.  Normal left ventricular end-diastolic pressure.



DISCUSSION AND RECOMMENDATIONS:

Based on results of the study, symptoms do not appear to be of cardiac origin. 

Continuing risk factor modification is advised.  Outpatient followup is advised.





Job ID: [id number]

DocumentID: [id number]

Dictated Date:  06/23/2020 12:45:12

Transcription Date: 06/23/2020 12:54:15

Dictated By: LAN ESCAMILLA MD, MA, FACP, FACC,

## 2020-06-23 NOTE — XMS REPORT
[facility]

                             Created on: 04/10/2020



Kate April

External Reference #: [id number]

: 1975

Sex: Female



Demographics





                          Address                   [address] [place]  [address]

 

                          Preferred Language        Unknown

 

                          Marital Status            Unknown

 

                          Jain Affiliation     Unknown

 

                          Race                      Unknown

 

                          Ethnic Group              Unknown





Author





                          Author                    Migration, April Doctor

 

                          Organization              [facility]

 

                          Address                   Unknown

 

                          Phone                     Unavailable







Care Team Providers





                    Care Team Member Name Role                Phone

 

                    Migration,  Doctor  Unavailable         Unavailable







PROBLEMS





          Type      Condition ICD9-CM Code BHM04-HB Code Onset Dates Condition S

tatus SNOMED 

Code

 

          Problem   Duarte's neuroma of right foot           G57.61             

 Active    37027716

 

          Problem   History of leukocytosis           Z86.2               Active

    [id number]

 

          Problem   Anxiety             F41.9               Active    91421910

 

                          Problem                   Type 2 diabetes mellitus wit

hout complication, without long-term current

use of insulin              E11.9                     Active       [id number]

 

          Problem   Seasonal allergic rhinitis due to pollen           J30.1    

           Active    42315889

 

          Problem   Tubular adenoma of colon           D12.6               Activ

e    [id number]

 

          Problem   GERD with esophagitis           K21.0               Active  

  [id number]

 

          Problem   Mixed hyperlipidemia           E78.2               Active   

 154425428

 

           Problem    Seasonal allergic rhinitis due to other allergic trigger  

          J30.89                

Active                                  [id number]

 

          Problem   Dysthymic disorder           F34.1               Active    7

[id number]

 

          Problem   Arthritis           M19.90              Active    9847046

 

          Problem   Non morbid obesity           E66.9               Active    4

[id number]







ALLERGIES

No Information



ENCOUNTERS





                Encounter       Location        Date            Diagnosis

 

                          [facility]     [address] [facility] [id number]

[address] [place] [address]

                                         

 

                          [facility]     [address] [facility] [id number]

[address] [place] [address]

                          04 Mar, 2020              Type 2 diabetes mellitus wit

hout complication, without long-term 

current use of insulin E11.9

 

                          [facility]     [address] [facility] [id number]

[address] [place] [address]

                                        Acute pain of left knee M25.

562

 

                          [facility]     [address] [facility] [id number]

[address] [place] [address]

                          15 2020              Leg pain, left M79.605

 

                          [facility]  [address] [facility] [id number]

[address] [place] [address] 

[address]                              Leg pain, left M79.605

 

                          [place] [address] [facility] [id number]

[address] [place] [address]

                                        Type 2 diabetes mellitus wit

hout complication, without long-term 

current use of insulin E11.9 and GERD with esophagitis K21.0

 

                          [place] [address] [facility] [id number]

[address] [place] [address]

                          01 Oct, 2019              Encounter for immunization Z

23

 

                          [place] [address] [facility] [id number]

[address] [place] [address]

                                        Type 2 diabetes mellitus wit

hout complication, without long-term 

current use of insulin E11.9

 

                          [place] [address] [facility] [id number]

[address] [place] [address]

                                         

 

                          [place] [address] [place] [id number] [place] [address]

                                        Type 2 diabetes mellitus wit

hout complication, without long-term 

current use of insulin E11.9

 

                          [place] [address] [facility] [id number]

[address] [place] [address]

                                        Weight loss R63.4

 

                          [place] [address] [place] [id number]

[address] [place] [address]

                          31 May, 2019              Type 2 diabetes mellitus wit

hout complication, without long-term 

current use of insulin E11.9

 

                          [place] [address] [place] [id number]

[address] [place] [address]

                          31 May, 2019              Type 2 diabetes mellitus wit

hout complication, without long-term 

current use of insulin E11.9 and Non morbid obesity E66.9

 

                          [facility]  [address] [facility] [id number]

[address] [place] [address] 

[address]                14 May, 2019              Seasonal allergic rhinitis d

ue to pollen J30.1 and Sore 

throat J02.9

 

                          [facility]  [address] [facility] [id number]

[address] [place] [address] 

[address]                              Arthritis M19.90

 

                          [place] [address] [address]

                                        Seasonal allergic rhinitis d

ue to other allergic trigger J30.89 and

Dysthymic disorder F34.1

 

                          [place] [address] [address]

                          05 Dec, 2018              Bilateral otitis media with 

effusion H65.93 and Anxiety F41.9

 

                          [place] [address] [address]

                                        Type 2 diabetes mellitus wit

hout complication, without long-term 

current use of insulin E11.9

 

                          [place] [address] [address]

                                        Pharyngitis due to Streptoco

ccus species J02.0

 

                          [place] [address] [address]

                                         

 

                          [facility] IN [facility]  [address] 

[address]                10 Jul, 2018              Fever, unspecified fever cau

se R50.9 and Lymphadenopathy

R59.1

 

                          [facility] [place] [address] 

[address]                              Pharyngitis due to other org

anism J02.8

 

                          [place] [address] [address]

                                         

 

                          [place] [address] [address]

                                        Type 2 diabetes mellitus wit

hout complication, without long-term 

current use of insulin E11.9 and Other eczema L30.8

 

                          [facility] IN [place] [address] 

[address]                              Acute pain of left shoulder 

M25.512

 

                          [place] [address] [address]

                                         

 

                          [place] [address] [place] [address] [place] [address]

                          15 2018              Type 2 diabetes mellitus wit

hout complication, without long-term 

current use of insulin E11.9

 

                          [place] [address] [address]

                          06 2018              Type 2 diabetes mellitus wit

hout complication, without long-term 

current use of insulin E11.9 ; Tubular adenoma of colon D12.6 ; Mixed 
hyperlipidemia E78.2 ; History of leukocytosis Z86.2 and Screening breast 
examination Z12.31

 

                          [place] [address] [address]

                                        Metatarsalgia of right foot 

M77.41 and Type 2 diabetes mellitus 

without complication, without long-term current use of insulin E11.9

 

                          [place] [address] [address]

                                        Capsulitis M77.9 and Metatar

salgia of right foot M77.41

 

                          [place] [address] [address]

                          08 Sep, 2017              Capsulitis M77.9

 

                          [place] [address] [address]

                          06 Sep, 2017               

 

                          [place] [address] [address]

                          03 Aug, 2017              Type 2 diabetes mellitus wit

hout complication, without long-term 

current use of insulin E11.9 ; Tubular adenoma of colon D12.6 and Mixed 
hyperlipidemia E78.2

 

                          [place] [address] [place] [id number] [place] [address]

                                        Metatarsalgia of right foot 

M77.41 and Capsulitis M77.9

 

                          [facility]     [address] [place] [id number]

[address] [place] [address]

                                         

 

                          [facility]  [address] [place] [id number]

[address] [place] [address] 

[address]                              Duarte's neuroma of right fo

ot G57.61

 

                          [facility]     [address] [id number]

[address] [place] [address]

                                        Mixed hyperlipidemia E78.2 a

nd Type 2 diabetes mellitus without 

complication, without long-term current use of insulin E11.9

 

                          [facility]     [address] [facility] [id number]

[address] [place] [address]

                          10 Brandon, 2017              Type 2 diabetes mellitus wit

hout complication, without long-term 

current use of insulin E11.9 ; Tubular adenoma of colon D12.6 and Mixed 
hyperlipidemia E78.2

 

                          [facility]     [address] [id number]

[address] [place] [address]

                          22 Dec, 2016              Mixed hyperlipidemia E78.2

 

                          [place] [address] [id number]

[address] [place] [address]

                                         

 

                          [place] [address] [facility] [id number]

[address] [place] [address]

                                        Mixed hyperlipidemia E78.2

 

                          [facility]     [address] [id number]

[address] [place] [address]

                                        Mixed hyperlipidemia E78.2

 

                          [facility]     [address] [facility] [id number]

[address] [place] [address]

                          08 Sep, 2016               

 

                          [facility]     [address] [facility] [id number]

[address] [place] [address]

                          10 Aug, 2016              Type 2 diabetes mellitus wit

hout complication, without long-term 

current use of insulin E11.9 ; Helicobacter pylori (H. pylori) infection A04.8 
and Mixed hyperlipidemia E78.2

 

                          [facility]     [address] [id number]

[address] [place] [address]

                          08 Aug, 2016              Type 2 diabetes mellitus wit

hout complication, without long-term 

current use of insulin E11.9

 

                          [facility]     [address] [id number]

[address] [place] [address]

                          03 Aug, 2016              Type 2 diabetes mellitus wit

hout complication, without long-term 

current use of insulin E11.9

 

                          [place] [address] [place] [id number]

[address] [place] [address]

                          02 Aug, 2016              Dyspepsia R10.13 ; Upper abd

ominal pain R10.10 ; Bowel habit 

changes R19.4 ; History of leukocytosis Z86.2 and Helicobacter pylori (H. 
pylori) infection A04.8

 

                          [facility] IN [facility]  [address] [place] [id number]

[address] [place] [address] 

[address]                27 May, 2016              Environmental allergies Z91.

09

 

                          [place] [address] [address]

                                        Left leg pain M79.605 ; Loca

lized swelling of lower leg R22.40 and 

Upper respiratory infection J06.9

 

                          [place] [address] [address]

                          28 Dec, 2015               

 

                          [place] [address] [address]

                          21 Dec, 2015              Left leg pain M79.605

 

                          [place] [address] [address]

                          10 Dec, 2015              Left leg pain M79.605 and Lo

calized swelling of lower leg R22.40

 

                          [place] [address] [address]

                          01 Dec, 2015              Left leg pain M79.605

 

                          [place] [address] [address]

                                        Encounter for immunization Z

23

 

                          [place] [address] [address]

                          12 Oct, 2015              Bronchitis J40

 

                          [place] [address] [address]

                          30 Sep, 2015              Physical exam, pre-employmen

t V70.5 ; Encounter for PPD test V74.1 

and Hyperlipidemia 272.4

 

                          [place] [address] [place] [id number]

[address] [place] [address]

                          21 Sep, 2015               

 

                          [place] [id number]

[address] [place] [address]

                          21 Sep, 2015               

 

                          [facility]     [address] [id number]

[address] [place] [address]

                          09 Sep, 2015               

 

                          [facility]     [address] [id number]

[address] [place] [address]

                          04 Sep, 2015              Elevated blood sugar 790.29

 

                          [facility]     [address] [facility] [id number]

[address] [place] [address]

                          03 Sep, 2015              Elevated blood sugar 790.29

 

                          [facility]     [address] [facility] [id number]

[address] [place] [address]

                          03 Sep, 2015              Palpitations 785.1

 

                          [facility]     [address] [facility] [id number]

[address] [place] [address]

                          01 Sep, 2015              Palpitations 785.1

 

                          [facility]     [address] [facility] [id number]

[address] [place] [address]

                                         

 

                          [facility]     [address] [facility] [id number]

[address] [place] [address]

                          28 May, 2015              Hand pain, right 729.5 and D

epression with anxiety 300.4

 

                          [facility]     [address] [facility] [id number]

[address] [place] [address]

                                         

 

                          [facility]     [address] [facility] [id number]

[address] [place] [address]

                                         

 

                          [facility]     [address] [facility] [id number]

[address] [place] [address]

                          05 Mar, 2015               

 

                          [facility]     [address] [facility] [id number]

[address] [place] [address]

                          05 Mar, 2015               

 

                          [facility]     [address] [facility] [id number]

[address] [place] [address]

                                         

 

                          [facility]     [address] [facility] [id number]

[address] [place] [address]

                                         

 

                          [facility]     [address] [id number]

[address] [place] [address] [place] [address]

                          10 2015               

 

                          [facility]     [address] [id number]

[address] [place] [address], [place] [address]

                          10 Feb, 2015               

 

                          [facility]     [address] [id number]

[address] [place] [address], [place] [address]

                                         

 

                          [facility]     [address] [id number]

[address] [place] [address], [place] [address]

                                         

 

                          [facility]     [address] [id number]

[address] [place] [address], [place] [address]

                                         

 

                          [facility]     [address] [id number]

[address] [place] [address], [place] [address]

                                         

 

                          [facility]     [address] [id number]

[address] [place] [address], [place] [address]

                                         

 

                          [facility]     [address] [id number]

[address] [place] [address], [place] [address]

                                         

 

                          [facility]     [address] [id number]

[address] [place] [address], [place] [address]

                                         

 

                          [facility]     [address] [id number]

[address] [place] [address], [place] [address]

                                         

 

                          [facility]     [address] [id number]

[address] [place] [address], [place] [address]

                                         

 

                          [facility]     [address] [id number]

[address] [place] [address], [place] [address]

                                         

 

                          [facility]     [address] [id number]

[address] [place] [address], [place] [address]

                                         

 

                          [facility]     [address] [id number]

[address] [place] [address], [place] [address]

                                         

 

                          [facility]     [address] [id number]

[address] [place] [address], [place] [address]

                          15 [place], 2015               

 

                          [facility]     [address] [id number]

[address] [place] [address], [place] [address]

                          15 [place], 2015               

 

                          [facility] [place] [facility]     [address] [id number]

[address] [place] [address], [place] [address]

                                         

 

                          [facility]     [address] [id number]

[address] [place] [address], [place] [address]

                                         

 

                          [facility]     [address] [id number]

[address] [place] [address], [place] [address]

                          15 Oct, 2014               

 

                          [facility]     [address] [id number]

[address] [place] [address], [place] [address]

                          15 Oct, 2014               

 

                          [facility]     [address] [id number]

[address] [place] [address], [place] [address]

                          14 Oct, 2014               

 

                          [facility]     [address] [id number]

[address] [place] [address], [place] [address]

                          14 Oct, 2014               

 

                          [facility]     [address] [id number]

[address] [place] [address], [place] [address]

                          07 Oct, 2014               

 

                          [facility]     [address] [id number]

[address] [place] [address], [place] [address]

                          07 Oct, 2014               

 

                          [facility]     [address] [id number]

[address] [place] [address], [place] [address]

                          12 Sep, 2014               

 

                          [facility]     [address] [id number]

[address] [place] [address], [place] [address]

                          12 Sep,                

 

                          [facility]     [address] [id number]

[address] [place] [address], [place] [address]

                          04 Sep,                

 

                          [facility]     [address] [id number]

[address] [place] [address], [place] [address]

                          03 Sep,                

 

                          [facility]     [address] [id number]

[address] [place] [address], [place] [address]

                          03 Sep,                

 

                          [facility]     [address] [id number]

[address] [place] [address], [place] [address]

                          02 Sep,                

 

                          [facility]     [address] [id number]

[address] [place] [address], [place] [address]

                          02 Sep,                

 

                          [facility]     [address] [id number]

[address] [place] [address], [place] [address]

                          30 Aug, 2014               

 

                          [facility]     [address] [id number]

[facility] [place] [address]

                          30 Aug, 2014               

 

                          [facility]     [address] [id number]

[address] [place] [address], [place] [address]

                          19 Aug, 2014               

 

                          [facility]     [address] [id number]

[address] [place] [address], [place] [address]

                          19 Aug, 2014               

 

                          [facility]     [address] [id number]

[address] [place] [address], [place] [address]

                          14 Aug, 2014               

 

                          [facility]     [address] [id number]

[address] [place] [address], [place] [address]

                          14 Aug, 2014               

 

                          [facility]     [address] [id number]

[address] [place] [address], [place] [address]

                          13 Aug, 2014               

 

                          [facility]     [address] [id number]

[address] [place] [address], [place] [address]

                          13 Aug, 2014               

 

                          [facility]     [address] [id number]

[address] [place] [address], [place] [address]

                                         

 

                          [facility]     [address] [id number]

[address] [place] [address], [place] [address]

                                         

 

                          [facility]     [address] [id number]

[address] [place] [address], [place] [address]

                                         

 

                          [facility]     [address] [id number]

[address] [place] [address], [place] [address]

                                         

 

                          [facility]     [address] [id number]

[address] [place] [address], [place] [address]

                                         

 

                          [facility]     [address] [id number]

[address] [place] [address], [place] [address]

                                         

 

                          [facility]     [address] [id number]

[address] [place] [address], [place] [address]

                                         

 

                          [facility]     [address] [id number]

[address] [place] [address], [place] [address]

                                         

 

                          [facility]     [address] [id number]

[facility] [place] [address]

                          ,                

 

                          [facility]     [address] [id number]

[facility] [place] [address]

                          ,                

 

                          [facility]     [address] [id number]

[address] [place] [address], [place] [address]

                          ,                

 

                          [facility]     [address] [id number]

[address] [place] [address], [place] [address]

                          ,                

 

                          [facility]     [address] [id number]

[address] [place] [address], [place] [address]

                          ,                

 

                          [facility]     [address] [id number]

[address] [place] [address], [place] [address]

                          ,                

 

                          [facility]     [address] [id number]

[address] [place] [address], [place] [address]

                          ,                

 

                          [facility]     [address] [id number]

[address] [place] [address], [place] [address]

                          ,                

 

                          [facility]     [address] [id number]

[address] [place] [address], [place] [address]

                                         

 

                          [facility]     [address] [id number]

[address] [place] [address], [place] [address]

                                         

 

                          [facility]     [address] [id number]

[address] [place] [address], [place] [address]

                                         

 

                          [facility]     [address] [id number]

[address] [place] [address], [place] [address]

                                         

 

                          [facility]     [address] [id number]

[address] [place] [address], [place] [address]

                                         

 

                          [facility]     [address] [id number]

[address] [place] [address], [place] [address]

                                         

 

                          [facility]     [address] [id number]

[address] [place] [address], [place] [address]

                                         

 

                          [facility]     [address] [id number]

[address] [place] [address], [place] [address]

                                         

 

                          [facility]     [address] [id number]

[address] [place] [address], [place] [address]

                                         

 

                          [facility]     [address] [id number]

[address] [place] [address], [place] [address]

                                         

 

                          [facility]     [address] [id number]

[address] [place] [address], [place] [address]

                                         

 

                          [facility]     [address] [id number]

[address] [place] [address], [place] [address]

                          27 Mar, 2014               

 

                          [facility]     [address] [id number]

[address] [place] [address], [place] [address]

                          27 Mar, 2014               

 

                          [facility]     [address] [id number]

[address] [place] [address], [place] [address]

                                         

 

                          [facility]     [address] [id number]

[address] [place] [address], [place] [address]

                                         

 

                          [facility]     [address] [id number]

[address] [place] [address], [place] [address]

                                         

 

                          [facility]     [address] [id number]

[address] [place] [address], [place] [address]

                                         

 

                          [facility]     [address] [id number]

[address] [place] [address], [place] [address]

                          08 Oct, 2013               

 

                          [facility]     [address] [id number]

[address] [place] [address], [place] [address]

                          04 Oct, 2013               

 

                          [facility]     [address] [id number]

[address] [place] [address]

                          03 Oct, 2013               

 

                          [facility]     [address] [id number]

[address] [place] [address], [place] [address]

                          02 Oct, 2013               

 

                          [facility]     [address] [id number]

[address] [place] [address], [place] [address]

                          01 Oct, 2013               

 

                          [facility]     [address] [id number]

[address] [place] [address], [place] [address]

                          20 Sep, 2013               

 

                          [facility]     [address] [id number]

[address] [place] [address], [place] [address]

                          08 Aug, 2013               

 

                          [facility]     [address] [id number]

[address] [place] [address], [place] [address]

                          24 May, 2013               

 

                          [facility]     [address] [id number]

[address] [place] [address], [place] [address]

                          13 May, 2013               

 

                          [facility]     [address] [id number]

[address] [place] [address], [place] [address]

                                         

 

                          [facility]     [address] [id number]

[address] [place] [address], [place] [address]

                                         

 

                          [facility]     [address] [id number]

[address] [place] [address], [place] [address]

                                         

 

                          [facility]     [address] [id number]

[address] [place] [address], [place] [address]

                                         

 

                          [facility]     [address] [id number]

[address] [place] [address], [place] [address]

                          02 Oct, 2012               

 

                          [facility]     [address] [id number]

[address] [place] [address], [place] [address]

                          02 Oct, 2012               

 

                          [facility]     [address] [id number]

[address] [place] [address], [place] [address]

                          21 Sep, 2012               

 

                          [facility]     [address] [id number]

[address] [place] [address], [place] [address]

                          06 Aug, 2012               

 

                          [facility]     [address] [id number]

[address] [place] [address], [place] [address]

                          02 Aug, 2012               

 

                          [facility]     [address] [id number]

[address] [place] [address], [place] [address]

                                         

 

                          [facility]     [address] [id number]

[address] [place] [address], [place] [address]

                                         

 

                          [facility]     [address] [id number]

[address] [place] [address], [place] [address]

                          15 Eusebio, 2012               

 

                          [facility]     [address] [id number]

[address] [place] [address], [place] [address]

                                         

 

                          [facility]     [address] [facility] [id number]

[address] [place] [address]

                          10 Apr, 2012               

 

                          [facility]     [address] [facility] [id number]

[address] [place] [address]

                          22 Mar, 2012               

 

                          [facility]     [address] [facility] [id number]

[address] [place] [address]

                          20 Mar, 2012               

 

                          [facility]     [address] [facility] [id number]

[address] [place] [address]

                          14 Mar, 2012               

 

                          [facility]     [address] [facility] [id number]

[address] [place] [address]

                                         







IMMUNIZATIONS

No Known Immunizations



SOCIAL HISTORY

Never Assessed



REASON FOR VISIT





PLAN OF CARE





VITAL SIGNS





MEDICATIONS

Unknown Medications



RESULTS

No Results



PROCEDURES

No Known procedures



INSTRUCTIONS





MEDICATIONS ADMINISTERED

No Known Medications



MEDICAL (GENERAL) HISTORY





                    Type                Description         Date

 

                          Medical History           allergic rhinitis- rec's edwige donahue allergy injections from Dr Rojas office                            

 

                    Medical History     mood disorder        

 

                    Medical History     Leukocytosis- has been to hematology  

 

                    Medical History     Hyperlipidemia       

 

                          Medical History           Left leg pain- multiple imag

ing performed and ortho referral 

placed                                   

 

                          Medical History           TUBULAR ADENOMA AND GASTRITI

S ON COLONOSOCPY AUG 2016 -MULTIPLE 

POLYPS                                   

 

                    Medical History     Helicobacter pylori (H. pylori) infectio

n Hx  

 

                    Surgical History    cholecystectomy     

 

                    Surgical History     section    , 

 

                    Surgical History    tonsillectomy       2015

 

                    Surgical History    colonoscopy         2016

 

                    Surgical History    colonscopy          2017

 

                    Surgical History    Right Knee scope    2017

## 2020-06-23 NOTE — XMS REPORT
[facility]

                             Created on: 2018



Kate April

External Reference #: [id number]

: 1975

Sex: Female



Demographics





                          Address                   [address] [place]  [address]

 

                          Preferred Language        Unknown

 

                          Marital Status            Unknown

 

                          Denominational Affiliation     Unknown

 

                          Race                      Unknown

 

                          Ethnic Group              Unknown





Author





                          Author                    Darby Humphrey

 

                          Organization              [facility]

 

                          Address                   [address] [place]  [address]



 

                          Phone                     [phone]







Care Team Providers





                    Care Team Member Name Role                Phone

 

                    apolloGinoGRISELDA ALLEN Unavailable         [phone]







PROBLEMS





          Type      Condition ICD9-CM Code EPX96-LN Code Onset Dates Condition S

tatus SNOMED 

Code

 

          Problem   History of leukocytosis           Z86.2               Active

    [id number]

 

          Problem   Duarte's neuroma of right foot           G57.61             

 Active    [id number]

 

                          Problem                   Type 2 diabetes mellitus wit

hout complication, without long-term current

use of insulin              E11.9                     Active       [id number]

 

          Problem   Tubular adenoma of colon           D12.6               Activ

e    [id number]

 

          Problem   Mixed hyperlipidemia           E78.2               Active   

 [id number]







ALLERGIES





             Substance    Reaction     Event Type   Date         Status

 

             Penicillin V Potassium Unknown      Drug Allergy 10 Jul, 2018 Activ

e







ENCOUNTERS





                Encounter       Location        Date            Diagnosis

 

                          [facility]     [address] [facility] [id number]

[address] [place] [address]

                                        Pharyngitis due to Streptoco

ccus species J02.0

 

                          [facility]     [address] [facility] [id number]

[address] [place] [address]

                                         

 

                          [facility]  [address] [facility] [id number]

[address] [place] [address] 

[address]                10 Jul, 2018              Fever, unspecified fever cau

se R50.9 and Lymphadenopathy

R59.1

 

                          [facility] [place] [facility]  [address] [facility] [id number]

[address] [place] [address] 

[address]                              Pharyngitis due to other org

anism J02.8

 

                          [facility]     [address] [facility] [id number]

[address] [place] [address]

                                         

 

                          [facility]     [address] [facility] [id number]

[address] [place] [address]

                                        Type 2 diabetes mellitus wit

hout complication, without long-term 

current use of insulin E11.9 and Other eczema L30.8

 

                          [facility]  [address] 

[address]                              Acute pain of left shoulder 

M25.512

 

                          [facility]     [address] [address]

                                         

 

                          [place] [address] [address]

                          15 Feb, 2018              Type 2 diabetes mellitus wit

hout complication, without long-term 

current use of insulin E11.9

 

                          [place] [address] [address]

                                        Type 2 diabetes mellitus wit

hout complication, without long-term 

current use of insulin E11.9 ; Tubular adenoma of colon D12.6 ; Mixed 
hyperlipidemia E78.2 ; History of leukocytosis Z86.2 and Screening breast 
examination Z12.31

 

                          [place] [address] [address]

                                        Metatarsalgia of right foot 

M77.41 and Type 2 diabetes mellitus 

without complication, without long-term current use of insulin E11.9

 

                          [place] [address] [address]

                                        Capsulitis M77.9 and Metatar

salgia of right foot M77.41

 

                          [place] [address] [address]

                          08 Sep, 2017              Capsulitis M77.9

 

                          [place] [address] [address]

                          06 Sep, 2017               

 

                          [place] [address] [address]

                          03 Aug, 2017              Type 2 diabetes mellitus wit

hout complication, without long-term 

current use of insulin E11.9 ; Tubular adenoma of colon D12.6 and Mixed 
hyperlipidemia E78.2

 

                          [place] [address] [facility] [id number]

[address] [place] [address]

                          14 2017              Metatarsalgia of right foot 

M77.41 and Capsulitis M77.9

 

                          [facility]     [address] [facility] [id number]

[address] [place] [address]

                                         

 

                          [facility]  [address] [facility] [id number]

[address] [place] [address] 

[address]                              Duarte's neuroma of right fo

ot G57.61

 

                          [facility]     [address] [facility] [id number]

[address] [place] [address]

                                        Mixed hyperlipidemia E78.2 a

nd Type 2 diabetes mellitus without 

complication, without long-term current use of insulin E11.9

 

                          [place] [address] [facility] [id number]

[address] [place] [address]

                          10 Brandon, 2017              Type 2 diabetes mellitus wit

hout complication, without long-term 

current use of insulin E11.9 ; Tubular adenoma of colon D12.6 and Mixed 
hyperlipidemia E78.2

 

                          [place] [address] [facility] [id number]

[address] [place] [address]

                          22 Dec, 2016              Mixed hyperlipidemia E78.2

 

                          [place] [address] [facility] [id number]

[address] [place] [address]

                                         

 

                          [place] [address] [place] [id number]

[address] [place] [address]

                                        Mixed hyperlipidemia E78.2

 

                          [place] [address] [facility] [id number]

[address] [place] [address]

                                        Mixed hyperlipidemia E78.2

 

                          [place] [address] [facility] [id number]

[address] [place] [address]

                          08 Sep, 2016               

 

                          [place] [address] [facility] [id number]

[address] [place] [address]

                          10 Aug, 2016              Type 2 diabetes mellitus wit

hout complication, without long-term 

current use of insulin E11.9 ; Helicobacter pylori (H. pylori) infection A04.8 
and Mixed hyperlipidemia E78.2

 

                          [facility]     [address] [address]

                          08 Aug, 2016              Type 2 diabetes mellitus wit

hout complication, without long-term 

current use of insulin E11.9

 

                          [place] [address] [address]

                          03 Aug, 2016              Type 2 diabetes mellitus wit

hout complication, without long-term 

current use of insulin E11.9

 

                          [place] [address] [address]

                          02 Aug, 2016              Dyspepsia R10.13 ; Upper abd

ominal pain R10.10 ; Bowel habit 

changes R19.4 ; History of leukocytosis Z86.2 and Helicobacter pylori (H. 
pylori) infection A04.8

 

                          [facility] [place] [facility]  [address] 

[address]                27 May, 2016              Environmental allergies Z91.

09

 

                          [place] [address] [address]

                                        Left leg pain M79.605 ; Loca

lized swelling of lower leg R22.40 and 

Upper respiratory infection J06.9

 

                          [place] [address] [address]

                          28 Dec, 2015               

 

                          [place] [address] [address]

                          21 Dec, 2015              Left leg pain M79.605

 

                          [place] [address] [address]

                          10 Dec, 2015              Left leg pain M79.605 and Lo

calized swelling of lower leg R22.40

 

                          [place] [address] [address]

                          01 Dec, 2015              Left leg pain M79.605

 

                          [place] [address] [address]

                                        Encounter for immunization Z

23

 

                          [place] [address] [address]

                          12 Oct, 2015              Bronchitis J40

 

                          [facility]     [address] [address]

                          30 Sep, 2015              Physical exam, pre-employmen

t V70.5 ; Encounter for PPD test V74.1 

and Hyperlipidemia 272.4

 

                          [facility]     [address] [address]

                          21 Sep, 2015               

 

                          [facility]     [address] [address]

                          21 Sep, 2015               

 

                          [facility]     [address] [address]

                          09 Sep, 2015               

 

                          [facility]     [address] [address]

                          04 Sep, 2015              Elevated blood sugar 790.29

 

                          [facility]     [address] [address]

                          03 Sep, 2015              Elevated blood sugar 790.29

 

                          [facility]     [address] [address]

                          03 Sep, 2015              Palpitations 785.1

 

                          [facility]     [address] [address]

                          01 Sep, 2015              Palpitations 785.1

 

                          [facility]     [address] [address]

                                         

 

                          [facility]     [address] [address]

                          28 May, 2015              Hand pain, right 729.5 and D

epression with anxiety 300.4

 

                          [facility]     [address] [address]

                                         

 

                          [facility]     [address] [address]

                                         

 

                          [facility]     [address] [address]

                          05 Mar, 2015               

 

                          [facility]     [address] [id number]

[address] [place] [address], [place] [address]

                          05 Mar, 2015               

 

                          [facility]     [address] [id number]

[address] [place] [address], [place] [address]

                                         

 

                          [facility]     [address] [id number]

[address] [place] [address], [place] [address]

                                         

 

                          [facility]     [address] [id number]

[address] [place] [address], [place] [address]

                          10 Feb, 2015               

 

                          [facility]     [address] [id number]

[address] [place] [address], [place] [address]

                          10 Feb, 2015               

 

                          [facility]     [address] [id number]

[address] [place] [address], [place] [address]

                                         

 

                          [facility]     [address] [id number]

[address] [place] [address], [place] [address]

                                         

 

                          [facility]     [address] [id number]

[address] [place] [address], [place] [address]

                                         

 

                          [facility]     [address] [id number]

[address] [place] [address], [place] [address]

                                         

 

                          [facility]     [address] [id number]

[address] [place] [address], [place] [address]

                                         

 

                          [facility]     [address] [id number]

[address] [place] [address], [place] [address]

                                         

 

                          [facility]     [address] [id number]

[address] [place] [address], [place] [address]

                                         

 

                          [facility]     [address] [id number]

[address] [place] [address], [place] [address]

                                         

 

                          [facility]     [address] [id number]

[address] [place] [address], [place] [address]

                                         

 

                          [facility]     [address] [id number]

[address] [place] [address], [place] [address]

                                         

 

                          [facility]     [address] [id number]

[address] [place] [address], [place] [address]

                          16 2015               

 

                          [facility]     [address] [id number]

[address] [place] [address], [place] [address]

                          16 2015               

 

                          [facility]     [address] [id number]

[address] [place] [address], [place] [address]

                          15 2015               

 

                          [facility]     [address] [id number]

[address] [place] [address], [place] [address]

                          15 2015               

 

                          [facility]     [address] [id number]

[address] [place] [address], [place] [address]

                                         

 

                          [facility]     [address] [id number]

[address] [place] [address], [place] [address]

                                         

 

                          [facility]     [address] [id number]

[address] [place] [address], [place] [address]

                          15 Oct, 2014               

 

                          [facility]     [address] [id number]

[address] [place] [address], [place] [address]

                          15 Oct, 2014               

 

                          [facility]     [address] [id number]

[address] [place] [address], [place] [address]

                          14 Oct, 2014               

 

                          [facility]     [address] [id number]

[address] [place] [address], [place] [address]

                          14 Oct, 2014               

 

                          [facility]     [address] [id number]

[address] [place] [address], [place] [address]

                          07 Oct, 2014               

 

                          [facility]     [address] [id number]

[address] [place] [address], [place] [address]

                          07 Oct, 2014               

 

                          [facility]     [address] [id number]

[address] [place] [address], [place] [address]

                          12 Sep, 2014               

 

                          [facility]     [address] [id number]

[address] [place] [address], [place] [address]

                          12 Sep, 2014               

 

                          [facility]     [address] [id number]

[address] [place] [address], [place] [address]

                          04 Sep,                

 

                          [facility]     [address] [id number]

[address] [place] [address], [place] [address]

                          03 Sep, 2014               

 

                          [facility]     [address] [id number]

[address] [place] [address], [place] [address]

                          03 Sep, 2014               

 

                          [facility]     [address] [id number]

[address] [place] [address], [place] [address]

                          02 Sep, 2014               

 

                          [facility]     [address] [id number]

[address] [place] [address], [place] [address]

                          02 Sep, 2014               

 

                          [facility]     [address] [id number]

[address] [place] [address], [place] [address]

                          30 Aug, 2014               

 

                          [facility]     [address] [id number]

[address] [place] [address], [place] [address]

                          30 Aug, 2014               

 

                          [facility]     [address] [id number]

[address] [place] [address], [place] [address]

                          19 Aug, 2014               

 

                          [facility]     [address] [id number]

[address] [place] [address], [place] [address]

                          19 Aug, 2014               

 

                          [facility]     [address] [id number]

[address] [place] [address], [place] [address]

                          14 Aug, 2014               

 

                          [facility]     [address] [id number]

[address] [place] [address], [place] [address]

                          14 Aug, 2014               

 

                          [facility]     [address] [id number]

[address] [place] [address], [place] [address]

                          13 Aug, 2014               

 

                          [facility]     [address] [id number]

[address] [place] [address], [place] [address]

                          13 Aug, 2014               

 

                          [facility]     [address] [id number]

[address] [place] [address], [place] [address]

                                         

 

                          [facility]     [address] [id number]

[address] [place] [address], [place] [address]

                                         

 

                          [facility]     [address] [id number]

[address] [place] [address], [place] [address]

                                         

 

                          [facility]     [address] [id number]

[address] [place] [address], [place] [address]

                                         

 

                          [facility]     [address] [id number]

[address] [place] [address], [place] [address]

                          ,                

 

                          [facility]     [address] [id number]

[facility] [place] [address]

                          ,                

 

                          [facility]     [address] [id number]

[address] [place] [address], [place] [address]

                                         

 

                          [facility]     [address] [id number]

[address] [place] [address], [place] [address]

                          ,                

 

                          [facility]     [address] [id number]

[address] [place] [address], [place] [address]

                          ,                

 

                          [facility]     [address] [id number]

[address] [place] [address], [place] [address]

                          ,                

 

                          [facility]     [address] [id number]

[address] [place] [address], [place] [address]

                          ,                

 

                          [facility]     [address] [id number]

[address] [place] [address], [place] [address]

                          ,                

 

                          [facility]     [address] [id number]

[address] [place] [address], [place] [address]

                          ,                

 

                          [facility]     [address] [id number]

[address] [place] [address], [place] [address]

                          ,                

 

                          [facility]     [address] [id number]

[address] [place] [address], [place] [address]

                                         

 

                          [facility]     [address] [id number]

[address] [place] [address], [place] [address]

                          ,                

 

                          [facility]     [address] [id number]

[address] [place] [address], [place] [address]

                          ,                

 

                          [facility]     [address] [id number]

[address] [place] [address], [place] [address]

                          ,                

 

                          [facility]     [address] [id number]

[address] [place] [address], [place] [address]

                          ,                

 

                          [facility]     [address] [id number]

[address] [place] [address], [place] [address]

                          ,                

 

                          [facility]     [address] [id number]

[address] [place] [address], [place] [address]

                                         

 

                          [facility]     [address] [id number]

[address] [place] [address], [place] [address]

                                         

 

                          [facility]     [address] [id number]

[address] [place] [address], [place] [address]

                                         

 

                          [facility]     [address] [id number]

[address] [place] [address], [place] [address]

                                         

 

                          [facility]     [address] [id number]

[address] [place] [address], [place] [address]

                                         

 

                          [facility]     [address] [id number]

[address] [place] [address], [place] [address]

                                         

 

                          [facility]     [address] [id number]

[address] [place] [address], [place] [address]

                                         

 

                          [facility]     [address] [id number]

[address] [place] [address], [place] [address]

                          27 Mar, 2014               

 

                          [facility]     [address] [id number]

[address] [place] [address], [place] [address]

                          27 Mar, 2014               

 

                          [facility]     [address] [id number]

[address] [place] [address], [place] [address]

                                         

 

                          [facility]     [address] [id number]

[address] [place] [address], [place] [address]

                                         

 

                          [facility]     [address] [id number]

[address] [place] [address], [place] [address]

                                         

 

                          [facility]     [address] [id number]

[address] [place] [address], [place] [address]

                                         

 

                          [facility]     [address] [id number]

[address] [place] [address], [place] [address]

                          08 Oct, 2013               

 

                          [facility]     [address] [id number]

[address] [place] [address], [place] [address]

                          04 Oct, 2013               

 

                          [facility]     [address] [id number]

[address] [place] [address], [place] [address]

                          03 Oct, 2013               

 

                          [facility]     [address] [id number]

[address] [place] [address], [place] [address]

                          02 Oct, 2013               

 

                          [facility]     [address] [id number]

[address] [place] [address], [place] [address]

                          01 Oct, 2013               

 

                          [facility]     [address] [id number]

[address] [place] [address], [place] [address]

                          20 Sep, 2013               

 

                          [facility]     [address] [id number]

[address] [place] [address], [place] [address]

                          08 Aug, 2013               

 

                          [facility]     [address] [id number]

[address] [place] [address], [place] [address]

                          24 May, 2013               

 

                          [facility]     [address] [id number]

[address] [place] [address], [place] [address]

                          13 May, 2013               

 

                          [facility]     [address] [id number]

[address] [place] [address], [place] [address]

                                         

 

                          [facility]     [address] [id number]

[address] [place] [address], [place] [address]

                                         

 

                          [facility]     [address] [id number]

[address] [place] [address], [place] [address]

                                         

 

                          [facility]     [address] [id number]

[address] [place] [address], [place] [address]

                                         

 

                          [facility]     [address] [id number]

[address] [place] [address], [place] [address]

                          02 Oct, 2012               

 

                          [facility]     [address] [id number]

[address] [place] [address], [place] [address]

                          02 Oct, 2012               

 

                          [facility]     [address] [id number]

[address] [place] [address], [place] [address]

                          21 Sep, 2012               

 

                          [facility]     [address] [id number]

[address] [place] [address], [place] [address]

                          06 Aug, 2012               

 

                          [facility]     [address] [id number]

[address] [place] [address], [place] [address]

                          02 Aug, 2012               

 

                          [facility]     [address] [id number]

[address] [place] [address]

                                         

 

                          [facility]     [address] [id number]

[address] [place] [address]

                                         

 

                          [facility]     [address] [id number]

[address] [place] [address]

                          15 2012               

 

                          [facility]     [address] [id number]

[address] [place] [address]

                                         

 

                          [facility]     [address] [id number]

[address] [place] [address]

                          10 Apr, 2012               

 

                          [facility]     [address] [id number]

[address] [place] [address]

                          22 Mar, 2012               

 

                          [facility]     [address] [id number]

[address] [place] [address]

                          20 Mar, 2012               

 

                          [facility]     [address] [facility] [id number]

[address] [place] [address]

                          14 Mar, 2012               

 

                          [facility]     [address] [id number]

[address] [place] [address]

                                         







IMMUNIZATIONS

No Known Immunizations



SOCIAL HISTORY

Never Assessed



REASON FOR VISIT

fever/sore throat-Pt was seen her two weeks ago for the same complaints.  The pa
kay said she got a little better but have now intensified over the last couple
of days.  She is complaining of swollen lymph nodes causing her neck and ear pa
in.  She is also having body aches, low grade fever and headaches.--BRENNAN Shetty



PLAN OF CARE





                          Activity                  Details

 

                                         

 

                          Follow Up                 prn Reason:







VITAL SIGNS





                    Height              69 in               2018-07-10

 

                    Weight              235.8 lbs           2018-07-10

 

                    Temperature         99.2 degrees Fahrenheit 2018-07-10

 

                    Heart Rate          84 bpm              2018-07-10

 

                    Respiratory Rate    24                  2018-07-10

 

                    BMI                 34.82 kg/m2         2018-07-10

 

                    Blood pressure systolic 124 mmHg            2018-07-10

 

                    Blood pressure diastolic 78 mmHg             2018-07-10







MEDICATIONS





        Medication Instructions Dosage  Frequency Start Date End Date Duration S

mode

 

        L2C Contour Next Monitor w/Device         as directed         10 Aug, 

2016                 Active

 

        Prenatal Advantage - Orally Once a day 1 tablet 24h                     

        Active

 

        Depo-Provera 150 MG/ML         1 ml                                    A

ctive

 

        Xopenex HFA 45 MCG/ACT Inhalation every 4 hrs 1 puff as needed 4h       

                       Active

 

             Los Contour Next Test Test Strips In Vitro Once a day as directed

  24h          10 Aug, 

2016                                                        Active

 

           Doxycycline Hyclate 100 MG Orally every 12 hrs 1 capsule  12h        

10 Jul, 2018 20 

Jul, 2018                 10 day(s)                 Active

 

        MetFORMIN HCl  mg Orally twice a day 2 tablets 12h                

     30      Active

 

        Fish Oil 1200 MG Orally Twice a day 1 capsule 12h                       

      Active

 

        Nexium 40 mg Orally Once a day 1 capsule 24h                     30     

 Active

 

        Allegra Allergy 180 MG Orally Once a day 1 tablet as needed 24h         

                    Active

 

        Crestor 10 mg Orally Once a day 1 tablet 24h                     30     

 Active

 

           Doxycycline Hyclate 100 MG Orally every 12 hrs 1 capsule  12h        

10 Jul, 2018 24 

Jul, 2018                 14 days                   Active

 

                    Triamcinolone Acetonide 0.1 % Externally Twice a day 1 appli

cation to affected 

area         12h                                     Active

 

        Glimepiride 4 MG Orally 2 times a day 1 tablet 12h                      

       Active







RESULTS

No Results



PROCEDURES





                Procedure       Date Ordered    Result          Body Site

 

                HETEROPHILE ANTIBODIES July 10, 2018                    

 

                COMPLETE CBC W/AUTO DIFF WBC July 10, 2018                    

 

                RICKETTSIA ANTIBODY July 10, 2018                    

 

                LYME DISEASE ANTIBODY July 10, 2018                    

 

                VENIPUNCT, ROUTINE* July 10, 2018                    







INSTRUCTIONS





MEDICATIONS ADMINISTERED

No Known Medications



MEDICAL (GENERAL) HISTORY





                    Type                Description         Date

 

                          Medical History           allergic rhinitis- rosita's edwige donahue allergy injections from Dr Rojas office                            

 

                    Medical History     mood disorder        

 

                    Medical History     Leukocytosis- has been to hematology  

 

                    Medical History     Hyperlipidemia       

 

                          Medical History           Left leg pain- multiple imag

ing performed and ortho referral 

placed                                   

 

                          Medical History           TUBULAR ADENOMA AND GASTRITI

S ON COLONOSOCPY AUG 2016 -MULTIPLE 

POLYPS                                   

 

                    Medical History     Helicobacter pylori (H. pylori) infectio

n Hx  

 

                    Surgical History    cholecystectomy     2006

 

                    Surgical History     section    , 

 

                    Surgical History    tonsillectomy       2015

 

                    Surgical History    colonoscopy         2016

 

                    Surgical History    colonscopy          2017

 

                    Surgical History    Right Knee scope    2017

## 2020-06-23 NOTE — XMS REPORT
[facility]

                             Created on: 2020



Kate April

External Reference #: [id number]

: 1975

Sex: Female



Demographics





                          Address                   [address]

[place]  [address]

 

                          Preferred Language        Unknown

 

                          Marital Status            Unknown

 

                          Synagogue Affiliation     Unknown

 

                          Race                      Unknown

 

                          Ethnic Group              Unknown





Author





                          Author                    ANNEMARIE April VENECIA

 

                          [facility]

 

                          Address                   [address] [place]  [address]



 

                          Phone                     [phone]







Care Team Providers





                    Care Team Member Name Role                Phone

 

                    ANNETTE SHARPEWNYA       Unavailable         [phone]







PROBLEMS





          Type      Condition ICD9-CM Code ICJ43-CV Code Onset Dates Condition S

tatus SNOMED 

Code

 

          Problem   Duarte's neuroma of right foot           G57.61             

 Active    64220705

 

          Problem   History of leukocytosis           Z86.2               Active

    [id number]

 

          Problem   Anxiety             F41.9               Active    77970661

 

                          Problem                   Type 2 diabetes mellitus wit

hout complication, without long-term current

use of insulin              E11.9                     Active       [id number]

 

          Problem   Seasonal allergic rhinitis due to pollen           J30.1    

           Active    89117403

 

          Problem   Tubular adenoma of colon           D12.6               Activ

e    393857258

 

          Problem   GERD with esophagitis           K21.0               Active  

  778750229

 

          Problem   Mixed hyperlipidemia           E78.2               Active   

 886559829

 

           Problem    Seasonal allergic rhinitis due to other allergic trigger  

          J30.89                

Active                                  [id number]

 

          Problem   Dysthymic disorder           F34.1               Active    7

5592634

 

          Problem   Arthritis           M19.90              Active    0569548

 

          Problem   Non morbid obesity           E66.9               Active    4

[id number]







ALLERGIES

No Information



ENCOUNTERS





                Encounter       Location        Date            Diagnosis

 

                    [facility] [id number] [place] [address] 10 

2020                                

 

                    [facility] [id number] [place] [address]                                Acute pain of left knee M25.562

 

                    [facility] [id number] [place] [address] 15 

2020                               Leg pain, left M79.605

 

                          [facility]  [address] [facility] [id number]

[address] [place] 

[address]                13 2020              Leg pain, left M79.605

 

                    [facility] [address] [address]                                Type 2 diabetes mellitus without complic

ation, without long-term 

current use of insulin E11.9 and GERD with esophagitis K21.0

 

                    [place] [address] [address] 01 

Oct, 2019                               Encounter for immunization Z23

 

                    [place] [address] [address]                                Type 2 diabetes mellitus without complic

ation, without long-term 

current use of insulin E11.9

 

                    [place] [address] [address]                                 

 

                    [place] [address] [address]                                Type 2 diabetes mellitus without complic

ation, without long-term 

current use of insulin E11.9

 

                    [place] [address] [address]                                Weight loss R63.4

 

                    [place] [address] [address] 31 

May, 2019                               Type 2 diabetes mellitus without complic

ation, without long-term 

current use of insulin E11.9

 

                    [place] [address] [address] 31 

May, 2019                               Type 2 diabetes mellitus without complic

ation, without long-term 

current use of insulin E11.9 and Non morbid obesity E66.9

 

                          [facility] WALK IN [place] [id number]

[address] [place] [address] 

[address]                14 May, 2019              Seasonal allergic rhinitis d

ue to pollen J30.1 and Sore 

throat J02.9

 

                          [facility] WALK IN [place] [id number]

[address] [place] [address] 

[address]                              Arthritis M19.90

 

                    [place] [address] [address]                                Seasonal allergic rhinitis due to other 

allergic trigger J30.89 and 

Dysthymic disorder F34.1

 

                    [place] [address] [address] 05 

Dec, 2018                               Bilateral otitis media with effusion H65

.93 and Anxiety F41.9

 

                    [place] [address] [place] [id number]                                Type 2 diabetes mellitus without complic

ation, without long-term 

current use of insulin E11.9

 

                    [place] [address] [address]                                Pharyngitis due to Streptococcus species

 J02.0

 

                    [address] [address]                                 

 

                          [facility] WALK IN [place] [address] 

[address]                10 Jul, 2018              Fever, unspecified fever cau

se R50.9 and Lymphadenopathy

R59.1

 

                          [facility] WALK IN [address] 

[address]                              Pharyngitis due to other org

anism J02.8

 

                    [place] [address] [address]                                 

 

                    [address] [address]                                Type 2 diabetes mellitus without complic

ation, without long-term 

current use of insulin E11.9 and Other eczema L30.8

 

                          [facility] WALK IN [place] [address] 

[address]                28 2018              Acute pain of left shoulder 

M25.512

 

                    [place] [address] [address] 23 

2018                                

 

                    [place] [id number] 15 

2018                               Type 2 diabetes mellitus without complic

ation, without long-term 

current use of insulin E11.9

 

                    [place] [address] [address]                                Type 2 diabetes mellitus without complic

ation, without long-term 

current use of insulin E11.9 ; Tubular adenoma of colon D12.6 ; Mixed 
hyperlipidemia E78.2 ; History of leukocytosis Z86.2 and Screening breast 
examination Z12.31

 

                    [place] [address] [address]                                Metatarsalgia of right foot M77.41 and T

ype 2 diabetes mellitus 

without complication, without long-term current use of insulin E11.9

 

                    [place] [address] [address]                                Capsulitis M77.9 and Metatarsalgia of ri

ght foot M77.41

 

                    [place] [address] [address] 08 

Sep, 2017                               Capsulitis M77.9

 

                    [place] [address] [address] 06 

Sep, 2017                                

 

                    [place] [address] [address] 03 

Aug, 2017                               Type 2 diabetes mellitus without complic

ation, without long-term 

current use of insulin E11.9 ; Tubular adenoma of colon D12.6 and Mixed 
hyperlipidemia E78.2

 

                    [place] [address] [address]                                Metatarsalgia of right foot M77.41 and C

apsulitis M77.9

 

                    [place] [address] [address]                                 

 

                          [facility]  [address] [facility] [id number]

[address] [place] 

[address]                              Duarte's neuroma of right fo

ot G57.61

 

                    [place] [address] [address]                                Mixed hyperlipidemia E78.2 and Type 2 di

abetes mellitus without 

complication, without long-term current use of insulin E11.9

 

                    [place] [address] [address] 10 

Brandon, 2017                               Type 2 diabetes mellitus without complic

ation, without long-term 

current use of insulin E11.9 ; Tubular adenoma of colon D12.6 and Mixed 
hyperlipidemia E78.2

 

                    [place] [id number] [place] [address] 22 

Dec, 2016                               Mixed hyperlipidemia E78.2

 

                    [place] [address] [address]                                 

 

                    [place] [address] [address]                                Mixed hyperlipidemia E78.2

 

                    [place] [address] [address]                                Mixed hyperlipidemia E78.2

 

                    [place] [address] [address] 08 

Sep, 2016                                

 

                    [place] [address] [address] 10 

Aug, 2016                               Type 2 diabetes mellitus without complic

ation, without long-term 

current use of insulin E11.9 ; Helicobacter pylori (H. pylori) infection A04.8 
and Mixed hyperlipidemia E78.2

 

                    [place] [id number] [place] [address] 08 

Aug, 2016                               Type 2 diabetes mellitus without complic

ation, without long-term 

current use of insulin E11.9

 

                    [place] [address] [address] 03 

Aug, 2016                               Type 2 diabetes mellitus without complic

ation, without long-term 

current use of insulin E11.9

 

                    [place] [address] [address] 02 

Aug, 2016                               Dyspepsia R10.13 ; Upper abdominal pain 

R10.10 ; Bowel habit changes 

R19.4 ; History of leukocytosis Z86.2 and Helicobacter pylori (H. pylori) 
infection A04.8

 

                          [facility]  [address] [facility] [id number]

[address] [place] 

[address]                27 May, 2016              Environmental allergies Z91.

09

 

                    [place] [address] [address]                                Left leg pain M79.605 ; Localized swelli

ng of lower leg R22.40 and 

Upper respiratory infection J06.9

 

                    [place] [address] [address] 28 

Dec, 2015                                

 

                    [place] [address] [address] 21 

Dec, 2015                               Left leg pain M79.605

 

                    [place] [address] [address] 10 

Dec, 2015                               Left leg pain M79.605 and Localized swel

ling of lower leg R22.40

 

                    [place] [address] [address] 01 

Dec, 2015                               Left leg pain M79.605

 

                    [place] [address] [address]                                Encounter for immunization Z23

 

                    [place] [address] [address] 12 

Oct, 2015                               Bronchitis J40

 

                    [place] [address] [address] 30 

Sep, 2015                               Physical exam, pre-employment V70.5 ; En

counter for PPD test V74.1 and

Hyperlipidemia 272.4

 

                    [place] [address] [address] 21 

Sep, 2015                                

 

                    [place] [address] [address] 21 

Sep, 2015                                

 

                    [place] [address] [address] 09 

Sep, 2015                                

 

                    [place] [address] [address] 04 

Sep, 2015                               Elevated blood sugar 790.29

 

                    [place] [address] [address] 03 

Sep, 2015                               Elevated blood sugar 790.29

 

                    [facility] [id number] [place] [address] 03 

Sep, 2015                               Palpitations 785.1

 

                    [facility] [id number] [place] [address] 01 

Sep, 2015                               Palpitations 785.1

 

                    [facility] [address] [address]                                 

 

                    [facility] [id number] [place] [address] 28 

May, 2015                               Hand pain, right 729.5 and Depression wi

th anxiety 300.4

 

                    [facility] [id number] [place] [address]                                 

 

                    [facility] [address] [address]                                 

 

                    [facility] [address] [address] 05 

Mar, 2015                                

 

                    [facility] [address] [address] 05 

Mar, 2015                                

 

                    [facility] [address] [address]                                 

 

                    [facility] [address] [address]                                 

 

                    [facility] [address] [address] 10 

Feb, 2015                                

 

                    [facility] [id number] [place] [address] 10 

Feb, 2015                                

 

                    [facility] [address] [address]                                 

 

                    [facility] [id number] [place] [address]                                 

 

                    [facility] [address] [address]                                 

 

                    [facility] [address] [facility] [place] [address]                                 

 

                    [facility] [address] [facility] [place] [address]                                 

 

                    [facility] [address] [facility] [place] [address]                                 

 

                    [facility] [address] [facility] [place] [address]                                 

 

                    [facility] [address] [facility] [place] [address]                                 

 

                    [facility] [address] [facility] [place] [address]                                 

 

                    [facility] [address] [facility] [place] [address]                                 

 

                    [facility] [address] [facility] [place] [address]                                 

 

                    [facility] [address] [facility] [place] [address]                                 

 

                    [facility] [address] [facility] [place] [address] 15 

Brandon, 2015                                

 

                    [facility] [address] [facility] [place] [address] 15 

Br[place], 2015                                

 

                    [facility] [address] [facility] [place] [address]                                 

 

                    [facility] [address] [facility] [place] [address]                                 

 

                    [facility] [address] [facility] [place] [address] 15 

Oct, 2014                                

 

                    [facility] [address] [facility] [place] [address] 15 

Oct, 2014                                

 

                    [facility] [address] [facility] [place] [address] 14 

Oct, 2014                                

 

                    [facility] [address] [facility] [place] [address] 14 

Oct, 2014                                

 

                    [facility] [address] [facility] [id number] [facility] [place] [address] 07 

Oct, 2014                                

 

                    [facility] [address] [facility] [id number] [facility] [place] [address] 07 

Oct, 2014                                

 

                    [facility] [address] [facility] [facility] [place] [address] 12 

Sep, 2014                                

 

                    [facility] [address] [facility] [id number] [facility] [place] [address] 12 

Sep,                                 

 

                    [facility] [address] [facility] [id number] [facility] [place] [address] 04 

Sep, 2014                                

 

                    [facility] [address] [facility] [id number] [facility] [place] [address] 03 

Sep, 2014                                

 

                    [facility] [address] [facility] [facility] [place] [address] 03 

Sep, 2014                                

 

                    [facility] [address] [facility] [place] [address] 02 

Sep, 2014                                

 

                    [facility] [address] [facility] [id number] [facility] [place] [address] 02 

Sep, 2014                                

 

                    [facility] [address] [facility] [place] [address] 30 

Aug, 2014                                

 

                    [facility] [address] [facility] [place] [address] 30 

Aug, 2014                                

 

                    [facility] [address] [facility] [place] [address] 19 

Aug, 2014                                

 

                    [facility] [address] [facility] [id number] [place] [address] 19 

Aug, 2014                                

 

                    [facility] [address] [facility] [place] [address] 14 

Aug, 2014                                

 

                    [facility] [address] [facility] [id number] [place] [address] 14 

Aug, 2014                                

 

                    [facility] [address] [facility] [place] [address] 13 

Aug, 2014                                

 

                    [facility] [address] [id number] [facility] [place] [address] 13 

Aug, 2014                                

 

                    [facility] [address] [id number] [facility] [place] [address]                                 

 

                    [facility] [address] [facility] [id number] [facility] [place] [address]                                 

 

                    [facility] [address] [facility] [id number] [facility] [place] [address]                                 

 

                    [facility] [address] [facility] [id number] [facility] [place] [address]                                 

 

                    [facility] [address] [facility] [id number] [facility] [place] [address]                                 

 

                    [facility] [address] [facility] [id number] [place] [address]                                 

 

                    [facility] [address] [id number] [place] [address]                                 

 

                    [facility] [address] [facility] [id number] [place] [address]                                 

 

                    [facility] [address] [facility] [id number] [facility] [place] [address]                                 

 

                    [facility] [address] [facility] [id number] [place] [address]                                 

 

                    [facility] [address] [facility] [place] [address]                                 

 

                    [facility] [address] [facility] [id number] [place] [address]                                 

 

                    [facility] [address] [id number] [place] [address]                                 

 

                    [facility] [address] [id number] [facility] [place] [address] ,                                 

 

                    [facility] [address] [facility] [id number] [place] [address]                                 

 

                    [facility] [address] [id number] [place] [address]                                 

 

                    [facility] [address] [id number] [place] [address] ,                                 

 

                    [facility] [address] [id number] [place] [address] ,                                 

 

                    [facility] [address] [id number] [place] [address]                                 

 

                    [facility] [address] [id number] [place] [address]                                 

 

                    [facility] [address] [id number] [place] [address]                                 

 

                    [facility] [address] [id number] [place] [address]                                 

 

                    [facility] [address] [id number] [place] [address]                                 

 

                    [facility] [address] [id number] [place] [address]                                 

 

                    [facility] [address] [id number] [place] [address]                                 

 

                    [facility] [address] [id number] [place] [address]                                 

 

                    [facility] [address] [id number] [place] [address]                                 

 

                    [facility] [address] [facility] [place] [address] 27 

Mar, 2014                                

 

                    [facility] [address] [id number] [place] [address] 27 

Mar, 2014                                

 

                    [facility] [address] [id number] [place] [address]                                 

 

                    [facility] [address] [id number] [place] [address]                                 

 

                    [facility] [address] [id number] [place] [address]                                 

 

                    [facility] [address] [id number] [place] [address]                                 

 

                    [facility] [address] [id number] [place] [address] 08 

Oct, 2013                                

 

                    [facility] [address] [id number] [place] [address] 04 

Oct, 2013                                

 

                    [facility] [address] [id number] [place] [address] 03 

Oct, 2013                                

 

                    [facility] [address] [id number] [place] [address] 

Oct, 2013                                

 

                    [facility] [address] [id number] [place] [address] 01 

Oct, 2013                                

 

                    [facility] [address] [id number] [place] [address] 20 

Sep, 2013                                

 

                    [facility] [address] [id number] [place] [address] 08 

Aug, 2013                                

 

                    [facility] [id number] [place] [address] 24 

May, 2013                                

 

                    [facility] [address] [id number] [place] [address] 13 

May, 2013                                

 

                    [facility] [id number] [place] [address]                                 

 

                    [facility] [address] [id number] [place] [address]                                 

 

                    [facility] [id number] [place] [address]                                 

 

                    [facility] [address] [id number] [place] [address]                                 

 

                    [facility] [id number] [place] [address] 02 

Oct, 2012                                

 

                    [facility] [address] [id number] [place] [address] 02 

Oct, 2012                                

 

                    [facility] [address] [id number] [place] [address] 21 

Sep, 2012                                

 

                    [facility] [address] [id number] [place] [address] 06 

Aug, 2012                                

 

                    [facility] [id number] [place] [address] 02 

Aug, 2012                                

 

                    [facility] [id number] [place] [address]                                 

 

                    [facility] [id number] [place] [address]                                 

 

                    [facility] [address] [address] 15 

Eusebio, 2012                                

 

                    [facility] [address] [address]                                 

 

                    [facility] [address] [address] 10 

Apr, 2012                                

 

                    [facility] [address] [address] 22 

Mar, 2012                                

 

                    [facility] [id number] [place] [address] 20 

Mar, 2012                                

 

                    [facility] [id number] [place] [address] 14 

Mar, 2012                                

 

                    [facility] [id number] [place] [address]                                 







IMMUNIZATIONS

No Known Immunizations



SOCIAL HISTORY

Never Assessed



REASON FOR VISIT





PLAN OF CARE





VITAL SIGNS





MEDICATIONS

Unknown Medications



RESULTS

No Results



PROCEDURES

No Known procedures



INSTRUCTIONS





MEDICATIONS ADMINISTERED

No Known Medications



MEDICAL (GENERAL) HISTORY





                    Type                Description         Date

 

                          Medical History           allergic rhinitis- rec's edwige donahue allergy injections from Dr Rojas office                            

 

                    Medical History     mood disorder        

 

                    Medical History     Leukocytosis- has been to hematology  

 

                    Medical History     Hyperlipidemia       

 

                          Medical History           Left leg pain- multiple imag

ing performed and ortho referral 

placed                                   

 

                          Medical History           TUBULAR ADENOMA AND GASTRITI

S ON COLONOSOCPY AUG 2016 -MULTIPLE 

POLYPS                                   

 

                    Medical History     Helicobacter pylori (H. pylori) infectio

n Hx  

 

                    Surgical History    cholecystectomy     2006

 

                    Surgical History     section    , 

 

                    Surgical History    tonsillectomy       2015

 

                    Surgical History    colonoscopy         2016

 

                    Surgical History    colonscopy          2017

 

                    Surgical History    Right Knee scope    2017

## 2020-06-23 NOTE — XMS REPORT
[facility]

                             Created on: 09/15/2017



Kate April

External Reference #: [id number]

: 1975

Sex: Female



Demographics





                          Address                   [address] [place]  [address]

 

                          Preferred Language        Unknown

 

                          Marital Status            Unknown

 

                          Zoroastrian Affiliation     Unknown

 

                          Race                      Unknown

 

                          Ethnic Group              Unknown





Author





                          Author                    ANNEMARIE April VENECIA

 

                          [facility]

 

                          Address                   [address] [place]  [address]



 

                          Phone                     [phone]







Care Team Providers





                    Care Team Member Name Role                Phone

 

                    ADRIENNEHANNA  VENECIA       Unavailable         [phone]







PROBLEMS





          Type      Condition ICD9-CM Code ZBH80-BV Code Onset Dates Condition S

tatus SNOMED 

Code

 

                          Problem                   Type 2 diabetes mellitus wit

hout complication, without long-term current

use of insulin              E11.9                     Active       [id number]

 

          Problem   Dyspepsia           R10.13              Active    [id number]

 

          Problem   Helicobacter pylori (H. pylori) infection           A04.8   

            Active    7673517

 

          Problem   Duarte's neuroma of right foot           G57.61             

 Active    15223384

 

          Problem   Tubular adenoma of colon           D12.6               Activ

e    699246085

 

          Problem   Bowel habit changes           R19.4               Active    

61907016

 

          Problem   Upper abdominal pain           R10.10              Active   

 00720807

 

          Problem   Mixed hyperlipidemia           E78.2               Active   

 [id number]

 

          Problem   History of leukocytosis           Z86.2               Active

    [id number]







ALLERGIES

Unknown Allergies



SOCIAL HISTORY

No smoking Hx information available



PLAN OF CARE





VITAL SIGNS





MEDICATIONS

Unknown Medications



RESULTS





                Name            Result          Date            Reference Range

 

                LIPID PANEL                     2017       

 

                Cholesterol, Total 135                             100-199

 

                Triglycerides   140                             0-149

 

                HDL Cholesterol 38                              >39

 

                VLDL Cholesterol Jani 28                              5-40

 

                LDL Cholesterol Calc 69                              0-99

 

                Comment:                                         

 

                CMP                             2017       

 

                Glucose, Serum  145                             65-99

 

                BUN             6                               6-24

 

                Creatinine, Serum 0.62                            0.57-1.00

 

                eGFR If NonAfricn Am 113                                 >59

 

                eGFR If Africn Am 130                                 >59

 

                BUN/Creatinine Ratio 10                              9-23

 

                Sodium, Serum   143                             134-144

 

                Potassium, Serum 4.1                             3.5-5.2

 

                Chloride, Serum 103                             

 

                Carbon Dioxide, Total 25                              18-29

 

                Calcium, Serum  9.2                             8.7-10.2

 

                Protein, Total, Serum 6.8                             6.0-8.5

 

                Albumin, Serum  4.0                             3.5-5.5

 

                Globulin, Total 2.8                             1.5-4.5

 

                A/G Ratio       1.4                             1.1-2.5

 

                Bilirubin, Total <0.2                            0.0-1.2

 

                Alkaline Phosphatase, S 60                              

 

                AST (SGOT)      11                              0-40

 

                ALT (SGPT)      12                              0-32







PROCEDURES





                Procedure       Date Ordered    Related Diagnosis Body Site

 

                LIPID PANEL     2017                     

 

                COMPREHEN METABOLIC PANEL 2017                     

 

                VENIPUNCT, ROUTINE* 2017                     







IMMUNIZATIONS

No Known Immunizations

## 2020-06-23 NOTE — XMS REPORT
[facility]

                             Created on: 2019



Kate April

External Reference #: [id number]

: 1975

Sex: Female



Demographics





                          Address                   [address] [place]  [address]

 

                          Preferred Language        Unknown

 

                          Marital Status            Unknown

 

                          [employer] Affiliation     Unknown

 

                          Race                      Unknown

 

                          Ethnic Group              Unknown





Author





                          Author                    Darby Cole

 

                          [facility]

 

                          Address                   [address] [place]  [address]



 

                          Phone                     [phone]







Care Team Providers





                    Care Team Member Name Role                Phone

 

                    apolloPakoEVGENYML WHEELERYL  Unavailable         [phone]







PROBLEMS





          Type      Condition ICD9-CM Code TGI44-PH Code Onset Dates Condition S

tatus SNOMED 

Code

 

          Problem   Tubular adenoma of colon           D12.6               Activ

e    [id number]

 

          Problem   Duarte's neuroma of right foot           G57.61             

 Active    [id number]

 

          Problem   History of leukocytosis           Z86.2               Active

    [id number]

 

          Problem   Non morbid obesity           E66.9               Active    4

[id number]

 

          Problem   Mixed hyperlipidemia           E78.2               Active   

 [id number]

 

          Problem   Seasonal allergic rhinitis due to pollen           J30.1    

           Active    96150826

 

                          Problem                   Type 2 diabetes mellitus wit

hout complication, without long-term current

use of insulin              E11.9                     Active       [id number]

 

          Problem   Anxiety             F41.9               Active    64188643

 

           Problem    Seasonal allergic rhinitis due to other allergic trigger  

          J30.89                

Active                                  [id number]

 

          Problem   Dysthymic disorder           F34.1               Active    7

[id number]

 

          Problem   Arthritis           M19.90              Active    2093642







ALLERGIES

No Information



ENCOUNTERS





                Encounter       Location        Date            Diagnosis

 

                          [place] [address] [address] [facility] [id number]

[address] [place] [address]

                                        Type 2 diabetes mellitus wit

hout complication, without long-term 

current use of insulin E11.9

 

                          [place] [address] [facility] [id number]

[address] [place] [address]

                                        Weight loss R63.4

 

                          [place] [address] [address] [facility] [id number]

[address] [place] [address]

                          31 May, 2019              Type 2 diabetes mellitus wit

hout complication, without long-term 

current use of insulin E11.9

 

                          [place] [address] [facility] [id number]

[address] [place] [address]

                          31 May, 2019              Type 2 diabetes mellitus wit

hout complication, without long-term 

current use of insulin E11.9 and Non morbid obesity E66.9

 

                          [facility] WALK IN [place] [address] 

[address]                14 May, 2019              Seasonal allergic rhinitis d

ue to pollen J30.1 and Sore 

throat J02.9

 

                          [facility] IN [place] [address] 

[address]                              Arthritis M19.90

 

                          [place] [address] [address]

                                        Seasonal allergic rhinitis d

ue to other allergic trigger J30.89 and

Dysthymic disorder F34.1

 

                          [place] [address] [address]

                          05 Dec, 2018              Bilateral otitis media with 

effusion H65.93 and Anxiety F41.9

 

                          [place] [address] [address]

                                        Type 2 diabetes mellitus wit

hout complication, without long-term 

current use of insulin E11.9

 

                          [place] [address] [address]

                                        Pharyngitis due to Streptoco

ccus species J02.0

 

                          [place] [address] [address]

                                         

 

                          [facility] [place] [address] 

[address]                10 Jul, 2018              Fever, unspecified fever cau

se R50.9 and Lymphadenopathy

R59.1

 

                          [facility] [place] [address] 

[address]                              Pharyngitis due to other org

anism J02.8

 

                          [place] [address] [address]

                                         

 

                          [place] [address] [address]

                                        Type 2 diabetes mellitus wit

hout complication, without long-term 

current use of insulin E11.9 and Other eczema L30.8

 

                          [facility] [place] [facility]  [address] [place] [id number]

[address] [place] [address] 

[address]                              Acute pain of left shoulder 

M25.512

 

                          [place] [address] [address]

                                         

 

                          [place] [address] [address]

                          15 Feb, 2018              Type 2 diabetes mellitus wit

hout complication, without long-term 

current use of insulin E11.9

 

                          [place] [address] [address]

                                        Type 2 diabetes mellitus wit

hout complication, without long-term 

current use of insulin E11.9 ; Tubular adenoma of colon D12.6 ; Mixed 
hyperlipidemia E78.2 ; History of leukocytosis Z86.2 and Screening breast 
examination Z12.31

 

                          [place] [address] [address] [place] [id number]

[address] [place] [address]

                                        Metatarsalgia of right foot 

M77.41 and Type 2 diabetes mellitus 

without complication, without long-term current use of insulin E11.9

 

                          [place] [address] [address] [place] [id number]

[address] [place] [address]

                                        Capsulitis M77.9 and Metatar

salgia of right foot M77.41

 

                          [place] [address] [place] [id number]

[address] [place] [address]

                          08 Sep, 2017              Capsulitis M77.9

 

                          [place] [address] [address] [place] [id number] [place] [address]

                          06 Sep, 2017               

 

                          [place] [address] [address] [place] [id number]

[address] [place] [address]

                          03 Aug, 2017              Type 2 diabetes mellitus wit

hout complication, without long-term 

current use of insulin E11.9 ; Tubular adenoma of colon D12.6 and Mixed 
hyperlipidemia E78.2

 

                          [facility]     [address] [id number]

[address] [place] [address]

                                        Metatarsalgia of right foot 

M77.41 and Capsulitis M77.9

 

                          [facility]     [address] [facility] [id number]

[address] [place] [address]

                                         

 

                          [facility] WALK IN [facility]  [address] [facility] [id number]

[address] [place] [address] 

[address]                              Duarte's neuroma of right fo

ot G57.61

 

                          [facility]     [address] [facility] [id number]

[address] [place] [address]

                                        Mixed hyperlipidemia E78.2 a

nd Type 2 diabetes mellitus without 

complication, without long-term current use of insulin E11.9

 

                          [place] [address] [address] [facility] [id number]

[address] [place] [address]

                          10 Brandon, 2017              Type 2 diabetes mellitus wit

hout complication, without long-term 

current use of insulin E11.9 ; Tubular adenoma of colon D12.6 and Mixed 
hyperlipidemia E78.2

 

                          [place] [address] [id number]

[address] [place] [address]

                          22 Dec, 2016              Mixed hyperlipidemia E78.2

 

                          [place] [address] [address] [facility] [id number]

[address] [place] [address]

                                         

 

                          [place] [address] [id number]

[address] [place] [address]

                                        Mixed hyperlipidemia E78.2

 

                          [place] [address] [id number]

[address] [place] [address]

                                        Mixed hyperlipidemia E78.2

 

                          [place] [address] [facility] [id number]

[address] [place] [address]

                          08 Sep, 2016               

 

                          [facility]     [address] [facility] [id number]

[address] [place] [address]

                          10 Aug, 2016              Type 2 diabetes mellitus wit

hout complication, without long-term 

current use of insulin E11.9 ; Helicobacter pylori (H. pylori) infection A04.8 
and Mixed hyperlipidemia E78.2

 

                          [facility]     [address] [place] [id number]

[address] [place] [address]

                          08 Aug, 2016              Type 2 diabetes mellitus wit

hout complication, without long-term 

current use of insulin E11.9

 

                          [place] [address] [place] [id number]

[address] [place] [address]

                          03 Aug, 2016              Type 2 diabetes mellitus wit

hout complication, without long-term 

current use of insulin E11.9

 

                          [place] [address] [address] [place] [address]

                          02 Aug, 2016              Dyspepsia R10.13 ; Upper abd

ominal pain R10.10 ; Bowel habit 

changes R19.4 ; History of leukocytosis Z86.2 and Helicobacter pylori (H. 
pylori) infection A04.8

 

                          [facility] [place] [facility]  [address] 

[address]                27 May, 2016              Environmental allergies Z91.

09

 

                          [place] [address] [address]

                                        Left leg pain M79.605 ; Loca

lized swelling of lower leg R22.40 and 

Upper respiratory infection J06.9

 

                          [place] [address] [address]

                          28 Dec, 2015               

 

                          [place] [address] [address]

                          21 Dec, 2015              Left leg pain M79.605

 

                          [place] [address] [address]

                          10 Dec, 2015              Left leg pain M79.605 and Lo

calized swelling of lower leg R22.40

 

                          [place] [address] [address]

                          01 Dec, 2015              Left leg pain M79.605

 

                          [place] [address] [address]

                                        Encounter for immunization Z

23

 

                          [place] [address] [address] [facility] [id number]

[address] [place] [address]

                          12 Oct, 2015              Bronchitis J40

 

                          [facility]     [address] [facility] [id number]

[address] [place] [address]

                          30 Sep, 2015              Physical exam, pre-employmen

t V70.5 ; Encounter for PPD test V74.1 

and Hyperlipidemia 272.4

 

                          [facility]     [address] [facility] [id number]

[address] [place] [address]

                          21 Sep, 2015               

 

                          [facility]     [address] [facility] [id number]

[address] [place] [address]

                          21 Sep, 2015               

 

                          [facility]     [address] [place] [id number]

[address] [place] [address]

                          09 Sep, 2015               

 

                          [facility]     [address] [place] [id number]

[address] [place] [address]

                          04 Sep, 2015              Elevated blood sugar 790.29

 

                          [facility]     [address] [place] [id number]

[address] [place] [address]

                          03 Sep, 2015              Elevated blood sugar 790.29

 

                          [facility]     [address] [facility] [id number]

[address] [place] [address]

                          03 Sep, 2015              Palpitations 785.1

 

                          [facility]     [address] [facility] [id number]

[address] [place] [address]

                          01 Sep, 2015              Palpitations 785.1

 

                          [facility]     [address] [place] [id number]

[address] [place] [address]

                                         

 

                          [facility]     [address] [facility] [id number]

[address] [place] [address]

                          28 May, 2015              Hand pain, right 729.5 and D

epression with anxiety 300.4

 

                          [facility]     [address] [facility] [id number]

[address] [place] [address]

                          14 2015               

 

                          [facility]     [address] [place] [id number]

[address] [place] [address]

                                         

 

                          [facility]     [address] [place] [id number]

[address] [place] [address]

                          05 Mar, 2015               

 

                          [facility]     [address] [id number]

[address] [place] [address], [place] [address]

                          05 Mar, 2015               

 

                          [facility]     [address] [id number]

[address] [place] [address], [place] [address]

                          ,                

 

                          [facility]     [address] [id number]

[address] [place] [address], [place] [address]

                          ,                

 

                          [facility]     [address] [id number]

[address] [place] [address], [place] [address]

                          10 Feb,                

 

                          [facility]     [address] [id number]

[address] [place] [address], [place] [address]

                          10 Feb,                

 

                          [facility]     [address] [id number]

[address] [place] [address], [place] [address]

                                         

 

                          [facility]     [address] [id number]

[address] [place] [address], [place] [address]

                                         

 

                          [facility]     [address] [id number]

[address] [place] [address], [place] [address]

                                         

 

                          [facility]     [address] [id number]

[address] [place] [address], [place] [address]

                                         

 

                          [facility]     [address] [id number]

[address] [place] [address], [place] [address]

                                         

 

                          [facility]     [address] [id number]

[address] [place] [address], [place] [address]

                                         

 

                          [facility]     [address] [id number]

[address] [place] [address], [place] [address]

                                         

 

                          [facility]     [address] [id number]

[address] [place] [address], [place] [address]

                                         

 

                          [facility]     [address] [id number]

[address] [place] [address], [place] [address]

                                         

 

                          [facility]     [address] [id number]

[address] [place] [address], [place] [address]

                                         

 

                          [facility]     [address] [id number]

[address] [place] [address], [place] [address]

                          16 2015               

 

                          [facility]     [address] [id number]

[address] [place] [address], [place] [address]

                          16 2015               

 

                          [facility]     [address] [id number]

[address] [place] [address], [place] [address]

                          15 2015               

 

                          [facility]     [address] [id number]

[address] [place] [address], [place] [address]

                          15 Brandon, 2015               

 

                          [facility]     [address] [id number]

[address] [place] [address], [place] [address]

                                         

 

                          [facility]     [address] [id number]

[address] [place] [address], [place] [address]

                                         

 

                          [facility]     [address] [id number]

[address] [place] [address], [place] [address]

                          15 Oct, 2014               

 

                          [facility]     [address] [id number]

[address] [place] [address], [place] [address]

                          15 Oct, 2014               

 

                          [facility]     [address] [id number]

[address] [place] [address], [place] [address]

                          14 Oct, 2014               

 

                          [facility]     [address] [id number]

[address] [place] [address], [place] [address]

                          14 Oct, 2014               

 

                          [facility]     [address] [id number]

[address] [place] [address]

                          07 Oct, 2014               

 

                          [facility]     [address] [id number]

[address] [place] [address]

                          07 Oct, 2014               

 

                          [facility]     [address] [id number]

[address] [place] [address], [place] [address]

                          12 Sep, 2014               

 

                          [facility]     [address] [id number]

[address] [place] [address], [place] [address]

                          12 Sep, 2014               

 

                          [facility]     [address] [id number]

[address] [place] [address]

                          04 Sep, 2014               

 

                          [facility]     [address] [id number]

[address] [place] [address]

                          03 Sep, 2014               

 

                          [facility]     [address] [id number]

[address] [place] [address], [place] [address]

                          03 Sep, 2014               

 

                          [facility]     [address] [id number]

[address] [place] [address], [place] [address]

                          02 Sep, 2014               

 

                          [facility]     [address] [id number]

[address] [place] [address], [place] [address]

                          02 Sep, 2014               

 

                          [facility]     [address] [id number]

[address] [place] [address], [place] [address]

                          30 Aug, 2014               

 

                          [facility]     [address] [id number]

[address] [place] [address], [place] [address]

                          30 Aug, 2014               

 

                          [facility]     [address] [id number]

[address] [place] [address], [place] [address]

                          19 Aug, 2014               

 

                          [facility]     [address] [id number]

[address] [place] [address], [place] [address]

                          19 Aug, 2014               

 

                          [facility]     [address] [id number]

[address] [place] [address], [place] [address]

                          14 Aug, 2014               

 

                          [facility]     [address] [id number]

[address] [place] [address], [place] [address]

                          14 Aug, 2014               

 

                          [facility]     [address] [id number]

[address] [place] [address], [place] [address]

                          13 Aug, 2014               

 

                          [facility]     [address] [id number]

[address] [place] [address], [place] [address]

                          13 Aug, 2014               

 

                          [facility]     [address] [id number]

[address] [place] [address], [place] [address]

                                         

 

                          [facility]     [address] [id number]

[address] [place] [address], [place] [address]

                                         

 

                          [facility]     [address] [id number]

[address] [place] [address], [place] [address]

                                         

 

                          [facility]     [address] [id number]

[address] [place] [address], [place] [address]

                                         

 

                          [facility]     [address] [id number]

[facility] [address]

                          ,                

 

                          [facility]     [address] [id number]

[facility] [address]

                                         

 

                          [facility]     [address] [id number]

[facility] [address]

                                         

 

                          [facility]     [address] [id number]

[facility] [address]

                          ,                

 

                          [facility]     [address] [id number]

[facility] [address]

                                         

 

                          [facility]     [address] [id number]

[address] [place] [address], [place] [address]

                                         

 

                          [facility]     [address] [id number]

[address] [place] [address], [place] [address]

                          ,                

 

                          [facility]     [address] [id number]

[address] [place] [address], [place] [address]

                          ,                

 

                          [facility]     [address] [id number]

[address] [place] [address], [place] [address]

                                         

 

                          [facility]     [address] [id number]

[address] [place] [address], [place] [address]

                          ,                

 

                          [facility]     [address] [id number]

[address] [place] [address], [place] [address]

                                         

 

                          [facility]     [address] [id number]

[address] [place] [address], [place] [address]

                          ,                

 

                          [facility]     [address] [id number]

[address] [place] [address], [place] [address]

                                         

 

                          [facility]     [address] [id number]

[address] [place] [address], [place] [address]

                          ,                

 

                          [facility]     [address] [id number]

[address] [place] [address], [place] [address]

                                         

 

                          [facility]     [address] [id number]

[address] [place] [address], [place] [address]

                                         

 

                          [facility]     [address] [id number]

[address] [place] [address], [place] [address]

                                         

 

                          [facility]     [address] [id number]

[address] [place] [address], [place] [address]

                                         

 

                          [facility]     [address] [id number]

[address] [place] [address], [place] [address]

                                         

 

                          [facility]     [address] [id number]

[address] [place] [address], [place] [address]

                                         

 

                          [facility]     [address] [id number]

[address] [place] [address], [place] [address]

                                         

 

                          [facility]     [address] [id number]

[address] [place] [address], [place] [address]

                                         

 

                          [facility]     [address] [id number]

[address] [place] [address], [place] [address]

                                         

 

                          [facility]     [address] [id number]

[address] [place] [address], [place] [address]

                          27 Mar, 2014               

 

                          [facility]     [address] [id number]

[address] [place] [address], [place] [address]

                          27 Mar, 2014               

 

                          [facility]     [address] [id number]

[address] [place] [address], [place] [address]

                                         

 

                          [facility]     [address] [id number]

[address] [place] [address], [place] [address]

                                         

 

                          [facility]     [address] [id number]

[address] [place] [address], [place] [address]

                                         

 

                          [facility]     [address] [id number]

[address] [place] [address], [place] [address]

                                         

 

                          [facility]     [address] [id number]

[address] [place] [address], [place] [address]

                          08 Oct, 2013               

 

                          [facility]     [address] [id number]

[address] [place] [address], [place] [address]

                          04 Oct, 2013               

 

                          [facility]     [address] [id number]

[address] [place] [address], [place] [address]

                          03 Oct, 2013               

 

                          [facility]     [address] [id number]

[address] [place] [address], [place] [address]

                          02 Oct, 2013               

 

                          [facility]     [address] [id number]

[address] [place] [address], [place] [address]

                          01 Oct, 2013               

 

                          [facility]     [address] [id number]

[address] [place] [address], [place] [address]

                          20 Sep, 2013               

 

                          [facility]     [address] [id number]

[address] [place] [address], [place] [address]

                          08 Aug, 2013               

 

                          [facility]     [address] [id number]

[address] [place] [address], [place] [address]

                          24 May, 2013               

 

                          [facility]     [address] [id number]

[address] [place] [address], [place] [address]

                          13 May, 2013               

 

                          [facility]     [address] [id number]

[address] [place] [address], [place] [address]

                                         

 

                          [facility]     [address] [id number]

[address] [place] [address], [place] [address]

                                         

 

                          [facility]     [address] [id number]

[address] [place] [address], [place] [address]

                                         

 

                          [facility]     [address] [id number]

[address] [place] [address], [place] [address]

                                         

 

                          [facility]     [address] [id number]

[address] [place] [address], [place] [address]

                          02 Oct, 2012               

 

                          [facility]     [address] [id number]

[address] [place] [address], [place] [address]

                          02 Oct, 2012               

 

                          [facility]     [address] [id number]

[address] [place] [address], [place] [address]

                          21 Sep, 2012               

 

                          [facility]     [address] [id number]

[address] [place] [address], [place] [address]

                          06 Aug, 2012               

 

                          [facility]     [address] [id number]

[address] [place] [address], [place] [address]

                          02 Aug, 2012               

 

                          [facility]     [address] [id number]

[address] [place] [address]

                                         

 

                          [facility]     [address] [id number]

[address] [place] [address]

                                         

 

                          [facility]     [address] [id number]

[address] [place] [address]

                          15 Eusebio, 2012               

 

                          [facility]     [address] [id number]

[address] [place] [address]

                                         

 

                          [facility]     [address] [id number]

[address] [place] [address]

                          10 Apr, 2012               

 

                          [facility]     [address] [id number]

[address] [place] [address]

                          22 Mar, 2012               

 

                          [facility]     [address] [facility] [id number]

[address] [place] [address]

                          20 Mar, 2012               

 

                          [facility]     [address] [facility] [id number]

[address] [place] [address]

                          14 Mar, 2012               

 

                          [facility]     [address] [facility] [id number]

[address] [place] [address]

                                         







IMMUNIZATIONS

No Known Immunizations



SOCIAL HISTORY

Never Assessed



REASON FOR VISIT





PLAN OF CARE





VITAL SIGNS





                    Height              69 in               2012-04-10

 

                    Weight              194.7 lbs           2012-04-10

 

                    Temperature         99.2 degrees Fahrenheit 2012-04-10

 

                    Heart Rate          78 bpm              2012-04-10

 

                    Respiratory Rate    18                  2012-04-10

 

                    Blood pressure systolic 120 mmHg            2012-04-10

 

                    Blood pressure diastolic 80 mmHg             2012-04-10







MEDICATIONS

Unknown Medications



RESULTS

No Results



PROCEDURES

No Known procedures



INSTRUCTIONS





MEDICATIONS ADMINISTERED

No Known Medications



MEDICAL (GENERAL) HISTORY





                    Type                Description         Date

 

                          Medical History           allergic rhinitis- rec's edwige donahue allergy injections from Dr Rojas office                            

 

                    Medical History     mood disorder        

 

                    Medical History     Leukocytosis- has been to hematology  

 

                    Medical History     Hyperlipidemia       

 

                          Medical History           Left leg pain- multiple imag

ing performed and ortho referral 

placed                                   

 

                          Medical History           TUBULAR ADENOMA AND GASTRITI

S ON COLONOSOCPY AUG 2016 -MULTIPLE 

POLYPS                                   

 

                    Medical History     Helicobacter pylori (H. pylori) infectio

n Hx  

 

                    Surgical History    cholecystectomy     2006

 

                    Surgical History     section    , 

 

                    Surgical History    tonsillectomy       2015

 

                    Surgical History    colonoscopy         2016

 

                    Surgical History    colonscopy          2017

 

                    Surgical History    Right Knee scope    2017

## 2020-06-23 NOTE — XMS REPORT
[facility]

                             Created on: 2018



Kate April

External Reference #: [id number]

: 1975

Sex: Female



Demographics





                          Address                   [address] [place]  [address]

 

                          Preferred Language        Unknown

 

                          Marital Status            Unknown

 

                          Worship Affiliation     Unknown

 

                          Race                      Unknown

 

                          Ethnic Group              Unknown





Author





                          Author                    ZIA April ASHOK

 

                          Organization              [facility]

 

                          Address                   [address] [place]  [address]



 

                          Phone                     [phone]







Care Team Providers





                    Care Team Member Name Role                Phone

 

                    ASHOK LIZARRAGA       Unavailable         [phone]







PROBLEMS





          Type      Condition ICD9-CM Code DGM48-DR Code Onset Dates Condition S

tatus SNOMED 

Code

 

          Problem   History of leukocytosis           Z86.2               Active

    [id number]

 

          Problem   Duarte's neuroma of right foot           G57.61             

 Active    [id number]

 

                          Problem                   Type 2 diabetes mellitus wit

hout complication, without long-term current

use of insulin              E11.9                     Active       [id number]

 

          Problem   Tubular adenoma of colon           D12.6               Activ

e    [id number]

 

          Problem   Mixed hyperlipidemia           E78.2               Active   

 [id number]







ALLERGIES

No Information



ENCOUNTERS





                Encounter       Location        Date            Diagnosis

 

                          [facility]  [id number]

[address] [place] [address] 

[address]                              Acute pain of left shoulder 

M25.512

 

                          [facility]     [address] [id number]

[address] [place] [address]

                          23 2018               

 

                          [facility]     [address] [id number]

[address] [place] [address]

                          15 2018              Type 2 diabetes mellitus wit

hout complication, without long-term 

current use of insulin E11.9

 

                          [facility]     [address] [id number]

[address] [place] [address]

                          06 2018              Type 2 diabetes mellitus wit

hout complication, without long-term 

current use of insulin E11.9 ; Tubular adenoma of colon D12.6 ; Mixed 
hyperlipidemia E78.2 ; History of leukocytosis Z86.2 and Screening breast 
examination Z12.31

 

                          [facility]     [address] [facility] [id number]

[address] [place] [address]

                                        Metatarsalgia of right foot 

M77.41 and Type 2 diabetes mellitus 

without complication, without long-term current use of insulin E11.9

 

                          [facility]     [address] [facility] [id number]

[address] [place] [address]

                                        Capsulitis M77.9 and Metatar

salgia of right foot M77.41

 

                          [address] [facility] [id number]

[address] [place] [address]

                          08 Sep, 2017              Capsulitis M77.9

 

                          [address] [facility] [id number]

[address] [place] [address]

                          06 Sep, 2017               

 

                          [address] [facility] [id number]

[address] [place] [address]

                          03 Aug, 2017              Type 2 diabetes mellitus wit

hout complication, without long-term 

current use of insulin E11.9 ; Tubular adenoma of colon D12.6 and Mixed 
hyperlipidemia E78.2

 

                          [address] [address] [id number]

[address] [place] [address]

                                        Metatarsalgia of right foot 

M77.41 and Capsulitis M77.9

 

                          [address] [facility] [id number]

[address] [place] [address]

                                         

 

                          [facility]  [address] [id number]

[address] [place] [address] 

[address]                              Duarte's neuroma of right fo

ot G57.61

 

                          [address] [facility] [id number]

[address] [place] [address]

                                        Mixed hyperlipidemia E78.2 a

nd Type 2 diabetes mellitus without 

complication, without long-term current use of insulin E11.9

 

                          [address] [facility] [id number]

[address] [place] [address]

                          10 Brandon, 2017              Type 2 diabetes mellitus wit

hout complication, without long-term 

current use of insulin E11.9 ; Tubular adenoma of colon D12.6 and Mixed 
hyperlipidemia E78.2

 

                          [address] [facility] [id number]

[address] [place] [address]

                          22 Dec, 2016              Mixed hyperlipidemia E78.2

 

                          [address]

[address] [place] [address]

                                         

 

                          [facility]     [address] [address]

                                        Mixed hyperlipidemia E78.2

 

                          [address] [address]

                                        Mixed hyperlipidemia E78.2

 

                          [address] [address]

                          08 Sep, 2016               

 

                          [address] [address]

                          10 Aug, 2016              Type 2 diabetes mellitus wit

hout complication, without long-term 

current use of insulin E11.9 ; Helicobacter pylori (H. pylori) infection A04.8 
and Mixed hyperlipidemia E78.2

 

                          [address] [address]

                          08 Aug, 2016              Type 2 diabetes mellitus wit

hout complication, without long-term 

current use of insulin E11.9

 

                          [id number]

[address] [place] [address]

                          03 Aug, 2016              Type 2 diabetes mellitus wit

hout complication, without long-term 

current use of insulin E11.9

 

                          [id number]

[address] [place] [address]

                          02 Aug, 2016              Dyspepsia R10.13 ; Upper abd

ominal pain R10.10 ; Bowel habit 

changes R19.4 ; History of leukocytosis Z86.2 and Helicobacter pylori (H. 
pylori) infection A04.8

 

                          [facility]  [id number]

[address] [place] [address] 

[address]                27 May, 2016              Environmental allergies Z91.

09

 

                          [facility]     [id number]

[address] [place] [address]

                                        Left leg pain M79.605 ; Loca

lized swelling of lower leg R22.40 and 

Upper respiratory infection J06.9

 

                          [address]

[address] [place] [address]

                          28 Dec, 2015               

 

                          [facility]     [address] [facility] [id number]

[address] [place] [address]

                          21 Dec, 2015              Left leg pain M79.605

 

                          [facility]     [address] [facility] [id number]

[address] [place] [address]

                          10 Dec, 2015              Left leg pain M79.605 and Lo

calized swelling of lower leg R22.40

 

                          [facility]     [address] [facility] [id number]

[address] [place] [address]

                          01 Dec, 2015              Left leg pain M79.605

 

                          [id number] [place] [address]

                                        Encounter for immunization Z

23

 

                          [id number] [place] [address]

                          12 Oct, 2015              Bronchitis J40

 

                          [facility]     [id number]

[address] [place] [address]

                          30 Sep, 2015              Physical exam, pre-employmen

t V70.5 ; Encounter for PPD test V74.1 

and Hyperlipidemia 272.4

 

                          [id number]

[address] [place] [address]

                          21 Sep, 2015               

 

                          [facility]     [address] [id number]

[address] [place] [address]

                          21 Sep, 2015               

 

                          [facility]     [address] [id number]

[address] [place] [address]

                          09 Sep, 2015               

 

                          [facility]     [address] [id number]

[address] [place] [address]

                          04 Sep, 2015              Elevated blood sugar 790.29

 

                          [facility]     [address] [id number]

[address] [place] [address]

                          03 Sep, 2015              Elevated blood sugar 790.29

 

                          [facility]     [address] [facility] [id number]

[address] [place] [address]

                          03 Sep, 2015              Palpitations 785.1

 

                          [address] [id number]

[address] [place] [address]

                          01 Sep, 2015              Palpitations 785.1

 

                          [facility]     [address] [id number]

[address] [place] [address]

                                         

 

                          [facility]     [address] [facility] [id number]

[address] [place] [address]

                          28 May, 2015              Hand pain, right 729.5 and D

epression with anxiety 300.4

 

                          [facility]     [address] [id number]

[address] [place] [address]

                                         

 

                          [facility]     [address] [id number]

[address] [place] [address]

                                         

 

                          [facility]     [address] [id number]

[address] [place] [address]

                          05 Mar, 2015               

 

                          [facility]     [address] [id number]

[address] [place] [address]

                          05 Mar, 2015               

 

                          [facility]     [address] [id number]

[address] [place] [address]

                                         

 

                          [facility]     [address] [id number]

[address] [place] [address]

                                         

 

                          [facility]     [address] [facility] [id number]

[address] [place] [address]

                          10 Feb, 2015               

 

                          [facility]     [address] [id number]

[address] [place] [address]

                          10 Feb, 2015               

 

                          [facility]     [address] [id number]

[address] [place] [address]

                                         

 

                          [facility]     [address] [id number]

[address] [place] [address]

                                         

 

                          [facility]     [address] [facility] [id number]

[address] [place] [address]

                                         

 

                          [facility]     [address] [id number]

[address] [place] [address]

                                         

 

                          [facility]     [address] [id number]

[address] [place] [address], [place] [address]

                                         

 

                          [facility]     [address] [id number]

[address] [place] [address], [place] [address]

                                         

 

                          [facility]     [address] [id number]

[address] [place] [address], [place] [address]

                                         

 

                          [facility]     [address] [id number]

[address] [place] [address], [place] [address]

                                         

 

                          [facility]     [address] [id number]

[address] [place] [address], [place] [address]

                                         

 

                          [facility]     [address] [id number]

[address] [place] [address], [place] [address]

                                         

 

                          [facility]     [address] [id number]

[address] [place] [address], [place] [address]

                                         

 

                          [facility]     [address] [id number]

[address] [place] [address], [place] [address]

                                         

 

                          [facility]     [address] [id number]

[address] [place] [address], [place] [address]

                          15 [address], 2015               

 

                          [facility]     [address] [id number]

[address] [place] [address]

                          15 [address], 2015               

 

                          [facility]     [address] [id number]

[address] [place] [address], [place] [address]

                                         

 

                          [facility]     [address] [id number]

[address] [place] [address]

                                         

 

                          [facility]     [address] [id number]

[address] [place] [address], [place] [address]

                          15 Oct, 2014               

 

                          [facility]     [address] [id number]

[address] [place] [address], [place] [address]

                          15 Oct, 2014               

 

                          [facility]     [address] [id number]

[address] [place] [address], [place] [address]

                          14 Oct, 2014               

 

                          [facility]     [address] [id number]

[address] [place] [address], [place] [address]

                          14 Oct, 2014               

 

                          [facility]     [address] [id number]

[address] [place] [address], [place] [address]

                          07 Oct, 2014               

 

                          [facility]     [address] [id number]

[address] [place] [address], [place] [address]

                          07 Oct, 2014               

 

                          [facility]     [address] [id number]

[address] [place] [address], [place] [address]

                          12 Sep,                

 

                          [facility]     [address] [id number]

[address] [place] [address], [place] [address]

                          12 Sep,                

 

                          [facility]     [address] [id number]

[address] [place] [address], [place] [address]

                          04 Sep,                

 

                          [facility]     [address] [id number]

[address] [place] [address], [place] [address]

                          03 Sep,                

 

                          [facility]     [address] [id number]

[address] [place] [address], [place] [address]

                          03 Sep,                

 

                          [facility]     [address] [id number]

[address] [place] [address], [place] [address]

                          02 Sep,                

 

                          [facility]     [address] [id number]

[address] [place] [address], [place] [address]

                          02 Sep, 2014               

 

                          [facility]     [address] [id number]

[address] [place] [address], [place] [address]

                          30 Aug, 2014               

 

                          [facility]     [address] [id number]

[address] [place] [address], [place] [address]

                          30 Aug, 2014               

 

                          [facility]     [address] [id number]

[address] [place] [address], [place] [address]

                          19 Aug, 2014               

 

                          [facility]     [address] [id number]

[address] [place] [address], [place] [address]

                          19 Aug, 2014               

 

                          [facility]     [address] [id number]

[address] [place] [address], [place] [address]

                          14 Aug, 2014               

 

                          [facility]     [address] [id number]

[address] [place] [address], [place] [address]

                          14 Aug, 2014               

 

                          [facility]     [address] [id number]

[facility] [address]

                          13 Aug, 2014               

 

                          [facility]     [address] [id number]

[facility] [place] [address]

                          13 Aug, 2014               

 

                          [facility]     [address] [id number]

[facility] [address]

                                         

 

                          [facility]     [address] [id number]

[address] [place] [address], [place] [address]

                                         

 

                          [facility]     [address] [id number]

[facility] [address]

                                         

 

                          [facility]     [address] [id number]

[address] [place] [address], [place] [address]

                                         

 

                          [facility]     [address] [id number]

[address] [place] [address], [place] [address]

                                         

 

                          [facility]     [address] [id number]

[address] [place] [address], [place] [address]

                                         

 

                          [facility]     [address] [id number]

[address] [place] [address], [place] [address]

                                         

 

                          [facility]     [address] [id number]

[address] [place] [address], [place] [address]

                                         

 

                          [facility]     [address] [id number]

[address] [place] [address], [place] [address]

                                         

 

                          [facility]     [address] [id number]

[address] [place] [address], [place] [address]

                                         

 

                          [facility]     [address] [id number]

[address] [place] [address], [place] [address]

                                         

 

                          [facility]     [address] [id number]

[address] [place] [address], [place] [address]

                                         

 

                          [facility]     [address] [id number]

[address] [place] [address], [place] [address]

                                         

 

                          [facility]     [address] [id number]

[address] [place] [address], [place] [address]

                                         

 

                          [facility]     [address] [id number]

[address] [place] [address], [place] [address]

                          ,                

 

                          [facility]     [address] [id number]

[address] [place] [address], [place] [address]

                                         

 

                          [facility]     [address] [id number]

[address] [place] [address], [place] [address]

                          ,                

 

                          [facility]     [address] [id number]

[address] [place] [address], [place] [address]

                          ,                

 

                          [facility]     [address] [id number]

[address] [place] [address], [place] [address]

                                         

 

                          [facility]     [address] [id number]

[address] [place] [address], [place] [address]

                                         

 

                          [facility]     [address] [id number]

[address] [place] [address], [place] [address]

                                         

 

                          [facility]     [address] [id number]

[address] [place] [address], [place] [address]

                                         

 

                          [facility]     [address] [id number]

[address] [place] [address], [place] [address]

                                         

 

                          [facility]     [address] [id number]

[address] [place] [address], [place] [address]

                                         

 

                          [facility]     [address] [id number]

[address] [place] [address], [place] [address]

                                         

 

                          [facility]     [address] [id number]

[address] [place] [address], [place] [address]

                                         

 

                          [facility]     [address] [id number]

[address] [place] [address], [place] [address]

                                         

 

                          [facility]     [address] [id number]

[address] [place] [address], [place] [address]

                          27 Mar, 2014               

 

                          [facility]     [address] [id number]

[address] [place] [address], [place] [address]

                          27 Mar, 2014               

 

                          [facility]     [address] [id number]

[address] [place] [address], [place] [address]

                                         

 

                          [facility]     [address] [id number]

[address] [place] [address], [place] [address]

                                         

 

                          [facility]     [address] [id number]

[address] [place] [address], [place] [address]

                                         

 

                          [facility]     [address] [id number]

[address] [place] [address], [place] [address]

                                         

 

                          [facility]     [address] [id number]

[address] [place] [address], [place] [address]

                          08 Oct, 2013               

 

                          [facility]     [address] [id number]

[address] [place] [address], [place] [address]

                          04 Oct, 2013               

 

                          [facility]     [address] [id number]

[address] [place] [address], [place] [address]

                          03 Oct, 2013               

 

                          [facility]     [address] [id number]

[address] [place] [address], [place] [address]

                          02 Oct, 2013               

 

                          [facility]     [address] [id number]

[address] [place] [address], [place] [address]

                          01 Oct, 2013               

 

                          [facility]     [address] [id number]

[address] [place] [address], [place] [address]

                          20 Sep, 2013               

 

                          [facility]     [address] [id number]

[address] [place] [address], [place] [address]

                          08 Aug, 2013               

 

                          [facility]     [address] [id number]

[address] [place] [address], [place] [address]

                          24 May, 2013               

 

                          [facility]     [address] [id number]

[address] [place] [address], [place] [address]

                          13 May, 2013               

 

                          [facility]     [address] [id number]

[address] [place] [address], [place] [address]

                                         

 

                          [facility]     [address] [id number]

[address] [place] [address], [place] [address]

                                         

 

                          [facility]     [address] [id number]

[address] [place] [address], [place] [address]

                                         

 

                          [facility]     [address] [id number]

[address] [place] [address]

                                         

 

                          [facility]     [address] [id number]

[address] [place] [address]

                          02 Oct, 2012               

 

                          [facility]     [address] [id number]

[address] [place] [address], [place] [address]

                          02 Oct, 2012               

 

                          [facility]     [address] [id number]

[address] [place] [address]

                          21 Sep, 2012               

 

                          [facility]     [address] [id number]

[address] [place] [address], [place] [address]

                          06 Aug, 2012               

 

                          [facility]     [address] [id number]

[address] [place] [address]

                          02 Aug, 2012               

 

                          [facility]     [address] [id number]

[address] [place] [address], [place] [address]

                                         

 

                          [facility]     [address] [id number]

[address] [place] [address]

                                         

 

                          [facility]     [address] [id number]

[address] [place] [address]

                          15 Eusebio, 2012               

 

                          [facility]     [address] [id number]

[address] [place] [address]

                                         

 

                          [facility]     [address] [id number]

[address] [place] [address]

                          10 Apr, 2012               

 

                          [facility]     [address] [id number]

[address] [place] [address]

                          22 Mar, 2012               

 

                          [facility]     [address] [id number]

[address] [place] [address]

                          20 Mar, 2012               

 

                          [facility]     [address] [id number]

[address] [place] [address]

                          14 Mar, 2012               

 

                          [facility]     [address] [id number]

[address] [place] [address]

                                         







IMMUNIZATIONS

No Known Immunizations



SOCIAL HISTORY

Never Assessed



REASON FOR VISIT

2 month f/u. Consult Dr. Lizarraga;Eyal RT(R)



PLAN OF CARE





                          Activity                  Details

 

                                         

 

                          Follow Up                 2 Months Reason:







VITAL SIGNS





                    Height              69 in               2017

 

                    Blood pressure systolic 122 mmHg            2017

 

                    Blood pressure diastolic 68 mmHg             2017







MEDICATIONS

No Known Medications



RESULTS

No Results



PROCEDURES

No Known procedures



INSTRUCTIONS





MEDICATIONS ADMINISTERED

No Known Medications



MEDICAL (GENERAL) HISTORY





                    Type                Description         Date

 

                          Medical History           allergic rhinitis- rec's edwige perriny allergy injections from Dr Rojas office                            

 

                    Medical History     mood disorder        

 

                    Medical History     Leukocytosis- has been to hematology  

 

                    Medical History     Hyperlipidemia       

 

                          Medical History           Left leg pain- multiple imag

ing performed and ortho referral 

placed                                   

 

                          Medical History           TUBULAR ADENOMA AND GASTRITI

S ON COLONOSOCPY AUG 2016 -MULTIPLE 

POLYPS                                   

 

                    Medical History     Helicobacter pylori (H. pylori) infectio

n Hx  

 

                    Surgical History    cholecystectomy     

 

                    Surgical History     section    , 

 

                    Surgical History    tonsillectomy       2015

 

                    Surgical History    colonoscopy         2016

 

                    Surgical History    colonscopy          2017

 

                    Surgical History    Right Knee scope    2017

## 2020-06-23 NOTE — XMS REPORT
[facility]

                             Created on: 2020



Kate April

External Reference #: [id number]

: 1975

Sex: Female



Demographics





                          Address                   [address]

[place]  [address]

 

                          Preferred Language        Unknown

 

                          Marital Status            Unknown

 

                          Sikhism Affiliation     Unknown

 

                          Race                      Unknown

 

                          Ethnic Group              Unknown





Author





                          Author                    ANNEMARIE April VENECIA

 

                          [facility]

 

                          Address                   [address] [place]  [address]



 

                          Phone                     [phone]







Care Team Providers





                    Care Team Member Name Role                Phone

 

                    ANNETTE SHARPEWNYA       Unavailable         [phone]







PROBLEMS





          Type      Condition ICD9-CM Code ZUX07-BS Code Onset Dates Condition S

tatus SNOMED 

Code

 

          Problem   Duarte's neuroma of right foot           G57.61             

 Active    48338661

 

          Problem   History of leukocytosis           Z86.2               Active

    [id number]

 

          Problem   Anxiety             F41.9               Active    61198039

 

                          Problem                   Type 2 diabetes mellitus wit

hout complication, without long-term current

use of insulin              E11.9                     Active       [id number]

 

          Problem   Seasonal allergic rhinitis due to pollen           J30.1    

           Active    02628591

 

          Problem   Tubular adenoma of colon           D12.6               Activ

e    376736036

 

          Problem   GERD with esophagitis           K21.0               Active  

  680722219

 

          Problem   Mixed hyperlipidemia           E78.2               Active   

 362639357

 

           Problem    Seasonal allergic rhinitis due to other allergic trigger  

          J30.89                

Active                                  [id number]

 

          Problem   Dysthymic disorder           F34.1               Active    7

0496172

 

          Problem   Arthritis           M19.90              Active    8148528

 

          Problem   Non morbid obesity           E66.9               Active    4

[id number]







ALLERGIES

No Information



ENCOUNTERS





                Encounter       Location        Date            Diagnosis

 

                    [facility] [id number] [place] [address] 10 

2020                                

 

                    [facility] [id number] [place] [address]                                Acute pain of left knee M25.562

 

                    [facility] [id number] [place] [address] 15 

2020                               Leg pain, left M79.605

 

                          [facility]  [address] [facility] [id number]

[address] [place] 

[address]                              Leg pain, left M79.605

 

                    [facility] [address] [address]                                Type 2 diabetes mellitus without complic

ation, without long-term 

current use of insulin E11.9 and GERD with esophagitis K21.0

 

                    [place] [address] [address] 01 

Oct, 2019                               Encounter for immunization Z23

 

                    [place] [address] [address]                                Type 2 diabetes mellitus without complic

ation, without long-term 

current use of insulin E11.9

 

                    [place] [address] [address]                                 

 

                    [place] [address] [address]                                Type 2 diabetes mellitus without complic

ation, without long-term 

current use of insulin E11.9

 

                    [place] [address] [address]                                Weight loss R63.4

 

                    [place] [address] [address] 31 

May, 2019                               Type 2 diabetes mellitus without complic

ation, without long-term 

current use of insulin E11.9

 

                    [place] [address] [address] 31 

May, 2019                               Type 2 diabetes mellitus without complic

ation, without long-term 

current use of insulin E11.9 and Non morbid obesity E66.9

 

                          [facility] [place] [id number]

[address] [place] [address] 

[address]                14 May, 2019              Seasonal allergic rhinitis d

ue to pollen J30.1 and Sore 

throat J02.9

 

                          [facility] [place] [id number]

[address] [place] [address] 

[address]                              Arthritis M19.90

 

                    [place] [address] [address]                                Seasonal allergic rhinitis due to other 

allergic trigger J30.89 and 

Dysthymic disorder F34.1

 

                    [place] [address] [address] 05 

Dec, 2018                               Bilateral otitis media with effusion H65

.93 and Anxiety F41.9

 

                    [address] [place] [address] [place] [id number]                                Type 2 diabetes mellitus without complic

ation, without long-term 

current use of insulin E11.9

 

                    [place] [address] [address]                                Pharyngitis due to Streptococcus species

 J02.0

 

                    [address] [address]                                 

 

                          [facility] [place] [address] 

[address]                10 Jul, 2018              Fever, unspecified fever cau

se R50.9 and Lymphadenopathy

R59.1

 

                          [facility] IN [address] 

[address]                              Pharyngitis due to other org

anism J02.8

 

                    [place] [address] [address]                                 

 

                    [address] [address]                                Type 2 diabetes mellitus without complic

ation, without long-term 

current use of insulin E11.9 and Other eczema L30.8

 

                          [facility] [place] [address] 

[address]                28 2018              Acute pain of left shoulder 

M25.512

 

                    [place] [address] [address] 23 

2018                                

 

                    [place] [id number] 15 

2018                               Type 2 diabetes mellitus without complic

ation, without long-term 

current use of insulin E11.9

 

                    [place] [address] [address]                                Type 2 diabetes mellitus without complic

ation, without long-term 

current use of insulin E11.9 ; Tubular adenoma of colon D12.6 ; Mixed 
hyperlipidemia E78.2 ; History of leukocytosis Z86.2 and Screening breast 
examination Z12.31

 

                    [place] [address] [address]                                Metatarsalgia of right foot M77.41 and T

ype 2 diabetes mellitus 

without complication, without long-term current use of insulin E11.9

 

                    [place] [address] [address]                                Capsulitis M77.9 and Metatarsalgia of ri

ght foot M77.41

 

                    [place] [address] [address] 08 

Sep, 2017                               Capsulitis M77.9

 

                    [place] [address] [address] 06 

Sep, 2017                                

 

                    [place] [address] [address] 03 

Aug, 2017                               Type 2 diabetes mellitus without complic

ation, without long-term 

current use of insulin E11.9 ; Tubular adenoma of colon D12.6 and Mixed 
hyperlipidemia E78.2

 

                    [place] [address] [address]                                Metatarsalgia of right foot M77.41 and C

apsulitis M77.9

 

                    [place] [address] [address]                                 

 

                          [facility]  [address] [facility] [id number]

[address] [place] 

[address]                              Duarte's neuroma of right fo

ot G57.61

 

                    [place] [address] [address]                                Mixed hyperlipidemia E78.2 and Type 2 di

abetes mellitus without 

complication, without long-term current use of insulin E11.9

 

                    [place] [address] [address] 10 

Brandon, 2017                               Type 2 diabetes mellitus without complic

ation, without long-term 

current use of insulin E11.9 ; Tubular adenoma of colon D12.6 and Mixed 
hyperlipidemia E78.2

 

                    [place] [id number] [place] [address] 22 

Dec, 2016                               Mixed hyperlipidemia E78.2

 

                    [address] [place] [address] [address]                                 

 

                    [place] [address] [address]                                Mixed hyperlipidemia E78.2

 

                    [place] [address] [address]                                Mixed hyperlipidemia E78.2

 

                    [place] [address] [address] 08 

Sep, 2016                                

 

                    [place] [address] [address] 10 

Aug, 2016                               Type 2 diabetes mellitus without complic

ation, without long-term 

current use of insulin E11.9 ; Helicobacter pylori (H. pylori) infection A04.8 
and Mixed hyperlipidemia E78.2

 

                    [address] [id number] [place] [address] 08 

Aug, 2016                               Type 2 diabetes mellitus without complic

ation, without long-term 

current use of insulin E11.9

 

                    [place] [address] [address] 03 

Aug, 2016                               Type 2 diabetes mellitus without complic

ation, without long-term 

current use of insulin E11.9

 

                    [place] [address] [address] 02 

Aug, 2016                               Dyspepsia R10.13 ; Upper abdominal pain 

R10.10 ; Bowel habit changes 

R19.4 ; History of leukocytosis Z86.2 and Helicobacter pylori (H. pylori) 
infection A04.8

 

                          [facility]  [address] [facility] [id number]

[address] [place] 

[address]                27 May, 2016              Environmental allergies Z91.

09

 

                    [place] [address] [address]                                Left leg pain M79.605 ; Localized swelli

ng of lower leg R22.40 and 

Upper respiratory infection J06.9

 

                    [place] [address] [address] 28 

Dec, 2015                                

 

                    [place] [address] [address] 21 

Dec, 2015                               Left leg pain M79.605

 

                    [place] [address] [address] 10 

Dec, 2015                               Left leg pain M79.605 and Localized swel

ling of lower leg R22.40

 

                    [place] [address] [address] 01 

Dec, 2015                               Left leg pain M79.605

 

                    [place] [address] [address]                                Encounter for immunization Z23

 

                    [place] [address] [address] 12 

Oct, 2015                               Bronchitis J40

 

                    [place] [address] [address] 30 

Sep, 2015                               Physical exam, pre-employment V70.5 ; En

counter for PPD test V74.1 and

Hyperlipidemia 272.4

 

                    [place] [address] [address] 21 

Sep, 2015                                

 

                    [place] [address] [address] 21 

Sep, 2015                                

 

                    [place] [address] [address] 09 

Sep, 2015                                

 

                    [place] [address] [address] 04 

Sep, 2015                               Elevated blood sugar 790.29

 

                    [place] [address] [address] 03 

Sep, 2015                               Elevated blood sugar 790.29

 

                    [facility] [id number] [place] [address] 03 

Sep, 2015                               Palpitations 785.1

 

                    [facility] [id number] [place] [address] 01 

Sep, 2015                               Palpitations 785.1

 

                    [facility] [address] [address]                                 

 

                    [facility] [id number] [place] [address] 28 

May, 2015                               Hand pain, right 729.5 and Depression wi

th anxiety 300.4

 

                    [facility] [id number] [place] [address]                                 

 

                    [facility] [address] [address]                                 

 

                    [facility] [address] [address] 05 

Mar, 2015                                

 

                    [facility] [address] [address] 05 

Mar, 2015                                

 

                    [facility] [address] [address]                                 

 

                    [facility] [address] [address]                                 

 

                    [facility] [address] [address] 10 

Feb, 2015                                

 

                    [facility] [id number] [place] [address] 10 

Feb, 2015                                

 

                    [facility] [address] [address]                                 

 

                    [facility] [id number] [place] [address]                                 

 

                    [facility] [address] [address]                                 

 

                    [facility] [address] [id number] [place] [address]                                 

 

                    [facility] [address] [id number] [place] [address]                                 

 

                    [facility] [address] [id number] [place] [address]                                 

 

                    [facility] [address] [id number] [place] [address]                                 

 

                    [facility] [address] [id number] [place] [address]                                 

 

                    [facility] [address] [id number] [place] [address]                                 

 

                    [facility] [address] [id number] [place] [address]                                 

 

                    [facility] [address] [id number] [place] [address]                                 

 

                    [facility] [address] [id number] [place] [address]                                 

 

                    [facility] [address] [id number] [place] [address] 15 

Brandon, 2015                                

 

                    [facility] [address] [id number] [place] [address] 15 

Brandon, 2015                                

 

                    [facility] [address] [id number] [place] [address]                                 

 

                    [facility] [address] [id number] [place] [address]                                 

 

                    [facility] [address] [id number] [place] [address] 15 

Oct, 2014                                

 

                    [facility] [address] [id number] [place] [address] 15 

Oct, 2014                                

 

                    [facility] [address] [id number] [place] [address] 14 

Oct, 2014                                

 

                    [facility] [address] [id number] [place] [address] 14 

Oct, 2014                                

 

                    [facility] [address] [facility] [id number] [facility] [place] [address] 07 

Oct, 2014                                

 

                    [facility] [address] [facility] [id number] [facility] [place] [address] 07 

Oct, 2014                                

 

                    [facility] [address] [id number] [facility] [place] [address] 12 

Sep, 2014                                

 

                    [facility] [address] [facility] [id number] [facility] [place] [address] 12 

Sep,                                 

 

                    [facility] [address] [facility] [id number] [facility] [place] [address] 04 

Sep, 2014                                

 

                    [facility] [address] [facility] [id number] [facility] [place] [address] 03 

Sep, 2014                                

 

                    [facility] [address] [id number] [facility] [place] [address] 03 

Sep, 2014                                

 

                    [facility] [address] [id number] [place] [address] 02 

Sep, 2014                                

 

                    [facility] [address] [facility] [id number] [facility] [place] [address] 02 

Sep, 2014                                

 

                    [facility] [address] [id number] [place] [address] 30 

Aug, 2014                                

 

                    [facility] [address] [id number] [place] [address] 30 

Aug, 2014                                

 

                    [facility] [address] [id number] [place] [address] 19 

Aug, 2014                                

 

                    [facility] [address] [facility] [id number] [place] [address] 19 

Aug, 2014                                

 

                    [facility] [address] [id number] [place] [address] 14 

Aug, 2014                                

 

                    [facility] [address] [facility] [id number] [place] [address] 14 

Aug, 2014                                

 

                    [facility] [address] [id number] [place] [address] 13 

Aug, 2014                                

 

                    [facility] [address] [id number] [facility] [place] [address] 13 

Aug, 2014                                

 

                    [facility] [address] [id number] [facility] [place] [address]                                 

 

                    [facility] [address] [facility] [id number] [facility] [place] [address]                                 

 

                    [facility] [address] [facility] [id number] [facility] [place] [address]                                 

 

                    [facility] [address] [facility] [id number] [facility] [place] [address]                                 

 

                    [facility] [address] [facility] [id number] [facility] [place] [address]                                 

 

                    [facility] [address] [facility] [id number] [place] [address]                                 

 

                    [facility] [address] [id number] [place] [address]                                 

 

                    [facility] [address] [facility] [id number] [place] [address]                                 

 

                    [facility] [address] [facility] [id number] [facility] [place] [address]                                 

 

                    [facility] [address] [facility] [id number] [place] [address]                                 

 

                    [facility] [address] [id number] [place] [address]                                 

 

                    [facility] [address] [facility] [id number] [place] [address]                                 

 

                    [facility] [address] [id number] [place] [address]                                 

 

                    [facility] [address] [id number] [facility] [place] [address] ,                                 

 

                    [facility] [address] [facility] [id number] [place] [address]                                 

 

                    [facility] [address] [id number] [place] [address]                                 

 

                    [facility] [facility] [address] [facility] [id number] [place] [address] ,                                 

 

                    [facility] [address] [facility] [id number] [place] [address] ,                                 

 

                    [facility] [address] [id number] [place] [address]                                 

 

                    [facility] [address] [id number] [place] [address]                                 

 

                    [facility] [address] [id number] [place] [address]                                 

 

                    [facility] [address] [id number] [place] [address]                                 

 

                    [facility] [address] [id number] [place] [address]                                 

 

                    [facility] [address] [id number] [place] [address]                                 

 

                    [facility] [address] [id number] [place] [address]                                 

 

                    [facility] [address] [id number] [place] [address]                                 

 

                    [facility] [address] [id number] [place] [address]                                 

 

                    [facility] [address] [id number] [place] [address] 27 

Mar, 2014                                

 

                    [facility] [address] [id number] [place] [address] 27 

Mar, 2014                                

 

                    [facility] [address] [id number] [place] [address]                                 

 

                    [facility] [address] [facility] [id number] [place] [address]                                 

 

                    [facility] [address] [id number] [place] [address]                                 

 

                    [facility] [address] [facility] [id number] [place] [address]                                 

 

                    [facility] [address] [facility] [id number] [place] [address] 08 

Oct, 2013                                

 

                    [facility] [address] [id number] [place] [address] 04 

Oct, 2013                                

 

                    [facility] [address] [facility] [id number] [place] [address] 03 

Oct, 2013                                

 

                    [facility] [address] [facility] [id number] [place] [address] 02 

Oct, 2013                                

 

                    [facility] [address] [id number] [place] [address] 01 

Oct, 2013                                

 

                    [facility] [address] [id number] [place] [address] 20 

Sep, 2013                                

 

                    [facility] [address] [facility] [id number] [place] [address] 08 

Aug, 2013                                

 

                    [facility] [id number] [place] [address] 24 

May, 2013                                

 

                    [facility] [address] [id number] [place] [address] 13 

May, 2013                                

 

                    [facility] [id number] [place] [address]                                 

 

                    [facility] [address] [facility] [id number] [place] [address]                                 

 

                    [facility] [id number] [place] [address]                                 

 

                    [facility] [address] [facility] [id number] [place] [address]                                 

 

                    [facility] [id number] [place] [address] 02 

Oct, 2012                                

 

                    [facility] [address] [facility] [id number] [place] [address] 02 

Oct, 2012                                

 

                    [facility] [address] [facility] [id number] [place] [address] 21 

Sep, 2012                                

 

                    [facility] [id number] [place] [address] 06 

Aug, 2012                                

 

                    [facility] [address] [address] 02 

Aug, 2012                                

 

                    [facility] [address] [address]                                 

 

                    [facility] [address] [address]                                 

 

                    [facility] [address] [address] 15 

Eusebio, 2012                                

 

                    [facility] [address] [address]                                 

 

                    [facility] [address] [address] 10 

Apr, 2012                                

 

                    [facility] [address] [address] 22 

Mar, 2012                                

 

                    [facility] [address] [address] 20 

Mar, 2012                                

 

                    [facility] [address] [address] 14 

Mar, 2012                                

 

                    [facility] [address] [address]                                 







IMMUNIZATIONS

No Known Immunizations



SOCIAL HISTORY

Never Assessed



REASON FOR VISIT





PLAN OF CARE





VITAL SIGNS





MEDICATIONS

Unknown Medications



RESULTS

No Results



PROCEDURES





                Procedure       Date Ordered    Result          Body Site

 

                COMPREHEN METABOLIC PANEL 2014                    

 

                LIPID PANEL     2014                    

 

                ASSAY THYROID STIM HORMONE 2014                    

 

                COMPLETE CBC W/AUTO DIFF WBC 2014                    

 

                VENIPUNCT, ROUTINE* 2014                    







INSTRUCTIONS





MEDICATIONS ADMINISTERED

No Known Medications



MEDICAL (GENERAL) HISTORY





                    Type                Description         Date

 

                          Medical History           allergic rhinitis- rec's edwige doanhue allergy injections from Dr Rojas office                            

 

                    Medical History     mood disorder        

 

                    Medical History     Leukocytosis- has been to hematology  

 

                    Medical History     Hyperlipidemia       

 

                          Medical History           Left leg pain- multiple imag

ing performed and ortho referral 

placed                                   

 

                          Medical History           TUBULAR ADENOMA AND GASTRITI

S ON COLONOSOCPY AUG 2016 -MULTIPLE 

POLYPS                                   

 

                    Medical History     Helicobacter pylori (H. pylori) infectio

n Hx  

 

                    Surgical History    cholecystectomy     2006

 

                    Surgical History     section    , 

 

                    Surgical History    tonsillectomy       2015

 

                    Surgical History    colonoscopy         2016

 

                    Surgical History    colonscopy          2017

 

                    Surgical History    Right Knee scope    2017

## 2020-06-23 NOTE — XMS REPORT
[facility]

                             Created on: 2019



Kate April

External Reference #: [id number]

: 1975

Sex: Female



Demographics





                          Address                   [address] [place]  [address]

 

                          Preferred Language        Unknown

 

                          Marital Status            Unknown

 

                          Orthodoxy Affiliation     Unknown

 

                          Race                      Unknown

 

                          Ethnic Group              Unknown





Author





                          Author                    Migration, April Doctor

 

                          Organization              [facility]

 

                          Address                   Unknown

 

                          Phone                     Unavailable







Care Team Providers





                    Care Team Member Name Role                Phone

 

                    Migration,  Doctor  Unavailable         Unavailable







PROBLEMS





          Type      Condition ICD9-CM Code OLT39-MM Code Onset Dates Condition S

tatus SNOMED 

Code

 

          Problem   Mixed hyperlipidemia           E78.2               Active   

 [id number]

 

          Problem   Tubular adenoma of colon           D12.6               Activ

e    123831298

 

          Problem   Duarte's neuroma of right foot           G57.61             

 Active    71349582

 

          Problem   Arthritis           M19.90              Active    0935042

 

                          Problem                   Type 2 diabetes mellitus wit

hout complication, without long-term current

use of insulin              E11.9                     Active       [id number]

 

          Problem   Seasonal allergic rhinitis due to pollen           J30.1    

           Active    92822511

 

          Problem   History of leukocytosis           Z86.2               Active

    119381936

 

          Problem   Anxiety             F41.9               Active    70322762

 

           Problem    Seasonal allergic rhinitis due to other allergic trigger  

          J30.89                

Active                                  [id number]

 

          Problem   Dysthymic disorder           F34.1               Active    7

[id number]







ALLERGIES

No Information



ENCOUNTERS





                Encounter       Location        Date            Diagnosis

 

                          [facility]     [address] [address]

                          31 May, 2019               

 

                          [facility] WALK IN [facility]  [address] 

[address]                14 May, 2019              Seasonal allergic rhinitis d

ue to pollen J30.1 and Sore 

throat J02.9

 

                          [facility] W[facility] IN [facility]  [address] 

[address]                              Arthritis M19.90

 

                          [facility]     [address] [address]

                                        Seasonal allergic rhinitis d

ue to other allergic trigger J30.89 and

Dysthymic disorder F34.1

 

                          [facility]     [address] [address]

                          05 Dec, 2018              Bilateral otitis media with 

effusion H65.93 and Anxiety F41.9

 

                          [place] [address] [facility] [id number]

[address] [place] [address]

                                        Type 2 diabetes mellitus wit

hout complication, without long-term 

current use of insulin E11.9

 

                          [place] [address] [facility] [id number]

[address] [place] [address]

                                        Pharyngitis due to Streptoco

ccus species J02.0

 

                          [place] [address] [facility] [id number]

[address] [place] [address]

                                         

 

                          [facility] WALK IN [facility]  [address] [place] [id number]

[address] [place] [address] 

[address]                10 Jul, 2018              Fever, unspecified fever cau

se R50.9 and Lymphadenopathy

R59.1

 

                          [facility] WALK IN [facility]  [address] [place] [id number]

[address] [place] [address] 

[address]                              Pharyngitis due to other org

anism J02.8

 

                          [place] [address] [facility] [id number]

[address] [place] [address]

                                         

 

                          [place] [address] [place] [id number]

[address] [place] [address]

                                        Type 2 diabetes mellitus wit

hout complication, without long-term 

current use of insulin E11.9 and Other eczema L30.8

 

                          [facility] WALK IN [facility]  [address] [facility] [id number]

[address] [place] [address] 

[address]                              Acute pain of left shoulder 

M25.512

 

                          [place] [address] [facility] [id number]

[address] [place] [address]

                                         

 

                          [place] [address] [place] [id number]

[address] [place] [address]

                          15 2018              Type 2 diabetes mellitus wit

hout complication, without long-term 

current use of insulin E11.9

 

                          [place] [address] [facility] [id number]

[address] [place] [address]

                                        Type 2 diabetes mellitus wit

hout complication, without long-term 

current use of insulin E11.9 ; Tubular adenoma of colon D12.6 ; Mixed 
hyperlipidemia E78.2 ; History of leukocytosis Z86.2 and Screening breast 
examination Z12.31

 

                          [place] [address] [address]

                                        Metatarsalgia of right foot 

M77.41 and Type 2 diabetes mellitus 

without complication, without long-term current use of insulin E11.9

 

                          [place] [address] [address]

                                        Capsulitis M77.9 and Metatar

salgia of right foot M77.41

 

                          [address] [address]

                          08 Sep, 2017              Capsulitis M77.9

 

                          [place] [address] [address]

                          06 Sep, 2017               

 

                          [address] [address]

                          03 Aug, 2017              Type 2 diabetes mellitus wit

hout complication, without long-term 

current use of insulin E11.9 ; Tubular adenoma of colon D12.6 and Mixed 
hyperlipidemia E78.2

 

                          [address] [address]

                                        Metatarsalgia of right foot 

M77.41 and Capsulitis M77.9

 

                          [place] [address] [address]

                                         

 

                          [facility]  [address] [place] [id number] [place] [address] 

[address]                              Duarte's neuroma of right fo

ot G57.61

 

                          [address] [address]

                                        Mixed hyperlipidemia E78.2 a

nd Type 2 diabetes mellitus without 

complication, without long-term current use of insulin E11.9

 

                          [address] [address]

                          10 Brandon, 2017              Type 2 diabetes mellitus wit

hout complication, without long-term 

current use of insulin E11.9 ; Tubular adenoma of colon D12.6 and Mixed 
hyperlipidemia E78.2

 

                          [facility]     [address] [address]

                          22 Dec, 2016              Mixed hyperlipidemia E78.2

 

                          [facility]     [address] [facility] [id number]

[address] [place] [address]

                                         

 

                          [facility]     [address] [address]

                                        Mixed hyperlipidemia E78.2

 

                          [place] [address] [address]

                                        Mixed hyperlipidemia E78.2

 

                          [place] [address] [address]

                          08 Sep, 2016               

 

                          [place] [address] [address]

                          10 Aug, 2016              Type 2 diabetes mellitus wit

hout complication, without long-term 

current use of insulin E11.9 ; Helicobacter pylori (H. pylori) infection A04.8 
and Mixed hyperlipidemia E78.2

 

                          [place] [address] [place] [id number]

[address] [place] [address]

                          08 Aug, 2016              Type 2 diabetes mellitus wit

hout complication, without long-term 

current use of insulin E11.9

 

                          [place] [address] [place] [id number]

[address] [place] [address]

                          03 Aug, 2016              Type 2 diabetes mellitus wit

hout complication, without long-term 

current use of insulin E11.9

 

                          [place] [address] [address]

                          02 Aug, 2016              Dyspepsia R10.13 ; Upper abd

ominal pain R10.10 ; Bowel habit 

changes R19.4 ; History of leukocytosis Z86.2 and Helicobacter pylori (H. 
pylori) infection A04.8

 

                          [facility] IN [facility]  [address] [place] [id number]

[address] [place] [address] 

[address]                27 May, 2016              Environmental allergies Z91.

09

 

                          [facility]     [address] [id number]

[address] [place] [address]

                                        Left leg pain M79.605 ; Loca

lized swelling of lower leg R22.40 and 

Upper respiratory infection J06.9

 

                          [place] [address] [id number]

[address] [place] [address]

                          28 Dec, 2015               

 

                          [place] [address] [id number]

[address] [place] [address]

                          21 Dec, 2015              Left leg pain M79.605

 

                          [place] [address] [facility] [id number]

[address] [place] [address]

                          10 Dec, 2015              Left leg pain M79.605 and Lo

calized swelling of lower leg R22.40

 

                          [place] [address] [id number]

[address] [place] [address]

                          01 Dec, 2015              Left leg pain M79.605

 

                          [place] [address] [id number]

[address] [place] [address]

                                        Encounter for immunization Z

23

 

                          [place] [address] [facility] [id number]

[address] [place] [address]

                          12 Oct, 2015              Bronchitis J40

 

                          [place] [address] [facility] [id number]

[address] [place] [address]

                          30 Sep, 2015              Physical exam, pre-employmen

t V70.5 ; Encounter for PPD test V74.1 

and Hyperlipidemia 272.4

 

                          [place] [address] [id number]

[address] [place] [address]

                          21 Sep, 2015               

 

                          [place] [address] [id number]

[address] [place] [address]

                          21 Sep, 2015               

 

                          [place] [address] [facility] [id number]

[address] [place] [address]

                          09 Sep, 2015               

 

                          [place] [address] [facility] [id number]

[address] [place] [address]

                          04 Sep, 2015              Elevated blood sugar 790.29

 

                          [facility]     [address] [id number]

[address] [place] [address]

                          03 Sep, 2015              Elevated blood sugar 790.29

 

                          [facility]     [address] [facility] [id number]

[address] [place] [address]

                          03 Sep, 2015              Palpitations 785.1

 

                          [facility]     [address] [facility] [id number]

[address] [place] [address]

                          01 Sep, 2015              Palpitations 785.1

 

                          [facility]     [address] [id number]

[address] [place] [address]

                                         

 

                          [facility]     [address] [facility] [id number]

[address] [place] [address]

                          28 May, 2015              Hand pain, right 729.5 and D

epression with anxiety 300.4

 

                          [facility]     [address] [facility] [id number]

[address] [place] [address]

                                         

 

                          [facility]     [address] [id number]

[address] [place] [address]

                                         

 

                          [facility]     [address] [id number]

[address] [place] [address]

                          05 Mar, 2015               

 

                          [facility]     [address] [id number]

[address] [place] [address]

                          05 Mar, 2015               

 

                          [facility]     [address] [facility] [id number]

[address] [place] [address]

                                         

 

                          [facility]     [address] [id number]

[address] [place] [address]

                                         

 

                          [facility]     [address] [facility] [id number]

[address] [place] [address]

                          10 Feb, 2015               

 

                          [facility]     [address] [facility] [id number]

[address] [place] [address]

                          10 Feb, 2015               

 

                          [facility]     [address] [facility] [id number]

[address] [place] [address]

                                         

 

                          [facility]     [address] [id number]

[address] [place] [address], [place] [address]

                                         

 

                          [facility]     [address] [id number]

[address] [place] [address], [place] [address]

                                         

 

                          [facility]     [address] [id number]

[address] [place] [address], [place] [address]

                                         

 

                          [facility]     [address] [id number]

[address] [place] [address], [place] [address]

                                         

 

                          [facility]     [address] [id number]

[address] [place] [address], [place] [address]

                                         

 

                          [facility]     [address] [id number]

[address] [place] [address], [place] [address]

                                         

 

                          [facility]     [address] [id number]

[address] [place] [address], [place] [address]

                                         

 

                          [facility]     [address] [id number]

[address] [place] [address], [place] [address]

                                         

 

                          [facility]     [address] [id number]

[address] [place] [address], [place] [address]

                                         

 

                          [facility]     [address] [id number]

[address] [place] [address], [place] [address]

                                         

 

                          [facility]     [address] [id number]

[address] [place] [address], [place] [address]

                                         

 

                          [facility]     [address] [id number]

[address] [place] [address], [place] [address]

                          15 [place], 2015               

 

                          [facility]     [address] [id number]

[address] [place] [address], [place] [address]

                          15 [place], 2015               

 

                          [facility]     [address] [id number]

[address] [place] [address], [place] [address]

                                         

 

                          [facility]     [address] [id number]

[address] [place] [address], [place] [address]

                                         

 

                          [facility]     [address] [id number]

[address] [place] [address], [place] [address]

                          15 Oct, 2014               

 

                          [facility]     [address] [id number]

[address] [place] [address], [place] [address]

                          15 Oct, 2014               

 

                          [facility]     [address] [id number]

[address] [place] [address], [place] [address]

                          14 Oct, 2014               

 

                          [facility]     [address] [id number]

[address] [place] [address], [place] [address]

                          14 Oct, 2014               

 

                          [facility]     [address] [id number]

[address] [place] [address], [place] [address]

                          07 Oct, 2014               

 

                          [facility]     [address] [id number]

[address] [place] [address], [place] [address]

                          07 Oct, 2014               

 

                          [facility]     [address] [id number]

[address] [place] [address], [place] [address]

                          12 Sep,                

 

                          [facility]     [address] [id number]

[address] [place] [address], [place] [address]

                          12 Sep,                

 

                          [facility]     [address] [id number]

[address] [place] [address], [place] [address]

                          04 Sep,                

 

                          [facility]     [address] [id number]

[address] [place] [address], [place] [address]

                          03 Sep,                

 

                          [facility]     [address] [id number]

[address] [place] [address], [place] [address]

                          03 Sep,                

 

                          [facility]     [address] [id number]

[address] [place] [address], [place] [address]

                          02 Sep,                

 

                          [facility]     [address] [id number]

[address] [place] [address], [place] [address]

                          02 Sep,                

 

                          [facility]     [address] [id number]

[address] [place] [address], [place] [address]

                          30 Aug, 2014               

 

                          [facility]     [address] [id number]

[address] [place] [address], [place] [address]

                          30 Aug, 2014               

 

                          [facility]     [address] [id number]

[address] [place] [address], [place] [address]

                          19 Aug, 2014               

 

                          [facility]     [address] [id number]

[facility] [place] [address]

                          19 Aug, 2014               

 

                          [facility]     [address] [id number]

[facility] [place] [address]

                          14 Aug, 2014               

 

                          [facility]     [address] [id number]

[facility] [address]

                          14 Aug, 2014               

 

                          [facility]     [address] [id number]

[address] [place] [address], [place] [address]

                          13 Aug, 2014               

 

                          [facility]     [address] [id number]

[address] [place] [address], [place] [address]

                          13 Aug, 2014               

 

                          [facility]     [address] [id number]

[address] [place] [address], [place] [address]

                                         

 

                          [facility]     [address] [id number]

[address] [place] [address], [place] [address]

                                         

 

                          [facility]     [address] [id number]

[address] [place] [address], [place] [address]

                                         

 

                          [facility]     [address] [id number]

[address] [place] [address], [place] [address]

                                         

 

                          [facility]     [address] [id number]

[address] [place] [address], [place] [address]

                                         

 

                          [facility]     [address] [id number]

[address] [place] [address], [place] [address]

                                         

 

                          [facility]     [address] [id number]

[address] [place] [address], [place] [address]

                                         

 

                          [facility]     [address] [id number]

[address] [place] [address], [place] [address]

                                         

 

                          [facility]     [address] [id number]

[address] [place] [address], [place] [address]

                                         

 

                          [facility]     [address] [id number]

[address] [place] [address], [place] [address]

                                         

 

                          [facility]     [address] [id number]

[address] [place] [address], [place] [address]

                                         

 

                          [facility]     [address] [id number]

[address] [place] [address], [place] [address]

                          ,                

 

                          [facility]     [address] [id number]

[address] [place] [address], [place] [address]

                          ,                

 

                          [facility]     [address] [id number]

[address] [place] [address], [place] [address]

                          ,                

 

                          [facility]     [address] [id number]

[address] [place] [address], [place] [address]

                          ,                

 

                          [facility]     [address] [id number]

[address] [place] [address], [place] [address]

                          ,                

 

                          [facility]     [address] [id number]

[address] [place] [address], [place] [address]

                          ,                

 

                          [facility]     [address] [id number]

[address] [place] [address], [place] [address]

                          ,                

 

                          [facility]     [address] [id number]

[address] [place] [address], [place] [address]

                                         

 

                          [facility]     [address] [id number]

[address] [place] [address], [place] [address]

                                         

 

                          [facility]     [address] [id number]

[address] [place] [address], [place] [address]

                                         

 

                          [facility]     [address] [id number]

[address] [place] [address], [place] [address]

                                         

 

                          [facility]     [address] [id number]

[address] [place] [address], [place] [address]

                                         

 

                          [facility]     [address] [id number]

[address] [place] [address], [place] [address]

                                         

 

                          [facility]     [address] [id number]

[address] [place] [address], [place] [address]

                                         

 

                          [facility]     [address] [id number]

[address] [place] [address], [place] [address]

                                         

 

                          [facility]     [address] [id number]

[address] [place] [address]

                                         

 

                          [facility]     [address] [id number]

[address] [place] [address], [place] [address]

                          27 Mar, 2014               

 

                          [facility]     [address] [id number]

[address] [place] [address], [place] [address]

                          27 Mar, 2014               

 

                          [facility]     [address] [id number]

[address] [place] [address], [place] [address]

                                         

 

                          [facility]     [address] [id number]

[address] [place] [address], [place] [address]

                                         

 

                          [facility]     [address] [id number]

[address] [place] [address], [place] [address]

                                         

 

                          [facility]     [address] [id number]

[address] [place] [address], [place] [address]

                                         

 

                          [facility]     [address] [id number]

[address] [place] [address], [place] [address]

                          08 Oct, 2013               

 

                          [facility]     [address] [id number]

[address] [place] [address], [place] [address]

                          04 Oct, 2013               

 

                          [facility]     [address] [id number]

[address] [place] [address], [place] [address]

                          03 Oct, 2013               

 

                          [facility]     [address] [id number]

[address] [place] [address], [place] [address]

                          02 Oct, 2013               

 

                          [facility]     [address] [id number]

[address] [place] [address], [place] [address]

                          01 Oct, 2013               

 

                          [facility]     [address] [id number]

[address] [place] [address], [place] [address]

                          20 Sep, 2013               

 

                          [facility]     [address] [id number]

[address] [place] [address], [place] [address]

                          08 Aug, 2013               

 

                          [facility]     [address] [id number]

[address] [place] [address], [place] [address]

                          24 May, 2013               

 

                          [facility]     [address] [id number]

[address] [place] [address], [place] [address]

                          13 May, 2013               

 

                          [facility]     [address] [id number]

[address] [place] [address], [place] [address]

                          14 2013               

 

                          [facility]     [address] [id number]

[address] [place] [address], [place] [address]

                                         

 

                          [facility]     [address] [id number]

[address] [place] [address], [place] [address]

                                         

 

                          [facility]     [address] [id number]

[address] [place] [address], [place] [address]

                                         

 

                          [facility]     [address] [id number]

[address] [place] [address], [place] [address]

                          02 Oct, 2012               

 

                          [facility]     [address] [id number]

[address] [place] [address], [place] [address]

                          02 Oct, 2012               

 

                          [facility]     [address] [id number]

[address] [place] [address], [place] [address]

                          21 Sep, 2012               

 

                          [facility]     [address] [id number]

[address] [place] [address], [place] [address]

                          06 Aug, 2012               

 

                          [facility]     [address] [id number]

[address] [place] [address], [place] [address]

                          02 Aug, 2012               

 

                          [facility]     [address] [id number]

[address] [place] [address], [place] [address]

                                         

 

                          [facility]     [address] [id number]

[address] [place] [address], [place] [address]

                                         

 

                          [facility]     [address] [id number]

[address] [place] [address], [place] [address]

                          15 Eusebio, 2012               

 

                          [facility]     [address] [id number]

[address] [place] [address], [place] [address]

                                         

 

                          [facility]     [address] [id number]

[address] [place] [address], [place] [address]

                          10 Apr, 2012               

 

                          [facility]     [address] [id number]

[address] [place] [address], [place] [address]

                          22 Mar, 2012               

 

                          [facility]     [address] [id number]

[address] [place] [address], [place] [address]

                          20 Mar, 2012               

 

                          [facility]     [address] [facility] [id number]

[address] [address]

                          14 Mar, 2012               

 

                          [facility]     [address] [facility] [id number]

[address], [place] [address]

                                         







IMMUNIZATIONS

No Known Immunizations



SOCIAL HISTORY

Never Assessed



REASON FOR VISIT

[facility]



PLAN OF CARE





VITAL SIGNS





MEDICATIONS

Unknown Medications



RESULTS

No Results



PROCEDURES

No Known procedures



INSTRUCTIONS





MEDICATIONS ADMINISTERED

No Known Medications



MEDICAL (GENERAL) HISTORY





                    Type                Description         Date

 

                          Medical History           allergic rhinitis- rec's edwige

kly allergy injections from Dr Rojas office                            

 

                    Medical History     mood disorder        

 

                    Medical History     Leukocytosis- has been to hematology  

 

                    Medical History     Hyperlipidemia       

 

                          Medical History           Left leg pain- multiple imag

ing performed and ortho referral 

placed                                   

 

                          Medical History           TUBULAR ADENOMA AND GASTRITI

S ON COLONOSOCPY AUG 2016 -MULTIPLE 

POLYPS                                   

 

                    Medical History     Helicobacter pylori (H. pylori) infectio

n Hx  

 

                    Surgical History    cholecystectomy     2006

 

                    Surgical History     section    , 

 

                    Surgical History    tonsillectomy       2015

 

                    Surgical History    colonoscopy         2016

 

                    Surgical History    colonscopy          2017

 

                    Surgical History    Right Knee scope    2017

## 2020-09-22 NOTE — OPERATIVE REPORT
DATE OF SERVICE:  09/22/2020



PREOPERATIVE DIAGNOSES:

Gastroesophageal reflux disease, change in bowel habits.



POSTOPERATIVE DIAGNOSES:

Hiatal hernia, normal colon.



PROCEDURE:

EGD with biopsies, colonoscopy.



SURGEON:

Iván Ovalle DO



ANESTHESIA:

Per CRNA.



ESTIMATED BLOOD LOSS:

None.



COMPLICATIONS:

None.



INDICATIONS:

The patient is a 45-year-old female with GERD symptoms and change in bowel

habits.  She understands risks and benefits of procedure and wished to proceed

with procedures.  Consent was signed in the chart.



DESCRIPTION OF PROCEDURE:

The patient was taken to the endoscopy suite, placed in left lateral recumbent

position.  Timeout was performed.  Scope was inserted in mouth, down the

esophagus, stomach and into the duodenum without difficulty.  There were no

polyps, masses or ulcerations within the duodenum.  Scope was slowly retracted

back into the stomach where it was further insufflated.  No polyps, masses or

ulcerations.  Biopsy of the antrum was obtained.  Scope was retroflexed noting a

small hiatal hernia, no other pathology.  Scope was returned to its normal

position, slowly withdrawn to distal esophagus.  Biopsy of the GE junction was

obtained.  No polyps, masses or ulcerations.  Scope was slowly retracted back

until completely removed.  The patient tolerated procedure well.  Digital rectal

exam was performed.  There were no palpable polyps, masses or ulcerations. 

Scope was inserted in the rectum and advanced all the way to cecum with minimal

difficulty.  Prep was adequate.  Scope was then slowly retracted back.  No

polyps, masses or ulcerations within the cecum, ascending, transverse,

descending and sigmoid colon.  Once in the rectum, scope was retroflexed noting

no other pathology.  The scope was then slowly retracted back until completely

removed.  The patient tolerated procedure well without any complications.  She

was taken to recovery room in stable condition.



RECOMMENDATIONS:

The patient to follow up in the office in two to three weeks.  We will continue

on current medications.  The patient will follow up and go over pathology.  We

would recommend high fiber diet.  The patient will need screening colonoscopy

per routine guidelines.  Any changes before that be seen at that time.





Job ID: [id number]

DocumentID: [id number]

Dictated Date:  09/22/2020 13:25:11

Transcription Date: 09/22/2020 15:24:30

Dictated By: IVÁN OVALLE DO

## 2020-09-22 NOTE — DISCHARGE INST-SIMPLE/STANDARD
Discharge Inst-Standard


Patient Instructions/Follow Up


Plan of Care/Instructions/FU:  


2-3 weeks Vasquez


Activity as Tolerated:  Yes


Discharge Diet:  Regular Diet











IVÁN HOLLAND DO              Sep 22, 2020 13:21

## 2020-09-22 NOTE — ANESTHESIA-GENERAL POST-OP
MAC


Patient Condition


Mental Status/LOC:  Same as Preop


Cardiovascular:  Satisfactory


Nausea/Vomiting:  Absent


Respiratory:  Satisfactory


Pain:  Controlled


Complications:  Absent





Post Op Complications


Complications


None





Follow Up Care/Instructions


Patient Instructions


None needed.





Anesthesiology Discharge Order


Discharge Order


Patient is doing well, no complaints, stable vital signs, no apparent adverse 

anesthesia problems.   


No complications reported per nursing.











AJIT KOENIG CRNA         Sep 22, 2020 18:33

## 2020-09-22 NOTE — PROGRESS NOTE-PRE OPERATIVE
Pre-Operative Progress Note


H&P Reviewed


The H&P was reviewed, patient examined and no changes noted.


Date Seen by Provider:  Sep 22, 2020


Time Seen by Provider:  11:17


Date H&P Reviewed:  Sep 22, 2020


Time H&P Reviewed:  11:17


Pre-Operative Diagnosis:  gerd, change in bowel habits











IVÁN HOLLAND DO              Sep 22, 2020 11:17

## 2020-09-22 NOTE — PROGRESS NOTE-POST OPERATIVE
Post-Operative Progess Note


Surgeon (s)/Assistant (s)


Surgeon


IVÁN HOLLAND DO


Assistant:  na





Pre-Operative Diagnosis


gerd, change in bowel habits





Post-Operative Diagnosis





hiatal hernia





Procedure & Operative Findings


Date of Procedure


9/22/20


Procedure Performed/Findings


egd c biopsies, colonoscop


Anesthesia Type


per crna





Estimated Blood Loss


Estimated blood loss (mL):  none





Specimens/Packing


Specimens Removed


antrum, ge











IVÁN HOLLAND DO              Sep 22, 2020 13:20

## 2020-11-03 ENCOUNTER — HOSPITAL ENCOUNTER (EMERGENCY)
Dept: HOSPITAL 75 - ER | Age: 45
Discharge: HOME | End: 2020-11-03
Payer: COMMERCIAL

## 2020-11-03 VITALS — SYSTOLIC BLOOD PRESSURE: 132 MMHG | DIASTOLIC BLOOD PRESSURE: 82 MMHG

## 2020-11-03 VITALS — BODY MASS INDEX: 34.12 KG/M2 | WEIGHT: 230.38 LBS | HEIGHT: 68.98 IN

## 2020-11-03 DIAGNOSIS — Z88.0: ICD-10-CM

## 2020-11-03 DIAGNOSIS — E78.00: ICD-10-CM

## 2020-11-03 DIAGNOSIS — Z82.49: ICD-10-CM

## 2020-11-03 DIAGNOSIS — K21.9: ICD-10-CM

## 2020-11-03 DIAGNOSIS — T38.3X1A: Primary | ICD-10-CM

## 2020-11-03 DIAGNOSIS — Z79.84: ICD-10-CM

## 2020-11-03 DIAGNOSIS — Z95.9: ICD-10-CM

## 2020-11-03 DIAGNOSIS — E11.9: ICD-10-CM

## 2020-11-03 DIAGNOSIS — Z87.891: ICD-10-CM

## 2020-11-03 DIAGNOSIS — F41.9: ICD-10-CM

## 2020-11-03 DIAGNOSIS — Z83.3: ICD-10-CM

## 2020-11-03 LAB
BUN/CREAT SERPL: 9
CALCIUM SERPL-MCNC: 8.9 MG/DL (ref 8.5–10.1)
CHLORIDE SERPL-SCNC: 104 MMOL/L (ref 98–107)
CO2 SERPL-SCNC: 22 MMOL/L (ref 21–32)
CREAT SERPL-MCNC: 0.77 MG/DL (ref 0.6–1.3)
GFR SERPLBLD BASED ON 1.73 SQ M-ARVRAT: > 60 ML/MIN
GLUCOSE SERPL-MCNC: 205 MG/DL (ref 70–105)
POTASSIUM SERPL-SCNC: 3.9 MMOL/L (ref 3.6–5)
SODIUM SERPL-SCNC: 137 MMOL/L (ref 135–145)

## 2020-11-03 PROCEDURE — 36415 COLL VENOUS BLD VENIPUNCTURE: CPT

## 2020-11-03 PROCEDURE — 82962 GLUCOSE BLOOD TEST: CPT

## 2020-11-03 PROCEDURE — 80048 BASIC METABOLIC PNL TOTAL CA: CPT

## 2020-11-03 NOTE — ED GENERAL
General


Chief Complaint:  General Problems/Pain


Stated Complaint:  ACCIDENTAL OD


Nursing Triage Note:  


AMB TO ROOM  WAS SENT BY POSION CONTROL DUE TO  ACCIDENTALLY  TAKING HER EXTRA 


MORNIG MEDS. POSION CONTROL CONCERNED THAT SHE TOOK HAS TAKEN A TOTAL OF 2 


GLIPIZIDE 10MG. AND METFORMIN 500MG 4 TAB'S IN TOTAL. SUGGEST  THAT PATIENT  BE 


OBSERVED  AND BSFS DONE.  BLOOD SUGAR ON ADMIT  PATIENT REPORTS SHE ATE A




HANDFULL OF SKITTLES PTA


Nursing Sepsis Screen:  No Definite Risk


Source of Information:  Patient


Exam Limitations:  No Limitations





History of Present Illness


Date Seen by Provider:  Nov 3, 2020


Time Seen by Provider:  01:15


Initial Comments


This 45-year-old woman presents to the emergency room with concerns about 

accidental overdose of glipizide and metformin.  This morning she accidentally 

took a double dose of each totaling 20 mg of glipizide and 2000 mg of Metformin.

 She contacted poison control and they advised her to come to the emergency 

room.





Allergies and Home Medications


Allergies


Coded Allergies:  


     Penicillins (Verified  Allergy, Severe, SEVERE DEHYDRATION, 9/15/20)





Home Medications


Cimetidine 200 Mg Tablet, 200 MG PO HS, (Reported)


Fexofenadine HCl 180 Mg Tablet, 180 MG PO DAILY, (Reported)


Glipizide 10 Mg Tablet, 10 MG PO BID, (Reported)


Metformin HCl 500 Mg Tablet, 1,000 MG PO BID, (Reported)


Omeprazole 20 Mg Capsule.dr, 20 MG PO BID, (Reported)


Prenatal Vit No.124/Iron/FA 1 Each Tablet, 1 EACH PO DAILY, (Reported)


Rosuvastatin Calcium 10 Mg Tablet, 10 MG PO DAILY, (Reported)


Sertraline HCl 50 Mg Tablet, 50 MG PO DAILY, (Reported)





Patient Home Medication List


Home Medication List Reviewed:  Yes





Review of Systems


Review of Systems


Constitutional:  no symptoms reported


EENTM:  no symptoms reported


Respiratory:  no symptoms reported


Cardiovascular:  no symptoms reported


Gastrointestinal:  no symptoms reported


Genitourinary:  no symptoms reported


Pregnant:  No


Musculoskeletal:  no symptoms reported


Skin:  no symptoms reported


Psychiatric/Neurological:  No Symptoms Reported


Hematologic/Lymphatic:  No Symptoms Reported


Immunological/Allergic:  no symptoms reported





Past Medical-Social-Family Hx


Past Med/Social Hx:  Reviewed Nursing Past Med/Soc Hx


Patient Social History


Alcohol Use:  Denies Use


Recreational Drug Use:  No


Smoking Status:  Former Smoker


Type Used:  Cigarettes


Former Smoker, Quit:  Sep 15, 2018


2nd Hand Smoke Exposure:  Yes


Recent Foreign Travel:  No


Contact w/Someone Who Travel:  No


Recent Infectious Disease Expo:  No


Recent Hopitalizations:  Yes





Immunizations Up To Date


Tetanus Booster (TDap):  Less than 5yrs


Date of Influenza Vaccine:  Oct 7, 2019





Seasonal Allergies


Seasonal Allergies:  Yes (MILD)





Past Medical History


Surgeries:  Yes (heart cath, r knee scope)


Gallbladder, Tonsillectomy


Respiratory:  No


Currently Using CPAP:  No


Currently Using BIPAP:  No


Cardiac:  Yes


High Cholesterol


Neurological:  No


Reproductive Disorders:  No


Sexually Transmitted Disease:  No


HIV/AIDS:  No


Genitourinary:  No


Gastrointestinal:  Yes


Gastroesophageal Reflux, Chronic Constipation, Chronic Diarrhea, Polyps


Musculoskeletal:  No


Endocrine:  Yes


Diabetes, Non-Insulin dep


HEENT:  Yes (GLASSES, DENTURES)


Loss of Vision:  Denies


Hearing Impairment:  Denies


Cancer:  No


Psychosocial:  Yes


Anxiety


Integumentary:  No


Blood Disorders:  No


Adverse Reaction/Blood Tranf:  No (N/A)





Family Medical History


Heart Disease, Diabetes, Psychiatric Problems





Physical Exam


Vital Signs





Vital Signs - First Documented








 11/3/20





 08:12


 


Temp 36.5


 


Pulse 66


 


Resp 18


 


B/P (MAP) 136/79 (98)


 


Pulse Ox 98


 


O2 Delivery Room Air





Capillary Refill : Less Than 3 Seconds


Height, Weight, BMI


Height: 5'9.00"


Weight: 212lbs. 0.0oz. 96.300345ft; 34.00 BMI


Method:


General Appearance:  No Apparent Distress, WD/WN


HEENT:  PERRL/EOMI, Normal ENT Inspection


Neck:  Normal Inspection


Respiratory:  Lungs Clear, Normal Breath Sounds, No Accessory Muscle Use


Cardiovascular:  Regular Rate, Rhythm, No Edema, No Murmur


Extremity:  Normal Inspection, No Pedal Edema


Neurologic/Psychiatric:  Alert, Oriented x3, No Motor/Sensory Deficits, Normal 

Mood/Affect





Progress/Results/Core Measures


Suspected Sepsis


Recent Fever Within 48 Hours:  No


Infection Criteria Present:  None


New/Unexplained  Altered Menta:  No


Sepsis Screen:  No Definite Risk


SIRS


Temperature: 


Pulse: 66 


Respiratory Rate: 18


 


Blood Pressure 136 /79 


Mean: 98


 


Laboratory Tests


11/3/20 08:18: Creatinine 0.77








Results/Orders


Lab Results





Laboratory Tests








Test


 11/3/20


12:59 11/3/20


14:07 11/3/20


15:41 Range/Units


 


 


Glucometer 130 H 118 H 140 H   MG/DL








My Orders





Vital Signs/I&O


Capillary Refill : Less Than 3 Seconds








Blood Pressure Mean:                    98











Point of Care Testing


Finger Stick Blood Glucose:  193


Blood Glucose Action Taken:  DR NOTIFIED


Progress Note #1:  


   Time:  09:20


Progress Note


Second blood sugar was 171.  Patient is receiving a liter of IV normal saline 

and she has eaten a meal tray.


Progress Note #2:  


   Time:  12:00


Progress Note


Patient was observed for several hours.  She was given breakfast and lunch.  

Blood sugars gradually trended down but then eventually started trending up.  At

that point she was discharged home.  She committed to having a friend help watch

her for the next several hours.  She will also be checking her blood sugars 

closely at home.





Departure


Impression





   Primary Impression:  


   Medication overdose


   Qualified Codes:  T50.901A - Poisoning by unspecified drugs, medicaments and 

   biological substances, accidental (unintentional), initial encounter


Disposition:  01 HOME, SELF-CARE


Condition:  Stable





Departure-Patient Inst.


Decision time for Depature:  14:59


Referrals:  


[facility] (PCP)


Primary Care Physician








VILMA DELGADO (Family)


Primary Care Physician


Patient Instructions:  Accidental Overdose (DC)





Add. Discharge Instructions:  


Check your blood sugars hourly until at least 7 PM.  Keep sugary food and 

beverages on hand to help correct lower blood sugars.  If your blood sugar dips 

below 70, return to the emergency room or call 911.


Please do not be alone during this time period and do not fall asleep.


Call or return to care if you have any further problems or concerns.


Eat a supper high in carbohydrates to keep your blood sugars up through the 

night.  Keep sugary snacks and/or beverages near your bed tonight if needed.





All discharge instructions reviewed with patient and/or family. Voiced 

understanding.





Copy


Copies To 1:   MARY GARCIA JOSHUA T MD         Nov 3, 2020 09:20

## 2021-12-31 ENCOUNTER — HOSPITAL ENCOUNTER (OUTPATIENT)
Dept: HOSPITAL 75 - RAD | Age: 46
End: 2021-12-31
Attending: PHYSICIAN ASSISTANT
Payer: COMMERCIAL

## 2021-12-31 DIAGNOSIS — Z12.31: Primary | ICD-10-CM

## 2021-12-31 PROCEDURE — 77067 SCR MAMMO BI INCL CAD: CPT

## 2021-12-31 PROCEDURE — 77063 BREAST TOMOSYNTHESIS BI: CPT

## 2021-12-31 NOTE — DIAGNOSTIC IMAGING REPORT
Digital mammogram bilateral screening.



COMPARISON: This study was compared to the prior exam of

02/09/2018. 



At this time, there are no current complaints. 



The current study was also evaluated with a Computer Aided

Detection (CAD) system.



FINDINGS: The fibroglandular tissue in both breasts is

heterogeneously dense. This does limit the sensitivity of this

exam. Overall, there does not appear to have been any significant

change when compared to the prior study. No primary or secondary

sign of malignancy is noted.



IMPRESSION: 

1. There is no radiographic evidence for malignancy.

2. The patient should have her annual bilateral screening

mammogram on schedule in December of 2



ACR BI-RADS Category 1: Negative.

Result letter will be mailed to the patient.

Note:  At least 10% of breast cancer is not imaged by

mammography.



Dictated by: 



  Dictated on workstation # [id number]

## 2022-08-27 ENCOUNTER — HOSPITAL ENCOUNTER (EMERGENCY)
Dept: HOSPITAL 75 - ER | Age: 47
Discharge: HOME | End: 2022-08-27
Payer: COMMERCIAL

## 2022-08-27 VITALS — SYSTOLIC BLOOD PRESSURE: 140 MMHG | DIASTOLIC BLOOD PRESSURE: 74 MMHG

## 2022-08-27 VITALS — HEIGHT: 69.02 IN | BODY MASS INDEX: 28.11 KG/M2 | WEIGHT: 189.82 LBS

## 2022-08-27 DIAGNOSIS — F17.210: ICD-10-CM

## 2022-08-27 DIAGNOSIS — Z20.822: ICD-10-CM

## 2022-08-27 DIAGNOSIS — G43.909: Primary | ICD-10-CM

## 2022-08-27 LAB
ALBUMIN SERPL-MCNC: 3.9 GM/DL (ref 3.2–4.5)
ALP SERPL-CCNC: 48 U/L (ref 40–136)
ALT SERPL-CCNC: 13 U/L (ref 0–55)
BASOPHILS # BLD AUTO: 0.1 10^3/UL (ref 0–0.1)
BASOPHILS NFR BLD AUTO: 1 % (ref 0–10)
BILIRUB SERPL-MCNC: 0.6 MG/DL (ref 0.1–1)
BUN/CREAT SERPL: 10
CALCIUM SERPL-MCNC: 9.3 MG/DL (ref 8.5–10.1)
CHLORIDE SERPL-SCNC: 105 MMOL/L (ref 98–107)
CO2 SERPL-SCNC: 21 MMOL/L (ref 21–32)
CREAT SERPL-MCNC: 0.7 MG/DL (ref 0.6–1.3)
EOSINOPHIL # BLD AUTO: 0.5 10^3/UL (ref 0–0.3)
EOSINOPHIL NFR BLD AUTO: 4 % (ref 0–10)
GFR SERPLBLD BASED ON 1.73 SQ M-ARVRAT: 108 ML/MIN
GLUCOSE SERPL-MCNC: 213 MG/DL (ref 70–105)
HCT VFR BLD CALC: 45 % (ref 35–52)
HGB BLD-MCNC: 15.6 G/DL (ref 11.5–16)
LYMPHOCYTES # BLD AUTO: 3.1 10^3/UL (ref 1–4)
LYMPHOCYTES NFR BLD AUTO: 23 % (ref 12–44)
MANUAL DIFFERENTIAL PERFORMED BLD QL: NO
MCH RBC QN AUTO: 30 PG (ref 25–34)
MCHC RBC AUTO-ENTMCNC: 34 G/DL (ref 32–36)
MCV RBC AUTO: 87 FL (ref 80–99)
MONOCYTES # BLD AUTO: 0.8 10^3/UL (ref 0–1)
MONOCYTES NFR BLD AUTO: 6 % (ref 0–12)
NEUTROPHILS # BLD AUTO: 9 10^3/UL (ref 1.8–7.8)
NEUTROPHILS NFR BLD AUTO: 66 % (ref 42–75)
PLATELET # BLD: 330 10^3/UL (ref 130–400)
PMV BLD AUTO: 8.9 FL (ref 9–12.2)
POTASSIUM SERPL-SCNC: 3.7 MMOL/L (ref 3.6–5)
PROT SERPL-MCNC: 7.2 GM/DL (ref 6.4–8.2)
SODIUM SERPL-SCNC: 137 MMOL/L (ref 135–145)
WBC # BLD AUTO: 13.5 10^3/UL (ref 4.3–11)

## 2022-08-27 PROCEDURE — 85025 COMPLETE CBC W/AUTO DIFF WBC: CPT

## 2022-08-27 PROCEDURE — 80053 COMPREHEN METABOLIC PANEL: CPT

## 2022-08-27 PROCEDURE — 86141 C-REACTIVE PROTEIN HS: CPT

## 2022-08-27 PROCEDURE — 36415 COLL VENOUS BLD VENIPUNCTURE: CPT

## 2022-08-27 PROCEDURE — 87636 SARSCOV2 & INF A&B AMP PRB: CPT

## 2022-08-27 NOTE — ED HEADACHE
General


Chief Complaint:  Head/Cervical Problems


Stated Complaint:  LEFT SIDE HEAD PAIN/NAUSEA


Nursing Triage Note:  


PT AMB TO [facility] WITH COMPLAINT OF LEFTSIDED HEADACHE AND NECK PAIN. STARTED ABOUT




AN HOUR AND A HALF PTA. STATES SHE IS NAUSEATED. TOOK SOME TYLENOL AND ALLERGY 


MEDICINE AT HOME. STATES FAMILY MEMBER WAS DIAGNOSED WITH COVID ON WEDNESDAY.





History of Present Illness


Date Seen by Provider:  Aug 27, 2022


Time Seen by Provider:  18:20


Initial Comments


, Patient to the ER by private conveyance with her daughter and chief complaint 

that she is having about 1-1/2 hours of headache, muscle aches and has a known 

sick contact with COVID-19 in her household.  She took Tylenol hour and a half 

ago 1000 mg without significant improvement in her symptoms.  She is having 

nausea.  She does not have anything for nausea at home.  She has had a little 

retching.  No unsafe water or food, travel outside the [place].  She is not having

any diarrhea dysuria.  She has a distant history of GERD and migraines.  She 

does not take anything routinely for migraines.  Her pain is all in her left 

head.  Radiates down into her neck.  She is not having stiffness in her neck or 

inability to move.  No fevers or chills.





Allergies and Home Medications


Allergies


Coded Allergies:  


     Penicillins (Verified  Allergy, Severe, SEVERE DEHYDRATION, 9/15/20)





Patient Home Medication List


Home Medication List Reviewed:  Yes


Cimetidine (Tagamet Hb) 200 Mg Tablet, 200 MG PO HS, (Reported)


   Entered as Reported by: HINA JEAN on 9/15/20 1113


Fexofenadine HCl (Allegra Allergy) 180 Mg Tablet, 180 MG PO DAILY, (Reported)


   Entered as Reported by: BETTE GAYTAN on 3/28/17 0942


Glipizide (Glipizide) 10 Mg Tablet, 10 MG PO BID, (Reported)


   Entered as Reported by: HINA JEAN on 9/15/20 1113


Metformin HCl (Metformin HCl) 500 Mg Tablet, 1,000 MG PO BID, (Reported)


   Entered as Reported by: HINA JEAN on 9/15/20 1113


Omeprazole (Omeprazole) 20 Mg Capsule.dr, 20 MG PO BID, (Reported)


   Entered as Reported by: HINA JEAN on 9/15/20 1113


Prenatal Vit No.124/Iron/FA (Prenatal Vitamin Tablet) 1 Each Tablet, 1 EACH PO 

DAILY, (Reported)


   Entered as Reported by: HINA JEAN on 9/15/20 1113


Rosuvastatin Calcium (Crestor) 10 Mg Tablet, 10 MG PO DAILY, (Reported)


   Entered as Reported by: HINA JEAN on 9/15/20 1113


Sertraline HCl (Sertraline HCl) 50 Mg Tablet, 50 MG PO DAILY, (Reported)


   Entered as Reported by: GARRY CORTEZ on 6/23/20 1005





Review of Systems


Review of Systems


Constitutional:  No chills, No diaphoresis; malaise, weakness


Eyes:  Denies Blindness, Denies Blurred Vision


Ears, Nose, Mouth, Throat:  denies ear pain, denies ear discharge


Respiratory:  No cough, No short of breath


Cardiovascular:  No edema, No palpitations


Gastrointestinal:  No see HPI, No abdominal pain, No constipation, No diarrhea; 

nausea, vomiting


Genitourinary:  No discharge, No dysuria


Musculoskeletal:  No back pain, No joint pain; neck pain





All Other Systems Reviewed


Negative Unless Noted:  Yes





Past Medical-Social-Family Hx


Patient Social History


Tobacco Use?:  Yes


Tobacco type used:  Cigarettes


Smoking Status:  Current Everyday Smoker


Use of E-Cig and/or Vaping dev:  No


Substance use?:  No


Alcohol Use?:  Yes


Alcohol Frequency:  Once in a while


Pt feels they are or have been:  No





Immunizations Up To Date


Tetanus Booster (TDap):  Less than 5yrs


Influenza Vaccine Up-to-Date:  No; Not Current


First/Initial COVID19 Vaccinat:  2021


Second COVID19 Vaccination Darío:  2021





Seasonal Allergies


Seasonal Allergies:  Yes (MILD)





Past Medical History


Surgeries:  Yes (heart cath, r knee scope)


Gallbladder, Tonsillectomy


Respiratory:  No


Currently Using CPAP:  No


Currently Using BIPAP:  No


Cardiac:  Yes


High Cholesterol


Neurological:  No


Reproductive Disorders:  No


Sexually Transmitted Disease:  No


HIV/AIDS:  No


Genitourinary:  No


Gastrointestinal:  Yes


Gastroesophageal Reflux, Chronic Constipation, Chronic Diarrhea, Polyps


Musculoskeletal:  No


Endocrine:  Yes


Diabetes, Non-Insulin dep


HEENT:  Yes (GLASSES, DENTURES)


Loss of Vision:  Denies


Hearing Impairment:  Denies


Cancer:  No


Psychosocial:  Yes


Anxiety


Integumentary:  No


Blood Disorders:  No


Adverse Reaction/Blood Tranf:  No (N/A)





Family Medical History


Heart Disease, Diabetes, Psychiatric Problems





Physical Exam


Vital Signs





Vital Signs - First Documented








 8/27/22





 18:20


 


Temp 36.5


 


Pulse 62


 


Resp 19


 


B/P (MAP) 155/92 (113)


 


Pulse Ox 98


 


O2 Delivery Room Air





Capillary Refill : Less Than 3 Seconds


Height, Weight, BMI


Height: 5'9.00"


Weight: 212lbs. 0.0oz. [id number]; 28.00 BMI


Method:


General Appearance:  WD/WN, no apparent distress


HEENT:  PERRL/EOMI, normal ENT inspection, TMs normal, pharynx normal


Neck:  full range of motion, normal inspection


Cardiovascular:  normal peripheral pulses, regular rate, rhythm


Respiratory:  lungs clear, normal breath sounds, no respiratory distress, no 

accessory muscle use


Gastrointestinal:  normal bowel sounds, non tender, soft


Extremities:  non-tender, normal inspection


Psychiatric:  alert, oriented x 3


Skin:  normal color, warm/dry





Progress/Results/Core Measures


Results/Orders


Lab Results





Laboratory Tests








Test


 8/27/22


18:27 8/27/22


18:29 Range/Units


 


 


White Blood Count


 13.5 H


 


 4.3-11.0


10^3/uL


 


Red Blood Count


 5.19 H


 


 3.80-5.11


10^6/uL


 


Hemoglobin 15.6   11.5-16.0  g/dL


 


Hematocrit 45   35-52  %


 


Mean Corpuscular Volume 87   80-99  fL


 


Mean Corpuscular Hemoglobin 30   25-34  pg


 


Mean Corpuscular Hemoglobin


Concent 34 


 


 32-36  g/dL





 


Red Cell Distribution Width 12.3   10.0-14.5  %


 


Platelet Count


 330 


 


 130-400


10^3/uL


 


Mean Platelet Volume 8.9 L  9.0-12.2  fL


 


Immature Granulocyte % (Auto) 1    %


 


Neutrophils (%) (Auto) 66   42-75  %


 


Lymphocytes (%) (Auto) 23   12-44  %


 


Monocytes (%) (Auto) 6   0-12  %


 


Eosinophils (%) (Auto) 4   0-10  %


 


Basophils (%) (Auto) 1   0-10  %


 


Neutrophils # (Auto)


 9.0 H


 


 1.8-7.8


10^3/uL


 


Lymphocytes # (Auto)


 3.1 


 


 1.0-4.0


10^3/uL


 


Monocytes # (Auto)


 0.8 


 


 0.0-1.0


10^3/uL


 


Eosinophils # (Auto)


 0.5 H


 


 0.0-0.3


10^3/uL


 


Basophils # (Auto)


 0.1 


 


 0.0-0.1


10^3/uL


 


Immature Granulocyte # (Auto)


 0.1 


 


 0.0-0.1


10^3/uL


 


Sodium Level 137   135-145  MMOL/L


 


Potassium Level 3.7   3.6-5.0  MMOL/L


 


Chloride Level 105     MMOL/L


 


Carbon Dioxide Level 21   21-32  MMOL/L


 


Anion Gap 11   5-14  MMOL/L


 


Blood Urea Nitrogen 7   7-18  MG/DL


 


Creatinine


 0.70 


 


 0.60-1.30


MG/DL


 


Estimat Glomerular Filtration


Rate 108 


 


  





 


BUN/Creatinine Ratio 10    


 


Glucose Level 213 H    MG/DL


 


Calcium Level 9.3   8.5-10.1  MG/DL


 


Corrected Calcium 9.4   8.5-10.1  MG/DL


 


Total Bilirubin 0.6   0.1-1.0  MG/DL


 


Aspartate Amino Transf


(AST/SGOT) 14 


 


 5-34  U/L





 


Alanine Aminotransferase


(ALT/SGPT) 13 


 


 0-55  U/L





 


Alkaline Phosphatase 48     U/L


 


C-Reactive Protein High


Sensitivity 0.26 


 


 0.00-0.50


MG/DL


 


Total Protein 7.2   6.4-8.2  GM/DL


 


Albumin 3.9   3.2-4.5  GM/DL


 


Influenza Type A (RT-PCR)  Not Detected  Not Detecte  


 


Influenza Type B (RT-PCR)  Not Detected  Not Detecte  


 


SARS-CoV-2 RNA (RT-PCR)  Not Detected  Not Detecte  








My Orders





Orders - GANESH MARTI


Ketorolac Injection (Toradol Injection) (8/27/22 18:45)


Ondansetron Injection (Zofran Injectio (8/27/22 18:45)


Cbc With Automated Diff (8/27/22 18:37)


Comprehensive Metabolic Panel (8/27/22 18:37)


Hs C Reactive Protein (8/27/22 18:37)


Covid 19 Inhouse Test (8/27/22 18:37)


Influenza A And B By Pcr (8/27/22 18:37)





Medications Given in ED





Current Medications








 Medications  Dose


 Ordered  Sig/James


 Route  Start Time


 Stop Time Status Last Admin


Dose Admin


 


 Ketorolac


 Tromethamine  30 mg  ONCE  ONCE


 IVP  8/27/22 18:45


 8/27/22 18:46 DC 8/27/22 18:55


30 MG


 


 Ondansetron HCl  8 mg  ONCE  ONCE


 IVP  8/27/22 18:45


 8/27/22 18:46 DC 8/27/22 18:55


8 MG








Vital Signs/I&O











 8/27/22





 18:20


 


Temp 36.5


 


Pulse 62


 


Resp 19


 


B/P (MAP) 155/92 (113)


 


Pulse Ox 98


 


O2 Delivery Room Air














Blood Pressure Mean:                    113











Progress


Progress Note #1:  


   Time:  18:48


Progress Note


Check some labs for a white count and markers inflammation.  We will check COVID

swab.  We turned the light down and gave her an injection of Toradol and 

ondansetron through her IV.  She appears to be well-hydrated and has normal 

vital signs.  She does not have clinical evidence of meningismus.


Progress Note #2:  


   Time:  20:29


Progress Note


The patient's headache is significant proved.  Her nausea is gone.  We will 

provide her with some ondansetron ODT for home.





Departure


Impression





   Primary Impression:  


   Migraine


   Qualified Codes:  G43.009 - Migraine without aura, not intractable, without 

   status migrainosus


Disposition:  01 HOME, SELF-CARE


Condition:  Improved





Departure-Patient Inst.


Decision time for Depature:  20:29


Referrals:  


[facility] [place] (PCP/Family)


Primary Care Physician


Patient Instructions:  Headache, Adult (DC)





Add. Discharge Instructions:  


Tylenol 1000 mg every 8 hours needed for pain.


Ibuprofen 800 mg every 8 hours needed for pain.


Ondansetron 1 to 2 tablets every 6 hours needed for nausea and/or vomiting.





All discharge instructions reviewed with patient and/or family. Voiced 

understanding.


Scripts


Ondansetron (Ondansetron Odt) 4 Mg Tab.rapdis


4-8 MG PO Q6H PRN for NAUSEA/VOMITING-1ST LINE, #12 TAB 0 Refills


   Prov: GANESH MARTI         8/27/22


Work/School Note:  Work Release Form   Date Seen in the Emergency Department:  

Aug 27, 2022


   Return to Work:  Sep 4, 2022


   Restrictions:  Return-No Fever (24hrs)











GANESH MARTI                 Aug 27, 2022 18:49